# Patient Record
Sex: MALE | Race: WHITE | Employment: OTHER | URBAN - METROPOLITAN AREA
[De-identification: names, ages, dates, MRNs, and addresses within clinical notes are randomized per-mention and may not be internally consistent; named-entity substitution may affect disease eponyms.]

---

## 2017-10-31 ENCOUNTER — HOSPITAL ENCOUNTER (INPATIENT)
Age: 62
LOS: 6 days | Discharge: HOME | DRG: 853 | End: 2017-11-07
Attending: EMERGENCY MEDICINE | Admitting: INTERNAL MEDICINE
Payer: MEDICARE

## 2017-10-31 ENCOUNTER — APPOINTMENT (OUTPATIENT)
Dept: CT IMAGING | Age: 62
DRG: 853 | End: 2017-10-31
Payer: MEDICARE

## 2017-10-31 DIAGNOSIS — K52.9 ENTERITIS: ICD-10-CM

## 2017-10-31 DIAGNOSIS — R19.7 VOMITING AND DIARRHEA: Primary | ICD-10-CM

## 2017-10-31 DIAGNOSIS — D72.9 NEUTROPHILIA: ICD-10-CM

## 2017-10-31 DIAGNOSIS — Z94.4 LIVER TRANSPLANT RECIPIENT (HCC): ICD-10-CM

## 2017-10-31 DIAGNOSIS — R11.10 VOMITING AND DIARRHEA: Primary | ICD-10-CM

## 2017-10-31 LAB
ALBUMIN SERPL-MCNC: 4.3 G/DL (ref 3.9–4.9)
ALP BLD-CCNC: 136 U/L (ref 35–104)
ALT SERPL-CCNC: 48 U/L (ref 0–41)
ANION GAP SERPL CALCULATED.3IONS-SCNC: 36 MEQ/L (ref 7–13)
AST SERPL-CCNC: 22 U/L (ref 0–40)
BASOPHILS ABSOLUTE: 0 K/UL (ref 0–0.2)
BASOPHILS RELATIVE PERCENT: 0.2 %
BILIRUB SERPL-MCNC: 0.4 MG/DL (ref 0–1.2)
BUN BLDV-MCNC: 105 MG/DL (ref 8–23)
CALCIUM SERPL-MCNC: 7.6 MG/DL (ref 8.6–10.2)
CHLORIDE BLD-SCNC: 90 MEQ/L (ref 98–107)
CO2: 12 MEQ/L (ref 22–29)
CREAT SERPL-MCNC: 13.52 MG/DL (ref 0.7–1.2)
EOSINOPHILS ABSOLUTE: 0 K/UL (ref 0–0.7)
EOSINOPHILS RELATIVE PERCENT: 0.3 %
GFR AFRICAN AMERICAN: 4.5
GFR NON-AFRICAN AMERICAN: 3.7
GLOBULIN: 3.3 G/DL (ref 2.3–3.5)
GLUCOSE BLD-MCNC: 79 MG/DL (ref 74–109)
HCT VFR BLD CALC: 49.4 % (ref 42–52)
HEMOGLOBIN: 15.5 G/DL (ref 14–18)
LYMPHOCYTES ABSOLUTE: 0.8 K/UL (ref 1–4.8)
LYMPHOCYTES RELATIVE PERCENT: 5 %
MCH RBC QN AUTO: 29.4 PG (ref 27–31.3)
MCHC RBC AUTO-ENTMCNC: 31.4 % (ref 33–37)
MCV RBC AUTO: 93.7 FL (ref 80–100)
MONOCYTES ABSOLUTE: 1.2 K/UL (ref 0.2–0.8)
MONOCYTES RELATIVE PERCENT: 7.5 %
NEUTROPHILS ABSOLUTE: 14.2 K/UL (ref 1.4–6.5)
NEUTROPHILS RELATIVE PERCENT: 87 %
PDW BLD-RTO: 16.3 % (ref 11.5–14.5)
PLATELET # BLD: 274 K/UL (ref 130–400)
POTASSIUM SERPL-SCNC: 4.8 MEQ/L (ref 3.5–5.1)
RBC # BLD: 5.27 M/UL (ref 4.7–6.1)
SODIUM BLD-SCNC: 138 MEQ/L (ref 132–144)
TOTAL PROTEIN: 7.6 G/DL (ref 6.4–8.1)
WBC # BLD: 16.4 K/UL (ref 4.8–10.8)

## 2017-10-31 PROCEDURE — 80053 COMPREHEN METABOLIC PANEL: CPT

## 2017-10-31 PROCEDURE — 96372 THER/PROPH/DIAG INJ SC/IM: CPT

## 2017-10-31 PROCEDURE — 6360000002 HC RX W HCPCS: Performed by: NURSE PRACTITIONER

## 2017-10-31 PROCEDURE — 6370000000 HC RX 637 (ALT 250 FOR IP): Performed by: EMERGENCY MEDICINE

## 2017-10-31 PROCEDURE — 96361 HYDRATE IV INFUSION ADD-ON: CPT

## 2017-10-31 PROCEDURE — 6360000002 HC RX W HCPCS: Performed by: EMERGENCY MEDICINE

## 2017-10-31 PROCEDURE — 99285 EMERGENCY DEPT VISIT HI MDM: CPT

## 2017-10-31 PROCEDURE — G0378 HOSPITAL OBSERVATION PER HR: HCPCS

## 2017-10-31 PROCEDURE — 96375 TX/PRO/DX INJ NEW DRUG ADDON: CPT

## 2017-10-31 PROCEDURE — 96374 THER/PROPH/DIAG INJ IV PUSH: CPT

## 2017-10-31 PROCEDURE — 36415 COLL VENOUS BLD VENIPUNCTURE: CPT

## 2017-10-31 PROCEDURE — 6370000000 HC RX 637 (ALT 250 FOR IP): Performed by: NURSE PRACTITIONER

## 2017-10-31 PROCEDURE — 2580000003 HC RX 258: Performed by: EMERGENCY MEDICINE

## 2017-10-31 PROCEDURE — 2580000003 HC RX 258: Performed by: NURSE PRACTITIONER

## 2017-10-31 PROCEDURE — 85025 COMPLETE CBC W/AUTO DIFF WBC: CPT

## 2017-10-31 PROCEDURE — 87040 BLOOD CULTURE FOR BACTERIA: CPT

## 2017-10-31 PROCEDURE — 74176 CT ABD & PELVIS W/O CONTRAST: CPT

## 2017-10-31 RX ORDER — DICYCLOMINE HYDROCHLORIDE 10 MG/ML
20 INJECTION INTRAMUSCULAR ONCE
Status: COMPLETED | OUTPATIENT
Start: 2017-10-31 | End: 2017-10-31

## 2017-10-31 RX ORDER — SODIUM CHLORIDE 0.9 % (FLUSH) 0.9 %
10 SYRINGE (ML) INJECTION EVERY 12 HOURS SCHEDULED
Status: DISCONTINUED | OUTPATIENT
Start: 2017-10-31 | End: 2017-11-07 | Stop reason: HOSPADM

## 2017-10-31 RX ORDER — 0.9 % SODIUM CHLORIDE 0.9 %
500 INTRAVENOUS SOLUTION INTRAVENOUS ONCE
Status: COMPLETED | OUTPATIENT
Start: 2017-10-31 | End: 2017-10-31

## 2017-10-31 RX ORDER — IBUPROFEN 400 MG/1
400 TABLET ORAL EVERY 6 HOURS PRN
Status: DISCONTINUED | OUTPATIENT
Start: 2017-10-31 | End: 2017-11-04

## 2017-10-31 RX ORDER — ONDANSETRON 2 MG/ML
4 INJECTION INTRAMUSCULAR; INTRAVENOUS EVERY 6 HOURS PRN
Status: DISCONTINUED | OUTPATIENT
Start: 2017-10-31 | End: 2017-11-07 | Stop reason: HOSPADM

## 2017-10-31 RX ORDER — OXYCODONE HYDROCHLORIDE 5 MG/1
5 TABLET ORAL EVERY 4 HOURS PRN
Status: DISCONTINUED | OUTPATIENT
Start: 2017-10-31 | End: 2017-11-07 | Stop reason: HOSPADM

## 2017-10-31 RX ORDER — SODIUM CHLORIDE 9 MG/ML
INJECTION, SOLUTION INTRAVENOUS CONTINUOUS
Status: DISCONTINUED | OUTPATIENT
Start: 2017-10-31 | End: 2017-11-01

## 2017-10-31 RX ORDER — DIPHENOXYLATE HYDROCHLORIDE AND ATROPINE SULFATE 2.5; .025 MG/1; MG/1
2 TABLET ORAL ONCE
Status: COMPLETED | OUTPATIENT
Start: 2017-10-31 | End: 2017-10-31

## 2017-10-31 RX ORDER — ONDANSETRON 4 MG/1
4 TABLET, ORALLY DISINTEGRATING ORAL ONCE
Status: COMPLETED | OUTPATIENT
Start: 2017-10-31 | End: 2017-10-31

## 2017-10-31 RX ORDER — MORPHINE SULFATE 2 MG/ML
2 INJECTION, SOLUTION INTRAMUSCULAR; INTRAVENOUS EVERY 4 HOURS PRN
Status: DISCONTINUED | OUTPATIENT
Start: 2017-10-31 | End: 2017-11-07 | Stop reason: HOSPADM

## 2017-10-31 RX ORDER — ONDANSETRON 2 MG/ML
4 INJECTION INTRAMUSCULAR; INTRAVENOUS ONCE
Status: COMPLETED | OUTPATIENT
Start: 2017-10-31 | End: 2017-10-31

## 2017-10-31 RX ORDER — MORPHINE SULFATE 2 MG/ML
1 INJECTION, SOLUTION INTRAMUSCULAR; INTRAVENOUS
Status: DISCONTINUED | OUTPATIENT
Start: 2017-10-31 | End: 2017-11-07 | Stop reason: HOSPADM

## 2017-10-31 RX ORDER — 0.9 % SODIUM CHLORIDE 0.9 %
500 INTRAVENOUS SOLUTION INTRAVENOUS ONCE
Status: DISCONTINUED | OUTPATIENT
Start: 2017-10-31 | End: 2017-10-31

## 2017-10-31 RX ORDER — ACETAMINOPHEN 325 MG/1
650 TABLET ORAL EVERY 4 HOURS PRN
Status: DISCONTINUED | OUTPATIENT
Start: 2017-10-31 | End: 2017-11-02

## 2017-10-31 RX ORDER — SODIUM CHLORIDE 0.9 % (FLUSH) 0.9 %
10 SYRINGE (ML) INJECTION PRN
Status: DISCONTINUED | OUTPATIENT
Start: 2017-10-31 | End: 2017-11-07 | Stop reason: HOSPADM

## 2017-10-31 RX ORDER — OXYCODONE HYDROCHLORIDE 5 MG/1
2.5 TABLET ORAL EVERY 4 HOURS PRN
Status: DISCONTINUED | OUTPATIENT
Start: 2017-10-31 | End: 2017-11-07 | Stop reason: HOSPADM

## 2017-10-31 RX ADMIN — DICYCLOMINE HYDROCHLORIDE 20 MG: 20 INJECTION, SOLUTION INTRAMUSCULAR at 16:07

## 2017-10-31 RX ADMIN — ONDANSETRON 4 MG: 4 TABLET, ORALLY DISINTEGRATING ORAL at 12:50

## 2017-10-31 RX ADMIN — SODIUM CHLORIDE 500 ML: 9 INJECTION, SOLUTION INTRAVENOUS at 16:04

## 2017-10-31 RX ADMIN — OXYCODONE HYDROCHLORIDE 5 MG: 5 TABLET ORAL at 20:48

## 2017-10-31 RX ADMIN — SODIUM CHLORIDE: 9 INJECTION, SOLUTION INTRAVENOUS at 20:48

## 2017-10-31 RX ADMIN — ONDANSETRON 4 MG: 2 INJECTION INTRAMUSCULAR; INTRAVENOUS at 16:04

## 2017-10-31 RX ADMIN — MORPHINE SULFATE 2 MG: 2 INJECTION, SOLUTION INTRAMUSCULAR; INTRAVENOUS at 22:06

## 2017-10-31 RX ADMIN — SODIUM CHLORIDE, PRESERVATIVE FREE 10 ML: 5 INJECTION INTRAVENOUS at 20:48

## 2017-10-31 RX ADMIN — DIPHENOXYLATE HYDROCHLORIDE AND ATROPINE SULFATE 2 TABLET: 2.5; .025 TABLET ORAL at 16:04

## 2017-10-31 ASSESSMENT — ENCOUNTER SYMPTOMS
BACK PAIN: 0
COLOR CHANGE: 0
COUGH: 0
TROUBLE SWALLOWING: 0
EYE PAIN: 0
ANAL BLEEDING: 0
EYE DISCHARGE: 0
BLOOD IN STOOL: 0
VOICE CHANGE: 0
ABDOMINAL DISTENTION: 0
RHINORRHEA: 0
CHEST TIGHTNESS: 0
CHOKING: 0
WHEEZING: 0
FACIAL SWELLING: 0
SORE THROAT: 0
DIARRHEA: 1
CONSTIPATION: 0
ABDOMINAL PAIN: 0
PHOTOPHOBIA: 0
NAUSEA: 1
SINUS PRESSURE: 0
EYE REDNESS: 0
STRIDOR: 0
SHORTNESS OF BREATH: 0
VOMITING: 0
EYE ITCHING: 0

## 2017-10-31 ASSESSMENT — PAIN DESCRIPTION - DESCRIPTORS
DESCRIPTORS: DULL
DESCRIPTORS: SHARP

## 2017-10-31 ASSESSMENT — PAIN DESCRIPTION - PAIN TYPE
TYPE: ACUTE PAIN
TYPE: ACUTE PAIN

## 2017-10-31 ASSESSMENT — PAIN DESCRIPTION - LOCATION
LOCATION: ABDOMEN
LOCATION: GROIN;PELVIS

## 2017-10-31 ASSESSMENT — PAIN DESCRIPTION - ORIENTATION
ORIENTATION: MID
ORIENTATION: RIGHT

## 2017-10-31 ASSESSMENT — PAIN SCALES - GENERAL
PAINLEVEL_OUTOF10: 10
PAINLEVEL_OUTOF10: 10
PAINLEVEL_OUTOF10: 1

## 2017-10-31 ASSESSMENT — PAIN DESCRIPTION - FREQUENCY
FREQUENCY: INTERMITTENT
FREQUENCY: INTERMITTENT

## 2017-10-31 ASSESSMENT — PAIN DESCRIPTION - ONSET: ONSET: GRADUAL

## 2017-10-31 NOTE — H&P
Resp 16   Ht 5' 6\" (1.676 m)   Wt 127 lb (57.6 kg)   SpO2 98%   BMI 20.50 kg/m²     General appearance:  No apparent distress, appears stated age and cooperative. HEENT:  Normal cephalic, atraumatic without obvious deformity. Pupils equal, round, and reactive to light. Extra ocular muscles intact. Conjunctivae/corneas clear. Neck: Supple, with full range of motion. No jugular venous distention. Trachea midline. Respiratory:  Normal respiratory effort. Clear to auscultation, bilaterally without Rales/Wheezes/Rhonchi. Cardiovascular:  Regular rate and rhythm with normal S1/S2 without murmurs, rubs or gallops. Abdomen: Soft, non-tender, non-distended with normal bowel sounds. Musculoskeletal:  No clubbing, cyanosis or edema bilaterally. Full range of motion without deformity. Skin: Skin color, texture, turgor normal.  No rashes or lesions. Neurologic:  Neurovascularly intact without any focal sensory/motor deficits. Psychiatric:  Alert and oriented, thought content appropriate, normal insight  Capillary Refill: Brisk,< 3 seconds   Peripheral Pulses: +2 palpable, equal bilaterally   Right arm fistula bruit and thrill  Right anterior chest dialysis catheter    Labs:     Recent Labs      10/31/17   1303   WBC  16.4*   HGB  15.5   HCT  49.4   PLT  274     Recent Labs      10/31/17   1303   NA  138   K  4.8   CL  90*   CO2  12*   BUN  105*   CREATININE  13.52*   CALCIUM  7.6*     Recent Labs      10/31/17   1303   AST  22   ALT  48*   BILITOT  0.4   ALKPHOS  136*     No results for input(s): INR in the last 72 hours. No results for input(s): Sumi Dyerrid in the last 72 hours. Radiology:       CT ABDOMEN PELVIS WO IV CONTRAST Additional Contrast? None   Final Result   STATUS POST LIVER TRANSPLANT. SMALL, FAT-CONTAINING UMBILICAL HERNIA. DIARRHEA. NO ACUTE PROCESS.          All CT scans at this facility use dose modulation, iterative reconstruction, and/or weight based dosing when appropriate to reduce radiation dose to as low as reasonably achievable. ASSESSMENT:PLAN:    1. Diarrhea: culture stool  2. Intractable N/V: IV zofran prn  3. ESRD: nephrology consult, MWF dialysis  4. Liver transplant: continue prograf            DVT Prophylaxis: lovenox  Diet:    Code Status: No Order      Dispo - observation       Momo Camarena CNP    Thank you No primary care provider on file. for the opportunity to be involved in this patient's care. If you have any questions or concerns please feel free to contact me.

## 2017-10-31 NOTE — ED NOTES
Attempted to call report to 5w  No answer for over 10 min  Nursing supervisor notified     Garrett Riley [facility]  10/31/17 3251

## 2017-10-31 NOTE — ED PROVIDER NOTES
and vomiting. Patient complains of sharp pain in the right inguinal area. Endocrine: Negative for cold intolerance, heat intolerance, polydipsia and polyphagia. Genitourinary: Negative for decreased urine volume, difficulty urinating, dysuria, enuresis, flank pain, frequency, genital sores, hematuria and urgency. Musculoskeletal: Negative for arthralgias, back pain, gait problem, joint swelling, myalgias, neck pain and neck stiffness. Skin: Negative for color change, pallor, rash and wound. Allergic/Immunologic: Negative for environmental allergies and food allergies. Neurological: Negative for dizziness, tremors, seizures, syncope, facial asymmetry, speech difficulty, weakness, light-headedness, numbness and headaches. Hematological: Negative for adenopathy. Does not bruise/bleed easily. Psychiatric/Behavioral: Negative for agitation, behavioral problems, confusion, decreased concentration, dysphoric mood, hallucinations, self-injury, sleep disturbance and suicidal ideas. The patient is not nervous/anxious and is not hyperactive. Except as noted above the remainder of the review of systems was reviewed and negative. PAST MEDICAL HISTORY     Past Medical History:   Diagnosis Date    ESRD (end stage renal disease) ([facility] [address]     Hepatitis     Hypertension          SURGICAL HISTORY       Past Surgical History:   Procedure Laterality Date    DIALYSIS FISTULA CREATION Right     LIVER TRANSPLANT  03/08/2017    TUNNELED VENOUS PORT PLACEMENT           CURRENT MEDICATIONS       Previous Medications    TACROLIMUS (PROGRAF PO)    Take 1 tablet by mouth 2 times daily       ALLERGIES     Review of patient's allergies indicates no known allergies. FAMILY HISTORY     History reviewed. No pertinent family history.        SOCIAL HISTORY       Social History     Social History    Marital status: Single     Spouse name: N/A    Number of children: N/A    Years of education: N/A     Social JUSAU   CBC WITH AUTO DIFFERENTIAL - Abnormal; Notable for the following:     WBC 16.4 (*)     MCHC 31.4 (*)     RDW 16.3 (*)     Neutrophils # 14.2 (*)     Lymphocytes # 0.8 (*)     Monocytes # 1.2 (*)     All other components within normal limits   CULTURE BLOOD #1   CULTURE BLOOD #2       All other labs were within normal range or not returned as of this dictation. EMERGENCY DEPARTMENT COURSE and DIFFERENTIAL DIAGNOSIS/MDM:   Vitals:    Vitals:    10/31/17 1219 10/31/17 1458 10/31/17 1736   BP: 106/75 115/77 119/78   Pulse: 96 89 82   Resp: 18 16 16   Temp: 97.6 °F (36.4 °C)     TempSrc: Oral     SpO2: 98% 99% 98%   Weight: 127 lb (57.6 kg)     Height: 5' 6\" (1.676 m)         The patient was observed for approximately 5 hours in the emergency Department doesn't feel much better. He was gently hydrated given anti-medic can antidiarrheal and still doesn't feel really all that well is still complaining of the right lower inguinal pain which I told him this most likely a muscle strain. The patient will be admitted to the service of the hospitalist because his white count is 16,400 with a left shift he is on Prograf and has had a liver transplant in the past..    MDM      CRITICAL CARE TIME     CONSULTS:  IP CONSULT TO HOSPITALIST    PROCEDURES:  Unless otherwise noted below, none     Procedures    FINAL IMPRESSION      1. Vomiting and diarrhea    2. Neutrophilia    3. Enteritis    4. Liver transplant recipient [facility])          DISPOSITION/PLAN   DISPOSITION Decision to Admit    PATIENT REFERRED TO:  No follow-up provider specified.     DISCHARGE MEDICATIONS:  New Prescriptions    No medications on file          (Please note that portions of this note were completed with a voice recognition program.  Efforts were made to edit the dictations but occasionally words are mis-transcribed.)    Blanche Mei MD (electronically signed)  Attending Emergency Physician            Blanche Mei MD  10/31/17 7546

## 2017-11-01 LAB
ANION GAP SERPL CALCULATED.3IONS-SCNC: 38 MEQ/L (ref 7–13)
ANISOCYTOSIS: ABNORMAL
BASOPHILS ABSOLUTE: 0.2 K/UL (ref 0–0.2)
BASOPHILS RELATIVE PERCENT: 1 %
BUN BLDV-MCNC: 115 MG/DL (ref 8–23)
CALCIUM SERPL-MCNC: 6.8 MG/DL (ref 8.6–10.2)
CHLORIDE BLD-SCNC: 91 MEQ/L (ref 98–107)
CO2: 7 MEQ/L (ref 22–29)
CREAT SERPL-MCNC: 13.34 MG/DL (ref 0.7–1.2)
EOSINOPHILS ABSOLUTE: 0 K/UL (ref 0–0.7)
EOSINOPHILS RELATIVE PERCENT: 0.1 %
GFR AFRICAN AMERICAN: 4.6
GFR NON-AFRICAN AMERICAN: 3.8
GLUCOSE BLD-MCNC: 87 MG/DL (ref 74–109)
HCT VFR BLD CALC: 47.3 % (ref 42–52)
HEMOGLOBIN: 14.8 G/DL (ref 14–18)
HYPOCHROMIA: 0
LYMPHOCYTES ABSOLUTE: 1.3 K/UL (ref 1–4.8)
LYMPHOCYTES RELATIVE PERCENT: 7 %
MACROCYTES: 0
MCH RBC QN AUTO: 29.3 PG (ref 27–31.3)
MCHC RBC AUTO-ENTMCNC: 31.3 % (ref 33–37)
MCV RBC AUTO: 93.5 FL (ref 80–100)
MICROCYTES: 0
MONOCYTES ABSOLUTE: 1.7 K/UL (ref 0.2–0.8)
MONOCYTES RELATIVE PERCENT: 9.4 %
NEUTROPHILS ABSOLUTE: 15.4 K/UL (ref 1.4–6.5)
NEUTROPHILS RELATIVE PERCENT: 83 %
PDW BLD-RTO: 15.7 % (ref 11.5–14.5)
PLATELET # BLD: 240 K/UL (ref 130–400)
PLATELET SLIDE REVIEW: ADEQUATE
POIKILOCYTES: ABNORMAL
POLYCHROMASIA: 0
POTASSIUM SERPL-SCNC: 4.5 MEQ/L (ref 3.5–5.1)
RBC # BLD: 5.06 M/UL (ref 4.7–6.1)
SODIUM BLD-SCNC: 136 MEQ/L (ref 132–144)
WBC # BLD: 18.5 K/UL (ref 4.8–10.8)

## 2017-11-01 PROCEDURE — 6360000002 HC RX W HCPCS: Performed by: INTERNAL MEDICINE

## 2017-11-01 PROCEDURE — 1210000000 HC MED SURG R&B

## 2017-11-01 PROCEDURE — 80048 BASIC METABOLIC PNL TOTAL CA: CPT

## 2017-11-01 PROCEDURE — 36415 COLL VENOUS BLD VENIPUNCTURE: CPT

## 2017-11-01 PROCEDURE — 96361 HYDRATE IV INFUSION ADD-ON: CPT

## 2017-11-01 PROCEDURE — 5A1D70Z PERFORMANCE OF URINARY FILTRATION, INTERMITTENT, LESS THAN 6 HOURS PER DAY: ICD-10-PCS | Performed by: INTERNAL MEDICINE

## 2017-11-01 PROCEDURE — 6360000002 HC RX W HCPCS: Performed by: NURSE PRACTITIONER

## 2017-11-01 PROCEDURE — 6370000000 HC RX 637 (ALT 250 FOR IP): Performed by: NURSE PRACTITIONER

## 2017-11-01 PROCEDURE — 85025 COMPLETE CBC W/AUTO DIFF WBC: CPT

## 2017-11-01 PROCEDURE — 2580000003 HC RX 258: Performed by: NURSE PRACTITIONER

## 2017-11-01 PROCEDURE — 96376 TX/PRO/DX INJ SAME DRUG ADON: CPT

## 2017-11-01 RX ORDER — HEPARIN SODIUM 1000 [USP'U]/ML
1000 INJECTION, SOLUTION INTRAVENOUS; SUBCUTANEOUS PRN
Status: DISCONTINUED | OUTPATIENT
Start: 2017-11-01 | End: 2017-11-07 | Stop reason: HOSPADM

## 2017-11-01 RX ORDER — HEPARIN SODIUM 1000 [USP'U]/ML
2000 INJECTION, SOLUTION INTRAVENOUS; SUBCUTANEOUS PRN
Status: DISCONTINUED | OUTPATIENT
Start: 2017-11-01 | End: 2017-11-07 | Stop reason: HOSPADM

## 2017-11-01 RX ORDER — SODIUM CHLORIDE 9 MG/ML
INJECTION, SOLUTION INTRAVENOUS
Status: DISPENSED
Start: 2017-11-01 | End: 2017-11-02

## 2017-11-01 RX ORDER — HEPARIN SODIUM 5000 [USP'U]/ML
5000 INJECTION, SOLUTION INTRAVENOUS; SUBCUTANEOUS EVERY 8 HOURS SCHEDULED
Status: DISCONTINUED | OUTPATIENT
Start: 2017-11-01 | End: 2017-11-03

## 2017-11-01 RX ORDER — ALBUMIN (HUMAN) 12.5 G/50ML
25 SOLUTION INTRAVENOUS DAILY PRN
Status: DISCONTINUED | OUTPATIENT
Start: 2017-11-01 | End: 2017-11-07 | Stop reason: HOSPADM

## 2017-11-01 RX ORDER — HEPARIN SODIUM 1000 [USP'U]/ML
4000 INJECTION, SOLUTION INTRAVENOUS; SUBCUTANEOUS PRN
Status: DISCONTINUED | OUTPATIENT
Start: 2017-11-01 | End: 2017-11-07 | Stop reason: HOSPADM

## 2017-11-01 RX ORDER — TACROLIMUS 1 MG/1
1 CAPSULE ORAL 2 TIMES DAILY
Status: DISCONTINUED | OUTPATIENT
Start: 2017-11-01 | End: 2017-11-02 | Stop reason: SDUPTHER

## 2017-11-01 RX ADMIN — MORPHINE SULFATE 2 MG: 2 INJECTION, SOLUTION INTRAMUSCULAR; INTRAVENOUS at 04:02

## 2017-11-01 RX ADMIN — HEPARIN SODIUM 4000 UNITS: 1000 INJECTION, SOLUTION INTRAVENOUS; SUBCUTANEOUS at 20:01

## 2017-11-01 RX ADMIN — ONDANSETRON 4 MG: 2 INJECTION INTRAMUSCULAR; INTRAVENOUS at 04:02

## 2017-11-01 RX ADMIN — OXYCODONE HYDROCHLORIDE 5 MG: 5 TABLET ORAL at 11:34

## 2017-11-01 RX ADMIN — HEPARIN SODIUM 2000 UNITS: 1000 INJECTION, SOLUTION INTRAVENOUS; SUBCUTANEOUS at 20:00

## 2017-11-01 RX ADMIN — HEPARIN SODIUM 1000 UNITS: 1000 INJECTION, SOLUTION INTRAVENOUS; SUBCUTANEOUS at 20:00

## 2017-11-01 RX ADMIN — HEPARIN SODIUM 5000 UNITS: 5000 INJECTION, SOLUTION INTRAVENOUS; SUBCUTANEOUS at 21:05

## 2017-11-01 RX ADMIN — ONDANSETRON 4 MG: 2 INJECTION INTRAMUSCULAR; INTRAVENOUS at 11:33

## 2017-11-01 RX ADMIN — SODIUM CHLORIDE: 9 INJECTION, SOLUTION INTRAVENOUS at 12:22

## 2017-11-01 RX ADMIN — SODIUM CHLORIDE, PRESERVATIVE FREE 10 ML: 5 INJECTION INTRAVENOUS at 21:06

## 2017-11-01 RX ADMIN — TACROLIMUS 1 MG: 1 CAPSULE ORAL at 21:05

## 2017-11-01 ASSESSMENT — PAIN SCALES - GENERAL
PAINLEVEL_OUTOF10: 0
PAINLEVEL_OUTOF10: 8
PAINLEVEL_OUTOF10: 10

## 2017-11-01 NOTE — CARE COORDINATION
LSW spoke to Pt, he said he is still sick. He said he is from home alone and has dtr and sister support. Pt stated he drives to and from dialysis. .Electronically signed by AL Antonio on 11/1/2017 at 2:44 PM

## 2017-11-01 NOTE — PROGRESS NOTES
Pt c/o nausea and l/s pain. Will medicate pt per MAR.  Electronically signed by Severo Martell RN on 11/1/2017 at 12:36 PM

## 2017-11-01 NOTE — CONSULTS
meds  3- f/u Gi work up  4- hd today      Thank you for the consultation. Please do not hesitate to call with questions.     Vale Luis

## 2017-11-01 NOTE — PROGRESS NOTES
Hospitalist Progress Note      PCP: No primary care provider on file. Date of Admission: 10/31/2017      Subjective:     Medications:  Reviewed    Infusion Medications    sodium chloride 75 mL/hr at 17 1222     Scheduled Medications    heparin (porcine)  5,000 Units Subcutaneous 3 times per day    sodium chloride flush  10 mL Intravenous 2 times per day    influenza virus vaccine  0.5 mL Intramuscular Once     PRN Meds: albumin human, heparin (porcine), heparin (porcine), heparin (porcine), sodium chloride flush, acetaminophen, ibuprofen, oxyCODONE **OR** oxyCODONE, morphine **OR** morphine, magnesium hydroxide, ondansetron      Intake/Output Summary (Last 24 hours) at 17 1227  Last data filed at 17 0649   Gross per 24 hour   Intake              755 ml   Output                0 ml   Net              755 ml       Exam:  BP (!) 179/86   Pulse 97   Temp 97 °F (36.1 °C) (Oral)   Resp 16   Ht 5' 6\" (1.676 m)   Wt 127 lb (57.6 kg)   SpO2 100%   BMI 20.50 kg/m²     Average, Min, and Max for last 24 hours Vitals:  TEMPERATURE:  Temp  Av.3 °F (36.3 °C)  Min: 97 °F (36.1 °C)  Max: 97.5 °F (36.4 °C)    RESPIRATIONS RANGE: Resp  Av  Min: 16  Max: 16    PULSE RANGE: Pulse  Av.5  Min: 82  Max: 97    BLOOD PRESSURE RANGE:  Systolic (42CJV), NXT:849 , Min:115 , XZR:276   ; Diastolic (98EJL), RWZ:37, Min:77, Max:86      PULSE OXIMETRY RANGE: SpO2  Av %  Min: 98 %  Max: 100 %    I/O last 3 completed shifts: In: 859 [P.O.:120; I.V.:635]  Out: -     General appearance: No apparent distress, appears stated age and cooperative. HEENT: Pupils equal, round, and reactive to light. Conjunctivae/corneas clear. Neck: Supple, with full range of motion. No jugular venous distention. Trachea midline. Respiratory:  Normal respiratory effort. Clear to auscultation, bilaterally without Rales/Wheezes/Rhonchi.   Cardiovascular: Regular rate and rhythm with normal S1/S2 without murmurs, rubs or

## 2017-11-01 NOTE — PROGRESS NOTES
Pt c/o pain in the right inguinal area as well as nausea. pt has not vomited or had bm today. Will medicate pt per MAR. Pt denies any other needs. Will continue to monitor.  Electronically signed by Yenny Anderson RN on 11/1/2017 at 3:40 PM

## 2017-11-01 NOTE — PROGRESS NOTES
Pharmacy Dose Adjustment Per Protocol    Cynthia Chacko is a 60 y.o. male. Recent Labs      10/31/17   1303   BUN  105*       Recent Labs      10/31/17   1303   CREATININE  13.52*       Estimated Creatinine Clearance: 5 mL/min (based on SCr of 13.52 mg/dL). Height:   Ht Readings from Last 1 Encounters:   10/31/17 5' 6\" (1.676 m)     Weight:  Wt Readings from Last 1 Encounters:   10/31/17 127 lb (57.6 kg)         Drug Ordered Therapeutic Interchange (CrCL ?  30 mL/min)    Enoxaparin 40 mg subq daily Enoxaparin 30 mg subq daily    Enoxaparin 1 mg/kg subq BID Enoxaparin ________ (1 mg/kg) subq daily     Enoxaparin 30 subq BID Enoxaparin 30 mg subq daily

## 2017-11-01 NOTE — PROGRESS NOTES
Called [facility] to get a list of pt meds. Pt states that he does not know all of his medications and that he does not know anyone to get his medications.  Electronically signed by Yenny Anderson RN on 11/1/2017 at 11:05 AM

## 2017-11-02 PROCEDURE — 87506 IADNA-DNA/RNA PROBE TQ 6-11: CPT

## 2017-11-02 PROCEDURE — 2580000003 HC RX 258: Performed by: NURSE PRACTITIONER

## 2017-11-02 PROCEDURE — 6360000002 HC RX W HCPCS: Performed by: INTERNAL MEDICINE

## 2017-11-02 PROCEDURE — 2580000003 HC RX 258: Performed by: INTERNAL MEDICINE

## 2017-11-02 PROCEDURE — 2000000000 HC ICU R&B

## 2017-11-02 PROCEDURE — 6370000000 HC RX 637 (ALT 250 FOR IP): Performed by: INTERNAL MEDICINE

## 2017-11-02 PROCEDURE — 6360000002 HC RX W HCPCS: Performed by: NURSE PRACTITIONER

## 2017-11-02 PROCEDURE — 99221 1ST HOSP IP/OBS SF/LOW 40: CPT | Performed by: SURGERY

## 2017-11-02 RX ORDER — TACROLIMUS 1 MG/1
1 CAPSULE ORAL 2 TIMES DAILY
Status: DISCONTINUED | OUTPATIENT
Start: 2017-11-02 | End: 2017-11-07 | Stop reason: HOSPADM

## 2017-11-02 RX ORDER — LISINOPRIL 20 MG/1
40 TABLET ORAL DAILY
Status: DISCONTINUED | OUTPATIENT
Start: 2017-11-02 | End: 2017-11-02

## 2017-11-02 RX ORDER — SULFAMETHOXAZOLE AND TRIMETHOPRIM 800; 160 MG/1; MG/1
1 TABLET ORAL DAILY
COMMUNITY
End: 2018-12-20

## 2017-11-02 RX ORDER — LISINOPRIL 40 MG/1
40 TABLET ORAL DAILY
Status: ON HOLD | COMMUNITY
End: 2017-11-07 | Stop reason: HOSPADM

## 2017-11-02 RX ORDER — PREDNISONE 1 MG/1
10 TABLET ORAL DAILY
Status: DISCONTINUED | OUTPATIENT
Start: 2017-11-02 | End: 2017-11-03

## 2017-11-02 RX ORDER — SULFAMETHOXAZOLE AND TRIMETHOPRIM 800; 160 MG/1; MG/1
1 TABLET ORAL DAILY
Status: DISCONTINUED | OUTPATIENT
Start: 2017-11-02 | End: 2017-11-04

## 2017-11-02 RX ORDER — 0.9 % SODIUM CHLORIDE 0.9 %
500 INTRAVENOUS SOLUTION INTRAVENOUS ONCE
Status: DISCONTINUED | OUTPATIENT
Start: 2017-11-03 | End: 2017-11-02

## 2017-11-02 RX ORDER — CARVEDILOL 12.5 MG/1
12.5 TABLET ORAL 2 TIMES DAILY
Status: ON HOLD | COMMUNITY
End: 2017-11-07 | Stop reason: HOSPADM

## 2017-11-02 RX ORDER — PREDNISONE 10 MG/1
10 TABLET ORAL DAILY
Status: ON HOLD | COMMUNITY
End: 2017-11-07 | Stop reason: HOSPADM

## 2017-11-02 RX ORDER — SODIUM CHLORIDE 9 MG/ML
INJECTION, SOLUTION INTRAVENOUS CONTINUOUS
Status: DISCONTINUED | OUTPATIENT
Start: 2017-11-03 | End: 2017-11-07 | Stop reason: HOSPADM

## 2017-11-02 RX ORDER — MYCOPHENOLATE MOFETIL 500 MG/1
500 TABLET ORAL 2 TIMES DAILY
COMMUNITY
End: 2018-12-20

## 2017-11-02 RX ORDER — ACETAMINOPHEN 325 MG/1
650 TABLET ORAL EVERY 8 HOURS PRN
Status: DISCONTINUED | OUTPATIENT
Start: 2017-11-02 | End: 2017-11-07 | Stop reason: HOSPADM

## 2017-11-02 RX ORDER — CARVEDILOL 12.5 MG/1
12.5 TABLET ORAL 2 TIMES DAILY
Status: DISCONTINUED | OUTPATIENT
Start: 2017-11-02 | End: 2017-11-03

## 2017-11-02 RX ORDER — MYCOPHENOLATE MOFETIL 250 MG/1
500 CAPSULE ORAL 2 TIMES DAILY
Status: DISCONTINUED | OUTPATIENT
Start: 2017-11-02 | End: 2017-11-07 | Stop reason: HOSPADM

## 2017-11-02 RX ORDER — SODIUM CHLORIDE 9 MG/ML
INJECTION, SOLUTION INTRAVENOUS CONTINUOUS
Status: DISCONTINUED | OUTPATIENT
Start: 2017-11-02 | End: 2017-11-03

## 2017-11-02 RX ORDER — 0.9 % SODIUM CHLORIDE 0.9 %
500 INTRAVENOUS SOLUTION INTRAVENOUS ONCE
Status: COMPLETED | OUTPATIENT
Start: 2017-11-02 | End: 2017-11-02

## 2017-11-02 RX ORDER — 0.9 % SODIUM CHLORIDE 0.9 %
500 INTRAVENOUS SOLUTION INTRAVENOUS ONCE
Status: COMPLETED | OUTPATIENT
Start: 2017-11-03 | End: 2017-11-03

## 2017-11-02 RX ADMIN — SODIUM CHLORIDE, PRESERVATIVE FREE 10 ML: 5 INJECTION INTRAVENOUS at 08:18

## 2017-11-02 RX ADMIN — CALCIUM ACETATE 667 MG: 667 CAPSULE ORAL at 08:16

## 2017-11-02 RX ADMIN — PREDNISONE 10 MG: 5 TABLET ORAL at 08:17

## 2017-11-02 RX ADMIN — TACROLIMUS 1 MG: 1 CAPSULE ORAL at 21:25

## 2017-11-02 RX ADMIN — HEPARIN SODIUM 5000 UNITS: 5000 INJECTION, SOLUTION INTRAVENOUS; SUBCUTANEOUS at 13:20

## 2017-11-02 RX ADMIN — MYCOPHENOLATE MOFETIL 500 MG: 250 CAPSULE ORAL at 21:25

## 2017-11-02 RX ADMIN — TACROLIMUS 1 MG: 1 CAPSULE ORAL at 08:16

## 2017-11-02 RX ADMIN — ONDANSETRON 4 MG: 2 INJECTION INTRAMUSCULAR; INTRAVENOUS at 09:45

## 2017-11-02 RX ADMIN — SODIUM CHLORIDE, PRESERVATIVE FREE 10 ML: 5 INJECTION INTRAVENOUS at 21:25

## 2017-11-02 RX ADMIN — HEPARIN SODIUM 5000 UNITS: 5000 INJECTION, SOLUTION INTRAVENOUS; SUBCUTANEOUS at 06:51

## 2017-11-02 RX ADMIN — SODIUM CHLORIDE 500 ML: 9 INJECTION, SOLUTION INTRAVENOUS at 09:45

## 2017-11-02 RX ADMIN — MYCOPHENOLATE MOFETIL 500 MG: 250 CAPSULE ORAL at 08:16

## 2017-11-02 RX ADMIN — SODIUM CHLORIDE: 9 INJECTION, SOLUTION INTRAVENOUS at 13:20

## 2017-11-02 RX ADMIN — SULFAMETHOXAZOLE AND TRIMETHOPRIM 1 TABLET: 800; 160 TABLET ORAL at 08:16

## 2017-11-02 RX ADMIN — HEPARIN SODIUM 5000 UNITS: 5000 INJECTION, SOLUTION INTRAVENOUS; SUBCUTANEOUS at 21:24

## 2017-11-02 RX ADMIN — CALCIUM ACETATE 667 MG: 667 CAPSULE ORAL at 18:16

## 2017-11-02 ASSESSMENT — PAIN SCALES - GENERAL
PAINLEVEL_OUTOF10: 0
PAINLEVEL_OUTOF10: 0

## 2017-11-02 NOTE — PROGRESS NOTES
Renal Progress Note    Assessment and Plan:    59-year-old man with end-stage renal disease due to complications from liver transplantation with the diarrhea. He has not had dialysis about 5 days. Had hd yesterday. Still c/o some diarrhea. Has leukocytosis .     Plan/  1- held ivf yesterday  2- HD yesterday and HD tomorrow  3- on prograf, cellcept prednisone from liver transplant standpoint  4- stop lisinopril as bp running low         There is no problem list on file for this patient. Subjective:   Admit Date: 10/31/2017    Interval History: still c/o diarrhea. No cp. No sob. No fevers      Medications:   Scheduled Meds:   calcium acetate  667 mg Oral TID WC    carvedilol  12.5 mg Oral BID    lisinopril  40 mg Oral Daily    mycophenolate  500 mg Oral BID    predniSONE  10 mg Oral Daily    sulfamethoxazole-trimethoprim  1 tablet Oral Daily    tacrolimus  1 mg Oral BID    heparin (porcine)  5,000 Units Subcutaneous 3 times per day    sodium chloride flush  10 mL Intravenous 2 times per day    influenza virus vaccine  0.5 mL Intramuscular Once     Continuous Infusions:   sodium chloride 100 mL/hr at 11/02/17 1320       CBC:   Recent Labs      10/31/17   1303  11/01/17   0545   WBC  16.4*  18.5*   HGB  15.5  14.8   PLT  274  240     CMP:  Recent Labs      10/31/17   1303  11/01/17   0545   NA  138  136   K  4.8  4.5   CL  90*  91*   CO2  12*  7*   BUN  105*  115*   CREATININE  13.52*  13.34*   GLUCOSE  79  87   CALCIUM  7.6*  6.8*   LABGLOM  3.7*  3.8*     Troponin: No results for input(s): TROPONINI in the last 72 hours. BNP: No results for input(s): BNP in the last 72 hours. INR: No results for input(s): INR in the last 72 hours. Lipids: No results for input(s): CHOL, LDLDIRECT, TRIG, HDL, AMYLASE, LIPASE in the last 72 hours.   Liver: Recent Labs      10/31/17   1303   AST  22   ALT  48*   ALKPHOS  136*   PROT  7.6   LABALBU  4.3   BILITOT  0.4     Iron:  No results for input(s): IRONS,

## 2017-11-02 NOTE — PLAN OF CARE
Problem: Infection:  Goal: Will remain free from infection  Will remain free from infection   Outcome: Ongoing      Problem: Safety:  Goal: Free from accidental physical injury  Free from accidental physical injury   Outcome: Ongoing    Goal: Free from intentional harm  Free from intentional harm   Outcome: Ongoing      Problem: Daily Care:  Goal: Daily care needs are met  Daily care needs are met   Outcome: Ongoing      Problem: Pain:  Goal: Patient's pain/discomfort is manageable  Patient's pain/discomfort is manageable   Outcome: Ongoing    Goal: Pain level will decrease  Pain level will decrease   Outcome: Ongoing    Goal: Control of acute pain  Control of acute pain   Outcome: Ongoing    Goal: Control of chronic pain  Control of chronic pain   Outcome: Ongoing      Problem: Discharge Planning:  Goal: Patients continuum of care needs are met  Patients continuum of care needs are met   Outcome: Ongoing

## 2017-11-02 NOTE — CONSULTS
Ashley Chatterjee [address]                     [address], [address] [facility]                                 CONSULTATION    PATIENT NAME: Zarina Myers                   :             1955  MED REC NO:   [id number]                           ROOM:           [id number]  ACCOUNT NO:   [de-identified]                          ADMISSION DATE:  10/31/2017  PROVIDER:     Geovanna Hayden MD    CONSULT DATE:  2017    REASON FOR CONSULTATION:  Abdominal pain and diarrhea. HISTORY OF PRESENT ILLNESS:  The patient is a 60-year-old male who was  admitted because of pain in the right lower abdomen and also had  diarrhea. Symptoms started about a week ago. He also had some nausea  earlier and 2 days ago, he had an episode of vomiting. The patient  reports vomiting brownish fluid. He had some watery diarrhea and the  last few days he has not had any bowel movement. No history of any  recent travel. No history of any antibiotics use. No prior history  of any chronic diarrhea. PAST MEDICAL HISTORY:  Includes history of liver transplantation  sometime in 2016 for cirrhosis secondary to chronic hepatitis C  and also history of chronic renal failure, on hemodialysis. He also  has history of hypertension. PAST SURGICAL HISTORY:  Includes history of liver transplantation. HOME MEDICATIONS:  The patient is on two anti-rejection medications. He only remembers one, Prograf, other medication name he does not  recall. He reports no history of any rejections. He has not had any  complication since transplantation done in March. SOCIAL HISTORY:  He has history of smoking, does not drink any  alcohol. REVIEW OF SYSTEM:  Nothing. No history of any fever or chills. No  history of any significant weight loss. No urinary symptoms. PHYSICAL EXAMINATION:  GENERAL:  A 60-year-old male who is in mild discomfort because of  pain. VITAL SIGNS:  He is afebrile.   Vital signs were stable. HEENT:  Oropharynx is slightly dry. HEART:  S1, S2, regular. ABDOMEN:  Nondistended. Surgical scar is seen in the abdomen, also  has small reducible umbilical hernia noted. The patient has  tenderness in the right inguinal area. No definite tenderness ______  seen in the abdomen. I was not able to see any visible hernia. GENITOURINARY:  Scrotal exam was grossly normal.  No tenderness or any  palpable mass seen. The patient had a CT of the abdomen and pelvis done that showed status  post liver transplantation and a small umbilical hernia, otherwise is  unremarkable. No bowel wall thickening was noted. His lab shows a white count of 18.5, hemoglobin and hematocrit are  14.8 and 47.3, platelet count is 638,148. Albumin is 4.3, globulin is  3.3, alk phos is 136, AST 22, ALT 48, bilirubin is 0.4. Sodium 136,  potassium 4.5, chloride is 91, CO2 of 7, BUN is 115, creatinine is  13.34. The patient has not had any bowel movement, so we were not able to do  any stool studies. IMPRESSION:  A 69-year-old male with history of pain in the right  inguinal area and had transient diarrhea, has also had some nausea and  vomiting. CT scan shows no signs of any bowel obstruction or colitis. One has to wonder whether the patient may have any inguinal or femoral  hernia or any issue with inguinal or femoral hernia. No signs of any  colitis so far. My suggestion would be to get a surgical evaluation  and stool studies will be done when the patient has a bowel movement.         Anu Farley MD    D: 11/01/2017 14:14:47       T: 11/01/2017 14:31:45     [email or web address]  Job#: [id number]     Doc#: [id number]    CC:  MD Gerard Gu MD

## 2017-11-02 NOTE — CONSULTS
A 3 cm umbilical hernia is noted. No mention of inguinal hernias is noted and I do not appreciate a right inguinal hernia either on my review of the films. There are pins present in the right femur. IMP: right groin pain of unclear etiology. There is no hernia present in the right groin. There is a chronically incarcerated [facility] noted. This is not tender    PLAN: No general surgical issues. He has hardware in the right hip and significant arthritis involving the lumbar spine on the CT so the pain may be musculoskeletal in nature.

## 2017-11-02 NOTE — CARE COORDINATION
MEDICARE IMM REVIEWED WITH PATIENT AND SIGNATURE OBTAINED. PLAN REMAINS HOME AT DISCHARGE. HAVING ISSUES WITH HYPOTENSION TODAY. WILL FOLLOW.   Electronically signed by Caleb Borjas RN on 11/2/17 at 4:35 PM

## 2017-11-02 NOTE — PROGRESS NOTES
Vitals and assessment completed. Patient is alert and oriented. Stating he is feeling much better since he went down for dialysis. Denies any SOB or pain at this time. Denies feeling nauseous. Will continue to monitor. Call light is within reach.   Electronically signed by Marge Finch RN on 11/1/2017 at 9:00 PM

## 2017-11-03 PROBLEM — I95.9 HYPOTENSION: Status: ACTIVE | Noted: 2017-11-03

## 2017-11-03 PROBLEM — Z94.4 LIVER TRANSPLANTED (HCC): Status: ACTIVE | Noted: 2017-11-03

## 2017-11-03 PROBLEM — N18.6 ESRD (END STAGE RENAL DISEASE) (HCC): Status: ACTIVE | Noted: 2017-11-03

## 2017-11-03 LAB
ANION GAP SERPL CALCULATED.3IONS-SCNC: 30 MEQ/L (ref 7–13)
BASOPHILS ABSOLUTE: 0 K/UL (ref 0–0.2)
BASOPHILS RELATIVE PERCENT: 0.1 %
BUN BLDV-MCNC: 65 MG/DL (ref 8–23)
CALCIUM SERPL-MCNC: 7 MG/DL (ref 8.6–10.2)
CHLORIDE BLD-SCNC: 96 MEQ/L (ref 98–107)
CO2: 13 MEQ/L (ref 22–29)
CREAT SERPL-MCNC: 10 MG/DL (ref 0.7–1.2)
EOSINOPHILS ABSOLUTE: 0.1 K/UL (ref 0–0.7)
EOSINOPHILS RELATIVE PERCENT: 0.4 %
GFR AFRICAN AMERICAN: 6.4
GFR NON-AFRICAN AMERICAN: 5.3
GI BACTERIAL PATHOGENS BY PCR: NORMAL
GLUCOSE BLD-MCNC: 85 MG/DL (ref 74–109)
HCT VFR BLD CALC: 40.9 % (ref 42–52)
HCT VFR BLD CALC: 41.8 % (ref 42–52)
HEMOGLOBIN: 12.9 G/DL (ref 14–18)
HEMOGLOBIN: 13 G/DL (ref 14–18)
LYMPHOCYTES ABSOLUTE: 0.9 K/UL (ref 1–4.8)
LYMPHOCYTES RELATIVE PERCENT: 3.8 %
MCH RBC QN AUTO: 28.8 PG (ref 27–31.3)
MCH RBC QN AUTO: 29.2 PG (ref 27–31.3)
MCHC RBC AUTO-ENTMCNC: 31.1 % (ref 33–37)
MCHC RBC AUTO-ENTMCNC: 31.5 % (ref 33–37)
MCV RBC AUTO: 92.6 FL (ref 80–100)
MCV RBC AUTO: 92.8 FL (ref 80–100)
MONOCYTES ABSOLUTE: 2.1 K/UL (ref 0.2–0.8)
MONOCYTES RELATIVE PERCENT: 8.8 %
NEUTROPHILS ABSOLUTE: 20.5 K/UL (ref 1.4–6.5)
NEUTROPHILS RELATIVE PERCENT: 86.9 %
PDW BLD-RTO: 15.8 % (ref 11.5–14.5)
PDW BLD-RTO: 16 % (ref 11.5–14.5)
PHOSPHORUS: 7.8 MG/DL (ref 2.5–4.5)
PLATELET # BLD: 164 K/UL (ref 130–400)
PLATELET # BLD: 213 K/UL (ref 130–400)
POTASSIUM SERPL-SCNC: 3.4 MEQ/L (ref 3.5–5.1)
RBC # BLD: 4.41 M/UL (ref 4.7–6.1)
RBC # BLD: 4.52 M/UL (ref 4.7–6.1)
SODIUM BLD-SCNC: 139 MEQ/L (ref 132–144)
WBC # BLD: 19.8 K/UL (ref 4.8–10.8)
WBC # BLD: 23.7 K/UL (ref 4.8–10.8)

## 2017-11-03 PROCEDURE — 6370000000 HC RX 637 (ALT 250 FOR IP): Performed by: INTERNAL MEDICINE

## 2017-11-03 PROCEDURE — 2580000003 HC RX 258: Performed by: INTERNAL MEDICINE

## 2017-11-03 PROCEDURE — 6360000002 HC RX W HCPCS: Performed by: ANESTHESIOLOGY

## 2017-11-03 PROCEDURE — 6360000002 HC RX W HCPCS: Performed by: INTERNAL MEDICINE

## 2017-11-03 PROCEDURE — 80048 BASIC METABOLIC PNL TOTAL CA: CPT

## 2017-11-03 PROCEDURE — 2000000000 HC ICU R&B

## 2017-11-03 PROCEDURE — 99233 SBSQ HOSP IP/OBS HIGH 50: CPT | Performed by: ANESTHESIOLOGY

## 2017-11-03 PROCEDURE — 85025 COMPLETE CBC W/AUTO DIFF WBC: CPT

## 2017-11-03 PROCEDURE — 2500000003 HC RX 250 WO HCPCS: Performed by: INTERNAL MEDICINE

## 2017-11-03 PROCEDURE — 36415 COLL VENOUS BLD VENIPUNCTURE: CPT

## 2017-11-03 PROCEDURE — 6370000000 HC RX 637 (ALT 250 FOR IP): Performed by: ANESTHESIOLOGY

## 2017-11-03 PROCEDURE — 2580000003 HC RX 258: Performed by: NURSE PRACTITIONER

## 2017-11-03 PROCEDURE — 85027 COMPLETE CBC AUTOMATED: CPT

## 2017-11-03 PROCEDURE — 99222 1ST HOSP IP/OBS MODERATE 55: CPT | Performed by: INTERNAL MEDICINE

## 2017-11-03 PROCEDURE — 84100 ASSAY OF PHOSPHORUS: CPT

## 2017-11-03 PROCEDURE — 87493 C DIFF AMPLIFIED PROBE: CPT

## 2017-11-03 RX ORDER — VANCOMYCIN HYDROCHLORIDE 1 G/200ML
1000 INJECTION, SOLUTION INTRAVENOUS ONCE
Status: COMPLETED | OUTPATIENT
Start: 2017-11-03 | End: 2017-11-03

## 2017-11-03 RX ORDER — ACETAMINOPHEN 80 MG
TABLET,CHEWABLE ORAL ONCE
Status: COMPLETED | OUTPATIENT
Start: 2017-11-03 | End: 2017-11-03

## 2017-11-03 RX ADMIN — CARVEDILOL 12.5 MG: 12.5 TABLET, FILM COATED ORAL at 08:32

## 2017-11-03 RX ADMIN — CALCIUM ACETATE 667 MG: 667 CAPSULE ORAL at 14:53

## 2017-11-03 RX ADMIN — HYDROCORTISONE SODIUM SUCCINATE 50 MG: 100 INJECTION, POWDER, FOR SOLUTION INTRAMUSCULAR; INTRAVENOUS at 22:31

## 2017-11-03 RX ADMIN — CALCIUM ACETATE 667 MG: 667 CAPSULE ORAL at 08:32

## 2017-11-03 RX ADMIN — SODIUM CHLORIDE, PRESERVATIVE FREE 10 ML: 5 INJECTION INTRAVENOUS at 08:32

## 2017-11-03 RX ADMIN — Medication: at 14:56

## 2017-11-03 RX ADMIN — SODIUM CHLORIDE 500 ML: 9 INJECTION, SOLUTION INTRAVENOUS at 00:02

## 2017-11-03 RX ADMIN — TAZOBACTAM SODIUM AND PIPERACILLIN SODIUM 3.38 G: 375; 3 INJECTION, SOLUTION INTRAVENOUS at 05:09

## 2017-11-03 RX ADMIN — CALCIUM ACETATE 667 MG: 667 CAPSULE ORAL at 16:36

## 2017-11-03 RX ADMIN — PREDNISONE 10 MG: 5 TABLET ORAL at 08:37

## 2017-11-03 RX ADMIN — MYCOPHENOLATE MOFETIL 500 MG: 250 CAPSULE ORAL at 08:32

## 2017-11-03 RX ADMIN — TACROLIMUS 1 MG: 1 CAPSULE ORAL at 22:31

## 2017-11-03 RX ADMIN — TAZOBACTAM SODIUM AND PIPERACILLIN SODIUM 3.38 G: 375; 3 INJECTION, SOLUTION INTRAVENOUS at 16:36

## 2017-11-03 RX ADMIN — Medication 125 MG: at 18:53

## 2017-11-03 RX ADMIN — Medication 125 MG: at 23:55

## 2017-11-03 RX ADMIN — SODIUM CHLORIDE, PRESERVATIVE FREE 10 ML: 5 INJECTION INTRAVENOUS at 22:32

## 2017-11-03 RX ADMIN — SULFAMETHOXAZOLE AND TRIMETHOPRIM 1 TABLET: 800; 160 TABLET ORAL at 08:32

## 2017-11-03 RX ADMIN — TACROLIMUS 1 MG: 1 CAPSULE ORAL at 08:32

## 2017-11-03 RX ADMIN — Medication 5 MCG/MIN: at 01:16

## 2017-11-03 RX ADMIN — VANCOMYCIN HYDROCHLORIDE 1000 MG: 1 INJECTION, SOLUTION INTRAVENOUS at 08:32

## 2017-11-03 RX ADMIN — MYCOPHENOLATE MOFETIL 500 MG: 250 CAPSULE ORAL at 22:31

## 2017-11-03 RX ADMIN — HYDROCORTISONE SODIUM SUCCINATE 50 MG: 100 INJECTION, POWDER, FOR SOLUTION INTRAMUSCULAR; INTRAVENOUS at 14:59

## 2017-11-03 ASSESSMENT — ENCOUNTER SYMPTOMS
GASTROINTESTINAL NEGATIVE: 1
RESPIRATORY NEGATIVE: 1

## 2017-11-03 ASSESSMENT — PAIN SCALES - GENERAL
PAINLEVEL_OUTOF10: 0

## 2017-11-03 NOTE — PROGRESS NOTES
Patient resting comfortably in bed. Patient repositioned himself. No skin breakdown noted. Hospital provided Tablet at bedside.

## 2017-11-03 NOTE — CONSULTS
Consults   Infectious Disease     Patient Name: Cynthia Chacko  Date: 11/3/2017  YOB: 1955  Medical Record Number: [id number]      Sepsis. History of Present Illness:    10/31 right groin pain and abdominal pain x 7 days pain is sharp persistent  Nausea vomiting diarrhea  On hemodialysis    Liver transplant on Prograf and CellCept and prednisone    11/3/17 Pain has continued since admission persistent leukocytosis developing hypotension requiring transfer to the intensive care unit placed on Levaquin    Currently on vancomycin IV  and vancomycin by mouth along with Zosyn      Stool negative for DNA for enteric pathogens  Stool for C. diff pending    Review of Systems   Constitutional: Positive for malaise/fatigue. Negative for chills and fever. HENT: Negative. Respiratory: Negative. Cardiovascular: Negative. Gastrointestinal: Negative. Genitourinary: Negative. Musculoskeletal: Negative. Skin: Negative. Neurological: Negative. Review of Systems: All 14 review of systems negative other than as stated above    Social History   Substance Use Topics    Smoking status: Current Every Day Smoker     Types: Cigarettes    Smokeless tobacco: Not on file    Alcohol use No         Past Medical History:   Diagnosis Date    ESRD (end stage renal disease) ([facility] [address]     Hepatitis     Hypertension            Past Surgical History:   Procedure Laterality Date    DIALYSIS FISTULA CREATION Right     LIVER TRANSPLANT  03/08/2017    TUNNELED VENOUS PORT PLACEMENT           No current facility-administered medications on file prior to encounter. No current outpatient prescriptions on file prior to encounter. No Known Allergies      fmh dm htn      Physical Exam:     Blood pressure (!) 87/53, pulse 93, temperature 98.2 °F (36.8 °C), temperature source Temporal, resp. rate 18, height 5' 6\" (1.676 m), weight 143 lb (64.9 kg), SpO2 97 %.     Physical Exam   Constitutional: He

## 2017-11-03 NOTE — PROGRESS NOTES
Renal Progress Note    Assessment and Plan:    56-year-old man with end-stage renal disease due to complications from liver transplantation with the diarrhea.  He has not had dialysis about 5 days. Had hd yesterday. Still c/o some diarrhea. Has leukocytosis .     Plan/  1- held ivf yesterday  2-follow  HD   3- on prograf, cellcept prednisone from liver transplant standpoint  4- stop lisinopril as bp running low  Patient Active Problem List:     Hypotension     ESRD (end stage renal disease) ([facility] [address]     Liver transplanted ([facility] [address]      Subjective:   Admit Date: 10/31/2017    Interval History: expressed feeling better denies any current CO with no organ system related symptoms      Medications:   Scheduled Meds:   piperacillin-tazobactam  3.375 g Intravenous Q12H    vancomycin (VANCOCIN) intermittent dosing (placeholder)   Other RX Placeholder    vancomycin  750 mg Intravenous Q MWF    metoprolol tartrate  12.5 mg Oral BID    vancomycin  125 mg Oral 4 times per day    hydrocortisone sodium succinate PF  50 mg Intravenous Q6H    calcium acetate  667 mg Oral TID WC    mycophenolate  500 mg Oral BID    sulfamethoxazole-trimethoprim  1 tablet Oral Daily    tacrolimus  1 mg Oral BID    sodium chloride flush  10 mL Intravenous 2 times per day    influenza virus vaccine  0.5 mL Intramuscular Once     Continuous Infusions:   norepinephrine 2.987 mcg/min (11/03/17 1640)    sodium chloride         CBC:   Recent Labs      11/03/17   0123  11/03/17   0550   WBC  19.8*  23.7*   HGB  12.9*  13.0*   PLT  164  213     CMP:  Recent Labs      11/01/17   0545  11/03/17   0550   NA  136  139   K  4.5  3.4*   CL  91*  96*   CO2  7*  13*   BUN  115*  65*   CREATININE  13.34*  10.00*   GLUCOSE  87  85   CALCIUM  6.8*  7.0*   LABGLOM  3.8*  5.3*     Troponin: No results for input(s): TROPONINI in the last 72 hours. BNP: No results for input(s): BNP in the last 72 hours.   INR: No results for input(s): INR in the last 72

## 2017-11-03 NOTE — PROGRESS NOTES
Hospitalist Progress Note      PCP: No primary care provider on file. Date of Admission: 10/31/2017      Subjective: The patient continues to experience abdominal pain although it has improved    Medications:  Reviewed    Infusion Medications    sodium chloride 100 mL/hr at 17 1320    norepinephrine 3 mcg/min (17 0148)    sodium chloride       Scheduled Medications    calcium acetate  667 mg Oral TID WC    carvedilol  12.5 mg Oral BID    mycophenolate  500 mg Oral BID    predniSONE  10 mg Oral Daily    sulfamethoxazole-trimethoprim  1 tablet Oral Daily    tacrolimus  1 mg Oral BID    heparin (porcine)  5,000 Units Subcutaneous 3 times per day    sodium chloride flush  10 mL Intravenous 2 times per day    influenza virus vaccine  0.5 mL Intramuscular Once     PRN Meds: acetaminophen, albumin human, heparin (porcine), heparin (porcine), heparin (porcine), sodium chloride flush, ibuprofen, oxyCODONE **OR** oxyCODONE, morphine **OR** morphine, magnesium hydroxide, ondansetron      Intake/Output Summary (Last 24 hours) at 17 0218  Last data filed at 17 0553   Gross per 24 hour   Intake              120 ml   Output                0 ml   Net              120 ml       Exam:  BP (!) 131/57   Pulse 90   Temp 97.3 °F (36.3 °C) (Oral)   Resp 17   Ht 5' 6\" (1.676 m)   Wt 139 lb 15.9 oz (63.5 kg)   SpO2 97%   BMI 22.60 kg/m²     Average, Min, and Max for last 24 hours Vitals:  TEMPERATURE:  Temp  Av °F (36.7 °C)  Min: 97.3 °F (36.3 °C)  Max: 98.4 °F (36.9 °C)    RESPIRATIONS RANGE: Resp  Av.7  Min: 10  Max: 20    PULSE RANGE: Pulse  Av.4  Min: 86  Max: 112    BLOOD PRESSURE RANGE:  Systolic (51HXC), JUZ:43 , Min:71 , LVR:881   ; Diastolic (36AQN), PCI:49, Min:48, Max:59      PULSE OXIMETRY RANGE: SpO2  Av.2 %  Min: 96 %  Max: 100 %    I/O last 3 completed shifts:   In: 120 [P.O.:120]  Out: -     General appearance: No apparent distress, appears stated age and

## 2017-11-03 NOTE — PROGRESS NOTES
Pharmacy Note  Vancomycin Consult    Ximena Maldonado is a 60 y.o. male started on Vancomycin for possible Sepsis; consult received from Dr. Andriy Kerr to manage therapy. Also receiving the following antibiotics: zosyn, bactrim. There is no problem list on file for this patient. Allergies:  Review of patient's allergies indicates no known allergies. Temp max: 98.3F    Recent Labs      11/01/17   0545  11/03/17   0550   BUN  115*  65*       Recent Labs      11/01/17   0545  11/03/17   0550   CREATININE  13.34*  10.00*       Recent Labs      11/03/17   0123  11/03/17   0550   WBC  19.8*  23.7*         Intake/Output Summary (Last 24 hours) at 11/03/17 0943  Last data filed at 11/03/17 0600   Gross per 24 hour   Intake 783 ml   Output 0 ml   Net 783 ml       Culture Date      Source                       Results  Recent Labs      10/31/17   1608   BC  No Growth to date. Any change in status will be called. BLOODCULT2  No Growth to date. Any change in status will be called. No results for input(s): CXSURG in the last 72 hours. Ht Readings from Last 1 Encounters:   10/31/17 5' 6\" (1.676 m)        Wt Readings from Last 1 Encounters:   11/03/17 143 lb (64.9 kg)         Body mass index is 23.08 kg/m². Estimated Creatinine Clearance: 7 mL/min (based on SCr of 10 mg/dL). Assessment/Plan:  Vancomycin 1000mg x 1 given as initial dose. Patient is scheduled for dialysis today 11/3/17. Will initiate vancomycin 750 mg IV after dialysis. Timing of random level will be on Monday morning prior to dialysis    Thank you for the consult. Will continue to follow.

## 2017-11-03 NOTE — PROGRESS NOTES
Pharmacy Dose Adjustment Per Protocol    Chely Nicole is a 60 y.o. male. Medication Ordered: Zosyn 3.375 gm IV every 8 hours, extended infusion    Recent Labs      10/31/17   1303  11/01/17   0545   BUN  105*  115*       Recent Labs      10/31/17   1303  11/01/17   0545   CREATININE  13.52*  13.34*       Estimated Creatinine Clearance: 5 mL/min (based on SCr of 13.34 mg/dL).     Height:   Ht Readings from Last 1 Encounters:   10/31/17 5' 6\" (1.676 m)     Weight:  Wt Readings from Last 1 Encounters:   11/01/17 139 lb 15.9 oz (63.5 kg)         Piperacillin/Tazobactam [Zosyn]      Traditional Dosing Change to Extended Infusion:   CrCl ?20 ml/min  Zosyn 2.25 gm q6-8 hr Zosyn 3.375 gm IV q8h, infuse over 4 hr     Zosyn 3.375 gm q6-8 hr      Zosyn 4.5 gm q6-8 hr    CrCl <20 ml/min or ESRD  Zosyn 2.25 gm q6-8 hr Zosyn 3.375 gm IV q12h, infuse over 4 hr     Zosyn 3.375 gm q6-8 hr      Zosyn 4.5 gm q6-8 hr

## 2017-11-03 NOTE — PROGRESS NOTES
Nutrition Assessment    Type and Reason for Visit: Initial, Positive Nutrition Screen    Nutrition Recommendations:     START RENAL ONS- BID. Malnutrition Assessment:  · Malnutrition Status: Meets the criteria for moderate malnutrition  · Context: Chronic illness  · Findings of the 6 clinical characteristics of malnutrition (Minimum of 2 out of 6 clinical characteristics is required to make the diagnosis of moderate or severe Protein Calorie Malnutrition based on AND/ASPEN Guidelines):  1. Energy Intake-Less than or equal to 50%, greater than 7 days    2. Weight Loss-10% loss or greater,  (~ 9 Months)  3. Fat Loss-Mild subcutaneous fat loss, Orbital  4. Muscle Loss-Moderate muscle mass loss, Clavicles (pectoralis and deltoids)  5. Fluid Accumulation-No significant fluid accumulation,    6.  Strength-Not measured    Nutrition Diagnosis:   · Problem: Inadequate oral intake, in context of chronic illness, Moderate malnutrition  · Etiology: related to Insufficient energy/nutrient consumption, Alteration in GI function     Signs and symptoms:  as evidenced by Weight loss greater than or equal to 10% in 6 months, Intake 25-50%, Diarrhea    Nutrition Assessment:  · Subjective Assessment: Patient states diarrhea is slowing down. He states h/o diarrhea ~ 2 weeks pta. Transferred to icu 11/2 due to hypotension.   · Nutrition-Focused Physical Findings:    · Wound Type: None  · Current Nutrition Therapies:  · Oral Diet Orders: Renal   · Oral Diet intake: 26-50%  · Oral Nutrition Supplement (ONS) Orders: None  · Anthropometric Measures:  · Ht: 5' 6\" (167.6 cm)   · Admission Body Wt: 127 lb (57.6 kg)  · Usual Body Wt: 145 lb (65.8 kg) (Jan. 2017- Per Patient)  · % Weight Change: 12%,  9 Months  · Ideal Body Wt: 142 lb (64.4 kg), % Ideal Body 89%  · BMI Classification: BMI 18.5 - 24.9 Normal Weight (20.6)  · Comparative Standards (Estimated Nutrition Needs):  · Estimated Daily Total Kcal: 1740 to 1915  · Estimated Daily Protein (g): 70 to 81    Estimated Intake vs Estimated Needs: Intake Less Than Needs    Nutrition Risk Level: High    Nutrition Interventions:   Start ONS  Continued Inpatient Monitoring    Nutrition Evaluation:   · Evaluation: Goals set   · Goals: Intake To Improve To > 75% With Meals/ONS'S. Decreased stooling. · Monitoring: Meal Intake, Supplement Intake, Weight, Diarrhea, Pertinent Labs    See Adult Nutrition Doc Flowsheet for more detail.      Electronically signed by Oliver Harris RD, SUSHANT on 11/3/17 at 3:51 PM    Contact Number: [phone]

## 2017-11-03 NOTE — PROGRESS NOTES
no change in the patients current condition patients vitals are stable at this time. Patient reports no pain or distress.

## 2017-11-04 ENCOUNTER — APPOINTMENT (OUTPATIENT)
Dept: GENERAL RADIOLOGY | Age: 62
DRG: 853 | End: 2017-11-04
Payer: MEDICARE

## 2017-11-04 LAB
ANION GAP SERPL CALCULATED.3IONS-SCNC: 20 MEQ/L (ref 7–13)
BUN BLDV-MCNC: 24 MG/DL (ref 8–23)
CALCIUM SERPL-MCNC: 7.6 MG/DL (ref 8.6–10.2)
CHLORIDE BLD-SCNC: 95 MEQ/L (ref 98–107)
CLOSTRIDIUM DIFFICILE DNA AMPLIFICATION: NORMAL
CO2: 22 MEQ/L (ref 22–29)
CREAT SERPL-MCNC: 5.29 MG/DL (ref 0.7–1.2)
GFR AFRICAN AMERICAN: 13.3
GFR NON-AFRICAN AMERICAN: 11
GLUCOSE BLD-MCNC: 120 MG/DL (ref 74–109)
HCT VFR BLD CALC: 42.1 % (ref 42–52)
HEMOGLOBIN: 13.6 G/DL (ref 14–18)
MAGNESIUM: 2 MG/DL (ref 1.7–2.3)
MCH RBC QN AUTO: 29.1 PG (ref 27–31.3)
MCHC RBC AUTO-ENTMCNC: 32.3 % (ref 33–37)
MCV RBC AUTO: 90.2 FL (ref 80–100)
PDW BLD-RTO: 15.7 % (ref 11.5–14.5)
PHOSPHORUS: 4.3 MG/DL (ref 2.5–4.5)
PLATELET # BLD: 182 K/UL (ref 130–400)
POTASSIUM SERPL-SCNC: 3.9 MEQ/L (ref 3.5–5.1)
RBC # BLD: 4.66 M/UL (ref 4.7–6.1)
SODIUM BLD-SCNC: 137 MEQ/L (ref 132–144)
WBC # BLD: 19.1 K/UL (ref 4.8–10.8)

## 2017-11-04 PROCEDURE — 2580000003 HC RX 258: Performed by: NURSE PRACTITIONER

## 2017-11-04 PROCEDURE — 6360000002 HC RX W HCPCS: Performed by: ANESTHESIOLOGY

## 2017-11-04 PROCEDURE — 87040 BLOOD CULTURE FOR BACTERIA: CPT

## 2017-11-04 PROCEDURE — 85027 COMPLETE CBC AUTOMATED: CPT

## 2017-11-04 PROCEDURE — 6360000002 HC RX W HCPCS: Performed by: INTERNAL MEDICINE

## 2017-11-04 PROCEDURE — 6370000000 HC RX 637 (ALT 250 FOR IP): Performed by: INTERNAL MEDICINE

## 2017-11-04 PROCEDURE — 1210000000 HC MED SURG R&B

## 2017-11-04 PROCEDURE — 2500000003 HC RX 250 WO HCPCS

## 2017-11-04 PROCEDURE — 99232 SBSQ HOSP IP/OBS MODERATE 35: CPT | Performed by: ANESTHESIOLOGY

## 2017-11-04 PROCEDURE — 36415 COLL VENOUS BLD VENIPUNCTURE: CPT

## 2017-11-04 PROCEDURE — 6370000000 HC RX 637 (ALT 250 FOR IP): Performed by: ANESTHESIOLOGY

## 2017-11-04 PROCEDURE — 2580000003 HC RX 258: Performed by: INTERNAL MEDICINE

## 2017-11-04 PROCEDURE — 71020 XR CHEST STANDARD TWO VW: CPT

## 2017-11-04 PROCEDURE — 83735 ASSAY OF MAGNESIUM: CPT

## 2017-11-04 PROCEDURE — 80048 BASIC METABOLIC PNL TOTAL CA: CPT

## 2017-11-04 PROCEDURE — 84100 ASSAY OF PHOSPHORUS: CPT

## 2017-11-04 PROCEDURE — 99232 SBSQ HOSP IP/OBS MODERATE 35: CPT | Performed by: INTERNAL MEDICINE

## 2017-11-04 RX ORDER — PANTOPRAZOLE SODIUM 40 MG/1
40 TABLET, DELAYED RELEASE ORAL
Status: DISCONTINUED | OUTPATIENT
Start: 2017-11-05 | End: 2017-11-07 | Stop reason: HOSPADM

## 2017-11-04 RX ORDER — PANTOPRAZOLE SODIUM 40 MG/1
40 GRANULE, DELAYED RELEASE ORAL
Status: DISCONTINUED | OUTPATIENT
Start: 2017-11-04 | End: 2017-11-04

## 2017-11-04 RX ORDER — WATER FOR INJ.,BACTERIOSTATIC
VIAL (ML) INJECTION
Status: COMPLETED
Start: 2017-11-04 | End: 2017-11-04

## 2017-11-04 RX ORDER — HEPARIN SODIUM 5000 [USP'U]/ML
5000 INJECTION, SOLUTION INTRAVENOUS; SUBCUTANEOUS EVERY 8 HOURS SCHEDULED
Status: DISCONTINUED | OUTPATIENT
Start: 2017-11-04 | End: 2017-11-07 | Stop reason: HOSPADM

## 2017-11-04 RX ADMIN — VANCOMYCIN HYDROCHLORIDE 750 MG: 750 INJECTION, POWDER, LYOPHILIZED, FOR SOLUTION INTRAVENOUS at 00:59

## 2017-11-04 RX ADMIN — TAZOBACTAM SODIUM AND PIPERACILLIN SODIUM 3.38 G: 375; 3 INJECTION, SOLUTION INTRAVENOUS at 05:55

## 2017-11-04 RX ADMIN — CALCIUM ACETATE 667 MG: 667 CAPSULE ORAL at 17:00

## 2017-11-04 RX ADMIN — TAZOBACTAM SODIUM AND PIPERACILLIN SODIUM 3.38 G: 375; 3 INJECTION, SOLUTION INTRAVENOUS at 17:00

## 2017-11-04 RX ADMIN — HEPARIN SODIUM 5000 UNITS: 5000 INJECTION, SOLUTION INTRAVENOUS; SUBCUTANEOUS at 21:28

## 2017-11-04 RX ADMIN — SULFAMETHOXAZOLE AND TRIMETHOPRIM 1 TABLET: 800; 160 TABLET ORAL at 08:40

## 2017-11-04 RX ADMIN — HYDROCORTISONE SODIUM SUCCINATE 50 MG: 100 INJECTION, POWDER, FOR SOLUTION INTRAMUSCULAR; INTRAVENOUS at 21:28

## 2017-11-04 RX ADMIN — HYDROCORTISONE SODIUM SUCCINATE 50 MG: 100 INJECTION, POWDER, FOR SOLUTION INTRAMUSCULAR; INTRAVENOUS at 15:27

## 2017-11-04 RX ADMIN — TACROLIMUS 1 MG: 1 CAPSULE ORAL at 08:40

## 2017-11-04 RX ADMIN — MYCOPHENOLATE MOFETIL 500 MG: 250 CAPSULE ORAL at 08:40

## 2017-11-04 RX ADMIN — SODIUM CHLORIDE, PRESERVATIVE FREE 10 ML: 5 INJECTION INTRAVENOUS at 21:30

## 2017-11-04 RX ADMIN — TACROLIMUS 1 MG: 1 CAPSULE ORAL at 21:26

## 2017-11-04 RX ADMIN — HYDROCORTISONE SODIUM SUCCINATE 50 MG: 100 INJECTION, POWDER, FOR SOLUTION INTRAMUSCULAR; INTRAVENOUS at 02:29

## 2017-11-04 RX ADMIN — CALCIUM ACETATE 667 MG: 667 CAPSULE ORAL at 11:59

## 2017-11-04 RX ADMIN — METOPROLOL TARTRATE 12.5 MG: 25 TABLET ORAL at 21:27

## 2017-11-04 RX ADMIN — HEPARIN SODIUM 4000 UNITS: 1000 INJECTION, SOLUTION INTRAVENOUS; SUBCUTANEOUS at 02:05

## 2017-11-04 RX ADMIN — HYDROCORTISONE SODIUM SUCCINATE 50 MG: 100 INJECTION, POWDER, FOR SOLUTION INTRAMUSCULAR; INTRAVENOUS at 08:40

## 2017-11-04 RX ADMIN — CALCIUM ACETATE 667 MG: 667 CAPSULE ORAL at 08:40

## 2017-11-04 RX ADMIN — BACTERIOSTATIC WATER 30 ML: 1 INJECTION, SOLUTION INTRAMUSCULAR; INTRAVENOUS; SUBCUTANEOUS at 21:30

## 2017-11-04 RX ADMIN — MYCOPHENOLATE MOFETIL 500 MG: 250 CAPSULE ORAL at 21:26

## 2017-11-04 RX ADMIN — Medication 125 MG: at 11:59

## 2017-11-04 RX ADMIN — Medication 125 MG: at 05:55

## 2017-11-04 RX ADMIN — SODIUM CHLORIDE, PRESERVATIVE FREE 10 ML: 5 INJECTION INTRAVENOUS at 09:17

## 2017-11-04 NOTE — PROGRESS NOTES
BILITOT in the last 72 hours. Invalid input(s): BILDIR  Iron:  No results for input(s): IRONS, FERRITIN in the last 72 hours. Invalid input(s): LABIRONS  Urinalysis: No results for input(s): UA in the last 72 hours. Objective:   Vitals: /64   Pulse 104   Temp 98.3 °F (36.8 °C) (Oral)   Resp 22   Ht 5' 6\" (1.676 m)   Wt 138 lb 14.2 oz (63 kg)   SpO2 97%   BMI 22.42 kg/m²    Wt Readings from Last 3 Encounters:   11/04/17 138 lb 14.2 oz (63 kg)      24HR INTAKE/OUTPUT:    Intake/Output Summary (Last 24 hours) at 11/04/17 1238  Last data filed at 11/04/17 0556   Gross per 24 hour   Intake             1990 ml   Output             3025 ml   Net            -1035 ml       Constitutional:  Alert, awake, no apparent distress   Skin:normal, no rash  HEENT:sclera anicteric.   Head atraumatic normocephalic  Neck:supple with no thyromegally  Cardiovascular:  S1, S2 without m/r/g   Respiratory:  CTA B without w/r/r   Abdomen: +bs, soft, nt  Ext: no LE edema  Musculoskeletal:Intact  Neuro:Alert and oriented with no deficit      Electronically signed by Morena Pressley MD on 11/4/2017 at 12:38 PM

## 2017-11-04 NOTE — PROGRESS NOTES
2100 Report from [facility] set-up & tested @ patient bedside   Modified orders for 4K bath received from Dr. Golden . 9697 Machine passed all tests. 4 hour Hemodialysis treatment started. Patient educated on treatment plan. UF goal of 1.5 to 2L   Catheter used, no thrill felt on R arm graft. No bruit could be auscultated. 0150 Hemodialysis treatment complete with net UF of 1.8L. Report given to Nurse Tc Moscoso.

## 2017-11-04 NOTE — PROGRESS NOTES
Critical Care Staff-ARIEL  IC08/[id number]  Christine Chris  11/4/2017    ASSESSMENT    Possible sepsis with gastroenteritis but making progress    PLAN    Fluids  Abx and steroids as is  R/O C diff    OK for transfer out of ICU    REASON FOR ICU ADMISSION    62 s/p OLT with ESRD, presents with c/o of groin pain, found to have gastroenteritis with a leukocytosis and hypotension requiring transfer to the ICU for further management. TODAY'S EVENTS    He continues to make good progress and is now off levophed as he tolerated dialysis yesterday with removal of 3 litres. He remains empirically on po and IV vanco and zosyn as his leukocytosis has improved with his WBC down to 19 (C diff sample still pending). He is chroncially on steroids so I still have him on hydrocortisone 50 q6. Exam:    A&Ox3  Blood pressure 105/64, pulse 104, temperature 98.3 °F (36.8 °C), temperature source Oral, resp. rate 22, height 5' 6\" (1.676 m), weight 138 lb 14.2 oz (63 kg), SpO2 97 %. RRR  Adequate air-exchange  Abd soft, NT  Extremities warm        LEVEL II: 1. Gastroenteritis 2. Severe sepsis 3. R/o C diff colitis    Patient seen and examined with the ICU team and RN. I personally participated in the key components.   I have discussed the case and management of the patient's care with the RN and other physicians involved with the care of the patient    Restraints n/a  Sedation interruption n/a  Urinary catheter n/a  Central access n/a    Please see chart for further labs, medications and/or reports    Electronically signed by Sergey Kim MD on 11/4/2017 at 12:02 PM

## 2017-11-04 NOTE — PROGRESS NOTES
Up to bsc, continent stool still loose. Up to sink for AM care, shaved. Austin very winded and c/o chest discomfort. Assisted back to bed and felt much better immediately. No ECG changes noted B/P stable sats 100% on room air.

## 2017-11-04 NOTE — PROGRESS NOTES
bilaterally without Rales/Wheezes/Rhonchi. Cardiovascular: Regular rate and rhythm with normal S1/S2 without murmurs, rubs or gallops. Abdomen: Distended, some mild diffuse tenderness; resonant to percussion throughout with normal bowel sounds. Musculoskeletal: No clubbing, cyanosis or edema bilaterally. Full range of motion without deformity. Skin: Skin color, texture, turgor normal.  No rashes or lesions. Neuro: Non Focal. Intact and symmetric  Psychiatric: Alert and oriented, thought content appropriate, normal insight  Capillary Refill: Brisk,< 3 seconds   Peripheral Pulses: +2 palpable, equal bilaterally     Labs:   Recent Labs      11/03/17   0123  11/03/17   0550  11/04/17   0630   WBC  19.8*  23.7*  19.1*   HGB  12.9*  13.0*  13.6*   HCT  40.9*  41.8*  42.1   PLT  164  213  182     Recent Labs      11/03/17   0550  11/04/17   0630   NA  139  137   K  3.4*  3.9   CL  96*  95*   CO2  13*  22   BUN  65*  24*   CREATININE  10.00*  5.29*   CALCIUM  7.0*  7.6*   PHOS  7.8*  4.3     No results for input(s): AST, ALT, BILIDIR, BILITOT, ALKPHOS in the last 72 hours. No results for input(s): INR in the last 72 hours. No results for input(s): Deboraha Burrell in the last 72 hours. Urinalysis:      Lab Results   Component Value Date    NITRU Negative 05/21/2015    BLOODU Negative 05/21/2015    SPECGRAV 1.018 05/21/2015    GLUCOSEU Negative 05/21/2015    GLUCOSEU NEG 02/22/2012       Radiology:  XR CHEST STANDARD (2 VW)   Final Result      NO EVIDENCE OF ACTIVE CARDIOPULMONARY DISEASE. CT ABDOMEN PELVIS WO IV CONTRAST Additional Contrast? None   Final Result   STATUS POST LIVER TRANSPLANT. SMALL, FAT-CONTAINING UMBILICAL HERNIA. DIARRHEA. NO ACUTE PROCESS. All CT scans at this facility use dose modulation, iterative reconstruction, and/or weight based dosing when appropriate to reduce radiation dose to as low as reasonably achievable.         Assessment/Plan:  61 y/o M with a PMHx of ESRD, HTN, cirrhosis 2/2 Hep C s/p liver transplant who presented with abdominal pain and found to be hypotensive. The patient is on levophed with fluctuating blood pressures and broad spectrum Abx with a worsening white count. #?Sepsis     - Pt with hypotension and rising white count; on broad spectrum Abx but the rising white count might be due to prednisone or inflammation rather than infection but need to err on the side of caution given he's immunosuppressed     - No clear source of infection at this time; no cough, blood cultures negative, no symptoms of a UTI; blood cultures will be repeated     - C Diff negative     - Continue vanc/zosyn per ID     - Will try to wean levophed today       #HTN     - Lisinopril was discontinued as pt is hypotensive     - Pt denies any cardiac history; will change his coreg to lopressor to reduce blood pressure effect while prevent beta blocker withdrawal    #ESRD      - Renal is on board for dialysis    #Cirrhosis s/p Liver Transplant       - Continue prograf, Cellcept and prednisone    Active Hospital Problems    Diagnosis Date Noted    Gastroenteritis [K52.9]     C. difficile colitis [A04.72]     Severe sepsis ([facility] [address] [A41.9, R65.20]     Hypotension [I95.9] 11/03/2017    ESRD (end stage renal disease) ([facility] [address] [N18.6] 11/03/2017    Liver transplanted ([facility] [address] [Z94.4] 11/03/2017    Liver transplant recipient [facility]) [Z94.4]     Sepsis ([facility] [address] [A41.9]        Additional work up or/and treatment plan may be added today or then after based on clinical progression. I am managing a portion of pt care. Some medical issues are handled by other specialists. Additional work up and treatment should be done in out pt setting by pt PCP and other out pt providers. In addition to examining and evaluating pt, I spent additional time explaining care, normal and abnormal findings, and treatment plan. All of pt questions were answered. Counseling, diet and education were  provided.  Case

## 2017-11-05 LAB
ANION GAP SERPL CALCULATED.3IONS-SCNC: 26 MEQ/L (ref 7–13)
BLOOD CULTURE, ROUTINE: NORMAL
BUN BLDV-MCNC: 49 MG/DL (ref 8–23)
CALCIUM SERPL-MCNC: 7.6 MG/DL (ref 8.6–10.2)
CHLORIDE BLD-SCNC: 94 MEQ/L (ref 98–107)
CO2: 19 MEQ/L (ref 22–29)
CREAT SERPL-MCNC: 7.64 MG/DL (ref 0.7–1.2)
CULTURE, BLOOD 2: NORMAL
GFR AFRICAN AMERICAN: 8.7
GFR NON-AFRICAN AMERICAN: 7.2
GLUCOSE BLD-MCNC: 102 MG/DL (ref 74–109)
POTASSIUM SERPL-SCNC: 3.2 MEQ/L (ref 3.5–5.1)
SODIUM BLD-SCNC: 139 MEQ/L (ref 132–144)

## 2017-11-05 PROCEDURE — 6360000002 HC RX W HCPCS: Performed by: INTERNAL MEDICINE

## 2017-11-05 PROCEDURE — 97161 PT EVAL LOW COMPLEX 20 MIN: CPT

## 2017-11-05 PROCEDURE — G8979 MOBILITY GOAL STATUS: HCPCS

## 2017-11-05 PROCEDURE — 97165 OT EVAL LOW COMPLEX 30 MIN: CPT

## 2017-11-05 PROCEDURE — G8989 SELF CARE D/C STATUS: HCPCS

## 2017-11-05 PROCEDURE — 6370000000 HC RX 637 (ALT 250 FOR IP): Performed by: INTERNAL MEDICINE

## 2017-11-05 PROCEDURE — G8988 SELF CARE GOAL STATUS: HCPCS

## 2017-11-05 PROCEDURE — 1210000000 HC MED SURG R&B

## 2017-11-05 PROCEDURE — 6370000000 HC RX 637 (ALT 250 FOR IP): Performed by: ANESTHESIOLOGY

## 2017-11-05 PROCEDURE — 2580000003 HC RX 258: Performed by: NURSE PRACTITIONER

## 2017-11-05 PROCEDURE — 36415 COLL VENOUS BLD VENIPUNCTURE: CPT

## 2017-11-05 PROCEDURE — G8987 SELF CARE CURRENT STATUS: HCPCS

## 2017-11-05 PROCEDURE — 6360000002 HC RX W HCPCS: Performed by: ANESTHESIOLOGY

## 2017-11-05 PROCEDURE — G8980 MOBILITY D/C STATUS: HCPCS

## 2017-11-05 PROCEDURE — 90937 HEMODIALYSIS REPEATED EVAL: CPT

## 2017-11-05 PROCEDURE — 80048 BASIC METABOLIC PNL TOTAL CA: CPT

## 2017-11-05 PROCEDURE — G8978 MOBILITY CURRENT STATUS: HCPCS

## 2017-11-05 RX ADMIN — HEPARIN SODIUM 5000 UNITS: 5000 INJECTION, SOLUTION INTRAVENOUS; SUBCUTANEOUS at 21:02

## 2017-11-05 RX ADMIN — SODIUM CHLORIDE, PRESERVATIVE FREE 10 ML: 5 INJECTION INTRAVENOUS at 10:08

## 2017-11-05 RX ADMIN — SODIUM CHLORIDE, PRESERVATIVE FREE 10 ML: 5 INJECTION INTRAVENOUS at 21:07

## 2017-11-05 RX ADMIN — CALCIUM ACETATE 667 MG: 667 CAPSULE ORAL at 11:46

## 2017-11-05 RX ADMIN — TACROLIMUS 1 MG: 1 CAPSULE ORAL at 10:08

## 2017-11-05 RX ADMIN — TAZOBACTAM SODIUM AND PIPERACILLIN SODIUM 3.38 G: 375; 3 INJECTION, SOLUTION INTRAVENOUS at 16:42

## 2017-11-05 RX ADMIN — HYDROCORTISONE SODIUM SUCCINATE 50 MG: 100 INJECTION, POWDER, FOR SOLUTION INTRAMUSCULAR; INTRAVENOUS at 17:03

## 2017-11-05 RX ADMIN — HYDROCORTISONE SODIUM SUCCINATE 50 MG: 100 INJECTION, POWDER, FOR SOLUTION INTRAMUSCULAR; INTRAVENOUS at 06:30

## 2017-11-05 RX ADMIN — METOPROLOL TARTRATE 12.5 MG: 25 TABLET ORAL at 21:01

## 2017-11-05 RX ADMIN — TACROLIMUS 1 MG: 1 CAPSULE ORAL at 21:02

## 2017-11-05 RX ADMIN — MYCOPHENOLATE MOFETIL 500 MG: 250 CAPSULE ORAL at 21:01

## 2017-11-05 RX ADMIN — CALCIUM ACETATE 667 MG: 667 CAPSULE ORAL at 10:09

## 2017-11-05 RX ADMIN — MYCOPHENOLATE MOFETIL 500 MG: 250 CAPSULE ORAL at 10:08

## 2017-11-05 RX ADMIN — PANTOPRAZOLE SODIUM 40 MG: 40 TABLET, DELAYED RELEASE ORAL at 06:41

## 2017-11-05 RX ADMIN — METOPROLOL TARTRATE 12.5 MG: 25 TABLET ORAL at 10:10

## 2017-11-05 RX ADMIN — HEPARIN SODIUM 5000 UNITS: 5000 INJECTION, SOLUTION INTRAVENOUS; SUBCUTANEOUS at 14:07

## 2017-11-05 RX ADMIN — HEPARIN SODIUM 5000 UNITS: 5000 INJECTION, SOLUTION INTRAVENOUS; SUBCUTANEOUS at 06:30

## 2017-11-05 RX ADMIN — TAZOBACTAM SODIUM AND PIPERACILLIN SODIUM 3.38 G: 375; 3 INJECTION, SOLUTION INTRAVENOUS at 06:31

## 2017-11-05 RX ADMIN — CALCIUM ACETATE 667 MG: 667 CAPSULE ORAL at 16:42

## 2017-11-05 RX ADMIN — HYDROCORTISONE SODIUM SUCCINATE 50 MG: 100 INJECTION, POWDER, FOR SOLUTION INTRAMUSCULAR; INTRAVENOUS at 11:46

## 2017-11-05 ASSESSMENT — PAIN SCALES - GENERAL: PAINLEVEL_OUTOF10: 0

## 2017-11-05 NOTE — PROGRESS NOTES
CARE:  Plan  Plan Comment: No skilled PT needs identified at this time. Safety Devices  Type of devices: Left in bed, Call light within reach    G-Code:  PT G-Codes  Functional Assessment Tool Used: functional mobility and clinical judgment   Score: independent/ supervision   Functional Limitation: Mobility: Walking and moving around  Mobility: Walking and Moving Around Current Status (): At least 1 percent but less than 20 percent impaired, limited or restricted  Mobility: Walking and Moving Around Goal Status (): At least 1 percent but less than 20 percent impaired, limited or restricted  Mobility: Walking and Moving Around Discharge Status ():  At least 1 percent but less than 20 percent impaired, limited or restricted    Goals:  Patient goals : return home     Therapy Time:   Individual   Time In 1320   Time Out 1340   Minutes 20   Timed Code Treatment Minutes: Best Quiles PT, 11/05/17 at 1:45 PM

## 2017-11-05 NOTE — PROGRESS NOTES
Hospitalist Progress Note      PCP: No primary care provider on file. Date of Admission: 10/31/2017    Chief Complaint:  Abdominal pain    Subjective:  Pt states he feels better. 10 point ROS negative except as mentioned. Medications:  Reviewed    Infusion Medications    sodium chloride       Scheduled Medications    heparin (porcine)  5,000 Units Subcutaneous 3 times per day    pantoprazole  40 mg Oral QAM AC    piperacillin-tazobactam  3.375 g Intravenous Q12H    vancomycin (VANCOCIN) intermittent dosing (placeholder)   Other RX Placeholder    vancomycin  750 mg Intravenous Q MWF    metoprolol tartrate  12.5 mg Oral BID    hydrocortisone sodium succinate PF  50 mg Intravenous Q6H    calcium acetate  667 mg Oral TID WC    mycophenolate  500 mg Oral BID    tacrolimus  1 mg Oral BID    sodium chloride flush  10 mL Intravenous 2 times per day    influenza virus vaccine  0.5 mL Intramuscular Once     PRN Meds: acetaminophen, albumin human, heparin (porcine), heparin (porcine), heparin (porcine), sodium chloride flush, oxyCODONE **OR** oxyCODONE, morphine **OR** morphine, ondansetron    No intake or output data in the 24 hours ending 11/05/17 1153  Exam:    /67   Pulse 71   Temp 97.9 °F (36.6 °C) (Oral)   Resp 20   Ht 5' 6\" (1.676 m)   Wt 138 lb 14.2 oz (63 kg)   SpO2 100%   BMI 22.42 kg/m²     General appearance: No apparent distress, appears stated age and cooperative. HEENT: Pupils equal, round, and reactive to light. Conjunctivae/corneas clear. Neck: Supple, with full range of motion. No jugular venous distention. Trachea midline. Respiratory:  Normal respiratory effort. Clear to auscultation, bilaterally without Rales/Wheezes/Rhonchi. Cardiovascular: Regular rate and rhythm with normal S1/S2 without murmurs, rubs or gallops. Abdomen: Distended, non tender today; resonant to percussion throughout with normal bowel sounds.   Musculoskeletal: No clubbing, cyanosis or edema count. #?Sepsis     - Pt with hypotension and leucocytosis; on broad spectrum Abx      - No clear source of infection at this time; no cough, blood cultures negative, no symptoms of a UTI but patients BP has improved with broad spectrum anbitiotics     - Continue vanc/zosyn per ID          #HTN     - Lisinopril was discontinued as pt was hypotensive     - Pt denies any cardiac history; changed his coreg to lopressor to reduce blood pressure effect while prevent beta blocker withdrawal     - As pts BP improves he may need to go back on his home antihypertensives    #ESRD      - Renal is on board for dialysis    #Cirrhosis s/p Liver Transplant       - Continue prograf, Cellcept and prednisone    Active Hospital Problems    Diagnosis Date Noted    Vomiting and diarrhea [R11.10, R19.7]     Generalized abdominal pain [R10.84]     Acute renal failure (HCC) [N17.9]     Gastroenteritis [K52.9]     C. difficile colitis [A04.72]     Severe sepsis ([facility] [address] [A41.9, R65.20]     Hypotension [I95.9] 11/03/2017    ESRD (end stage renal disease) ([facility] [address] [N18.6] 11/03/2017    Liver transplanted ([facility] [address] [Z94.4] 11/03/2017    Liver transplant recipient [facility]) [Z94.4]     Sepsis ([facility] [address] [A41.9]        Additional work up or/and treatment plan may be added today or then after based on clinical progression. I am managing a portion of pt care. Some medical issues are handled by other specialists. Additional work up and treatment should be done in out pt setting by pt PCP and other out pt providers. In addition to examining and evaluating pt, I spent additional time explaining care, normal and abnormal findings, and treatment plan. All of pt questions were answered. Counseling, diet and education were  provided. Case will be discussed with nursing staff when appropriate. Family will be updated if and when appropriate.       Diet: DIET RENAL;  Dietary Nutrition Supplements: Renal Oral Supplement    Code Status: Full Code    PT/OT Eval

## 2017-11-05 NOTE — PROGRESS NOTES
[facility] OCCUPATIONAL THERAPY EVALUATION - ACUTE     Date: 2017  Patient Name: Hector Mcneal        MRN: [id number]  Account: [de-identified]   : 1955  (60 y.o.)  Room: [place] [address]    Chart Review:  Diagnosis:  The primary encounter diagnosis was Vomiting and diarrhea. Diagnoses of Neutrophilia, Enteritis, and Liver transplant recipient [facility]) were also pertinent to this visit. Past Medical History:   Diagnosis Date    ESRD (end stage renal disease) ([facility] [address]     Hepatitis     Hypertension      Past Surgical History:   Procedure Laterality Date    DIALYSIS FISTULA CREATION Right     LIVER TRANSPLANT  2017    TUNNELED VENOUS PORT PLACEMENT       Precautions:  N/A       Evaluation and Pt. rights have been reviewed: [x]Yes   [] No   If no why not:   Falls safety interventions in place  []Yes   [x] No    Comments:     Subjective: Pt seen alone    Prior living arrangement:      Support contact: Self    Pt lives: [x] Alone   [] With spouse   [] Other   Comment:    Home: [x] Single level   []  Two level   []  Split level     [x]  Apartment:     Entrance:  Stairs: 0 Hand rails  ,    Inside: Stairs:   Hand rails:    Bathroom: [] Bath tub   [x] Tub/Shower combo   []  Shower stall    Location:      DME: [] W/W   [] Melody Amend   [] Rollator   []  W/C   [x] Delwin Holiday   [] Shower Chair   [] [facility]  [] Dressing  AE  [] Other:      Previous Functional Status:  Pt reports that he was independent with adl and homemaking tasks. Pt was also driving.     Pain:   Start of session: 0/10  Description:    Location:    End of session: 0/10  Action: [x] No Action Necessary    [] Patient reports pain at acceptable level for treatment  [] Nursing notified    [] Other      Objective:  Observation:  Pt supine in bed    Orientation: Oriented to  [x] Person   [x] Place  [x]Time    Vision:   [x]  [facility]   [] Impaired  Comments:  Reading glasses only    Hearing:  [x] WFL   [] Impaired  Comments:    Sensation:   [x] [facility]   []  Impaired Comments:      Cognition:   [] WFL   [x] Impaired  Comments:  Decreased problem solving  Communication:   [x] WFL   [] Impaired  Comments:    Range of Motion:  R UE AROM/PROM: [x]  WFL [] Impaired  Comments:   L UE AROM/PROM:  [x]  WFL [] Impaired  Comments:     Strength:   R UE Strength: []1    [] 2   [] 2+   [] 3   [] 3+   [x] 4   [] 4+  [] 5  Comments:   L UE Strength:  []1    [] 2   [] 2+   [] 3   [] 3+  [x] 4   [] 4+   [] 5  Comments:     Quality of Movement:  [x] Good   [] Fair   [] Poor     Coordination:  Gross motor: [x] WFL   [] Impaired   Fine motor: [x] WFL   [] Impaired     Functional Mobility:  Toilet Transfers:  N/T  Bed Transfer:  independent  Sit to stand: independent  Bed to Chair:  independent    Seated Balance:      Static: [x] Good  [] Fair   [] Poor   Dynamic: [x]  Good  [] Fair   [] Poor     Standing Balance:     Static: [x] Good   [] Fair  [] Poor   Dynamic: [x] Good   [] Fair   [] Poor     Functional Endurance: [] Good  [x] Fair  [] Poor     ADLs  Feeding:   Positive hand to mouth  UE Dressing:  independent  LB Dressing:  independent  Bathing:  N/T  Toileting: N/T  Grooming: Independent      Patient Goal: To return to home  Discussed and agreed upon: [x] Yes   [] No         Comments:     Assessment/Discharge Disposition:     Prognosis: Good  Discharge Recommendations: Continue to assess pending progress  No Skilled OT: At baseline function  History: 2 complexities  Exam: no deficits  Assistance / Modification: No Assist    Prognosis:  [x] Good   []Fair   [] Poor     Barriers to Improvement:  N/A    Recommended DME:  [] W/W   [] Li Mckeon   [] Rollator   [] W/C   [] Aleisha Peterson  [] Shower Chair   []Dressing AD []  [facility]  [] Other:    Plan: ,  N/A    G-Codes:  OT G-codes  Functional Limitation: Self care  Self Care Current Status (): 0 percent impaired, limited or restricted  Self Care Goal Status (): 0 percent impaired, limited or restricted  Self Care Discharge Status (): 0 percent impaired, limited or restricted    Time in:  1320  Time out:  1345  Timed treatment minutes:  25  Total treatment time/minutes:  25    Electronically signed by:    Aldo Álvarez OT  12/3/4618, 1:41 PM Electronically signed by NAV Hamilton on 03/6/58 at 1:48 PM

## 2017-11-06 LAB
ANION GAP SERPL CALCULATED.3IONS-SCNC: 27 MEQ/L (ref 7–13)
BASOPHILS ABSOLUTE: 0 K/UL (ref 0–0.2)
BASOPHILS RELATIVE PERCENT: 0.1 %
BUN BLDV-MCNC: 71 MG/DL (ref 8–23)
CALCIUM SERPL-MCNC: 7.1 MG/DL (ref 8.6–10.2)
CHLORIDE BLD-SCNC: 95 MEQ/L (ref 98–107)
CO2: 18 MEQ/L (ref 22–29)
CREAT SERPL-MCNC: 9.07 MG/DL (ref 0.7–1.2)
EOSINOPHILS ABSOLUTE: 0 K/UL (ref 0–0.7)
EOSINOPHILS RELATIVE PERCENT: 0.1 %
GFR AFRICAN AMERICAN: 7.2
GFR NON-AFRICAN AMERICAN: 5.9
GLUCOSE BLD-MCNC: 110 MG/DL (ref 74–109)
HCT VFR BLD CALC: 41.6 % (ref 42–52)
HEMOGLOBIN: 13.4 G/DL (ref 14–18)
LYMPHOCYTES ABSOLUTE: 0.6 K/UL (ref 1–4.8)
LYMPHOCYTES RELATIVE PERCENT: 3 %
MCH RBC QN AUTO: 29.4 PG (ref 27–31.3)
MCHC RBC AUTO-ENTMCNC: 32.1 % (ref 33–37)
MCV RBC AUTO: 91.7 FL (ref 80–100)
MONOCYTES ABSOLUTE: 1.3 K/UL (ref 0.2–0.8)
MONOCYTES RELATIVE PERCENT: 6.8 %
NEUTROPHILS ABSOLUTE: 16.6 K/UL (ref 1.4–6.5)
NEUTROPHILS RELATIVE PERCENT: 90 %
PDW BLD-RTO: 15.8 % (ref 11.5–14.5)
PLATELET # BLD: 192 K/UL (ref 130–400)
POTASSIUM SERPL-SCNC: 3.6 MEQ/L (ref 3.5–5.1)
RBC # BLD: 4.54 M/UL (ref 4.7–6.1)
SODIUM BLD-SCNC: 140 MEQ/L (ref 132–144)
VANCOMYCIN RANDOM: 16.2 UG/ML (ref 5–40)
WBC # BLD: 18.5 K/UL (ref 4.8–10.8)

## 2017-11-06 PROCEDURE — 6370000000 HC RX 637 (ALT 250 FOR IP): Performed by: ANESTHESIOLOGY

## 2017-11-06 PROCEDURE — 99232 SBSQ HOSP IP/OBS MODERATE 35: CPT | Performed by: INTERNAL MEDICINE

## 2017-11-06 PROCEDURE — 1210000000 HC MED SURG R&B

## 2017-11-06 PROCEDURE — 80048 BASIC METABOLIC PNL TOTAL CA: CPT

## 2017-11-06 PROCEDURE — 2580000003 HC RX 258

## 2017-11-06 PROCEDURE — 6360000002 HC RX W HCPCS: Performed by: INTERNAL MEDICINE

## 2017-11-06 PROCEDURE — 90937 HEMODIALYSIS REPEATED EVAL: CPT

## 2017-11-06 PROCEDURE — 6370000000 HC RX 637 (ALT 250 FOR IP): Performed by: INTERNAL MEDICINE

## 2017-11-06 PROCEDURE — 36415 COLL VENOUS BLD VENIPUNCTURE: CPT

## 2017-11-06 PROCEDURE — 80202 ASSAY OF VANCOMYCIN: CPT

## 2017-11-06 PROCEDURE — 6360000002 HC RX W HCPCS: Performed by: ANESTHESIOLOGY

## 2017-11-06 PROCEDURE — 85025 COMPLETE CBC W/AUTO DIFF WBC: CPT

## 2017-11-06 PROCEDURE — 6360000002 HC RX W HCPCS: Performed by: NURSE PRACTITIONER

## 2017-11-06 RX ORDER — SODIUM CHLORIDE 9 MG/ML
INJECTION, SOLUTION INTRAVENOUS
Status: COMPLETED
Start: 2017-11-06 | End: 2017-11-06

## 2017-11-06 RX ORDER — SULFAMETHOXAZOLE AND TRIMETHOPRIM 800; 160 MG/1; MG/1
1 TABLET ORAL DAILY
Status: DISCONTINUED | OUTPATIENT
Start: 2017-11-06 | End: 2017-11-07 | Stop reason: HOSPADM

## 2017-11-06 RX ORDER — WATER FOR INJ.,BACTERIOSTATIC
VIAL (ML) INJECTION
Status: DISPENSED
Start: 2017-11-06 | End: 2017-11-07

## 2017-11-06 RX ADMIN — ONDANSETRON 4 MG: 2 INJECTION INTRAMUSCULAR; INTRAVENOUS at 21:58

## 2017-11-06 RX ADMIN — TACROLIMUS 1 MG: 1 CAPSULE ORAL at 22:20

## 2017-11-06 RX ADMIN — HEPARIN SODIUM 4000 UNITS: 1000 INJECTION, SOLUTION INTRAVENOUS; SUBCUTANEOUS at 10:21

## 2017-11-06 RX ADMIN — SODIUM CHLORIDE 2000 ML: 9 INJECTION, SOLUTION INTRAVENOUS at 10:23

## 2017-11-06 RX ADMIN — HYDROCORTISONE SODIUM SUCCINATE 50 MG: 100 INJECTION, POWDER, FOR SOLUTION INTRAMUSCULAR; INTRAVENOUS at 05:16

## 2017-11-06 RX ADMIN — HEPARIN SODIUM 5000 UNITS: 5000 INJECTION, SOLUTION INTRAVENOUS; SUBCUTANEOUS at 22:20

## 2017-11-06 RX ADMIN — ONDANSETRON 4 MG: 2 INJECTION INTRAMUSCULAR; INTRAVENOUS at 13:22

## 2017-11-06 RX ADMIN — TAZOBACTAM SODIUM AND PIPERACILLIN SODIUM 3.38 G: 375; 3 INJECTION, SOLUTION INTRAVENOUS at 05:15

## 2017-11-06 RX ADMIN — SULFAMETHOXAZOLE AND TRIMETHOPRIM 1 TABLET: 800; 160 TABLET ORAL at 22:20

## 2017-11-06 RX ADMIN — HEPARIN SODIUM 2000 UNITS: 1000 INJECTION, SOLUTION INTRAVENOUS; SUBCUTANEOUS at 10:20

## 2017-11-06 RX ADMIN — HEPARIN SODIUM 5000 UNITS: 5000 INJECTION, SOLUTION INTRAVENOUS; SUBCUTANEOUS at 05:15

## 2017-11-06 RX ADMIN — HYDROCORTISONE SODIUM SUCCINATE 50 MG: 100 INJECTION, POWDER, FOR SOLUTION INTRAMUSCULAR; INTRAVENOUS at 18:20

## 2017-11-06 RX ADMIN — HEPARIN SODIUM 5000 UNITS: 5000 INJECTION, SOLUTION INTRAVENOUS; SUBCUTANEOUS at 13:26

## 2017-11-06 RX ADMIN — HEPARIN SODIUM 1000 UNITS: 1000 INJECTION, SOLUTION INTRAVENOUS; SUBCUTANEOUS at 10:21

## 2017-11-06 RX ADMIN — HYDROCORTISONE SODIUM SUCCINATE 50 MG: 100 INJECTION, POWDER, FOR SOLUTION INTRAMUSCULAR; INTRAVENOUS at 00:54

## 2017-11-06 RX ADMIN — METOPROLOL TARTRATE 12.5 MG: 25 TABLET ORAL at 22:21

## 2017-11-06 RX ADMIN — PANTOPRAZOLE SODIUM 40 MG: 40 TABLET, DELAYED RELEASE ORAL at 05:15

## 2017-11-06 RX ADMIN — MYCOPHENOLATE MOFETIL 500 MG: 250 CAPSULE ORAL at 22:19

## 2017-11-06 ASSESSMENT — PAIN SCALES - GENERAL: PAINLEVEL_OUTOF10: 0

## 2017-11-06 NOTE — PROGRESS NOTES
Patient states that diarrhea has slowed down, denies abdominal pain at this time. Patient denies needs at this time. Call light in reach.

## 2017-11-06 NOTE — PROGRESS NOTES
Hospitalist Progress Note      PCP: No primary care provider on file. Date of Admission: 10/31/2017    Interval H/P:     Pt admitted with abdominal pain and hypotension , also with hx of cirrhosis and liver transplant. He is tolerating oral diet  denies any  abdominal pain, SOB, CP, N/V, fever, chills. Pt however with non functional RAVF with no bruit /thrill, nephrologist aware with consult in place to Dr Mayra Buitrago  for declotting access. Medications:  Reviewed    Infusion Medications    sodium chloride 2,000 mL (11/06/17 1023)     Scheduled Medications    sulfamethoxazole-trimethoprim  1 tablet Oral Daily    heparin (porcine)  5,000 Units Subcutaneous 3 times per day    pantoprazole  40 mg Oral QAM AC    piperacillin-tazobactam  3.375 g Intravenous Q12H    vancomycin (VANCOCIN) intermittent dosing (placeholder)   Other RX Placeholder    vancomycin  750 mg Intravenous Q MWF    metoprolol tartrate  12.5 mg Oral BID    hydrocortisone sodium succinate PF  50 mg Intravenous Q6H    calcium acetate  667 mg Oral TID WC    mycophenolate  500 mg Oral BID    tacrolimus  1 mg Oral BID    sodium chloride flush  10 mL Intravenous 2 times per day    influenza virus vaccine  0.5 mL Intramuscular Once     PRN Meds: acetaminophen, albumin human, heparin (porcine), heparin (porcine), heparin (porcine), sodium chloride flush, oxyCODONE **OR** oxyCODONE, morphine **OR** morphine, ondansetron      Intake/Output Summary (Last 24 hours) at 11/06/17 1423  Last data filed at 11/06/17 1230   Gross per 24 hour   Intake             1180 ml   Output             3850 ml   Net            -2670 ml       Exam:    BP (!) 137/93   Pulse 86   Temp 97.5 °F (36.4 °C)   Resp 18   Ht 5' 6\" (1.676 m)   Wt 137 lb 9.1 oz (62.4 kg)   SpO2 100%   BMI 22.20 kg/m²     General appearance: No apparent distress, appears stated age and cooperative. HEENT: Pupils equal, round, and reactive to light. Conjunctivae/corneas clear.   Neck: Supple, with full range of motion. No jugular venous distention. Trachea midline. Respiratory:  Normal respiratory effort. Clear to auscultation, bilaterally without Rales/Wheezes/Rhonchi. Cardiovascular: Regular rate and rhythm with normal S1/S2 without murmurs, rubs or gallops. Abdomen: Soft, non-tender, non-distended with normal bowel sounds. Musculoskeletal: No clubbing, cyanosis or edema bilaterally. Full range of motion without deformity. Skin: Skin color, texture, turgor normal.  No rashes or lesions. Neuro: Non Focal. Symetrical motor and tone. Nl Comprehension, Alert,awake and oriented. NL CN. Symetrical tone and reflexes. Psychiatric: Alert and oriented, thought content appropriate, normal insight  Capillary Refill: Brisk,< 3 seconds   Peripheral Pulses: +2 palpable, equal bilaterally       Labs:   Recent Labs      11/04/17   0630  11/06/17   0508   WBC  19.1*  18.5*   HGB  13.6*  13.4*   HCT  42.1  41.6*   PLT  182  192     Recent Labs      11/04/17   0630  11/05/17   0655  11/06/17   0508   NA  137  139  140   K  3.9  3.2*  3.6   CL  95*  94*  95*   CO2  22  19*  18*   BUN  24*  49*  71*   CREATININE  5.29*  7.64*  9.07*   CALCIUM  7.6*  7.6*  7.1*   PHOS  4.3   --    --      No results for input(s): AST, ALT, BILIDIR, BILITOT, ALKPHOS in the last 72 hours. No results for input(s): INR in the last 72 hours. No results for input(s): [place] in the last 72 hours. Urinalysis:    Lab Results   Component Value Date    NITRU Negative 05/21/2015    BLOODU Negative 05/21/2015    SPECGRAV 1.018 05/21/2015    GLUCOSEU Negative 05/21/2015    GLUCOSEU NEG 02/22/2012       Radiology:  XR CHEST STANDARD (2 VW)   Final Result      NO EVIDENCE OF ACTIVE CARDIOPULMONARY DISEASE. CT ABDOMEN PELVIS WO IV CONTRAST Additional Contrast? None   Final Result   STATUS POST LIVER TRANSPLANT. SMALL, FAT-CONTAINING UMBILICAL HERNIA. DIARRHEA. NO ACUTE PROCESS.          All CT scans at this

## 2017-11-06 NOTE — PROGRESS NOTES
[facility]  SOURCE: Blood                              COLLECTED:  10/31/17 16:08  ANTIBIOTICS AT [facility]LEXI. :                      RECEIVED :  10/31/17 16:08   Culture Stool [[id number]] Collected: 10/31/17          ASSESSMENT:PLAN:     Sepsis. ..  Leukocytosis hypertension  Blood cultures are negative  Discontinue IV antibiotics

## 2017-11-06 NOTE — PROGRESS NOTES
Pt seen on hd.  2k bath. No hd related complications. Using hd catheter. Fistula which was able to be used last week. However, it doesn't have thrill or bruit starting over weekend. Diarrhea better    - hd mwf  - consult Dr. Anton Hamman for eval for declotting access  - will get op report from Dr. Lisbet Palomares who placed fistula.

## 2017-11-06 NOTE — PLAN OF CARE
Problem: Infection:  Goal: Will remain free from infection  Will remain free from infection   Outcome: Ongoing      Problem: Safety:  Goal: Free from accidental physical injury  Free from accidental physical injury   Outcome: Ongoing    Goal: Free from intentional harm  Free from intentional harm   Outcome: Ongoing      Problem: Daily Care:  Goal: Daily care needs are met  Daily care needs are met   Outcome: Ongoing      Problem: Pain:  Goal: Patient's pain/discomfort is manageable  Patient's pain/discomfort is manageable   Outcome: Ongoing    Goal: Pain level will decrease  Pain level will decrease   Outcome: Ongoing    Goal: Control of acute pain  Control of acute pain   Outcome: Ongoing    Goal: Control of chronic pain  Control of chronic pain   Outcome: Ongoing      Problem: Discharge Planning:  Goal: Patients continuum of care needs are met  Patients continuum of care needs are met   Outcome: Ongoing      Problem: Nutrition  Goal: Optimal nutrition therapy  Nutrition Problem: Inadequate oral intake, in context of chronic illness, Moderate malnutrition  Intervention: Food and/or Nutrient Delivery: Start ONS  Nutritional Goals: Intake To Improve To > 75% With Meals/ONS'S. Decreased stooling.    Outcome: Ongoing      Problem: Mobility - Impaired:  Goal: Mobility will improve  Mobility will improve   Outcome: Ongoing

## 2017-11-07 ENCOUNTER — APPOINTMENT (OUTPATIENT)
Dept: INTERVENTIONAL RADIOLOGY/VASCULAR | Age: 62
DRG: 853 | End: 2017-11-07
Payer: MEDICARE

## 2017-11-07 VITALS
OXYGEN SATURATION: 98 % | TEMPERATURE: 97.3 F | RESPIRATION RATE: 18 BRPM | HEART RATE: 86 BPM | WEIGHT: 140 LBS | BODY MASS INDEX: 22.5 KG/M2 | HEIGHT: 66 IN | DIASTOLIC BLOOD PRESSURE: 82 MMHG | SYSTOLIC BLOOD PRESSURE: 118 MMHG

## 2017-11-07 LAB
ABO/RH: NORMAL
ANION GAP SERPL CALCULATED.3IONS-SCNC: 27 MEQ/L (ref 7–13)
ANISOCYTOSIS: ABNORMAL
ANTIBODY SCREEN: NORMAL
BANDED NEUTROPHILS RELATIVE PERCENT: 2 %
BASOPHILS ABSOLUTE: 0 K/UL (ref 0–0.2)
BASOPHILS RELATIVE PERCENT: 0 %
BUN BLDV-MCNC: 43 MG/DL (ref 8–23)
CALCIUM SERPL-MCNC: 8 MG/DL (ref 8.6–10.2)
CHLORIDE BLD-SCNC: 93 MEQ/L (ref 98–107)
CO2: 16 MEQ/L (ref 22–29)
CREAT SERPL-MCNC: 6.29 MG/DL (ref 0.7–1.2)
EOSINOPHILS ABSOLUTE: 0 K/UL (ref 0–0.7)
EOSINOPHILS RELATIVE PERCENT: 0 %
GFR AFRICAN AMERICAN: 10.9
GFR NON-AFRICAN AMERICAN: 9
GLUCOSE BLD-MCNC: 106 MG/DL (ref 74–109)
HCT VFR BLD CALC: 48.8 % (ref 42–52)
HEMOGLOBIN: 15.4 G/DL (ref 14–18)
HYPOCHROMIA: 0
LYMPHOCYTES ABSOLUTE: 0.2 K/UL (ref 1–4.8)
LYMPHOCYTES RELATIVE PERCENT: 1 %
MACROCYTES: 0
MCH RBC QN AUTO: 29.3 PG (ref 27–31.3)
MCHC RBC AUTO-ENTMCNC: 31.6 % (ref 33–37)
MCV RBC AUTO: 92.7 FL (ref 80–100)
MICROCYTES: 0
MONOCYTES ABSOLUTE: 0.9 K/UL (ref 0.2–0.8)
MONOCYTES RELATIVE PERCENT: 3.6 %
NEUTROPHILS ABSOLUTE: 20.8 K/UL (ref 1.4–6.5)
NEUTROPHILS RELATIVE PERCENT: 93 %
PDW BLD-RTO: 15.9 % (ref 11.5–14.5)
PLATELET # BLD: 250 K/UL (ref 130–400)
PLATELET SLIDE REVIEW: NORMAL
POIKILOCYTES: ABNORMAL
POLYCHROMASIA: 0
POTASSIUM SERPL-SCNC: 4.1 MEQ/L (ref 3.5–5.1)
PROMYELOCYTES PERCENT: 1 %
RBC # BLD: 5.26 M/UL (ref 4.7–6.1)
SMUDGE CELLS: 4.5
SODIUM BLD-SCNC: 136 MEQ/L (ref 132–144)
WBC # BLD: 21.7 K/UL (ref 4.8–10.8)

## 2017-11-07 PROCEDURE — C1725 CATH, TRANSLUMIN NON-LASER: HCPCS

## 2017-11-07 PROCEDURE — 6360000002 HC RX W HCPCS: Performed by: INTERNAL MEDICINE

## 2017-11-07 PROCEDURE — 037Y3ZZ DILATION OF UPPER ARTERY, PERCUTANEOUS APPROACH: ICD-10-PCS | Performed by: SURGERY

## 2017-11-07 PROCEDURE — 6370000000 HC RX 637 (ALT 250 FOR IP): Performed by: ANESTHESIOLOGY

## 2017-11-07 PROCEDURE — 027V3ZZ DILATION OF SUPERIOR VENA CAVA, PERCUTANEOUS APPROACH: ICD-10-PCS | Performed by: SURGERY

## 2017-11-07 PROCEDURE — 36907 BALO ANGIOP CTR DIALYSIS SEG: CPT | Performed by: SURGERY

## 2017-11-07 PROCEDURE — 6360000002 HC RX W HCPCS: Performed by: SURGERY

## 2017-11-07 PROCEDURE — 86900 BLOOD TYPING SEROLOGIC ABO: CPT

## 2017-11-07 PROCEDURE — 05773ZZ DILATION OF RIGHT AXILLARY VEIN, PERCUTANEOUS APPROACH: ICD-10-PCS | Performed by: SURGERY

## 2017-11-07 PROCEDURE — 6360000002 HC RX W HCPCS: Performed by: ANESTHESIOLOGY

## 2017-11-07 PROCEDURE — 75710 ARTERY X-RAYS ARM/LEG: CPT | Performed by: SURGERY

## 2017-11-07 PROCEDURE — 80048 BASIC METABOLIC PNL TOTAL CA: CPT

## 2017-11-07 PROCEDURE — B51WYZZ FLUOROSCOPY OF DIALYSIS SHUNT/FISTULA USING OTHER CONTRAST: ICD-10-PCS | Performed by: SURGERY

## 2017-11-07 PROCEDURE — 86901 BLOOD TYPING SEROLOGIC RH(D): CPT

## 2017-11-07 PROCEDURE — 2500000003 HC RX 250 WO HCPCS: Performed by: SURGERY

## 2017-11-07 PROCEDURE — 86850 RBC ANTIBODY SCREEN: CPT

## 2017-11-07 PROCEDURE — 6370000000 HC RX 637 (ALT 250 FOR IP): Performed by: INTERNAL MEDICINE

## 2017-11-07 PROCEDURE — 36905 THRMBC/NFS DIALYSIS CIRCUIT: CPT | Performed by: SURGERY

## 2017-11-07 PROCEDURE — 85025 COMPLETE CBC W/AUTO DIFF WBC: CPT

## 2017-11-07 PROCEDURE — 2580000003 HC RX 258: Performed by: SURGERY

## 2017-11-07 PROCEDURE — 36415 COLL VENOUS BLD VENIPUNCTURE: CPT

## 2017-11-07 PROCEDURE — 36215 PLACE CATHETER IN ARTERY: CPT | Performed by: SURGERY

## 2017-11-07 PROCEDURE — 99232 SBSQ HOSP IP/OBS MODERATE 35: CPT | Performed by: INTERNAL MEDICINE

## 2017-11-07 PROCEDURE — 2580000003 HC RX 258: Performed by: NURSE PRACTITIONER

## 2017-11-07 RX ORDER — PANTOPRAZOLE SODIUM 40 MG/1
40 TABLET, DELAYED RELEASE ORAL
Qty: 30 TABLET | Refills: 3 | Status: SHIPPED | OUTPATIENT
Start: 2017-11-08 | End: 2018-12-20

## 2017-11-07 RX ORDER — METHYLPREDNISOLONE 4 MG/1
TABLET ORAL
Qty: 1 KIT | Refills: 0 | Status: SHIPPED | OUTPATIENT
Start: 2017-11-07 | End: 2017-11-13

## 2017-11-07 RX ORDER — FENTANYL CITRATE 50 UG/ML
100 INJECTION, SOLUTION INTRAMUSCULAR; INTRAVENOUS
Status: DISCONTINUED | OUTPATIENT
Start: 2017-11-07 | End: 2017-11-07

## 2017-11-07 RX ORDER — 0.9 % SODIUM CHLORIDE 0.9 %
1000 INTRAVENOUS SOLUTION INTRAVENOUS ONCE
Status: COMPLETED | OUTPATIENT
Start: 2017-11-07 | End: 2017-11-07

## 2017-11-07 RX ORDER — HEPARIN SODIUM 1000 [USP'U]/ML
6000 INJECTION, SOLUTION INTRAVENOUS; SUBCUTANEOUS ONCE
Status: DISCONTINUED | OUTPATIENT
Start: 2017-11-07 | End: 2017-11-07 | Stop reason: HOSPADM

## 2017-11-07 RX ORDER — HEPARIN SODIUM 1000 [USP'U]/ML
10000 INJECTION, SOLUTION INTRAVENOUS; SUBCUTANEOUS ONCE
Status: COMPLETED | OUTPATIENT
Start: 2017-11-07 | End: 2017-11-07

## 2017-11-07 RX ORDER — 0.9 % SODIUM CHLORIDE 0.9 %
250 INTRAVENOUS SOLUTION INTRAVENOUS ONCE
Status: DISCONTINUED | OUTPATIENT
Start: 2017-11-07 | End: 2017-11-07 | Stop reason: HOSPADM

## 2017-11-07 RX ORDER — SODIUM CHLORIDE 9 MG/ML
INJECTION, SOLUTION INTRAVENOUS CONTINUOUS
Status: DISCONTINUED | OUTPATIENT
Start: 2017-11-07 | End: 2017-11-07 | Stop reason: HOSPADM

## 2017-11-07 RX ORDER — MIDAZOLAM HYDROCHLORIDE 1 MG/ML
2 INJECTION INTRAMUSCULAR; INTRAVENOUS ONCE
Status: DISCONTINUED | OUTPATIENT
Start: 2017-11-07 | End: 2017-11-07 | Stop reason: HOSPADM

## 2017-11-07 RX ORDER — LIDOCAINE HYDROCHLORIDE 20 MG/ML
10 INJECTION, SOLUTION INFILTRATION; PERINEURAL ONCE
Status: COMPLETED | OUTPATIENT
Start: 2017-11-07 | End: 2017-11-07

## 2017-11-07 RX ADMIN — HYDROCORTISONE SODIUM SUCCINATE 50 MG: 100 INJECTION, POWDER, FOR SOLUTION INTRAMUSCULAR; INTRAVENOUS at 00:39

## 2017-11-07 RX ADMIN — SODIUM CHLORIDE, PRESERVATIVE FREE 10 ML: 5 INJECTION INTRAVENOUS at 00:39

## 2017-11-07 RX ADMIN — HEPARIN SODIUM 10000 UNITS: 1000 INJECTION, SOLUTION INTRAVENOUS; SUBCUTANEOUS at 14:05

## 2017-11-07 RX ADMIN — CALCIUM ACETATE 667 MG: 667 CAPSULE ORAL at 09:06

## 2017-11-07 RX ADMIN — HYDROCORTISONE SODIUM SUCCINATE 50 MG: 100 INJECTION, POWDER, FOR SOLUTION INTRAMUSCULAR; INTRAVENOUS at 06:13

## 2017-11-07 RX ADMIN — TACROLIMUS 1 MG: 1 CAPSULE ORAL at 09:06

## 2017-11-07 RX ADMIN — SODIUM CHLORIDE, PRESERVATIVE FREE 10 ML: 5 INJECTION INTRAVENOUS at 09:12

## 2017-11-07 RX ADMIN — METOPROLOL TARTRATE 12.5 MG: 25 TABLET ORAL at 09:07

## 2017-11-07 RX ADMIN — HEPARIN SODIUM 5000 UNITS: 5000 INJECTION, SOLUTION INTRAVENOUS; SUBCUTANEOUS at 06:16

## 2017-11-07 RX ADMIN — LIDOCAINE HYDROCHLORIDE: 20 INJECTION, SOLUTION INFILTRATION; PERINEURAL at 12:42

## 2017-11-07 RX ADMIN — PANTOPRAZOLE SODIUM 40 MG: 40 TABLET, DELAYED RELEASE ORAL at 06:16

## 2017-11-07 RX ADMIN — SULFAMETHOXAZOLE AND TRIMETHOPRIM 1 TABLET: 800; 160 TABLET ORAL at 09:07

## 2017-11-07 RX ADMIN — CALCIUM ACETATE 667 MG: 667 CAPSULE ORAL at 16:33

## 2017-11-07 RX ADMIN — SODIUM CHLORIDE 1000 ML: 9 INJECTION, SOLUTION INTRAVENOUS at 14:04

## 2017-11-07 RX ADMIN — HEPARIN SODIUM 5000 UNITS: 1000 INJECTION, SOLUTION INTRAVENOUS; SUBCUTANEOUS at 12:50

## 2017-11-07 RX ADMIN — MYCOPHENOLATE MOFETIL 500 MG: 250 CAPSULE ORAL at 09:06

## 2017-11-07 ASSESSMENT — PAIN - FUNCTIONAL ASSESSMENT: PAIN_FUNCTIONAL_ASSESSMENT: 0-10

## 2017-11-07 ASSESSMENT — PAIN SCALES - GENERAL: PAINLEVEL_OUTOF10: 0

## 2017-11-07 NOTE — SEDATION DOCUMENTATION
7 Fr x 5.5 cm brite tip introducer sheath inserted per Dr Evelia Espitia 5 Fr percutaneous thrombolytic device used and  Injecting 1/2 and 1/2 contrast through the Terry. Clots being removed at this time by Dr Christy Horton.    Pt tolerating very well, VSS

## 2017-11-07 NOTE — SEDATION DOCUMENTATION
Conquest 6 mm balloon removed and Conquest 40 ( 8 mm by 4 cm by 50 cm )  Balloon inserted per Dr Adelina Shin. Continued support given to pt throughout procedure.

## 2017-11-07 NOTE — SEDATION DOCUMENTATION
Conquest 40 (6 mm by 40 mm by 50 cm) PTA dilation balloon inserted per Dr Marixa Garrett. Inflator device used for inflation/deflation cycles. Support continued to pt, tolerating very well.

## 2017-11-07 NOTE — SEDATION DOCUMENTATION
Pt to specials, assisted off cart walked into room and assisted onto table. VSS, support given, procedure explained.

## 2017-11-07 NOTE — PROGRESS NOTES
Renal Progress Note    Assessment and Plan:    1. ESRD with clotted access. Placed by Dr. Dennie Roman. Last used Wednesday   2. Liver transplant   3. Extreme noncompliance issues at times (stopped all liver transplant meds for a while)   4- diarrhea    D/w Dr. Carlos Eduardo Hwang yesterday and placed consult for declotting  I contracted his dialysis unit at [phone] and asked them to fax Dr. Dennie Roman op report  [facility] for d/c from renal standpoint after eval by Dr. Carlos Eduardo Hwang  HD tomorrow if still here  Has leukoyctosis unexplained. Blood cx negative. ID following    Patient Active Problem List:     Hypotension     ESRD (end stage renal disease) ([facility] [address]     Liver transplanted [facility])     Liver transplant recipient ([facility] [address]     Sepsis ([facility] [address]     Gastroenteritis     C. difficile colitis     Severe sepsis ([facility] [address]     Vomiting and diarrhea     Generalized abdominal pain     Acute renal failure ([facility])      Subjective:   Admit Date: 10/31/2017    Interval History: pt feels ok. Had hd yesterday. No cp. No sob.        Medications:   Scheduled Meds:   sulfamethoxazole-trimethoprim  1 tablet Oral Daily    heparin (porcine)  5,000 Units Subcutaneous 3 times per day    pantoprazole  40 mg Oral QAM AC    metoprolol tartrate  12.5 mg Oral BID    hydrocortisone sodium succinate PF  50 mg Intravenous Q6H    calcium acetate  667 mg Oral TID WC    mycophenolate  500 mg Oral BID    tacrolimus  1 mg Oral BID    sodium chloride flush  10 mL Intravenous 2 times per day    influenza virus vaccine  0.5 mL Intramuscular Once     Continuous Infusions:   sodium chloride 2,000 mL (11/06/17 1023)       CBC:   Recent Labs      11/06/17   0508  11/07/17   0514   WBC  18.5*  21.7*   HGB  13.4*  15.4   PLT  192  250     CMP:  Recent Labs      11/05/17   0655  11/06/17   0508  11/07/17   0514   NA  139  140  136   K  3.2*  3.6  4.1   CL  94*  95*  93*   CO2  19*  18*  16*   BUN  49*  71*  43*   CREATININE  7.64*  9.07*  6.29*   GLUCOSE  102  110*  106

## 2017-11-07 NOTE — SEDATION DOCUMENTATION
Balloon dilation catheter (XXL Vascular)12 mm x 4 cm by 75 cm inserted by Dr Mayra Buitrago, no ectopy noted on monitor. 1/2 contrast and 1/2 saline used to inflate balloon.

## 2017-11-07 NOTE — SEDATION DOCUMENTATION
thin wall needle used to gain access 18 g by 7 cm  glidewire stiff angled shaft 0.035 by 180 cm inserted per Dr Harjinder Tijerina.

## 2017-11-07 NOTE — DISCHARGE INSTR - DIET

## 2017-11-07 NOTE — SEDATION DOCUMENTATION
6 F brite tip sheath introducer placed then Soft-Vu Berenstein 5 Fr by 65 cm catheter placed into sheath per Dr Adelina Shin, pt mallory very well, VSS

## 2017-11-07 NOTE — DISCHARGE SUMMARY
EVIDENCE OF ACTIVE CARDIOPULMONARY DISEASE. CT ABDOMEN PELVIS WO IV CONTRAST Additional Contrast? None   Final Result   STATUS POST LIVER TRANSPLANT. SMALL, FAT-CONTAINING UMBILICAL HERNIA. DIARRHEA. NO ACUTE PROCESS. All CT scans at this facility use dose modulation, iterative reconstruction, and/or weight based dosing when appropriate to reduce radiation dose to as low as reasonably achievable. Discharge Medications:   Current Discharge Medication List      START taking these medications    Details   metoprolol tartrate (LOPRESSOR) 25 MG tablet Take 0.5 tablets by mouth 2 times daily  Qty: 60 tablet, Refills: 3      pantoprazole (PROTONIX) 40 MG tablet Take 1 tablet by mouth every morning (before breakfast)  Qty: 30 tablet, Refills: 3      methylPREDNISolone (MEDROL DOSEPACK) 4 MG tablet Take by mouth. Qty: 1 kit, Refills: 0           Current Discharge Medication List        Current Discharge Medication List      CONTINUE these medications which have NOT CHANGED    Details   calcium acetate (PHOSLO) 667 MG capsule Take 667 mg by mouth 3 times daily (with meals)      mycophenolate (CELLCEPT) 500 MG tablet Take 500 mg by mouth 2 times daily      sulfamethoxazole-trimethoprim (BACTRIM DS;SEPTRA DS) 800-160 MG per tablet Take 1 tablet by mouth daily      Tacrolimus (PROGRAF PO) Take 1 tablet by mouth 2 times daily           Current Discharge Medication List      STOP taking these medications       carvedilol (COREG) 12.5 MG tablet Comments:   Reason for Stopping:         lisinopril (PRINIVIL;ZESTRIL) 40 MG tablet Comments:   Reason for Stopping:         predniSONE (DELTASONE) 10 MG tablet Comments:   Reason for Stopping: Follow-up appointments:  one week    Provider Follow-up:    Schedule appointment with internal medicine doctor - 1 week   F/u Liver transplant  @ [facility]- 1 week   Nephrologist - for dialysis tx.      Condition at Discharge:  Stable    The patient was seen

## 2017-11-07 NOTE — SEDATION DOCUMENTATION
Balloon and wire removed per Dr Christy Horton. Total of 5 inflation/deflation cycles performed per Dr Christy Horton. Final contrast/saline used to visualize vessels.

## 2017-11-08 NOTE — OP NOTE
[address]                     [address], [address] [facility]                               OPERATIVE REPORT    PATIENT NAME: Cem Rogers                    :         1955  MED REC NO:   [id number]                            ROOM:       W265  ACCOUNT NO:   [de-identified]                           ADMIT DATE:  10/31/2017  PROVIDER:     Hayde Roper MD    DATE OF PROCEDURE:  2017    PREOPERATIVE DIAGNOSIS:  Clotted right arm AV graft. POSTOPERATIVE DIAGNOSIS:  Clotted right arm AV graft. OPERATION PERFORMED:  Thrombectomy utilizing a Trerotola device,  balloon angioplasty of the peripheral portion of the graft as well as  add-on central angioplasty of the superior vena cava with a 12 x 4  balloon, balloon angioplasty of the arterial inflow utilizing a 6 x 4  balloon, balloon angioplasty of the axillary vein utilizing an 8 x 4  balloon, and arteriogram of right arm. SURGEON:  Hayde Roper MD    ANESTHESIA:  Local only. DETAILS OF PROCEDURE:  This is a male patient. He is 62. He has a  newly placed right arm AV graft. He experienced thrombosis recently. It was then recommended that he undergo a thrombectomy and a  fistulogram with intention to treat. He elected to proceed. He was  taken to the special procedure room. He was placed supine on the  table. His right arm was prepped and draped in sterile fashion. Lidocaine was then used at the skin level. Next, graft was then  accessed with a needle and a wire was placed. There was difficulty  passing it into the outflow into the superior vena cava. At this  time, a sheath was then placed. Next, a Trerotola device was taken up  through the venous outflow and a thrombectomy was performed. This  reestablished some venous flow. Fistulogram was performed. This  identified stenosis, occlusion of the superior vena cava at the  entrance site of the PermCath.   At this time, a wire and catheter were  used to traverse the lesion. Next, a 12 x 4 balloon was then used to  angioplasty this area. This reestablished flow. At this time, then  the graft was then accessed in the arterial direction and a sheath was  then placed. A wire was placed. The Trerotola was then used to pass  through arterial anastomosis. There was very much difficulty and the  Trerotola did not open upon passing. At this time, a wire and a  catheter were taken and an angiogram was performed. This identified  brachial artery was patent. Radial and ulnar artery were patent. The  arterial anastomosis had very severe high-grade inflow stenosis. At  this time, a 6 x 4 balloon was taken to this area and balloon  angioplasty was carried out. This did reestablish arterial flow. At  this time, there was flow through the fistula. The sheaths were then  removed utilizing suture. Pressure was held. He tolerated the  procedure well, stable to recovery.       Derrick Shelley MD    D: 11/07/2017 14:16:37       T: 11/07/2017 23:46:30     JUAN [email or web address]  Job#: [id number]     Doc#: [id number]

## 2017-11-09 LAB
BLOOD CULTURE, ROUTINE: NORMAL
CULTURE, BLOOD 2: NORMAL

## 2017-11-13 ENCOUNTER — HOSPITAL ENCOUNTER (EMERGENCY)
Age: 62
Discharge: HOME | End: 2017-11-13
Attending: EMERGENCY MEDICINE
Payer: MEDICARE

## 2017-11-13 VITALS
OXYGEN SATURATION: 100 % | TEMPERATURE: 96.5 F | HEART RATE: 104 BPM | SYSTOLIC BLOOD PRESSURE: 103 MMHG | RESPIRATION RATE: 18 BRPM | DIASTOLIC BLOOD PRESSURE: 63 MMHG | HEIGHT: 66 IN

## 2017-11-13 DIAGNOSIS — E86.0 DEHYDRATION: ICD-10-CM

## 2017-11-13 DIAGNOSIS — R19.7 DIARRHEA, UNSPECIFIED TYPE: Primary | ICD-10-CM

## 2017-11-13 DIAGNOSIS — I95.1 ORTHOSTATIC HYPOTENSION: ICD-10-CM

## 2017-11-13 DIAGNOSIS — R42 DIZZINESS: ICD-10-CM

## 2017-11-13 PROCEDURE — 96361 HYDRATE IV INFUSION ADD-ON: CPT

## 2017-11-13 PROCEDURE — 96360 HYDRATION IV INFUSION INIT: CPT

## 2017-11-13 PROCEDURE — 99284 EMERGENCY DEPT VISIT MOD MDM: CPT

## 2017-11-13 PROCEDURE — 2580000003 HC RX 258: Performed by: EMERGENCY MEDICINE

## 2017-11-13 RX ORDER — 0.9 % SODIUM CHLORIDE 0.9 %
2000 INTRAVENOUS SOLUTION INTRAVENOUS ONCE
Status: COMPLETED | OUTPATIENT
Start: 2017-11-13 | End: 2017-11-13

## 2017-11-13 RX ADMIN — SODIUM CHLORIDE 2000 ML: 9 INJECTION, SOLUTION INTRAVENOUS at 17:50

## 2017-11-13 NOTE — ED NOTES
Bed: 26  Expected date: 11/13/17  Expected time: 5:10 PM  Means of arrival:   Comments:  62M, coming from dialysis w/dizziness, 86/57, BS-77     Fatoumata Bustillos RN  11/13/17 2740

## 2017-11-13 NOTE — ED PROVIDER NOTES
below, if available at the time of this note:    No orders to display         ED BEDSIDE ULTRASOUND:   Performed by ED Physician - none    LABS:  Labs Reviewed - No data to display    All other labs were within normal range or not returned as of this dictation. EMERGENCY DEPARTMENT COURSE and DIFFERENTIAL DIAGNOSIS/MDM:   Vitals:    Vitals:    11/13/17 1829 11/13/17 1830 11/13/17 1831 11/13/17 1915   BP:   (!) 90/59 103/63   Pulse: 104 102 100 104   Resp: 18 17 17 18   Temp:       TempSrc:       SpO2:    100%   Height:           Patient is initially hypotensive and orthostatic positive. He is given 2 L normal saline and his blood pressure comes up to 531 to systolic and he is feeling significantly better on reevaluation. We have a discussion regarding his ongoing diarrhea and that he needs to increase his fluid intake while this is happening. He is encouraged to follow-up with [facility] and dentistry should his diarrhea continue. Signs and symptoms which should prompt his urgent return to the emergency department are reviewed and patient is discharged in stable condition and feeling better. MDM    CRITICAL CARE TIME   Total Critical Care time was 35 minutes, excluding separately reportable procedures. There was a high probability of clinically significant/life threatening deterioration in the patient's condition which required my urgent intervention. CONSULTS:  None    PROCEDURES:  Unless otherwise noted below, none     Procedures    FINAL IMPRESSION      1. Diarrhea, unspecified type    2. Dehydration    3. Orthostatic hypotension    4. Dizziness          DISPOSITION/PLAN   DISPOSITION Discharge - Pending Orders Complete    PATIENT REFERRED TO:  [address] [place] [address].   [address] [place] [address]  [phone]      As needed if diarrhea continues      DISCHARGE MEDICATIONS:  New Prescriptions    No medications on file          (Please note that portions of this note were completed with a voice recognition program.  Efforts were made to edit the dictations but occasionally words are mis-transcribed.)    Richa Rosales DO (electronically signed)  Attending Emergency Physician          Richa Rosales DO  11/13/17 1940

## 2017-11-13 NOTE — ED TRIAGE NOTES
Pt c/o dizziness while at dialysis today, sent for low BP 86/57, OT 77.  Pt recent d/c from ICU about 1 week ago, also weight loss of 16 pounds over the weekend per Pt.

## 2017-11-14 NOTE — ED NOTES
Pt states he feels better. Pt ambulatory in room with steady gait. No distress noted. Pt called for ride home.      Milan Hernandez RN  [phone]

## 2018-12-20 ENCOUNTER — TELEPHONE (OUTPATIENT)
Dept: INTERVENTIONAL RADIOLOGY/VASCULAR | Age: 63
End: 2018-12-20

## 2018-12-20 ENCOUNTER — INITIAL CONSULT (OUTPATIENT)
Dept: INTERVENTIONAL RADIOLOGY/VASCULAR | Age: 63
End: 2018-12-20
Payer: MEDICARE

## 2018-12-20 ENCOUNTER — HOSPITAL ENCOUNTER (EMERGENCY)
Age: 63
Discharge: HOME | End: 2018-12-20
Attending: EMERGENCY MEDICINE
Payer: MEDICARE

## 2018-12-20 VITALS
DIASTOLIC BLOOD PRESSURE: 76 MMHG | HEIGHT: 66 IN | TEMPERATURE: 98 F | OXYGEN SATURATION: 98 % | RESPIRATION RATE: 16 BRPM | SYSTOLIC BLOOD PRESSURE: 152 MMHG | BODY MASS INDEX: 23.3 KG/M2 | WEIGHT: 145 LBS | HEART RATE: 68 BPM

## 2018-12-20 VITALS
OXYGEN SATURATION: 99 % | DIASTOLIC BLOOD PRESSURE: 80 MMHG | RESPIRATION RATE: 14 BRPM | HEART RATE: 74 BPM | SYSTOLIC BLOOD PRESSURE: 160 MMHG | WEIGHT: 146.6 LBS | BODY MASS INDEX: 23.66 KG/M2

## 2018-12-20 DIAGNOSIS — T82.9XXA COMPLICATIONS DUE TO RENAL DIALYSIS DEVICE, IMPLANT, AND GRAFT, INITIAL ENCOUNTER: ICD-10-CM

## 2018-12-20 DIAGNOSIS — M79.89 PAIN AND SWELLING OF RIGHT UPPER EXTREMITY: ICD-10-CM

## 2018-12-20 DIAGNOSIS — M79.601 PAIN AND SWELLING OF RIGHT UPPER EXTREMITY: ICD-10-CM

## 2018-12-20 DIAGNOSIS — L03.113 CELLULITIS OF RIGHT UPPER EXTREMITY: Primary | ICD-10-CM

## 2018-12-20 DIAGNOSIS — M79.89 ARM SWELLING: Primary | ICD-10-CM

## 2018-12-20 LAB
ALBUMIN SERPL-MCNC: 4 G/DL (ref 3.9–4.9)
ALP BLD-CCNC: 76 U/L (ref 35–104)
ALT SERPL-CCNC: 53 U/L (ref 0–41)
ANION GAP SERPL CALCULATED.3IONS-SCNC: 14 MEQ/L (ref 7–13)
AST SERPL-CCNC: 24 U/L (ref 0–40)
BASOPHILS ABSOLUTE: 0 K/UL (ref 0–0.2)
BASOPHILS RELATIVE PERCENT: 0.8 %
BILIRUB SERPL-MCNC: 0.4 MG/DL (ref 0–1.2)
BUN BLDV-MCNC: 31 MG/DL (ref 8–23)
C-REACTIVE PROTEIN: 3.4 MG/L (ref 0–5)
CALCIUM SERPL-MCNC: 8.3 MG/DL (ref 8.6–10.2)
CHLORIDE BLD-SCNC: 94 MEQ/L (ref 98–107)
CO2: 32 MEQ/L (ref 22–29)
CREAT SERPL-MCNC: 8.19 MG/DL (ref 0.7–1.2)
EOSINOPHILS ABSOLUTE: 0.1 K/UL (ref 0–0.7)
EOSINOPHILS RELATIVE PERCENT: 2.8 %
GFR AFRICAN AMERICAN: 8
GFR NON-AFRICAN AMERICAN: 6.6
GLOBULIN: 3.4 G/DL (ref 2.3–3.5)
GLUCOSE BLD-MCNC: 85 MG/DL (ref 74–109)
HCT VFR BLD CALC: 36.6 % (ref 42–52)
HEMOGLOBIN: 12.2 G/DL (ref 14–18)
LYMPHOCYTES ABSOLUTE: 0.8 K/UL (ref 1–4.8)
LYMPHOCYTES RELATIVE PERCENT: 15.3 %
MCH RBC QN AUTO: 34.4 PG (ref 27–31.3)
MCHC RBC AUTO-ENTMCNC: 33.2 % (ref 33–37)
MCV RBC AUTO: 103.5 FL (ref 80–100)
MONOCYTES ABSOLUTE: 0.6 K/UL (ref 0.2–0.8)
MONOCYTES RELATIVE PERCENT: 12.5 %
NEUTROPHILS ABSOLUTE: 3.4 K/UL (ref 1.4–6.5)
NEUTROPHILS RELATIVE PERCENT: 68.6 %
PDW BLD-RTO: 15.2 % (ref 11.5–14.5)
PLATELET # BLD: 170 K/UL (ref 130–400)
POTASSIUM SERPL-SCNC: 4 MEQ/L (ref 3.5–5.1)
RBC # BLD: 3.54 M/UL (ref 4.7–6.1)
SEDIMENTATION RATE, ERYTHROCYTE: 33 MM (ref 0–20)
SODIUM BLD-SCNC: 140 MEQ/L (ref 132–144)
TOTAL PROTEIN: 7.4 G/DL (ref 6.4–8.1)
WBC # BLD: 5 K/UL (ref 4.8–10.8)

## 2018-12-20 PROCEDURE — G8427 DOCREV CUR MEDS BY ELIG CLIN: HCPCS | Performed by: NURSE PRACTITIONER

## 2018-12-20 PROCEDURE — 36415 COLL VENOUS BLD VENIPUNCTURE: CPT

## 2018-12-20 PROCEDURE — 86140 C-REACTIVE PROTEIN: CPT

## 2018-12-20 PROCEDURE — 85025 COMPLETE CBC W/AUTO DIFF WBC: CPT

## 2018-12-20 PROCEDURE — 99204 OFFICE O/P NEW MOD 45 MIN: CPT | Performed by: NURSE PRACTITIONER

## 2018-12-20 PROCEDURE — 80053 COMPREHEN METABOLIC PANEL: CPT

## 2018-12-20 PROCEDURE — G8484 FLU IMMUNIZE NO ADMIN: HCPCS | Performed by: NURSE PRACTITIONER

## 2018-12-20 PROCEDURE — 85652 RBC SED RATE AUTOMATED: CPT

## 2018-12-20 PROCEDURE — 99283 EMERGENCY DEPT VISIT LOW MDM: CPT

## 2018-12-20 PROCEDURE — 3017F COLORECTAL CA SCREEN DOC REV: CPT | Performed by: NURSE PRACTITIONER

## 2018-12-20 PROCEDURE — 87040 BLOOD CULTURE FOR BACTERIA: CPT

## 2018-12-20 PROCEDURE — G8420 CALC BMI NORM PARAMETERS: HCPCS | Performed by: NURSE PRACTITIONER

## 2018-12-20 RX ORDER — CARVEDILOL 12.5 MG/1
12.5 TABLET ORAL 2 TIMES DAILY
Status: ON HOLD | COMMUNITY
End: 2022-08-18 | Stop reason: HOSPADM

## 2018-12-20 ASSESSMENT — ENCOUNTER SYMPTOMS
BACK PAIN: 1
VOMITING: 0
DIARRHEA: 0
EYE PAIN: 0
SHORTNESS OF BREATH: 0
CHEST TIGHTNESS: 0
NAUSEA: 0
ABDOMINAL PAIN: 0
ABDOMINAL PAIN: 0
VOMITING: 0
COUGH: 0
SORE THROAT: 0
EYE PAIN: 0
NAUSEA: 0
SORE THROAT: 0

## 2018-12-20 NOTE — ED TRIAGE NOTES
Pt arrived to ER from Dr. Crain Hair office with right arm swelling and tender. Pt has a fistula in his right arm. Dr wants pt checked for infection. Pt A&Ox4, skin p/w/d. resp even and unlabored.

## 2018-12-20 NOTE — PROGRESS NOTES
Ariane Batista, a male of 58 y.o. came to the office 12/20/2018. Chief Complaint   Patient presents with    Arm Pain     Pt c/o Rt arm pain and swelling due to problems with fistula. HPI: Patient is pleasant 58year old referred by [facility] for consultation for Right arm swelling and erythema. He has a right brachioaxillary graft per chart review from [facility]. Patient states graft placed by Dr. Boby Brown. States symptoms began last Friday the 14th after dialysis treatment and continued to get worse. He went to dialysis Monday, 17th and symptoms were much worse and that's when patient was referred to our office for evaluation of graft. He states symptoms are now getting better however exam shows entire right UE is erythematous, swollen and tender in areas. Extremity and fingers warm to touch. No cyanosis. He states no difficulty with dialysis treatments. No family history on file.     Past Surgical History:   Procedure Laterality Date    DIALYSIS FISTULA CREATION Right     LIVER TRANSPLANT  03/08/2017    TUNNELED VENOUS PORT PLACEMENT          Past Medical History:   Diagnosis Date    [facility] stage [facility]) [address] [address]     Hepatitis     Hypertension     Liver transplanted ([facility] [address] 03/2016       Social History     Social History    Marital status: Single     Spouse name: N/A    Number of children: N/A    Years of education: N/A     Social History Main Topics    Smoking status: Former Smoker     Packs/day: 0.50     Years: 15.00     Types: Cigarettes     Quit date: 11/1/2017    Smokeless tobacco: Never Used    Alcohol use No    Drug use: No    Sexual activity: Not Asked     Other Topics Concern    None     Social History Narrative    None       No Known Allergies    Current Outpatient Prescriptions on File Prior to Visit   Medication Sig Dispense Refill    calcium acetate (PHOSLO) 667 MG capsule Take 667 mg by mouth 3 times daily (with meals)      Tacrolimus (PROGRAF PO) Take 1 breath sounds normal. No stridor. No respiratory distress. He has no wheezes. He has no rales. He exhibits no tenderness. Abdominal: Soft. Bowel sounds are normal. He exhibits no distension and no mass. There is no tenderness. There is no rebound and no guarding. Musculoskeletal: Normal range of motion. He exhibits edema (RUE) and tenderness (RUE). He exhibits no deformity. Neurological: He is alert and oriented to person, place, and time. No cranial nerve deficit. Coordination normal.   Skin: Skin is warm and dry. No rash noted. He is not diaphoretic. There is erythema (RUE). No pallor. Psychiatric: He has a normal mood and affect. His behavior is normal. Judgment and thought content normal.       ASSESSMENT AND PLAN:    Assessment:   1. Possible Cellulitis to RUE causing erythema, tenderness, and swelling onset 11/14/2018 following dialysis tx. He has Right brachioaxillary graft. US done in office shows graft is patent without any thrombus. US negative for any DVT or superficial thrombus from neck to wrist. There is no abnormal reversal of arterial flow to suggest Arterial Steal syndrome. Plan:   1. Attempted to call and have patient been seen by ID today but unable and advised by Dr. Yang Bunch to have patient go to ED to be evaluated. Patient was advised of this and to report to ED today. He verbalized understanding. Report given to ED Charge Nurse, Deanne Mcdaniels. Thank You [facility] for referral of your patient to our practice for care.      Blu Mejia, APRN - CNP

## 2018-12-25 LAB
BLOOD CULTURE, ROUTINE: NORMAL
CULTURE, BLOOD 2: NORMAL

## 2020-03-11 ENCOUNTER — TELEPHONE (OUTPATIENT)
Dept: INTERVENTIONAL RADIOLOGY/VASCULAR | Age: 65
End: 2020-03-11

## 2020-03-13 NOTE — TELEPHONE ENCOUNTER
Spoke with pt regarding upcoming Fistulagram on 3/18/2020. Told to arrive at 12 noon for 1pm procedure. NPO after MN,  may take meds with sips of water the morning of. Bringing a . Pt had no further questions.

## 2020-03-18 ENCOUNTER — HOSPITAL ENCOUNTER (OUTPATIENT)
Dept: INTERVENTIONAL RADIOLOGY/VASCULAR | Age: 65
Discharge: HOME | End: 2020-03-20
Payer: COMMERCIAL

## 2020-03-18 VITALS
WEIGHT: 145 LBS | HEIGHT: 66 IN | DIASTOLIC BLOOD PRESSURE: 78 MMHG | OXYGEN SATURATION: 94 % | SYSTOLIC BLOOD PRESSURE: 145 MMHG | BODY MASS INDEX: 23.3 KG/M2 | HEART RATE: 71 BPM | TEMPERATURE: 97.6 F | RESPIRATION RATE: 19 BRPM

## 2020-03-18 PROCEDURE — 2709999900 IR US HEMODIALYSIS ACCESS EVAL

## 2020-03-18 PROCEDURE — 36902 INTRO CATH DIALYSIS CIRCUIT: CPT | Performed by: RADIOLOGY

## 2020-03-18 PROCEDURE — 99214 OFFICE O/P EST MOD 30 MIN: CPT | Performed by: RADIOLOGY

## 2020-03-18 PROCEDURE — 93990 DOPPLER FLOW TESTING: CPT | Performed by: RADIOLOGY

## 2020-03-18 PROCEDURE — 6360000004 HC RX CONTRAST MEDICATION: Performed by: NURSE PRACTITIONER

## 2020-03-18 PROCEDURE — 2500000003 HC RX 250 WO HCPCS: Performed by: NURSE PRACTITIONER

## 2020-03-18 PROCEDURE — 2580000003 HC RX 258: Performed by: NURSE PRACTITIONER

## 2020-03-18 PROCEDURE — 2709999900 IR DIALYSIS FISTULAGRAM EVAL

## 2020-03-18 PROCEDURE — 6360000002 HC RX W HCPCS: Performed by: NURSE PRACTITIONER

## 2020-03-18 RX ORDER — IODIXANOL 320 MG/ML
100 INJECTION, SOLUTION INTRAVASCULAR
Status: COMPLETED | OUTPATIENT
Start: 2020-03-18 | End: 2020-03-18

## 2020-03-18 RX ORDER — ONDANSETRON 2 MG/ML
4 INJECTION INTRAMUSCULAR; INTRAVENOUS
Status: ACTIVE | OUTPATIENT
Start: 2020-03-18 | End: 2020-03-18

## 2020-03-18 RX ORDER — FENTANYL CITRATE 50 UG/ML
50 INJECTION, SOLUTION INTRAMUSCULAR; INTRAVENOUS
Status: DISCONTINUED | OUTPATIENT
Start: 2020-03-18 | End: 2020-03-21 | Stop reason: HOSPADM

## 2020-03-18 RX ORDER — HEPARIN SODIUM 1000 [USP'U]/ML
6000 INJECTION, SOLUTION INTRAVENOUS; SUBCUTANEOUS ONCE
Status: COMPLETED | OUTPATIENT
Start: 2020-03-18 | End: 2020-03-18

## 2020-03-18 RX ORDER — HEPARIN SODIUM 1000 [USP'U]/ML
1000 INJECTION, SOLUTION INTRAVENOUS; SUBCUTANEOUS ONCE
Status: DISCONTINUED | OUTPATIENT
Start: 2020-03-18 | End: 2020-03-18 | Stop reason: CLARIF

## 2020-03-18 RX ORDER — 0.9 % SODIUM CHLORIDE 0.9 %
250 INTRAVENOUS SOLUTION INTRAVENOUS
Status: DISCONTINUED | OUTPATIENT
Start: 2020-03-18 | End: 2020-03-21 | Stop reason: HOSPADM

## 2020-03-18 RX ORDER — 0.9 % SODIUM CHLORIDE 0.9 %
1000 INTRAVENOUS SOLUTION INTRAVENOUS
Status: DISCONTINUED | OUTPATIENT
Start: 2020-03-18 | End: 2020-03-21 | Stop reason: HOSPADM

## 2020-03-18 RX ORDER — MIDAZOLAM HYDROCHLORIDE 1 MG/ML
0.5 INJECTION INTRAMUSCULAR; INTRAVENOUS
Status: DISCONTINUED | OUTPATIENT
Start: 2020-03-18 | End: 2020-03-21 | Stop reason: HOSPADM

## 2020-03-18 RX ORDER — BACITRACIN, NEOMYCIN, POLYMYXIN B 400; 3.5; 5 [USP'U]/G; MG/G; [USP'U]/G
OINTMENT TOPICAL ONCE
Status: DISCONTINUED | OUTPATIENT
Start: 2020-03-18 | End: 2020-03-21 | Stop reason: HOSPADM

## 2020-03-18 RX ORDER — LIDOCAINE HYDROCHLORIDE 20 MG/ML
10 INJECTION, SOLUTION INFILTRATION; PERINEURAL
Status: DISCONTINUED | OUTPATIENT
Start: 2020-03-18 | End: 2020-03-21 | Stop reason: HOSPADM

## 2020-03-18 RX ADMIN — FENTANYL CITRATE 50 MCG: 50 INJECTION, SOLUTION INTRAMUSCULAR; INTRAVENOUS at 13:49

## 2020-03-18 RX ADMIN — LIDOCAINE HYDROCHLORIDE 2 ML: 20 INJECTION, SOLUTION INFILTRATION; PERINEURAL at 13:28

## 2020-03-18 RX ADMIN — HEPARIN SODIUM 3000 UNITS: 1000 INJECTION INTRAVENOUS; SUBCUTANEOUS at 13:48

## 2020-03-18 RX ADMIN — IODIXANOL 90 ML: 320 INJECTION, SOLUTION INTRAVASCULAR at 13:32

## 2020-03-18 RX ADMIN — SODIUM CHLORIDE 250 ML: 9 INJECTION, SOLUTION INTRAVENOUS at 13:08

## 2020-03-18 RX ADMIN — SODIUM CHLORIDE 1000 ML: 9 INJECTION, SOLUTION INTRAVENOUS at 13:08

## 2020-03-18 RX ADMIN — HEPARIN SODIUM: 1000 INJECTION, SOLUTION INTRAVENOUS; SUBCUTANEOUS at 13:08

## 2020-03-18 RX ADMIN — MIDAZOLAM HYDROCHLORIDE 0.5 MG: 1 INJECTION, SOLUTION INTRAMUSCULAR; INTRAVENOUS at 13:49

## 2020-03-18 ASSESSMENT — ENCOUNTER SYMPTOMS
GASTROINTESTINAL NEGATIVE: 1
WHEEZING: 0
EYES NEGATIVE: 1
ABDOMINAL PAIN: 0
CONSTIPATION: 0
PHOTOPHOBIA: 0
DIARRHEA: 0
BACK PAIN: 0
COUGH: 0
VOMITING: 0
RESPIRATORY NEGATIVE: 1
SHORTNESS OF BREATH: 0
NAUSEA: 0

## 2020-03-18 ASSESSMENT — PAIN SCALES - GENERAL: PAINLEVEL_OUTOF10: 0

## 2020-03-18 ASSESSMENT — PAIN - FUNCTIONAL ASSESSMENT: PAIN_FUNCTIONAL_ASSESSMENT: 0-10

## 2020-03-18 NOTE — H&P
Social History Narrative    None       No Known Allergies    Current Outpatient Medications on File Prior to Encounter   Medication Sig Dispense Refill    carvedilol (COREG) 12.5 MG tablet Take 12.5 mg by mouth 2 times daily      calcium acetate (PHOSLO) 667 MG capsule Take 667 mg by mouth 3 times daily (with meals)      Tacrolimus (PROGRAF PO) Take 1 tablet by mouth 2 times daily       No current facility-administered medications on file prior to encounter. Review of Systems   Constitutional: Negative. Negative for chills, diaphoresis and fever. HENT: Negative. Negative for congestion, ear pain, hearing loss and tinnitus. Eyes: Negative. Negative for photophobia. Respiratory: Negative. Negative for cough, shortness of breath and wheezing. Cardiovascular: Negative. Negative for chest pain, palpitations and leg swelling. Gastrointestinal: Negative. Negative for abdominal pain, constipation, diarrhea, nausea and vomiting. Genitourinary: Negative. Negative for dysuria, flank pain, frequency, hematuria and urgency. Musculoskeletal: Negative. Negative for back pain and neck pain. Skin: Negative. Negative for rash. Allergic/Immunologic: Negative for environmental allergies. Neurological: Negative. Negative for dizziness, tremors, weakness and headaches. Hematological: Does not bruise/bleed easily. Psychiatric/Behavioral: Negative. Negative for hallucinations and suicidal ideas. The patient is not nervous/anxious. OBJECTIVE:  BP (!) 168/82   Pulse 68   Temp 97.6 °F (36.4 °C) (Oral)   Resp 15   Ht 5' 6\" (1.676 m)   Wt 145 lb (65.8 kg)   SpO2 100%   BMI 23.40 kg/m²     Physical Exam  Constitutional:       General: He is not in acute distress. Appearance: He is well-developed. He is not diaphoretic. HENT:      Head: Normocephalic and atraumatic. Nose: Nose normal.      Mouth/Throat:      Pharynx: No oropharyngeal exudate.    Eyes:      General: No scleral icterus. Right eye: No discharge. Left eye: No discharge. Conjunctiva/sclera: Conjunctivae normal.   Neck:      Musculoskeletal: Neck supple. Thyroid: No thyromegaly. Vascular: No JVD. Trachea: No tracheal deviation. Cardiovascular:      Rate and Rhythm: Normal rate and regular rhythm. Heart sounds: Normal heart sounds. No murmur. No friction rub. No gallop. Pulmonary:      Effort: No respiratory distress. Breath sounds: No stridor. No wheezing or rales. Chest:      Chest wall: No tenderness. Abdominal:      General: Bowel sounds are normal. There is no distension. Palpations: Abdomen is soft. There is no mass. Tenderness: There is no abdominal tenderness. There is no guarding or rebound. Hernia: No hernia is present. Musculoskeletal:         General: No tenderness or deformity. Arms:       Comments: Right arm radial basilic fistula demonstrates good pulse and thrill   Skin:     General: Skin is dry. Coloration: Skin is not pale. Findings: No erythema or rash. Neurological:      Mental Status: He is alert and oriented to person, place, and time. Cranial Nerves: No cranial nerve deficit. Psychiatric:         Behavior: Behavior normal.         Thought Content: Thought content normal.         Judgment: Judgment normal.         ASSESSMENT ANDPLAN:    Assessment: Decreased clearance during dialysis    Plan: Fistulogram with possible angioplasty.      Ashley Hinton MD

## 2020-03-18 NOTE — FLOWSHEET NOTE
Patient now back in the cath lab holding room for recovery. Denies pain in his right arm. Right hand is slightly cooler than the left radial pulse easily palpable. Dressing CD&I at the fistula site. VSS. Patient's daughter Terence Vann updated on the plan of care. 1430  Dr Suze Banegas would like patient to recover for 2 hours ans he plans to contact Dr Jocelyne Gupta with the results of the procedure today. 1600 Patient resting quietly with eyes closed. VSS. Dressing to right ar fistula site CD&I. Patient stated,\"I had hemodialysis early this morning and I plan to go again in Friday morning. Right radial pulse easiliy palpable. Patient denies discomfort. 1620 discharge instructions reviewed in detail and patient encouraged to contact Dr Suze Banegas if he has any concerns after dialysis on Friday. Suture removal kit and order for OK to use the right arm fistula given to the patient to take to his HD treatment scheduled for Friday morning. Patient voiced good understanding of the plan of care. Assisted to get dressed. Report given and order faxed to HD center for OK to use the right arm fistula after removal of purse string sutures. 1623 Patient taken to the main lobby via wheelchair to the care of his daughter Terence Vann. Able to ambulate a short distance in steady gait. Denies pain, or dizziness.

## 2020-03-18 NOTE — SEDATION DOCUMENTATION
administered. 1350 Conquest PTA dilatation catheter 7mm x 40mm x 50 cm used with indeflator for one minute intervals for a total of two times. 1352 Pt oxygen 90% on room air, O2 applied 2L NC.    1356 Dr Wilber Castellon inserting Glidewire through sheath to evaluate right axilla. 1400 Dr Wilber Castellon inserted Glidewire catheter over Glidewire. 1403 Dr. Wilber Castellon again hand injected a 1:1 mixture of heparinized saline and contrast and fluoro images taken to visualized distal SVC. Final images obtained. 1406 Sheath removed after purse string sutures placed and minimal manual pressure applied briefly at the site. 1410 Gauze and tegaderm dressing applied. No bleeding noted. Procedure complete. Patient tolerated well. VSS. Patient denies complaints at this time. 1421 Pt transferred from IR table to the cart with minimal assistance and taken to cath lab holding for recovery.      Total Sedation Given:  Versed:     0.5 mg       Fentanyl:  50 mcg      Total Contrast used: 90 ml

## 2020-04-02 ENCOUNTER — PREP FOR PROCEDURE (OUTPATIENT)
Dept: INTERVENTIONAL RADIOLOGY/VASCULAR | Age: 65
End: 2020-04-02

## 2020-04-02 ENCOUNTER — TELEPHONE (OUTPATIENT)
Dept: INTERVENTIONAL RADIOLOGY/VASCULAR | Age: 65
End: 2020-04-02

## 2020-04-02 RX ORDER — LIDOCAINE HYDROCHLORIDE 20 MG/ML
10 INJECTION, SOLUTION INFILTRATION; PERINEURAL
Status: CANCELLED | OUTPATIENT
Start: 2020-04-02

## 2020-04-02 RX ORDER — HEPARIN SODIUM 1000 [USP'U]/ML
1000 INJECTION, SOLUTION INTRAVENOUS; SUBCUTANEOUS ONCE
Status: CANCELLED | OUTPATIENT
Start: 2020-04-02 | End: 2020-04-02

## 2020-04-02 RX ORDER — HEPARIN SODIUM 1000 [USP'U]/ML
6000 INJECTION, SOLUTION INTRAVENOUS; SUBCUTANEOUS ONCE
Status: CANCELLED | OUTPATIENT
Start: 2020-04-02 | End: 2020-04-02

## 2020-04-02 RX ORDER — 0.9 % SODIUM CHLORIDE 0.9 %
500 INTRAVENOUS SOLUTION INTRAVENOUS
Status: CANCELLED | OUTPATIENT
Start: 2020-04-02

## 2020-04-02 RX ORDER — 0.9 % SODIUM CHLORIDE 0.9 %
250 INTRAVENOUS SOLUTION INTRAVENOUS
Status: CANCELLED | OUTPATIENT
Start: 2020-04-02

## 2020-04-15 ENCOUNTER — TELEPHONE (OUTPATIENT)
Dept: INTERVENTIONAL RADIOLOGY/VASCULAR | Age: 65
End: 2020-04-15

## 2020-04-15 NOTE — TELEPHONE ENCOUNTER
I spoke with Joshua Hickman earlier this morning and she was to fax referral with order and current labs and medications. They have since been reviewed. Please schedule. Thanks.

## 2020-04-16 ENCOUNTER — TELEPHONE (OUTPATIENT)
Dept: INTERVENTIONAL RADIOLOGY/VASCULAR | Age: 65
End: 2020-04-16

## 2020-04-20 ENCOUNTER — HOSPITAL ENCOUNTER (OUTPATIENT)
Dept: INTERVENTIONAL RADIOLOGY/VASCULAR | Age: 65
Discharge: HOME | End: 2020-04-22
Payer: COMMERCIAL

## 2020-04-20 VITALS
HEART RATE: 75 BPM | SYSTOLIC BLOOD PRESSURE: 166 MMHG | RESPIRATION RATE: 17 BRPM | DIASTOLIC BLOOD PRESSURE: 91 MMHG | OXYGEN SATURATION: 96 %

## 2020-04-20 PROCEDURE — 99214 OFFICE O/P EST MOD 30 MIN: CPT | Performed by: RADIOLOGY

## 2020-04-20 PROCEDURE — 2580000003 HC RX 258: Performed by: NURSE PRACTITIONER

## 2020-04-20 PROCEDURE — 76937 US GUIDE VASCULAR ACCESS: CPT | Performed by: RADIOLOGY

## 2020-04-20 PROCEDURE — 2500000003 HC RX 250 WO HCPCS: Performed by: NURSE PRACTITIONER

## 2020-04-20 PROCEDURE — 6370000000 HC RX 637 (ALT 250 FOR IP): Performed by: NURSE PRACTITIONER

## 2020-04-20 PROCEDURE — 6360000002 HC RX W HCPCS: Performed by: RADIOLOGY

## 2020-04-20 PROCEDURE — 36558 INSERT TUNNELED CV CATH: CPT | Performed by: RADIOLOGY

## 2020-04-20 PROCEDURE — 77001 FLUOROGUIDE FOR VEIN DEVICE: CPT | Performed by: RADIOLOGY

## 2020-04-20 PROCEDURE — 2709999900 IR TUNNELED CVC PLACE WO SQ PORT/PUMP > 5 YEARS

## 2020-04-20 RX ORDER — 0.9 % SODIUM CHLORIDE 0.9 %
500 INTRAVENOUS SOLUTION INTRAVENOUS
Status: DISCONTINUED | OUTPATIENT
Start: 2020-04-20 | End: 2020-04-23 | Stop reason: HOSPADM

## 2020-04-20 RX ORDER — HEPARIN SODIUM 1000 [USP'U]/ML
1000 INJECTION, SOLUTION INTRAVENOUS; SUBCUTANEOUS ONCE
Status: DISCONTINUED | OUTPATIENT
Start: 2020-04-20 | End: 2020-04-23 | Stop reason: HOSPADM

## 2020-04-20 RX ORDER — HEPARIN SODIUM 1000 [USP'U]/ML
6000 INJECTION, SOLUTION INTRAVENOUS; SUBCUTANEOUS ONCE
Status: DISCONTINUED | OUTPATIENT
Start: 2020-04-20 | End: 2020-04-23 | Stop reason: HOSPADM

## 2020-04-20 RX ORDER — HEPARIN SODIUM 5000 [USP'U]/ML
INJECTION, SOLUTION INTRAVENOUS; SUBCUTANEOUS
Status: COMPLETED | OUTPATIENT
Start: 2020-04-20 | End: 2020-04-20

## 2020-04-20 RX ORDER — 0.9 % SODIUM CHLORIDE 0.9 %
250 INTRAVENOUS SOLUTION INTRAVENOUS
Status: DISCONTINUED | OUTPATIENT
Start: 2020-04-20 | End: 2020-04-23 | Stop reason: HOSPADM

## 2020-04-20 RX ORDER — LIDOCAINE HYDROCHLORIDE 20 MG/ML
10 INJECTION, SOLUTION INFILTRATION; PERINEURAL
Status: DISCONTINUED | OUTPATIENT
Start: 2020-04-20 | End: 2020-04-23 | Stop reason: HOSPADM

## 2020-04-20 RX ADMIN — SODIUM CHLORIDE 500 ML: 9 INJECTION, SOLUTION INTRAVENOUS at 11:20

## 2020-04-20 RX ADMIN — LIDOCAINE HYDROCHLORIDE 16 ML: 20 INJECTION, SOLUTION INFILTRATION; PERINEURAL at 11:40

## 2020-04-20 RX ADMIN — Medication 1 EACH: at 12:00

## 2020-04-20 RX ADMIN — HEPARIN SODIUM 4600 UNITS: 5000 INJECTION INTRAVENOUS; SUBCUTANEOUS at 11:54

## 2020-04-20 NOTE — SEDATION DOCUMENTATION
sites without bleeding at this time. Pt tolerated procedure well. No ectopy noted on monitor. Pt. was given support and reassurance throughout procedure. 1212 Pt transferred from IR table to the cart with 3 person assistance and taken to cath lab holding for observation and discharge. 1215 /71, HR 72, 95% on room air, RR 16. Pt states he feels \"prety good\". Pt denies any pain, no dizziness or nausea. Pt in semi - fowlers position. Pt sipping on ginger ale. Site soft, clean, dry, intact, no bleeding noted. 1225 VSS, no distress noted. Pt states he feels good. Site c/d/i. Will monitor. 1238 VSS. Pt drinking ginger ale. Denies any pain, states he feels good. 1250 Pt dangled on the cart. Pt states he feels good. 1300 D/C instructions given to pt. Pt verbalized understanding of given info. [phone] Pt changed into street clothes. Pt denies any dizziness. [phone] Pt d/c home with daughter. Pt wheeled to the entrance. Pt walked short distance with steady gait. Pt denied nay dizziness or lightlessness.

## 2020-04-21 ASSESSMENT — ENCOUNTER SYMPTOMS
BACK PAIN: 0
SHORTNESS OF BREATH: 0
VOMITING: 0
NAUSEA: 0
WHEEZING: 0
GASTROINTESTINAL NEGATIVE: 1
RESPIRATORY NEGATIVE: 1
CONSTIPATION: 0
DIARRHEA: 0
COUGH: 0
EYES NEGATIVE: 1
PHOTOPHOBIA: 0
ABDOMINAL PAIN: 0

## 2020-04-21 NOTE — H&P
HPI:  Patient is a pleasant 26-year-old gentleman with chronic kidney disease on dialysis. Patient has a right arm AV fistula which has had decreased in clearance recently. A fistulogram was performed recently which demonstrated complete occlusion of the right subclavian and possibly the innominate veins. Attempt at recanalizing those veins was unsuccessful. There was still flow into the chest from collateral veins, therefore attempt at using the fistula was tried, though clearance was too low. Patient presents today by referral for placement of the left internal jugular vein tunneled dialysis catheter. Patient understands the procedure, including the risks, benefits, and alternatives, and wishes to proceed. History reviewed. No pertinent family history.     Past Surgical History:   Procedure Laterality Date    DIALYSIS FISTULA CREATION Right     LIVER TRANSPLANT  2017    TUNNELED VENOUS PORT PLACEMENT          Past Medical History:   Diagnosis Date    [facility]end stage [facility]) ([facility] [address]     Hepatitis     Hypertension     Liver transplanted ([facility] 2016       Social History     Socioeconomic History    Marital status: Single     Spouse name: None    Number of children: None    Years of education: None    Highest education level: None   Occupational History    None   Social Needs    Financial resource strain: None    Food insecurity     Worry: None     Inability: None    Transportation needs     Medical: None     Non-medical: None   Tobacco Use    Smoking status: Former Smoker     Packs/day: 0.50     Years: 15.00     Pack years: 7.50     Types: Cigarettes     Last attempt to quit: 2017     Years since quittin.4    Smokeless tobacco: Never Used   Substance and Sexual Activity    Alcohol use: No    Drug use: No    Sexual activity: None   Lifestyle    Physical activity     Days per week: None     Minutes per session: None    Stress: None   Relationships    Social connections     Talks on phone: None     Gets together: None     Attends Latter day service: None     Active member of club or organization: None     Attends meetings of clubs or organizations: None     Relationship status: None    Intimate partner violence     Fear of current or ex partner: None     Emotionally abused: None     Physically abused: None     Forced sexual activity: None   Other Topics Concern    None   Social History Narrative    None       No Known Allergies    Current Outpatient Medications on File Prior to Encounter   Medication Sig Dispense Refill    carvedilol (COREG) 12.5 MG tablet Take 12.5 mg by mouth 2 times daily      calcium acetate (PHOSLO) 667 MG capsule Take 667 mg by mouth 3 times daily (with meals)      Tacrolimus (PROGRAF PO) Take 1 tablet by mouth 2 times daily       No current facility-administered medications on file prior to encounter. Review of Systems   Constitutional: Negative. Negative for chills, diaphoresis and fever. HENT: Negative. Negative for congestion, ear pain, hearing loss and tinnitus. Eyes: Negative. Negative for photophobia. Respiratory: Negative. Negative for cough, shortness of breath and wheezing. Cardiovascular: Negative. Negative for chest pain, palpitations and leg swelling. Gastrointestinal: Negative. Negative for abdominal pain, constipation, diarrhea, nausea and vomiting. Genitourinary: Negative. Negative for dysuria, flank pain, frequency, hematuria and urgency. Musculoskeletal: Negative. Negative for back pain and neck pain. Skin: Negative. Negative for rash. Allergic/Immunologic: Negative for environmental allergies. Neurological: Negative. Negative for dizziness, tremors, weakness and headaches. Hematological: Does not bruise/bleed easily. Psychiatric/Behavioral: Negative. Negative for hallucinations and suicidal ideas. The patient is not nervous/anxious.         OBJECTIVE:  BP (!) 166/91   Pulse 75

## 2021-11-07 NOTE — FLOWSHEET NOTE
Hep lock d/c'ed, pt given discharge paperwork, pt verbalizes understanding, pt getting dressed and awaiting ride, will call for transport when ready.   Electronically signed by Ankit Santos RN on 11/7/2017 at 4:27 PM
Pt complaining of dry heaving which then caused his stomach to hurt. Given zofran for his nausea. Will continue to monitor.
Pt's BP was 75/51. Pt asymptomatic. Dr. Jennifer Luke called, he stated to transfer the pt to ICU. Pt made aware, questions answered. Handoff of care given to ICU RN. Transport called to assist with transfer to ICU bed #8.
Rec'd in ICU from [address]
N/A

## 2022-06-28 ENCOUNTER — HOSPITAL ENCOUNTER (EMERGENCY)
Age: 67
Discharge: HOME | End: 2022-06-28
Attending: STUDENT IN AN ORGANIZED HEALTH CARE EDUCATION/TRAINING PROGRAM
Payer: MEDICARE

## 2022-06-28 ENCOUNTER — APPOINTMENT (OUTPATIENT)
Dept: GENERAL RADIOLOGY | Age: 67
End: 2022-06-28
Payer: MEDICARE

## 2022-06-28 VITALS
HEIGHT: 66 IN | HEART RATE: 88 BPM | TEMPERATURE: 98 F | DIASTOLIC BLOOD PRESSURE: 52 MMHG | RESPIRATION RATE: 21 BRPM | WEIGHT: 145 LBS | BODY MASS INDEX: 23.3 KG/M2 | SYSTOLIC BLOOD PRESSURE: 108 MMHG | OXYGEN SATURATION: 99 %

## 2022-06-28 DIAGNOSIS — N18.6 ESRD ON HEMODIALYSIS (HCC): ICD-10-CM

## 2022-06-28 DIAGNOSIS — Z94.4 HISTORY OF LIVER TRANSPLANT (HCC): ICD-10-CM

## 2022-06-28 DIAGNOSIS — Z99.2 ESRD ON HEMODIALYSIS (HCC): ICD-10-CM

## 2022-06-28 DIAGNOSIS — J98.01 BRONCHOSPASM: ICD-10-CM

## 2022-06-28 DIAGNOSIS — J18.9 PNEUMONIA OF RIGHT LOWER LOBE DUE TO INFECTIOUS ORGANISM: Primary | ICD-10-CM

## 2022-06-28 LAB
ALBUMIN SERPL-MCNC: 4.2 G/DL (ref 3.5–4.6)
ALP BLD-CCNC: 98 U/L (ref 35–104)
ALT SERPL-CCNC: 22 U/L (ref 0–41)
ANION GAP SERPL CALCULATED.3IONS-SCNC: 14 MEQ/L (ref 9–15)
AST SERPL-CCNC: 16 U/L (ref 0–40)
BASE EXCESS VENOUS: 3 (ref -3–3)
BASOPHILS ABSOLUTE: 0 K/UL (ref 0–0.2)
BASOPHILS RELATIVE PERCENT: 0.5 %
BILIRUB SERPL-MCNC: 0.5 MG/DL (ref 0.2–0.7)
BUN BLDV-MCNC: 13 MG/DL (ref 8–23)
CALCIUM IONIZED: 0.96 MMOL/L (ref 1.12–1.32)
CALCIUM SERPL-MCNC: 9.6 MG/DL (ref 8.5–9.9)
CHLORIDE BLD-SCNC: 88 MEQ/L (ref 95–107)
CO2: 32 MEQ/L (ref 20–31)
CREAT SERPL-MCNC: 5.5 MG/DL (ref 0.7–1.2)
EOSINOPHILS ABSOLUTE: 0.1 K/UL (ref 0–0.7)
EOSINOPHILS RELATIVE PERCENT: 2 %
GFR AFRICAN AMERICAN: 12
GFR AFRICAN AMERICAN: 12.6
GFR NON-AFRICAN AMERICAN: 10
GFR NON-AFRICAN AMERICAN: 10.4
GLOBULIN: 3.4 G/DL (ref 2.3–3.5)
GLUCOSE BLD-MCNC: 81 MG/DL (ref 70–99)
GLUCOSE BLD-MCNC: 81 MG/DL (ref 70–99)
HCO3 VENOUS: 27.5 MMOL/L (ref 23–29)
HCT VFR BLD CALC: 36.5 % (ref 42–52)
HEMOGLOBIN: 12.1 G/DL (ref 14–18)
HEMOGLOBIN: 14.5 GM/DL (ref 13.5–17.5)
LACTATE: 2.46 MMOL/L (ref 0.4–2)
LYMPHOCYTES ABSOLUTE: 0.5 K/UL (ref 1–4.8)
LYMPHOCYTES RELATIVE PERCENT: 7.5 %
MAGNESIUM: 2.6 MG/DL (ref 1.7–2.4)
MCH RBC QN AUTO: 34.5 PG (ref 27–31.3)
MCHC RBC AUTO-ENTMCNC: 33 % (ref 33–37)
MCV RBC AUTO: 104.4 FL (ref 80–100)
MONOCYTES ABSOLUTE: 0.8 K/UL (ref 0.2–0.8)
MONOCYTES RELATIVE PERCENT: 12.1 %
NEUTROPHILS ABSOLUTE: 4.9 K/UL (ref 1.4–6.5)
NEUTROPHILS RELATIVE PERCENT: 77.9 %
O2 SAT, VEN: 81 %
PCO2, VEN: 42.4 MM HG (ref 40–50)
PDW BLD-RTO: 14.1 % (ref 11.5–14.5)
PERFORMED ON: ABNORMAL
PH VENOUS: 7.42 (ref 7.32–7.42)
PLATELET # BLD: 217 K/UL (ref 130–400)
PO2, VEN: 44 MM HG
POC CHLORIDE: 94 MEQ/L (ref 99–110)
POC CREATININE: 5.9 MG/DL (ref 0.8–1.3)
POC HEMATOCRIT: 43 % (ref 41–53)
POC POTASSIUM: 3.1 MEQ/L (ref 3.5–5.1)
POC SAMPLE TYPE: ABNORMAL
POC SODIUM: 133 MEQ/L (ref 136–145)
POTASSIUM SERPL-SCNC: 3.4 MEQ/L (ref 3.4–4.9)
RBC # BLD: 3.5 M/UL (ref 4.7–6.1)
REASON FOR REJECTION: NORMAL
REJECTED TEST: NORMAL
SODIUM BLD-SCNC: 134 MEQ/L (ref 135–144)
TCO2 CALC VENOUS: 29 MMOL/L
TOTAL PROTEIN: 7.6 G/DL (ref 6.3–8)
WBC # BLD: 6.3 K/UL (ref 4.8–10.8)

## 2022-06-28 PROCEDURE — 94761 N-INVAS EAR/PLS OXIMETRY MLT: CPT

## 2022-06-28 PROCEDURE — 71046 X-RAY EXAM CHEST 2 VIEWS: CPT

## 2022-06-28 PROCEDURE — 84295 ASSAY OF SERUM SODIUM: CPT

## 2022-06-28 PROCEDURE — 80053 COMPREHEN METABOLIC PANEL: CPT

## 2022-06-28 PROCEDURE — 83735 ASSAY OF MAGNESIUM: CPT

## 2022-06-28 PROCEDURE — 94640 AIRWAY INHALATION TREATMENT: CPT

## 2022-06-28 PROCEDURE — 82803 BLOOD GASES ANY COMBINATION: CPT

## 2022-06-28 PROCEDURE — 93005 ELECTROCARDIOGRAM TRACING: CPT | Performed by: STUDENT IN AN ORGANIZED HEALTH CARE EDUCATION/TRAINING PROGRAM

## 2022-06-28 PROCEDURE — 96365 THER/PROPH/DIAG IV INF INIT: CPT

## 2022-06-28 PROCEDURE — 6370000000 HC RX 637 (ALT 250 FOR IP): Performed by: STUDENT IN AN ORGANIZED HEALTH CARE EDUCATION/TRAINING PROGRAM

## 2022-06-28 PROCEDURE — 84132 ASSAY OF SERUM POTASSIUM: CPT

## 2022-06-28 PROCEDURE — 85025 COMPLETE CBC W/AUTO DIFF WBC: CPT

## 2022-06-28 PROCEDURE — 36415 COLL VENOUS BLD VENIPUNCTURE: CPT

## 2022-06-28 PROCEDURE — 83605 ASSAY OF LACTIC ACID: CPT

## 2022-06-28 PROCEDURE — 87040 BLOOD CULTURE FOR BACTERIA: CPT

## 2022-06-28 PROCEDURE — 85014 HEMATOCRIT: CPT

## 2022-06-28 PROCEDURE — 82435 ASSAY OF BLOOD CHLORIDE: CPT

## 2022-06-28 PROCEDURE — 82330 ASSAY OF CALCIUM: CPT

## 2022-06-28 PROCEDURE — 82565 ASSAY OF CREATININE: CPT

## 2022-06-28 PROCEDURE — 99285 EMERGENCY DEPT VISIT HI MDM: CPT

## 2022-06-28 PROCEDURE — 36600 WITHDRAWAL OF ARTERIAL BLOOD: CPT

## 2022-06-28 PROCEDURE — 6360000002 HC RX W HCPCS: Performed by: STUDENT IN AN ORGANIZED HEALTH CARE EDUCATION/TRAINING PROGRAM

## 2022-06-28 RX ORDER — ALBUTEROL SULFATE 90 UG/1
AEROSOL, METERED RESPIRATORY (INHALATION)
Qty: 18 G | Refills: 0 | Status: SHIPPED | OUTPATIENT
Start: 2022-06-28

## 2022-06-28 RX ORDER — MAGNESIUM SULFATE IN WATER 40 MG/ML
2000 INJECTION, SOLUTION INTRAVENOUS ONCE
Status: COMPLETED | OUTPATIENT
Start: 2022-06-28 | End: 2022-06-28

## 2022-06-28 RX ORDER — CEFUROXIME AXETIL 250 MG/1
250 TABLET ORAL DAILY
Qty: 10 TABLET | Refills: 0 | Status: SHIPPED | OUTPATIENT
Start: 2022-06-28 | End: 2022-07-08

## 2022-06-28 RX ORDER — BENZONATATE 100 MG/1
100 CAPSULE ORAL 2 TIMES DAILY PRN
Qty: 14 CAPSULE | Refills: 0 | Status: SHIPPED | OUTPATIENT
Start: 2022-06-28 | End: 2022-07-05

## 2022-06-28 RX ORDER — CEFUROXIME AXETIL 500 MG/1
250 TABLET ORAL EVERY 12 HOURS SCHEDULED
Status: DISCONTINUED | OUTPATIENT
Start: 2022-06-28 | End: 2022-06-28 | Stop reason: HOSPADM

## 2022-06-28 RX ORDER — IPRATROPIUM BROMIDE AND ALBUTEROL SULFATE 2.5; .5 MG/3ML; MG/3ML
1 SOLUTION RESPIRATORY (INHALATION) ONCE
Status: COMPLETED | OUTPATIENT
Start: 2022-06-28 | End: 2022-06-28

## 2022-06-28 RX ADMIN — MAGNESIUM SULFATE HEPTAHYDRATE 2000 MG: 40 INJECTION, SOLUTION INTRAVENOUS at 11:18

## 2022-06-28 RX ADMIN — IPRATROPIUM BROMIDE AND ALBUTEROL SULFATE 1 AMPULE: .5; 2.5 SOLUTION RESPIRATORY (INHALATION) at 10:37

## 2022-06-28 RX ADMIN — CEFUROXIME AXETIL 250 MG: 500 TABLET ORAL at 14:14

## 2022-06-28 ASSESSMENT — ENCOUNTER SYMPTOMS
SHORTNESS OF BREATH: 1
COUGH: 1
DIARRHEA: 0
CHEST TIGHTNESS: 0
VOMITING: 0
BACK PAIN: 0
TROUBLE SWALLOWING: 0
WHEEZING: 1
SINUS PRESSURE: 0
ABDOMINAL PAIN: 0

## 2022-06-28 ASSESSMENT — PAIN - FUNCTIONAL ASSESSMENT: PAIN_FUNCTIONAL_ASSESSMENT: NONE - DENIES PAIN

## 2022-06-28 NOTE — ED PROVIDER NOTES
[address] [facility] ED  eMERGENCY dEPARTMENT eNCOUnter      Pt Name: Humaira Grant  MRN: [id number]  [place] 1955  Date of evaluation: 6/28/2022  Provider: Shima Zarate, [facility]       Chief Complaint   Patient presents with    Shortness of Breath         HISTORY OF PRESENT ILLNESS   (Location/Symptom, Timing/Onset,Context/Setting, Quality, Duration, Modifying Factors, Severity)  Note limiting factors. Humaira Grant is a 75 y.o. male who presents to the emergency department complaint of 3 weeks of productive cough and shortness of breath. Patient states if he exerts himself like walks 10 or 25 feet he starts coughing which makes him feel short of breath. On exam the patient is wheezing in all lung fields. Patient admits that he has previously had similar problems treated with an inhaler but he had ran out of that a long time ago. Patient does not have an inhaler. The patient denies any fever or chills. Patient denies nausea, vomiting or diarrhea. Last dialysis was yesterday. The history is provided by the patient. NursingNotes were reviewed. REVIEW OF SYSTEMS    (2-9 systems for level 4, 10 or more for level 5)     Review of Systems   Constitutional: Negative for activity change, appetite change, chills, fever and unexpected weight change. HENT: Negative for drooling, ear pain, nosebleeds, sinus pressure and trouble swallowing. Respiratory: Positive for cough, shortness of breath and wheezing. Negative for chest tightness. Cardiovascular: Negative for chest pain and leg swelling. Gastrointestinal: Negative for abdominal pain, diarrhea and vomiting. Endocrine: Negative for polydipsia and polyphagia. Genitourinary: Negative for dysuria, flank pain and frequency. Musculoskeletal: Negative for back pain and myalgias. Skin: Negative for pallor and rash. Neurological: Negative for syncope, weakness and headaches.    Hematological: Does not bruise/bleed easily. All other systems reviewed and are negative. Except as noted above the remainder of the review of systems was reviewed and negative. PAST MEDICAL HISTORY     Past Medical History:   Diagnosis Date    [facility]D [facility] stage [facility]) ([facility] [address]     Hepatitis     Hypertension     Liver transplanted ([facility] [address] 2016         SURGICALHISTORY       Past Surgical History:   Procedure Laterality Date    DIALYSIS FISTULA CREATION Right     LIVER TRANSPLANT  2017    TUNNELED VENOUS PORT PLACEMENT           CURRENT MEDICATIONS       Previous Medications    CALCIUM ACETATE (PHOSLO) 667 MG CAPSULE    Take 667 mg by mouth 3 times daily (with meals)    CARVEDILOL (COREG) 12.5 MG TABLET    Take 12.5 mg by mouth 2 times daily    TACROLIMUS (PROGRAF PO)    Take 1 tablet by mouth 2 times daily       ALLERGIES     Patient has no known allergies. FAMILY HISTORY     No family history on file. SOCIAL HISTORY       Social History     Socioeconomic History    Marital status: Single     Spouse name: Not on file    Number of children: Not on file    Years of education: Not on file    Highest education level: Not on file   Occupational History    Not on file   Tobacco Use    Smoking status: Former Smoker     Packs/day: 0.50     Years: 15.00     Pack years: 7.50     Types: Cigarettes     Quit date: 2017     Years since quittin.6    Smokeless tobacco: Never Used   Substance and Sexual Activity    Alcohol use: No    Drug use: No    Sexual activity: Not on file   Other Topics Concern    Not on file   Social History Narrative    Not on file     Social Determinants of Health     Financial Resource Strain:     Difficulty of Paying Living Expenses: Not on file   Food Insecurity:     Worried About Running Out of Food in the Last Year: Not on file    Neeta of Food in the Last Year: Not on file   Transportation Needs:     Lack of Transportation (Medical):  Not on file    Lack of Transportation (Non-Medical): Not on file   Physical Activity:     Days of Exercise per Week: Not on file    Minutes of Exercise per Session: Not on file   Stress:     Feeling of Stress : Not on file   Social Connections:     Frequency of Communication with Friends and Family: Not on file    Frequency of Social Gatherings with Friends and Family: Not on file    Attends Confucianist Services: Not on file    Active Member of [address] [place] [address] or Organizations: Not on file    Attends Club or Organization Meetings: Not on file    Marital Status: Not on file   Intimate Partner Violence:     Fear of Current or Ex-Partner: Not on file    Emotionally Abused: Not on file    Physically Abused: Not on file    Sexually Abused: Not on file   Housing Stability:     Unable to Pay for Housing in the Last Year: Not on file    Number of [place] in the Last Year: Not on file    Unstable Housing in the Last Year: Not on file       SCREENINGS    Wanda Coma Scale  Eye Opening: Spontaneous  Best Verbal Response: Oriented  Best Motor Response: Obeys commands  Wanda Coma Scale Score: 15 @FLOW([id number])@      PHYSICAL EXAM    (up to 7 for level 4, 8 or more for level 5)     ED Triage Vitals [06/28/22 1010]   BP Temp Temp Source Heart Rate Resp SpO2 Height Weight   105/61 98 °F (36.7 °C) Oral 91 22 96 % 5' 6\" (1.676 m) 145 lb (65.8 kg)       Physical Exam  Vitals and nursing note reviewed. Constitutional:       General: He is awake. He is in acute distress. Appearance: Normal appearance. He is well-developed and normal weight. He is not ill-appearing, toxic-appearing or diaphoretic. Comments: Frequent cough. HENT:      Head: Normocephalic and atraumatic. No Swann's sign. Right Ear: External ear normal.      Left Ear: External ear normal.      Nose: Nose normal. No congestion or rhinorrhea. Mouth/Throat:      Mouth: Mucous membranes are moist.      Pharynx: Oropharynx is clear.  No oropharyngeal exudate or bruising, erythema, lesion or rash. Neurological:      General: No focal deficit present. Mental Status: He is alert and oriented to person, place, and time. Mental status is at baseline. Cranial Nerves: No cranial nerve deficit. Sensory: No sensory deficit. Motor: No weakness. Coordination: Coordination normal.      Deep Tendon Reflexes: Reflexes are normal and symmetric. Psychiatric:         Mood and Affect: Mood normal.         Behavior: Behavior normal. Behavior is cooperative. Thought Content: Thought content normal.         Judgment: Judgment normal.         DIAGNOSTIC RESULTS     EKG: All EKG's are interpreted by the Emergency Department Physician who either signs or Co-signsthis chart in the absence of a cardiologist.    EKG: Accelerated junctional rhythm. Heart rate 96 bpm.  Normal axis. Diffuse low voltage. QT interval of 378 ms. There is wavering of the baseline. There are no PVCs. RADIOLOGY:   Non-plain filmimages such as CT, Ultrasound and MRI are read by the radiologist. Plain radiographic images are visualized and preliminarily interpreted by the emergency physician with the below findings:        Interpretation per the Radiologist below, if available at the time ofthis note:    XR CHEST (2 VW)   Final Result   POSSIBLE SLIGHT INTERSTITIAL PNEUMONIA BOTH LOWER LUNGS. SMALL PLEURAL EFFUSIONS BILATERALLY. CXR: Infiltrate right lower lobe. No pneumothorax.       ED BEDSIDE ULTRASOUND:   Performed by ED Physician - none    LABS:  Labs Reviewed   CBC WITH AUTO DIFFERENTIAL - Abnormal; Notable for the following components:       Result Value    RBC 3.50 (*)     Hemoglobin 12.1 (*)     Hematocrit 36.5 (*)     .4 (*)     MCH 34.5 (*)     Lymphocytes Absolute 0.5 (*)     All other components within normal limits   COMPREHENSIVE METABOLIC PANEL - Abnormal; Notable for the following components:    Sodium 134 (*)     Chloride 88 (*)     CO2 32 (*) CREATININE 5.50 (*)     GFR Non- 10.4 (*)     GFR  12.6 (*)     All other components within normal limits    Narrative:     redraw   MAGNESIUM - Abnormal; Notable for the following components:    Magnesium 2.6 (*)     All other components within normal limits    Narrative:     redraw   POCT VENOUS - Abnormal; Notable for the following components:    POC Sodium 133 (*)     POC Potassium 3.1 (*)     POC Chloride 94 (*)     POC Creatinine 5.9 (*)     GFR Non- 10 (*)     GFR  12 (*)     Calcium, Ion 0.96 (*)     Lactate 2.46 (*)     All other components within normal limits   CULTURE, BLOOD 2   CULTURE, BLOOD 1   SPECIMEN REJECTION   POCT EPOC BLOOD GAS, LACTIC ACID, ICA       All other labs were within normal range or not returned as of this dictation. EMERGENCY DEPARTMENT COURSE and DIFFERENTIAL DIAGNOSIS/MDM:   Vitals:    Vitals:    06/28/22 1151 06/28/22 1216 06/28/22 1220 06/28/22 1332   BP: 93/62 (!) 115/59  (!) 108/52   Pulse: 90 90  88   Resp: 21 21 19 21   Temp:       TempSrc:       SpO2: 96% 99%  99%   Weight:       Height:               MDM  Patient has a area of pneumonia. Its a small area in the right lung base. Clinical pharmacy consult was obtained being that the patient takes Prograf the ER physician did not want a use of macrolide or other antibiotics that may interfere with the levels or cause elevated levels to be very high or low. Jean Marie Carroll the clinical pharmacist recommends Ceftin 250 daily for 5 days. Patient is a dialysis patient. PORT score is (III). Signs are stable. Patient be discharged to home. Follow-up with primary care tomorrow. CONSULTS:  IP CONSULT TO PHARMACY    PROCEDURES:  Unless otherwise noted below, none     Procedures    FINAL IMPRESSION      1. Pneumonia of right lower lobe due to infectious organism    2. History of liver transplant ([facility] [address]    3.  ESRD on hemodialysis [facility])          DISPOSITION/PLAN DISPOSITION Discharge - Pending Orders Complete 06/28/2022 01:20:53 PM      PATIENT REFERRED TO:  No follow-up provider specified.     DISCHARGE MEDICATIONS:  New Prescriptions    No medications on file          (Please note that portions of this note were completed with a voice recognition program.  Efforts were made to edit the dictations but occasionally words are mis-transcribed.)    Miller Tapia DO (electronically signed)  Attending Emergency Physician          Miller Tapia DO  06/28/22 [id number]

## 2022-06-28 NOTE — ED TRIAGE NOTES
Pt was heading into an appt at [facility] and appt was cancelled and pt was returning to car using a cane  Pt became SOB when returning to car  Pt states had dry heaves  Pt is on dialysis and last done on 6/27/2022  Pt has port on left arm  Pt denies any chest pain  Pt states subsided by the time squad arrived  110/70 80 HR NSR on monitor  Pt states at this time is at baseline and no issues

## 2022-06-29 LAB
EKG ATRIAL RATE: 96 BPM
EKG Q-T INTERVAL: 378 MS
EKG QRS DURATION: 82 MS
EKG QTC CALCULATION (BAZETT): 477 MS
EKG R AXIS: 48 DEGREES
EKG T AXIS: 80 DEGREES
EKG VENTRICULAR RATE: 96 BPM

## 2022-06-29 PROCEDURE — 93010 ELECTROCARDIOGRAM REPORT: CPT | Performed by: INTERNAL MEDICINE

## 2022-07-03 LAB — CULTURE, BLOOD 2: NORMAL

## 2022-07-25 ENCOUNTER — HOSPITAL ENCOUNTER (INPATIENT)
Age: 67
LOS: 24 days | Discharge: NURSING, REHAB OR LONG-TERM CARE | DRG: 871 | End: 2022-08-18
Attending: STUDENT IN AN ORGANIZED HEALTH CARE EDUCATION/TRAINING PROGRAM | Admitting: FAMILY MEDICINE
Payer: MEDICARE

## 2022-07-25 ENCOUNTER — APPOINTMENT (OUTPATIENT)
Dept: GENERAL RADIOLOGY | Age: 67
DRG: 871 | End: 2022-07-25
Payer: MEDICARE

## 2022-07-25 DIAGNOSIS — J96.01 ACUTE RESPIRATORY FAILURE WITH HYPOXIA (HCC): Primary | ICD-10-CM

## 2022-07-25 DIAGNOSIS — Z99.2 ESRD (END STAGE RENAL DISEASE) ON DIALYSIS (HCC): ICD-10-CM

## 2022-07-25 DIAGNOSIS — N18.6 ESRD (END STAGE RENAL DISEASE) ON DIALYSIS (HCC): ICD-10-CM

## 2022-07-25 DIAGNOSIS — E11.65 TYPE 2 DIABETES MELLITUS WITH HYPERGLYCEMIA, UNSPECIFIED WHETHER LONG TERM INSULIN USE (HCC): ICD-10-CM

## 2022-07-25 DIAGNOSIS — D53.9 MACROCYTIC ANEMIA: ICD-10-CM

## 2022-07-25 LAB
ALBUMIN SERPL-MCNC: 3.9 G/DL (ref 3.5–4.6)
ALP BLD-CCNC: 87 U/L (ref 35–104)
ALT SERPL-CCNC: 79 U/L (ref 0–41)
ANION GAP SERPL CALCULATED.3IONS-SCNC: 12 MEQ/L (ref 9–15)
ANISOCYTOSIS: ABNORMAL
APTT: 26 SEC (ref 24.4–36.8)
AST SERPL-CCNC: 77 U/L (ref 0–40)
BANDED NEUTROPHILS RELATIVE PERCENT: 1 %
BASE EXCESS ARTERIAL: 4 (ref -3–3)
BASE EXCESS ARTERIAL: 6 (ref -3–3)
BASOPHILS ABSOLUTE: 0 K/UL (ref 0–0.2)
BASOPHILS RELATIVE PERCENT: 0.4 %
BILIRUB SERPL-MCNC: 0.7 MG/DL (ref 0.2–0.7)
BUN BLDV-MCNC: 10 MG/DL (ref 8–23)
CALCIUM IONIZED: 1.07 MMOL/L (ref 1.12–1.32)
CALCIUM IONIZED: 1.1 MMOL/L (ref 1.12–1.32)
CALCIUM SERPL-MCNC: 9.6 MG/DL (ref 8.5–9.9)
CHLORIDE BLD-SCNC: 94 MEQ/L (ref 95–107)
CO2: 32 MEQ/L (ref 20–31)
CREAT SERPL-MCNC: 3.9 MG/DL (ref 0.7–1.2)
EKG ATRIAL RATE: 109 BPM
EKG P AXIS: 36 DEGREES
EKG P-R INTERVAL: 146 MS
EKG Q-T INTERVAL: 378 MS
EKG QRS DURATION: 72 MS
EKG QTC CALCULATION (BAZETT): 509 MS
EKG R AXIS: 64 DEGREES
EKG T AXIS: 79 DEGREES
EKG VENTRICULAR RATE: 109 BPM
EOSINOPHILS ABSOLUTE: 0 K/UL (ref 0–0.7)
EOSINOPHILS RELATIVE PERCENT: 0.5 %
GFR AFRICAN AMERICAN: 18
GFR AFRICAN AMERICAN: 18
GFR AFRICAN AMERICAN: 18.7
GFR NON-AFRICAN AMERICAN: 15
GFR NON-AFRICAN AMERICAN: 15
GFR NON-AFRICAN AMERICAN: 15.4
GLOBULIN: 3.6 G/DL (ref 2.3–3.5)
GLUCOSE BLD-MCNC: 109 MG/DL (ref 70–99)
GLUCOSE BLD-MCNC: 116 MG/DL (ref 70–99)
GLUCOSE BLD-MCNC: 124 MG/DL (ref 70–99)
GLUCOSE BLD-MCNC: 129 MG/DL (ref 70–99)
GLUCOSE BLD-MCNC: 159 MG/DL (ref 70–99)
GLUCOSE BLD-MCNC: 160 MG/DL (ref 70–99)
HCO3 ARTERIAL: 27.1 MMOL/L (ref 21–29)
HCO3 ARTERIAL: 29.3 MMOL/L (ref 21–29)
HCT VFR BLD CALC: 38.8 % (ref 42–52)
HEMOGLOBIN: 12.5 G/DL (ref 14–18)
HEMOGLOBIN: 13.3 GM/DL (ref 13.5–17.5)
HEMOGLOBIN: 14.4 GM/DL (ref 13.5–17.5)
INR BLD: 1
LACTATE: 1.15 MMOL/L (ref 0.4–2)
LACTATE: 2.08 MMOL/L (ref 0.4–2)
LACTIC ACID, SEPSIS: 3.8 MMOL/L (ref 0.5–1.9)
LACTIC ACID, SEPSIS: 4.6 MMOL/L (ref 0.5–1.9)
LACTIC ACID: 3.2 MMOL/L (ref 0.5–2.2)
LYMPHOCYTES ABSOLUTE: 0.3 K/UL (ref 1–4.8)
LYMPHOCYTES RELATIVE PERCENT: 3 %
MACROCYTES: ABNORMAL
MAGNESIUM: 2 MG/DL (ref 1.7–2.4)
MCH RBC QN AUTO: 34.2 PG (ref 27–31.3)
MCHC RBC AUTO-ENTMCNC: 32.1 % (ref 33–37)
MCV RBC AUTO: 106.4 FL (ref 80–100)
MONOCYTES ABSOLUTE: 0.5 K/UL (ref 0.2–0.8)
MONOCYTES RELATIVE PERCENT: 4.7 %
NEUTROPHILS ABSOLUTE: 8.9 K/UL (ref 1.4–6.5)
NEUTROPHILS RELATIVE PERCENT: 92 %
O2 SAT, ARTERIAL: 100 % (ref 93–100)
O2 SAT, ARTERIAL: 91 % (ref 93–100)
PCO2 ARTERIAL: 35 MM HG (ref 35–45)
PCO2 ARTERIAL: 41 MM HG (ref 35–45)
PDW BLD-RTO: 15.1 % (ref 11.5–14.5)
PERFORMED ON: ABNORMAL
PH ARTERIAL: 7.47 (ref 7.35–7.45)
PH ARTERIAL: 7.5 (ref 7.35–7.45)
PLATELET # BLD: 161 K/UL (ref 130–400)
PLATELET SLIDE REVIEW: NORMAL
PO2 ARTERIAL: 242 MM HG (ref 75–108)
PO2 ARTERIAL: 56 MM HG (ref 75–108)
POC CHLORIDE: 97 MEQ/L (ref 99–110)
POC CHLORIDE: 98 MEQ/L (ref 99–110)
POC CREATININE: 4 MG/DL (ref 0.8–1.3)
POC CREATININE: 4 MG/DL (ref 0.8–1.3)
POC FIO2: 100
POC FIO2: 4
POC HEMATOCRIT: 39 % (ref 41–53)
POC HEMATOCRIT: 42 % (ref 41–53)
POC POTASSIUM: 3.2 MEQ/L (ref 3.5–5.1)
POC POTASSIUM: 3.5 MEQ/L (ref 3.5–5.1)
POC SAMPLE TYPE: ABNORMAL
POC SAMPLE TYPE: ABNORMAL
POC SODIUM: 135 MEQ/L (ref 136–145)
POC SODIUM: 137 MEQ/L (ref 136–145)
POTASSIUM SERPL-SCNC: 3.7 MEQ/L (ref 3.4–4.9)
PRO-BNP: NORMAL PG/ML
PROCALCITONIN: 15.57 NG/ML (ref 0–0.15)
PROTHROMBIN TIME: 13.1 SEC (ref 12.3–14.9)
RBC # BLD: 3.64 M/UL (ref 4.7–6.1)
SARS-COV-2, NAAT: NOT DETECTED
SODIUM BLD-SCNC: 138 MEQ/L (ref 135–144)
STOMATOCYTES: ABNORMAL
TCO2 ARTERIAL: 28 MMOL/L (ref 21–32)
TCO2 ARTERIAL: 31 MMOL/L (ref 21–32)
TOTAL CK: 67 U/L (ref 0–190)
TOTAL PROTEIN: 7.5 G/DL (ref 6.3–8)
TROPONIN: 0.1 NG/ML (ref 0–0.01)
TROPONIN: 0.1 NG/ML (ref 0–0.01)
TROPONIN: 0.11 NG/ML (ref 0–0.01)
TROPONIN: 0.12 NG/ML (ref 0–0.01)
TSH SERPL DL<=0.05 MIU/L-ACNC: 25.11 UIU/ML (ref 0.44–3.86)
WBC # BLD: 9.6 K/UL (ref 4.8–10.8)

## 2022-07-25 PROCEDURE — 2580000003 HC RX 258: Performed by: FAMILY MEDICINE

## 2022-07-25 PROCEDURE — 83880 ASSAY OF NATRIURETIC PEPTIDE: CPT

## 2022-07-25 PROCEDURE — 93005 ELECTROCARDIOGRAM TRACING: CPT | Performed by: STUDENT IN AN ORGANIZED HEALTH CARE EDUCATION/TRAINING PROGRAM

## 2022-07-25 PROCEDURE — 83605 ASSAY OF LACTIC ACID: CPT

## 2022-07-25 PROCEDURE — 82435 ASSAY OF BLOOD CHLORIDE: CPT

## 2022-07-25 PROCEDURE — 2500000003 HC RX 250 WO HCPCS

## 2022-07-25 PROCEDURE — 31500 INSERT EMERGENCY AIRWAY: CPT

## 2022-07-25 PROCEDURE — 85025 COMPLETE CBC W/AUTO DIFF WBC: CPT

## 2022-07-25 PROCEDURE — 84484 ASSAY OF TROPONIN QUANT: CPT

## 2022-07-25 PROCEDURE — 6360000002 HC RX W HCPCS: Performed by: NURSE PRACTITIONER

## 2022-07-25 PROCEDURE — 84145 PROCALCITONIN (PCT): CPT

## 2022-07-25 PROCEDURE — 5A1945Z RESPIRATORY VENTILATION, 24-96 CONSECUTIVE HOURS: ICD-10-PCS | Performed by: STUDENT IN AN ORGANIZED HEALTH CARE EDUCATION/TRAINING PROGRAM

## 2022-07-25 PROCEDURE — 85610 PROTHROMBIN TIME: CPT

## 2022-07-25 PROCEDURE — 6360000002 HC RX W HCPCS: Performed by: STUDENT IN AN ORGANIZED HEALTH CARE EDUCATION/TRAINING PROGRAM

## 2022-07-25 PROCEDURE — 80053 COMPREHEN METABOLIC PANEL: CPT

## 2022-07-25 PROCEDURE — 84295 ASSAY OF SERUM SODIUM: CPT

## 2022-07-25 PROCEDURE — 84443 ASSAY THYROID STIM HORMONE: CPT

## 2022-07-25 PROCEDURE — 2000000000 HC ICU R&B

## 2022-07-25 PROCEDURE — 6360000002 HC RX W HCPCS: Performed by: FAMILY MEDICINE

## 2022-07-25 PROCEDURE — 36600 WITHDRAWAL OF ARTERIAL BLOOD: CPT

## 2022-07-25 PROCEDURE — 6370000000 HC RX 637 (ALT 250 FOR IP): Performed by: NURSE PRACTITIONER

## 2022-07-25 PROCEDURE — A4216 STERILE WATER/SALINE, 10 ML: HCPCS | Performed by: NURSE PRACTITIONER

## 2022-07-25 PROCEDURE — 2500000003 HC RX 250 WO HCPCS: Performed by: NURSE PRACTITIONER

## 2022-07-25 PROCEDURE — 83735 ASSAY OF MAGNESIUM: CPT

## 2022-07-25 PROCEDURE — 36415 COLL VENOUS BLD VENIPUNCTURE: CPT

## 2022-07-25 PROCEDURE — 71045 X-RAY EXAM CHEST 1 VIEW: CPT

## 2022-07-25 PROCEDURE — 82550 ASSAY OF CK (CPK): CPT

## 2022-07-25 PROCEDURE — 94002 VENT MGMT INPAT INIT DAY: CPT

## 2022-07-25 PROCEDURE — 82330 ASSAY OF CALCIUM: CPT

## 2022-07-25 PROCEDURE — 99291 CRITICAL CARE FIRST HOUR: CPT | Performed by: INTERNAL MEDICINE

## 2022-07-25 PROCEDURE — 2580000003 HC RX 258: Performed by: NURSE PRACTITIONER

## 2022-07-25 PROCEDURE — 85014 HEMATOCRIT: CPT

## 2022-07-25 PROCEDURE — 2500000003 HC RX 250 WO HCPCS: Performed by: FAMILY MEDICINE

## 2022-07-25 PROCEDURE — 82803 BLOOD GASES ANY COMBINATION: CPT

## 2022-07-25 PROCEDURE — 87635 SARS-COV-2 COVID-19 AMP PRB: CPT

## 2022-07-25 PROCEDURE — 96374 THER/PROPH/DIAG INJ IV PUSH: CPT

## 2022-07-25 PROCEDURE — 85730 THROMBOPLASTIN TIME PARTIAL: CPT

## 2022-07-25 PROCEDURE — 84132 ASSAY OF SERUM POTASSIUM: CPT

## 2022-07-25 PROCEDURE — 0BH18EZ INSERTION OF ENDOTRACHEAL AIRWAY INTO TRACHEA, VIA NATURAL OR ARTIFICIAL OPENING ENDOSCOPIC: ICD-10-PCS | Performed by: STUDENT IN AN ORGANIZED HEALTH CARE EDUCATION/TRAINING PROGRAM

## 2022-07-25 PROCEDURE — 82565 ASSAY OF CREATININE: CPT

## 2022-07-25 PROCEDURE — 99285 EMERGENCY DEPT VISIT HI MDM: CPT

## 2022-07-25 PROCEDURE — 87040 BLOOD CULTURE FOR BACTERIA: CPT

## 2022-07-25 RX ORDER — SODIUM CHLORIDE 0.9 % (FLUSH) 0.9 %
5-40 SYRINGE (ML) INJECTION PRN
Status: DISCONTINUED | OUTPATIENT
Start: 2022-07-25 | End: 2022-07-27 | Stop reason: SDUPTHER

## 2022-07-25 RX ORDER — ETOMIDATE 2 MG/ML
INJECTION INTRAVENOUS
Status: COMPLETED
Start: 2022-07-25 | End: 2022-07-25

## 2022-07-25 RX ORDER — SODIUM CHLORIDE 9 MG/ML
250 INJECTION, SOLUTION INTRAVENOUS ONCE
Status: DISCONTINUED | OUTPATIENT
Start: 2022-07-25 | End: 2022-08-02 | Stop reason: SDUPTHER

## 2022-07-25 RX ORDER — HEPARIN SODIUM 5000 [USP'U]/ML
5000 INJECTION, SOLUTION INTRAVENOUS; SUBCUTANEOUS 2 TIMES DAILY
Status: DISCONTINUED | OUTPATIENT
Start: 2022-07-25 | End: 2022-07-26

## 2022-07-25 RX ORDER — ACETAMINOPHEN 650 MG/1
650 SUPPOSITORY RECTAL EVERY 6 HOURS PRN
Status: DISCONTINUED | OUTPATIENT
Start: 2022-07-25 | End: 2022-08-19 | Stop reason: HOSPADM

## 2022-07-25 RX ORDER — ALBUMIN (HUMAN) 12.5 G/50ML
25 SOLUTION INTRAVENOUS
Status: ACTIVE | OUTPATIENT
Start: 2022-07-26 | End: 2022-07-26

## 2022-07-25 RX ORDER — PROPOFOL 10 MG/ML
5-50 INJECTION, EMULSION INTRAVENOUS CONTINUOUS
Status: DISCONTINUED | OUTPATIENT
Start: 2022-07-25 | End: 2022-07-29

## 2022-07-25 RX ORDER — MAGNESIUM HYDROXIDE 1200 MG/15ML
LIQUID ORAL
Status: DISPENSED
Start: 2022-07-25 | End: 2022-07-26

## 2022-07-25 RX ORDER — SODIUM CHLORIDE 9 MG/ML
250 INJECTION, SOLUTION INTRAVENOUS PRN
Status: ACTIVE | OUTPATIENT
Start: 2022-07-26 | End: 2022-07-26

## 2022-07-25 RX ORDER — INSULIN LISPRO 100 [IU]/ML
0-4 INJECTION, SOLUTION INTRAVENOUS; SUBCUTANEOUS EVERY 4 HOURS
Status: DISCONTINUED | OUTPATIENT
Start: 2022-07-25 | End: 2022-07-29

## 2022-07-25 RX ORDER — ROCURONIUM BROMIDE 10 MG/ML
INJECTION, SOLUTION INTRAVENOUS
Status: COMPLETED
Start: 2022-07-25 | End: 2022-07-25

## 2022-07-25 RX ORDER — ROCURONIUM BROMIDE 10 MG/ML
50 INJECTION, SOLUTION INTRAVENOUS ONCE
Status: COMPLETED | OUTPATIENT
Start: 2022-07-25 | End: 2022-07-25

## 2022-07-25 RX ORDER — PROPOFOL 10 MG/ML
5-50 INJECTION, EMULSION INTRAVENOUS ONCE
Status: COMPLETED | OUTPATIENT
Start: 2022-07-25 | End: 2022-07-25

## 2022-07-25 RX ORDER — SODIUM CHLORIDE 0.9 % (FLUSH) 0.9 %
5-40 SYRINGE (ML) INJECTION EVERY 12 HOURS SCHEDULED
Status: DISCONTINUED | OUTPATIENT
Start: 2022-07-25 | End: 2022-07-27 | Stop reason: SDUPTHER

## 2022-07-25 RX ORDER — DEXTROSE MONOHYDRATE 100 MG/ML
INJECTION, SOLUTION INTRAVENOUS CONTINUOUS PRN
Status: DISCONTINUED | OUTPATIENT
Start: 2022-07-25 | End: 2022-08-19 | Stop reason: HOSPADM

## 2022-07-25 RX ORDER — HEPARIN SODIUM 1000 [USP'U]/ML
1000 INJECTION, SOLUTION INTRAVENOUS; SUBCUTANEOUS PRN
Status: DISCONTINUED | OUTPATIENT
Start: 2022-07-25 | End: 2022-08-19 | Stop reason: HOSPADM

## 2022-07-25 RX ORDER — ENOXAPARIN SODIUM 100 MG/ML
30 INJECTION SUBCUTANEOUS DAILY
Status: DISCONTINUED | OUTPATIENT
Start: 2022-07-25 | End: 2022-07-25 | Stop reason: ALTCHOICE

## 2022-07-25 RX ORDER — CHLORHEXIDINE GLUCONATE 0.12 MG/ML
15 RINSE ORAL 2 TIMES DAILY
Status: DISCONTINUED | OUTPATIENT
Start: 2022-07-25 | End: 2022-07-29

## 2022-07-25 RX ORDER — LIDOCAINE HYDROCHLORIDE 20 MG/ML
5 INJECTION, SOLUTION INFILTRATION; PERINEURAL ONCE
Status: DISCONTINUED | OUTPATIENT
Start: 2022-07-25 | End: 2022-08-02 | Stop reason: SDUPTHER

## 2022-07-25 RX ORDER — FENTANYL CITRATE-0.9 % NACL/PF 20 MCG/2ML
50 SYRINGE (ML) INTRAVENOUS EVERY 30 MIN PRN
Status: DISCONTINUED | OUTPATIENT
Start: 2022-07-25 | End: 2022-08-12

## 2022-07-25 RX ORDER — SODIUM CHLORIDE 9 MG/ML
INJECTION, SOLUTION INTRAVENOUS PRN
Status: DISCONTINUED | OUTPATIENT
Start: 2022-07-25 | End: 2022-07-27 | Stop reason: SDUPTHER

## 2022-07-25 RX ORDER — ETOMIDATE 2 MG/ML
20 INJECTION INTRAVENOUS ONCE
Status: COMPLETED | OUTPATIENT
Start: 2022-07-25 | End: 2022-07-25

## 2022-07-25 RX ORDER — ACETAMINOPHEN 325 MG/1
650 TABLET ORAL EVERY 6 HOURS PRN
Status: DISCONTINUED | OUTPATIENT
Start: 2022-07-25 | End: 2022-08-19 | Stop reason: HOSPADM

## 2022-07-25 RX ADMIN — ETOMIDATE 40 MG: 2 INJECTION, SOLUTION INTRAVENOUS at 12:03

## 2022-07-25 RX ADMIN — Medication 10 ML: at 21:15

## 2022-07-25 RX ADMIN — ETOMIDATE 40 MG: 2 INJECTION INTRAVENOUS at 12:03

## 2022-07-25 RX ADMIN — FENTANYL CITRATE 25 MCG/HR: 0.05 INJECTION, SOLUTION INTRAMUSCULAR; INTRAVENOUS at 14:41

## 2022-07-25 RX ADMIN — HEPARIN SODIUM 5000 UNITS: 5000 INJECTION INTRAVENOUS; SUBCUTANEOUS at 13:04

## 2022-07-25 RX ADMIN — PIPERACILLIN SODIUM AND TAZOBACTAM SODIUM 4500 MG: 4; .5 INJECTION, POWDER, LYOPHILIZED, FOR SOLUTION INTRAVENOUS at 13:12

## 2022-07-25 RX ADMIN — ROCURONIUM BROMIDE 50 MG: 10 INJECTION, SOLUTION INTRAVENOUS at 12:04

## 2022-07-25 RX ADMIN — VANCOMYCIN HYDROCHLORIDE 1000 MG: 1 INJECTION, POWDER, LYOPHILIZED, FOR SOLUTION INTRAVENOUS at 18:08

## 2022-07-25 RX ADMIN — Medication 5 MCG/MIN: at 17:57

## 2022-07-25 RX ADMIN — HEPARIN SODIUM 5000 UNITS: 5000 INJECTION INTRAVENOUS; SUBCUTANEOUS at 23:34

## 2022-07-25 RX ADMIN — PROPOFOL 5 MCG/KG/MIN: 10 INJECTION, EMULSION INTRAVENOUS at 12:20

## 2022-07-25 RX ADMIN — PIPERACILLIN AND TAZOBACTAM 3375 MG: 3; .375 INJECTION, POWDER, LYOPHILIZED, FOR SOLUTION INTRAVENOUS at 20:34

## 2022-07-25 RX ADMIN — FAMOTIDINE 20 MG: 10 INJECTION, SOLUTION INTRAVENOUS at 18:03

## 2022-07-25 RX ADMIN — CHLORHEXIDINE GLUCONATE 15 ML: 1.2 RINSE ORAL at 21:15

## 2022-07-25 ASSESSMENT — PULMONARY FUNCTION TESTS
PIF_VALUE: 23
PIF_VALUE: 22
PIF_VALUE: 27
PIF_VALUE: 25
PIF_VALUE: 23
PIF_VALUE: 23
PIF_VALUE: 22

## 2022-07-25 ASSESSMENT — ENCOUNTER SYMPTOMS
ABDOMINAL PAIN: 0
PHOTOPHOBIA: 0
CONSTIPATION: 0
WHEEZING: 0
VOMITING: 0
SHORTNESS OF BREATH: 1
COUGH: 1
NAUSEA: 0
DIARRHEA: 0

## 2022-07-25 ASSESSMENT — PAIN - FUNCTIONAL ASSESSMENT: PAIN_FUNCTIONAL_ASSESSMENT: NONE - DENIES PAIN

## 2022-07-25 NOTE — ED TRIAGE NOTES
Pt reports to the ED via EMS with co sob after receiving dialysis. Pt received 125 mg solumedrol and 2 denebs PTA.    Upon assessment pt is a/ox4 resp even and labored, pt is orthopneic, and having sob at rest. EMS reports pt was 88% on room air for upon their arrival.

## 2022-07-25 NOTE — PROGRESS NOTES
[address] [facility] Pharmacokinetic Monitoring Service - Vancomycin    Duane Chacon is a 75 y.o. male starting on vancomycin therapy for sepsis. Pharmacy consulted by Dr Josiah Nicole for monitoring and adjustment. Target Concentration: Pre-Dialysis Concentration 21-24 mg/L  Additional Antimicrobials: Zosyn    Pertinent Laboratory Values: Wt Readings from Last 1 Encounters:   07/25/22 145 lb (65.8 kg)     Temp Readings from Last 1 Encounters:   07/25/22 98.2 °F (36.8 °C)     Recent Labs     07/25/22  1127 07/25/22  1128 07/25/22  1228   CREATININE 3.90* 4.0* 4.0*   WBC 9.6  --   --      Procalcitonin: 15.57      MRSA Nasal Swab: N/A. Non-respiratory infection. Plan:  Concentration-guided dosing due to renal impairment and intermittent hemodialysis   Start vancomycin 1000mg x1. Patient  to ER today after outpatient HD session. Vancomycin concentration to be ordered based on HD schedule (unknown at this time).    Pharmacy will continue to monitor patient and adjust therapy as indicated    Thank you for the consult,  Daniel Mensah, [facility]  7/25/2022 1:32 PM

## 2022-07-25 NOTE — CONSULTS
Marital status: Single     Spouse name: Not on file    Number of children: Not on file    Years of education: Not on file    Highest education level: Not on file   Occupational History    Not on file   Tobacco Use    Smoking status: Former     Packs/day: 0.50     Years: 15.00     Pack years: 7.50     Types: Cigarettes     Quit date: 2017     Years since quittin.7    Smokeless tobacco: Never   Substance and Sexual Activity    Alcohol use: No    Drug use: No    Sexual activity: Not on file   Other Topics Concern    Not on file   Social History Narrative    Not on file     Social Determinants of Health     Financial Resource Strain: Not on file   Food Insecurity: Not on file   Transportation Needs: Not on file   Physical Activity: Not on file   Stress: Not on file   Social Connections: Not on file   Intimate Partner Violence: Not on file   Housing Stability: Not on file       Family History:   No family history on file. Review of Systems:   Review of Systems   Unable to perform ROS: Intubated       Physical exam:   Constitutional:  VITALS:  BP (!) 119/40   Pulse 94   Temp 98.2 °F (36.8 °C)   Resp 21   Ht 5' 6\" (1.676 m)   Wt 145 lb (65.8 kg)   SpO2 94%   BMI 23.40 kg/m²   Gen: alert, awake, nad  Skin: no rash, turgor wnl  Heent:  eomi, mmm  Neck: no bruits or jvd noted, thyroid normal  Lungs:  coarse, on vent  Heart:  Heart sounds are normal.  Regular rate and rhythm without murmur, gallop or rub.   Abdomen:  +bs, soft, nt, nd, no hepatosplenomegaly   Extremities: 1+ edema  Psychiatric: mood and affect appropriate; judgement and insight intact  Musculoskeletal:  Rom, muscular strength intact; digits, nails normal    Data/  CBC:   Lab Results   Component Value Date/Time    WBC 9.6 2022 11:27 AM    RBC 3.64 2022 11:27 AM    RBC 3.34 2012 05:15 AM    HGB 13.3 2022 12:28 PM    HCT 38.8 2022 11:27 AM    .4 2022 11:27 AM    MCH 34.2 2022 11:27 AM    MCHC 32.1 07/25/2022 11:27 AM    RDW 15.1 07/25/2022 11:27 AM     07/25/2022 11:27 AM    MPV 9.5 10/09/2015 02:00 PM     BMP:    Lab Results   Component Value Date/Time     07/25/2022 11:27 AM    K 3.7 07/25/2022 11:27 AM    CL 94 07/25/2022 11:27 AM    CO2 32 07/25/2022 11:27 AM    BUN 10 07/25/2022 11:27 AM    LABALBU 3.9 07/25/2022 11:27 AM    LABALBU 2.8 02/23/2012 06:40 AM    CREATININE 4.0 07/25/2022 12:28 PM    CREATININE 3.90 07/25/2022 11:27 AM    CALCIUM 9.6 07/25/2022 11:27 AM    GFRAA 18 07/25/2022 12:28 PM    LABGLOM 15 07/25/2022 12:28 PM    GLUCOSE 124 07/25/2022 11:27 AM    GLUCOSE 136 02/25/2012 05:10 PM     XR CHEST (2 VW)    Result Date: 6/28/2022  EXAMINATION:  CHEST. CLINICAL HISTORY:  SHORTNESS OF BREATH. COMPARISONS:  11/4/2017. TECHNIQUE:  PA and lateral views. FINDINGS:  Heart size and contour are within normal limits. Pulmonary vascularity appears normal. There are small effusions bilaterally. There is a suggestion of small interstitial infiltrates. No definite evidence of a mass or adenopathy. No significant  bony abnormality. POSSIBLE SLIGHT INTERSTITIAL PNEUMONIA BOTH LOWER LUNGS. SMALL PLEURAL EFFUSIONS BILATERALLY. XR CHEST PORTABLE    Result Date: 7/25/2022  XR CHEST PORTABLE : 7/25/2022 CLINICAL HISTORY:  post intubation . COMPARISON: Earlier 7/25/2022 TECHNIQUE: A portable upright AP radiograph of the chest was obtained at approximately 12:46 PM. FINDINGS: Both lung bases have been excluded from the radiograph. An endotracheal tube is present, with its tip approximately 4 to 5 cm above the ulcita. An orogastric tube probably extends below the diaphragm out of field of view radiograph. Moderate pleural effusions and mild to moderate probable scarring and/or atelectasis of the mid to lower lung fields has not significantly changed. There is no cardiomegaly, pneumothorax, displaced fractures, or other significant changes identified.      ENDOTRACHEAL AND OROGASTRIC TUBES IN EXPECTED POSITIONS. OTHERWISE, STABLE CHEST FROM EARLIER 7/25/2022. XR CHEST PORTABLE    Result Date: 7/25/2022  TECHNIQUE: Single portable view of the chest. CLINICAL INDICATION: Chest pain. COMPARISON: Chest x-ray obtained on June 28, 2022. PROCEDURE AND FINDINGS: Poor inspiratory effort is seen. The cardiomediastinal silhouette is slightly prominent in size. Mild prominence of the bronchovascular and interstitial lung markings is visualized bilaterally, linear streaky opacities visualized in bilateral lung fields, subsegmental atelectatic streaks, fluid visualized in the right minor fissure. Airspace opacification visualized in the lower lung fields bilaterally with blunting of the costophrenic angles and obliteration of the hemidiaphragms consistent with bilateral pleural effusions. . No evidence of pneumothorax or parenchymal lung mass. Degenerative bone changes. Congestive heart failure versus pneumonia and parapneumonic effusions would recommend clinical correlation. Increased opacification seen in comparison to the prior study. Assessment/  -74 yo man with ESRD with liver transplant. Access is av fistula. Admitted with sob/ resp failure that may be PNA but looks like more fluid overload to me. Was dialyzed today. On vent. Some low bp earlier. Plan/  1- UF tomorrow  2- worried about what his cardiac status is. Will get echo to start and likely cardio eval  3- HD MWF    Thank you for the consultation. Please do not hesitate to call with questions.     Muriel Melchor MD

## 2022-07-25 NOTE — CONSULTS
Pulmonary and Critical Care Medicine  Consult Note  Encounter Date: 2022 1:52 PM    Mr. Kennedy Rhoades is a 75 y.o. male  : 1955  Requesting Provider: Andriy Parsons MD   Reason for request: Acute hypoxic respiratory failure            HISTORY OF PRESENT ILLNESS:    Kennedy Rhoades is a 75 y.o., male who has a past medical history of  has a past medical history of ESRD (end stage renal disease) ([facility] [address] Hepatitis, Hypertension, and Liver transplanted ([facility] [address] that is hospitalized for acute respiratory failure    HPI   Patient presented to the ER with increased shortness of breath and 88% on room air. Patient states in the ER he has been short of breath over the past several weeks becoming worse today. Patient received dialysis according to report 2 L were removed. And once arriving home he was more short of breath so squad called. Per EMS he was 88% on room air and cyanotic. Per him EMS patient was hypotensive 70/40, no pressors were started. Patient reported a productive cough with white sputum. Patient was just in the ER on 80 and diagnosed with pneumonia sent home on Ceftin. Patient denied fever nausea or vomiting or headache in the ER. While in the ER he became more labored with breathing and ABGs were obtained, decision to intubate in the ER. Patient has a history of liver transplant, he is a smoker, in the ER he denied any diagnosis of COPD or CHF. Past Medical History:        Diagnosis Date    ESRD (end stage renal disease) ([facility] [address]     Hepatitis     Hypertension     Liver transplanted ([facility] [address] 2016       Past Surgical History:        Procedure Laterality Date    DIALYSIS FISTULA CREATION Right     LIVER TRANSPLANT  2017    TUNNELED VENOUS PORT PLACEMENT         Social History:     reports that he quit smoking about 4 years ago. His smoking use included cigarettes. He has a 7.50 pack-year smoking history.  He has never used smokeless tobacco. He reports that he does not drink alcohol and does not use drugs. Family History:   No family history on file. Allergies:  Patient has no known allergies. MEDICATIONS during current hospitalization:    Continuous Infusions:   sodium chloride      propofol      fentaNYL         Scheduled Meds:   sodium chloride flush  5-40 mL IntraVENous 2 times per day    piperacillin-tazobactam  3,375 mg IntraVENous Q12H    vancomycin  15 mg/kg IntraVENous Once    heparin (porcine)  5,000 Units SubCUTAneous BID    vancomycin (VANCOCIN) intermittent dosing (placeholder)   Other RX Placeholder    chlorhexidine  15 mL Mouth/Throat BID    famotidine (PEPCID) injection  20 mg IntraVENous Daily       PRN Meds:sodium chloride flush, sodium chloride, acetaminophen **OR** acetaminophen, fentaNYL **AND** fentaNYL    REVIEW OF SYSTEMS:      Review of Systems    PHYSICAL EXAM:    Vitals:  BP (!) 119/40   Pulse 94   Temp 98.2 °F (36.8 °C)   Resp 21   Ht 5' 6\" (1.676 m)   Wt 145 lb (65.8 kg)   SpO2 94%   BMI 23.40 kg/m²     General: Alert and oriented, given paralytic and sedation in ER  Head: normocephalic, atraumatic  Eyes:No gross abnormalities. ENT:  MMM no lesions  Neck:  supple and no masses  Chest : Fair air movement, bibasilar rales, occasional wheeze nontender tympanic  Heart[de-identified] Heart sounds are normal.  Regular rate and rhythm without murmur, gallop or rub. ABD:  bowel sounds normal, soft, non-tender  Musculoskeletal : no cyanosis, no clubbing, and upper exts edema  Neuro:  Sedation   Skin: No rashes or nodules noted.   Lymph node:  no cervical nodes  Urology: No [place]   Psychiatric: unresponsive    Data Review  Recent Labs     07/25/22  1127 07/25/22  1128 07/25/22  1228   WBC 9.6  --   --    HGB 12.5* 14.4 13.3*   HCT 38.8*  --   --      --   --       Recent Labs     07/25/22  1127 07/25/22  1128 07/25/22  1228     --   --    K 3.7  --   --    CL 94*  --   --    CO2 32*  --   --    BUN 10  --   --    CREATININE 3.90* 4.0* 4.0*   GLUCOSE 124*  --   --        MV Settings: ABGs:   Recent Labs     07/25/22  1128 07/25/22  1228   PHART 7.466* 7.500*   TFW7SPJ 41 35   PO2ART 56* 242*   UUK7NNS 29.3* 27.1   BEART 6* 4*   V6NPIPNW 91* 100*   JQB8ARD 31 28     O2 Device: Ventilator  O2 Flow Rate (L/min): 15 L/min  Lab Results   Component Value Date/Time    LACTA 3.2 07/25/2022 11:27 AM    LACTA 6.9 05/20/2015 05:45 PM       Radiology     Most recent    Chest CT      WITH CONTRAST:No results found for this or any previous visit. WITHOUT CONTRAST: No results found for this or any previous visit. CXR      2-view: Results for orders placed during the hospital encounter of 06/28/22    XR CHEST (2 VW)    Narrative  EXAMINATION:  CHEST. CLINICAL HISTORY:  SHORTNESS OF BREATH.    COMPARISONS:  11/4/2017. TECHNIQUE:  PA and lateral views. FINDINGS:  Heart size and contour are within normal limits. Pulmonary vascularity appears normal. There are small effusions bilaterally. There is a suggestion of small interstitial infiltrates. No definite evidence of a mass or adenopathy. No significant  bony abnormality. Impression  POSSIBLE SLIGHT INTERSTITIAL PNEUMONIA BOTH LOWER LUNGS. SMALL PLEURAL EFFUSIONS BILATERALLY. Results for orders placed during the hospital encounter of 10/31/17    XR CHEST STANDARD (2 VW)    Narrative  XR CHEST STANDARD TWO VW: 11/4/2017    CLINICAL HISTORY:  Pneumonia . COMPARISON: Portable chest 5/20/2015 and 2 view chest 12/23/2013. A portable upright AP radiograph of the chest was obtained. FINDINGS:    A right internal jugular approach hemodialysis catheter has been placed since the prior study with its tip within the right atrium. There is no developing pulmonary infiltrate, cardiomegaly, pleural effusion, significant vascular congestion, pneumothorax, or acute fractures identified. A moderate compression fracture T9 appears old.     The cardiac and mediastinal silhouettes appear within normal limits for technique. Impression  NO EVIDENCE OF ACTIVE CARDIOPULMONARY DISEASE. Portable: Results for orders placed during the hospital encounter of 07/25/22    XR CHEST PORTABLE    Narrative  TECHNIQUE:  Single portable view of the chest.    CLINICAL INDICATION:  Chest pain. COMPARISON:  Chest x-ray obtained on June 28, 2022. PROCEDURE AND FINDINGS:  Poor inspiratory effort is seen. The cardiomediastinal silhouette is slightly prominent in size. Mild prominence of the bronchovascular and interstitial lung markings is visualized bilaterally, linear streaky opacities visualized in bilateral lung fields, subsegmental atelectatic streaks, fluid visualized in the right minor fissure. Airspace  opacification visualized in the lower lung fields bilaterally with blunting of the costophrenic angles and obliteration of the hemidiaphragms consistent with bilateral pleural effusions. .  No evidence of pneumothorax or parenchymal lung mass. Degenerative bone changes. Impression  Congestive heart failure versus pneumonia and parapneumonic effusions would recommend clinical correlation. Increased opacification seen in comparison to the prior study. Echo No results found for this or any previous visit. Assessment, plan:    This is a critically ill patient at risk of deterioration / death , needing close ICU monitoring and intervention due to below noted problems   Acute hypoxic respiratory failure, requiring intubation  Right pleural effusion, possible pneumonia   Chronic kidney failure dialysis Monday Wednesday Friday  Lactic acidosis  History of liver transplant  Smoker    Recommendation  Vent support lung protective strategy  head of the bed 30°  Daily sedation holidays and breathing trials  Sedation with combination propofol and fentanyl target R ASS of 0 to -1  Watch for ICU delirium: TV on, natural light, avoid benzos, pain control, early mobility, and family engagement  PUD prophylaxis  DVT prophylaxis   Watch urine output and volume status  Monitor electrolyte, fluid management   Monitor blood sugar target 140-180  Continue Zosyn  Received dialysis today 2 L removed  Consult nephrology  Panculture     Thank you for consultation    Electronically signed by ISI Arreguin CNP on 7/25/2022 at 1:52 PM

## 2022-07-25 NOTE — ED PROVIDER NOTES
[facility]      Pt Name: Lucrecia Olson  MRN: [id number]  [place] 1955  Date of evaluation: 7/25/2022  Provider: Aby Selby DO    CHIEF COMPLAINT       Chief Complaint   Patient presents with    Shortness of Breath     C/O SOB  AFTER DIALYSIS  88 % ON RA  GIVEN 2 DUONEBS  SOLU MEDROL          HISTORY OF PRESENT ILLNESS   (Location/Symptom, Timing/Onset, Context/Setting, Quality, Duration, Modifying Factors, Severity)  Note limiting factors. Lucrecia Olson is a 75 y.o. male who per chart review has a past medical history of end-stage renal disease on dialysis Monday Wednesday Friday (compliant) liver transplant status post cirrhosis and hypertension presents to the emergency department for evaluation of shortness of breath. Patient states he has been short of breath constantly for several weeks however worse today after arriving home from dialysis. Per EMS patient 88% on room air and cyanotic. He does not wear oxygen at home. He reports associated productive cough with white sputum. He was given 2 duo nebs and Solu-Medrol in route without much improvement. He was placed on 4 L nasal cannula. EMS states patient hypotensive to 70/40. Patient denies fever chills chest pain abd pain back pain headache dizziness nausea vomiting. He states that when he wakes up in the morning he notices that his neck is swollen. He denies history of CHF. Was seen in the ED 6/28, dx with pneumonia and prescribed Ceftin. He is a long time smoker but denies COPD hx.      HPI    Nursing Notes were reviewed. REVIEW OF SYSTEMS    (2-9 systems for level 4, 10 or more for level 5)     Review of Systems   Constitutional:  Negative for chills, fatigue and fever. HENT:  Negative for congestion. Eyes:  Negative for photophobia. Respiratory:  Positive for cough and shortness of breath. Negative for wheezing. Cardiovascular:  Negative for chest pain, palpitations and leg swelling. Gastrointestinal:  Negative for abdominal pain, constipation, diarrhea, nausea and vomiting. Genitourinary:  Negative for dysuria, frequency and hematuria. Musculoskeletal:  Negative for myalgias. Allergic/Immunologic: Negative for immunocompromised state. Neurological:  Negative for dizziness, weakness and headaches. All other systems reviewed and are negative. Except as noted above the remainder of the review of systems was reviewed and negative. PAST MEDICAL HISTORY     Past Medical History:   Diagnosis Date    ESRD (end stage renal disease) ([facility] [address]     Hepatitis     Hypertension     Liver transplanted ([facility] [address] 03/2016         SURGICAL HISTORY       Past Surgical History:   Procedure Laterality Date    DIALYSIS FISTULA CREATION Right     LIVER TRANSPLANT  03/08/2017    TUNNELED VENOUS PORT PLACEMENT           CURRENT MEDICATIONS       Current Discharge Medication List        CONTINUE these medications which have NOT CHANGED    Details   albuterol sulfate HFA (PROAIR HFA) 108 (90 Base) MCG/ACT inhaler 2 puffs; Use every 4 hours while awake for 7-10 days then PRN wheezing  Dispense with SPACER and Instruct on use. May sub Ventolin or Proventil as needed per [employer]. Qty: 18 g, Refills: 0      carvedilol (COREG) 12.5 MG tablet Take 12.5 mg by mouth 2 times daily    Associated Diagnoses: Pain and swelling of right upper extremity; Cellulitis of right upper extremity; Complications due to renal dialysis device, implant, and graft, initial encounter      calcium acetate (PHOSLO) 667 MG capsule Take 667 mg by mouth 3 times daily (with meals)      Tacrolimus (PROGRAF PO) Take 1 tablet by mouth 2 times daily             ALLERGIES     Patient has no known allergies. FAMILY HISTORY     No family history on file.        SOCIAL HISTORY       Social History     Socioeconomic History    Marital status: Single   Tobacco Use    Smoking status: Former     Packs/day: 0.50     Years: 15.00     Pack years: 7.50 Types: Cigarettes     Quit date: 2017     Years since quittin.7    Smokeless tobacco: Never   Substance and Sexual Activity    Alcohol use: No    Drug use: No       SCREENINGS         [place] Coma Scale  Eye Opening: None  Best Verbal Response: None  Best Motor Response: None  Wanda Coma Scale Score: 3                     CIWA Assessment  BP: (!) 119/40  Heart Rate: 94                 PHYSICAL EXAM    (up to 7 for level 4, 8 or more for level 5)     ED Triage Vitals [22 1120]   BP Temp Temp src Heart Rate Resp SpO2 Height Weight   (!) 94/45 98.2 °F (36.8 °C) -- (!) 110 22 (!) 88 % 5' 6\" (1.676 m) 145 lb (65.8 kg)       Physical Exam  Constitutional:       General: He is in acute distress. Appearance: He is well-developed. He is ill-appearing. He is not toxic-appearing or diaphoretic. HENT:      Head: Normocephalic and atraumatic. Nose: Nose normal.      Mouth/Throat:      Mouth: Mucous membranes are moist.   Eyes:      Pupils: Pupils are equal, round, and reactive to light. Cardiovascular:      Rate and Rhythm: Normal rate and regular rhythm. Heart sounds: No murmur heard. No friction rub. No gallop. Pulmonary:      Effort: Tachypnea, accessory muscle usage and respiratory distress present. Breath sounds: Decreased air movement present. Decreased breath sounds and wheezing present. No rhonchi or rales. Abdominal:      General: Bowel sounds are normal. There is no distension. Palpations: Abdomen is soft. Tenderness: There is no abdominal tenderness. There is no guarding or rebound. Musculoskeletal:         General: No swelling. Cervical back: Normal range of motion. Skin:     General: Skin is warm and dry. Capillary Refill: Capillary refill takes 2 to 3 seconds. Coloration: Skin is ashen and cyanotic.       Comments: Anasarca 1-2+ pitting  + significant JVD bilaterally worse on the R    Neurological:      General: No focal deficit present. Mental Status: He is alert and oriented to person, place, and time. Cranial Nerves: No cranial nerve deficit. Sensory: No sensory deficit. Motor: No weakness. Coordination: Coordination normal.      Gait: Gait normal.       DIAGNOSTIC RESULTS     EKG: All EKG's are interpreted by the Emergency Department Physician who either signs or Co-signs this chart in the absence of a cardiologist.    EKG shows Sinus tach with , normal axis, normal intervals, no ST changes. RADIOLOGY:   Non-plain film images such as CT, Ultrasound and MRI are read by the radiologist. Plain radiographic images are visualized and preliminarily interpreted by the emergency physician with the below findings:        Interpretation per the Radiologist below, if available at the time of this note:    XR CHEST PORTABLE   Final Result      ENDOTRACHEAL AND OROGASTRIC TUBES IN EXPECTED POSITIONS. OTHERWISE, STABLE CHEST FROM EARLIER 7/25/2022. XR CHEST PORTABLE   Final Result   Congestive heart failure versus pneumonia and parapneumonic effusions would recommend clinical correlation. Increased opacification seen in comparison to the prior study.       IR PICC WO SQ PORT/PUMP > 5 YEARS    (Results Pending)         ED BEDSIDE ULTRASOUND:   Performed by ED Physician - none    LABS:  Labs Reviewed   CBC WITH AUTO DIFFERENTIAL - Abnormal; Notable for the following components:       Result Value    RBC 3.64 (*)     Hemoglobin 12.5 (*)     Hematocrit 38.8 (*)     .4 (*)     MCH 34.2 (*)     MCHC 32.1 (*)     RDW 15.1 (*)     Neutrophils Absolute 8.9 (*)     Lymphocytes Absolute 0.3 (*)     All other components within normal limits   COMPREHENSIVE METABOLIC PANEL - Abnormal; Notable for the following components:    Chloride 94 (*)     CO2 32 (*)     Glucose 124 (*)     Creatinine 3.90 (*)     GFR Non- 15.4 (*)     GFR  18.7 (*)     ALT 79 (*)     AST 77 (*) Globulin 3.6 (*)     All other components within normal limits   TROPONIN - Abnormal; Notable for the following components:    Troponin 0.117 (*)     All other components within normal limits    Narrative:     CALL  [facility] tel. [phone],  Troponin results called to and read back by Justin Rosenbergsuzi 1159, 07/25/2022  12:09, by Reji Singh   PROCALCITONIN - Abnormal; Notable for the following components:    Procalcitonin 15.57 (*)     All other components within normal limits    Narrative:     CALL  [facility] tel. [phone],  Troponin results called to and read back by Justin Adams [facility], 07/25/2022  12:09, by Reji Singh   LACTIC ACID - Abnormal; Notable for the following components:    Lactic Acid 3.2 (*)     All other components within normal limits    Narrative:     CALL  [facility] tel. [phone],  Lactic acid results called to and read back by Justinsuzi Adams [facility], 07/25/2022  12:10, by FELIPE   TSH - Abnormal; Notable for the following components:    TSH 25.110 (*)     All other components within normal limits   POCT ARTERIAL - Abnormal; Notable for the following components:    POC Sodium 135 (*)     POC Chloride 97 (*)     POC Glucose 129 (*)     POC Creatinine 4.0 (*)     GFR Non- 15 (*)     GFR  18 (*)     Calcium, Ionized 1.10 (*)     pH, Arterial 7.466 (*)     pO2, Arterial 56 (*)     HCO3, Arterial 29.3 (*)     Base Excess, Arterial 6 (*)     O2 Sat, Arterial 91 (*)     Lactate 2.08 (*)     All other components within normal limits   POCT ARTERIAL - Abnormal; Notable for the following components:    POC Potassium 3.2 (*)     POC Chloride 98 (*)     POC Glucose 116 (*)     POC Creatinine 4.0 (*)     GFR Non- 15 (*)     GFR  18 (*)     Calcium, Ionized 1.07 (*)     pH, Arterial 7.500 (*)     pO2, Arterial 242 (*)     Base Excess, Arterial 4 (*)     O2 Sat, Arterial 100 (*)     POC Hematocrit 39 (*)     Hemoglobin 13.3 (*)     All other components within normal limits   COVID-19, RAPID   CULTURE, BLOOD 1   CULTURE, BLOOD 2   CULTURE, RESPIRATORY   APTT   CK    Narrative:     CALL  [facility] tel. [phone],  Troponin results called to and read back by Eric, 07/25/2022  12:09, by Leah Ron   MAGNESIUM    Narrative:     Jae Rogel  [facility] tel. [phone],  Troponin results called to and read back by Eric, 07/25/2022  12:09, by [address] [place] [address], SEPSIS   LACTATE, SEPSIS   TROPONIN   TROPONIN   TROPONIN   TROPONIN   POCT EPOC BLOOD GAS, LACTIC ACID, ICA   POCT EPOC BLOOD GAS, LACTIC ACID, ICA   POCT GLUCOSE   POCT GLUCOSE       All other labs were within normal range or not returned as of this dictation. EMERGENCY DEPARTMENT COURSE and DIFFERENTIAL DIAGNOSIS/MDM:   Vitals:    Vitals:    07/25/22 1210 07/25/22 1211 07/25/22 1246 07/25/22 1311   BP:   (!) 107/54 (!) 119/40   Pulse: (!) 103 (!) 113 99 94   Resp: 24 27 24 21   Temp:       SpO2: 99% 99% 90% 94%   Weight:       Height:               MDM    Patient is a 69-year-old male presents ED for evaluation of shortness of breath and respiratory distress. He is afebrile hypoxic to 88% on 4 L and tachycardic to 113 on arrival.  He was given duo nebs and Solu-Medrol in route to the ED.  500 cc IV normal saline hanging on arrival to the ED. Pt is cyanotic, 1-2+ pitting edema diffusely and significant JVD. Dr. Suzy De Leon ED Attending at bedside. Patient desaturating as low as 56% on even NRB 15 L therefore decision was made to emergently intubate the patient. Pt verbally agreed to intubation. Patient was intubated by ED attending Dr. Suzy De Leon. CBC is remarkable for stable anemia hemoglobin of 12.5. Baseline kidney function. Troponin 0.117. ProCalcitonin 15.57. Lactic acid 3.2. Chest x-ray shows congestive heart failure versus pneumonia and parapneumonic effusions, increased opacification in comparison to prior study on 6/28.   Will Start patient on Vanco and Zosyn. Due to acute respiratory failure likely secondary to acute congestive heart failure versus pneumonia, patient to be admitted to the ICU in critical condition. Dr. Rupert Guerrero accepts to the floor. Dr. Theresa Carmona came down saw and evaluated the patient in the emergency room. The ED attending gave him an update. REASSESSMENT          CRITICAL CARE TIME   Total Critical Care time was 67 minutes, excluding separately reportable procedures. There was a high probability of clinically significant/life threatening deterioration in the patient's condition which required my urgent intervention.       CONSULTS:  IP CONSULT TO NEPHROLOGY  IP CONSULT TO CRITICAL CARE  PHARMACY TO DOSE VANCOMYCIN  IP CONSULT TO DIETITIAN    PROCEDURES:  Unless otherwise noted below, none     Intubation    Date/Time: 7/25/2022 12:00 PM  Performed by: Jose M Verdugo DO  Authorized by: Krystyna Dickinson MD     Consent:     Consent obtained:  Verbal    Consent given by:  Patient    Risks, benefits, and alternatives were discussed: yes      Risks discussed:  Hypoxia and aspiration    Alternatives discussed:  No treatment  Universal protocol:     Procedure explained and questions answered to patient or proxy's satisfaction: yes      Relevant documents present and verified: yes      Test results available: yes      Imaging studies available: yes      Required blood products, implants, devices, and special equipment available: yes      Site/side marked: yes      Immediately prior to procedure, a time out was called: yes      Patient identity confirmed:  Arm band  Pre-procedure details:     Indication: failure to oxygenate      Patient status:  Awake    Look externally: no concerns      Mouth opening - incisor distance:  2 finger widths    Hyoid-mental distance: 2 finger widths      Hyoid-thyroid distance: 2 or more finger widths      Mallampati score:  III    Obstruction: none      Neck mobility: normal      Pharmacologic strategy: RSI Induction agents:  Etomidate    Paralytics:  Rocuronium  Procedure details:     Preoxygenation:  Bag valve mask    CPR in progress: no      Intubation method:  Oral    Intubation technique: video assisted      Laryngoscope blade: Mac 4    Bougie used: no      Grade view: II      Tube size (mm):  7.5    Tube type:  Cuffed    Number of attempts:  1    Ventilation between attempts: yes with mask      Tube visualized through cords: yes    Placement assessment:     ETT at teeth/gumline (cm):  20    Tube secured with: Adhesive tape    Breath sounds:  Equal    Placement verification: chest rise, colorimetric ETCO2, CXR verification and tube exhalation      CXR findings:  Appropriate position  Post-procedure details:     Procedure completion:  Tolerated well, no immediate complications        FINAL IMPRESSION      1. Acute respiratory failure with hypoxia ([facility] [address]    2. ESRD (end stage renal disease) on dialysis ([facility] [address]    3. Macrocytic anemia    4. Type 2 diabetes mellitus with hyperglycemia, unspecified whether long term insulin use ([facility] [address]          DISPOSITION/PLAN   DISPOSITION Admitted 07/25/2022 12:29:20 PM      PATIENT REFERRED TO:  No follow-up provider specified. DISCHARGE MEDICATIONS:  Current Discharge Medication List        Controlled Substances Monitoring:     No flowsheet data found.     (Please note that portions of this note were completed with a voice recognition program.  Efforts were made to edit the dictations but occasionally words are mis-transcribed.)    52 Ashley Morales DO (electronically signed)               52 Ashley Morales DO  07/25/22 1385

## 2022-07-25 NOTE — ED NOTES
Mary Tabares and RRT at [facility] to set up for intubation. Pt gave verbal consent for intubation.        Randall Lund, RN  07/25/22 [address], RN  07/25/22 [id number]

## 2022-07-25 NOTE — ED NOTES
Dr. Ying Maharaj informed of lactic acid 2.08 and trop 0.117  No orders at this time      [facility], RN  07/25/22 4330 Diogo Luo  07/25/22 4330 Diogo Luo  07/25/22 [id number]

## 2022-07-25 NOTE — H&P
while awake for 7-10 days then PRN wheezing  Dispense with SPACER and Instruct on use. May sub Ventolin or Proventil as needed per [employer]. 6/28/22   Tiff Mejia DO   carvedilol (COREG) 12.5 MG tablet Take 12.5 mg by mouth 2 times daily    Historical Provider, MD   calcium acetate (PHOSLO) 667 MG capsule Take 667 mg by mouth 3 times daily (with meals)    Historical Provider, MD   Tacrolimus (PROGRAF PO) Take 1 tablet by mouth 2 times daily    Historical Provider, MD       Allergies:  Patient has no known allergies. Social History:   TOBACCO:   reports that he quit smoking about 4 years ago. His smoking use included cigarettes. He has a 7.50 pack-year smoking history. He has never used smokeless tobacco.  ETOH:   reports no history of alcohol use. Family History:   No family history on file. REVIEW OF SYSTEMS:  Ten systems reviewed and negative except for as above. Physical Exam:    Vitals: /76   Pulse (!) 113   Temp 98.2 °F (36.8 °C)   Resp 27   Ht 5' 6\" (1.676 m)   Wt 145 lb (65.8 kg)   SpO2 99%   BMI 23.40 kg/m²   Constitutional: intubated, sedated. Skin: Skin color, texture, turgor normal. Anterior chest wall and right shoulder chronic changes  Eyes:Eye: Normal external eye, conjunctiva, EULA. ENT: Head: Normocephalic, no lesions, without obvious abnormality. Neck: no adenopathy, no carotid bruit, no JVD, supple, symmetrical, trachea midline and thyroid not enlarged, symmetric, no tenderness/mass/nodules  Respiratory: clear to auscultation bilaterally  Cardiovascular: regular rate and rhythm, S1, S2 normal, no murmur, click, rub or gallop  Gastrointestinal: soft, non-tender; bowel sounds normal; no masses,  no organomegaly  Genitourinary: Deferred  Musculoskeletal:extremities normal, atraumatic, no cyanosis or edema  Neurologic: Intubated, sedated.   No clear focal deficits      Recent Labs     07/25/22  1127 07/25/22  1128   WBC 9.6  --    HGB 12.5* 14.4     -- Recent Labs     07/25/22  1127 07/25/22  1128     --    K 3.7  --    CL 94*  --    CO2 32*  --    BUN 10  --    CREATININE 3.90* 4.0*   GLUCOSE 124*  --    AST 77*  --    ALT 79*  --    BILITOT 0.7  --    ALKPHOS 87  --      Troponin T:   Recent Labs     07/25/22  1127   TROPONINI 0.117*     ABGs:   Lab Results   Component Value Date/Time    PHART 7.466 07/25/2022 11:28 AM    PO2ART 56 07/25/2022 11:28 AM    PMY3UJR 41 07/25/2022 11:28 AM     INR:   Recent Labs     07/25/22  1127   INR 1.0     URINALYSIS:No results for input(s): NITRITE, COLORU, PHUR, LABCAST, WBCUA, RBCUA, MUCUS, TRICHOMONAS, YEAST, BACTERIA, CLARITYU, SPECGRAV, LEUKOCYTESUR, UROBILINOGEN, BILIRUBINUR, BLOODU, GLUCOSEU, AMORPHOUS in the last 72 hours. Invalid input(s): Darin Push  -----------------------------------------------------------------   XR CHEST PORTABLE    Result Date: 7/25/2022  TECHNIQUE: Single portable view of the chest. CLINICAL INDICATION: Chest pain. COMPARISON: Chest x-ray obtained on June 28, 2022. PROCEDURE AND FINDINGS: Poor inspiratory effort is seen. The cardiomediastinal silhouette is slightly prominent in size. Mild prominence of the bronchovascular and interstitial lung markings is visualized bilaterally, linear streaky opacities visualized in bilateral lung fields, subsegmental atelectatic streaks, fluid visualized in the right minor fissure. Airspace opacification visualized in the lower lung fields bilaterally with blunting of the costophrenic angles and obliteration of the hemidiaphragms consistent with bilateral pleural effusions. . No evidence of pneumothorax or parenchymal lung mass. Degenerative bone changes. Congestive heart failure versus pneumonia and parapneumonic effusions would recommend clinical correlation. Increased opacification seen in comparison to the prior study.           Assessment and Plan   Acute hypoxic respiratory failure  Ventilated, intensivist consult, follow abg, ICU admission  Sepsis 2/2 PNA  Questionable on imaging, labs likely elevated 2/2 ESRD. However will cover with broad spectrum abx, obtain blood cultures, follow  Pulmonary edema  Patient to likely need some additional fluid removal through HD, d/w Dr. Neel Mota, to evaluate.   ESRD on HD  Consult nephrology  Elevated troponin  Cycle trops x 2  DVT proph    CCT 30 minutes      Patient Active Problem List   Diagnosis Code    Hypotension I95.9    ESRD (end stage renal disease) ([facility] N18.6    Liver transplanted ([facility] Z94.4    Liver transplant recipient ([address] Z94.4    Sepsis ([address] A41.9    Gastroenteritis K52.9    C. difficile colitis A04.72    Severe sepsis (HCC) A41.9, R65.20    Vomiting and diarrhea R11.10, R19.7    Generalized abdominal pain R10.84    Acute renal failure ([address] N17.9    Acute respiratory failure with hypoxia ([address] J96.01       Jacki Burger MD, MD  Admitting Hospitalist

## 2022-07-25 NOTE — PROGRESS NOTES
DVT / VTE PROPHYLAXIS EVALUATION    Estimated Creatinine Clearance: 16 mL/min (A) (based on SCr of 4 mg/dL (H)). Recent Labs     07/25/22  1127 07/25/22  1128   BUN 10  --    CREATININE 3.90* 4.0*     --    HGB 12.5* 14.4   HCT 38.8*  --    INR 1.0  --      ADMITTING DX OR CHIEF COMPLAINT?  Resp failure  WARFARIN? DOAC'S? no  ANY APPARENT BLEEDING? no  SCHEDULED SURGERY? no     Current order:  Enoxaparin 30 mg SUBQ once daily      Plan:  Pharmacologic VTE prophylaxis modified based on patient renal function per [facility] approved protocol     Patient Weight (kg)      50.9 and below .9 101-150.9 151-174.9 175 or greater   Estimated   CrCl  (ml/min) 30 or greater []   30 mg   SUBQ daily   []   40 mg   SUBQ daily []  30 mg SUBQ   BID  []  40 mg   SUBQ   BID []  60mg SUBQ BID    15-29.9 []  UFH 5000   units SUBQ BID []  30 mg   SUBQ daily [] 30 mg SUBQ   daily []  40 mg SUBQ   daily [] 60 mg SUBQ   daily    Less than 15 or dialysis []  UFH 5000   units SUBQ BID [x] UFH 5000 units SUBQ TID []  UFH 7500   units   SUBQ TID

## 2022-07-25 NOTE — ED NOTES
Lab called to collect 2 sets of blood cultures, lab states they will send someone.       Estefania Dennis RN  07/25/22 9838

## 2022-07-25 NOTE — PROGRESS NOTES
[address] [facility] Dose Adjustment Per Protocol:  Zosyn Extended Interval Interchange      Garrett Bhandari is a 75 y.o. male. The following ordered dose of Zosyn has been changed to optimize its pharmacodynamic profile per [facility] pharmacy policy approved by [facility]. Recent Labs     07/25/22  1127 07/25/22  1128   CREATININE 3.90* 4.0*   WBC 9.6  --      . Height: 5' 6\" (167.6 cm), Weight: 145 lb (65.8 kg), Body mass index is 23.4 kg/m². Estimated Creatinine Clearance: 16 mL/min (A) (based on SCr of 4 mg/dL (H)). Medication Ordered:   Traditional Dosing   [] Zosyn 2.25 gm q6-8 hr   [] Zosyn 3.375 gm q6-8 hr   [] Zosyn 4.5 gm q6-8 hr   [] Zosyn 2.25 gm q6-8 hr   [] Zosyn 3.375 gm q6-8 hr   [x] Zosyn 4.5 gm q6-8 hr     New Dose      Piperacillin/Tazobactam - Extended Infusion Dosing (4-hour infusion) - Preferred Dosing Strategy       Renal Function (CrCl mL/min)  ? 20  < 20, HD, PD  CRRT    All Indications - Loading dose of 4500 milligrams x 1 over 30 minutes or via IV push. Maintenance dose  should begin 6 hours after load for CrCl > 20 and 8 hours after load for CrCl < 20. Maintenance dosing for all indications except as outlined below  [] 3375mg q8h  [x] 3375mg q12h  [] 3375mg q8h    Febrile neutropenia, Cystic fibrosis, BMI > 40*, local Pseudomonas susceptibility < 80% (refer to page 3 for indications)     [] 4500mg q8h  [] 4500 q12h  [] 4500mg q8h    *Consider 3375mg q8h for indication of Urinary tract infections in BMI > 40. Adjust for decreased renal function.            Thank GENI Turk RODRÍGUEZ [facility]  7/25/2022 12:40 PM

## 2022-07-26 ENCOUNTER — HOSPITAL ENCOUNTER (INPATIENT)
Dept: INTERVENTIONAL RADIOLOGY/VASCULAR | Age: 67
Discharge: HOME | DRG: 871 | End: 2022-07-28
Payer: MEDICARE

## 2022-07-26 ENCOUNTER — APPOINTMENT (OUTPATIENT)
Dept: ULTRASOUND IMAGING | Age: 67
DRG: 871 | End: 2022-07-26
Payer: MEDICARE

## 2022-07-26 VITALS
SYSTOLIC BLOOD PRESSURE: 166 MMHG | RESPIRATION RATE: 19 BRPM | DIASTOLIC BLOOD PRESSURE: 76 MMHG | HEART RATE: 60 BPM | OXYGEN SATURATION: 100 %

## 2022-07-26 LAB
ALBUMIN SERPL-MCNC: 3.7 G/DL (ref 3.5–4.6)
ANION GAP SERPL CALCULATED.3IONS-SCNC: 17 MEQ/L (ref 9–15)
ANTI-XA UNFRAC HEPARIN: 1.14 IU/ML
APPEARANCE FLUID: NORMAL
APTT: >150 SEC (ref 24.4–36.8)
BASE EXCESS ARTERIAL: 6 (ref -3–3)
BASOPHILS ABSOLUTE: 0 K/UL (ref 0–0.2)
BASOPHILS RELATIVE PERCENT: 0.3 %
BUN BLDV-MCNC: 22 MG/DL (ref 8–23)
CALCIUM IONIZED: 1.08 MMOL/L (ref 1.12–1.32)
CALCIUM SERPL-MCNC: 9.3 MG/DL (ref 8.5–9.9)
CELL COUNT FLUID TYPE: NORMAL
CHLORIDE BLD-SCNC: 93 MEQ/L (ref 95–107)
CLOT EVALUATION: NORMAL
CO2: 26 MEQ/L (ref 20–31)
COLOR FLUID: YELLOW
CREAT SERPL-MCNC: 5.05 MG/DL (ref 0.7–1.2)
EOSINOPHILS ABSOLUTE: 0 K/UL (ref 0–0.7)
EOSINOPHILS RELATIVE PERCENT: 0.1 %
FLUID TYPE: NORMAL
FLUID TYPE: NORMAL
GFR AFRICAN AMERICAN: 13.9
GFR AFRICAN AMERICAN: 14
GFR NON-AFRICAN AMERICAN: 11
GFR NON-AFRICAN AMERICAN: 11.5
GLUCOSE BLD-MCNC: 129 MG/DL (ref 70–99)
GLUCOSE BLD-MCNC: 154 MG/DL (ref 70–99)
GLUCOSE BLD-MCNC: 155 MG/DL (ref 70–99)
GLUCOSE BLD-MCNC: 164 MG/DL (ref 70–99)
GLUCOSE BLD-MCNC: 167 MG/DL (ref 70–99)
GLUCOSE BLD-MCNC: 188 MG/DL (ref 70–99)
GLUCOSE, FLUID: 151 MG/DL
HCO3 ARTERIAL: 28.9 MMOL/L (ref 21–29)
HCT VFR BLD CALC: 38.9 % (ref 42–52)
HEMOGLOBIN: 12.4 G/DL (ref 14–18)
HEMOGLOBIN: 13.9 GM/DL (ref 13.5–17.5)
INR BLD: 1.1
LACTATE: 1.48 MMOL/L (ref 0.4–2)
LYMPHOCYTES ABSOLUTE: 0.5 K/UL (ref 1–4.8)
LYMPHOCYTES RELATIVE PERCENT: 3.9 %
LYMPHOCYTES, BODY FLUID: 70 %
MACROPHAGE FLUID: 23 %
MCH RBC QN AUTO: 33.8 PG (ref 27–31.3)
MCHC RBC AUTO-ENTMCNC: 32 % (ref 33–37)
MCV RBC AUTO: 105.7 FL (ref 80–100)
MONOCYTES ABSOLUTE: 1.4 K/UL (ref 0.2–0.8)
MONOCYTES RELATIVE PERCENT: 10.1 %
NEUTROPHIL, FLUID: 7 %
NEUTROPHILS ABSOLUTE: 11.9 K/UL (ref 1.4–6.5)
NEUTROPHILS RELATIVE PERCENT: 85.6 %
NUCLEATED CELLS FLUID: 274 /CUMM
NUMBER OF CELLS COUNTED FLUID: 100
O2 SAT, ARTERIAL: 98 % (ref 93–100)
PCO2 ARTERIAL: 36 MM HG (ref 35–45)
PDW BLD-RTO: 15.1 % (ref 11.5–14.5)
PERFORMED ON: ABNORMAL
PH ARTERIAL: 7.51 (ref 7.35–7.45)
PH, BODY FLUID: 7.5
PHOSPHORUS: 4 MG/DL (ref 2.3–4.8)
PLATELET # BLD: 180 K/UL (ref 130–400)
PO2 ARTERIAL: 87 MM HG (ref 75–108)
POC CHLORIDE: 96 MEQ/L (ref 99–110)
POC CREATININE: 5.1 MG/DL (ref 0.8–1.3)
POC FIO2: 60
POC HEMATOCRIT: 41 % (ref 41–53)
POC POTASSIUM: 3.7 MEQ/L (ref 3.5–5.1)
POC SAMPLE TYPE: ABNORMAL
POC SODIUM: 134 MEQ/L (ref 136–145)
POTASSIUM SERPL-SCNC: 3.8 MEQ/L (ref 3.4–4.9)
PROTEIN FLUID: 4 G/DL
PROTHROMBIN TIME: 14.5 SEC (ref 12.3–14.9)
RBC # BLD: 3.68 M/UL (ref 4.7–6.1)
RBC FLUID: 4834 /CUMM
SODIUM BLD-SCNC: 136 MEQ/L (ref 135–144)
TCO2 ARTERIAL: 30 MMOL/L (ref 21–32)
TROPONIN: 0.11 NG/ML (ref 0–0.01)
TROPONIN: 0.12 NG/ML (ref 0–0.01)
TROPONIN: 0.15 NG/ML (ref 0–0.01)
TROPONIN: 0.15 NG/ML (ref 0–0.01)
WBC # BLD: 13.9 K/UL (ref 4.8–10.8)

## 2022-07-26 PROCEDURE — 84295 ASSAY OF SERUM SODIUM: CPT

## 2022-07-26 PROCEDURE — 77001 FLUOROGUIDE FOR VEIN DEVICE: CPT | Performed by: RADIOLOGY

## 2022-07-26 PROCEDURE — 2500000003 HC RX 250 WO HCPCS: Performed by: FAMILY MEDICINE

## 2022-07-26 PROCEDURE — 2580000003 HC RX 258: Performed by: NURSE PRACTITIONER

## 2022-07-26 PROCEDURE — 36620 INSERTION CATHETER ARTERY: CPT | Performed by: INTERNAL MEDICINE

## 2022-07-26 PROCEDURE — 99291 CRITICAL CARE FIRST HOUR: CPT | Performed by: INTERNAL MEDICINE

## 2022-07-26 PROCEDURE — 36600 WITHDRAWAL OF ARTERIAL BLOOD: CPT

## 2022-07-26 PROCEDURE — 6360000002 HC RX W HCPCS: Performed by: FAMILY MEDICINE

## 2022-07-26 PROCEDURE — 82330 ASSAY OF CALCIUM: CPT

## 2022-07-26 PROCEDURE — 2700000000 HC OXYGEN THERAPY PER DAY

## 2022-07-26 PROCEDURE — 85520 HEPARIN ASSAY: CPT

## 2022-07-26 PROCEDURE — 85610 PROTHROMBIN TIME: CPT

## 2022-07-26 PROCEDURE — 02HV33Z INSERTION OF INFUSION DEVICE INTO SUPERIOR VENA CAVA, PERCUTANEOUS APPROACH: ICD-10-PCS | Performed by: RADIOLOGY

## 2022-07-26 PROCEDURE — 85025 COMPLETE CBC W/AUTO DIFF WBC: CPT

## 2022-07-26 PROCEDURE — 75860 VEIN X-RAY NECK: CPT

## 2022-07-26 PROCEDURE — 36415 COLL VENOUS BLD VENIPUNCTURE: CPT

## 2022-07-26 PROCEDURE — 88342 IMHCHEM/IMCYTCHM 1ST ANTB: CPT

## 2022-07-26 PROCEDURE — 37799 UNLISTED PX VASCULAR SURGERY: CPT

## 2022-07-26 PROCEDURE — 83986 ASSAY PH BODY FLUID NOS: CPT

## 2022-07-26 PROCEDURE — 89051 BODY FLUID CELL COUNT: CPT

## 2022-07-26 PROCEDURE — 88305 TISSUE EXAM BY PATHOLOGIST: CPT

## 2022-07-26 PROCEDURE — 85014 HEMATOCRIT: CPT

## 2022-07-26 PROCEDURE — 87102 FUNGUS ISOLATION CULTURE: CPT

## 2022-07-26 PROCEDURE — 87205 SMEAR GRAM STAIN: CPT

## 2022-07-26 PROCEDURE — 32555 ASPIRATE PLEURA W/ IMAGING: CPT | Performed by: INTERNAL MEDICINE

## 2022-07-26 PROCEDURE — 83615 LACTATE (LD) (LDH) ENZYME: CPT

## 2022-07-26 PROCEDURE — 93971 EXTREMITY STUDY: CPT

## 2022-07-26 PROCEDURE — 6360000002 HC RX W HCPCS: Performed by: NURSE PRACTITIONER

## 2022-07-26 PROCEDURE — 82945 GLUCOSE OTHER FLUID: CPT

## 2022-07-26 PROCEDURE — 84484 ASSAY OF TROPONIN QUANT: CPT

## 2022-07-26 PROCEDURE — 82565 ASSAY OF CREATININE: CPT

## 2022-07-26 PROCEDURE — 2500000003 HC RX 250 WO HCPCS: Performed by: INTERNAL MEDICINE

## 2022-07-26 PROCEDURE — 36620 INSERTION CATHETER ARTERY: CPT

## 2022-07-26 PROCEDURE — A4217 STERILE WATER/SALINE, 500 ML: HCPCS | Performed by: RADIOLOGY

## 2022-07-26 PROCEDURE — 0W993ZZ DRAINAGE OF RIGHT PLEURAL CAVITY, PERCUTANEOUS APPROACH: ICD-10-PCS | Performed by: INTERNAL MEDICINE

## 2022-07-26 PROCEDURE — 6370000000 HC RX 637 (ALT 250 FOR IP): Performed by: NURSE PRACTITIONER

## 2022-07-26 PROCEDURE — 36556 INSERT NON-TUNNEL CV CATH: CPT | Performed by: INTERNAL MEDICINE

## 2022-07-26 PROCEDURE — 94003 VENT MGMT INPAT SUBQ DAY: CPT

## 2022-07-26 PROCEDURE — 2580000003 HC RX 258: Performed by: FAMILY MEDICINE

## 2022-07-26 PROCEDURE — 76937 US GUIDE VASCULAR ACCESS: CPT | Performed by: RADIOLOGY

## 2022-07-26 PROCEDURE — 6360000004 HC RX CONTRAST MEDICATION: Performed by: RADIOLOGY

## 2022-07-26 PROCEDURE — 88112 CYTOPATH CELL ENHANCE TECH: CPT

## 2022-07-26 PROCEDURE — 2580000003 HC RX 258: Performed by: INTERNAL MEDICINE

## 2022-07-26 PROCEDURE — 82803 BLOOD GASES ANY COMBINATION: CPT

## 2022-07-26 PROCEDURE — 06HY33Z INSERTION OF INFUSION DEVICE INTO LOWER VEIN, PERCUTANEOUS APPROACH: ICD-10-PCS | Performed by: INTERNAL MEDICINE

## 2022-07-26 PROCEDURE — 99223 1ST HOSP IP/OBS HIGH 75: CPT | Performed by: RADIOLOGY

## 2022-07-26 PROCEDURE — 2500000003 HC RX 250 WO HCPCS: Performed by: RADIOLOGY

## 2022-07-26 PROCEDURE — 85730 THROMBOPLASTIN TIME PARTIAL: CPT

## 2022-07-26 PROCEDURE — 36299 UNLISTED PX VASCULAR NJX: CPT

## 2022-07-26 PROCEDURE — 80069 RENAL FUNCTION PANEL: CPT

## 2022-07-26 PROCEDURE — 88341 IMHCHEM/IMCYTCHM EA ADD ANTB: CPT

## 2022-07-26 PROCEDURE — 76937 US GUIDE VASCULAR ACCESS: CPT | Performed by: INTERNAL MEDICINE

## 2022-07-26 PROCEDURE — 87070 CULTURE OTHR SPECIMN AEROBIC: CPT

## 2022-07-26 PROCEDURE — 82435 ASSAY OF BLOOD CHLORIDE: CPT

## 2022-07-26 PROCEDURE — 87116 MYCOBACTERIA CULTURE: CPT

## 2022-07-26 PROCEDURE — 82948 REAGENT STRIP/BLOOD GLUCOSE: CPT

## 2022-07-26 PROCEDURE — 2000000000 HC ICU R&B

## 2022-07-26 PROCEDURE — 84132 ASSAY OF SERUM POTASSIUM: CPT

## 2022-07-26 PROCEDURE — 2580000003 HC RX 258: Performed by: RADIOLOGY

## 2022-07-26 PROCEDURE — 83605 ASSAY OF LACTIC ACID: CPT

## 2022-07-26 PROCEDURE — 84157 ASSAY OF PROTEIN OTHER: CPT

## 2022-07-26 PROCEDURE — 75860 VEIN X-RAY NECK: CPT | Performed by: RADIOLOGY

## 2022-07-26 PROCEDURE — 2709999900 IR FLUORO GUIDED CVA DEVICE PLMT/REPLACE/REMOVAL

## 2022-07-26 PROCEDURE — 36556 INSERT NON-TUNNEL CV CATH: CPT

## 2022-07-26 RX ORDER — SODIUM CHLORIDE 0.9 % (FLUSH) 0.9 %
5-40 SYRINGE (ML) INJECTION EVERY 12 HOURS SCHEDULED
Status: DISCONTINUED | OUTPATIENT
Start: 2022-07-26 | End: 2022-08-02 | Stop reason: SDUPTHER

## 2022-07-26 RX ORDER — HEPARIN SODIUM 10000 [USP'U]/100ML
5-30 INJECTION, SOLUTION INTRAVENOUS CONTINUOUS
Status: DISCONTINUED | OUTPATIENT
Start: 2022-07-26 | End: 2022-07-30 | Stop reason: ALTCHOICE

## 2022-07-26 RX ORDER — TACROLIMUS 1 MG/1
1 CAPSULE ORAL 2 TIMES DAILY
Status: DISCONTINUED | OUTPATIENT
Start: 2022-07-26 | End: 2022-08-19 | Stop reason: HOSPADM

## 2022-07-26 RX ORDER — HEPARIN SODIUM 1000 [USP'U]/ML
80 INJECTION, SOLUTION INTRAVENOUS; SUBCUTANEOUS PRN
Status: DISCONTINUED | OUTPATIENT
Start: 2022-07-26 | End: 2022-07-30 | Stop reason: ALTCHOICE

## 2022-07-26 RX ORDER — LIDOCAINE HYDROCHLORIDE 20 MG/ML
INJECTION, SOLUTION INFILTRATION; PERINEURAL
Status: COMPLETED | OUTPATIENT
Start: 2022-07-26 | End: 2022-07-26

## 2022-07-26 RX ORDER — SODIUM CHLORIDE 9 MG/ML
INJECTION, SOLUTION INTRAVENOUS PRN
Status: DISCONTINUED | OUTPATIENT
Start: 2022-07-26 | End: 2022-08-02 | Stop reason: SDUPTHER

## 2022-07-26 RX ORDER — MAGNESIUM HYDROXIDE 1200 MG/15ML
LIQUID ORAL CONTINUOUS PRN
Status: COMPLETED | OUTPATIENT
Start: 2022-07-26 | End: 2022-07-26

## 2022-07-26 RX ORDER — SODIUM CHLORIDE 0.9 % (FLUSH) 0.9 %
5-40 SYRINGE (ML) INJECTION PRN
Status: DISCONTINUED | OUTPATIENT
Start: 2022-07-26 | End: 2022-08-02 | Stop reason: SDUPTHER

## 2022-07-26 RX ORDER — HEPARIN SODIUM 1000 [USP'U]/ML
40 INJECTION, SOLUTION INTRAVENOUS; SUBCUTANEOUS PRN
Status: DISCONTINUED | OUTPATIENT
Start: 2022-07-26 | End: 2022-07-30 | Stop reason: ALTCHOICE

## 2022-07-26 RX ADMIN — IOPAMIDOL 15 ML: 612 INJECTION, SOLUTION INTRAVENOUS at 13:57

## 2022-07-26 RX ADMIN — PIPERACILLIN AND TAZOBACTAM 3375 MG: 3; .375 INJECTION, POWDER, LYOPHILIZED, FOR SOLUTION INTRAVENOUS at 20:20

## 2022-07-26 RX ADMIN — Medication 10 ML: at 21:00

## 2022-07-26 RX ADMIN — PIPERACILLIN AND TAZOBACTAM 3375 MG: 3; .375 INJECTION, POWDER, LYOPHILIZED, FOR SOLUTION INTRAVENOUS at 11:02

## 2022-07-26 RX ADMIN — VASOPRESSIN 0.01 UNITS/MIN: 20 INJECTION PARENTERAL at 16:06

## 2022-07-26 RX ADMIN — TACROLIMUS 1 MG: 1 CAPSULE ORAL at 20:31

## 2022-07-26 RX ADMIN — PROPOFOL 50 MCG/KG/MIN: 10 INJECTION, EMULSION INTRAVENOUS at 17:45

## 2022-07-26 RX ADMIN — HEPARIN SODIUM AND DEXTROSE 18 UNITS/KG/HR: 10000; 5 INJECTION INTRAVENOUS at 15:46

## 2022-07-26 RX ADMIN — PROPOFOL 15 MCG/KG/MIN: 10 INJECTION, EMULSION INTRAVENOUS at 00:27

## 2022-07-26 RX ADMIN — SODIUM CHLORIDE 500 ML: 900 IRRIGANT IRRIGATION at 13:38

## 2022-07-26 RX ADMIN — FENTANYL CITRATE 75 MCG/HR: 0.05 INJECTION, SOLUTION INTRAMUSCULAR; INTRAVENOUS at 08:37

## 2022-07-26 RX ADMIN — Medication 15 MCG/MIN: at 15:54

## 2022-07-26 RX ADMIN — PROPOFOL 35 MCG/KG/MIN: 10 INJECTION, EMULSION INTRAVENOUS at 23:47

## 2022-07-26 RX ADMIN — CHLORHEXIDINE GLUCONATE 15 ML: 1.2 RINSE ORAL at 20:31

## 2022-07-26 RX ADMIN — LIDOCAINE HYDROCHLORIDE 5 ML: 20 INJECTION, SOLUTION INFILTRATION; PERINEURAL at 14:24

## 2022-07-26 RX ADMIN — FENTANYL CITRATE 125 MCG/HR: 0.05 INJECTION, SOLUTION INTRAMUSCULAR; INTRAVENOUS at 15:48

## 2022-07-26 RX ADMIN — PROPOFOL 50 MCG/KG/MIN: 10 INJECTION, EMULSION INTRAVENOUS at 10:00

## 2022-07-26 RX ADMIN — HEPARIN SODIUM 5390 UNITS: 1000 INJECTION INTRAVENOUS; SUBCUTANEOUS at 15:34

## 2022-07-26 RX ADMIN — SODIUM BICARBONATE 50 MEQ: 84 INJECTION, SOLUTION INTRAVENOUS at 15:19

## 2022-07-26 RX ADMIN — PROPOFOL 50 MCG/KG/MIN: 10 INJECTION, EMULSION INTRAVENOUS at 13:04

## 2022-07-26 RX ADMIN — LIDOCAINE HYDROCHLORIDE 7 ML: 20 INJECTION, SOLUTION INFILTRATION; PERINEURAL at 13:52

## 2022-07-26 ASSESSMENT — PULMONARY FUNCTION TESTS
PIF_VALUE: 23
PIF_VALUE: 21
PIF_VALUE: 22
PIF_VALUE: 23
PIF_VALUE: 27
PIF_VALUE: 21
PIF_VALUE: 23
PIF_VALUE: 22
PIF_VALUE: 22
PIF_VALUE: 23
PIF_VALUE: 23
PIF_VALUE: 24
PIF_VALUE: 22
PIF_VALUE: 22
PIF_VALUE: 23
PIF_VALUE: 0
PIF_VALUE: 22
PIF_VALUE: 22
PIF_VALUE: 21
PIF_VALUE: 21
PIF_VALUE: 22
PIF_VALUE: 0
PIF_VALUE: 22
PIF_VALUE: 21
PIF_VALUE: 23
PIF_VALUE: 21
PIF_VALUE: 24
PIF_VALUE: 24
PIF_VALUE: 23
PIF_VALUE: 24
PIF_VALUE: 22
PIF_VALUE: 24
PIF_VALUE: 24
PIF_VALUE: 0
PIF_VALUE: 25
PIF_VALUE: 24
PIF_VALUE: 23
PIF_VALUE: 22
PIF_VALUE: 22
PIF_VALUE: 0
PIF_VALUE: 22
PIF_VALUE: 23
PIF_VALUE: 21
PIF_VALUE: 24
PIF_VALUE: 21
PIF_VALUE: 23
PIF_VALUE: 22
PIF_VALUE: 23
PIF_VALUE: 21
PIF_VALUE: 21
PIF_VALUE: 22
PIF_VALUE: 24
PIF_VALUE: 26
PIF_VALUE: 21

## 2022-07-26 NOTE — OR NURSING
NO SEDATION    1336- Pt assisted to IR table in supine position. Consent verified for tunneled Mclean catheter. Patient maintained on ICU monitor. VSS. Right IJ neck vessels assessed for patency using US guidance. Chest hair clipped using electric razor. Right  IJ area cleansed generously with chloro prep and full body drape applied. 1350- Time out performed for Tunneled Mclean Catheter. 56- Dr Cedric Lopez administered 2% lidocaine to right IJ site with US guidance. 1354- 5 Fr MP set used to access IJ site with US guidance. 1357- Unable to place wire. Dr. Cedric Lopez hand injecting contrast via transition sheath under fluoroscopy guidance. 1358- 5Fr MP set used again to gain access to right IJ with US guidance. 1400- Dr. Cedric Lopez hand injecting contrast via transition sheath under fluoroscopy guidance. 1401- Unable to place Mclean on right side due to thrombus in right IJ. Switching to left side. 1412- Patient's vessels assessed on left side using US by radiology tech. 1414- Left IJ area cleansed generously with tinted chloroprep and full body drape applied. 1- Dr Cedric Lopez administered 2% lidocaine to right IJ site with US guidance. 1425- 5 Fr MP set used to access IJ site with US guidance. Guidewire inserted through introducer with fluoroscopy guidance. 1426- Resistance being met when trying to place guidewire. Hand contrast being injected under fluoroscopy guidance. Manual pressure released to right IJ site, no evidence of bleeding, hematoma noted. 26- Dr. Cedric Lopez calling to notify Dr. Allie Peterson that he is unable to place Mclean catheter. Manual pressure being held to left IJ site. Dr. Cedric Lopez notified Elmer Juarez RN that Ceci Dominique will place a non tunneled central line in patient's groin in ICU.     1436- Manual pressure released to left IJ site. No bleeding, oozing or hematoma noted.      Contrast total: 15ml

## 2022-07-26 NOTE — DIALYSIS
Medications used to manage unstable BP with little success. call placed to dr Freddie Sky who gave order to stop tx. pt completed 2 of 3hr UF and removed 1905ml. Report given to Alice Montalvo RN.

## 2022-07-26 NOTE — PROGRESS NOTES
[address] [facility] Pharmacokinetic Monitoring Service - Vancomycin    Consulting Provider: Franco Campbell   Indication: sepsis  Target Concentration: Pre-Dialysis Concentration 21-24 mg/L  Day of Therapy: 2  Additional Antimicrobials: Zosyn    Pertinent Laboratory Values: Wt Readings from Last 1 Encounters:   07/25/22 148 lb 9.4 oz (67.4 kg)     Temp Readings from Last 1 Encounters:   07/26/22 97.2 °F (36.2 °C) (Rectal)     Recent Labs     07/25/22  1127 07/25/22  1128 07/25/22  1228 07/26/22  0443   CREATININE 3.90*   < > 4.0* 5.1*   WBC 9.6  --   --   --     < > = values in this interval not displayed.        Recent vancomycin administrations                     vancomycin 1000 mg IVPB in 250 mL D5W addavial (mg) 1,000 mg New Bag 07/25/22 1808                    Assessment:  Date/Time Current Dose  Dialysis Session   7-26-22 1101 1000 mg x1  HD MW , last 7/25 outpatient     Plan:  Concentration-guided dosing due to renal impairment and intermittent hemodialysis   Current dosing 1000mg x1   If HD completed 7/27/22, patient will need vanco dose post session  Vancomycin concentration likely to be ordered prior to HD session on 7/29/22  Pharmacy will continue to monitor patient and adjust therapy as indicated    Thank you for the [address] [place] [address]  7/26/2022 10:58 AM

## 2022-07-26 NOTE — PROCEDURES
PROCEDURE:   Radial artery line placement. (A-line)  [id number]    INDICATION: Monitor blood pressure       CONSENT: The family members were counseled regarding the procedure, it's indications, risks, potential complications and alternatives and any questions were answered. Consent was obtained. nt. PROCEDURE SUMMARY:   Radial arteries were assessed by palpation and ultrasound localization. Barby Pernell test was done to asses collateral circulation. The patient was prepped and draped in the usual sterile manner using chlorhexidine scrub. 1% lidocaine was used to numb the region. The right  radial artery was palpated and successfully cannulated on the first pass. Pulsatile, arterial blood was visualized and the artery was then threaded using the Seldinger technique and a catheter was then sutured into place. Good wave-form was obtained. The patient tolerated the procedure well without any immediate complications. The area was cleaned and Tegaderm was applied. ESTIMATED BLOOD LOSS:   Minimal    COMPLICATIONS:  No immediate complication     ISI Acevedo CNP was present during the whole procedure time and participated in key aspects of the procedure.     Electronically signed by Ann-Marie Ramsay MD on 7/26/2022 at 12:26 PM

## 2022-07-26 NOTE — CARE COORDINATION
[facility] Case Management Initial Discharge Assessment    Met with ZAHRAA to discuss discharge plan. PCP: Benedict Pettit MD                                Date of Last Visit: UNSURE    [facility] Patient: No        [facility] Notified: no    If no PCP, list provided? N/A    Discharge Planning    Living Arrangements: independently at home    Who do you live with? ALONE    Who helps you with your care:  self    If lives at home:     Do you have any barriers navigating in your home? no    Patient can perform ADL? Yes    Current Services (outpatient and in home) :  None    Dialysis: Yes, Location [facility], Chair Time AM BUT UNSURE OF TIME    Is transportation available to get to your appointments? Yes    DME Equipment:  yes - [facility]    Respiratory equipment: None    Respiratory provider:  no     Pharmacy:  yes - [address] [place] [address] with Medication Assistance Program?  No      [facility] SHE IS UNSURE OF HIS NEEDS, ,MAY NEED HHC AND PRIVATE HIRE LIST    Patient agreeable to SNF/Rehab? N/A    Other discharge needs identified? N/A    Initial Discharge Plan? (Note: please see concurrent daily documentation for any updates after initial note). DTR STATES SHE LIVES IN [place] AND HIS SISTER IS THE ONLY LOCAL  FAMILY BUT NOT RELIABLE TO ASSIST. SHE STATES HE COULD USE SOME HELP WITH CLEANING AND COOKING.  WILL LEAVE NOTE FOR PT/OT EVALS ONCE EXTUBATED AND MAY NEED PRIVATE HIRE LIST    Readmission Risk              Risk of Unplanned Readmission:  [id number]         Electronically signed by Yoselyn Aiken RN on 7/26/2022 at 9:48 AM

## 2022-07-26 NOTE — PROGRESS NOTES
0700am Report from [facility]. Vented and sedated. Able to follow commands and nods head appropriatley to questions asked. Heart rate in 40's with pvc. BP wnl levo lowered to 30meqs. Propofol Fentanyl infusing without issues. Anuric. AV fistula R upper arm + thrill bruit.   09:20am Critical care rounds with Dr Marcy Santiago and team  1000am Alissa Feng and Dr Luis Alfredo Mari in for CVC Line. Uinable to obtain at this time. Art Line placed in R radial per Alissa Feng NP. Site without redness swelling. Dressing dry andintact  11:15am Thoracentesis with Dr Marcy Santiago and Debera Members NP.900cc [place] with sediment fluid out. Fluid sent for analysis. 13:35pm To [facility] via bed with respiratory and transport. Sister called and updated on status   14;45pm Returned specials. Dr Katerina Ward not able to insert Mclean Catheter due to poor access. Dr Marcy Santiago and Alissa Feng NP Aware. 15:00pm Alissa Feng NP inserted R Groin CVC Line. Site without redness swelling. Dressing dry intact. BP in the 50's 2 amps of Bicarb given. Levophed increased per Alissa Feng NP. Dialysis here for ultrafiltration  15:34pm Heparin gtt started per order. Dilaysis in progress  1605pm Vasopressin up at 0.01units/min due to Levo increased to 20meqs  16:50pm Vasopressin increased to 0.03units/min. Levo to 25meqs  1800pm Dialysis ended. 2L off. Levo and vaso off at this time. Color gray. Doppled pulses     18;15pm Levo restarted due low BP.    1900PM Report with Charlene Juan RN at bedside.  Sister at bedside and updated on status

## 2022-07-26 NOTE — PROCEDURES
THORACENTESIS--35146   PROCEDURE:   1. right sided diagnostic and therapeutic thoracentesis with catheter insertion. 2. Ultrasound guidance yes -     CONSENT:   The risks and benefits of this procedure were explained to the daughter. All questions were answered and alternative options explained. The patient has been assessed and he/she is stable to undergo the procedure itself. MEDICATIONS USED:   Lidocaine 1% without epinephrine, total quantity 5 mL. PREOPERATIVE DIAGNOSIS   1. Pleural effusion     POSTPROCEDURE DIAGNOSIS:   1. Same       DESCRIPTION OF PROCEDURE:   Consent was verified as signed and placed upon the chart. Patient was positively identified and procedure site was marked. Time-out was performed by operating team and patient. Vital sign monitoring was accomplished by noninvasive hemodynamic monitoring, pulse oximetry, and telemetry. In the seated position, the right posterior hemithorax was examined using ultrasound probe and the diaphragm and pleural fluid was easily located. A picture was taken for documentation and placed in the chart. The skin was then prepped and draped in usual sterile fashion and skin anesthetized with 1% lidocaine without epinephrine. A finder needle was inserted in the pleural space with return of serosanguinous pleural fluid. A thoracentesis catheter was then inserted in the same location and a total of 900 mL of serosanguinous pleural fluid was withdrawn. The catheter was withdrawn on exhalation and a sterile dressing was applied. The patient had stable vital signs throughout the entire procedure.      ESTIMATED BLOOD LOSS:   less than 5 cc    COMPLICATIONS:  None      Electronically signed by Fransisca Wagner MD on 7/26/2022 at 12:27 PM

## 2022-07-26 NOTE — PROGRESS NOTES
Comprehensive Nutrition Assessment    Type and Reason for Visit:  Initial, Consult (TF order and manage)    Nutrition Recommendations/Plan:   Continue Standard with fiber TF (Vital HP)  Increase goal rate to 35 ml/hr x 24 hrs    50 ml water flush every 4 hrs    Add one protein modular (proteinex) once daily     Malnutrition Assessment:  Malnutrition Status:  No malnutrition (07/26/22 1456)    Context:  Acute Illness     Findings of the 6 clinical characteristics of malnutrition:  Energy Intake:  Unable to assess  Weight Loss:  No significant weight loss     Body Fat Loss:  No significant body fat loss     Muscle Mass Loss:  No significant muscle mass loss    Fluid Accumulation:  Unable to assess     Strength:  Not Performed    Nutrition Assessment:    Pt appears to be adequately nourished on admission, currently at risk for malnutrition due inutabtion and unable to take po. Trophic tube feedings started yesterday and tolerated well, will modify TF goal rate to better meet estimated nutrition needs    Nutrition Related Findings:    PMH: hypertension, hepatitis-s/p liver transplant, ESRD on hemodialysis M,W,F. Went toHD today, after driving home became SOB and came to the ER for further evaluation, intubated 7/25.   S/p thoracentesis (7/26), labs (7/26): BUN/CR: 22/5.05, glucose 167, meds reviewed, propofol @ 19.7 (520 kcal), levo @ 14, OGT in place, anuric Wound Type: None       Current Nutrition Intake & Therapies:    Average Meal Intake: NPO  Average Supplements Intake: NPO  Diet NPO  ADULT TUBE FEEDING; Orogastric; Peptide Based High Protein; Continuous; 20; No; 50; Q 4 hours  Current Tube Feeding (TF) Orders:  Feeding Route: Orogastric  Formula: Peptide Based High Protein (Vital HP)  Schedule: Continuous  Feeding Regimen: 20 ml/hr x 24 hrs  Water Flushes: 50 ml q 4 hrs  Current TF & Flush Orders Provides: 480 kcal (+propofol), 42 gm protein ~700 ml free water  Goal TF & Flush Orders Provides: @ 35 ml/hr + 1 PX = 944 kcal (+propofol), 99.5 gm protein ~ 1000 ml free water    Anthropometric Measures:  Height: 5' 6\" (167.6 cm)  Ideal Body Weight (IBW): 142 lbs (65 kg)    Admission Body Weight: 148 lb (67.1 kg)  Current Body Weight: 148 lb (67.1 kg) (adm wt),   IBW. Weight Source: Bed Scale  Current BMI (kg/m2): 23.9  Usual Body Weight: 145 lb (65.8 kg) (stated 6/28/22, 2020)  % Weight Change (Calculated): 2.1  Weight Adjustment For: No Adjustment                 BMI Categories: Normal Weight (BMI 22.0 to 24.9) age over 72    Estimated Daily Nutrient Needs:  Energy Requirements Based On: Kcal/kg  Weight Used for Energy Requirements: Admission (67.4 kg)  Energy (kcal/day): 6846-4878 (kg x 20-22)  Weight Used for Protein Requirements: Admission (67.4 kg)  Protein (g/day): 101 gm (kg x 1.5)  Method Used for Fluid Requirements: Standard Renal  Fluid (ml/day): 500-800 + UOP (or as per MD)    Nutrition Diagnosis:   Inadequate oral intake related to impaired respiratory function as evidenced by intubation    Nutrition Interventions:   Food and/or Nutrient Delivery: Modify Tube Feeding (Continue Standard with fiber TF (Vital HP), increase to a goal rate of 35 ml/hr x 24 hrs  50 ml water flush every 4 hrs  Add one protein modular (proteinex) once daily)  Nutrition Education/Counseling: No recommendation at this time  Coordination of Nutrition Care: Continue to monitor while inpatient       Goals:     Goals: Tolerate nutrition support at goal rate       Nutrition Monitoring and Evaluation:      Food/Nutrient Intake Outcomes: Enteral Nutrition Intake/Tolerance  Physical Signs/Symptoms Outcomes: Biochemical Data, Fluid Status or Edema, Weight, Hemodynamic Status    Discharge Planning:     Too soon to determine     Abhishek Villeda RD, LD

## 2022-07-26 NOTE — PROCEDURES
Femoral central venous catheter    INDICATION:   poor peripheral access    CONSENT:   The family members were counseled regarding the procedure, it's indications, risks, potential complications and alternatives and any questions were answered. Consent was obtained. PROCEDURE SUMMARY:   The patient was prepped and draped in the usual sterile manner using chlorhexidine scrub. 1% lidocaine was used to numb the region. The finder needle was used to locate the right femoral veinc medial to palpated artery under ultrasound guidance  . A central venous catheter  was inserted using the Seldinger technique. Line ends in the Iliac/femoral vein. All ports aspirate and flushed without difficulty. The patient tolerated the procedure well without any immediate complications. The line was sutured into place and the area was cleaned and Tegaderm applied. ESTIMATED BLOOD LOSS:   less than 5 cc    Complications   None        ISI Gonzales CNP, [facility]  7/26/2022 3:20 PM        I was present during the whole procedure time and participated in key aspects of the procedure.       Electronically signed by Noemi Young MD on 7/26/2022 at 3:28 PM

## 2022-07-26 NOTE — PROGRESS NOTES
Report received from mauricio Rodrigues. Patient has copious thin white secretions from ETT. Left side fistula positive bruit and thrill. Patient bladder scanned, 9 ml's on scanner  Messaged Nephrology regarding anuric status, and order for dailey, nephrology stated no need for dailey at this time. Tube feeds started overnight, patient had minimal residual, max 10 ml for residual.  Patient had uneventful night, linens changed, gown changed and electrodes changed. 6312: Patient became hypotensive (see flowsheets) Titrated levophed to support patient. Patient blood pressures responded. 5436:  Bedside handoff report given to Lilia Jenkins.

## 2022-07-26 NOTE — PROGRESS NOTES
values in this interval not displayed. CMP:    Recent Labs     07/25/22  1127 07/25/22  1128 07/25/22  1228 07/26/22  0443     --   --   --    K 3.7  --   --   --    CL 94*  --   --   --    CO2 32*  --   --   --    BUN 10  --   --   --    CREATININE 3.90* 4.0* 4.0* 5.1*   GLUCOSE 124*  --   --   --    CALCIUM 9.6  --   --   --    LABGLOM 15.4* 15* 15* 11*     Troponin:   Recent Labs     07/25/22  2230   TROPONINI 0.102*     BNP: No results for input(s): BNP in the last 72 hours. INR:   Recent Labs     07/25/22 1127   INR 1.0     Lipids: No results for input(s): CHOL, LDLDIRECT, TRIG, HDL, AMYLASE, LIPASE in the last 72 hours. Liver:   Recent Labs     07/25/22 1127   AST 77*   ALT 79*   ALKPHOS 87   PROT 7.5   LABALBU 3.9   BILITOT 0.7     Iron:  No results for input(s): IRONS, FERRITIN in the last 72 hours. Invalid input(s): LABIRONS  Urinalysis: No results for input(s): UA in the last 72 hours.     Objective:  Vitals: BP (!) 124/50   Pulse 55   Temp 97.2 °F (36.2 °C) (Rectal)   Resp 19   Ht 5' 6\" (1.676 m)   Wt 148 lb 9.4 oz (67.4 kg)   SpO2 94%   BMI 23.98 kg/m²    Wt Readings from Last 3 Encounters:   07/25/22 148 lb 9.4 oz (67.4 kg)   06/28/22 145 lb (65.8 kg)   03/18/20 145 lb (65.8 kg)      24HR INTAKE/OUTPUT:    Intake/Output Summary (Last 24 hours) at 7/26/2022 1140  Last data filed at 7/26/2022 0800  Gross per 24 hour   Intake 261 ml   Output --   Net 261 ml       General: intubated, sedated  HEENT: normocephalic, atraumatic, ETT in place  Lungs: intubated, on vent, RT sided decreased sounds, coarse breath sounds  Heart: regular rate and rhythm, no murmurs or rubs  Abdomen: soft, non-tender, non-distended  MSK: no joint swelling or tenderness  Ext: no cyanosis, no peripheral edema  Neuro: unable to assess, sedated    Electronically signed by Olga Diaz MD, MD

## 2022-07-26 NOTE — PROGRESS NOTES
Critical Care Progress Note    2022 10:36 AM    Subjective:   Admit Date: 2022  PCP: Alexander Omalley MD    Chief Complaint   Patient presents with    Shortness of Breath     C/O SOB  AFTER DIALYSIS  88 % ON RA  GIVEN 2 DUONEBS  SOLU MEDROL      Interval History: Continues to be on mechanical ventilation. Currently on 60% FiO2 and 8 of PEEP. He is currently on 15 MCG of norepinephrine. He is sedated with fentanyl and propofol.       Medications:   Scheduled Meds:   tacrolimus  1 mg Oral BID    sodium chloride flush  5-40 mL IntraVENous 2 times per day    piperacillin-tazobactam  3,375 mg IntraVENous Q12H    heparin (porcine)  5,000 Units SubCUTAneous BID    vancomycin (VANCOCIN) intermittent dosing (placeholder)   Other RX Placeholder    chlorhexidine  15 mL Mouth/Throat BID    famotidine (PEPCID) injection  20 mg IntraVENous Daily    insulin lispro  0-4 Units SubCUTAneous Q4H    lidocaine  5 mL IntraDERmal Once    sodium chloride flush  5-40 mL IntraVENous 2 times per day     Continuous Infusions:   sodium chloride      propofol 50 mcg/kg/min (22)    fentaNYL 125 mcg/hr (22)    dextrose      sodium chloride      sodium chloride      sodium chloride      norepinephrine 30 mcg/min (2242)         Objective:   Vitals:   Temp (24hrs), Av.3 °F (36.3 °C), Min:96.6 °F (35.9 °C), Max:98.2 °F (36.8 °C)    BP (!) 124/50   Pulse 58   Temp 97.2 °F (36.2 °C) (Rectal)   Resp 18   Ht 5' 6\" (1.676 m)   Wt 148 lb 9.4 oz (67.4 kg)   SpO2 95%   BMI 23.98 kg/m²   I/O:24HR INTAKE/OUTPUT:    Intake/Output Summary (Last 24 hours) at 2022 1036  Last data filed at 2022 0800  Gross per 24 hour   Intake 261 ml   Output --   Net 261 ml      07 -  0700  In: 211   Out: -   CVP:        Physical Exam:  General appearance -ill appearing  Mental status -intubated and sedated  Eyes - pupils equal and reactive  Nose - normal and patent  Mouth-ET tube  Neck - supple, no significant adenopathy  Chest -diminished bilaterally to auscultation but more on the right side, no wheezes, rales or rhonchi, symmetric air entry  Heart - normal rate, regular rhythm, normal S1, S2, no murmurs, rubs, clicks or gallops  Abdomen - soft, nontender, nondistended, no masses or organomegaly  Rectal - deferred, not clinically indicated  Neurological -intubated and sedated but nonfocal exam.  Musculoskeletal - no joint tenderness, deformity or swelling  Extremities - peripheral pulses normal, no pedal edema, no clubbing or cyanosis  Skin -chronic skin changes. BMP:    Recent Labs     07/25/22 1127 07/25/22 1128 07/25/22 1228 07/26/22  0443     --   --   --    K 3.7  --   --   --    CL 94*  --   --   --    CO2 32*  --   --   --    BUN 10  --   --   --    CREATININE 3.90*   < > 4.0* 5.1*   GLUCOSE 124*  --   --   --     < > = values in this interval not displayed. .  MG:3,PHOS:3)@  Ionized Calcium: No results found for: IONCA  CBC:   Recent Labs     07/25/22 1127 07/25/22 1128 07/25/22 1228 07/26/22  0443   WBC 9.6  --   --   --    HGB 12.5*   < > 13.3* 13.9     --   --   --     < > = values in this interval not displayed. ABG: No results for input(s): PH, PCO2, PO2 in the last 72 hours. Assessment and Plan:               Impression:     -  Acute hypoxic respiratory failure. Suspect due to volume overload, right-sided pleural effusion and likely pneumonia. - Large right-sided pleural effusion.  - Pneumonia  - Hypertension, requiring low-dose of vasopressors. - End-stage renal disease on hemodialysis. - Lactic acidosis likely due to hypoxia.  - Status post liver transplant. Recommendations:     - Continue care in the ICU for hemodynamic and airway monitoring.  - Ventilator bundle. Increase PEEP. Gentle sedation. Head of bed 30 degrees. - Wean off FiO2. Check serial ABGs. - Broad-spectrum antibiotic and check sputum culture.   - Continue vasopressors.  -Consider bronchoscopy as well.  -Plan for thoracentesis today.  -Check blood culture. -Tight Glucose control.  -Strict intake and output. -DVT and GI prophylaxis         Prophylaxis:  Stress ulcer: [] PPI Agent [] H2RA [] Sucralfate [] Other:   VTE: [] Enoxaparin  [] SC Heparin  [] SCD      Full Code      Excluding procedures, the total critical care time caring for this patient with life threatening, unstable organ failure, including direct patient contact, review of medical record, management of life support systems, review of data including imaging and labs, discussions with other team members, patient's family and physicians at least 32 minutes so far today.         Electronically signed by Lorrie Matt MD on 7/26/2022 at 10:36 AM

## 2022-07-26 NOTE — PROGRESS NOTES
Hospitalist Daily Progress Note  Name: Duane Chacon  Age: 75 y.o. Gender: male  CodeStatus: Full Code  Allergies: No Known Allergies    Chief Complaint:Shortness of Breath (C/O SOB  AFTER DIALYSIS  88 % ON RA  GIVEN 2 DUONEBS  SOLU MEDROL )      Primary Care Provider: Kevin Thayer MD    InpatientTreatment Team: Treatment Team: Attending Provider: Damari Aguilar MD; Consulting Physician: Deonna Mcgarry MD; Consulting Physician: Fransisca Wagner MD; Consulting Physician: Ian Ray MD; Registered Nurse: Rehan Nava, RN; Registered Nurse: Jeremi Mota RN    Admission Date: 7/25/2022      Subjective: Intubated, sedated    Physical Exam  Vitals and nursing note reviewed. Constitutional:       Comments: Intubated, sedated   Cardiovascular:      Rate and Rhythm: Normal rate and regular rhythm. Pulmonary:      Effort: Pulmonary effort is normal.      Breath sounds: Normal breath sounds. Abdominal:      General: Bowel sounds are normal.      Palpations: Abdomen is soft. Skin:     General: Skin is warm and dry. Neurological:      Comments: Intubated, sedated.        Medications:  Reviewed    Infusion Medications:    sodium chloride      propofol 15 mcg/kg/min (07/26/22 0027)    fentaNYL 50 mcg/hr (07/25/22 1550)    dextrose      sodium chloride      sodium chloride      sodium chloride      norepinephrine 30 mcg/min (07/26/22 0700)     Scheduled Medications:    sodium chloride flush  5-40 mL IntraVENous 2 times per day    piperacillin-tazobactam  3,375 mg IntraVENous Q12H    heparin (porcine)  5,000 Units SubCUTAneous BID    vancomycin (VANCOCIN) intermittent dosing (placeholder)   Other RX Placeholder    chlorhexidine  15 mL Mouth/Throat BID    famotidine (PEPCID) injection  20 mg IntraVENous Daily    insulin lispro  0-4 Units SubCUTAneous Q4H    lidocaine  5 mL IntraDERmal Once    sodium chloride flush  5-40 mL IntraVENous 2 times per day     PRN Meds: sodium chloride flush, sodium chloride, acetaminophen **OR** acetaminophen, fentaNYL **AND** fentaNYL, glucose, dextrose bolus **OR** dextrose bolus, glucagon (rDNA), dextrose, heparin (porcine), sodium chloride, albumin human, sodium chloride flush, sodium chloride    Labs:   Recent Labs     07/25/22  1127 07/25/22  1128 07/25/22  1228 07/26/22  0443   WBC 9.6  --   --   --    HGB 12.5* 14.4 13.3* 13.9   HCT 38.8*  --   --   --      --   --   --      Recent Labs     07/25/22  1127 07/25/22  1128 07/25/22  1228 07/26/22  0443     --   --   --    K 3.7  --   --   --    CL 94*  --   --   --    CO2 32*  --   --   --    BUN 10  --   --   --    CREATININE 3.90* 4.0* 4.0* 5.1*   CALCIUM 9.6  --   --   --      Recent Labs     07/25/22  1127   AST 77*   ALT 79*   BILITOT 0.7   ALKPHOS 87     Recent Labs     07/25/22  1127   INR 1.0     Recent Labs     07/25/22  1127 07/25/22  1428 07/25/22  1837 07/25/22  2230   CKTOTAL 67  --   --   --    TROPONINI 0.117* 0.107* 0.099* 0.102*       Urinalysis:   Lab Results   Component Value Date/Time    NITRU Negative 05/21/2015 10:22 AM    BLOODU Negative 05/21/2015 10:22 AM    SPECGRAV 1.018 05/21/2015 10:22 AM    GLUCOSEU Negative 05/21/2015 10:22 AM    GLUCOSEU NEG 02/22/2012 11:16 AM       Radiology:   Most recent    Chest CT      WITH CONTRAST:No results found for this or any previous visit. WITHOUT CONTRAST: No results found for this or any previous visit. CXR      2-view: Results for orders placed during the hospital encounter of 06/28/22    XR CHEST (2 VW)    Narrative  EXAMINATION:  CHEST. CLINICAL HISTORY:  SHORTNESS OF BREATH.    COMPARISONS:  11/4/2017. TECHNIQUE:  PA and lateral views. FINDINGS:  Heart size and contour are within normal limits. Pulmonary vascularity appears normal. There are small effusions bilaterally. There is a suggestion of small interstitial infiltrates. No definite evidence of a mass or adenopathy.  No significant  bony abnormality. Impression  POSSIBLE SLIGHT INTERSTITIAL PNEUMONIA BOTH LOWER LUNGS. SMALL PLEURAL EFFUSIONS BILATERALLY. Results for orders placed during the hospital encounter of 10/31/17    XR CHEST STANDARD (2 VW)    Narrative  XR CHEST STANDARD TWO VW: 11/4/2017    CLINICAL HISTORY:  Pneumonia . COMPARISON: Portable chest 5/20/2015 and 2 view chest 12/23/2013. A portable upright AP radiograph of the chest was obtained. FINDINGS:    A right internal jugular approach hemodialysis catheter has been placed since the prior study with its tip within the right atrium. There is no developing pulmonary infiltrate, cardiomegaly, pleural effusion, significant vascular congestion, pneumothorax, or acute fractures identified. A moderate compression fracture T9 appears old. The cardiac and mediastinal silhouettes appear within normal limits for technique. Impression  NO EVIDENCE OF ACTIVE CARDIOPULMONARY DISEASE. Portable: Results for orders placed during the hospital encounter of 07/25/22    XR CHEST PORTABLE    Narrative  XR CHEST PORTABLE : 7/25/2022    CLINICAL HISTORY:  post intubation . COMPARISON: Earlier 7/25/2022    TECHNIQUE: A portable upright AP radiograph of the chest was obtained at approximately 12:46 PM.      FINDINGS:    Both lung bases have been excluded from the radiograph. An endotracheal tube is present, with its tip approximately 4 to 5 cm above the lucita. An orogastric tube probably extends below the diaphragm out of field of view radiograph. Moderate pleural effusions and mild to moderate probable scarring and/or atelectasis of the mid to lower lung fields has not significantly changed. There is no cardiomegaly, pneumothorax, displaced fractures, or other significant changes identified. Impression  ENDOTRACHEAL AND OROGASTRIC TUBES IN EXPECTED POSITIONS. OTHERWISE, STABLE CHEST FROM EARLIER 7/25/2022.       Echo No results found for this or any previous visit. Assessment/Plan:    Active Hospital Problems    Diagnosis Date Noted    Acute respiratory failure with hypoxia ([facility] [address] [J96.01] 07/25/2022     Priority: Medium     Acute hypoxic respiratory failure  Ventilated, intensivist consult, follow abg, ICU admission  7/26 - weaning as able, follow abg  Septic shock 2/2 PNA  Questionable on imaging, labs likely elevated 2/2 ESRD. However will cover with broad spectrum abx, obtain blood cultures, follow  7/26 - sputum culture  Pulmonary edema  Patient to likely need some additional fluid removal through HD, d/w Dr. Uziel Cook, to evaluate.   7/26 - HD per nephrology  ESRD on HD  Consult nephrology  Elevated troponin  Cycle trops x 2  DVT proph    Electronically signed by Brittaney Mann MD on 7/26/2022 at 7:21 AM

## 2022-07-26 NOTE — CONSULTS
CC:   Chief Complaint   Patient presents with    Shortness of Breath     C/O SOB  AFTER DIALYSIS  88 % ON RA  GIVEN 2 DUONEBS  SOLU MEDROL         HPI:  Patient is a 40-year-old gentleman with past medical history of hypertension, liver transplant, end-stage kidney disease on hemodialysis on , , and . Patient had presented to hemodialysis yesterday and had a normal session, though became very short of breath during the end of his dialysis session and presented to the emergency room. He was given IV Solu-Medrol at hemodialysis. Upon arrival to the emergency room he had low oxygen saturation, even with a nonrebreather. Patient was sedated and admitted to the intensive care unit. Patient requiring IV access, though due to dialysis, a PICC line cannot be used. Additionally attempt at bilateral internal jugular vein central lines were unsuccessful, likely secondary to chronic stenosis from previous dialysis catheters. Interventional radiology was consulted for long-term IV access. No family history on file. Past Surgical History:   Procedure Laterality Date    DIALYSIS FISTULA CREATION Right     LIVER TRANSPLANT  2017    TUNNELED VENOUS CATHETER PLACEMENT Left 2022    Right IJ Mclean Catheter Placed by Dr. Rony Salazar    TUNNELED VENOUS PORT PLACEMENT          Past Medical History:   Diagnosis Date    ESRD (end stage renal disease) ([facility] [address]     Hepatitis     Hypertension     Liver transplanted ([facility] [address] 2016       Social History     Socioeconomic History    Marital status: Single   Tobacco Use    Smoking status: Former     Packs/day: 0.50     Years: 15.00     Pack years: 7.50     Types: Cigarettes     Quit date: 2017     Years since quittin.7    Smokeless tobacco: Never   Substance and Sexual Activity    Alcohol use: No    Drug use: No       No Known Allergies    No current facility-administered medications on file prior to encounter.      Current Outpatient Medications on File Prior to Encounter   Medication Sig Dispense Refill    albuterol sulfate HFA (PROAIR HFA) 108 (90 Base) MCG/ACT inhaler 2 puffs; Use every 4 hours while awake for 7-10 days then PRN wheezing  Dispense with SPACER and Instruct on use. May sub Ventolin or Proventil as needed per [employer]. 18 g 0    carvedilol (COREG) 12.5 MG tablet Take 12.5 mg by mouth 2 times daily      calcium acetate (PHOSLO) 667 MG capsule Take 667 mg by mouth 3 times daily (with meals)      Tacrolimus (PROGRAF PO) Take 1 tablet by mouth 2 times daily         Review of Systems   Unable to perform ROS: Intubated     OBJECTIVE:  BP (!) 149/33   Pulse 66   Temp 98.8 °F (37.1 °C) (Oral)   Resp 18   Ht 5' 6\" (1.676 m)   Wt 148 lb 9.4 oz (67.4 kg)   SpO2 100%   BMI 23.98 kg/m²     Physical Exam  Constitutional:       General: He is sleeping. Interventions: He is sedated and intubated. HENT:      Head: Normocephalic and atraumatic. Nose: Nose normal.      Mouth/Throat:      Pharynx: No oropharyngeal exudate. Eyes:      General: No scleral icterus. Right eye: No discharge. Left eye: No discharge. Conjunctiva/sclera: Conjunctivae normal.   Neck:      Thyroid: No thyromegaly. Vascular: No JVD. Trachea: No tracheal deviation. Cardiovascular:      Rate and Rhythm: Normal rate and regular rhythm. Heart sounds: Normal heart sounds. No murmur heard. No friction rub. No gallop. Pulmonary:      Effort: No respiratory distress. He is intubated. Breath sounds: No stridor. No wheezing or rales. Chest:      Chest wall: No tenderness. Abdominal:      General: Bowel sounds are normal. There is no distension. Palpations: Abdomen is soft. There is no mass. Tenderness: There is no abdominal tenderness. There is no guarding or rebound. Hernia: No hernia is present. Musculoskeletal:         General: No tenderness or deformity. Cervical back: Neck supple.    Skin:     General: Skin is dry. Coloration: Skin is not pale. Findings: No erythema or rash. Neurological:      Mental Status: He is unresponsive. Lab Results   Component Value Date/Time    WBC 13.9 07/26/2022 10:39 AM    HGB 12.4 07/26/2022 10:39 AM    HCT 38.9 07/26/2022 10:39 AM     07/26/2022 10:39 AM    .7 07/26/2022 10:39 AM       XR CHEST PORTABLE    Result Date: 7/25/2022  XR CHEST PORTABLE : 7/25/2022 CLINICAL HISTORY:  post intubation . COMPARISON: Earlier 7/25/2022 TECHNIQUE: A portable upright AP radiograph of the chest was obtained at approximately 12:46 PM. FINDINGS: Both lung bases have been excluded from the radiograph. An endotracheal tube is present, with its tip approximately 4 to 5 cm above the lucita. An orogastric tube probably extends below the diaphragm out of field of view radiograph. Moderate pleural effusions and mild to moderate probable scarring and/or atelectasis of the mid to lower lung fields has not significantly changed. There is no cardiomegaly, pneumothorax, displaced fractures, or other significant changes identified. ENDOTRACHEAL AND OROGASTRIC TUBES IN EXPECTED POSITIONS. OTHERWISE, STABLE CHEST FROM EARLIER 7/25/2022. XR CHEST PORTABLE    Result Date: 7/25/2022  TECHNIQUE: Single portable view of the chest. CLINICAL INDICATION: Chest pain. COMPARISON: Chest x-ray obtained on June 28, 2022. PROCEDURE AND FINDINGS: Poor inspiratory effort is seen. The cardiomediastinal silhouette is slightly prominent in size. Mild prominence of the bronchovascular and interstitial lung markings is visualized bilaterally, linear streaky opacities visualized in bilateral lung fields, subsegmental atelectatic streaks, fluid visualized in the right minor fissure. Airspace opacification visualized in the lower lung fields bilaterally with blunting of the costophrenic angles and obliteration of the hemidiaphragms consistent with bilateral pleural effusions. . No evidence of pneumothorax or parenchymal lung mass. Degenerative bone changes. Congestive heart failure versus pneumonia and parapneumonic effusions would recommend clinical correlation. Increased opacification seen in comparison to the prior study. ASSESSMENT ANDPLAN:    ASSESSMENT: Patient admitted to ICU with respiratory failure after dialysis, requiring IV access. Bilateral internal jugular vein central line attempt was unsuccessful, likely due to old dialysis catheters. PLAN: Attempt at fluoroscopy and ultrasound-guided tunneled Mclean placement. If unsuccessful, the groin access may be necessary in the interim.     Abran Velazco MD, Christopher Alvarenga

## 2022-07-27 ENCOUNTER — APPOINTMENT (OUTPATIENT)
Dept: ULTRASOUND IMAGING | Age: 67
DRG: 871 | End: 2022-07-27
Payer: MEDICARE

## 2022-07-27 ENCOUNTER — APPOINTMENT (OUTPATIENT)
Dept: GENERAL RADIOLOGY | Age: 67
DRG: 871 | End: 2022-07-27
Payer: MEDICARE

## 2022-07-27 LAB
ALBUMIN SERPL-MCNC: 3.2 G/DL (ref 3.5–4.6)
ANION GAP SERPL CALCULATED.3IONS-SCNC: 20 MEQ/L (ref 9–15)
ANTI-XA UNFRAC HEPARIN: 0.38 IU/ML
ANTI-XA UNFRAC HEPARIN: 0.5 IU/ML
BASE EXCESS ARTERIAL: 5 (ref -3–3)
BASOPHILS ABSOLUTE: 0 K/UL (ref 0–0.2)
BASOPHILS RELATIVE PERCENT: 0.5 %
BUN BLDV-MCNC: 28 MG/DL (ref 8–23)
CALCIUM SERPL-MCNC: 8.9 MG/DL (ref 8.5–9.9)
CHLORIDE BLD-SCNC: 92 MEQ/L (ref 95–107)
CO2: 26 MEQ/L (ref 20–31)
CREAT SERPL-MCNC: 5.33 MG/DL (ref 0.7–1.2)
EOSINOPHILS ABSOLUTE: 0.1 K/UL (ref 0–0.7)
EOSINOPHILS RELATIVE PERCENT: 1 %
GFR AFRICAN AMERICAN: 13
GFR NON-AFRICAN AMERICAN: 10.8
GLUCOSE BLD-MCNC: 106 MG/DL (ref 70–99)
GLUCOSE BLD-MCNC: 106 MG/DL (ref 70–99)
GLUCOSE BLD-MCNC: 134 MG/DL (ref 70–99)
GLUCOSE BLD-MCNC: 145 MG/DL (ref 70–99)
GLUCOSE BLD-MCNC: 83 MG/DL (ref 70–99)
GLUCOSE BLD-MCNC: 84 MG/DL (ref 70–99)
GLUCOSE BLD-MCNC: 87 MG/DL (ref 70–99)
HCO3 ARTERIAL: 27.3 MMOL/L (ref 21–29)
HCT VFR BLD CALC: 36.1 % (ref 42–52)
HEMOGLOBIN: 12.5 G/DL (ref 14–18)
LACTATE DEHYDROGENASE, FLUID: 145 U/L
LV EF: 45 %
LVEF MODALITY: NORMAL
LYMPHOCYTES ABSOLUTE: 0.5 K/UL (ref 1–4.8)
LYMPHOCYTES RELATIVE PERCENT: 4.4 %
MCH RBC QN AUTO: 35.7 PG (ref 27–31.3)
MCHC RBC AUTO-ENTMCNC: 34.5 % (ref 33–37)
MCV RBC AUTO: 103.5 FL (ref 80–100)
MONOCYTES ABSOLUTE: 0.8 K/UL (ref 0.2–0.8)
MONOCYTES RELATIVE PERCENT: 7.7 %
NEUTROPHILS ABSOLUTE: 9 K/UL (ref 1.4–6.5)
NEUTROPHILS RELATIVE PERCENT: 86.4 %
O2 SAT, ARTERIAL: 100 % (ref 93–100)
PCO2 ARTERIAL: 32 MM HG (ref 35–45)
PDW BLD-RTO: 15.1 % (ref 11.5–14.5)
PERFORMED ON: ABNORMAL
PERFORMED ON: NORMAL
PH ARTERIAL: 7.54 (ref 7.35–7.45)
PHOSPHORUS: 4.4 MG/DL (ref 2.3–4.8)
PLATELET # BLD: 284 K/UL (ref 130–400)
PO2 ARTERIAL: 144 MM HG (ref 75–108)
POC FIO2: 50
POC SAMPLE TYPE: ABNORMAL
POTASSIUM SERPL-SCNC: 5.1 MEQ/L (ref 3.4–4.9)
RBC # BLD: 3.49 M/UL (ref 4.7–6.1)
REASON FOR REJECTION: NORMAL
REJECTED TEST: NORMAL
SODIUM BLD-SCNC: 138 MEQ/L (ref 135–144)
SPECIMEN TYPE: NORMAL
TCO2 ARTERIAL: 28 MMOL/L (ref 21–32)
TROPONIN: 0.12 NG/ML (ref 0–0.01)
TROPONIN: 0.13 NG/ML (ref 0–0.01)
VANCOMYCIN RANDOM: 11.9 UG/ML (ref 10–40)
WBC # BLD: 10.4 K/UL (ref 4.8–10.8)

## 2022-07-27 PROCEDURE — 82948 REAGENT STRIP/BLOOD GLUCOSE: CPT

## 2022-07-27 PROCEDURE — 71045 X-RAY EXAM CHEST 1 VIEW: CPT

## 2022-07-27 PROCEDURE — 37799 UNLISTED PX VASCULAR SURGERY: CPT

## 2022-07-27 PROCEDURE — 99291 CRITICAL CARE FIRST HOUR: CPT | Performed by: INTERNAL MEDICINE

## 2022-07-27 PROCEDURE — 93306 TTE W/DOPPLER COMPLETE: CPT

## 2022-07-27 PROCEDURE — 82803 BLOOD GASES ANY COMBINATION: CPT

## 2022-07-27 PROCEDURE — 94003 VENT MGMT INPAT SUBQ DAY: CPT

## 2022-07-27 PROCEDURE — 6360000002 HC RX W HCPCS: Performed by: NURSE PRACTITIONER

## 2022-07-27 PROCEDURE — 80069 RENAL FUNCTION PANEL: CPT

## 2022-07-27 PROCEDURE — 2580000003 HC RX 258: Performed by: NURSE PRACTITIONER

## 2022-07-27 PROCEDURE — 2500000003 HC RX 250 WO HCPCS: Performed by: FAMILY MEDICINE

## 2022-07-27 PROCEDURE — 2700000000 HC OXYGEN THERAPY PER DAY

## 2022-07-27 PROCEDURE — 99213 OFFICE O/P EST LOW 20 MIN: CPT

## 2022-07-27 PROCEDURE — 6370000000 HC RX 637 (ALT 250 FOR IP): Performed by: NURSE PRACTITIONER

## 2022-07-27 PROCEDURE — 2500000003 HC RX 250 WO HCPCS: Performed by: NURSE PRACTITIONER

## 2022-07-27 PROCEDURE — 31624 DX BRONCHOSCOPE/LAVAGE: CPT

## 2022-07-27 PROCEDURE — A4216 STERILE WATER/SALINE, 10 ML: HCPCS | Performed by: NURSE PRACTITIONER

## 2022-07-27 PROCEDURE — 6360000002 HC RX W HCPCS: Performed by: FAMILY MEDICINE

## 2022-07-27 PROCEDURE — 85520 HEPARIN ASSAY: CPT

## 2022-07-27 PROCEDURE — 84484 ASSAY OF TROPONIN QUANT: CPT

## 2022-07-27 PROCEDURE — 93970 EXTREMITY STUDY: CPT

## 2022-07-27 PROCEDURE — 2580000003 HC RX 258: Performed by: FAMILY MEDICINE

## 2022-07-27 PROCEDURE — 94761 N-INVAS EAR/PLS OXIMETRY MLT: CPT

## 2022-07-27 PROCEDURE — 80202 ASSAY OF VANCOMYCIN: CPT

## 2022-07-27 PROCEDURE — 2000000000 HC ICU R&B

## 2022-07-27 PROCEDURE — 99221 1ST HOSP IP/OBS SF/LOW 40: CPT | Performed by: INTERNAL MEDICINE

## 2022-07-27 PROCEDURE — 87077 CULTURE AEROBIC IDENTIFY: CPT

## 2022-07-27 PROCEDURE — 87186 SC STD MICRODIL/AGAR DIL: CPT

## 2022-07-27 PROCEDURE — 85025 COMPLETE CBC W/AUTO DIFF WBC: CPT

## 2022-07-27 PROCEDURE — 87070 CULTURE OTHR SPECIMN AEROBIC: CPT

## 2022-07-27 RX ADMIN — HEPARIN SODIUM AND DEXTROSE 15 UNITS/KG/HR: 10000; 5 INJECTION INTRAVENOUS at 18:12

## 2022-07-27 RX ADMIN — TACROLIMUS 1 MG: 1 CAPSULE ORAL at 07:54

## 2022-07-27 RX ADMIN — PIPERACILLIN AND TAZOBACTAM 3375 MG: 3; .375 INJECTION, POWDER, LYOPHILIZED, FOR SOLUTION INTRAVENOUS at 08:09

## 2022-07-27 RX ADMIN — FAMOTIDINE 20 MG: 10 INJECTION, SOLUTION INTRAVENOUS at 07:53

## 2022-07-27 RX ADMIN — Medication 10 ML: at 07:55

## 2022-07-27 RX ADMIN — PIPERACILLIN AND TAZOBACTAM 3375 MG: 3; .375 INJECTION, POWDER, LYOPHILIZED, FOR SOLUTION INTRAVENOUS at 20:04

## 2022-07-27 RX ADMIN — PROPOFOL 25 MCG/KG/MIN: 10 INJECTION, EMULSION INTRAVENOUS at 23:15

## 2022-07-27 RX ADMIN — PROPOFOL 25 MCG/KG/MIN: 10 INJECTION, EMULSION INTRAVENOUS at 16:30

## 2022-07-27 RX ADMIN — CHLORHEXIDINE GLUCONATE 15 ML: 1.2 RINSE ORAL at 20:29

## 2022-07-27 RX ADMIN — Medication 17 MCG/MIN: at 12:54

## 2022-07-27 RX ADMIN — PROPOFOL 25 MCG/KG/MIN: 10 INJECTION, EMULSION INTRAVENOUS at 08:02

## 2022-07-27 RX ADMIN — FENTANYL CITRATE 175 MCG/HR: 0.05 INJECTION, SOLUTION INTRAMUSCULAR; INTRAVENOUS at 12:56

## 2022-07-27 RX ADMIN — FENTANYL CITRATE 100 MCG/HR: 0.05 INJECTION, SOLUTION INTRAMUSCULAR; INTRAVENOUS at 02:20

## 2022-07-27 RX ADMIN — VANCOMYCIN HYDROCHLORIDE 1000 MG: 1 INJECTION, POWDER, LYOPHILIZED, FOR SOLUTION INTRAVENOUS at 14:32

## 2022-07-27 RX ADMIN — TACROLIMUS 1 MG: 1 CAPSULE ORAL at 20:29

## 2022-07-27 RX ADMIN — CHLORHEXIDINE GLUCONATE 15 ML: 1.2 RINSE ORAL at 07:54

## 2022-07-27 RX ADMIN — Medication 10 ML: at 21:46

## 2022-07-27 ASSESSMENT — PULMONARY FUNCTION TESTS
PIF_VALUE: 22
PIF_VALUE: 16

## 2022-07-27 NOTE — PROGRESS NOTES
Critical Care Progress Note    2022 9:46 AM    Subjective:   Admit Date: 2022  PCP: Elizabeth Henderson MD    Chief Complaint   Patient presents with    Shortness of Breath     C/O SOB  AFTER DIALYSIS  88 % ON RA  GIVEN 2 DUONEBS  SOLU MEDROL      Interval History: Had 1000 cc thoracentesis from right side yesterday. Fluid appears to be exudative. Is currently on low ventilator settings. Has been on Levophed and vasopressin had to be added. Ultrasound of upper extremities showed extensive bilateral DVTs and multiple veins. Had dialysis yesterday and only 2 L were removed due to hypotension.          Medications:   Scheduled Meds:   tacrolimus  1 mg Oral BID    sodium chloride flush  5-40 mL IntraVENous 2 times per day    sodium chloride flush  5-40 mL IntraVENous 2 times per day    piperacillin-tazobactam  3,375 mg IntraVENous Q12H    vancomycin (VANCOCIN) intermittent dosing (placeholder)   Other RX Placeholder    chlorhexidine  15 mL Mouth/Throat BID    famotidine (PEPCID) injection  20 mg IntraVENous Daily    insulin lispro  0-4 Units SubCUTAneous Q4H    lidocaine  5 mL IntraDERmal Once    sodium chloride flush  5-40 mL IntraVENous 2 times per day     Continuous Infusions:   sodium chloride      heparin (PORCINE) Infusion 15 Units/kg/hr (22)    vasopressin (Septic Shock) infusion Stopped (22)    sodium chloride      propofol 25 mcg/kg/min (22 0802)    fentaNYL 100 mcg/hr (22)    dextrose      sodium chloride      sodium chloride      norepinephrine 17 mcg/min (22)         Objective:   Vitals:   Temp (24hrs), Av °F (36.1 °C), Min:93.2 °F (34 °C), Max:98.8 °F (37.1 °C)    BP (!) 149/33   Pulse 68   Temp 97.9 °F (36.6 °C) (Rectal)   Resp (!) 0   Ht 5' 6\" (1.676 m)   Wt 148 lb 9.4 oz (67.4 kg)   SpO2 100%   BMI 23.98 kg/m²   I/O:24HR INTAKE/OUTPUT:    Intake/Output Summary (Last 24 hours) at 2022 3675  Last data filed at 7/27/2022 0730  Gross per 24 hour   Intake 2363.48 ml   Output 2305 ml   Net 58.48 ml       07/26 0701 - 07/27 0700  In: 1992.4 [I.V.:1147.7]  Out: 2305   CVP:        Physical Exam:  General appearance -ill appearing  Mental status -intubated and sedated  Eyes - pupils equal and reactive  Nose - normal and patent  Mouth-ET tube  Neck - supple, no significant adenopathy  Chest -diminished bilaterally to auscultation but more on the right side, no wheezes, rales or rhonchi, symmetric air entry  Heart - normal rate, regular rhythm, normal S1, S2, no murmurs, rubs, clicks or gallops  Abdomen - soft, nontender, nondistended, no masses or organomegaly  Rectal - deferred, not clinically indicated  Neurological -intubated and sedated but nonfocal exam.  Musculoskeletal - no joint tenderness, deformity or swelling  Extremities - peripheral pulses normal, no pedal edema, no clubbing or cyanosis  Skin -chronic skin changes. BMP:    Recent Labs     07/25/22  1127 07/25/22  1128 07/26/22  1039 07/27/22  0554     --  136 138   K 3.7  --  3.8 5.1*   CL 94*  --  93* 92*   CO2 32*  --  26 26   BUN 10  --  22 28*   CREATININE 3.90*   < > 5.05* 5.33*   GLUCOSE 124*  --  167* 134*    < > = values in this interval not displayed. .  MG:3,PHOS:3)@  Ionized Calcium: No results found for: IONCA  CBC:   Recent Labs     07/26/22  1039 07/27/22  0440   WBC 13.9* 10.4   HGB 12.4* 12.5*    284        ABG: No results for input(s): PH, PCO2, PO2 in the last 72 hours. Assessment and Plan:               Impression:     -  Acute hypoxic respiratory failure. Suspect due to volume overload, right-sided pleural effusion and likely pneumonia. Continues to be on mechanical ventilation. Currently on low ventilator settings. - Large right-sided pleural effusion.   Status postthoracentesis  -Right-sided pneumonia  -Shock, requiring vasopressors.  -Extensive upper extremity DVT and cannot rule out PE.  - End-stage renal disease on hemodialysis. - Lactic acidosis likely due to hypoxia.  - Status post liver transplant. Recommendations:     - Continue care in the ICU for hemodynamic and airway monitoring.  - Ventilator bundle. Increase PEEP. Gentle sedation. Head of bed 30 degrees. - Wean off FiO2. Check serial ABGs. -Continue vasopressors. Goal map of 65.  - Broad-spectrum antibiotic and check sputum culture.  -Plan for bronchoscopy today.  -Status postthoracentesis. Fluid is exudative. Await culture results. - tight Glucose control.  -Strict intake and output. -DVT and GI prophylaxis         Prophylaxis:  Stress ulcer: [] PPI Agent [] H2RA [] Sucralfate [] Other:   VTE: [] Enoxaparin  [] SC Heparin  [] SCD      Full Code      Excluding procedures, the total critical care time caring for this patient with life threatening, unstable organ failure, including direct patient contact, review of medical record, management of life support systems, review of data including imaging and labs, discussions with other team members, patient's family and physicians at least 31 minutes so far today.         Electronically signed by Randall Fortune MD on 7/27/2022 at 9:46 AM

## 2022-07-27 NOTE — PROGRESS NOTES
Nephrology Progress Note    Assessment:  Pt is a 76 y/o male w/ history s/f HTN, ESRD on IHD, liver transplant who presented for SOB    ESRD on IHD MWF  Acute hypoxic respiratory failure: in setting of fluid overload, RT sided thoracentesis and PNA, intubated, now s/p RT sided thoracentesis   Septic shock: on pressors  Lactic acidosis  Metabolic/respiratory alkalosis  6.  Bilateral IJ thrombi    Plan:  - usually MWF however will hold off HD today and reassess tomorrow  - if continues to require high pressor requirements may need to transition to CRRT via femoral line vs.      Patient Active Problem List:     Hypotension     ESRD (end stage renal disease) ([facility] [address]     Liver transplanted ([facility] [address]     Liver transplant recipient ([facility] [address]     Sepsis ([facility] [address]     Gastroenteritis     C. difficile colitis     Severe sepsis ([facility] [address]     Vomiting and diarrhea     Generalized abdominal pain     Acute renal failure ([address]     Acute respiratory failure with hypoxia ([facility] [address]      Subjective:  Admit Date: 7/25/2022    Interval History: remains on levo and vaso, intubated, dopplers showed bilateral IJ thrombi, had issues w/ UF yesterday, did get 2L off however had to stop rx early due to worsening pressor requirements     Medications:  Scheduled Meds:   tacrolimus  1 mg Oral BID    sodium chloride flush  5-40 mL IntraVENous 2 times per day    sodium chloride flush  5-40 mL IntraVENous 2 times per day    piperacillin-tazobactam  3,375 mg IntraVENous Q12H    vancomycin (VANCOCIN) intermittent dosing (placeholder)   Other RX Placeholder    chlorhexidine  15 mL Mouth/Throat BID    famotidine (PEPCID) injection  20 mg IntraVENous Daily    insulin lispro  0-4 Units SubCUTAneous Q4H    lidocaine  5 mL IntraDERmal Once    sodium chloride flush  5-40 mL IntraVENous 2 times per day     Continuous Infusions:   sodium chloride      heparin (PORCINE) Infusion 15 Units/kg/hr (07/27/22 0730)    vasopressin (Septic Shock) infusion Stopped (07/26/22 1248)    sodium chloride      propofol 25 mcg/kg/min (07/27/22 0802)    fentaNYL 100 mcg/hr (07/27/22 0730)    dextrose      sodium chloride      sodium chloride      norepinephrine 17 mcg/min (07/27/22 0730)       CBC:   Recent Labs     07/26/22  1039 07/27/22  0440   WBC 13.9* 10.4   HGB 12.4* 12.5*    284       CMP:    Recent Labs     07/25/22  1127 07/25/22  1128 07/26/22  0443 07/26/22  1039 07/27/22  0554     --   --  136 138   K 3.7  --   --  3.8 5.1*   CL 94*  --   --  93* 92*   CO2 32*  --   --  26 26   BUN 10  --   --  22 28*   CREATININE 3.90*   < > 5.1* 5.05* 5.33*   GLUCOSE 124*  --   --  167* 134*   CALCIUM 9.6  --   --  9.3 8.9   LABGLOM 15.4*   < > 11* 11.5* 10.8*    < > = values in this interval not displayed. Troponin:   Recent Labs     07/27/22  0439   TROPONINI 0.126*       BNP: No results for input(s): BNP in the last 72 hours. INR:   Recent Labs     07/26/22  1558   INR 1.1       Lipids: No results for input(s): CHOL, LDLDIRECT, TRIG, HDL, AMYLASE, LIPASE in the last 72 hours. Liver:   Recent Labs     07/25/22  1127 07/26/22 1039 07/27/22  0554   AST 77*  --   --    ALT 79*  --   --    ALKPHOS 87  --   --    PROT 7.5  --   --    LABALBU 3.9   < > 3.2*   BILITOT 0.7  --   --     < > = values in this interval not displayed. Iron:  No results for input(s): IRONS, FERRITIN in the last 72 hours. Invalid input(s): LABIRONS  Urinalysis: No results for input(s): UA in the last 72 hours.     Objective:  Vitals: BP (!) 149/33   Pulse 68   Temp 97.9 °F (36.6 °C) (Rectal)   Resp (!) 0   Ht 5' 6\" (1.676 m)   Wt 148 lb 9.4 oz (67.4 kg)   SpO2 100%   BMI 23.98 kg/m²    Wt Readings from Last 3 Encounters:   07/25/22 148 lb 9.4 oz (67.4 kg)   06/28/22 145 lb (65.8 kg)   03/18/20 145 lb (65.8 kg)      24HR INTAKE/OUTPUT:    Intake/Output Summary (Last 24 hours) at 7/27/2022 1006  Last data filed at 7/27/2022 0730  Gross per 24 hour   Intake 2363.48 ml   Output 2305 ml   Net 58.48 ml General: intubated, off sedation   HEENT: normocephalic, atraumatic, ETT in place  Lungs: intubated, on vent,coarse breath sounds  Heart: regular rate and rhythm, no murmurs or rubs  Abdomen: soft, non-tender, non-distended  Ext: no cyanosis, no peripheral edema  Neuro: unable to assess, sedated    Electronically signed by Katlin Gilbert MD, MD

## 2022-07-27 NOTE — PROGRESS NOTES
Wound Ostomy Continence Nurse  Consult Note       NAME:  Pelon Kee  MEDICAL RECORD NUMBER:  [id number]  AGE: 75 y.o. GENDER: male  : 1955  TODAY'S DATE:  2022    Subjective   Reason for [id number] Nurse Evaluation and Assessment: Sacral pressure injury & and Incontinence assocaited dermatitis (IAD)      Pelon Kee is a 75 y.o. male referred by:   [] Physician  [x] Nursing  [] Other:     Wound Identification:  Wound Type: pressure and IAD  Contributing Factors: chronic pressure, decreased mobility, shear force, malnutrition, and incontinence of stool    Wound History: Patient admitted to [facility] ICU on . Patient found to have developed a (Deep Tissue Injury) DTI to the sacrum and IAD to bilateral buttock and coccyx areas. Current Wound Care Treatment: Recommending 1) continue pressure injury prevention interventions 2) protective barrier cream with zinc for IAD and split in gluteal cleft 3) Border foam dressing for sacral DTI, change dressing q 3 days and as needed. Patient Goal of Care:  [x] Wound Healing  [] Odor Control  [] Palliative Care  [] Pain Control   [] Other:         PAST MEDICAL HISTORY        Diagnosis Date    ESRD (end stage renal disease) ([facility] [address]     Hepatitis     Hypertension     Liver transplanted ([facility] [address] 2016       PAST SURGICAL HISTORY    Past Surgical History:   Procedure Laterality Date    DIALYSIS FISTULA CREATION Right     LIVER TRANSPLANT  2017    TUNNELED VENOUS PORT PLACEMENT         FAMILY HISTORY    No family history on file. SOCIAL HISTORY    Social History     Tobacco Use    Smoking status: Former     Packs/day: 0.50     Years: 15.00     Pack years: 7.50     Types: Cigarettes     Quit date: 2017     Years since quittin.7    Smokeless tobacco: Never   Substance Use Topics    Alcohol use: No    Drug use: No       ALLERGIES    No Known Allergies    MEDICATIONS    No current facility-administered medications on file prior to encounter. Wound Assessment Other (Comment) 07/27/22 0800   Number of days: 0       Wound 07/27/22 Buttocks Left; Lower Incontinence Associated Dermatitis (Active)   Wound Cleansed Soap and water 07/27/22 0619   Dressing/Treatment Foam 07/27/22 0800   Wound Assessment Purple/maroon 07/27/22 0800   Drainage Amount None 07/27/22 0800   Jemima-wound Assessment Blanchable erythema 07/27/22 0800   Margins Defined edges 07/27/22 0800   Number of days: 0       Wound 07/27/22 Sacrum Medial (Active)   Number of days: 0     Assessment:    Patient in ICU, currently on vent. Assisted to turn to the right side. Patient found to have a DTI to the sacrum - wound measures 2x1 cm, light purple/maroon, intact, non-blanchable tissue. Bilateral buttocks have IAD with denudation of skin and small split in the gluteal cleft r/t moisture. Plan   Plan of Care: Wound 07/27/22 Perineum Incontinence Associated Dermatitis-Dressing/Treatment: Zinc paste  Wound 07/27/22 Buttocks Left; Lower Incontinence Associated Dermatitis-Dressing/Treatment: Foam    Specialty Bed Required : Yes   [x] Low Air Loss   [] Pressure Redistribution  [] Fluid Immersion  [] Bariatric  [] Other:     Current Diet: Diet NPO  ADULT TUBE FEEDING; Orogastric; Peptide Based High Protein; Continuous; 35; No; 50; Q 4 hours; Protein;  Add 1 bottle of proteinex via OGT once daily  Dietician consult:  Yes    Discharge Plan:  Placement for patient upon discharge: skilled nursing    Patient appropriate for Outpatient [address]: N/A    Referrals:  []   [] [address]  [] Supplies  [] Other    Patient/Caregiver Teaching:  Level of patient/caregiver understanding able to:   [] Indicates understanding       [] Needs reinforcement  [] Unsuccessful      [] Verbal Understanding  [] Demonstrated understanding       [] No evidence of learning  [] Refused teaching         [x] N/A       Electronically signed by HANG Jimenez, RN, CWOCN on 7/27/2022 at 10:59 AM

## 2022-07-27 NOTE — PROGRESS NOTES
PHARMACY NOTE:   Interdisciplinary Rounds Completed     ICU Day #2     Changes made today by Pharmacy:   No changes per pharmacy today      Additional information:  No HD today per nephrology. Usually MWF schedule. Will adjust vancomycin therapy accordingly   Insulin coverage:   SSI: Low Dose  Humalo units required in the past 24 hours  Pressors: Levophed, vasopressin  Sedation: Fentanyl, propofol  Antimicrobial therapy, day #3: Zosyn 3375 mg every 12 hours, vancomycin post-HD dosing (last dose 1000 mg x 1 dose on ). Renally adjusted by pharmacy for a patient on HD. ID not on consult.  Blood & body fluid cultures with no growth to date    Core measures assessed/met    Dede Mehta, PharmRODRÍGUEZ  2022 11:33 AM

## 2022-07-27 NOTE — CONSULTS
Cardiology Consult Note  Patient: Praful Alvarado  Unit/Bed: IC12/IC12-01  YOB: 1955  MRN: [id number]  Acct: [de-identified]   Admitting Diagnosis: Acute respiratory failure with hypoxia [facility]) [J96.01]  Date:  7/25/2022  Hospital Day: 2      Chief Complaint:  Shortness of breath    Reason of this consult  Bradycardia    History of Present Illness:  68-year-old male without prior cardiac history but significant for end-stage renal disease on hemodialysis, hepatitis status post liver transplant, hypertension who was admitted to the hospital on 7/25/2022 for shortness of breath.     Patient was transferred to the ICU given hypoxic respiratory failure  Today patient in the ICU  Currently on Levophed and vasopressin  Currently sedated on ventilator    No Known Allergies    Current Facility-Administered Medications   Medication Dose Route Frequency Provider Last Rate Last Admin    vancomycin 1000 mg IVPB in 250 mL D5W addavial  1,000 mg IntraVENous Once Alma Chung  mL/hr at 07/27/22 1438 Rate Verify at 07/27/22 1438    tacrolimus (PROGRAF) capsule 1 mg  1 mg Oral BID Braden Burows, APRN - CNP   1 mg at 07/27/22 0754    sodium chloride flush 0.9 % injection 5-40 mL  5-40 mL IntraVENous 2 times per day Braden Burows, APRN - CNP   10 mL at 07/27/22 0755    sodium chloride flush 0.9 % injection 5-40 mL  5-40 mL IntraVENous PRN Braden Burows, APRN - CNP        0.9 % sodium chloride infusion   IntraVENous PRN Braden Burows, APRN - CNP        heparin (porcine) injection 5,390 Units  80 Units/kg IntraVENous PRN Braden Burows, APRN - CNP   5,390 Units at 07/26/22 1534    heparin (porcine) injection 2,700 Units  40 Units/kg IntraVENous PRN Braden Burows, APRN - CNP        heparin 25,000 units in dextrose 5% 250 mL (premix) infusion  5-30 Units/kg/hr IntraVENous Continuous Braden Burows, APRN - CNP 10.1 mL/hr at 07/27/22 1438 15 Units/kg/hr at 07/27/22 1438    vasopressin 20 Units in dextrose 5 % 100 mL infusion  0.01-0.03 Units/min IntraVENous Continuous Lissy Vogel MD   Stopped at 07/26/22 1738    acetaminophen (TYLENOL) tablet 650 mg  650 mg Oral Q6H PRN Hilda Mabry MD        Or    acetaminophen (TYLENOL) suppository 650 mg  650 mg Rectal Q6H PRN Hilda Mabry MD        piperacillin-tazobactam (ZOSYN) 3,375 mg in dextrose 5 % 50 mL IVPB (mini-bag)  3,375 mg IntraVENous Q12H Hilda Mabry MD   Stopped at 07/27/22 1101    vancomycin (VANCOCIN) intermittent dosing (placeholder)   Other RX Placeholder Hilda Mabry MD        chlorhexidine (PERIDEX) 0.12 % solution 15 mL  15 mL Mouth/Throat BID Daun Hurst, APRN - CNP   15 mL at 07/27/22 0754    famotidine (PEPCID) 20 mg in sodium chloride (PF) 10 mL injection  20 mg IntraVENous Daily Daun Hurst, APRN - CNP   20 mg at 07/27/22 0753    propofol injection  5-50 mcg/kg/min IntraVENous Continuous Daun Hurst, APRN - CNP 12.1 mL/hr at 07/27/22 1438 30 mcg/kg/min at 07/27/22 1438    fentaNYL (SUBLIMAZE) 1,000 mcg in sodium chloride 0.9 % 100 mL infusion   mcg/hr IntraVENous Continuous Daun Hurst, APRN - CNP 10 mL/hr at 07/27/22 1438 100 mcg/hr at 07/27/22 1438    And    fentaNYL bolus from bag  50 mcg IntraVENous Q30 Min PRN Daun Hurst, APRN - CNP        glucose chewable tablet 16 g  4 tablet Oral PRN Daun Hurst, APRN - CNP        dextrose bolus 10% 125 mL  125 mL IntraVENous PRN Daun Hurst, APRN - CNP        Or    dextrose bolus 10% 250 mL  250 mL IntraVENous PRN Daun Hurst, APRN - CNP        glucagon (rDNA) injection 1 mg  1 mg SubCUTAneous PRN Daun Hurst, APRN - CNP        dextrose 10 % infusion   IntraVENous Continuous PRN Daun Hurst, APRN - CNP        insulin lispro (HUMALOG) injection vial 0-4 Units  0-4 Units SubCUTAneous Q4H Daun Hurst, APRN - CNP        heparin (porcine) injection 1,000 Units  1,000 Units IntraVENous PRN Cory Smith MD        lidocaine 2 % injection 5 mL  5 mL IntraDERmal Once Km ISI Huertas - CNP        0.9 % sodium chloride infusion  250 mL IntraVENous Once Dennys GAVIN RosaN - CNP        norepinephrine (LEVOPHED) 16 mg in dextrose 5% 250 mL infusion  1-100 mcg/min IntraVENous Continuous Carolynn Najera MD 13.1 mL/hr at 22 1438 14 mcg/min at 22 1438       PMHx:  Past Medical History:   Diagnosis Date    ESRD (end stage renal disease) ([facility] [address]     Hepatitis     Hypertension     [facility]       PSHx:  Past Surgical History:   Procedure Laterality Date    DIALYSIS FISTULA CREATION Right     LIVER TRANSPLANT  2017    TUNNELED VENOUS PORT PLACEMENT         Social Hx:  Social History     Socioeconomic History    Marital status: Single   Tobacco Use    Smoking status: Former     Packs/day: 0.50     Years: 15.00     Pack years: 7.50     Types: Cigarettes     Quit date: 2017     Years since quittin.7    Smokeless tobacco: Never   Substance and Sexual Activity    Alcohol use: No    Drug use: No       Family Hx:  No family history on file. Review of Systems:   Review of Systems   Unable to perform ROS: Intubated       Physical Examination:    BP (!) 149/33   Pulse 56   Temp 97.2 °F (36.2 °C) (Rectal)   Resp 18   Ht 5' 6\" (1.676 m)   Wt 148 lb 9.4 oz (67.4 kg)   SpO2 100%   BMI 23.98 kg/m²    Physical Exam  Vitals and nursing note reviewed. HENT:      Head: Normocephalic and atraumatic. Mouth/Throat:      Mouth: Mucous membranes are moist.      Pharynx: Oropharynx is clear. Eyes:      Extraocular Movements: Extraocular movements intact. Conjunctiva/sclera: Conjunctivae normal.      Pupils: Pupils are equal, round, and reactive to light. Cardiovascular:      Rate and Rhythm: Normal rate and regular rhythm. Pulses: Normal pulses. Heart sounds: Normal heart sounds. Pulmonary:      Effort: Pulmonary effort is normal.      Breath sounds: Normal breath sounds. No rales. Abdominal:      General: Abdomen is flat.  Bowel sounds are normal.      Palpations: Abdomen is soft. Musculoskeletal:         General: Normal range of motion. Cervical back: Normal range of motion and neck supple. Skin:     General: Skin is warm.        LABS:  CBC:  Lab Results   Component Value Date/Time    WBC 10.4 07/27/2022 04:40 AM    RBC 3.49 07/27/2022 04:40 AM    RBC 3.34 02/20/2012 05:15 AM    HGB 12.5 07/27/2022 04:40 AM    HCT 36.1 07/27/2022 04:40 AM    .5 07/27/2022 04:40 AM    MCH 35.7 07/27/2022 04:40 AM    MCHC 34.5 07/27/2022 04:40 AM    RDW 15.1 07/27/2022 04:40 AM     07/27/2022 04:40 AM    MPV 9.5 10/09/2015 02:00 PM     CBC with Differential:   Lab Results   Component Value Date/Time    WBC 10.4 07/27/2022 04:40 AM    RBC 3.49 07/27/2022 04:40 AM    RBC 3.34 02/20/2012 05:15 AM    HGB 12.5 07/27/2022 04:40 AM    HCT 36.1 07/27/2022 04:40 AM     07/27/2022 04:40 AM    .5 07/27/2022 04:40 AM    MCH 35.7 07/27/2022 04:40 AM    MCHC 34.5 07/27/2022 04:40 AM    RDW 15.1 07/27/2022 04:40 AM    BANDSPCT 1 07/25/2022 11:27 AM    METASPCT 1 05/28/2015 05:46 AM    LYMPHOPCT 4.4 07/27/2022 04:40 AM    PROMYELOPCT 1 11/07/2017 05:14 AM    MONOPCT 7.7 07/27/2022 04:40 AM    EOSPCT 3.1 02/20/2012 05:15 AM    BASOPCT 0.5 07/27/2022 04:40 AM    MONOSABS 0.8 07/27/2022 04:40 AM    LYMPHSABS 0.5 07/27/2022 04:40 AM    EOSABS 0.1 07/27/2022 04:40 AM    BASOSABS 0.0 07/27/2022 04:40 AM     CMP:    Lab Results   Component Value Date/Time     07/27/2022 05:54 AM    K 5.1 07/27/2022 05:54 AM    CL 92 07/27/2022 05:54 AM    CO2 26 07/27/2022 05:54 AM    BUN 28 07/27/2022 05:54 AM    CREATININE 5.33 07/27/2022 05:54 AM    GFRAA 13.0 07/27/2022 05:54 AM    LABGLOM 10.8 07/27/2022 05:54 AM    GLUCOSE 134 07/27/2022 05:54 AM    GLUCOSE 136 02/25/2012 05:10 PM    PROT 7.5 07/25/2022 11:27 AM    LABALBU 3.2 07/27/2022 05:54 AM    LABALBU 2.8 02/23/2012 06:40 AM    CALCIUM 8.9 07/27/2022 05:54 AM    BILITOT 0.7 07/25/2022 11:27 AM ALKPHOS 87 07/25/2022 11:27 AM    AST 77 07/25/2022 11:27 AM    ALT 79 07/25/2022 11:27 AM     BMP:    Lab Results   Component Value Date/Time     07/27/2022 05:54 AM    K 5.1 07/27/2022 05:54 AM    CL 92 07/27/2022 05:54 AM    CO2 26 07/27/2022 05:54 AM    BUN 28 07/27/2022 05:54 AM    LABALBU 3.2 07/27/2022 05:54 AM    LABALBU 2.8 02/23/2012 06:40 AM    CREATININE 5.33 07/27/2022 05:54 AM    CALCIUM 8.9 07/27/2022 05:54 AM    GFRAA 13.0 07/27/2022 05:54 AM    LABGLOM 10.8 07/27/2022 05:54 AM    GLUCOSE 134 07/27/2022 05:54 AM    GLUCOSE 136 02/25/2012 05:10 PM     Magnesium:    Lab Results   Component Value Date/Time    MG 2.0 07/25/2022 11:27 AM    MG 2.0 02/19/2012 12:42 PM     Troponin:    Lab Results   Component Value Date/Time    TROPONINI 0.126 07/27/2022 04:39 AM          EKG: sinus tachycardia      Assessment:    Active Hospital Problems    Diagnosis Date Noted    Acute respiratory failure with hypoxia ([facility] [address] [J96.01] 07/25/2022     Priority: Medium     Bradycardia.   Currently sinus tachycardia  Acute hypoxic respiratory failure currently ventilator  End-stage renal disease on hemodialysis  Extensive upper extremity DVTs  Pleural effusion status postthoracentesis  History of liver transplant  History of hypertension  Septic shock  Hyperkalemia  Positive troponins 0.126 in the setting of end-stage renal disease EKG without active signs of ischemia       Plan:  Continue ICU  Wean off pressors as tolerated  Wean off vent as tolerated  Obtain an echocardiogram  Please keep potassium between 4 and 5 magnesium above 2  Please keep hemoglobin above 8  Once patient is more hemodynamically stable we can restart cardioprotective medications including beta-blocker and possibly ACE inhibitor  Most likely patient will need an ischemic evaluation as an outpatient once acute conditions resolved  Continue management of rest of comorbidities as per primary team  Consider anticoagulation for extensive DVT  Further

## 2022-07-27 NOTE — PROGRESS NOTES
Hospitalist Daily Progress Note  Name: Rolanda Diggs  Age: 75 y.o. Gender: male  CodeStatus: Full Code  Allergies: No Known Allergies    Chief Complaint:Shortness of Breath (C/O SOB  AFTER DIALYSIS  88 % ON RA  GIVEN 2 DUONEBS  SOLU MEDROL )      Primary Care Provider: Geovanny Garcia MD    InpatientTreatment Team: Treatment Team: Attending Provider: North Mace MD; Consulting Physician: Robert Andrews MD; Consulting Physician: Ratna Ortiz MD; Consulting Physician: Daljit Gandara MD; : Bob Lancaster, BAILEY; Consulting Physician: Sohrty Zapata DPM; Registered Nurse: Osito Dewey, RN; Utilization Reviewer: Bala Guerra, RN; Russell Aleman Nurse: Clementina Alas RN; Consulting Physician: Jhonny Simons MD    Admission Date: 7/25/2022      Subjective: Intubated, sedated    Physical Exam  Vitals and nursing note reviewed. Constitutional:       Comments: Intubated, sedated   Cardiovascular:      Rate and Rhythm: Normal rate and regular rhythm. Pulmonary:      Effort: Pulmonary effort is normal.      Breath sounds: Normal breath sounds. Abdominal:      General: Bowel sounds are normal.      Palpations: Abdomen is soft. Skin:     General: Skin is warm and dry. Neurological:      Comments: Intubated, sedated.        Medications:  Reviewed    Infusion Medications:    sodium chloride      heparin (PORCINE) Infusion 15 Units/kg/hr (07/27/22 1438)    vasopressin (Septic Shock) infusion Stopped (07/26/22 1738)    propofol 30 mcg/kg/min (07/27/22 1438)    fentaNYL 100 mcg/hr (07/27/22 1438)    dextrose      sodium chloride      norepinephrine 14 mcg/min (07/27/22 1438)     Scheduled Medications:    tacrolimus  1 mg Oral BID    sodium chloride flush  5-40 mL IntraVENous 2 times per day    piperacillin-tazobactam  3,375 mg IntraVENous Q12H    vancomycin (VANCOCIN) intermittent dosing (placeholder)   Other RX Placeholder    chlorhexidine  15 mL Mouth/Throat BID    famotidine (PEPCID) VW)    Narrative  EXAMINATION:  CHEST. CLINICAL HISTORY:  SHORTNESS OF BREATH.    COMPARISONS:  11/4/2017. TECHNIQUE:  PA and lateral views. FINDINGS:  Heart size and contour are within normal limits. Pulmonary vascularity appears normal. There are small effusions bilaterally. There is a suggestion of small interstitial infiltrates. No definite evidence of a mass or adenopathy. No significant  bony abnormality. Impression  POSSIBLE SLIGHT INTERSTITIAL PNEUMONIA BOTH LOWER LUNGS. SMALL PLEURAL EFFUSIONS BILATERALLY. Results for orders placed during the hospital encounter of 10/31/17    XR CHEST STANDARD (2 VW)    Narrative  XR CHEST STANDARD TWO VW: 11/4/2017    CLINICAL HISTORY:  Pneumonia . COMPARISON: Portable chest 5/20/2015 and 2 view chest 12/23/2013. A portable upright AP radiograph of the chest was obtained. FINDINGS:    A right internal jugular approach hemodialysis catheter has been placed since the prior study with its tip within the right atrium. There is no developing pulmonary infiltrate, cardiomegaly, pleural effusion, significant vascular congestion, pneumothorax, or acute fractures identified. A moderate compression fracture T9 appears old. The cardiac and mediastinal silhouettes appear within normal limits for technique. Impression  NO EVIDENCE OF ACTIVE CARDIOPULMONARY DISEASE. Portable: Results for orders placed during the hospital encounter of 07/25/22    XR CHEST PORTABLE    Narrative  XR CHEST PORTABLE : 7/25/2022    CLINICAL HISTORY:  post intubation . COMPARISON: Earlier 7/25/2022    TECHNIQUE: A portable upright AP radiograph of the chest was obtained at approximately 12:46 PM.      FINDINGS:    Both lung bases have been excluded from the radiograph. An endotracheal tube is present, with its tip approximately 4 to 5 cm above the lucita. An orogastric tube probably extends below the diaphragm out of field of view radiograph.     Moderate pleural effusions and mild to moderate probable scarring and/or atelectasis of the mid to lower lung fields has not significantly changed. There is no cardiomegaly, pneumothorax, displaced fractures, or other significant changes identified. Impression  ENDOTRACHEAL AND OROGASTRIC TUBES IN EXPECTED POSITIONS. OTHERWISE, STABLE CHEST FROM EARLIER 7/25/2022. Echo No results found for this or any previous visit. Assessment/Plan:    Active Hospital Problems    Diagnosis Date Noted    Acute respiratory failure with hypoxia ([facility] [address] [J96.01] 07/25/2022     Priority: Medium     Acute hypoxic respiratory failure  Ventilated, intensivist consult, follow abg, ICU admission  7/26 - weaning as able, follow abg  7/27 - cont supportive care  Septic shock 2/2 PNA  Questionable on imaging, labs likely elevated 2/2 ESRD. However will cover with broad spectrum abx, obtain blood cultures, follow  7/26 - sputum culture  7/27 - cont broad abx  Extensive bue DVT with central involvement  7/27 - heparin gtt  Pulmonary edema  Patient to likely need some additional fluid removal through HD, d/w Dr. Ashlyn Malone, to evaluate.   7/26 - HD per nephrology  ESRD on HD  Consult nephrology  Elevated troponin  Cycle trops x 2  DVT proph    Electronically signed by Elias Joseph MD on 7/27/2022 at 3:43 PM

## 2022-07-27 NOTE — PROGRESS NOTES
Received bedside report from [facility]. Patient continues on sedation and Norepinephrine for blood pressure support. Norepinephrine at 15mcg, Vassopressin started for blood pressure support (See Emar). Both titrated throughout night. Patient continues on Heparin gtt, hepain drip titrated per protocol. Patient received bath, linen change. Two wounds noted during bath on buttocks and coccyx, Wound team consulted. Patient temperature unobtainable at start of shift, rectal probe placed, temperature probe reading around 94 degrees F, bear hugger started, patient temperature back to 97 at 4 am.  Xa result this am therapeutic, no titration to heparin gtt.

## 2022-07-27 NOTE — PROGRESS NOTES
[facility] Pharmacokinetic Monitoring Service - Vancomycin    Consulting Provider: Reyna Fung   Indication: sepsis  Target Concentration: Pre-Dialysis Concentration 21-24 mg/L  Day of Therapy: 3  Additional Antimicrobials: Zosyn    Pertinent Laboratory Values: Wt Readings from Last 1 Encounters:   07/25/22 148 lb 9.4 oz (67.4 kg)     Temp Readings from Last 1 Encounters:   07/27/22 97.9 °F (36.6 °C) (Rectal)     Recent Labs     07/26/22  1039 07/27/22  0440 07/27/22  0554   CREATININE 5.05*  --  5.33*   WBC 13.9* 10.4  --      Recent vancomycin administrations                     vancomycin 1000 mg IVPB in 250 mL D5W addavial (mg) 1,000 mg New Bag 07/25/22 1808                  Assessment:  Date/Time Current Dose Dialysis Session   7-25-22 1000 mg x1 HD MWF , last 7/25 outpatient   7-27-22  1000 mg x1 HD Tues 7/26 (usually MWF)     Vancomycin Rm   Date Value Ref Range Status   07/27/2022 11.9 10.0 - 40.0 ug/mL Final     Plan:  Concentration-guided dosing due to renal impairment and intermittent hemodialysis   Current dosing 1000mg x1 dose on 7/25  Will order vancomycin 1000 mg x1 dose for today   Patient is normally MWF HD, however no dialysis today per nephrology. Plan is to reevaluate need for dialysis tomorrow .  Last HD session 7/26  Vancomycin concentration ordered for tomorrow morning in anticipation of potential dialysis   Pharmacy will continue to monitor patient and adjust therapy as indicated    Thank you for the consult,    Timothy Graff, PharmD  7/27/2022 1:56 PM

## 2022-07-27 NOTE — PLAN OF CARE
psychosocial status, comfort, nutrition and hydration     Problem: Pain  Goal: Verbalizes/displays adequate comfort level or baseline comfort level  Outcome: Progressing     Problem: Skin/Tissue Integrity  Goal: Absence of new skin breakdown  Description: 1. Monitor for areas of redness and/or skin breakdown  2. Assess vascular access sites hourly  3. Every 4-6 hours minimum:  Change oxygen saturation probe site  4. Every 4-6 hours:  If on nasal continuous positive airway pressure, respiratory therapy assess nares and determine need for appliance change or resting period.   Outcome: Progressing     Problem: Safety - Adult  Goal: Free from fall injury  Outcome: Progressing     Problem: ABCDS Injury Assessment  Goal: Absence of physical injury  Outcome: Progressing     Problem: Nutrition Deficit:  Goal: Optimize nutritional status  Outcome: Progressing

## 2022-07-28 LAB
ALBUMIN SERPL-MCNC: 3 G/DL (ref 3.5–4.6)
ANION GAP SERPL CALCULATED.3IONS-SCNC: 16 MEQ/L (ref 9–15)
ANTI-XA UNFRAC HEPARIN: 0.33 IU/ML
BASE EXCESS ARTERIAL: 3 (ref -3–3)
BASE EXCESS ARTERIAL: 4 (ref -3–3)
BASOPHILS ABSOLUTE: 0 K/UL (ref 0–0.2)
BASOPHILS RELATIVE PERCENT: 0.3 %
BUN BLDV-MCNC: 40 MG/DL (ref 8–23)
CALCIUM IONIZED: 1.03 MMOL/L (ref 1.12–1.32)
CALCIUM IONIZED: 1.08 MMOL/L (ref 1.12–1.32)
CALCIUM SERPL-MCNC: 8.6 MG/DL (ref 8.5–9.9)
CHLORIDE BLD-SCNC: 88 MEQ/L (ref 95–107)
CO2: 26 MEQ/L (ref 20–31)
CREAT SERPL-MCNC: 6.33 MG/DL (ref 0.7–1.2)
EOSINOPHILS ABSOLUTE: 0.2 K/UL (ref 0–0.7)
EOSINOPHILS RELATIVE PERCENT: 1.9 %
GFR AFRICAN AMERICAN: 10
GFR AFRICAN AMERICAN: 10
GFR AFRICAN AMERICAN: 10.7
GFR NON-AFRICAN AMERICAN: 8
GFR NON-AFRICAN AMERICAN: 8
GFR NON-AFRICAN AMERICAN: 8.8
GLUCOSE BLD-MCNC: 100 MG/DL (ref 70–99)
GLUCOSE BLD-MCNC: 105 MG/DL (ref 70–99)
GLUCOSE BLD-MCNC: 107 MG/DL (ref 70–99)
GLUCOSE BLD-MCNC: 80 MG/DL (ref 70–99)
GLUCOSE BLD-MCNC: 89 MG/DL (ref 70–99)
GLUCOSE BLD-MCNC: 91 MG/DL (ref 70–99)
GLUCOSE BLD-MCNC: 92 MG/DL (ref 70–99)
HCO3 ARTERIAL: 27.9 MMOL/L (ref 21–29)
HCO3 ARTERIAL: 28 MMOL/L (ref 21–29)
HCT VFR BLD CALC: 34.4 % (ref 42–52)
HEMOGLOBIN: 11.4 G/DL (ref 14–18)
HEMOGLOBIN: 13.2 GM/DL (ref 13.5–17.5)
HEMOGLOBIN: 13.2 GM/DL (ref 13.5–17.5)
LACTATE: 0.75 MMOL/L (ref 0.4–2)
LACTATE: 1.18 MMOL/L (ref 0.4–2)
LYMPHOCYTES ABSOLUTE: 0.3 K/UL (ref 1–4.8)
LYMPHOCYTES RELATIVE PERCENT: 3.4 %
MCH RBC QN AUTO: 34.5 PG (ref 27–31.3)
MCHC RBC AUTO-ENTMCNC: 33 % (ref 33–37)
MCV RBC AUTO: 104.5 FL (ref 80–100)
MONOCYTES ABSOLUTE: 0.7 K/UL (ref 0.2–0.8)
MONOCYTES RELATIVE PERCENT: 8.5 %
NEUTROPHILS ABSOLUTE: 7.5 K/UL (ref 1.4–6.5)
NEUTROPHILS RELATIVE PERCENT: 85.9 %
O2 SAT, ARTERIAL: 97 % (ref 93–100)
O2 SAT, ARTERIAL: 98 % (ref 93–100)
PCO2 ARTERIAL: 42 MM HG (ref 35–45)
PCO2 ARTERIAL: 47 MM HG (ref 35–45)
PDW BLD-RTO: 14.6 % (ref 11.5–14.5)
PERFORMED ON: ABNORMAL
PERFORMED ON: NORMAL
PH ARTERIAL: 7.38 (ref 7.35–7.45)
PH ARTERIAL: 7.43 (ref 7.35–7.45)
PHOSPHORUS: 4.4 MG/DL (ref 2.3–4.8)
PLATELET # BLD: 133 K/UL (ref 130–400)
PO2 ARTERIAL: 109 MM HG (ref 75–108)
PO2 ARTERIAL: 98 MM HG (ref 75–108)
POC CHLORIDE: 94 MEQ/L (ref 99–110)
POC CHLORIDE: 95 MEQ/L (ref 99–110)
POC CREATININE: 6.6 MG/DL (ref 0.8–1.3)
POC CREATININE: 6.8 MG/DL (ref 0.8–1.3)
POC FIO2: 35
POC FIO2: 40
POC HEMATOCRIT: 39 % (ref 41–53)
POC HEMATOCRIT: 39 % (ref 41–53)
POC POTASSIUM: 3 MEQ/L (ref 3.5–5.1)
POC POTASSIUM: 3.4 MEQ/L (ref 3.5–5.1)
POC SAMPLE TYPE: ABNORMAL
POC SAMPLE TYPE: ABNORMAL
POC SODIUM: 131 MEQ/L (ref 136–145)
POC SODIUM: 133 MEQ/L (ref 136–145)
POTASSIUM SERPL-SCNC: 3.3 MEQ/L (ref 3.4–4.9)
RBC # BLD: 3.29 M/UL (ref 4.7–6.1)
SODIUM BLD-SCNC: 130 MEQ/L (ref 135–144)
TCO2 ARTERIAL: 29 MMOL/L (ref 21–32)
TCO2 ARTERIAL: 30 MMOL/L (ref 21–32)
VANCOMYCIN RANDOM: 24.2 UG/ML (ref 10–40)
WBC # BLD: 8.8 K/UL (ref 4.8–10.8)

## 2022-07-28 PROCEDURE — 2500000003 HC RX 250 WO HCPCS: Performed by: NURSE PRACTITIONER

## 2022-07-28 PROCEDURE — 82435 ASSAY OF BLOOD CHLORIDE: CPT

## 2022-07-28 PROCEDURE — 37799 UNLISTED PX VASCULAR SURGERY: CPT

## 2022-07-28 PROCEDURE — 2700000000 HC OXYGEN THERAPY PER DAY

## 2022-07-28 PROCEDURE — 2580000003 HC RX 258: Performed by: FAMILY MEDICINE

## 2022-07-28 PROCEDURE — 80069 RENAL FUNCTION PANEL: CPT

## 2022-07-28 PROCEDURE — 85014 HEMATOCRIT: CPT

## 2022-07-28 PROCEDURE — A4216 STERILE WATER/SALINE, 10 ML: HCPCS | Performed by: NURSE PRACTITIONER

## 2022-07-28 PROCEDURE — 2580000003 HC RX 258: Performed by: INTERNAL MEDICINE

## 2022-07-28 PROCEDURE — 83605 ASSAY OF LACTIC ACID: CPT

## 2022-07-28 PROCEDURE — 99233 SBSQ HOSP IP/OBS HIGH 50: CPT | Performed by: INTERNAL MEDICINE

## 2022-07-28 PROCEDURE — 6360000002 HC RX W HCPCS: Performed by: FAMILY MEDICINE

## 2022-07-28 PROCEDURE — 85025 COMPLETE CBC W/AUTO DIFF WBC: CPT

## 2022-07-28 PROCEDURE — 82330 ASSAY OF CALCIUM: CPT

## 2022-07-28 PROCEDURE — 5A1D70Z PERFORMANCE OF URINARY FILTRATION, INTERMITTENT, LESS THAN 6 HOURS PER DAY: ICD-10-PCS | Performed by: INTERNAL MEDICINE

## 2022-07-28 PROCEDURE — 90935 HEMODIALYSIS ONE EVALUATION: CPT

## 2022-07-28 PROCEDURE — 82948 REAGENT STRIP/BLOOD GLUCOSE: CPT

## 2022-07-28 PROCEDURE — 2580000003 HC RX 258: Performed by: NURSE PRACTITIONER

## 2022-07-28 PROCEDURE — C8923 2D TTE W OR W/O FOL W/CON,CO: HCPCS

## 2022-07-28 PROCEDURE — 6360000002 HC RX W HCPCS: Performed by: INTERNAL MEDICINE

## 2022-07-28 PROCEDURE — 6370000000 HC RX 637 (ALT 250 FOR IP): Performed by: NURSE PRACTITIONER

## 2022-07-28 PROCEDURE — 82565 ASSAY OF CREATININE: CPT

## 2022-07-28 PROCEDURE — 85520 HEPARIN ASSAY: CPT

## 2022-07-28 PROCEDURE — 2000000000 HC ICU R&B

## 2022-07-28 PROCEDURE — 84132 ASSAY OF SERUM POTASSIUM: CPT

## 2022-07-28 PROCEDURE — 0HBRXZZ EXCISION OF TOE NAIL, EXTERNAL APPROACH: ICD-10-PCS | Performed by: PODIATRIST

## 2022-07-28 PROCEDURE — 84295 ASSAY OF SERUM SODIUM: CPT

## 2022-07-28 PROCEDURE — 80202 ASSAY OF VANCOMYCIN: CPT

## 2022-07-28 PROCEDURE — 82803 BLOOD GASES ANY COMBINATION: CPT

## 2022-07-28 PROCEDURE — 6360000004 HC RX CONTRAST MEDICATION: Performed by: INTERNAL MEDICINE

## 2022-07-28 PROCEDURE — 99221 1ST HOSP IP/OBS SF/LOW 40: CPT | Performed by: NURSE PRACTITIONER

## 2022-07-28 PROCEDURE — 2500000003 HC RX 250 WO HCPCS: Performed by: FAMILY MEDICINE

## 2022-07-28 PROCEDURE — 94003 VENT MGMT INPAT SUBQ DAY: CPT

## 2022-07-28 PROCEDURE — 6360000002 HC RX W HCPCS: Performed by: NURSE PRACTITIONER

## 2022-07-28 PROCEDURE — 99291 CRITICAL CARE FIRST HOUR: CPT | Performed by: INTERNAL MEDICINE

## 2022-07-28 RX ORDER — POLYETHYLENE GLYCOL 3350 17 G/17G
17 POWDER, FOR SOLUTION ORAL DAILY
Status: DISCONTINUED | OUTPATIENT
Start: 2022-07-28 | End: 2022-08-19 | Stop reason: HOSPADM

## 2022-07-28 RX ORDER — POTASSIUM CHLORIDE 29.8 MG/ML
20 INJECTION INTRAVENOUS ONCE
Status: COMPLETED | OUTPATIENT
Start: 2022-07-28 | End: 2022-07-28

## 2022-07-28 RX ADMIN — TACROLIMUS 1 MG: 1 CAPSULE ORAL at 20:36

## 2022-07-28 RX ADMIN — DOCUSATE SODIUM 100 MG: 50 LIQUID ORAL at 20:36

## 2022-07-28 RX ADMIN — Medication 10 ML: at 07:27

## 2022-07-28 RX ADMIN — POTASSIUM CHLORIDE 20 MEQ: 29.8 INJECTION, SOLUTION INTRAVENOUS at 08:40

## 2022-07-28 RX ADMIN — HEPARIN SODIUM AND DEXTROSE 15 UNITS/KG/HR: 10000; 5 INJECTION INTRAVENOUS at 22:14

## 2022-07-28 RX ADMIN — POLYETHYLENE GLYCOL 3350 17 G: 17 POWDER, FOR SOLUTION ORAL at 10:18

## 2022-07-28 RX ADMIN — TACROLIMUS 1 MG: 1 CAPSULE ORAL at 07:25

## 2022-07-28 RX ADMIN — Medication 10 ML: at 20:36

## 2022-07-28 RX ADMIN — DOCUSATE SODIUM 100 MG: 50 LIQUID ORAL at 10:18

## 2022-07-28 RX ADMIN — Medication 5 MCG/MIN: at 14:54

## 2022-07-28 RX ADMIN — PIPERACILLIN AND TAZOBACTAM 3375 MG: 3; .375 INJECTION, POWDER, LYOPHILIZED, FOR SOLUTION INTRAVENOUS at 07:38

## 2022-07-28 RX ADMIN — FAMOTIDINE 20 MG: 10 INJECTION, SOLUTION INTRAVENOUS at 07:25

## 2022-07-28 RX ADMIN — PIPERACILLIN AND TAZOBACTAM 3375 MG: 3; .375 INJECTION, POWDER, LYOPHILIZED, FOR SOLUTION INTRAVENOUS at 20:54

## 2022-07-28 RX ADMIN — CHLORHEXIDINE GLUCONATE 15 ML: 1.2 RINSE ORAL at 07:25

## 2022-07-28 RX ADMIN — PERFLUTREN 1.65 MG: 6.52 INJECTION, SUSPENSION INTRAVENOUS at 08:15

## 2022-07-28 RX ADMIN — CHLORHEXIDINE GLUCONATE 15 ML: 1.2 RINSE ORAL at 20:43

## 2022-07-28 RX ADMIN — VANCOMYCIN HYDROCHLORIDE 500 MG: 500 INJECTION, POWDER, LYOPHILIZED, FOR SOLUTION INTRAVENOUS at 20:33

## 2022-07-28 RX ADMIN — FENTANYL CITRATE 100 MCG/HR: 0.05 INJECTION, SOLUTION INTRAMUSCULAR; INTRAVENOUS at 04:14

## 2022-07-28 RX ADMIN — PROPOFOL 20 MCG/KG/MIN: 10 INJECTION, EMULSION INTRAVENOUS at 07:37

## 2022-07-28 ASSESSMENT — PULMONARY FUNCTION TESTS
PIF_VALUE: 23
PIF_VALUE: 17
PIF_VALUE: 9.3
PIF_VALUE: 20

## 2022-07-28 ASSESSMENT — PAIN SCALES - GENERAL
PAINLEVEL_OUTOF10: 0
PAINLEVEL_OUTOF10: 0

## 2022-07-28 NOTE — PROGRESS NOTES
Pulmonary & Critical Care Medicine ICU Progress Note  Chief complaint : Shortness of breath     Subjunctive/24 hour events :   Patient seen and examined during multidisciplinary rounds with RN, charge nurse, RT, pharmacy, dietitian, and social service. Patient remains on the vent FiO2 35% and peep 5, currently tolerating SBT, O2 sat 100%. Sedation is currently off. Remains on levophed at 5 mcg/min and heparin drip. No fever overnight and he is anuric. Last BM none noted. Social History     Tobacco Use    Smoking status: Former     Packs/day: 0.50     Years: 15.00     Pack years: 7.50     Types: Cigarettes     Quit date: 2017     Years since quittin.7    Smokeless tobacco: Never   Substance Use Topics    Alcohol use: No     No family history on file. Recent Labs     22  0340 22  0437   PHART 7.544* 7.435   [id number] 32* 42   PO2ART 144* 109*       MV Settings:  Vent Mode: CPAP Resp Rate (Set): 18 bmp/Vt (Set, mL): 390 mL/ /FiO2 : 35 %           IV:   sodium chloride      heparin (PORCINE) Infusion 15 Units/kg/hr (22 1012)    vasopressin (Septic Shock) infusion Stopped (22 1738)    propofol Stopped (22 0843)    fentaNYL Stopped (22 0847)    dextrose      sodium chloride      norepinephrine 6 mcg/min (22 1012)       Vitals:  BP (!) 102/52   Pulse 82   Temp 97.2 °F (36.2 °C) (Rectal)   Resp 13   Ht 5' 6\" (1.676 m)   Wt 148 lb 9.4 oz (67.4 kg)   SpO2 100%   BMI 23.98 kg/m²    Tmax:       Intake/Output Summary (Last 24 hours) at 2022 1041  Last data filed at 2022 1012  Gross per 24 hour   Intake 2474.3 ml   Output --   Net 2474.3 ml       EXAM:    General: alert, fatigued, cooperative  Head: normocephalic, atraumatic  Eyes:No gross abnormalities. ENT:  MMM no lesions  Neck:  supple and no masses  Chest : Fair air movement minimal rales no wheezes   Heart[de-identified] Heart sounds are normal.  Regular rate and rhythm without murmur, gallop or rub.   ABD: BLOODU, GLUCOSEU, AMORPHOUS in the last 72 hours. Invalid input(s): KETONESU    Cultures:    Echocardiogram complete 2D with doppler with color    Result Date: 7/27/2022  Transthoracic Echocardiography Report (TTE)  Demographics   Patient Name    [facility]       Gender               Male                  Christina Delgado   Patient Number  [id number]       Race                                                   Ethnicity   Visit Number    [id number]      Room Number          IC12   Corporate ID                   Date of Study        07/27/2022   Accession       [id number]     Referring Physician  Number   Date of Birth   1955     Sonographer          Roge Francois   Age             75 year(s)     Interpreting         [facility])                                 Physician            Cardiology                                                      [facility]  Procedure Type of Study   TTE procedure:ECHO COMPLETE 2D W/DOP W/COLOR. Procedure Date Date: 07/27/2022 Start: 10:27 AM Study Location: Portable Technical Quality: Limited visualization due to lung interface. Indications:LVF. Patient Status: Routine Height: 66 inches Weight: 148 pounds BSA: 1.76 m^2 BMI: 23.89 kg/m^2 BP: 96/54 mmHg  Conclusions   Summary  Left ventricular ejection fraction is estimated at 45%. Diastolic Dysfunction is noted by mitral flow. Normal right ventricle systolic pressure. RVSP 28mmHg  Echogenic mass in the apex concerning for thrombus recommend limited echo  with definity  No hemodynamic evidence of significant valve disease   Signature   ----------------------------------------------------------------  Electronically signed by Gordo Ricks(Interpreting physician)  on 07/27/2022 03:48 PM  ----------------------------------------------------------------   Findings  Left Ventricle Left ventricular ejection fraction is estimated at 45%. Left ventricular size is normal . Normal left ventricular wall thickness.  Diastolic Dysfunction is noted by mitral flow. Echogenic mass in the apex possible thrombus Right Ventricle Normal right ventricle structure and function. Normal right ventricle systolic pressure. RVSP 28mmHg Left Atrium Normal left atrium. Right Atrium Normal right atrium. Mitral Valve Diffusely thickened and pliable mitral valve leaflets with normal excursion. Structurally normal mitral valve. No evidence of mitral valve stenosis. No evidence of mitral regurgitaton. Tricuspid Valve Tricuspid valve is structurally normal. No evidence of tricuspid stenosis. No evidence of tricuspid regurgitation. Aortic Valve Mildly thickened trileaflet aortic valve with normal excursion. Structurally normal aortic valve. No evidence of aortic valve stenosis . No evidence of aortic valve regurgitation . Pulmonic Valve The pulmonic valve was not well visualized . Pericardial Effusion No evidence of pericardial effusion. Aorta \ Miscellaneous The aorta is within normal limits. M-Mode Measurements (cm)   LVIDd: 2.97 cm                         LVIDs: 2.34 cm  IVSd: 0.73 cm                          IVSs: 1.01 cm  LVPWd: 0.9 cm                          LVPWs: 1.01 cm  Rt. Vent.  Dimension: 3.12 cm           AO Root Dimension: 2.1 cm                                         ACS: 1.37 cm                                         LA: 1.82 cm                                         LVOT: 2.03 cm  Doppler Measurements:   AV Velocity:0.02 m/s                    MV Peak E-Wave: 0.5 m/s  AV Peak Gradient: 5.77 mmHg             MV Peak A-Wave: 0.53 m/s  AV Mean Gradient: 3.05 mmHg  AV Area (Continuity):1.97 cm^2  TR Velocity:2.52 m/s                    Estimated RAP:3 mmHg  TR Gradient:25.31 mmHg                  RVSP:28.31 mmHg  Valves  Mitral Valve   Peak E-Wave: 0.5 m/s                  Peak A-Wave: 0.53 m/s  Mean Velocity: 0.87 m/s               E/A Ratio: 0.94  Mean Gradient: 4.66 mmHg              Peak Gradient: 0.99 mmHg Deceleration Time: 353.3 msec                                        Area (continuity): 1.4 cm^2   Tissue Doppler   E' Septal Velocity: 0.07 m/s  E' Lateral Velocity: 0.07 m/s   Aortic Valve   Peak Velocity: 1.2 m/s                 Mean Velocity: 0.82 m/s  Peak Gradient: 5.77 mmHg               Mean Gradient: 3.05 mmHg  Area (continuity): 1.97 cm^2  AV VTI: 29.33 cm   Cusp Separation: 1.37 cm   Tricuspid Valve   Estimated RVSP: 28.31 mmHg              Estimated RAP: 3 mmHg  TR Velocity: 2.52 m/s                   TR Gradient: 25.31 mmHg   Pulmonic Valve   Peak Velocity: 0.99 m/s           Peak Gradient: 3.91 mmHg                                    Estimated PASP: 28.31 mmHg   LVOT   Peak Velocity: 0.97 m/s              Mean Velocity: 0.61 m/s  Peak Gradient: 3.77 mmHg             Mean Gradient: 1.79 mmHg  LVOT Diameter: 2.03 cm               LVOT VTI: 17.9 cm  Structures  Left Atrium   LA Dimension: 1.82 cm                        LA Area: 12.58 cm^2  LA/Aorta: 0.87  LA Volume/Index: 44.74 ml /25 m^2   Left Ventricle   Diastolic Dimension: 6.28 cm         Systolic Dimension: 8.07 cm  Septum Diastolic: 8.76 cm            Septum Systolic: 8.33 cm  PW Diastolic: 0.9 cm                 PW Systolic: 7.81 cm                                       FS: 21.2 %  LV EDV/LV EDV Index: 34.12 ml/19 m^2 LV ESV/LV ESV Index: 19.02 ml/11 m^2  EF Calculated: 44.3 %   LVOT Diameter: 2.03 cm   Right Ventricle   Diastolic Dimension: 7.36 cm                                    RV Systolic Pressure: 92.72 mmHg  Aorta/ Miscellaneous Aorta   Aortic Root: 2.1 cm  LVOT Diameter: 2.03 cm      ECHO Limited    Result Date: 7/28/2022  Transthoracic Echocardiography Report (TTE)  Demographics   Patient Name    [facility]       Gender               Male                  [facility]   Patient Number  [id number]       Race                                                   Ethnicity   Visit Number    [id number]      [facility] Number          [address] [place] [address] small collateral veins. 2. 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 Passage of contrast into the chest is through a small collateral veins. Radiation dose to the patient was: 24.21 mGy Additional clinical data: Long-term IV access Procedure: 1. Ultrasound guidance for vascular access into the right internal jugular vein. The ultrasound image of the blood vessel was saved to PACS. 2. Venogram via contrast injection of the right internal jugular vein. 3.   Ultrasound guidance for vascular access into the left internal jugular vein. The ultrasound image of the blood vessel was saved to PACS 4. Venogram via contrast injection of the left internal jugular vein. Body of Report: Informed and written consent was obtained from the patient following discussion of risks, benefits and alternatives to this procedure. The was patient placed supine on the angiographic table. The patient's neck and chest were then prepped and draped in  normal sterile fashion. A small amount of local lidocaine anesthesia was injected subcutaneously. Ultrasound was used to study the jugular vein we intended to use prior to accessing it. The vein appeared patent. The ultrasound image of the blood vessel was saved to PACS. Using ultrasound access, puncture was made of the right internal jugular vein using a 21 GA needle. A wire was advanced into the right internal jugular vein, though would not pass into the superior vena cava. A 4 Citizen of [place] short thin sheath was placed, through the sheath digital subtraction venography was performed. Venography demonstrated a thrombus in the right internal jugular vein and complete occlusion of the vein central to the thrombus. This access was aborted. Ultrasound was used to study the left jugular vein we intended to use prior to accessing it. The vein appeared patent.   The ultrasound image of the blood vessel was saved to PACS. Using ultrasound access, puncture was made of the left internal jugular vein using a 21 GA needle. A wire was attempted to be passed, though would not pass to the superior vena  cava. A 4 Telugu thin sheath was placed and digital subtraction venography was performed. 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 The procedure was aborted. Findings discussed with ICU team who will place a temporary central line in the groin. XR CHEST PORTABLE    Result Date: 7/27/2022  XR CHEST PORTABLE COMPARISON: July 25, 2022. HISTORY: resp failure TECHNIQUE: AP view FINDINGS: Endotracheal tube again seen in place. . There is also an enteric tube seen in place and the tip is below the diaphragm. They appear unchanged in position. A small pleural effusion seen on the right. The left costophrenic angle is not well seen. The lungs are hyperinflated and there is coarsening of the interstitium. Atelectasis and/or scarring is again seen bilaterally in the lower lung fields. The cardiac silhouette appears mildly enlarged but may be accentuated by the portable technique. Degenerative changes are seen in the visualized portion of the right shoulder. The airspace disease has diminished when compared to previous study. . A small pleural effusion is seen on the right and scarring or atelectasis is again seen bilaterally in the bases. US DUP UPPER EXTREMITY RIGHT VENOUS COMPARISON: HISTORY:  resp failure TECHNIQUE: , US DUP UPPER EXTREMITY RIGHT VENOUS AND LEFT VENOUS FINDINGS: Thrombus is seen in the right internal jugular and proximal subclavian, veins it is also seen in the cephalic vein. The right ulnar and basilic veins are not visualized. A right AV fistula seen. Thrombus is seen in the left internal jugular vein. The left basilic and axillary veins are not visualized. IMPRESSION: Deep venous thrombosis seen in the right and left upper extremities.  .    XR CHEST PORTABLE    Result Date: 7/25/2022  XR CHEST PORTABLE : 7/25/2022 CLINICAL HISTORY:  post intubation . COMPARISON: Earlier 7/25/2022 TECHNIQUE: A portable upright AP radiograph of the chest was obtained at approximately 12:46 PM. FINDINGS: Both lung bases have been excluded from the radiograph. An endotracheal tube is present, with its tip approximately 4 to 5 cm above the lucita. An orogastric tube probably extends below the diaphragm out of field of view radiograph. Moderate pleural effusions and mild to moderate probable scarring and/or atelectasis of the mid to lower lung fields has not significantly changed. There is no cardiomegaly, pneumothorax, displaced fractures, or other significant changes identified. ENDOTRACHEAL AND OROGASTRIC TUBES IN EXPECTED POSITIONS. OTHERWISE, STABLE CHEST FROM EARLIER 7/25/2022. XR CHEST PORTABLE    Result Date: 7/25/2022  TECHNIQUE: Single portable view of the chest. CLINICAL INDICATION: Chest pain. COMPARISON: Chest x-ray obtained on June 28, 2022. PROCEDURE AND FINDINGS: Poor inspiratory effort is seen. The cardiomediastinal silhouette is slightly prominent in size. Mild prominence of the bronchovascular and interstitial lung markings is visualized bilaterally, linear streaky opacities visualized in bilateral lung fields, subsegmental atelectatic streaks, fluid visualized in the right minor fissure. Airspace opacification visualized in the lower lung fields bilaterally with blunting of the costophrenic angles and obliteration of the hemidiaphragms consistent with bilateral pleural effusions. . No evidence of pneumothorax or parenchymal lung mass. Degenerative bone changes. Congestive heart failure versus pneumonia and parapneumonic effusions would recommend clinical correlation. Increased opacification seen in comparison to the prior study. US DUP UPPER EXTREMITY RIGHT VENOUS    Result Date: 7/27/2022  XR CHEST PORTABLE COMPARISON: July 25, 2022.  HISTORY: resp failure TECHNIQUE: AP view FINDINGS: Endotracheal tube again seen in place. . There is also an enteric tube seen in place and the tip is below the diaphragm. They appear unchanged in position. A small pleural effusion seen on the right. The left costophrenic angle is not well seen. The lungs are hyperinflated and there is coarsening of the interstitium. Atelectasis and/or scarring is again seen bilaterally in the lower lung fields. The cardiac silhouette appears mildly enlarged but may be accentuated by the portable technique. Degenerative changes are seen in the visualized portion of the right shoulder. The airspace disease has diminished when compared to previous study. . A small pleural effusion is seen on the right and scarring or atelectasis is again seen bilaterally in the bases. US DUP UPPER EXTREMITY RIGHT VENOUS COMPARISON: HISTORY:  resp failure TECHNIQUE: , US DUP UPPER EXTREMITY RIGHT VENOUS AND LEFT VENOUS FINDINGS: Thrombus is seen in the right internal jugular and proximal subclavian, veins it is also seen in the cephalic vein. The right ulnar and basilic veins are not visualized. A right AV fistula seen. Thrombus is seen in the left internal jugular vein. The left basilic and axillary veins are not visualized. IMPRESSION: Deep venous thrombosis seen in the right and left upper extremities. .    US DUP UPPER EXTREMITY LEFT VENOUS    Result Date: 7/27/2022  XR CHEST PORTABLE COMPARISON: July 25, 2022. HISTORY: resp failure TECHNIQUE: AP view FINDINGS: Endotracheal tube again seen in place. . There is also an enteric tube seen in place and the tip is below the diaphragm. They appear unchanged in position. A small pleural effusion seen on the right. The left costophrenic angle is not well seen. The lungs are hyperinflated and there is coarsening of the interstitium. Atelectasis and/or scarring is again seen bilaterally in the lower lung fields.  The cardiac silhouette appears mildly enlarged but may be accentuated by the portable technique. Degenerative changes are seen in the visualized portion of the right shoulder. The airspace disease has diminished when compared to previous study. . A small pleural effusion is seen on the right and scarring or atelectasis is again seen bilaterally in the bases. US DUP UPPER EXTREMITY RIGHT VENOUS COMPARISON: HISTORY:  resp failure TECHNIQUE: , US DUP UPPER EXTREMITY RIGHT VENOUS AND LEFT VENOUS FINDINGS: Thrombus is seen in the right internal jugular and proximal subclavian, veins it is also seen in the cephalic vein. The right ulnar and basilic veins are not visualized. A right AV fistula seen. Thrombus is seen in the left internal jugular vein. The left basilic and axillary veins are not visualized. IMPRESSION: Deep venous thrombosis seen in the right and left upper extremities. .    IR VENOGRAM VENOUS SINUS/JUGULAR    1. Venogram of the right internal jugular vein demonstrates a thrombus in the right internal jugular vein which completely occludes the vein. Passage of contrast into the chest is through small collateral veins. 2. 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 Passage of contrast into the chest is through a small collateral veins. Radiation dose to the patient was: 24.21 mGy Additional clinical data: Long-term IV access Procedure: 1. Ultrasound guidance for vascular access into the right internal jugular vein. The ultrasound image of the blood vessel was saved to PACS. 2. Venogram via contrast injection of the right internal jugular vein. 3.   Ultrasound guidance for vascular access into the left internal jugular vein. The ultrasound image of the blood vessel was saved to PACS 4. Venogram via contrast injection of the left internal jugular vein. Body of Report:  Informed and written consent was obtained from the patient following discussion of risks, benefits and alternatives to this procedure. The was patient placed supine on the angiographic table. The patient's neck and chest were then prepped and draped in  normal sterile fashion. A small amount of local lidocaine anesthesia was injected subcutaneously. Ultrasound was used to study the jugular vein we intended to use prior to accessing it. The vein appeared patent. The ultrasound image of the blood vessel was saved to PACS. Using ultrasound access, puncture was made of the right internal jugular vein using a 21 GA needle. A wire was advanced into the right internal jugular vein, though would not pass into the superior vena cava. A 4 [place] short thin sheath was placed, through the sheath digital subtraction venography was performed. Venography demonstrated a thrombus in the right internal jugular vein and complete occlusion of the vein central to the thrombus. This access was aborted. Ultrasound was used to study the left jugular vein we intended to use prior to accessing it. The vein appeared patent. The ultrasound image of the blood vessel was saved to PACS. Using ultrasound access, puncture was made of the left internal jugular vein using a 21 GA needle. A wire was attempted to be passed, though would not pass to the superior vena  cava. A 4 [place] thin sheath was placed and digital subtraction venography was performed. 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 The procedure was aborted. Findings discussed with ICU team who will place a temporary central line in the groin. IR ULTRASOUND GUIDANCE VASCULAR ACCESS    1. Venogram of the right internal jugular vein demonstrates a thrombus in the right internal jugular vein which completely occludes the vein. Passage of contrast into the chest is through small collateral veins.  2. 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 Passage of contrast into the chest is through a small collateral veins. Radiation dose to the patient was: 24.21 mGy Additional clinical data: Long-term IV access Procedure: 1. Ultrasound guidance for vascular access into the right internal jugular vein. The ultrasound image of the blood vessel was saved to PACS. 2. Venogram via contrast injection of the right internal jugular vein. 3.   Ultrasound guidance for vascular access into the left internal jugular vein. The ultrasound image of the blood vessel was saved to PACS 4. Venogram via contrast injection of the left internal jugular vein. Body of Report: Informed and written consent was obtained from the patient following discussion of risks, benefits and alternatives to this procedure. The was patient placed supine on the angiographic table. The patient's neck and chest were then prepped and draped in  normal sterile fashion. A small amount of local lidocaine anesthesia was injected subcutaneously. Ultrasound was used to study the jugular vein we intended to use prior to accessing it. The vein appeared patent. The ultrasound image of the blood vessel was saved to PACS. Using ultrasound access, puncture was made of the right internal jugular vein using a 21 GA needle. A wire was advanced into the right internal jugular vein, though would not pass into the superior vena cava. A 4 [place] short thin sheath was placed, through the sheath digital subtraction venography was performed. Venography demonstrated a thrombus in the right internal jugular vein and complete occlusion of the vein central to the thrombus. This access was aborted. Ultrasound was used to study the left jugular vein we intended to use prior to accessing it. The vein appeared patent. The ultrasound image of the blood vessel was saved to PACS. Using ultrasound access, puncture was made of the left internal jugular vein using a 21 GA needle.  A wire was attempted to be passed, though would not pass to the superior vena  cava. A 4 [place] thin sheath was placed and digital subtraction venography was performed. 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 The procedure was aborted. Findings discussed with ICU team who will place a temporary central line in the groin. US DUP LOWER EXTREMITIES BILATERAL VENOUS    Result Date: 7/27/2022  EXAMINATION: US DUP LOWER EXTREMITIES BILATERAL VENOUS CLINICAL HISTORY: 79-year-old with lower extremity swelling COMPARISONS: None available. FINDINGS: Duplex and color Doppler ultrasounds were performed of the bilateral lower extremity deep venous systems. Examination was performed portably and limited due to patient condition and access to the lower extremities. Right leg: Visualized portions of the right common femoral vein, femoral vein, popliteal vein demonstrate satisfactory compression, color flow, and augmentation. Deep calf veins are not evaluated. Left leg: The left common femoral vein is enlarged filled with intraluminal echoes with absent color flow and poor compression consistent with occluding thrombus. Occluding Thrombus extends into the left proximal femoral vein. Mid to distal femoral vein and popliteal vein demonstrate satisfactory compression and color flow. Deep calf veins are not assessed on this portable study     ULTRASOUND FINDINGS ARE POSITIVE FOR OCCLUDING THROMBUS IN THE LEFT COMMON FEMORAL VEIN EXTENDING INTO THE PROXIMAL FEMORAL VEIN. NO ULTRASOUND SIGNS OF DVT IN THE RIGHT LEG FROM THE GROIN TO THE POPLITEAL FOSSA    Most recent    Chest CT      WITH CONTRAST:No results found for this or any previous visit. WITHOUT CONTRAST: No results found for this or any previous visit. CXR      2-view: Results for orders placed during the hospital encounter of 06/28/22    XR CHEST (2 VW)    Narrative  EXAMINATION:  CHEST.     CLINICAL HISTORY:  SHORTNESS OF BREATH.    COMPARISONS: 11/4/2017. TECHNIQUE:  PA and lateral views. FINDINGS:  Heart size and contour are within normal limits. Pulmonary vascularity appears normal. There are small effusions bilaterally. There is a suggestion of small interstitial infiltrates. No definite evidence of a mass or adenopathy. No significant  bony abnormality. Impression  POSSIBLE SLIGHT INTERSTITIAL PNEUMONIA BOTH LOWER LUNGS. SMALL PLEURAL EFFUSIONS BILATERALLY. Results for orders placed during the hospital encounter of 10/31/17    XR CHEST STANDARD (2 VW)    Narrative  XR CHEST STANDARD TWO VW: 11/4/2017    CLINICAL HISTORY:  Pneumonia . COMPARISON: Portable chest 5/20/2015 and 2 view chest 12/23/2013. A portable upright AP radiograph of the chest was obtained. FINDINGS:    A right internal jugular approach hemodialysis catheter has been placed since the prior study with its tip within the right atrium. There is no developing pulmonary infiltrate, cardiomegaly, pleural effusion, significant vascular congestion, pneumothorax, or acute fractures identified. A moderate compression fracture T9 appears old. The cardiac and mediastinal silhouettes appear within normal limits for technique. Impression  NO EVIDENCE OF ACTIVE CARDIOPULMONARY DISEASE. Portable: Results for orders placed during the hospital encounter of 07/25/22    XR CHEST PORTABLE    Narrative  XR CHEST PORTABLE    COMPARISON: July 25, 2022. HISTORY: resp failure    TECHNIQUE: AP view      FINDINGS:  Endotracheal tube again seen in place. . There is also an enteric tube seen in place and the tip is below the diaphragm. They appear unchanged in position. A small pleural effusion seen on the right. The left costophrenic angle is not well seen. The lungs are hyperinflated and there is coarsening of the interstitium. Atelectasis and/or scarring is again seen bilaterally in the lower lung fields.  The  cardiac silhouette appears mildly enlarged but may be accentuated by the portable technique. Degenerative changes are seen in the visualized portion of the right shoulder. Impression  The airspace disease has diminished when compared to previous study. . A small pleural effusion is seen on the right and scarring or atelectasis is again seen bilaterally in the bases. US DUP UPPER EXTREMITY RIGHT VENOUS    COMPARISON:    HISTORY:  resp failure    TECHNIQUE: , US DUP UPPER EXTREMITY RIGHT VENOUS AND LEFT VENOUS    FINDINGS: Thrombus is seen in the right internal jugular and proximal subclavian, veins it is also seen in the cephalic vein. The right ulnar and basilic veins are not visualized. A right AV fistula seen. Thrombus is seen in the left internal jugular vein. The left basilic and axillary veins are not visualized. IMPRESSION: Deep venous thrombosis seen in the right and left upper extremities. .      Echo No results found for this or any previous visit. Assessment:   This is a critically ill patient at risk of deterioration / death , needing close ICU monitoring and intervention due to below noted problems   Acute hypoxic respiratory failure, requiring intubation   Large Right sided pleural effusion, thoracentesis   Right sided pneumonia   Shock, requiring vasopressors   Multiple DVT's, thrombus left common femoral vein, thrombus right internal jugular and proximal subclavian and thrombus in the left internal jugular   Hyponatremia   Hypokalemia   Chronic kidney disease on dialysis   Liver transplant   Lactic acidosis, improved     Recommendations   Vent support lung protective strategy  head of the bed 30°  Daily sedation holidays and breathing trials  Sedation with combination propofol and fentanyl target R ASS of 0 to -1  Watch for ICU delirium: TV on, natural light, avoid benzos, pain control, early mobility, and family engagement  PUD prophylaxis  DVT prophylaxis   Continue heparin drip   Continue levophed to maintain map>65  Laxatives Maintain blood sugar 140-180  Continue antibiotics   Dialysis today   Potassium replacement   EF 45%  Continue tube feeding   BAL negative   Thoracentesis fluid exudative, fluid still in process   SBT today                 Electronically signed by ISI Berg - CNP,  FCCP ,on 7/28/2022 at 10:42 AM

## 2022-07-28 NOTE — PROGRESS NOTES
Pt. Sedated on vent. Weaning down sedation, also weaning down levo. Vaso is off. Heparin drip remains running. Tolerating tube feed at 20cc/hour with residual of 40cc. Protein shot and water flush given per order. Tech in for echo with definity. IV potassium replacement ordered. Sedation off, pt. Opening eyes to voice, nods head, follows commands in all extremities, able to lift head off of bed. Called RT to SBT pt. Continuing to wean down levo. Pt.'s sister, Yong Collins, in room. Updated by RN and Dr. Dorian Coyne. SBT passed. ABG done. Pt. Extubed 2810 to 2L NC. During extubation, pt. Had 1x episode of emesis, tube feed appearance. Pt. Suctioned. Pt. Did not want oral care done. 1200- pt. Suddenly desatting to 86%, encouraged pt. To take deep breaths through nose, increased O2 to 8L, pt. Still satting 86-88%. RT called and pt. Placed on bipap. Pt. Up to high 90s pulse ox. Pulse ox also positioned on ear due to cool fingers. Pt. Denies pain, denies nausea, denies feeling any difficulty to breathe. 1800- unable to turn/reposition pt. At this time. Pt. Receiving hemodialysis treatment. Dialysis RN states pt. Cannot be turned due to access/needle position. During dialysis BP dropping and levo requirement increased. See MAR/flowsheets. Per dialysis RN, 1 Liter of fluid removed from pt.    1:1 patient sitter remains in place for pt. Safety due to pt. Being on bipap.

## 2022-07-28 NOTE — PROGRESS NOTES
Cardiology Consult Note  Patient: Suzi Lowe  Unit/Bed: IC12/IC12-01  YOB: 1955  MRN: [id number]  Acct: [de-identified]   Admitting Diagnosis: Acute respiratory failure with hypoxia [facility]) [J96.01]  Date:  7/25/2022  Hospital Day: 3      Chief Complaint:  Shortness of breath    Reason of this consult  Bradycardia    History of Present Illness:  27-year-old male without prior cardiac history but significant for end-stage renal disease on hemodialysis, hepatitis status post liver transplant, hypertension who was admitted to the hospital on 7/25/2022 for shortness of breath.     Patient was transferred to the ICU given hypoxic respiratory failure  Today patient in the ICU  Currently on Levophed and vasopressin  Currently sedated on ventilator  ====================  Hospital course  7/28/2022  Patient ventilated, opens his eyes  Still on Levophed  Still on heparin drip    No Known Allergies    Current Facility-Administered Medications   Medication Dose Route Frequency Provider Last Rate Last Admin    polyethylene glycol (GLYCOLAX) packet 17 g  17 g Oral Daily ISI Mcknight CNP        docusate (COLACE) 50 MG/5ML liquid 100 mg  100 mg Oral BID ISI Mcknight CNP        tacrolimus (PROGRAF) capsule 1 mg  1 mg Oral BID ISI Mcknight - CNP   1 mg at 07/28/22 0725    sodium chloride flush 0.9 % injection 5-40 mL  5-40 mL IntraVENous 2 times per day ISI Mcknight CNP   10 mL at 07/28/22 0727    sodium chloride flush 0.9 % injection 5-40 mL  5-40 mL IntraVENous PRN ISI Mcknight CNP        0.9 % sodium chloride infusion   IntraVENous PRN ISI Mcknight CNP        heparin (porcine) injection 5,390 Units  80 Units/kg IntraVENous PRN ISI Mcknight CNP   5,390 Units at 07/26/22 1534    heparin (porcine) injection 2,700 Units  40 Units/kg IntraVENous PRN ISI Mcknight CNP        heparin 25,000 units in dextrose 5% 250 mL (premix) infusion  5-30 Units/kg/hr IntraVENous Continuous Joseph Malcris, APRN - CNP 10.1 mL/hr at 07/28/22 0845 15 Units/kg/hr at 07/28/22 0845    vasopressin 20 Units in dextrose 5 % 100 mL infusion  0.01-0.03 Units/min IntraVENous Continuous Fransisca Wagner MD   Stopped at 07/26/22 1738    acetaminophen (TYLENOL) tablet 650 mg  650 mg Oral Q6H PRN Damari Aguilar MD        Or    acetaminophen (TYLENOL) suppository 650 mg  650 mg Rectal Q6H PRN Damari Aguilar MD        piperacillin-tazobactam (ZOSYN) 3,375 mg in dextrose 5 % 50 mL IVPB (mini-bag)  3,375 mg IntraVENous Q12H Damari Aguilar MD 12.5 mL/hr at 07/28/22 0845 Rate Verify at 07/28/22 0845    vancomycin (VANCOCIN) intermittent dosing (placeholder)   Other RX Placeholder Damari Aguilar MD        chlorhexidine (PERIDEX) 0.12 % solution 15 mL  15 mL Mouth/Throat BID Ojseph Malcris, APRN - CNP   15 mL at 07/28/22 0725    famotidine (PEPCID) 20 mg in sodium chloride (PF) 10 mL injection  20 mg IntraVENous Daily Joseph Malcris, APRN - CNP   20 mg at 07/28/22 0725    propofol injection  5-50 mcg/kg/min IntraVENous Continuous Joseph Malady, APRN - CNP   Stopped at 07/28/22 0843    fentaNYL (SUBLIMAZE) 1,000 mcg in sodium chloride 0.9 % 100 mL infusion   mcg/hr IntraVENous Continuous Joseph Malady, APRN - CNP 2.5 mL/hr at 07/28/22 0845 25 mcg/hr at 07/28/22 0845    And    fentaNYL bolus from bag  50 mcg IntraVENous Q30 Min PRN Joseph Jamacia, APRN - CNP        glucose chewable tablet 16 g  4 tablet Oral PRN Joseph Malady, APRN - CNP        dextrose bolus 10% 125 mL  125 mL IntraVENous PRN Joseph Malady, APRN - CNP        Or    dextrose bolus 10% 250 mL  250 mL IntraVENous PRN Joseph Malcris, APRN - CNP        glucagon (rDNA) injection 1 mg  1 mg SubCUTAneous PRN Joseph Malady, APRN - CNP        dextrose 10 % infusion   IntraVENous Continuous PRN Josephjoyce Berumen, APRN - CNP        insulin lispro (HUMALOG) injection vial 0-4 Units  0-4 Units SubCUTAneous Q4H ISI Fuentes CNP heparin (porcine) injection 1,000 Units  1,000 Units IntraVENous PRN Roderick Diez MD        lidocaine 2 % injection 5 mL  5 mL IntraDERmal Once Adam Kilts, APRN - CNP        0.9 % sodium chloride infusion  250 mL IntraVENous Once Adam Kilts, APRN - CNP        norepinephrine (LEVOPHED) 16 mg in dextrose 5% 250 mL infusion  1-100 mcg/min IntraVENous Continuous aGil Santoyo MD 8.4 mL/hr at 22 0845 9 mcg/min at 22 0845       PMHx:  Past Medical History:   Diagnosis Date    ESRD (end stage renal disease) ([facility] [address]     Hepatitis     Hypertension     Liver transplanted ([facility] [address] 2016       PSHx:  Past Surgical History:   Procedure Laterality Date    DIALYSIS FISTULA CREATION Right     LIVER TRANSPLANT  2017    TUNNELED VENOUS PORT PLACEMENT         Social Hx:  Social History     Socioeconomic History    Marital status: Single   Tobacco Use    Smoking status: Former     Packs/day: 0.50     Years: 15.00     Pack years: 7.50     Types: Cigarettes     Quit date: 2017     Years since quittin.7    Smokeless tobacco: Never   Substance and Sexual Activity    Alcohol use: No    Drug use: No       Family Hx:  No family history on file. Review of Systems:   Review of Systems   Unable to perform ROS: Intubated       Physical Examination:    BP (!) 102/52   Pulse 96   Temp 97.2 °F (36.2 °C) (Rectal)   Resp 16   Ht 5' 6\" (1.676 m)   Wt 148 lb 9.4 oz (67.4 kg)   SpO2 99%   BMI 23.98 kg/m²    Physical Exam  Vitals and nursing note reviewed. HENT:      Head: Normocephalic and atraumatic. Mouth/Throat:      Mouth: Mucous membranes are moist.      Pharynx: Oropharynx is clear. Eyes:      Extraocular Movements: Extraocular movements intact. Conjunctiva/sclera: Conjunctivae normal.      Pupils: Pupils are equal, round, and reactive to light. Cardiovascular:      Rate and Rhythm: Normal rate and regular rhythm. Pulses: Normal pulses. Heart sounds: Normal heart sounds. Pulmonary:      Effort: Pulmonary effort is normal.      Breath sounds: Normal breath sounds. No rales. Abdominal:      General: Abdomen is flat. Bowel sounds are normal.      Palpations: Abdomen is soft. Musculoskeletal:         General: Normal range of motion. Cervical back: Normal range of motion and neck supple. Skin:     General: Skin is warm.        LABS:  CBC:  Lab Results   Component Value Date/Time    WBC 8.8 07/28/2022 05:38 AM    RBC 3.29 07/28/2022 05:38 AM    RBC 3.34 02/20/2012 05:15 AM    HGB 11.4 07/28/2022 05:38 AM    HCT 34.4 07/28/2022 05:38 AM    .5 07/28/2022 05:38 AM    MCH 34.5 07/28/2022 05:38 AM    MCHC 33.0 07/28/2022 05:38 AM    RDW 14.6 07/28/2022 05:38 AM     07/28/2022 05:38 AM    MPV 9.5 10/09/2015 02:00 PM     CBC with Differential:   Lab Results   Component Value Date/Time    WBC 8.8 07/28/2022 05:38 AM    RBC 3.29 07/28/2022 05:38 AM    RBC 3.34 02/20/2012 05:15 AM    HGB 11.4 07/28/2022 05:38 AM    HCT 34.4 07/28/2022 05:38 AM     07/28/2022 05:38 AM    .5 07/28/2022 05:38 AM    MCH 34.5 07/28/2022 05:38 AM    MCHC 33.0 07/28/2022 05:38 AM    RDW 14.6 07/28/2022 05:38 AM    BANDSPCT 1 07/25/2022 11:27 AM    METASPCT 1 05/28/2015 05:46 AM    LYMPHOPCT 3.4 07/28/2022 05:38 AM    PROMYELOPCT 1 11/07/2017 05:14 AM    MONOPCT 8.5 07/28/2022 05:38 AM    EOSPCT 3.1 02/20/2012 05:15 AM    BASOPCT 0.3 07/28/2022 05:38 AM    MONOSABS 0.7 07/28/2022 05:38 AM    LYMPHSABS 0.3 07/28/2022 05:38 AM    EOSABS 0.2 07/28/2022 05:38 AM    BASOSABS 0.0 07/28/2022 05:38 AM     CMP:    Lab Results   Component Value Date/Time     07/28/2022 05:38 AM    K 3.3 07/28/2022 05:38 AM    CL 88 07/28/2022 05:38 AM    CO2 26 07/28/2022 05:38 AM    BUN 40 07/28/2022 05:38 AM    CREATININE 6.33 07/28/2022 05:38 AM    CREATININE 6.8 07/28/2022 04:37 AM    GFRAA 10.7 07/28/2022 05:38 AM    LABGLOM 8.8 07/28/2022 05:38 AM    GLUCOSE 105 07/28/2022 05:38 AM    GLUCOSE 136 02/25/2012 05:10 PM    PROT 7.5 07/25/2022 11:27 AM    LABALBU 3.0 07/28/2022 05:38 AM    LABALBU 2.8 02/23/2012 06:40 AM    CALCIUM 8.6 07/28/2022 05:38 AM    BILITOT 0.7 07/25/2022 11:27 AM    ALKPHOS 87 07/25/2022 11:27 AM    AST 77 07/25/2022 11:27 AM    ALT 79 07/25/2022 11:27 AM     BMP:    Lab Results   Component Value Date/Time     07/28/2022 05:38 AM    K 3.3 07/28/2022 05:38 AM    CL 88 07/28/2022 05:38 AM    CO2 26 07/28/2022 05:38 AM    BUN 40 07/28/2022 05:38 AM    LABALBU 3.0 07/28/2022 05:38 AM    LABALBU 2.8 02/23/2012 06:40 AM    CREATININE 6.33 07/28/2022 05:38 AM    CREATININE 6.8 07/28/2022 04:37 AM    CALCIUM 8.6 07/28/2022 05:38 AM    GFRAA 10.7 07/28/2022 05:38 AM    LABGLOM 8.8 07/28/2022 05:38 AM    GLUCOSE 105 07/28/2022 05:38 AM    GLUCOSE 136 02/25/2012 05:10 PM     Magnesium:    Lab Results   Component Value Date/Time    MG 2.0 07/25/2022 11:27 AM    MG 2.0 02/19/2012 12:42 PM     Troponin:    Lab Results   Component Value Date/Time    TROPONINI 0.126 07/27/2022 04:39 AM          EKG: sinus tachycardia      Assessment:    Active Hospital Problems    Diagnosis Date Noted    Acute respiratory failure with hypoxia ([address] [J96.01] 07/25/2022     Priority: Medium     Bradycardia.   Currently sinus rhythm  Acute hypoxic respiratory failure currently ventilator  End-stage renal disease on hemodialysis  Extensive upper extremity DVTs  Pleural effusion status postthoracentesis  History of liver transplant  History of hypertension  Septic shock  Hyperkalemia  Positive troponins 0.126 in the setting of end-stage renal disease EKG without active signs of ischemia    TTE 7/27/2022 EF 45%, echogenic mass at the apex  Limited TTE with Definity 7/28/2022 EF 65%, no evidence of apical thrombus    Plan:  Continue ICU  Wean off pressors as tolerated  Wean off vent as tolerated  Please keep potassium between 4 and 5 magnesium above 2  Please keep hemoglobin above 8  Once patient is more hemodynamically stable we can restart cardioprotective medications including beta-blocker and possibly ACE inhibitor  Most likely patient will need an ischemic evaluation as an outpatient once acute conditions resolve  Continue management of rest of comorbidities as per primary team  Cont anticoagulation for extensive DVT  Further recommendations to follow       This report was transcribed using voice recognition software. Every effort was made to ensure accuracy; however, inadvertent computerized transcription errors may be present.     Electronically signed by Vini Arnett MD on 7/28/2022 at 9:46 AM

## 2022-07-28 NOTE — CONSULTS
PODIATRIC MEDICINE AND SURGERY  CONSULT HISTORY AND PHYSICAL    Consulting Service:  Critical care   Requesting Provider: SONIA Bean  Opinion/advice regarding: toenails  Staff Doctor:  Dr. Buckner Markusk:  75 y.o. male with PMH significant for end-stage renal disease on dialysis Monday Wednesday Friday (compliant) liver transplant status post cirrhosis and hypertension for who podiatry was consulted for toenail debridement. Upon examination, toenails appeared discolored, thickened, and elongated. Pulses non palpable, but no wounds noted. No other pedal complaints noted with examination. PLAN AND RECOMMENDATIONS[de-identified]  Patient's case discussed with staff, Dr. Jeremi Gilliland,   Debridement of toenails 1-10 without incident  Pain management per primary team   Podiatry recommends no further follow up. Consult Podiatry again if any new pedal issues arise or patient needs nails trimmed again in the future      HPI: This 75y.o. year old male was seen today for toenail debridement. Patient was unable to communicate verbally but was able to shake his head yes, or no. Patient denies any foot pain or toenail pain. Patient denies nausea, vomiting, diarrhea, or fevers. No other pedal complaints. Past Medical History:   Diagnosis Date    ESRD (end stage renal disease) ([facility] [address]     Hepatitis     Hypertension     Liver transplanted ([facility] [address] 03/2016       Past Surgical History:   Procedure Laterality Date    DIALYSIS FISTULA CREATION Right     LIVER TRANSPLANT  03/08/2017    TUNNELED VENOUS PORT PLACEMENT         No current facility-administered medications on file prior to encounter. Current Outpatient Medications on File Prior to Encounter   Medication Sig Dispense Refill    albuterol sulfate HFA (PROAIR HFA) 108 (90 Base) MCG/ACT inhaler 2 puffs; Use every 4 hours while awake for 7-10 days then PRN wheezing  Dispense with SPACER and Instruct on use. May sub Ventolin or Proventil as needed per [employer].  25 to cold  tibial tuberosity to the digits B/L  Hair growth absent to digits  mild edema  + varicosities     Neurological:   Sensation to light touch intact B/L  Protective sensation via monofilament testing impaired B/L  Gross sensation intact to bilateral lower extremities    Musculoskeletal/Orthopaedic:   No structural deformities  Decreased in ankle joint ROM , no crepitus  Muscle strength not tested     Dermatological:   Skin appears xerotic,   no hyperkeratotic lesions noted  Nails 1-5 B/L appear thickened, discolored and elongated  Interspaces 1-4 B/L dry with debris  No open lesions noted B/L      Procedure: Toe Nail Debridement Procedure Note    This 75 y.o. male with PMH of end-stage renal disease on dialysis Monday Wednesday Friday (compliant) liver transplant status post cirrhosis and hypertension presents with elongated and painful nails secondary  pressure. Nails 1 bilateral are thick, elongated, with subungual debris, and painful to palpation of nail folds. Nails 2 bilateral are thick, elongated, with subungual debris, and painful to palpation of nail folds. Nails 3 bilateral are thick, elongated, with subungual debris, and painful to palpation of nail folds. Nails 4 bilateral are thick, elongated, with subungual debris, and painful to palpation of nail folds. Nails 5 bilateral are thick, elongated, with subungual debris, and painful to palpation of nail folds. Procedure Note  Indications:  Based on my examination of this patient's toenails 1-5 bialteral, debridement is deemed medically necessary for the prevention of secondary infection in the presences of systemic disease and or deformity.     Performed by: Leslie Prado DPM    Consent obtained:  Yes    Time out taken:  Yes    Pain Control:   not needed      Debridement:toenail debridement     Using nail nippers toenails were sharply debrided down through and including the removal of thick, yellow, elongated,crumbly, and painful toenails and subungual debris. Bleeding:  None    Hemostasis Achieved:  not needed    Procedural Pain:  2  / 10     Post Procedural Pain:  0 / 10     Response to treatment:  Well tolerated by patient.     LABS:   Lab Results   Component Value Date    WBC 8.8 07/28/2022    HGB 13.2 (L) 07/28/2022    HCT 34.4 (L) 07/28/2022    .5 (H) 07/28/2022     07/28/2022     Lab Results   Component Value Date/Time     07/28/2022 05:38 AM    K 3.3 07/28/2022 05:38 AM    CL 88 07/28/2022 05:38 AM    CO2 26 07/28/2022 05:38 AM    BUN 40 07/28/2022 05:38 AM    CREATININE 6.6 07/28/2022 11:00 AM    CREATININE 6.33 07/28/2022 05:38 AM    GLUCOSE 105 07/28/2022 05:38 AM    GLUCOSE 136 02/25/2012 05:10 PM    CALCIUM 8.6 07/28/2022 05:38 AM      Lab Results   Component Value Date    LABPROT 0.1 05/21/2015    LABALBU 3.0 (L) 07/28/2022     Lab Results   Component Value Date    SEDRATE 33 (H) 12/20/2018     Lab Results   Component Value Date    CRP 3.4 12/20/2018     Lab Results   Component Value Date    LABA1C 3.8 (L) 02/19/2012       MICROBIOLOGY:   N/a    IMAGING:   N/a      Charity Selby DPM PGY-1  Podiatric Surgery Resident  Podiatry On Call Pager: [phone]  July 28, 2022  1:57 PM

## 2022-07-28 NOTE — PROGRESS NOTES
[facility] Pharmacokinetic Monitoring Service - Vancomycin    Consulting Provider: Aries Grigsby   Indication: sepsis  Target Concentration: Pre-Dialysis Concentration 21-24 mg/L  Day of Therapy: 4  Additional Antimicrobials: Zosyn    Pertinent Laboratory Values: Wt Readings from Last 1 Encounters:   07/25/22 148 lb 9.4 oz (67.4 kg)     Temp Readings from Last 1 Encounters:   07/28/22 97.2 °F (36.2 °C) (Rectal)     Recent Labs     07/27/22  0440 07/27/22  0554 07/28/22  0538 07/28/22  1100   CREATININE  --    < > 6.33* 6.6*   WBC 10.4  --  8.8  --     < > = values in this interval not displayed. Recent vancomycin administrations                     vancomycin 1000 mg IVPB in 250 mL D5W addavial (mg) 1,000 mg New Bag 07/25/22 1808                  Assessment:  Date/Time Current Dose Dialysis Session   7-25-22 1000 mg x1 post-HD HD MWF , last 7/25 outpatient   7-27-22  1000 mg x1 post-HD HD Tues 7/26 (usually MWF)   7-28-22 500 mg x1 post-HD HD Thurs 7/28 (usually MWF)     Vancomycin Rm   Date Value Ref Range Status   07/28/2022 24.2 10.0 - 40.0 ug/mL Final     Plan:  Concentration-guided dosing due to renal impairment and intermittent hemodialysis   Most recent dosing 1000mg x1 dose on 7/27  Patient is normally MWF HD, however short dialysis session ordered for today per nephrology. If tolerated, plan is to have full dialysis tomorrow to get back on MWF schedule .  Last HD session 7/26  Will order vancomycin mini dose of 500 mg x1 dose for today given planned short session  Vancomycin concentration ordered for tomorrow morning in anticipation of full dialysis session  Pharmacy will continue to monitor patient and adjust therapy as indicated    Thank you for the consult,    Marie Sexton, PharmD  7/28/2022 1:47 PM

## 2022-07-28 NOTE — PROGRESS NOTES
Nephrology Progress Note    Assessment:  Pt is a 76 y/o male w/ history s/f HTN, ESRD on IHD, liver transplant who presented for SOB    ESRD on IHD MWF  Acute hypoxic respiratory failure: in setting of fluid overload, RT sided thoracentesis and PNA, intubated, now s/p RT sided thoracentesis   Septic shock: on pressors  Lactic acidosis: corrected  Metabolic/respiratory alkalosis  6.  Bilateral IJ thrombi  Hyponatremia  Hypokalemia  Hypoalbuminemia     Plan:  - will plan for short rx today, if able to tolerate will plan for full session tomorrow and continue IHD MWF      Patient Active Problem List:     Hypotension     ESRD (end stage renal disease) ([facility]     Liver transplanted ([facility]     Liver transplant recipient ([facility]     Sepsis ([facility]     Gastroenteritis     C. difficile colitis     Severe sepsis ([facility]     Vomiting and diarrhea     Generalized abdominal pain     Acute renal failure ([facility]     Acute respiratory failure with hypoxia (HCC)      Subjective:  Admit Date: 7/25/2022    Interval History: remains intubated however getting SBT w/ plan to extubate if able, off vaso, remains on levo but has been weaned down significantly     Medications:  Scheduled Meds:   polyethylene glycol  17 g Oral Daily    docusate  100 mg Oral BID    tacrolimus  1 mg Oral BID    sodium chloride flush  5-40 mL IntraVENous 2 times per day    piperacillin-tazobactam  3,375 mg IntraVENous Q12H    vancomycin (VANCOCIN) intermittent dosing (placeholder)   Other RX Placeholder    chlorhexidine  15 mL Mouth/Throat BID    famotidine (PEPCID) injection  20 mg IntraVENous Daily    insulin lispro  0-4 Units SubCUTAneous Q4H    lidocaine  5 mL IntraDERmal Once     Continuous Infusions:   sodium chloride      heparin (PORCINE) Infusion 15 Units/kg/hr (07/28/22 1042)    vasopressin (Septic Shock) infusion Stopped (07/26/22 5428)    propofol Stopped (07/28/22 0843)    fentaNYL Stopped (07/28/22 0847)    dextrose      sodium chloride      norepinephrine 5 mcg/min (07/28/22 1042)       CBC:   Recent Labs     07/27/22  0440 07/28/22  0437 07/28/22  0538 07/28/22  1100   WBC 10.4  --  8.8  --    HGB 12.5*   < > 11.4* 13.2*     --  133  --     < > = values in this interval not displayed. CMP:    Recent Labs     07/26/22  1039 07/27/22  0554 07/28/22  0437 07/28/22  0538 07/28/22  1100    138  --  130*  --    K 3.8 5.1*  --  3.3*  --    CL 93* 92*  --  88*  --    CO2 26 26  --  26  --    BUN 22 28*  --  40*  --    CREATININE 5.05* 5.33* 6.8* 6.33* 6.6*   GLUCOSE 167* 134*  --  105*  --    CALCIUM 9.3 8.9  --  8.6  --    LABGLOM 11.5* 10.8* 8* 8.8* 8*       Troponin:   Recent Labs     07/27/22  0439   TROPONINI 0.126*       BNP: No results for input(s): BNP in the last 72 hours. INR:   Recent Labs     07/26/22  1558   INR 1.1       Lipids: No results for input(s): CHOL, LDLDIRECT, TRIG, HDL, AMYLASE, LIPASE in the last 72 hours. Liver:   Recent Labs     07/28/22  0538   LABALBU 3.0*       Iron:  No results for input(s): IRONS, FERRITIN in the last 72 hours. Invalid input(s): LABIRONS  Urinalysis: No results for input(s): UA in the last 72 hours.     Objective:  Vitals: BP (!) 102/52   Pulse 85   Temp 97.2 °F (36.2 °C) (Rectal)   Resp 22   Ht 5' 6\" (1.676 m)   Wt 148 lb 9.4 oz (67.4 kg)   SpO2 99%   BMI 23.98 kg/m²    Wt Readings from Last 3 Encounters:   07/25/22 148 lb 9.4 oz (67.4 kg)   06/28/22 145 lb (65.8 kg)   03/18/20 145 lb (65.8 kg)      24HR INTAKE/OUTPUT:    Intake/Output Summary (Last 24 hours) at 7/28/2022 1128  Last data filed at 7/28/2022 1042  Gross per 24 hour   Intake 2485.88 ml   Output --   Net 2485.88 ml         General: intubated, off sedation   HEENT: normocephalic, atraumatic, ETT in place  Lungs: intubated, on vent,coarse breath sounds  Heart: regular rate and rhythm, no murmurs or rubs  Abdomen: soft, non-tender, non-distended  Ext: no cyanosis, no peripheral edema  Neuro: unable to assess, sedated    Electronically signed by Paloma Peterson MD, MD

## 2022-07-28 NOTE — PLAN OF CARE
Problem: Discharge Planning  Goal: Discharge to home or other facility with appropriate resources  Outcome: Progressing  Flowsheets (Taken 7/28/2022 6430)  Discharge to home or other facility with appropriate resources: Identify barriers to discharge with patient and caregiver     Problem: Pain  Goal: Verbalizes/displays adequate comfort level or baseline comfort level  Outcome: Progressing     Problem: Skin/Tissue Integrity  Goal: Absence of new skin breakdown  Description: 1. Monitor for areas of redness and/or skin breakdown  2. Assess vascular access sites hourly  3. Every 4-6 hours minimum:  Change oxygen saturation probe site  4. Every 4-6 hours:  If on nasal continuous positive airway pressure, respiratory therapy assess nares and determine need for appliance change or resting period.   Outcome: Progressing     Problem: Safety - Adult  Goal: Free from fall injury  Outcome: Progressing     Problem: ABCDS Injury Assessment  Goal: Absence of physical injury  Outcome: Progressing     Problem: Nutrition Deficit:  Goal: Optimize nutritional status  Outcome: Progressing

## 2022-07-28 NOTE — CARE COORDINATION
Quality rounds completed. Initial DC plan: Home possible HHC. LAURAW will follow.      Electronically signed by JOYCE Escobar on 7/28/2022 at 10:51 AM

## 2022-07-28 NOTE — PROGRESS NOTES
1900:Received report from [facility].  2000: Head to toe assessment complete (see flowsheets). Tube feeding residual 300 ml's, no s/s of intolerance. 2100: Dr. Paras Chaparro notified regarding residual notified this RN to hold tube feed for 2 hours. 2300: Rechecked residual after on hold for two hours, residual was 275 ml's , Messaged Dr. Paras Chaparro, notified RN to hold for two more hours and recheck. 0100: Rechecked residual got 150 ml's back, Dr Nellie Oquendo notified this RN to restart tube feed at 20ml/hr. 0400: Reassessment complete, patient able to follow certain commands, able to squeeze hands and move toes. Patient's abdomen observed to be more rounded, bowel sounds active x4 quadrants. Tube feed Residual checked 80 ml's. 0600: Residual rechecked, less than 20 ml's, patient's abdomen observed to be less rounded. 0700: [facility]. Patient continues on sedation and Pressors (see Emar) for titrations.

## 2022-07-28 NOTE — PROGRESS NOTES
Spiritual Care Services     Summary of Visit:  This  participated in morning interdisciplinary rounds. No family present. Patient remains intubated at this time, however, sedation is coming down. Patient appeared calm. Spiritual Assessment/Intervention/Outcomes:    Encounter Summary  Encounter Overview/Reason : Interdisciplinary rounds  Service Provided For[de-identified] Patient  Referral/Consult From[de-identified] Rounding  Support System: Family members  Last Encounter : 07/27/22  Complexity of Encounter: Low  Begin Time: 0940  End Time : 0945  Total Time Calculated: 5 min  Encounter   Type: Follow up        Rituals, Rites and Sacraments  Type: Anointing                 Values / Beliefs  Do You Have Any Ethnic, Cultural, Sacramental, or Spiritual Islam Needs You Would Like Us To Be Aware of While You Are in the Hospital : No    Care Plan:    Spiritual care to continue to follow. Spiritual Care Services   Electronically signed by Kiran Mathew on 7/28/22 at 10:10 AM EDT     To reach a  for emotional and spiritual support, place an [facility]'S \A Chronology of [place] [facility]\"" consult request.   If a  is needed immediately, dial 0 and ask to page the on-call .

## 2022-07-28 NOTE — DIALYSIS
Tx complete sites held for hemostasis, pt alert, stable. 1000ml removed patient did not tolerate treatment well, had low Bps throughout treatment (see charting/ levo titration).   Primary [facility] notified

## 2022-07-29 LAB
ALBUMIN SERPL-MCNC: 2.7 G/DL (ref 3.5–4.6)
ANION GAP SERPL CALCULATED.3IONS-SCNC: 17 MEQ/L (ref 9–15)
ANTI-XA UNFRAC HEPARIN: 0.29 IU/ML
ANTI-XA UNFRAC HEPARIN: 0.7 IU/ML
BUN BLDV-MCNC: 35 MG/DL (ref 8–23)
CALCIUM SERPL-MCNC: 8.5 MG/DL (ref 8.5–9.9)
CHLORIDE BLD-SCNC: 91 MEQ/L (ref 95–107)
CO2: 27 MEQ/L (ref 20–31)
CREAT SERPL-MCNC: 4.94 MG/DL (ref 0.7–1.2)
GFR AFRICAN AMERICAN: 14.2
GFR NON-AFRICAN AMERICAN: 11.8
GLUCOSE BLD-MCNC: 83 MG/DL (ref 70–99)
GLUCOSE BLD-MCNC: 85 MG/DL (ref 70–99)
GLUCOSE BLD-MCNC: 89 MG/DL (ref 70–99)
GLUCOSE BLD-MCNC: 90 MG/DL (ref 70–99)
GLUCOSE BLD-MCNC: 90 MG/DL (ref 70–99)
GLUCOSE BLD-MCNC: 94 MG/DL (ref 70–99)
HCT VFR BLD CALC: 33.4 % (ref 42–52)
HEMOGLOBIN: 11.1 G/DL (ref 14–18)
MCH RBC QN AUTO: 34.4 PG (ref 27–31.3)
MCHC RBC AUTO-ENTMCNC: 33.1 % (ref 33–37)
MCV RBC AUTO: 103.9 FL (ref 80–100)
PDW BLD-RTO: 14.8 % (ref 11.5–14.5)
PERFORMED ON: NORMAL
PHOSPHORUS: 5 MG/DL (ref 2.3–4.8)
PLATELET # BLD: 143 K/UL (ref 130–400)
POTASSIUM SERPL-SCNC: 4.8 MEQ/L (ref 3.4–4.9)
RBC # BLD: 3.22 M/UL (ref 4.7–6.1)
SODIUM BLD-SCNC: 135 MEQ/L (ref 135–144)
VANCOMYCIN RANDOM: 25 UG/ML (ref 10–40)
WBC # BLD: 11.3 K/UL (ref 4.8–10.8)

## 2022-07-29 PROCEDURE — 6360000002 HC RX W HCPCS: Performed by: NURSE PRACTITIONER

## 2022-07-29 PROCEDURE — 6360000002 HC RX W HCPCS: Performed by: INTERNAL MEDICINE

## 2022-07-29 PROCEDURE — 2000000000 HC ICU R&B

## 2022-07-29 PROCEDURE — 6360000002 HC RX W HCPCS: Performed by: FAMILY MEDICINE

## 2022-07-29 PROCEDURE — 80202 ASSAY OF VANCOMYCIN: CPT

## 2022-07-29 PROCEDURE — 2580000003 HC RX 258: Performed by: FAMILY MEDICINE

## 2022-07-29 PROCEDURE — 2580000003 HC RX 258: Performed by: NURSE PRACTITIONER

## 2022-07-29 PROCEDURE — 99233 SBSQ HOSP IP/OBS HIGH 50: CPT | Performed by: INTERNAL MEDICINE

## 2022-07-29 PROCEDURE — A4216 STERILE WATER/SALINE, 10 ML: HCPCS | Performed by: NURSE PRACTITIONER

## 2022-07-29 PROCEDURE — 99291 CRITICAL CARE FIRST HOUR: CPT | Performed by: INTERNAL MEDICINE

## 2022-07-29 PROCEDURE — 6370000000 HC RX 637 (ALT 250 FOR IP): Performed by: INTERNAL MEDICINE

## 2022-07-29 PROCEDURE — 2700000000 HC OXYGEN THERAPY PER DAY

## 2022-07-29 PROCEDURE — 94761 N-INVAS EAR/PLS OXIMETRY MLT: CPT

## 2022-07-29 PROCEDURE — 37799 UNLISTED PX VASCULAR SURGERY: CPT

## 2022-07-29 PROCEDURE — 94660 CPAP INITIATION&MGMT: CPT

## 2022-07-29 PROCEDURE — 85027 COMPLETE CBC AUTOMATED: CPT

## 2022-07-29 PROCEDURE — 2580000003 HC RX 258: Performed by: INTERNAL MEDICINE

## 2022-07-29 PROCEDURE — 2500000003 HC RX 250 WO HCPCS: Performed by: NURSE PRACTITIONER

## 2022-07-29 PROCEDURE — 90935 HEMODIALYSIS ONE EVALUATION: CPT

## 2022-07-29 PROCEDURE — 85520 HEPARIN ASSAY: CPT

## 2022-07-29 PROCEDURE — 80069 RENAL FUNCTION PANEL: CPT

## 2022-07-29 PROCEDURE — 6370000000 HC RX 637 (ALT 250 FOR IP): Performed by: NURSE PRACTITIONER

## 2022-07-29 PROCEDURE — 92610 EVALUATE SWALLOWING FUNCTION: CPT

## 2022-07-29 RX ORDER — DOCUSATE SODIUM 100 MG/1
100 CAPSULE, LIQUID FILLED ORAL 2 TIMES DAILY
Status: DISCONTINUED | OUTPATIENT
Start: 2022-07-29 | End: 2022-08-19 | Stop reason: HOSPADM

## 2022-07-29 RX ORDER — ASPIRIN 81 MG/1
81 TABLET ORAL DAILY
Status: DISCONTINUED | OUTPATIENT
Start: 2022-07-29 | End: 2022-08-19 | Stop reason: HOSPADM

## 2022-07-29 RX ORDER — INSULIN LISPRO 100 [IU]/ML
0-4 INJECTION, SOLUTION INTRAVENOUS; SUBCUTANEOUS
Status: DISCONTINUED | OUTPATIENT
Start: 2022-07-29 | End: 2022-08-19 | Stop reason: HOSPADM

## 2022-07-29 RX ORDER — MIDODRINE HYDROCHLORIDE 10 MG/1
10 TABLET ORAL
Status: DISCONTINUED | OUTPATIENT
Start: 2022-07-29 | End: 2022-07-31

## 2022-07-29 RX ORDER — ATORVASTATIN CALCIUM 40 MG/1
40 TABLET, FILM COATED ORAL NIGHTLY
Status: DISCONTINUED | OUTPATIENT
Start: 2022-07-29 | End: 2022-07-29

## 2022-07-29 RX ADMIN — HEPARIN SODIUM AND DEXTROSE 17 UNITS/KG/HR: 10000; 5 INJECTION INTRAVENOUS at 10:17

## 2022-07-29 RX ADMIN — PIPERACILLIN AND TAZOBACTAM 3375 MG: 3; .375 INJECTION, POWDER, LYOPHILIZED, FOR SOLUTION INTRAVENOUS at 09:09

## 2022-07-29 RX ADMIN — MIDODRINE HYDROCHLORIDE 10 MG: 10 TABLET ORAL at 12:44

## 2022-07-29 RX ADMIN — PIPERACILLIN AND TAZOBACTAM 3375 MG: 3; .375 INJECTION, POWDER, LYOPHILIZED, FOR SOLUTION INTRAVENOUS at 19:53

## 2022-07-29 RX ADMIN — Medication 10 ML: at 21:36

## 2022-07-29 RX ADMIN — MIDODRINE HYDROCHLORIDE 10 MG: 10 TABLET ORAL at 21:28

## 2022-07-29 RX ADMIN — Medication 10 ML: at 12:45

## 2022-07-29 RX ADMIN — ASPIRIN 81 MG: 81 TABLET, COATED ORAL at 12:44

## 2022-07-29 RX ADMIN — VANCOMYCIN HYDROCHLORIDE 500 MG: 500 INJECTION, POWDER, LYOPHILIZED, FOR SOLUTION INTRAVENOUS at 21:32

## 2022-07-29 RX ADMIN — HEPARIN SODIUM 2700 UNITS: 1000 INJECTION INTRAVENOUS; SUBCUTANEOUS at 10:18

## 2022-07-29 RX ADMIN — POLYETHYLENE GLYCOL 3350 17 G: 17 POWDER, FOR SOLUTION ORAL at 09:07

## 2022-07-29 RX ADMIN — TACROLIMUS 1 MG: 1 CAPSULE ORAL at 09:07

## 2022-07-29 RX ADMIN — TACROLIMUS 1 MG: 1 CAPSULE ORAL at 21:28

## 2022-07-29 RX ADMIN — FAMOTIDINE 20 MG: 10 INJECTION, SOLUTION INTRAVENOUS at 09:07

## 2022-07-29 ASSESSMENT — PAIN SCALES - GENERAL
PAINLEVEL_OUTOF10: 0

## 2022-07-29 NOTE — PROGRESS NOTES
Comprehensive Nutrition Assessment    Type and Reason for Visit:  Reassess    Nutrition Recommendations/Plan:   Advance po diet when medically appropriate  Follow up to assess adequacy of po intake vs need for oral nutrition supplements  Anticipate weight loss if edema resolves     Malnutrition Assessment:  Malnutrition Status: At risk for malnutrition (Comment) (07/29/22 1417)    Context:  Chronic Illness     Findings of the 6 clinical characteristics of malnutrition:  Energy Intake:  Mild decrease in energy intake (Comment)  Weight Loss:  No significant weight loss     Body Fat Loss:  No significant body fat loss     Muscle Mass Loss:  No significant muscle mass loss    Fluid Accumulation:  Mild Generalized   Strength:  Not Performed    Nutrition Assessment:    Nutritional status appears stable at this time, was intubated x 2 days and received trophic TF, awaitigng progression to po diet once evaluated by SLP,    Nutrition Related Findings:    PMH: hypertension, hepatitis-s/p liver transplant, ESRD on hemodialysis M,W,F.  , intubated 7/25-27 with trophic TF.   S/p thoracentesis (7/26), labs noted, meds reviewed, anuric, 2+ generalized edema per nursing, BM .7.29 Wound Type: None       Current Nutrition Intake & Therapies:    Average Meal Intake: NPO  Average Supplements Intake: NPO  Diet NPO    Anthropometric Measures:  Height: 5' 6\" (167.6 cm)  Ideal Body Weight (IBW): 142 lbs (65 kg)    Admission Body Weight: 148 lb (67.1 kg)  Current Body Weight: 152 lb (68.9 kg) (edema present),    Weight Source: Bed Scale  Current BMI (kg/m2): 24.5  Usual Body Weight: 145 lb (65.8 kg) (stated 6/28/22, 2020)  % Weight Change (Calculated): 2.1  Weight Adjustment For: No Adjustment                 BMI Categories: Normal Weight (BMI 22.0 to 24.9) age over 72    Estimated Daily Nutrient Needs:  Energy Requirements Based On: Kcal/kg  Weight Used for Energy Requirements: Admission  Energy (kcal/day): 6949-0412 kcals @ 25-28 kcal/kg  Weight Used for Protein Requirements: Admission (67.4 kg)  Protein (g/day): 101 gm (kg x 1.5)  Method Used for Fluid Requirements: Standard Renal  Fluid (ml/day): 500-800 + UOP (or as per MD)    Nutrition Diagnosis:   Inadequate oral intake related to other (comment) (recent extubation, awaiting BSE) as evidenced by NPO or clear liquid status due to medical condition       Nutrition Interventions:   Food and/or Nutrient Delivery: Continue NPO  Nutrition Education/Counseling: No recommendation at this time  Coordination of Nutrition Care: Continue to monitor while inpatient       Goals:  Previous Goal Met:  (new goals)  Goals: Initiate PO diet, PO intake 75% or greater, by next RD assessment       Nutrition Monitoring and Evaluation:      Food/Nutrient Intake Outcomes: Diet Advancement/Tolerance, Food and Nutrient Intake  Physical Signs/Symptoms Outcomes: Biochemical Data, Weight, Meal Time Behavior    Discharge Planning:     Too soon to determine     Luann James RD, LD

## 2022-07-29 NOTE — PROGRESS NOTES
Cardiology Progress Note  Patient: Lul Reilly  Unit/Bed: IC12/IC12-01  YOB: 1955  MRN: [id number]  Acct: [de-identified]   Admitting Diagnosis: Acute respiratory failure with hypoxia [facility]) [J96.01]  Date:  7/25/2022  Hospital Day: 4      Chief Complaint:  Shortness of breath    Reason of this consult  Bradycardia    History of Present Illness:  68-year-old male without prior cardiac history but significant for end-stage renal disease on hemodialysis, hepatitis status post liver transplant, hypertension who was admitted to the hospital on 7/25/2022 for shortness of breath.     Patient was transferred to the ICU given hypoxic respiratory failure  Today patient in the ICU  Currently on Levophed and vasopressin  Currently sedated on ventilator  ====================  Hospital course  7/29/2022  Patient now extubated on nasal cannula  Denies chest pain   Telemetry sinus rhythm  Still on Levophed    7/28/2022  Patient ventilated, opens his eyes  Still on Levophed  Still on heparin drip    No Known Allergies    Current Facility-Administered Medications   Medication Dose Route Frequency Provider Last Rate Last Admin    midodrine (PROAMATINE) tablet 10 mg  10 mg Oral TID  ISI Awad - SHELDON        polyethylene glycol (GLYCOLAX) packet 17 g  17 g Oral Daily ISI Awad - CNP   17 g at 07/28/22 1018    docusate (COLACE) 50 MG/5ML liquid 100 mg  100 mg Oral BID Andree Galaviz APRN - CNP   100 mg at 07/28/22 2036    tacrolimus (PROGRAF) capsule 1 mg  1 mg Oral BID ISI Awad - CNP   1 mg at 07/28/22 2036    sodium chloride flush 0.9 % injection 5-40 mL  5-40 mL IntraVENous 2 times per day ISI Awad - CNP   10 mL at 07/28/22 2036    sodium chloride flush 0.9 % injection 5-40 mL  5-40 mL IntraVENous PRN ISI Awad - CNP        0.9 % sodium chloride infusion   IntraVENous PRN ISI Awad CNP        heparin (porcine) injection 5,390 Units  80 Units/kg IntraVENous PRN Levonia Rios, APRN - CNP   5,390 Units at 07/26/22 1534    heparin (porcine) injection 2,700 Units  40 Units/kg IntraVENous PRN Levonia Rios, APRN - CNP        heparin 25,000 units in dextrose 5% 250 mL (premix) infusion  5-30 Units/kg/hr IntraVENous Continuous Levonia Rios, APRN - CNP 10.1 mL/hr at 07/29/22 0659 15 Units/kg/hr at 07/29/22 0659    acetaminophen (TYLENOL) tablet 650 mg  650 mg Oral Q6H PRN Aric Liang MD        Or    acetaminophen (TYLENOL) suppository 650 mg  650 mg Rectal Q6H PRN Aric Liang MD        piperacillin-tazobactam (ZOSYN) 3,375 mg in dextrose 5 % 50 mL IVPB (mini-bag)  3,375 mg IntraVENous Q12H Aric Liang MD   Stopped at 07/29/22 0056    vancomycin (VANCOCIN) intermittent dosing (placeholder)   Other RX Placeholder Aric Liang MD        famotidine (PEPCID) 20 mg in sodium chloride (PF) 10 mL injection  20 mg IntraVENous Daily Levonia Rios, APRN - CNP   20 mg at 07/28/22 0725    fentaNYL bolus from bag  50 mcg IntraVENous Q30 Min PRN Levonia Rios, APRN - CNP        glucose chewable tablet 16 g  4 tablet Oral PRN Levonia Rios, APRN - CNP        dextrose bolus 10% 125 mL  125 mL IntraVENous PRN Levonia Rios, APRN - CNP        Or    dextrose bolus 10% 250 mL  250 mL IntraVENous PRN Levonia Rios, APRN - CNP        glucagon (rDNA) injection 1 mg  1 mg SubCUTAneous PRN Levonia Rios, APRN - CNP        dextrose 10 % infusion   IntraVENous Continuous PRN Levonia Rios, APRN - CNP        insulin lispro (HUMALOG) injection vial 0-4 Units  0-4 Units SubCUTAneous Q4H Levonia Rios, APRN - CNP        heparin (porcine) injection 1,000 Units  1,000 Units IntraVENous PRN Renetta Angulo MD        lidocaine 2 % injection 5 mL  5 mL IntraDERmal Once Levonia Rios, APRN - CNP        0.9 % sodium chloride infusion  250 mL IntraVENous Once Levonia Rios, APRN - CNP        norepinephrine (LEVOPHED) 16 mg in dextrose 5% 250 mL infusion  1-100 mcg/min IntraVENous Continuous Luz Love MD 4.7 mL/hr at 22 0847 5 mcg/min at 22 0847       PMHx:  Past Medical History:   Diagnosis Date    ESRD (end stage renal disease) ([facility]     Hepatitis     Hypertension     Liver transplanted ([facility] 2016       PSHx:  Past Surgical History:   Procedure Laterality Date    DIALYSIS FISTULA CREATION Right     LIVER TRANSPLANT  2017    TUNNELED VENOUS PORT PLACEMENT         Social Hx:  Social History     Socioeconomic History    Marital status: Single   Tobacco Use    Smoking status: Former     Packs/day: 0.50     Years: 15.00     Pack years: 7.50     Types: Cigarettes     Quit date: 2017     Years since quittin.7    Smokeless tobacco: Never   Substance and Sexual Activity    Alcohol use: No    Drug use: No       Family Hx:  No family history on file. Review of Systems:   Review of Systems   Unable to perform ROS: Intubated       Physical Examination:    BP (!) 105/52   Pulse 80   Temp 97.7 °F (36.5 °C) (Rectal)   Resp 18   Ht 5' 6\" (1.676 m)   Wt 152 lb 8.9 oz (69.2 kg)   SpO2 97%   BMI 24.62 kg/m²    Physical Exam  Vitals and nursing note reviewed. HENT:      Head: Normocephalic and atraumatic. Mouth/Throat:      Mouth: Mucous membranes are moist.      Pharynx: Oropharynx is clear. Eyes:      Extraocular Movements: Extraocular movements intact. Conjunctiva/sclera: Conjunctivae normal.      Pupils: Pupils are equal, round, and reactive to light. Cardiovascular:      Rate and Rhythm: Normal rate and regular rhythm. Pulses: Normal pulses. Heart sounds: Normal heart sounds. Pulmonary:      Effort: Pulmonary effort is normal.      Breath sounds: Normal breath sounds. No rales. Abdominal:      General: Abdomen is flat. Bowel sounds are normal.      Palpations: Abdomen is soft. Musculoskeletal:         General: Normal range of motion. Cervical back: Normal range of motion and neck supple. Skin:     General: Skin is warm. LABS:  CBC:  Lab Results   Component Value Date/Time    WBC 11.3 07/29/2022 06:00 AM    RBC 3.22 07/29/2022 06:00 AM    RBC 3.34 02/20/2012 05:15 AM    HGB 11.1 07/29/2022 06:00 AM    HCT 33.4 07/29/2022 06:00 AM    .9 07/29/2022 06:00 AM    MCH 34.4 07/29/2022 06:00 AM    MCHC 33.1 07/29/2022 06:00 AM    RDW 14.8 07/29/2022 06:00 AM     07/29/2022 06:00 AM    MPV 9.5 10/09/2015 02:00 PM     CBC with Differential:   Lab Results   Component Value Date/Time    WBC 11.3 07/29/2022 06:00 AM    RBC 3.22 07/29/2022 06:00 AM    RBC 3.34 02/20/2012 05:15 AM    HGB 11.1 07/29/2022 06:00 AM    HCT 33.4 07/29/2022 06:00 AM     07/29/2022 06:00 AM    .9 07/29/2022 06:00 AM    MCH 34.4 07/29/2022 06:00 AM    MCHC 33.1 07/29/2022 06:00 AM    RDW 14.8 07/29/2022 06:00 AM    BANDSPCT 1 07/25/2022 11:27 AM    METASPCT 1 05/28/2015 05:46 AM    LYMPHOPCT 3.4 07/28/2022 05:38 AM    PROMYELOPCT 1 11/07/2017 05:14 AM    MONOPCT 8.5 07/28/2022 05:38 AM    EOSPCT 3.1 02/20/2012 05:15 AM    BASOPCT 0.3 07/28/2022 05:38 AM    MONOSABS 0.7 07/28/2022 05:38 AM    LYMPHSABS 0.3 07/28/2022 05:38 AM    EOSABS 0.2 07/28/2022 05:38 AM    BASOSABS 0.0 07/28/2022 05:38 AM     CMP:    Lab Results   Component Value Date/Time     07/28/2022 05:38 AM    K 3.3 07/28/2022 05:38 AM    CL 88 07/28/2022 05:38 AM    CO2 26 07/28/2022 05:38 AM    BUN 40 07/28/2022 05:38 AM    CREATININE 6.6 07/28/2022 11:00 AM    CREATININE 6.33 07/28/2022 05:38 AM    GFRAA 10 07/28/2022 11:00 AM    LABGLOM 8 07/28/2022 11:00 AM    GLUCOSE 105 07/28/2022 05:38 AM    GLUCOSE 136 02/25/2012 05:10 PM    PROT 7.5 07/25/2022 11:27 AM    LABALBU 3.0 07/28/2022 05:38 AM    LABALBU 2.8 02/23/2012 06:40 AM    CALCIUM 8.6 07/28/2022 05:38 AM    BILITOT 0.7 07/25/2022 11:27 AM    ALKPHOS 87 07/25/2022 11:27 AM    AST 77 07/25/2022 11:27 AM    ALT 79 07/25/2022 11:27 AM     BMP:    Lab Results   Component Value Date/Time     07/28/2022 05:38 AM K 3.3 07/28/2022 05:38 AM    CL 88 07/28/2022 05:38 AM    CO2 26 07/28/2022 05:38 AM    BUN 40 07/28/2022 05:38 AM    LABALBU 3.0 07/28/2022 05:38 AM    LABALBU 2.8 02/23/2012 06:40 AM    CREATININE 6.6 07/28/2022 11:00 AM    CREATININE 6.33 07/28/2022 05:38 AM    CALCIUM 8.6 07/28/2022 05:38 AM    GFRAA 10 07/28/2022 11:00 AM    LABGLOM 8 07/28/2022 11:00 AM    GLUCOSE 105 07/28/2022 05:38 AM    GLUCOSE 136 02/25/2012 05:10 PM     Magnesium:    Lab Results   Component Value Date/Time    MG 2.0 07/25/2022 11:27 AM    MG 2.0 02/19/2012 12:42 PM     Troponin:    Lab Results   Component Value Date/Time    TROPONINI 0.126 07/27/2022 04:39 AM          EKG: sinus tachycardia      Assessment:    Active Hospital Problems    Diagnosis Date Noted    Acute respiratory failure with hypoxia ([facility] [J96.01] 07/25/2022     Priority: Medium     Bradycardia. Currently sinus rhythm, no high degree AV blocks. Bradycardia likely due to sedation side effect versus clinical condition  Acute hypoxic respiratory failure.   Extubated on 7/28/2022  End-stage renal disease on hemodialysis  Extensive upper extremity DVTs  Pleural effusion status postthoracentesis  History of liver transplant  History of hypertension  Septic shock  Hyperkalemia  Positive troponins 0.126 in the setting of end-stage renal disease EKG without active signs of ischemia    TTE 7/27/2022 EF 45%, echogenic mass at the apex  Limited TTE with Definity 7/28/2022 EF 65%, no evidence of apical thrombus    Plan:  Continue ICU  Wean off pressors as tolerated  Continue midodrine  Please keep potassium between 4 and 5 magnesium above 2  Please keep hemoglobin above 8  Once patient is more hemodynamically stable we can restart cardioprotective medications including beta-blocker and possibly ACE inhibitor  Most likely patient will need an ischemic evaluation as an outpatient once acute conditions resolve  Continue management of rest of comorbidities as per primary team  Cont anticoagulation for extensive DVT  Further recommendations to follow       This report was transcribed using voice recognition software. Every effort was made to ensure accuracy; however, inadvertent computerized transcription errors may be present.     Electronically signed by Cheli Wakefield MD on 7/29/2022 at 9:07 AM

## 2022-07-29 NOTE — PROGRESS NOTES
[facility]   Facility/Department: [facility]  Speech Language Pathology  Clinical Bedside Swallow Evaluation    Pascal Kocher  : 1955 (75 y.o.)   [x]   confirmed    MRN: [id number]  ROOM: [place] [address]  ADMISSION DATE: 2022  PATIENT DIAGNOSIS(ES): Acute respiratory failure with hypoxia ([address] [J96.01]  Chief Complaint   Patient presents with    Shortness of Breath     C/O SOB  AFTER DIALYSIS  88 % ON RA  GIVEN 2 DUONEBS  SOLU MEDROL      Patient Active Problem List    Diagnosis Date Noted    Acute respiratory failure with hypoxia ([address] 2022    Vomiting and diarrhea     Generalized abdominal pain     Acute renal failure (HCC)     Gastroenteritis     C. difficile colitis     Severe sepsis ([address]     Hypotension 2017    ESRD (end stage renal disease) ([address] 2017    Liver transplanted ([facility] 2017    Liver transplant recipient [facility])     Sepsis ([address]      Past Medical History:   Diagnosis Date    ESRD (end stage renal disease) ([address]     Hepatitis     Hypertension     Liver transplanted ([address] 2016     Past Surgical History:   Procedure Laterality Date    DIALYSIS FISTULA CREATION Right     LIVER TRANSPLANT  2017    TUNNELED VENOUS PORT PLACEMENT       No Known Allergies    DATE ONSET: 22    Date of Evaluation: 2022   Evaluating Therapist: Karlee Lopez, SLP    Dysphagia Diagnosis  Dysphagia Diagnosis: Mild oral stage dysphagia;Mild pharyngeal stage dysphagia  Dysphagia Impression : Pt p/w mild oropharyngeal dysphagia characterized by lingual pumping, decreased propulsion, delayed initaiton and impaired mastication with soft solids. Pt had multiple swallows with trials of soft solids. Pt had no overt s/s of aspiration with pureed and thin trials. Pt has hoarse vocal quality. Once RN fixed pt's O2 monitor O2 sats were in the 90-94 during trials. Adequate hyolaryngeal elevation noted with puree and thin liquids.     Recommended Diet  Recommendations: suspected    Average Adult Male MPT range: 25-35 seconds  Average Adult Female MPT range: 15-25 seconds    S/Z Ratio   Ratio: 1.16  Ratio indicates: abnormal laryngeal function is not suspected    A ratio of 1.4 or above may indicate a degree of vocal fold dysfunction     Cough Strength:   perceptually strong               Oral/Pharyngeal Phase  Oral Phase - Comment: Mild oral phase dysphagia  Pharyngeal Phase: Mild pharyngeal dysphagia    PO Trials  Neuromuscular Estim Used: No  Assessment Method(s): Observation;Palpation  Vocal Quality: Hoarse  Consistency Presented: Thin  How Presented: Cup/sip  How much presented: 4  Bolus Acceptance: No impairment  Bolus Formation/Control: No impairment  Propulsion: No impairment  Oral Residue: None  Initiation of Swallow: No impairment  Laryngeal Elevation: Functional  Aspiration Signs/Symptoms: None  Pharyngeal Phase Characteristics: No impairment, issues, or problems  Additional PO Trials: 3      PO Trials 2  Consistency Presented:  Ice Chips  How Presented: SLP-fed/Presented;Spoon  How much presented: 2  Bolus Acceptance: No impairment  Bolus Formation/Control: No impairment  Type of Impairment: Delayed  Propulsion: Delayed (# of seconds)  Oral Residue: None  Initiation of Swallow: Delayed (# of seconds) (4-5 seconds)  Aspiration Signs/Symptoms: None  Pharyngeal Phase Characteristics: Multiple swallows  Effective Modifications: None      PO Trials 3  Consistency Presented: Pureed  How Presented: Spoon;SLP-fed/Presented  How much presented: 2  Bolus Acceptance: No impairment  Bolus Formation/Control: No impairment  Propulsion: No impairment  Oral Residue: None  Initiation of Swallow: No impairment  Aspiration Signs/Symptoms: None  Pharyngeal Phase Characteristics: No impairment, issues, or problems      PO Trials 4  Consistency Presented: Soft & Bite Sized  How Presented: SLP-fed/Presented  How much presented: 2  Bolus Acceptance: No impairment  Bolus Formation/Control: No impairment  Type of Impairment: Mastication; Suspected premature spilling; Incomplete (mild decreased laryngeal elevation noted)  Propulsion: Tongue pumping;Delayed (# of seconds) (5-6 seconds)  Oral Residue: None  Initiation of Swallow: Delayed (# of seconds) (5-6 seconds)  Pharyngeal Phase Characteristics: Double swallow             Dysphagia Diagnosis  Dysphagia Diagnosis: Mild oral stage dysphagia;Mild pharyngeal stage dysphagia  Dysphagia Impression : Pt p/w mild oropharyngeal dysphagia characterized by lingual pumping, decreased propulsion, delayed initaiton and impaired mastication with soft solids. Pt had multiple swallows with trials of soft solids. Pt had no overt s/s of aspiration with pureed and thin trials. Pt has hoarse vocal quality. Once RN fixed pt's O2 monitor O2 sats were in the 90-94 during trials. Adequate hyolaryngeal elevation noted with puree and thin liquids. Dysphagia Outcome Severity Scale: Level 5: Mild dysphagia- Distant supervision. May need one diet consistency restricted     Recommendations  Requires SLP Intervention: Yes  Recommendations: Dysphagia treatment  D/C Recommendations: Ongoing speech therapy is recommended during this hospitalization  Diet Solids Recommendation: Pureed  Liquid Consistency Recommendation: Thin  Compensatory Swallowing Strategies : Eat/Feed slowly;Upright as possible for all oral intake;Assist feed;Small bites/sips  Therapeutic Interventions: Laryngeal exercises; Patient/Family education;Diet tolerance monitoring  Duration of Treatment: during LOS or until goals met  Frequency of Treatment: 2-3x/week    Prognosis  Speech Therapy Prognosis  Prognosis: Good  Prognosis Considerations: Previous Level of Function; Motivation    Education  Individuals consulted  Consulted and agree with results and recommendations: Patient;RN;Physician  RN Name: Lv Aponte    Treatment/Goals  Short-term Goals  Timeframe for Short-term Goals: 2 weeks  Goal 1: Pt will tolerate the recommended diet level without clinical s/s of aspiration. Goal 2: Pt will tolerate 5/5 trials of minced and moist consistencies with adequate mastication and oral clearance of bolus in all opportunities. Goal 3: SLP to continue to assess need for MBS study in order to more objectively assess pharyngeal phase of swallow and determine least restrictive diet consistency. Goal 4: Pt will complete pharyngeal strengthening exercises (such as the Lashay, Effortful swallow, etc) with 80% acc in order to strengthen and establish and  more effective swallow. Long-term Goals  Timeframe for Long-term Goals: 1-2 weeks  Goal 1: Pt will tolerate the least restrictive diet level without clinical s/s of aspiration. Dysphagia Goals: The patient will tolerate recommended diet without observed clinical signs of aspiration    Safety Devices  Safety Devices  Safety Devices in place: Yes  Type of devices: Bed alarm in place;Call light within reach  Restraints Initially in Place: No    Pain Assessment  Patient does not c/o pain. Pain Re-assessment  Patient does not c/o pain.       Therapy Time   Time in: 5619  Time out: [address]  Minutes: 22              Signature: Electronically signed by Necia Hodgkin, SLP on 7/29/2022 at 2:44 PM

## 2022-07-29 NOTE — PROGRESS NOTES
Nephrology Progress Note    Assessment:  Pt is a 74 y/o male w/ history s/f HTN, ESRD on IHD, liver transplant who presented for SOB    ESRD on IHD MWF  Acute hypoxic respiratory failure: in setting of fluid overload, RT sided thoracentesis and PNA, now s/p RT sided thoracentesis, extubated 7/28  Septic shock: on pressors  Lactic acidosis: corrected  Metabolic/respiratory alkalosis  6.  Bilateral IJ thrombi  Hyponatremia  Hypokalemia  Hypoalbuminemia     Plan:  - continuing HD MWF       Patient Active Problem List:     Hypotension     ESRD (end stage renal disease) ([facility]     Liver transplanted ([facility] [address]     Liver transplant recipient ([facility] [address]     Sepsis ([facility]     Gastroenteritis     C. difficile colitis     Severe sepsis ([facility]     Vomiting and diarrhea     Generalized abdominal pain     Acute renal failure (HCC)     Acute respiratory failure with hypoxia (HCC)      Subjective:  Admit Date: 7/25/2022    Interval History: extubated, got short rx yesterday, remains on pressors     Medications:  Scheduled Meds:   midodrine  10 mg Oral TID WC    aspirin  81 mg Oral Daily    docusate sodium  100 mg Oral BID    vancomycin  500 mg IntraVENous Once    polyethylene glycol  17 g Oral Daily    tacrolimus  1 mg Oral BID    sodium chloride flush  5-40 mL IntraVENous 2 times per day    piperacillin-tazobactam  3,375 mg IntraVENous Q12H    vancomycin (VANCOCIN) intermittent dosing (placeholder)   Other RX Placeholder    famotidine (PEPCID) injection  20 mg IntraVENous Daily    insulin lispro  0-4 Units SubCUTAneous Q4H    lidocaine  5 mL IntraDERmal Once     Continuous Infusions:   sodium chloride      heparin (PORCINE) Infusion 17 Units/kg/hr (07/29/22 1017)    dextrose      sodium chloride      norepinephrine 2 mcg/min (07/29/22 1042)       CBC:   Recent Labs     07/28/22  0538 07/28/22  1100 07/29/22  0600   WBC 8.8  --  11.3*   HGB 11.4* 13.2* 11.1*     --  143       CMP:    Recent Labs     07/27/22  0554 07/28/22  0437 07/28/22  0538 07/28/22  1100 07/29/22  0927     --  130*  --  135   K 5.1*  --  3.3*  --  4.8   CL 92*  --  88*  --  91*   CO2 26  --  26  --  27   BUN 28*  --  40*  --  35*   CREATININE 5.33*   < > 6.33* 6.6* 4.94*   GLUCOSE 134*  --  105*  --  94   CALCIUM 8.9  --  8.6  --  8.5   LABGLOM 10.8*   < > 8.8* 8* 11.8*    < > = values in this interval not displayed. Troponin:   Recent Labs     07/27/22  0439   TROPONINI 0.126*       BNP: No results for input(s): BNP in the last 72 hours. INR:   Recent Labs     07/26/22  1558   INR 1.1       Lipids: No results for input(s): CHOL, LDLDIRECT, TRIG, HDL, AMYLASE, LIPASE in the last 72 hours. Liver:   Recent Labs     07/29/22 0927   LABALBU 2.7*       Iron:  No results for input(s): IRONS, FERRITIN in the last 72 hours. Invalid input(s): LABIRONS  Urinalysis: No results for input(s): UA in the last 72 hours.     Objective:  Vitals: BP (!) 105/52   Pulse 90   Temp 97.7 °F (36.5 °C) (Rectal)   Resp 22   Ht 5' 6\" (1.676 m)   Wt 152 lb 8.9 oz (69.2 kg)   SpO2 94%   BMI 24.62 kg/m²    Wt Readings from Last 3 Encounters:   07/28/22 152 lb 8.9 oz (69.2 kg)   06/28/22 145 lb (65.8 kg)   03/18/20 145 lb (65.8 kg)      24HR INTAKE/OUTPUT:    Intake/Output Summary (Last 24 hours) at 7/29/2022 1236  Last data filed at 7/29/2022 2464  Gross per 24 hour   Intake 884.04 ml   Output 1400 ml   Net -515.96 ml       General: alert, in no apparent distress  HEENT: normocephalic, atraumatic, anicteric  Lungs: non-labored respirations, clear to auscultation bilaterally  Heart: regular rate and rhythm, no murmurs or rubs  Abdomen: soft, non-tender, non-distended  MSK: no joint swelling or tenderness  Ext: no cyanosis, no peripheral edema  Neuro: alert and oriented, no gross abnormalities    Electronically signed by Lisbet Justin MD, MD

## 2022-07-29 NOTE — PROGRESS NOTES
[address] [facility] Pharmacokinetic Monitoring Service - Vancomycin    Consulting Provider: Nettie Scheuermann   Indication: sepsis  Target Concentration: Pre-Dialysis Concentration 21-24 mg/L  Day of Therapy: 5  Additional Antimicrobials: Zosyn    Pertinent Laboratory Values: Wt Readings from Last 1 Encounters:   07/28/22 152 lb 8.9 oz (69.2 kg)     Temp Readings from Last 1 Encounters:   07/29/22 97.7 °F (36.5 °C) (Rectal)     Recent Labs     07/28/22  0538 07/28/22  1100 07/29/22  0600 07/29/22  0927   CREATININE 6.33* 6.6*  --  4.94*   WBC 8.8  --  11.3*  --      Recent vancomycin administrations                     vancomycin 1000 mg IVPB in 250 mL D5W addavial (mg) 1,000 mg New Bag 07/25/22 1808                  Assessment:  Date/Time Current Dose Dialysis Session   7-25-22 1000 mg x1 post-HD HD MWF , last 7/25 outpatient   7-27-22  1000 mg x1 post-HD HD Tues 7/26 (usually MWF)   7-28-22 500 mg x1 post-HD HD Thurs 7/28 (usually MWF)     Vancomycin Rm   Date Value Ref Range Status   07/29/2022 25.0 10.0 - 40.0 ug/mL Final     Plan:  Concentration-guided dosing due to renal impairment and intermittent hemodialysis   Most recent dosing 500mg x1 dose on 7/28 @2033 after short dialysis session  Pt normally MWF dialysis schedule. Scheduled to have dialysis today.  Last dialysis was short session yesterday 7-28-22  Will give another Vancomycin 500 mg IV after todays dialysis  Vancomycin concentration ordered for Monday morning prior to dialysis session  Pharmacy will continue to monitor patient and adjust therapy as indicated    Thank you for the consult,    Diana Kruger  Staff Pharmacist, [facility]  7/29/2022  10:51 AM

## 2022-07-29 NOTE — CARE COORDINATION
Team ICU rounds done in room and pt continues on Levo and Heparin drips. Pt will need PT/OT and I asked re: this and he is not ready yet. Per report pt has multiple blood clots and possibly get therapy ordered Mon if remains stable.

## 2022-07-29 NOTE — PROGRESS NOTES
Pt awake and alert, flat effect noted. Pt is able to follow simple commands but is very weak and swollen. (Arms)  Pt given a few ice chips then sips of water and tolerated well.

## 2022-07-29 NOTE — PROGRESS NOTES
Hospitalist Daily Progress Note  Name: Rolanda Diggs  Age: 75 y.o. Gender: male  CodeStatus: Full Code  Allergies: No Known Allergies    Chief Complaint:Shortness of Breath (C/O SOB  AFTER DIALYSIS  88 % ON RA  GIVEN 2 DUONEBS  SOLU MEDROL )      Primary Care Provider: Geovanny Garcia MD    InpatientTreatment Team: Treatment Team: Attending Provider: North Mace MD; Consulting Physician: Robert Andrews MD; Consulting Physician: Ratna Ortiz MD; Consulting Physician: Daljit Gandara MD; Consulting Physician: Shorty Zapata DPM; Utilization Reviewer: Bala Guerra, BAILEY; Russell Aleman Nurse: Clementina Alas, BAILEY; Consulting Physician: Jhonny Simons MD; Registered Nurse: Pepper Hamilton RN; Patient Care Tech: Milagros Joel; Patient Care Tech: Ramesh Cartagena    Admission Date: 7/25/2022      Subjective: Intubated, sedated    Physical Exam  Vitals and nursing note reviewed. Constitutional:       Appearance: Normal appearance. He is ill-appearing. Cardiovascular:      Rate and Rhythm: Normal rate and regular rhythm. Pulmonary:      Effort: Pulmonary effort is normal.      Breath sounds: Normal breath sounds. Abdominal:      General: Bowel sounds are normal.      Palpations: Abdomen is soft. Skin:     General: Skin is warm and dry.    Neurological:      Comments: Sleepy, minimally arousable, denies pain       Medications:  Reviewed    Infusion Medications:    sodium chloride      heparin (PORCINE) Infusion 15 Units/kg/hr (07/28/22 2215)    vasopressin (Septic Shock) infusion Stopped (07/26/22 7628)    propofol Stopped (07/28/22 0843)    fentaNYL Stopped (07/28/22 0847)    dextrose      sodium chloride      norepinephrine 12 mcg/min (07/28/22 2215)     Scheduled Medications:    polyethylene glycol  17 g Oral Daily    docusate  100 mg Oral BID    tacrolimus  1 mg Oral BID    sodium chloride flush  5-40 mL IntraVENous 2 times per day    piperacillin-tazobactam  3,375 mg IntraVENous Q12H vancomycin (VANCOCIN) intermittent dosing (placeholder)   Other RX Placeholder    chlorhexidine  15 mL Mouth/Throat BID    famotidine (PEPCID) injection  20 mg IntraVENous Daily    insulin lispro  0-4 Units SubCUTAneous Q4H    lidocaine  5 mL IntraDERmal Once     PRN Meds: sodium chloride flush, sodium chloride, heparin (porcine), heparin (porcine), acetaminophen **OR** acetaminophen, fentaNYL **AND** fentaNYL, glucose, dextrose bolus **OR** dextrose bolus, glucagon (rDNA), dextrose, heparin (porcine)    Labs:   Recent Labs     07/26/22  1039 07/27/22  0440 07/28/22  0437 07/28/22  0538 07/28/22  1100   WBC 13.9* 10.4  --  8.8  --    HGB 12.4* 12.5* 13.2* 11.4* 13.2*   HCT 38.9* 36.1*  --  34.4*  --     284  --  133  --        Recent Labs     07/26/22  1039 07/27/22  0554 07/28/22  0437 07/28/22  0538 07/28/22  1100    138  --  130*  --    K 3.8 5.1*  --  3.3*  --    CL 93* 92*  --  88*  --    CO2 26 26  --  26  --    BUN 22 28*  --  40*  --    CREATININE 5.05* 5.33* 6.8* 6.33* 6.6*   CALCIUM 9.3 8.9  --  8.6  --    PHOS 4.0 4.4  --  4.4  --        No results for input(s): AST, ALT, BILIDIR, BILITOT, ALKPHOS in the last 72 hours. Recent Labs     07/26/22  1558   INR 1.1       Recent Labs     07/26/22  2116 07/27/22  0153 07/27/22  0439   TROPONINI 0.152* 0.117* 0.126*         Urinalysis:   Lab Results   Component Value Date/Time    NITRU Negative 05/21/2015 10:22 AM    BLOODU Negative 05/21/2015 10:22 AM    SPECGRAV 1.018 05/21/2015 10:22 AM    GLUCOSEU Negative 05/21/2015 10:22 AM    GLUCOSEU NEG 02/22/2012 11:16 AM       Radiology:   Most recent    Chest CT      WITH CONTRAST:No results found for this or any previous visit. WITHOUT CONTRAST: No results found for this or any previous visit. CXR      2-view: Results for orders placed during the hospital encounter of 06/28/22    XR CHEST (2 VW)    Narrative  EXAMINATION:  CHEST.     CLINICAL HISTORY:  SHORTNESS OF BREATH.    COMPARISONS: 11/4/2017. TECHNIQUE:  PA and lateral views. FINDINGS:  Heart size and contour are within normal limits. Pulmonary vascularity appears normal. There are small effusions bilaterally. There is a suggestion of small interstitial infiltrates. No definite evidence of a mass or adenopathy. No significant  bony abnormality. Impression  POSSIBLE SLIGHT INTERSTITIAL PNEUMONIA BOTH LOWER LUNGS. SMALL PLEURAL EFFUSIONS BILATERALLY. Results for orders placed during the hospital encounter of 10/31/17    XR CHEST STANDARD (2 VW)    Narrative  XR CHEST STANDARD TWO VW: 11/4/2017    CLINICAL HISTORY:  Pneumonia . COMPARISON: Portable chest 5/20/2015 and 2 view chest 12/23/2013. A portable upright AP radiograph of the chest was obtained. FINDINGS:    A right internal jugular approach hemodialysis catheter has been placed since the prior study with its tip within the right atrium. There is no developing pulmonary infiltrate, cardiomegaly, pleural effusion, significant vascular congestion, pneumothorax, or acute fractures identified. A moderate compression fracture T9 appears old. The cardiac and mediastinal silhouettes appear within normal limits for technique. Impression  NO EVIDENCE OF ACTIVE CARDIOPULMONARY DISEASE. Portable: Results for orders placed during the hospital encounter of 07/25/22    XR CHEST PORTABLE    Narrative  XR CHEST PORTABLE : 7/25/2022    CLINICAL HISTORY:  post intubation . COMPARISON: Earlier 7/25/2022    TECHNIQUE: A portable upright AP radiograph of the chest was obtained at approximately 12:46 PM.      FINDINGS:    Both lung bases have been excluded from the radiograph. An endotracheal tube is present, with its tip approximately 4 to 5 cm above the lucita. An orogastric tube probably extends below the diaphragm out of field of view radiograph.     Moderate pleural effusions and mild to moderate probable scarring and/or atelectasis of the mid to lower lung landis has not significantly changed. There is no cardiomegaly, pneumothorax, displaced fractures, or other significant changes identified. Impression  ENDOTRACHEAL AND OROGASTRIC TUBES IN EXPECTED POSITIONS. OTHERWISE, STABLE CHEST FROM EARLIER 7/25/2022. Echo No results found for this or any previous visit. Assessment/Plan:    Active Hospital Problems    Diagnosis Date Noted    Acute respiratory failure with hypoxia ([facility] [address] [J96.01] 07/25/2022     Priority: Medium     Acute hypoxic respiratory failure  Ventilated, intensivist consult, follow abg, ICU admission  7/26 - weaning as able, follow abg  7/27 - cont supportive care  7/28 - extubate today  Septic shock 2/2 PNA  Questionable on imaging, labs likely elevated 2/2 ESRD. However will cover with broad spectrum abx, obtain blood cultures, follow  7/26 - sputum culture  7/27 - cont broad abx  Extensive bue DVT with central involvement  7/27 - heparin gtt  Pulmonary edema  Patient to likely need some additional fluid removal through HD, d/w Dr. Ladonna Olmos, to evaluate. 7/26 - HD per nephrology  ESRD on HD  Consult nephrology  Elevated troponin  Cycle trops x 2  DVT proph  7/28 - d/w sister yesterday, considering tx to [facility] when improved.     Electronically signed by dAelaida Stokes MD on 7/29/2022 at 12:12 AM

## 2022-07-29 NOTE — PROGRESS NOTES
Pt tolerated dialysis well and titrated levo per blood pressure demands. Pt repositioned and oral care given. Pt ordered dinner but refusing states he is not hungry. Only taking sips of water.

## 2022-07-29 NOTE — PLAN OF CARE
BSE completed  Problem: Nutrition Deficit:  Goal: Optimize nutritional status  7/29/2022 1419 by Aidee Kathleen RD, LD  Outcome: Not Progressing  Flowsheets (Taken 7/29/2022 1412)  Nutrient intake appropriate for improving, restoring, or maintaining nutritional needs:   Assess nutritional status and recommend course of action   Recommend appropriate diets, oral nutritional supplements, and vitamin/mineral supplements   Monitor oral intake, labs, and treatment plans

## 2022-07-29 NOTE — FLOWSHEET NOTE
Slept well during the night Bipap maintained  Heparin gtt.  levophed gtt titrated down to 7 mcg Complete bath and linen change  sitter maintained in room

## 2022-07-29 NOTE — PLAN OF CARE
Problem: Nutrition Deficit:  Goal: Optimize nutritional status  Outcome: Not Progressing  Flowsheets (Taken 7/29/2022 1412)  Nutrient intake appropriate for improving, restoring, or maintaining nutritional needs:   Assess nutritional status and recommend course of action   Recommend appropriate diets, oral nutritional supplements, and vitamin/mineral supplements   Monitor oral intake, labs, and treatment plans

## 2022-07-29 NOTE — PROGRESS NOTES
Pulmonary & Critical Care Medicine ICU Progress Note  Chief complaint : Shortness of breath     Subjunctive/24 hour events :   Patient seen and examined during multidisciplinary rounds with RN, charge nurse, RT, pharmacy, dietitian, and social service. Patient was extubated yesterday. Patient wore BiPAP all night, currently on 2 L nasal cannula O2 sats 92%. Patient continues on Levophed at 5 mcg/min and heparin drip. No fever overnight patient is anuric. Received dialysis yesterday 1.4 L removed. Last bowel movement 2022. Social History     Tobacco Use    Smoking status: Former     Packs/day: 0.50     Years: 15.00     Pack years: 7.50     Types: Cigarettes     Quit date: 2017     Years since quittin.7    Smokeless tobacco: Never   Substance Use Topics    Alcohol use: No     No family history on file. Recent Labs     22  0437 22  1100   PHART 7.435 7.381   [id number] 42 47*   PO2ART 109* 98*         MV Settings:  Vent Mode: CPAP Resp Rate (Set): 18 bmp/Vt (Set, mL): 390 mL/ /FiO2 : 40 %           IV:   sodium chloride      heparin (PORCINE) Infusion 15 Units/kg/hr (22 0659)    vasopressin (Septic Shock) infusion Stopped (22 1738)    propofol Stopped (22 0843)    fentaNYL Stopped (22 0847)    dextrose      sodium chloride      norepinephrine 5 mcg/min (22 0847)       Vitals:  BP (!) 105/52   Pulse 80   Temp 97.7 °F (36.5 °C) (Rectal)   Resp 18   Ht 5' 6\" (1.676 m)   Wt 152 lb 8.9 oz (69.2 kg)   SpO2 97%   BMI 24.62 kg/m²    Tmax:       Intake/Output Summary (Last 24 hours) at 2022 0851  Last data filed at 2022 0659  Gross per 24 hour   Intake 1151.07 ml   Output 1400 ml   Net -248.93 ml         EXAM:    General: alert, fatigued, cooperative  Head: normocephalic, atraumatic  Eyes:No gross abnormalities.   ENT:  MMM no lesions  Neck:  supple and no masses  Chest : Fair air movement minimal rales no wheezes   Heart[de-identified] Heart sounds are 14.5   INR 1.1       APTT:   Recent Labs     07/26/22  1558   APTT >150.0*       UA:No results for input(s): NITRITE, COLORU, PHUR, LABCAST, WBCUA, RBCUA, MUCUS, TRICHOMONAS, YEAST, BACTERIA, CLARITYU, SPECGRAV, LEUKOCYTESUR, UROBILINOGEN, BILIRUBINUR, BLOODU, GLUCOSEU, AMORPHOUS in the last 72 hours. Invalid input(s): KETONESU    Cultures:    Echocardiogram complete 2D with doppler with color    Result Date: 7/27/2022  Transthoracic Echocardiography Report (TTE)  Demographics   Patient Name    VIA [facility]       Gender               Male                  [facility]   Patient Number  [id number]       Race                                                   Ethnicity   Visit Number    [id number]      Room Number          IC12   Corporate ID                   Date of Study        07/27/2022   Accession       [id number]     Referring Physician  Number   Date of Birth   1955     Sonographer          Zohreh Mele   Age             75 year(s)     Interpreting         [facility])                                 Physician            Cardiology                                                      [facility]  Procedure Type of Study   TTE procedure:ECHO COMPLETE 2D W/DOP W/COLOR. Procedure Date Date: 07/27/2022 Start: 10:27 AM Study Location: Portable Technical Quality: Limited visualization due to lung interface. Indications:LVF. Patient Status: Routine Height: 66 inches Weight: 148 pounds BSA: 1.76 m^2 BMI: 23.89 kg/m^2 BP: 96/54 mmHg  Conclusions   Summary  Left ventricular ejection fraction is estimated at 45%. Diastolic Dysfunction is noted by mitral flow. Normal right ventricle systolic pressure.   RVSP 28mmHg  Echogenic mass in the apex concerning for thrombus recommend limited echo  with definity  No hemodynamic evidence of significant valve disease   Signature   ----------------------------------------------------------------  Electronically signed by Rick Ricks(Interpreting physician)  on 07/27/2022 03:48 PM  ----------------------------------------------------------------   Findings  Left Ventricle Left ventricular ejection fraction is estimated at 45%. Left ventricular size is normal . Normal left ventricular wall thickness. Diastolic Dysfunction is noted by mitral flow. Echogenic mass in the apex possible thrombus Right Ventricle Normal right ventricle structure and function. Normal right ventricle systolic pressure. RVSP 28mmHg Left Atrium Normal left atrium. Right Atrium Normal right atrium. Mitral Valve Diffusely thickened and pliable mitral valve leaflets with normal excursion. Structurally normal mitral valve. No evidence of mitral valve stenosis. No evidence of mitral regurgitaton. Tricuspid Valve Tricuspid valve is structurally normal. No evidence of tricuspid stenosis. No evidence of tricuspid regurgitation. Aortic Valve Mildly thickened trileaflet aortic valve with normal excursion. Structurally normal aortic valve. No evidence of aortic valve stenosis . No evidence of aortic valve regurgitation . Pulmonic Valve The pulmonic valve was not well visualized . Pericardial Effusion No evidence of pericardial effusion. Aorta \ Miscellaneous The aorta is within normal limits. M-Mode Measurements (cm)   LVIDd: 2.97 cm                         LVIDs: 2.34 cm  IVSd: 0.73 cm                          IVSs: 1.01 cm  LVPWd: 0.9 cm                          LVPWs: 1.01 cm  Rt. Vent.  Dimension: 3.12 cm           AO Root Dimension: 2.1 cm                                         ACS: 1.37 cm                                         LA: 1.82 cm                                         LVOT: 2.03 cm  Doppler Measurements:   AV Velocity:0.02 m/s                    MV Peak E-Wave: 0.5 m/s  AV Peak Gradient: 5.77 mmHg             MV Peak A-Wave: 0.53 m/s  AV Mean Gradient: 3.05 mmHg  AV Area (Continuity):1.97 cm^2  TR Velocity:2.52 m/s                    Estimated RAP:3 mmHg  TR Gradient:25.31 mmHg                  RVSP:28.31 mmHg  Valves  Mitral Valve   Peak E-Wave: 0.5 m/s                  Peak A-Wave: 0.53 m/s  Mean Velocity: 0.87 m/s               E/A Ratio: 0.94  Mean Gradient: 4.66 mmHg              Peak Gradient: 0.99 mmHg                                        Deceleration Time: 353.3 msec                                        Area (continuity): 1.4 cm^2   Tissue Doppler   E' Septal Velocity: 0.07 m/s  E' Lateral Velocity: 0.07 m/s   Aortic Valve   Peak Velocity: 1.2 m/s                 Mean Velocity: 0.82 m/s  Peak Gradient: 5.77 mmHg               Mean Gradient: 3.05 mmHg  Area (continuity): 1.97 cm^2  AV VTI: 29.33 cm   Cusp Separation: 1.37 cm   Tricuspid Valve   Estimated RVSP: 28.31 mmHg              Estimated RAP: 3 mmHg  TR Velocity: 2.52 m/s                   TR Gradient: 25.31 mmHg   Pulmonic Valve   Peak Velocity: 0.99 m/s           Peak Gradient: 3.91 mmHg                                    Estimated PASP: 28.31 mmHg   LVOT   Peak Velocity: 0.97 m/s              Mean Velocity: 0.61 m/s  Peak Gradient: 3.77 mmHg             Mean Gradient: 1.79 mmHg  LVOT Diameter: 2.03 cm               LVOT VTI: 17.9 cm  Structures  Left Atrium   LA Dimension: 1.82 cm                        LA Area: 12.58 cm^2  LA/Aorta: 0.87  LA Volume/Index: 44.74 ml /25 m^2   Left Ventricle   Diastolic Dimension: 1.18 cm         Systolic Dimension: 1.00 cm  Septum Diastolic: 0.12 cm            Septum Systolic: 4.26 cm  PW Diastolic: 0.9 cm                 PW Systolic: 8.38 cm                                       FS: 21.2 %  LV EDV/LV EDV Index: 34.12 ml/19 m^2 LV ESV/LV ESV Index: 19.02 ml/11 m^2  EF Calculated: 44.3 %   LVOT Diameter: 2.03 cm   Right Ventricle   Diastolic Dimension: 4.16 cm                                    RV Systolic Pressure: 32.04 mmHg  Aorta/ Miscellaneous Aorta   Aortic Root: 2.1 cm  LVOT Diameter: 2.03 cm      ECHO Limited    Result Date: 7/28/2022  Transthoracic Echocardiography Report (TTE)  Demographics   Patient Name VIA [facility]  BLAINE       Gender               Male                  JON   Patient Number  [id number]       Race                                                   Ethnicity   Visit Number    [id number]      Room Number          IC12   Corporate ID                   Date of Study        07/28/2022   Accession       [id number]     Referring Physician  Number   Date of Birth   1955     Sonographer          Altagracia Sim   Age             75 year(s)     Interpreting         [facility])                                 Physician            Cardiology                                                      [facility]  Procedure Type of Study   TTE procedure:ECHOCARDIOGRAM LIMITED. Procedure Date Date: 07/28/2022 Start: 08:08 AM Study Location: Portable Technical Quality: Limited visualization Indications:LVF. Patient Status: Routine Contrast Medium: Definity. Amount - 2 ml Height: 66 inches Weight: 148 pounds BSA: 1.76 m^2 BMI: 23.89 kg/m^2  Conclusions   Summary  No evidence of thrombus with definity  Left ventricular ejection fraction is visually estimated at 65%. Signature   ----------------------------------------------------------------  Electronically signed by Abdulaziz Ricks(Interpreting physician)  on 07/28/2022 08:42 AM  ----------------------------------------------------------------   Findings  Left Ventricle Left ventricular ejection fraction is visually estimated at 65%. XR CHEST (2 VW)    Result Date: 6/28/2022  EXAMINATION:  CHEST. CLINICAL HISTORY:  SHORTNESS OF BREATH. COMPARISONS:  11/4/2017. TECHNIQUE:  PA and lateral views. FINDINGS:  Heart size and contour are within normal limits. Pulmonary vascularity appears normal. There are small effusions bilaterally. There is a suggestion of small interstitial infiltrates. No definite evidence of a mass or adenopathy. No significant  bony abnormality. POSSIBLE SLIGHT INTERSTITIAL PNEUMONIA BOTH LOWER LUNGS. SMALL PLEURAL EFFUSIONS BILATERALLY. IR FLUORO GUIDED CVA DEVICE PLMT/REPLACE/REMOVAL    1. Venogram of the right internal jugular vein demonstrates a thrombus in the right internal jugular vein which completely occludes the vein. Passage of contrast into the chest is through small collateral veins. 2. 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 Passage of contrast into the chest is through a small collateral veins. Radiation dose to the patient was: 24.21 mGy Additional clinical data: Long-term IV access Procedure: 1. Ultrasound guidance for vascular access into the right internal jugular vein. The ultrasound image of the blood vessel was saved to PACS. 2. Venogram via contrast injection of the right internal jugular vein. 3.   Ultrasound guidance for vascular access into the left internal jugular vein. The ultrasound image of the blood vessel was saved to PACS 4. Venogram via contrast injection of the left internal jugular vein. Body of Report: Informed and written consent was obtained from the patient following discussion of risks, benefits and alternatives to this procedure. The was patient placed supine on the angiographic table. The patient's neck and chest were then prepped and draped in  normal sterile fashion. A small amount of local lidocaine anesthesia was injected subcutaneously. Ultrasound was used to study the jugular vein we intended to use prior to accessing it. The vein appeared patent. The ultrasound image of the blood vessel was saved to PACS. Using ultrasound access, puncture was made of the right internal jugular vein using a 21 GA needle. A wire was advanced into the right internal jugular vein, though would not pass into the superior vena cava. A 4 Turkmen short thin sheath was placed, through the sheath digital subtraction venography was performed.  Venography demonstrated a thrombus in the right internal jugular vein and complete occlusion of the vein central to the thrombus. This access was aborted. Ultrasound was used to study the left jugular vein we intended to use prior to accessing it. The vein appeared patent. The ultrasound image of the blood vessel was saved to PACS. Using ultrasound access, puncture was made of the left internal jugular vein using a 21 GA needle. A wire was attempted to be passed, though would not pass to the superior vena  cava. A 4 Angolan thin sheath was placed and digital subtraction venography was performed. 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 The procedure was aborted. Findings discussed with ICU team who will place a temporary central line in the groin. XR CHEST PORTABLE    Result Date: 7/27/2022  XR CHEST PORTABLE COMPARISON: July 25, 2022. HISTORY: resp failure TECHNIQUE: AP view FINDINGS: Endotracheal tube again seen in place. . There is also an enteric tube seen in place and the tip is below the diaphragm. They appear unchanged in position. A small pleural effusion seen on the right. The left costophrenic angle is not well seen. The lungs are hyperinflated and there is coarsening of the interstitium. Atelectasis and/or scarring is again seen bilaterally in the lower lung fields. The cardiac silhouette appears mildly enlarged but may be accentuated by the portable technique. Degenerative changes are seen in the visualized portion of the right shoulder. The airspace disease has diminished when compared to previous study. . A small pleural effusion is seen on the right and scarring or atelectasis is again seen bilaterally in the bases. US DUP UPPER EXTREMITY RIGHT VENOUS COMPARISON: HISTORY:  resp failure TECHNIQUE: , US DUP UPPER EXTREMITY RIGHT VENOUS AND LEFT VENOUS FINDINGS: Thrombus is seen in the right internal jugular and proximal subclavian, veins it is also seen in the cephalic vein. The right ulnar and basilic veins are not visualized.  A right AV fistula seen. Thrombus is seen in the left internal jugular vein. The left basilic and axillary veins are not visualized. IMPRESSION: Deep venous thrombosis seen in the right and left upper extremities. .    XR CHEST PORTABLE    Result Date: 7/25/2022  XR CHEST PORTABLE : 7/25/2022 CLINICAL HISTORY:  post intubation . COMPARISON: Earlier 7/25/2022 TECHNIQUE: A portable upright AP radiograph of the chest was obtained at approximately 12:46 PM. FINDINGS: Both lung bases have been excluded from the radiograph. An endotracheal tube is present, with its tip approximately 4 to 5 cm above the lucita. An orogastric tube probably extends below the diaphragm out of field of view radiograph. Moderate pleural effusions and mild to moderate probable scarring and/or atelectasis of the mid to lower lung fields has not significantly changed. There is no cardiomegaly, pneumothorax, displaced fractures, or other significant changes identified. ENDOTRACHEAL AND OROGASTRIC TUBES IN EXPECTED POSITIONS. OTHERWISE, STABLE CHEST FROM EARLIER 7/25/2022. XR CHEST PORTABLE    Result Date: 7/25/2022  TECHNIQUE: Single portable view of the chest. CLINICAL INDICATION: Chest pain. COMPARISON: Chest x-ray obtained on June 28, 2022. PROCEDURE AND FINDINGS: Poor inspiratory effort is seen. The cardiomediastinal silhouette is slightly prominent in size. Mild prominence of the bronchovascular and interstitial lung markings is visualized bilaterally, linear streaky opacities visualized in bilateral lung fields, subsegmental atelectatic streaks, fluid visualized in the right minor fissure. Airspace opacification visualized in the lower lung fields bilaterally with blunting of the costophrenic angles and obliteration of the hemidiaphragms consistent with bilateral pleural effusions. . No evidence of pneumothorax or parenchymal lung mass. Degenerative bone changes.      Congestive heart failure versus pneumonia and parapneumonic effusions would recommend clinical correlation. Increased opacification seen in comparison to the prior study. US DUP UPPER EXTREMITY RIGHT VENOUS    Result Date: 7/27/2022  XR CHEST PORTABLE COMPARISON: July 25, 2022. HISTORY: resp failure TECHNIQUE: AP view FINDINGS: Endotracheal tube again seen in place. . There is also an enteric tube seen in place and the tip is below the diaphragm. They appear unchanged in position. A small pleural effusion seen on the right. The left costophrenic angle is not well seen. The lungs are hyperinflated and there is coarsening of the interstitium. Atelectasis and/or scarring is again seen bilaterally in the lower lung fields. The cardiac silhouette appears mildly enlarged but may be accentuated by the portable technique. Degenerative changes are seen in the visualized portion of the right shoulder. The airspace disease has diminished when compared to previous study. . A small pleural effusion is seen on the right and scarring or atelectasis is again seen bilaterally in the bases. US DUP UPPER EXTREMITY RIGHT VENOUS COMPARISON: HISTORY:  resp failure TECHNIQUE: , US DUP UPPER EXTREMITY RIGHT VENOUS AND LEFT VENOUS FINDINGS: Thrombus is seen in the right internal jugular and proximal subclavian, veins it is also seen in the cephalic vein. The right ulnar and basilic veins are not visualized. A right AV fistula seen. Thrombus is seen in the left internal jugular vein. The left basilic and axillary veins are not visualized. IMPRESSION: Deep venous thrombosis seen in the right and left upper extremities. .    [facility] UPPER EXTREMITY LEFT VENOUS    Result Date: 7/27/2022  XR CHEST PORTABLE COMPARISON: July 25, 2022. HISTORY: resp failure TECHNIQUE: AP view FINDINGS: Endotracheal tube again seen in place. . There is also an enteric tube seen in place and the tip is below the diaphragm. They appear unchanged in position. A small pleural effusion seen on the right.  The left costophrenic angle is not well seen. The lungs are hyperinflated and there is coarsening of the interstitium. Atelectasis and/or scarring is again seen bilaterally in the lower lung fields. The cardiac silhouette appears mildly enlarged but may be accentuated by the portable technique. Degenerative changes are seen in the visualized portion of the right shoulder. The airspace disease has diminished when compared to previous study. . A small pleural effusion is seen on the right and scarring or atelectasis is again seen bilaterally in the bases. US DUP UPPER EXTREMITY RIGHT VENOUS COMPARISON: HISTORY:  resp failure TECHNIQUE: , US DUP UPPER EXTREMITY RIGHT VENOUS AND LEFT VENOUS FINDINGS: Thrombus is seen in the right internal jugular and proximal subclavian, veins it is also seen in the cephalic vein. The right ulnar and basilic veins are not visualized. A right AV fistula seen. Thrombus is seen in the left internal jugular vein. The left basilic and axillary veins are not visualized. IMPRESSION: Deep venous thrombosis seen in the right and left upper extremities. .    IR VENOGRAM VENOUS SINUS/JUGULAR    1. Venogram of the right internal jugular vein demonstrates a thrombus in the right internal jugular vein which completely occludes the vein. Passage of contrast into the chest is through small collateral veins. 2. 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 Passage of contrast into the chest is through a small collateral veins. Radiation dose to the patient was: 24.21 mGy Additional clinical data: Long-term IV access Procedure: 1. Ultrasound guidance for vascular access into the right internal jugular vein. The ultrasound image of the blood vessel was saved to PACS. 2. Venogram via contrast injection of the right internal jugular vein. 3.   Ultrasound guidance for vascular access into the left internal jugular vein.  The ultrasound image of the blood vessel was saved to PACS 4. Venogram via contrast injection of the left internal jugular vein. Body of Report: Informed and written consent was obtained from the patient following discussion of risks, benefits and alternatives to this procedure. The was patient placed supine on the angiographic table. The patient's neck and chest were then prepped and draped in  normal sterile fashion. A small amount of local lidocaine anesthesia was injected subcutaneously. Ultrasound was used to study the jugular vein we intended to use prior to accessing it. The vein appeared patent. The ultrasound image of the blood vessel was saved to PACS. Using ultrasound access, puncture was made of the right internal jugular vein using a 21 GA needle. A wire was advanced into the right internal jugular vein, though would not pass into the superior vena cava. A 4 Albanian short thin sheath was placed, through the sheath digital subtraction venography was performed. Venography demonstrated a thrombus in the right internal jugular vein and complete occlusion of the vein central to the thrombus. This access was aborted. Ultrasound was used to study the left jugular vein we intended to use prior to accessing it. The vein appeared patent. The ultrasound image of the blood vessel was saved to PACS. Using ultrasound access, puncture was made of the left internal jugular vein using a 21 GA needle. A wire was attempted to be passed, though would not pass to the superior vena  cava. A 4 Albanian thin sheath was placed and digital subtraction venography was performed. 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 The procedure was aborted. Findings discussed with ICU team who will place a temporary central line in the groin. IR ULTRASOUND GUIDANCE VASCULAR ACCESS    1.  Venogram of the right internal jugular vein demonstrates a thrombus in the right internal jugular vein which completely occludes the vein. Passage of contrast into the chest is through small collateral veins. 2. 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 Passage of contrast into the chest is through a small collateral veins. Radiation dose to the patient was: 24.21 mGy Additional clinical data: Long-term IV access Procedure: 1. Ultrasound guidance for vascular access into the right internal jugular vein. The ultrasound image of the blood vessel was saved to PACS. 2. Venogram via contrast injection of the right internal jugular vein. 3.   Ultrasound guidance for vascular access into the left internal jugular vein. The ultrasound image of the blood vessel was saved to PACS 4. Venogram via contrast injection of the left internal jugular vein. Body of Report: Informed and written consent was obtained from the patient following discussion of risks, benefits and alternatives to this procedure. The was patient placed supine on the angiographic table. The patient's neck and chest were then prepped and draped in  normal sterile fashion. A small amount of local lidocaine anesthesia was injected subcutaneously. Ultrasound was used to study the jugular vein we intended to use prior to accessing it. The vein appeared patent. The ultrasound image of the blood vessel was saved to PACS. Using ultrasound access, puncture was made of the right internal jugular vein using a 21 GA needle. A wire was advanced into the right internal jugular vein, though would not pass into the superior vena cava. A 4 Sri Lankan short thin sheath was placed, through the sheath digital subtraction venography was performed. Venography demonstrated a thrombus in the right internal jugular vein and complete occlusion of the vein central to the thrombus. This access was aborted. Ultrasound was used to study the left jugular vein we intended to use prior to accessing it.   The vein appeared patent. The ultrasound image of the blood vessel was saved to PACS. Using ultrasound access, puncture was made of the left internal jugular vein using a 21 GA needle. A wire was attempted to be passed, though would not pass to the superior vena  cava. A 4 Slovenian thin sheath was placed and digital subtraction venography was performed. 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 The procedure was aborted. Findings discussed with ICU team who will place a temporary central line in the groin. US DUP LOWER EXTREMITIES BILATERAL VENOUS    Result Date: 7/27/2022  EXAMINATION: US DUP LOWER EXTREMITIES BILATERAL VENOUS CLINICAL HISTORY: 69-year-old with lower extremity swelling COMPARISONS: None available. FINDINGS: Duplex and color Doppler ultrasounds were performed of the bilateral lower extremity deep venous systems. Examination was performed portably and limited due to patient condition and access to the lower extremities. Right leg: Visualized portions of the right common femoral vein, femoral vein, popliteal vein demonstrate satisfactory compression, color flow, and augmentation. Deep calf veins are not evaluated. Left leg: The left common femoral vein is enlarged filled with intraluminal echoes with absent color flow and poor compression consistent with occluding thrombus. Occluding Thrombus extends into the left proximal femoral vein. Mid to distal femoral vein and popliteal vein demonstrate satisfactory compression and color flow. Deep calf veins are not assessed on this portable study     ULTRASOUND FINDINGS ARE POSITIVE FOR OCCLUDING THROMBUS IN THE LEFT COMMON FEMORAL VEIN EXTENDING INTO THE PROXIMAL FEMORAL VEIN. NO ULTRASOUND SIGNS OF DVT IN THE RIGHT LEG FROM THE GROIN TO THE POPLITEAL FOSSA    Most recent    Chest CT      WITH CONTRAST:No results found for this or any previous visit. WITHOUT CONTRAST: No results found for this or any previous visit.       CXR 2-view: Results for orders placed during the hospital encounter of 06/28/22    XR CHEST (2 VW)    Narrative  EXAMINATION:  CHEST. CLINICAL HISTORY:  SHORTNESS OF BREATH.    COMPARISONS:  11/4/2017. TECHNIQUE:  PA and lateral views. FINDINGS:  Heart size and contour are within normal limits. Pulmonary vascularity appears normal. There are small effusions bilaterally. There is a suggestion of small interstitial infiltrates. No definite evidence of a mass or adenopathy. No significant  bony abnormality. Impression  POSSIBLE SLIGHT INTERSTITIAL PNEUMONIA BOTH LOWER LUNGS. SMALL PLEURAL EFFUSIONS BILATERALLY. Results for orders placed during the hospital encounter of 10/31/17    XR CHEST STANDARD (2 VW)    Narrative  XR CHEST STANDARD TWO VW: 11/4/2017    CLINICAL HISTORY:  Pneumonia . COMPARISON: Portable chest 5/20/2015 and 2 view chest 12/23/2013. A portable upright AP radiograph of the chest was obtained. FINDINGS:    A right internal jugular approach hemodialysis catheter has been placed since the prior study with its tip within the right atrium. There is no developing pulmonary infiltrate, cardiomegaly, pleural effusion, significant vascular congestion, pneumothorax, or acute fractures identified. A moderate compression fracture T9 appears old. The cardiac and mediastinal silhouettes appear within normal limits for technique. Impression  NO EVIDENCE OF ACTIVE CARDIOPULMONARY DISEASE. Portable: Results for orders placed during the hospital encounter of 07/25/22    XR CHEST PORTABLE    Narrative  XR CHEST PORTABLE    COMPARISON: July 25, 2022. HISTORY: resp failure    TECHNIQUE: AP view      FINDINGS:  Endotracheal tube again seen in place. . There is also an enteric tube seen in place and the tip is below the diaphragm. They appear unchanged in position. A small pleural effusion seen on the right. The left costophrenic angle is not well seen.  The lungs are hyperinflated and there is coarsening of the interstitium. Atelectasis and/or scarring is again seen bilaterally in the lower lung fields. The  cardiac silhouette appears mildly enlarged but may be accentuated by the portable technique. Degenerative changes are seen in the visualized portion of the right shoulder. Impression  The airspace disease has diminished when compared to previous study. . A small pleural effusion is seen on the right and scarring or atelectasis is again seen bilaterally in the bases. US DUP UPPER EXTREMITY RIGHT VENOUS    COMPARISON:    HISTORY:  resp failure    TECHNIQUE: , US DUP UPPER EXTREMITY RIGHT VENOUS AND LEFT VENOUS    FINDINGS: Thrombus is seen in the right internal jugular and proximal subclavian, veins it is also seen in the cephalic vein. The right ulnar and basilic veins are not visualized. A right AV fistula seen. Thrombus is seen in the left internal jugular vein. The left basilic and axillary veins are not visualized. IMPRESSION: Deep venous thrombosis seen in the right and left upper extremities. .      Echo No results found for this or any previous visit. Assessment:   This is a critically ill patient at risk of deterioration / death , needing close ICU monitoring and intervention due to below noted problems   Acute hypoxic respiratory failure, requiring intubation   Large Right sided pleural effusion, thoracentesis   Right sided pneumonia   Shock, requiring vasopressors   Multiple DVT's, thrombus left common femoral vein, thrombus right internal jugular and proximal subclavian and thrombus in the left internal jugular   Hyponatremia   Hypokalemia   Chronic kidney disease on dialysis   Liver transplant   Lactic acidosis, improved     Recommendations   Watch for ICU delirium: TV on, natural light, avoid benzos, pain control, early mobility, and family engagement  PUD prophylaxis  DVT prophylaxis   Continue heparin drip   Continue levophed to maintain map>65  Maintain blood sugar 140-180  Continue antibiotics   Labs pending   EF 45%  BAL negative   Dialysis today  If able to wean off Levophed, remove groin central line  Start midodrine  Thoracentesis fluid exudative  Bedside swallow eval                 Electronically signed by ISI Chase - CNP,  [facility] ,on 7/29/2022 at 8:51 AM

## 2022-07-30 PROBLEM — J18.9 PNEUMONIA DUE TO INFECTIOUS ORGANISM: Status: ACTIVE | Noted: 2022-07-30

## 2022-07-30 PROBLEM — J90 PLEURAL EFFUSION: Status: ACTIVE | Noted: 2022-07-30

## 2022-07-30 PROBLEM — Z99.2 ESRD (END STAGE RENAL DISEASE) ON DIALYSIS (HCC): Status: ACTIVE | Noted: 2017-11-03

## 2022-07-30 LAB
ALBUMIN SERPL-MCNC: 2.9 G/DL (ref 3.5–4.6)
ANION GAP SERPL CALCULATED.3IONS-SCNC: 15 MEQ/L (ref 9–15)
ANTI-XA UNFRAC HEPARIN: 0.41 IU/ML
ANTI-XA UNFRAC HEPARIN: 0.53 IU/ML
BASOPHILS ABSOLUTE: 0 K/UL (ref 0–0.2)
BASOPHILS RELATIVE PERCENT: 0.2 %
BLOOD CULTURE, ROUTINE: NORMAL
BUN BLDV-MCNC: 24 MG/DL (ref 8–23)
CALCIUM SERPL-MCNC: 9 MG/DL (ref 8.5–9.9)
CHLORIDE BLD-SCNC: 92 MEQ/L (ref 95–107)
CO2: 28 MEQ/L (ref 20–31)
CREAT SERPL-MCNC: 3.55 MG/DL (ref 0.7–1.2)
CULTURE, BLOOD 2: NORMAL
CULTURE, RESPIRATORY: ABNORMAL
CULTURE, RESPIRATORY: ABNORMAL
EOSINOPHILS ABSOLUTE: 0.1 K/UL (ref 0–0.7)
EOSINOPHILS RELATIVE PERCENT: 1 %
GFR AFRICAN AMERICAN: 20.8
GFR NON-AFRICAN AMERICAN: 17.2
GLUCOSE BLD-MCNC: 135 MG/DL (ref 70–99)
GLUCOSE BLD-MCNC: 79 MG/DL (ref 70–99)
GLUCOSE BLD-MCNC: 85 MG/DL (ref 70–99)
GLUCOSE BLD-MCNC: 85 MG/DL (ref 70–99)
GLUCOSE BLD-MCNC: 90 MG/DL (ref 70–99)
HBA1C MFR BLD: 4.9 % (ref 4.8–5.9)
HCT VFR BLD CALC: 32.3 % (ref 42–52)
HEMOGLOBIN: 10.8 G/DL (ref 14–18)
LYMPHOCYTES ABSOLUTE: 0.2 K/UL (ref 1–4.8)
LYMPHOCYTES RELATIVE PERCENT: 2.3 %
MCH RBC QN AUTO: 35 PG (ref 27–31.3)
MCHC RBC AUTO-ENTMCNC: 33.5 % (ref 33–37)
MCV RBC AUTO: 104.6 FL (ref 80–100)
MONOCYTES ABSOLUTE: 1.1 K/UL (ref 0.2–0.8)
MONOCYTES RELATIVE PERCENT: 10.4 %
NEUTROPHILS ABSOLUTE: 9.1 K/UL (ref 1.4–6.5)
NEUTROPHILS RELATIVE PERCENT: 86.1 %
ORGANISM: ABNORMAL
PDW BLD-RTO: 14.5 % (ref 11.5–14.5)
PERFORMED ON: ABNORMAL
PERFORMED ON: NORMAL
PHOSPHORUS: 4.4 MG/DL (ref 2.3–4.8)
PLATELET # BLD: 152 K/UL (ref 130–400)
POTASSIUM SERPL-SCNC: 4 MEQ/L (ref 3.4–4.9)
RBC # BLD: 3.09 M/UL (ref 4.7–6.1)
SODIUM BLD-SCNC: 135 MEQ/L (ref 135–144)
WBC # BLD: 10.5 K/UL (ref 4.8–10.8)

## 2022-07-30 PROCEDURE — A4216 STERILE WATER/SALINE, 10 ML: HCPCS | Performed by: NURSE PRACTITIONER

## 2022-07-30 PROCEDURE — 2500000003 HC RX 250 WO HCPCS: Performed by: NURSE PRACTITIONER

## 2022-07-30 PROCEDURE — 83036 HEMOGLOBIN GLYCOSYLATED A1C: CPT

## 2022-07-30 PROCEDURE — 2580000003 HC RX 258: Performed by: NURSE PRACTITIONER

## 2022-07-30 PROCEDURE — 2500000003 HC RX 250 WO HCPCS: Performed by: FAMILY MEDICINE

## 2022-07-30 PROCEDURE — 6370000000 HC RX 637 (ALT 250 FOR IP): Performed by: NURSE PRACTITIONER

## 2022-07-30 PROCEDURE — 6360000002 HC RX W HCPCS: Performed by: FAMILY MEDICINE

## 2022-07-30 PROCEDURE — 85520 HEPARIN ASSAY: CPT

## 2022-07-30 PROCEDURE — 99233 SBSQ HOSP IP/OBS HIGH 50: CPT | Performed by: INTERNAL MEDICINE

## 2022-07-30 PROCEDURE — 2700000000 HC OXYGEN THERAPY PER DAY

## 2022-07-30 PROCEDURE — 2580000003 HC RX 258: Performed by: INTERNAL MEDICINE

## 2022-07-30 PROCEDURE — 6370000000 HC RX 637 (ALT 250 FOR IP): Performed by: INTERNAL MEDICINE

## 2022-07-30 PROCEDURE — 6360000002 HC RX W HCPCS: Performed by: NURSE PRACTITIONER

## 2022-07-30 PROCEDURE — 99222 1ST HOSP IP/OBS MODERATE 55: CPT | Performed by: INTERNAL MEDICINE

## 2022-07-30 PROCEDURE — 2500000003 HC RX 250 WO HCPCS: Performed by: INTERNAL MEDICINE

## 2022-07-30 PROCEDURE — 99291 CRITICAL CARE FIRST HOUR: CPT | Performed by: INTERNAL MEDICINE

## 2022-07-30 PROCEDURE — 80069 RENAL FUNCTION PANEL: CPT

## 2022-07-30 PROCEDURE — 94660 CPAP INITIATION&MGMT: CPT

## 2022-07-30 PROCEDURE — 85025 COMPLETE CBC W/AUTO DIFF WBC: CPT

## 2022-07-30 PROCEDURE — 2580000003 HC RX 258: Performed by: FAMILY MEDICINE

## 2022-07-30 PROCEDURE — 2000000000 HC ICU R&B

## 2022-07-30 RX ADMIN — HEPARIN SODIUM AND DEXTROSE 17 UNITS/KG/HR: 10000; 5 INJECTION INTRAVENOUS at 02:21

## 2022-07-30 RX ADMIN — Medication 2 MCG/MIN: at 02:19

## 2022-07-30 RX ADMIN — MIDODRINE HYDROCHLORIDE 10 MG: 10 TABLET ORAL at 18:16

## 2022-07-30 RX ADMIN — DOCUSATE SODIUM 100 MG: 100 CAPSULE, LIQUID FILLED ORAL at 09:24

## 2022-07-30 RX ADMIN — DOCUSATE SODIUM 100 MG: 100 CAPSULE, LIQUID FILLED ORAL at 20:09

## 2022-07-30 RX ADMIN — FAMOTIDINE 20 MG: 10 INJECTION, SOLUTION INTRAVENOUS at 09:24

## 2022-07-30 RX ADMIN — APIXABAN 10 MG: 5 TABLET, FILM COATED ORAL at 10:09

## 2022-07-30 RX ADMIN — ASPIRIN 81 MG: 81 TABLET, COATED ORAL at 09:24

## 2022-07-30 RX ADMIN — MIDODRINE HYDROCHLORIDE 10 MG: 10 TABLET ORAL at 09:24

## 2022-07-30 RX ADMIN — PIPERACILLIN AND TAZOBACTAM 3375 MG: 3; .375 INJECTION, POWDER, LYOPHILIZED, FOR SOLUTION INTRAVENOUS at 20:04

## 2022-07-30 RX ADMIN — TACROLIMUS 1 MG: 1 CAPSULE ORAL at 09:23

## 2022-07-30 RX ADMIN — Medication 10 ML: at 20:06

## 2022-07-30 RX ADMIN — APIXABAN 10 MG: 5 TABLET, FILM COATED ORAL at 20:09

## 2022-07-30 RX ADMIN — Medication 1.3 MCG/MIN: at 15:06

## 2022-07-30 RX ADMIN — TACROLIMUS 1 MG: 1 CAPSULE ORAL at 20:08

## 2022-07-30 RX ADMIN — MIDODRINE HYDROCHLORIDE 10 MG: 10 TABLET ORAL at 11:58

## 2022-07-30 RX ADMIN — PIPERACILLIN AND TAZOBACTAM 3375 MG: 3; .375 INJECTION, POWDER, LYOPHILIZED, FOR SOLUTION INTRAVENOUS at 09:22

## 2022-07-30 RX ADMIN — Medication 1 MCG/MIN: at 12:02

## 2022-07-30 RX ADMIN — SULFAMETHOXAZOLE AND TRIMETHOPRIM 518.4 MG: 80; 16 INJECTION, SOLUTION, CONCENTRATE INTRAVENOUS at 10:52

## 2022-07-30 ASSESSMENT — PAIN SCALES - GENERAL: PAINLEVEL_OUTOF10: 0

## 2022-07-30 NOTE — PROGRESS NOTES
Hospitalist Daily Progress Note  Name: Shruthi Brower  Age: 75 y.o. Gender: male  CodeStatus: Full Code  Allergies: No Known Allergies    Chief Complaint:Shortness of Breath (C/O SOB  AFTER DIALYSIS  88 % ON RA  GIVEN 2 DUONEBS  SOLU MEDROL )      Primary Care Provider: Azael Castanon MD    InpatientTreatment Team: Treatment Team: Attending Provider: Angela Dave MD; Consulting Physician: Dragan Haddad MD; Consulting Physician: Pepito Fritz MD; Consulting Physician: Jese Oliver MD; Utilization Reviewer: Janine Coburn, BAILEY; Oswald Saldana Nurse: Lucy Emerson, RN; Consulting Physician: Sergey Antonio MD; : Zuri Plunkett. Corry, RN; Registered Nurse: Scott Smith RN; : Bart Howell, RN; Utilization Reviewer: Lorrie Bernard, RN; Consulting Physician: Thi Almeida MD    Admission Date: 7/25/2022      Subjective: Resting in bed, feels improved, no cp, sob improving    Physical Exam  Vitals and nursing note reviewed. Constitutional:       Appearance: Normal appearance. He is ill-appearing. Cardiovascular:      Rate and Rhythm: Normal rate and regular rhythm. Pulmonary:      Effort: Pulmonary effort is normal.      Breath sounds: Normal breath sounds. Abdominal:      General: Bowel sounds are normal.      Palpations: Abdomen is soft. Skin:     General: Skin is warm and dry.    Neurological:      Comments: Sleepy, minimally arousable, denies pain       Medications:  Reviewed    Infusion Medications:    sodium chloride      dextrose      sodium chloride      norepinephrine Stopped (07/30/22 1000)     Scheduled Medications:    apixaban  10 mg Oral BID    Followed by    Maria Fernanda Arciniega ON 8/6/2022] apixaban  5 mg Oral BID    sulfamethoxazole-trimethoprim (BACTRIM) IVPB  7.5 mg/kg IntraVENous Daily    midodrine  10 mg Oral TID WC    aspirin  81 mg Oral Daily    docusate sodium  100 mg Oral BID    insulin lispro  0-4 Units SubCUTAneous 4x Daily AC & HS polyethylene glycol  17 g Oral Daily    tacrolimus  1 mg Oral BID    sodium chloride flush  5-40 mL IntraVENous 2 times per day    piperacillin-tazobactam  3,375 mg IntraVENous Q12H    vancomycin (VANCOCIN) intermittent dosing (placeholder)   Other RX Placeholder    famotidine (PEPCID) injection  20 mg IntraVENous Daily    lidocaine  5 mL IntraDERmal Once     PRN Meds: sodium chloride flush, sodium chloride, acetaminophen **OR** acetaminophen, [DISCONTINUED] fentaNYL **AND** fentaNYL, glucose, dextrose bolus **OR** dextrose bolus, glucagon (rDNA), dextrose, heparin (porcine)    Labs:   Recent Labs     07/28/22  0538 07/28/22  1100 07/29/22  0600 07/30/22  0600   WBC 8.8  --  11.3* 10.5   HGB 11.4* 13.2* 11.1* 10.8*   HCT 34.4*  --  33.4* 32.3*     --  143 152       Recent Labs     07/28/22  0538 07/28/22  1100 07/29/22  0927 07/30/22  0600   *  --  135 135   K 3.3*  --  4.8 4.0   CL 88*  --  91* 92*   CO2 26  --  27 28   BUN 40*  --  35* 24*   CREATININE 6.33* 6.6* 4.94* 3.55*   CALCIUM 8.6  --  8.5 9.0   PHOS 4.4  --  5.0* 4.4       No results for input(s): AST, ALT, BILIDIR, BILITOT, ALKPHOS in the last 72 hours. No results for input(s): INR in the last 72 hours. No results for input(s): Curlie Lat in the last 72 hours. Urinalysis:   Lab Results   Component Value Date/Time    NITRU Negative 05/21/2015 10:22 AM    BLOODU Negative 05/21/2015 10:22 AM    SPECGRAV 1.018 05/21/2015 10:22 AM    GLUCOSEU Negative 05/21/2015 10:22 AM    GLUCOSEU NEG 02/22/2012 11:16 AM       Radiology:   Most recent    Chest CT      WITH CONTRAST:No results found for this or any previous visit. WITHOUT CONTRAST: No results found for this or any previous visit. CXR      2-view: Results for orders placed during the hospital encounter of 06/28/22    XR CHEST (2 VW)    Narrative  EXAMINATION:  CHEST. CLINICAL HISTORY:  SHORTNESS OF BREATH.    COMPARISONS:  11/4/2017.     TECHNIQUE:  PA and lateral views.    FINDINGS:  Heart size and contour are within normal limits. Pulmonary vascularity appears normal. There are small effusions bilaterally. There is a suggestion of small interstitial infiltrates. No definite evidence of a mass or adenopathy. No significant  bony abnormality. Impression  POSSIBLE SLIGHT INTERSTITIAL PNEUMONIA BOTH LOWER LUNGS. SMALL PLEURAL EFFUSIONS BILATERALLY. Results for orders placed during the hospital encounter of 10/31/17    XR CHEST STANDARD (2 VW)    Narrative  XR CHEST STANDARD TWO VW: 11/4/2017    CLINICAL HISTORY:  Pneumonia . COMPARISON: Portable chest 5/20/2015 and 2 view chest 12/23/2013. A portable upright AP radiograph of the chest was obtained. FINDINGS:    A right internal jugular approach hemodialysis catheter has been placed since the prior study with its tip within the right atrium. There is no developing pulmonary infiltrate, cardiomegaly, pleural effusion, significant vascular congestion, pneumothorax, or acute fractures identified. A moderate compression fracture T9 appears old. The cardiac and mediastinal silhouettes appear within normal limits for technique. Impression  NO EVIDENCE OF ACTIVE CARDIOPULMONARY DISEASE. Portable: Results for orders placed during the hospital encounter of 07/25/22    XR CHEST PORTABLE    Narrative  XR CHEST PORTABLE : 7/25/2022    CLINICAL HISTORY:  post intubation . COMPARISON: Earlier 7/25/2022    TECHNIQUE: A portable upright AP radiograph of the chest was obtained at approximately 12:46 PM.      FINDINGS:    Both lung bases have been excluded from the radiograph. An endotracheal tube is present, with its tip approximately 4 to 5 cm above the lucita. An orogastric tube probably extends below the diaphragm out of field of view radiograph.     Moderate pleural effusions and mild to moderate probable scarring and/or atelectasis of the mid to lower lung fields has not significantly changed. There is no cardiomegaly, pneumothorax, displaced fractures, or other significant changes identified. Impression  ENDOTRACHEAL AND OROGASTRIC TUBES IN EXPECTED POSITIONS. OTHERWISE, STABLE CHEST FROM EARLIER 7/25/2022. Echo No results found for this or any previous visit. Assessment/Plan:    Active Hospital Problems    Diagnosis Date Noted    Acute respiratory failure with hypoxia ([facility] [address] [J96.01] 07/25/2022     Priority: Medium     Acute hypoxic respiratory failure  Ventilated, intensivist consult, follow abg, ICU admission  7/26 - weaning as able, follow abg  7/27 - cont supportive care  7/28 - extubate today  7/29 - significantly improved, weaning 2  7/30 - continues to improve  Septic shock 2/2 PNA  Questionable on imaging, labs likely elevated 2/2 ESRD. However will cover with broad spectrum abx, obtain blood cultures, follow  7/26 - sputum culture  7/27 - cont broad abx  Extensive bue DVT with central involvement  7/27 - heparin gtt  Pulmonary edema  Patient to likely need some additional fluid removal through HD, d/w Dr. Ashlyn Malone, to evaluate. 7/26 - HD per nephrology  ESRD on HD  Consult nephrology  Elevated troponin  Cycle trops x 2  DVT proph  7/28 - d/w sister yesterday, considering tx to [facility] when improved.   7/30 - hold plans to transfer, pt/ot when able    Electronically signed by Elias Joseph MD on 7/30/2022 at 11:40 AM

## 2022-07-30 NOTE — CARE COORDINATION
PT STARTED ON ELIQUIS, COUPON PLACED ON BLUE CHART. CURRENTLY ON 3LNC. NOTE LEFT REQUESTING PT/OT WHEN ABLE.

## 2022-07-30 NOTE — PROGRESS NOTES
Nephrology Progress Note    Assessment:  Pt is a 76 y/o male w/ history s/f HTN, ESRD on IHD, liver transplant who presented for SOB    ESRD on IHD MWF  Acute hypoxic respiratory failure: in setting of fluid overload, RT sided thoracentesis and PNA, now s/p RT sided thoracentesis, extubated 7/28  Septic shock: on pressors  Lactic acidosis: corrected  Metabolic/respiratory alkalosis  6.  Bilateral IJ thrombi  Hyponatremia  Hypokalemia: corrected   Hypoalbuminemia     Plan:  - continuing HD MWF       Patient Active Problem List:     Hypotension     ESRD (end stage renal disease) ([facility] [address]     Liver transplanted ([facility] [address]     Liver transplant recipient ([facility] [address]     Sepsis ([facility] [address]     Gastroenteritis     C. difficile colitis     Severe sepsis ([facility] [address]     Vomiting and diarrhea     Generalized abdominal pain     Acute renal failure ([facility] [address]     Acute respiratory failure with hypoxia ([facility] [address]      Subjective:  Admit Date: 7/25/2022    Interval History: remains in ICU, levo stopped earlier today    Medications:  Scheduled Meds:   apixaban  10 mg Oral BID    Followed by    Jt Hammond ON 8/6/2022] apixaban  5 mg Oral BID    sulfamethoxazole-trimethoprim (BACTRIM) IVPB  7.5 mg/kg IntraVENous Daily    midodrine  10 mg Oral TID WC    aspirin  81 mg Oral Daily    docusate sodium  100 mg Oral BID    insulin lispro  0-4 Units SubCUTAneous 4x Daily AC & HS    polyethylene glycol  17 g Oral Daily    tacrolimus  1 mg Oral BID    sodium chloride flush  5-40 mL IntraVENous 2 times per day    piperacillin-tazobactam  3,375 mg IntraVENous Q12H    vancomycin (VANCOCIN) intermittent dosing (placeholder)   Other RX Placeholder    famotidine (PEPCID) injection  20 mg IntraVENous Daily    lidocaine  5 mL IntraDERmal Once     Continuous Infusions:   norepinephrine 1.3 mcg/min (07/30/22 1506)    sodium chloride      dextrose      sodium chloride         CBC:   Recent Labs     07/29/22  0600 07/30/22  0600   WBC 11.3* 10.5   HGB 11.1* 10.8*    152       CMP:

## 2022-07-30 NOTE — PROGRESS NOTES
Cardiology Progress Note  Patient: Kennedy Rhoades  Unit/Bed: IC12/IC12-01  YOB: 1955  MRN: [id number]  Acct: [de-identified]   Admitting Diagnosis: Acute respiratory failure with hypoxia [facility]) [J96.01]  Date:  7/25/2022  Hospital Day: 5      Chief Complaint:  Shortness of breath    Reason of this consult  Bradycardia    History of Present Illness:  65-year-old male without prior cardiac history but significant for end-stage renal disease on hemodialysis, hepatitis status post liver transplant, hypertension who was admitted to the hospital on 7/25/2022 for shortness of breath.     Patient was transferred to the ICU given hypoxic respiratory failure  Today patient in the ICU  Currently on Levophed and vasopressin  Currently sedated on ventilator  ====================  Hospital course  7/30/2022  Patient laying in bed looks comfortable  Denies chest pain  Still on Levophed  Telemetry sinus rhythm    7/29/2022  Patient now extubated on nasal cannula  Denies chest pain   Telemetry sinus rhythm  Still on Levophed    7/28/2022  Patient ventilated, opens his eyes  Still on Levophed  Still on heparin drip    No Known Allergies    Current Facility-Administered Medications   Medication Dose Route Frequency Provider Last Rate Last Admin    apixaban (ELIQUIS) tablet 10 mg  10 mg Oral BID Yolanda Morocho MD   10 mg at 07/30/22 1009    Followed by    Yoel Asher ON 8/6/2022] apixaban (ELIQUIS) tablet 5 mg  5 mg Oral BID Yolanda Morocho MD        sulfamethoxazole-trimethoprim (BACTRIM) 518.4 mg in dextrose 5 % 500 mL IVPB  7.5 mg/kg IntraVENous Daily Cora Leon .3 mL/hr at 07/30/22 1052 518.4 mg at 07/30/22 1052    midodrine (PROAMATINE) tablet 10 mg  10 mg Oral TID  ISI Pearce - CNP   10 mg at 07/30/22 8386    aspirin EC tablet 81 mg  81 mg Oral Daily Devin Shin MD   81 mg at 07/30/22 2083    docusate sodium (COLACE) capsule 100 mg  100 mg Oral BID ISI Pearce - CNP   100 mg at 07/30/22 1188 insulin lispro (HUMALOG) injection vial 0-4 Units  0-4 Units SubCUTAneous 4x Daily AC & HS Nadege Jimenez MD        polyethylene glycol [facility]) packet 17 g  17 g Oral Daily Cherylle Abu, APRN - CNP   17 g at 07/29/22 0907    tacrolimus (PROGRAF) capsule 1 mg  1 mg Oral BID Cherylle Abu, APRN - CNP   1 mg at 07/30/22 5320    sodium chloride flush 0.9 % injection 5-40 mL  5-40 mL IntraVENous 2 times per day Cherylle Abu, APRN - CNP   10 mL at 07/29/22 2136    sodium chloride flush 0.9 % injection 5-40 mL  5-40 mL IntraVENous PRN Cherylle Abu, APRN - CNP        0.9 % sodium chloride infusion   IntraVENous PRN Cherylle Abu, APRN - CNP        acetaminophen (TYLENOL) tablet 650 mg  650 mg Oral Q6H PRN Nadege Jimenez MD        Or    acetaminophen (TYLENOL) suppository 650 mg  650 mg Rectal Q6H PRN Nadege Jimenez MD        piperacillin-tazobactam (ZOSYN) 3,375 mg in dextrose 5 % 50 mL IVPB (mini-bag)  3,375 mg IntraVENous Q12H Nadege Jimenez MD 12.5 mL/hr at 07/30/22 0922 3,375 mg at 07/30/22 6616    vancomycin (VANCOCIN) intermittent dosing (placeholder)   Other RX Placeholder Nadege Jimenez MD        famotidine (PEPCID) 20 mg in sodium chloride (PF) 10 mL injection  20 mg IntraVENous Daily Cherylle Abu, APRN - CNP   20 mg at 07/30/22 0924    fentaNYL bolus from bag  50 mcg IntraVENous Q30 Min PRN Cherylle Abu, APRN - CNP        glucose chewable tablet 16 g  4 tablet Oral PRN Cherylle Abu, APRN - CNP        dextrose bolus 10% 125 mL  125 mL IntraVENous PRN Cherylle Abu, APRN - CNP        Or    dextrose bolus 10% 250 mL  250 mL IntraVENous PRN Cherylle Abu, APRN - CNP        glucagon (rDNA) injection 1 mg  1 mg SubCUTAneous PRN Cherylle Abu, APRN - CNP        dextrose 10 % infusion   IntraVENous Continuous PRN Cherylle Abu, APRN - CNP        heparin (porcine) injection 1,000 Units  1,000 Units IntraVENous PRN Ever Cr MD        lidocaine 2 % injection 5 mL  5 mL IntraDERmal Once Km ISI Betancourt - CNP        0.9 % sodium chloride infusion  250 mL IntraVENous Once Kari BeckerISI - CNP        norepinephrine (LEVOPHED) 16 mg in dextrose 5% 250 mL infusion  1-100 mcg/min IntraVENous Continuous Hilda Mabry MD   Stopped at 22 1000       PMHx:  Past Medical History:   Diagnosis Date    ESRD (end stage renal disease) ([facility] [address]     Hepatitis     Hypertension     Liver transplanted ([facility] [address] 2016       PSHx:  Past Surgical History:   Procedure Laterality Date    DIALYSIS FISTULA CREATION Right     LIVER TRANSPLANT  2017    TUNNELED VENOUS PORT PLACEMENT         Social Hx:  Social History     Socioeconomic History    Marital status: Single     Spouse name: None    Number of children: None    Years of education: None    Highest education level: None   Tobacco Use    Smoking status: Former     Packs/day: 0.50     Years: 15.00     Pack years: 7.50     Types: Cigarettes     Quit date: 2017     Years since quittin.7    Smokeless tobacco: Never   Substance and Sexual Activity    Alcohol use: No    Drug use: No       Family Hx:  History reviewed. No pertinent family history. Review of Systems:   Review of Systems   Unable to perform ROS: Intubated       Physical Examination:    /60   Pulse 83   Temp 98.9 °F (37.2 °C) (Oral)   Resp 18   Ht 5' 6\" (1.676 m)   Wt 152 lb 8.9 oz (69.2 kg)   SpO2 100%   BMI 24.62 kg/m²    Physical Exam  Vitals and nursing note reviewed. HENT:      Head: Normocephalic and atraumatic. Mouth/Throat:      Mouth: Mucous membranes are moist.      Pharynx: Oropharynx is clear. Eyes:      Extraocular Movements: Extraocular movements intact. Conjunctiva/sclera: Conjunctivae normal.      Pupils: Pupils are equal, round, and reactive to light. Cardiovascular:      Rate and Rhythm: Normal rate and regular rhythm. Pulses: Normal pulses. Heart sounds: Normal heart sounds.    Pulmonary:      Effort: Pulmonary effort is normal. Breath sounds: Normal breath sounds. No rales. Abdominal:      General: Abdomen is flat. Bowel sounds are normal.      Palpations: Abdomen is soft. Musculoskeletal:         General: Normal range of motion. Cervical back: Normal range of motion and neck supple. Skin:     General: Skin is warm.        LABS:  CBC:  Lab Results   Component Value Date/Time    WBC 10.5 07/30/2022 06:00 AM    RBC 3.09 07/30/2022 06:00 AM    RBC 3.34 02/20/2012 05:15 AM    HGB 10.8 07/30/2022 06:00 AM    HCT 32.3 07/30/2022 06:00 AM    .6 07/30/2022 06:00 AM    MCH 35.0 07/30/2022 06:00 AM    MCHC 33.5 07/30/2022 06:00 AM    RDW 14.5 07/30/2022 06:00 AM     07/30/2022 06:00 AM    MPV 9.5 10/09/2015 02:00 PM     CBC with Differential:   Lab Results   Component Value Date/Time    WBC 10.5 07/30/2022 06:00 AM    RBC 3.09 07/30/2022 06:00 AM    RBC 3.34 02/20/2012 05:15 AM    HGB 10.8 07/30/2022 06:00 AM    HCT 32.3 07/30/2022 06:00 AM     07/30/2022 06:00 AM    .6 07/30/2022 06:00 AM    MCH 35.0 07/30/2022 06:00 AM    MCHC 33.5 07/30/2022 06:00 AM    RDW 14.5 07/30/2022 06:00 AM    BANDSPCT 1 07/25/2022 11:27 AM    METASPCT 1 05/28/2015 05:46 AM    LYMPHOPCT 2.3 07/30/2022 06:00 AM    PROMYELOPCT 1 11/07/2017 05:14 AM    MONOPCT 10.4 07/30/2022 06:00 AM    EOSPCT 3.1 02/20/2012 05:15 AM    BASOPCT 0.2 07/30/2022 06:00 AM    MONOSABS 1.1 07/30/2022 06:00 AM    LYMPHSABS 0.2 07/30/2022 06:00 AM    EOSABS 0.1 07/30/2022 06:00 AM    BASOSABS 0.0 07/30/2022 06:00 AM     CMP:    Lab Results   Component Value Date/Time     07/30/2022 06:00 AM    K 4.0 07/30/2022 06:00 AM    CL 92 07/30/2022 06:00 AM    CO2 28 07/30/2022 06:00 AM    BUN 24 07/30/2022 06:00 AM    CREATININE 3.55 07/30/2022 06:00 AM    GFRAA 20.8 07/30/2022 06:00 AM    LABGLOM 17.2 07/30/2022 06:00 AM    GLUCOSE 90 07/30/2022 06:00 AM    GLUCOSE 136 02/25/2012 05:10 PM    PROT 7.5 07/25/2022 11:27 AM    LABALBU 2.9 07/30/2022 06:00 AM LABALBU 2.8 02/23/2012 06:40 AM    CALCIUM 9.0 07/30/2022 06:00 AM    BILITOT 0.7 07/25/2022 11:27 AM    ALKPHOS 87 07/25/2022 11:27 AM    AST 77 07/25/2022 11:27 AM    ALT 79 07/25/2022 11:27 AM     BMP:    Lab Results   Component Value Date/Time     07/30/2022 06:00 AM    K 4.0 07/30/2022 06:00 AM    CL 92 07/30/2022 06:00 AM    CO2 28 07/30/2022 06:00 AM    BUN 24 07/30/2022 06:00 AM    LABALBU 2.9 07/30/2022 06:00 AM    LABALBU 2.8 02/23/2012 06:40 AM    CREATININE 3.55 07/30/2022 06:00 AM    CALCIUM 9.0 07/30/2022 06:00 AM    GFRAA 20.8 07/30/2022 06:00 AM    LABGLOM 17.2 07/30/2022 06:00 AM    GLUCOSE 90 07/30/2022 06:00 AM    GLUCOSE 136 02/25/2012 05:10 PM     Magnesium:    Lab Results   Component Value Date/Time    MG 2.0 07/25/2022 11:27 AM    MG 2.0 02/19/2012 12:42 PM     Troponin:    Lab Results   Component Value Date/Time    TROPONINI 0.126 07/27/2022 04:39 AM          EKG: sinus tachycardia      Assessment:    Active Hospital Problems    Diagnosis Date Noted    Acute respiratory failure with hypoxia ([facility] [address] [J96.01] 07/25/2022     Priority: Medium     Bradycardia. Currently sinus rhythm, no high degree AV blocks. Bradycardia likely due to sedation side effect versus clinical condition  Acute hypoxic respiratory failure.   Extubated on 7/28/2022  End-stage renal disease on hemodialysis  Extensive upper extremity DVTs  Pleural effusion status postthoracentesis  History of liver transplant  History of hypertension  Septic shock  Hyperkalemia  Positive troponins 0.126 in the setting of end-stage renal disease EKG without active signs of ischemia    TTE 7/27/2022 EF 45%, echogenic mass at the apex  Limited TTE with Definity 7/28/2022 EF 65%, no evidence of apical thrombus    Plan:  Continue ICU  Wean off pressors as tolerated  Continue midodrine  Please keep potassium between 4 and 5 magnesium above 2  Please keep hemoglobin above 8  Once patient is more hemodynamically stable we can restart cardioprotective medications including beta-blocker and possibly ACE inhibitor  Most likely patient will need an ischemic evaluation as an outpatient once acute conditions resolve  Continue management of rest of comorbidities as per primary team  Cont anticoagulation for extensive DVT  Further recommendations to follow       This report was transcribed using voice recognition software. Every effort was made to ensure accuracy; however, inadvertent computerized transcription errors may be present.     Electronically signed by Daniel Baeza MD on 7/30/2022 at 11:42 AM

## 2022-07-30 NOTE — PROGRESS NOTES
Pulmonary & Critical Care Medicine ICU Progress Note  Chief complaint : Shortness of breath     Subjunctive/24 hour events :   Patient seen and examined during multidisciplinary rounds with RN, charge nurse, RT, pharmacy, dietitian, and social service. Patient is doing well. Wore the bipap for couple of hours last night and currently is on 3 Liters nasal cannula. Continues on 1 mcg/min of levophed and heparin drip. No fever overnight and he is anuric, received dialysis yesterday and removed 1.4 liters. Last BM 2022. Tolerating po diet. Social History     Tobacco Use    Smoking status: Former     Packs/day: 0.50     Years: 15.00     Pack years: 7.50     Types: Cigarettes     Quit date: 2017     Years since quittin.7    Smokeless tobacco: Never   Substance Use Topics    Alcohol use: No     History reviewed. No pertinent family history. Recent Labs     22  0437 22  1100   PHART 7.435 7.381   RNO4WUH 42 47*   PO2ART 109* 98*         MV Settings:  Vent Mode: CPAP Resp Rate (Set): 18 bmp/Vt (Set, mL): 390 mL/ /FiO2 : 40 %           IV:   sodium chloride      heparin (PORCINE) Infusion 17 Units/kg/hr (22 0650)    dextrose      sodium chloride      norepinephrine 1 mcg/min (22 0650)       Vitals:  BP (!) 105/52   Pulse 86   Temp 98.3 °F (36.8 °C) (Oral)   Resp 16   Ht 5' 6\" (1.676 m)   Wt 152 lb 8.9 oz (69.2 kg)   SpO2 98%   BMI 24.62 kg/m²    Tmax:       Intake/Output Summary (Last 24 hours) at 2022 0910  Last data filed at 2022 0650  Gross per 24 hour   Intake 1074.79 ml   Output 1400 ml   Net -325.21 ml         EXAM:    General: alert, fatigued, cooperative  Head: normocephalic, atraumatic  Eyes:No gross abnormalities. ENT:  MMM no lesions  Neck:  supple and no masses  Chest : Fair air movement minimal rales no wheezes   Heart[de-identified] Heart sounds are normal.  Regular rate and rhythm without murmur, gallop or rub.   ABD:  bowel sounds normal, soft, non-tender  Musculoskeletal : no cyanosis, no clubbing, and no edema  Neuro:  Grossly normal  Skin: No rashes or nodules noted. Lymph node:  no cervical nodes  Urology: No [place]   Psychiatric: appropriate    Medications:  Scheduled Meds:   midodrine  10 mg Oral TID WC    aspirin  81 mg Oral Daily    docusate sodium  100 mg Oral BID    insulin lispro  0-4 Units SubCUTAneous 4x Daily AC & HS    polyethylene glycol  17 g Oral Daily    tacrolimus  1 mg Oral BID    sodium chloride flush  5-40 mL IntraVENous 2 times per day    piperacillin-tazobactam  3,375 mg IntraVENous Q12H    vancomycin (VANCOCIN) intermittent dosing (placeholder)   Other RX Placeholder    famotidine (PEPCID) injection  20 mg IntraVENous Daily    lidocaine  5 mL IntraDERmal Once       PRN Meds:  sodium chloride flush, sodium chloride, heparin (porcine), heparin (porcine), acetaminophen **OR** acetaminophen, [DISCONTINUED] fentaNYL **AND** fentaNYL, glucose, dextrose bolus **OR** dextrose bolus, glucagon (rDNA), dextrose, heparin (porcine)    Results: reviewed by me   CBC:   Recent Labs     07/28/22  0538 07/28/22  1100 07/29/22  0600 07/30/22  0600   WBC 8.8  --  11.3* 10.5   HGB 11.4* 13.2* 11.1* 10.8*   HCT 34.4*  --  33.4* 32.3*   .5*  --  103.9* 104.6*     --  143 152       BMP:   Recent Labs     07/28/22  0538 07/28/22  1100 07/29/22  0927 07/30/22  0600   *  --  135 135   K 3.3*  --  4.8 4.0   CL 88*  --  91* 92*   CO2 26  --  27 28   PHOS 4.4  --  5.0* 4.4   BUN 40*  --  35* 24*   CREATININE 6.33* 6.6* 4.94* 3.55*       LIVER PROFILE:   No results for input(s): AST, ALT, LIPASE, BILIDIR, BILITOT, ALKPHOS in the last 72 hours. Invalid input(s): AMYLASE,  ALB    PT/INR:   No results for input(s): PROTIME, INR in the last 72 hours. APTT:   No results for input(s): APTT in the last 72 hours.     UA:No results for input(s): NITRITE, COLORU, PHUR, LABCAST, WBCUA, RBCUA, MUCUS, TRICHOMONAS, YEAST, BACTERIA, CLARITYU, Gloria Mikes, UROBILINOGEN, BILIRUBINUR, BLOODU, GLUCOSEU, AMORPHOUS in the last 72 hours. Invalid input(s): KETONESU    Cultures:    Echocardiogram complete 2D with doppler with color    Result Date: 7/27/2022  Transthoracic Echocardiography Report (TTE)  Demographics   Patient Name    VIA [facility]       Gender               Male                  [facility]   Patient Number  [id number]       Race                                                   Ethnicity   Visit Number    [id number]      Room Number          IC12   Corporate ID                   Date of Study        07/27/2022   Accession       [id number]     Referring Physician  Number   Date of Birth   1955     Sonographer          Antonietta Major   Age             75 year(s)     Interpreting         [facility])                                 Physician            Cardiology                                                      [facility]  Procedure Type of Study   TTE procedure:ECHO COMPLETE 2D W/DOP W/COLOR. Procedure Date Date: 07/27/2022 Start: 10:27 AM Study Location: Portable Technical Quality: Limited visualization due to lung interface. Indications:LVF. Patient Status: Routine Height: 66 inches Weight: 148 pounds BSA: 1.76 m^2 BMI: 23.89 kg/m^2 BP: 96/54 mmHg  Conclusions   Summary  Left ventricular ejection fraction is estimated at 45%. Diastolic Dysfunction is noted by mitral flow. Normal right ventricle systolic pressure. RVSP 28mmHg  Echogenic mass in the apex concerning for thrombus recommend limited echo  with definity  No hemodynamic evidence of significant valve disease   Signature   ----------------------------------------------------------------  Electronically signed by Warner Ricks(Interpreting physician)  on 07/27/2022 03:48 PM  ----------------------------------------------------------------   Findings  Left Ventricle Left ventricular ejection fraction is estimated at 45%.  Left ventricular size is normal . Normal left ventricular wall thickness. Diastolic Dysfunction is noted by mitral flow. Echogenic mass in the apex possible thrombus Right Ventricle Normal right ventricle structure and function. Normal right ventricle systolic pressure. RVSP 28mmHg Left Atrium Normal left atrium. Right Atrium Normal right atrium. Mitral Valve Diffusely thickened and pliable mitral valve leaflets with normal excursion. Structurally normal mitral valve. No evidence of mitral valve stenosis. No evidence of mitral regurgitaton. Tricuspid Valve Tricuspid valve is structurally normal. No evidence of tricuspid stenosis. No evidence of tricuspid regurgitation. Aortic Valve Mildly thickened trileaflet aortic valve with normal excursion. Structurally normal aortic valve. No evidence of aortic valve stenosis . No evidence of aortic valve regurgitation . Pulmonic Valve The pulmonic valve was not well visualized . Pericardial Effusion No evidence of pericardial effusion. Aorta \ Miscellaneous The aorta is within normal limits. M-Mode Measurements (cm)   LVIDd: 2.97 cm                         LVIDs: 2.34 cm  IVSd: 0.73 cm                          IVSs: 1.01 cm  LVPWd: 0.9 cm                          LVPWs: 1.01 cm  Rt. Vent.  Dimension: 3.12 cm           AO Root Dimension: 2.1 cm                                         ACS: 1.37 cm                                         LA: 1.82 cm                                         LVOT: 2.03 cm  Doppler Measurements:   AV Velocity:0.02 m/s                    MV Peak E-Wave: 0.5 m/s  AV Peak Gradient: 5.77 mmHg             MV Peak A-Wave: 0.53 m/s  AV Mean Gradient: 3.05 mmHg  AV Area (Continuity):1.97 cm^2  TR Velocity:2.52 m/s                    Estimated RAP:3 mmHg  TR Gradient:25.31 mmHg                  RVSP:28.31 mmHg  Valves  Mitral Valve   Peak E-Wave: 0.5 m/s                  Peak A-Wave: 0.53 m/s  Mean Velocity: 0.87 m/s               E/A Ratio: 0.94  Mean Gradient: 4.66 mmHg              Peak Gradient: 0.99 mmHg                                        Deceleration Time: 353.3 msec                                        Area (continuity): 1.4 cm^2   Tissue Doppler   E' Septal Velocity: 0.07 m/s  E' Lateral Velocity: 0.07 m/s   Aortic Valve   Peak Velocity: 1.2 m/s                 Mean Velocity: 0.82 m/s  Peak Gradient: 5.77 mmHg               Mean Gradient: 3.05 mmHg  Area (continuity): 1.97 cm^2  AV VTI: 29.33 cm   Cusp Separation: 1.37 cm   Tricuspid Valve   Estimated RVSP: 28.31 mmHg              Estimated RAP: 3 mmHg  TR Velocity: 2.52 m/s                   TR Gradient: 25.31 mmHg   Pulmonic Valve   Peak Velocity: 0.99 m/s           Peak Gradient: 3.91 mmHg                                    Estimated PASP: 28.31 mmHg   LVOT   Peak Velocity: 0.97 m/s              Mean Velocity: 0.61 m/s  Peak Gradient: 3.77 mmHg             Mean Gradient: 1.79 mmHg  LVOT Diameter: 2.03 cm               LVOT VTI: 17.9 cm  Structures  Left Atrium   LA Dimension: 1.82 cm                        LA Area: 12.58 cm^2  LA/Aorta: 0.87  LA Volume/Index: 44.74 ml /25 m^2   Left Ventricle   Diastolic Dimension: 2.40 cm         Systolic Dimension: 0.22 cm  Septum Diastolic: 1.63 cm            Septum Systolic: 4.44 cm  PW Diastolic: 0.9 cm                 PW Systolic: 0.78 cm                                       FS: 21.2 %  LV EDV/LV EDV Index: 34.12 ml/19 m^2 LV ESV/LV ESV Index: 19.02 ml/11 m^2  EF Calculated: 44.3 %   LVOT Diameter: 2.03 cm   Right Ventricle   Diastolic Dimension: 5.09 cm                                    RV Systolic Pressure: 48.86 mmHg  Aorta/ Miscellaneous Aorta   Aortic Root: 2.1 cm  LVOT Diameter: 2.03 cm      ECHO Limited    Result Date: 7/28/2022  Transthoracic Echocardiography Report (TTE)  Demographics   Patient Name    VIA [facility]       Gender               Male                  [facility]   Patient Number  [id number]       Race                                                   Ethnicity   Visit Number    [id number] Room Number          IC12   Corporate ID                   Date of Study        07/28/2022   Accession       [id number]     Referring Physician  Number   Date of Birth   1955     Sonographer          Dennis Grande   Age             75 year(s)     Interpreting         [address] [place] [address]            Cardiology                                                      [facility]  Procedure Type of Study   TTE procedure:ECHOCARDIOGRAM LIMITED. Procedure Date Date: 07/28/2022 Start: 08:08 AM Study Location: Portable Technical Quality: Limited visualization Indications:LVF. Patient Status: Routine Contrast Medium: Definity. Amount - 2 ml Height: 66 inches Weight: 148 pounds BSA: 1.76 m^2 BMI: 23.89 kg/m^2  Conclusions   Summary  No evidence of thrombus with definity  Left ventricular ejection fraction is visually estimated at 65%. Signature   ----------------------------------------------------------------  Electronically signed by Bernie Ricks(Interpreting physician)  on 07/28/2022 08:42 AM  ----------------------------------------------------------------   Findings  Left Ventricle Left ventricular ejection fraction is visually estimated at 65%. XR CHEST (2 VW)    Result Date: 6/28/2022  EXAMINATION:  CHEST. CLINICAL HISTORY:  SHORTNESS OF BREATH. COMPARISONS:  11/4/2017. TECHNIQUE:  PA and lateral views. FINDINGS:  Heart size and contour are within normal limits. Pulmonary vascularity appears normal. There are small effusions bilaterally. There is a suggestion of small interstitial infiltrates. No definite evidence of a mass or adenopathy. No significant  bony abnormality. POSSIBLE SLIGHT INTERSTITIAL PNEUMONIA BOTH LOWER LUNGS. SMALL PLEURAL EFFUSIONS BILATERALLY. IR FLUORO GUIDED CVA DEVICE PLMT/REPLACE/REMOVAL    1. Venogram of the right internal jugular vein demonstrates a thrombus in the right internal jugular vein which completely occludes the vein.  Passage of contrast into the chest is through small collateral veins. 2. 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 Passage of contrast into the chest is through a small collateral veins. Radiation dose to the patient was: 24.21 mGy Additional clinical data: Long-term IV access Procedure: 1. Ultrasound guidance for vascular access into the right internal jugular vein. The ultrasound image of the blood vessel was saved to PACS. 2. Venogram via contrast injection of the right internal jugular vein. 3.   Ultrasound guidance for vascular access into the left internal jugular vein. The ultrasound image of the blood vessel was saved to PACS 4. Venogram via contrast injection of the left internal jugular vein. Body of Report: Informed and written consent was obtained from the patient following discussion of risks, benefits and alternatives to this procedure. The was patient placed supine on the angiographic table. The patient's neck and chest were then prepped and draped in  normal sterile fashion. A small amount of local lidocaine anesthesia was injected subcutaneously. Ultrasound was used to study the jugular vein we intended to use prior to accessing it. The vein appeared patent. The ultrasound image of the blood vessel was saved to PACS. Using ultrasound access, puncture was made of the right internal jugular vein using a 21 GA needle. A wire was advanced into the right internal jugular vein, though would not pass into the superior vena cava. A 4 Surinamese short thin sheath was placed, through the sheath digital subtraction venography was performed. Venography demonstrated a thrombus in the right internal jugular vein and complete occlusion of the vein central to the thrombus. This access was aborted. Ultrasound was used to study the left jugular vein we intended to use prior to accessing it. The vein appeared patent. The ultrasound image of the blood vessel was saved to PACS. Using ultrasound access, puncture was made of the left internal jugular vein using a 21 GA needle. A wire was attempted to be passed, though would not pass to the superior vena  cava. A 4 Yoruba thin sheath was placed and digital subtraction venography was performed. 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 The procedure was aborted. Findings discussed with ICU team who will place a temporary central line in the groin. XR CHEST PORTABLE    Result Date: 7/27/2022  XR CHEST PORTABLE COMPARISON: July 25, 2022. HISTORY: resp failure TECHNIQUE: AP view FINDINGS: Endotracheal tube again seen in place. . There is also an enteric tube seen in place and the tip is below the diaphragm. They appear unchanged in position. A small pleural effusion seen on the right. The left costophrenic angle is not well seen. The lungs are hyperinflated and there is coarsening of the interstitium. Atelectasis and/or scarring is again seen bilaterally in the lower lung fields. The cardiac silhouette appears mildly enlarged but may be accentuated by the portable technique. Degenerative changes are seen in the visualized portion of the right shoulder. The airspace disease has diminished when compared to previous study. . A small pleural effusion is seen on the right and scarring or atelectasis is again seen bilaterally in the bases. US DUP UPPER EXTREMITY RIGHT VENOUS COMPARISON: HISTORY:  resp failure TECHNIQUE: , US DUP UPPER EXTREMITY RIGHT VENOUS AND LEFT VENOUS FINDINGS: Thrombus is seen in the right internal jugular and proximal subclavian, veins it is also seen in the cephalic vein. The right ulnar and basilic veins are not visualized. A right AV fistula seen. Thrombus is seen in the left internal jugular vein. The left basilic and axillary veins are not visualized.  IMPRESSION: Deep venous thrombosis seen in the right and left upper extremities. .    XR CHEST PORTABLE    Result Date: 7/25/2022  XR CHEST PORTABLE : 7/25/2022 CLINICAL HISTORY:  post intubation . COMPARISON: Earlier 7/25/2022 TECHNIQUE: A portable upright AP radiograph of the chest was obtained at approximately 12:46 PM. FINDINGS: Both lung bases have been excluded from the radiograph. An endotracheal tube is present, with its tip approximately 4 to 5 cm above the lucita. An orogastric tube probably extends below the diaphragm out of field of view radiograph. Moderate pleural effusions and mild to moderate probable scarring and/or atelectasis of the mid to lower lung fields has not significantly changed. There is no cardiomegaly, pneumothorax, displaced fractures, or other significant changes identified. ENDOTRACHEAL AND OROGASTRIC TUBES IN EXPECTED POSITIONS. OTHERWISE, STABLE CHEST FROM EARLIER 7/25/2022. XR CHEST PORTABLE    Result Date: 7/25/2022  TECHNIQUE: Single portable view of the chest. CLINICAL INDICATION: Chest pain. COMPARISON: Chest x-ray obtained on June 28, 2022. PROCEDURE AND FINDINGS: Poor inspiratory effort is seen. The cardiomediastinal silhouette is slightly prominent in size. Mild prominence of the bronchovascular and interstitial lung markings is visualized bilaterally, linear streaky opacities visualized in bilateral lung fields, subsegmental atelectatic streaks, fluid visualized in the right minor fissure. Airspace opacification visualized in the lower lung fields bilaterally with blunting of the costophrenic angles and obliteration of the hemidiaphragms consistent with bilateral pleural effusions. . No evidence of pneumothorax or parenchymal lung mass. Degenerative bone changes. Congestive heart failure versus pneumonia and parapneumonic effusions would recommend clinical correlation. Increased opacification seen in comparison to the prior study.     US DUP UPPER EXTREMITY RIGHT VENOUS    Result Date: 7/27/2022  XR CHEST PORTABLE COMPARISON: July 25, 2022. HISTORY: resp failure TECHNIQUE: AP view FINDINGS: Endotracheal tube again seen in place. . There is also an enteric tube seen in place and the tip is below the diaphragm. They appear unchanged in position. A small pleural effusion seen on the right. The left costophrenic angle is not well seen. The lungs are hyperinflated and there is coarsening of the interstitium. Atelectasis and/or scarring is again seen bilaterally in the lower lung fields. The cardiac silhouette appears mildly enlarged but may be accentuated by the portable technique. Degenerative changes are seen in the visualized portion of the right shoulder. The airspace disease has diminished when compared to previous study. . A small pleural effusion is seen on the right and scarring or atelectasis is again seen bilaterally in the bases. US DUP UPPER EXTREMITY RIGHT VENOUS COMPARISON: HISTORY:  resp failure TECHNIQUE: , US DUP UPPER EXTREMITY RIGHT VENOUS AND LEFT VENOUS FINDINGS: Thrombus is seen in the right internal jugular and proximal subclavian, veins it is also seen in the cephalic vein. The right ulnar and basilic veins are not visualized. A right AV fistula seen. Thrombus is seen in the left internal jugular vein. The left basilic and axillary veins are not visualized. IMPRESSION: Deep venous thrombosis seen in the right and left upper extremities. .    US DUP UPPER EXTREMITY LEFT VENOUS    Result Date: 7/27/2022  XR CHEST PORTABLE COMPARISON: July 25, 2022. HISTORY: resp failure TECHNIQUE: AP view FINDINGS: Endotracheal tube again seen in place. . There is also an enteric tube seen in place and the tip is below the diaphragm. They appear unchanged in position. A small pleural effusion seen on the right. The left costophrenic angle is not well seen. The lungs are hyperinflated and there is coarsening of the interstitium. Atelectasis and/or scarring is again seen bilaterally in the lower lung fields.  The cardiac silhouette appears mildly enlarged but may be accentuated by the portable technique. Degenerative changes are seen in the visualized portion of the right shoulder. The airspace disease has diminished when compared to previous study. . A small pleural effusion is seen on the right and scarring or atelectasis is again seen bilaterally in the bases. US DUP UPPER EXTREMITY RIGHT VENOUS COMPARISON: HISTORY:  resp failure TECHNIQUE: , US DUP UPPER EXTREMITY RIGHT VENOUS AND LEFT VENOUS FINDINGS: Thrombus is seen in the right internal jugular and proximal subclavian, veins it is also seen in the cephalic vein. The right ulnar and basilic veins are not visualized. A right AV fistula seen. Thrombus is seen in the left internal jugular vein. The left basilic and axillary veins are not visualized. IMPRESSION: Deep venous thrombosis seen in the right and left upper extremities. .    IR VENOGRAM VENOUS SINUS/JUGULAR    1. Venogram of the right internal jugular vein demonstrates a thrombus in the right internal jugular vein which completely occludes the vein. Passage of contrast into the chest is through small collateral veins. 2. 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 Passage of contrast into the chest is through a small collateral veins. Radiation dose to the patient was: 24.21 mGy Additional clinical data: Long-term IV access Procedure: 1. Ultrasound guidance for vascular access into the right internal jugular vein. The ultrasound image of the blood vessel was saved to PACS. 2. Venogram via contrast injection of the right internal jugular vein. 3.   Ultrasound guidance for vascular access into the left internal jugular vein. The ultrasound image of the blood vessel was saved to PACS 4. Venogram via contrast injection of the left internal jugular vein. Body of Report:  Informed and written consent was obtained from the patient following discussion of risks, benefits and alternatives to this procedure. The was patient placed supine on the angiographic table. The patient's neck and chest were then prepped and draped in  normal sterile fashion. A small amount of local lidocaine anesthesia was injected subcutaneously. Ultrasound was used to study the jugular vein we intended to use prior to accessing it. The vein appeared patent. The ultrasound image of the blood vessel was saved to PACS. Using ultrasound access, puncture was made of the right internal jugular vein using a 21 GA needle. A wire was advanced into the right internal jugular vein, though would not pass into the superior vena cava. A 4 Kittitian short thin sheath was placed, through the sheath digital subtraction venography was performed. Venography demonstrated a thrombus in the right internal jugular vein and complete occlusion of the vein central to the thrombus. This access was aborted. Ultrasound was used to study the left jugular vein we intended to use prior to accessing it. The vein appeared patent. The ultrasound image of the blood vessel was saved to PACS. Using ultrasound access, puncture was made of the left internal jugular vein using a 21 GA needle. A wire was attempted to be passed, though would not pass to the superior vena  cava. A 4 Kittitian thin sheath was placed and digital subtraction venography was performed. 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 The procedure was aborted. Findings discussed with ICU team who will place a temporary central line in the groin. IR ULTRASOUND GUIDANCE VASCULAR ACCESS    1. Venogram of the right internal jugular vein demonstrates a thrombus in the right internal jugular vein which completely occludes the vein. Passage of contrast into the chest is through small collateral veins.  2. 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 Passage of contrast into the chest is through a small collateral veins. Radiation dose to the patient was: 24.21 mGy Additional clinical data: Long-term IV access Procedure: 1. Ultrasound guidance for vascular access into the right internal jugular vein. The ultrasound image of the blood vessel was saved to PACS. 2. Venogram via contrast injection of the right internal jugular vein. 3.   Ultrasound guidance for vascular access into the left internal jugular vein. The ultrasound image of the blood vessel was saved to PACS 4. Venogram via contrast injection of the left internal jugular vein. Body of Report: Informed and written consent was obtained from the patient following discussion of risks, benefits and alternatives to this procedure. The was patient placed supine on the angiographic table. The patient's neck and chest were then prepped and draped in  normal sterile fashion. A small amount of local lidocaine anesthesia was injected subcutaneously. Ultrasound was used to study the jugular vein we intended to use prior to accessing it. The vein appeared patent. The ultrasound image of the blood vessel was saved to PACS. Using ultrasound access, puncture was made of the right internal jugular vein using a 21 GA needle. A wire was advanced into the right internal jugular vein, though would not pass into the superior vena cava. A 4 Chinese short thin sheath was placed, through the sheath digital subtraction venography was performed. Venography demonstrated a thrombus in the right internal jugular vein and complete occlusion of the vein central to the thrombus. This access was aborted. Ultrasound was used to study the left jugular vein we intended to use prior to accessing it. The vein appeared patent. The ultrasound image of the blood vessel was saved to PACS. Using ultrasound access, puncture was made of the left internal jugular vein using a 21 GA needle.  A wire was attempted to be passed, though would not pass to the superior vena  cava. A 4 American thin sheath was placed and digital subtraction venography was performed. 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 The procedure was aborted. Findings discussed with ICU team who will place a temporary central line in the groin. US DUP LOWER EXTREMITIES BILATERAL VENOUS    Result Date: 7/27/2022  EXAMINATION: US DUP LOWER EXTREMITIES BILATERAL VENOUS CLINICAL HISTORY: 79-year-old with lower extremity swelling COMPARISONS: None available. FINDINGS: Duplex and color Doppler ultrasounds were performed of the bilateral lower extremity deep venous systems. Examination was performed portably and limited due to patient condition and access to the lower extremities. Right leg: Visualized portions of the right common femoral vein, femoral vein, popliteal vein demonstrate satisfactory compression, color flow, and augmentation. Deep calf veins are not evaluated. Left leg: The left common femoral vein is enlarged filled with intraluminal echoes with absent color flow and poor compression consistent with occluding thrombus. Occluding Thrombus extends into the left proximal femoral vein. Mid to distal femoral vein and popliteal vein demonstrate satisfactory compression and color flow. Deep calf veins are not assessed on this portable study     ULTRASOUND FINDINGS ARE POSITIVE FOR OCCLUDING THROMBUS IN THE LEFT COMMON FEMORAL VEIN EXTENDING INTO THE PROXIMAL FEMORAL VEIN. NO ULTRASOUND SIGNS OF DVT IN THE RIGHT LEG FROM THE GROIN TO THE POPLITEAL FOSSA    Most recent    Chest CT      WITH CONTRAST:No results found for this or any previous visit. WITHOUT CONTRAST: No results found for this or any previous visit. CXR      2-view: Results for orders placed during the hospital encounter of 06/28/22    XR CHEST (2 VW)    Narrative  EXAMINATION:  CHEST.     CLINICAL HISTORY:  SHORTNESS OF BREATH.    COMPARISONS:  11/4/2017. TECHNIQUE:  PA and lateral views. FINDINGS:  Heart size and contour are within normal limits. Pulmonary vascularity appears normal. There are small effusions bilaterally. There is a suggestion of small interstitial infiltrates. No definite evidence of a mass or adenopathy. No significant  bony abnormality. Impression  POSSIBLE SLIGHT INTERSTITIAL PNEUMONIA BOTH LOWER LUNGS. SMALL PLEURAL EFFUSIONS BILATERALLY. Results for orders placed during the hospital encounter of 10/31/17    XR CHEST STANDARD (2 VW)    Narrative  XR CHEST STANDARD TWO VW: 11/4/2017    CLINICAL HISTORY:  Pneumonia . COMPARISON: Portable chest 5/20/2015 and 2 view chest 12/23/2013. A portable upright AP radiograph of the chest was obtained. FINDINGS:    A right internal jugular approach hemodialysis catheter has been placed since the prior study with its tip within the right atrium. There is no developing pulmonary infiltrate, cardiomegaly, pleural effusion, significant vascular congestion, pneumothorax, or acute fractures identified. A moderate compression fracture T9 appears old. The cardiac and mediastinal silhouettes appear within normal limits for technique. Impression  NO EVIDENCE OF ACTIVE CARDIOPULMONARY DISEASE. Portable: Results for orders placed during the hospital encounter of 07/25/22    XR CHEST PORTABLE    Narrative  XR CHEST PORTABLE    COMPARISON: July 25, 2022. HISTORY: resp failure    TECHNIQUE: AP view      FINDINGS:  Endotracheal tube again seen in place. . There is also an enteric tube seen in place and the tip is below the diaphragm. They appear unchanged in position. A small pleural effusion seen on the right. The left costophrenic angle is not well seen. The lungs are hyperinflated and there is coarsening of the interstitium. Atelectasis and/or scarring is again seen bilaterally in the lower lung fields.  The  cardiac silhouette appears mildly enlarged but may be accentuated by the portable technique. Degenerative changes are seen in the visualized portion of the right shoulder. Impression  The airspace disease has diminished when compared to previous study. . A small pleural effusion is seen on the right and scarring or atelectasis is again seen bilaterally in the bases. US DUP UPPER EXTREMITY RIGHT VENOUS    COMPARISON:    HISTORY:  resp failure    TECHNIQUE: , US DUP UPPER EXTREMITY RIGHT VENOUS AND LEFT VENOUS    FINDINGS: Thrombus is seen in the right internal jugular and proximal subclavian, veins it is also seen in the cephalic vein. The right ulnar and basilic veins are not visualized. A right AV fistula seen. Thrombus is seen in the left internal jugular vein. The left basilic and axillary veins are not visualized. IMPRESSION: Deep venous thrombosis seen in the right and left upper extremities. .      Echo No results found for this or any previous visit. Assessment:   This is a critically ill patient at risk of deterioration / death , needing close ICU monitoring and intervention due to below noted problems   Acute hypoxic respiratory failure, requiring intubation   Large Right sided pleural effusion, thoracentesis   Right sided pneumonia   Shock, requiring vasopressors   Multiple DVT's, thrombus left common femoral vein, thrombus right internal jugular and proximal subclavian and thrombus in the left internal jugular   Hyponatremia, improved   Hypokalemia, improved   Chronic kidney disease on dialysis   Liver transplant   Lactic acidosis, improved     Recommendations   Watch for ICU delirium: TV on, natural light, avoid benzos, pain control, early mobility, and family engagement  PUD prophylaxis  DVT prophylaxis   Continue heparin drip   Continue levophed to maintain map>65  Maintain blood sugar 140-180  Continue antibiotics   EF 45%  If able to wean off Levophed, remove groin central line  Continue midodrine  Thoracentesis fluid exudative  Consult ID for positive stenotrophomonas   Start Bactrim pharmacy to dose                 Electronically signed by ISI Phan - CNP,  F[facility] ,on 7/30/2022 at 9:10 AM

## 2022-07-30 NOTE — FLOWSHEET NOTE
Patient resting in bed with no distress noted, he is alert and answers questions appropriately. Appetite is poor, he does like ice water. Levo has been stopped since 10am and remains with a MAP (62). He is on Proamatine 10 TID. When he falls asleep (MAP is between 57-61). He states that he feels much better today.

## 2022-07-30 NOTE — PROGRESS NOTES
Hospitalist Daily Progress Note  Name: Althea Cornejo  Age: 75 y.o. Gender: male  CodeStatus: Full Code  Allergies: No Known Allergies    Chief Complaint:Shortness of Breath (C/O SOB  AFTER DIALYSIS  88 % ON RA  GIVEN 2 DUONEBS  SOLU MEDROL )      Primary Care Provider: Mando Romero MD    InpatientTreatment Team: Treatment Team: Attending Provider: Edneilson Mccarthy MD; Consulting Physician: Brenda Leon MD; Consulting Physician: Kulwant Tapia MD; Consulting Physician: Star Alfredo MD; Utilization Reviewer: Kiki Oppenheim, RN; Jonatan Garcia Nurse: Neto Pulido RN; Consulting Physician: Melinda Grossman MD; Registered Nurse: Brian Porter RN    Admission Date: 7/25/2022      Subjective: Extubated, feels improved, no cp, sob improving    Physical Exam  Vitals and nursing note reviewed. Constitutional:       Appearance: Normal appearance. He is ill-appearing. Cardiovascular:      Rate and Rhythm: Normal rate and regular rhythm. Pulmonary:      Effort: Pulmonary effort is normal.      Breath sounds: Normal breath sounds. Abdominal:      General: Bowel sounds are normal.      Palpations: Abdomen is soft. Skin:     General: Skin is warm and dry.    Neurological:      Comments: Sleepy, minimally arousable, denies pain       Medications:  Reviewed    Infusion Medications:    sodium chloride      heparin (PORCINE) Infusion 17 Units/kg/hr (07/29/22 2334)    dextrose      sodium chloride      norepinephrine 1 mcg/min (07/29/22 2334)     Scheduled Medications:    midodrine  10 mg Oral TID WC    aspirin  81 mg Oral Daily    docusate sodium  100 mg Oral BID    insulin lispro  0-4 Units SubCUTAneous 4x Daily AC & HS    polyethylene glycol  17 g Oral Daily    tacrolimus  1 mg Oral BID    sodium chloride flush  5-40 mL IntraVENous 2 times per day    piperacillin-tazobactam  3,375 mg IntraVENous Q12H    vancomycin (VANCOCIN) intermittent dosing (placeholder)   Other RX Placeholder    famotidine (PEPCID) injection  20 mg IntraVENous Daily    lidocaine  5 mL IntraDERmal Once     PRN Meds: sodium chloride flush, sodium chloride, heparin (porcine), heparin (porcine), acetaminophen **OR** acetaminophen, [DISCONTINUED] fentaNYL **AND** fentaNYL, glucose, dextrose bolus **OR** dextrose bolus, glucagon (rDNA), dextrose, heparin (porcine)    Labs:   Recent Labs     07/27/22  0440 07/28/22  0437 07/28/22  0538 07/28/22  1100 07/29/22  0600   WBC 10.4  --  8.8  --  11.3*   HGB 12.5*   < > 11.4* 13.2* 11.1*   HCT 36.1*  --  34.4*  --  33.4*     --  133  --  143    < > = values in this interval not displayed. Recent Labs     07/27/22  0554 07/28/22  0437 07/28/22  0538 07/28/22  1100 07/29/22  0927     --  130*  --  135   K 5.1*  --  3.3*  --  4.8   CL 92*  --  88*  --  91*   CO2 26  --  26  --  27   BUN 28*  --  40*  --  35*   CREATININE 5.33*   < > 6.33* 6.6* 4.94*   CALCIUM 8.9  --  8.6  --  8.5   PHOS 4.4  --  4.4  --  5.0*    < > = values in this interval not displayed. No results for input(s): AST, ALT, BILIDIR, BILITOT, ALKPHOS in the last 72 hours. No results for input(s): INR in the last 72 hours. Recent Labs     07/27/22  0153 07/27/22  0439   TROPONINI 0.117* 0.126*         Urinalysis:   Lab Results   Component Value Date/Time    NITRU Negative 05/21/2015 10:22 AM    BLOODU Negative 05/21/2015 10:22 AM    SPECGRAV 1.018 05/21/2015 10:22 AM    GLUCOSEU Negative 05/21/2015 10:22 AM    GLUCOSEU NEG 02/22/2012 11:16 AM       Radiology:   Most recent    Chest CT      WITH CONTRAST:No results found for this or any previous visit. WITHOUT CONTRAST: No results found for this or any previous visit. CXR      2-view: Results for orders placed during the hospital encounter of 06/28/22    XR CHEST (2 VW)    Narrative  EXAMINATION:  CHEST. CLINICAL HISTORY:  SHORTNESS OF BREATH.    COMPARISONS:  11/4/2017. TECHNIQUE:  PA and lateral views.     FINDINGS:  Heart size and contour are within normal limits. Pulmonary vascularity appears normal. There are small effusions bilaterally. There is a suggestion of small interstitial infiltrates. No definite evidence of a mass or adenopathy. No significant  bony abnormality. Impression  POSSIBLE SLIGHT INTERSTITIAL PNEUMONIA BOTH LOWER LUNGS. SMALL PLEURAL EFFUSIONS BILATERALLY. Results for orders placed during the hospital encounter of 10/31/17    XR CHEST STANDARD (2 VW)    Narrative  XR CHEST STANDARD TWO VW: 11/4/2017    CLINICAL HISTORY:  Pneumonia . COMPARISON: Portable chest 5/20/2015 and 2 view chest 12/23/2013. A portable upright AP radiograph of the chest was obtained. FINDINGS:    A right internal jugular approach hemodialysis catheter has been placed since the prior study with its tip within the right atrium. There is no developing pulmonary infiltrate, cardiomegaly, pleural effusion, significant vascular congestion, pneumothorax, or acute fractures identified. A moderate compression fracture T9 appears old. The cardiac and mediastinal silhouettes appear within normal limits for technique. Impression  NO EVIDENCE OF ACTIVE CARDIOPULMONARY DISEASE. Portable: Results for orders placed during the hospital encounter of 07/25/22    XR CHEST PORTABLE    Narrative  XR CHEST PORTABLE : 7/25/2022    CLINICAL HISTORY:  post intubation . COMPARISON: Earlier 7/25/2022    TECHNIQUE: A portable upright AP radiograph of the chest was obtained at approximately 12:46 PM.      FINDINGS:    Both lung bases have been excluded from the radiograph. An endotracheal tube is present, with its tip approximately 4 to 5 cm above the lucita. An orogastric tube probably extends below the diaphragm out of field of view radiograph. Moderate pleural effusions and mild to moderate probable scarring and/or atelectasis of the mid to lower lung fields has not significantly changed.     There is no cardiomegaly, pneumothorax, displaced fractures, or other significant changes identified. Impression  ENDOTRACHEAL AND OROGASTRIC TUBES IN EXPECTED POSITIONS. OTHERWISE, STABLE CHEST FROM EARLIER 7/25/2022. Echo No results found for this or any previous visit. Assessment/Plan:    Active Hospital Problems    Diagnosis Date Noted    Acute respiratory failure with hypoxia ([facility] [address] [J96.01] 07/25/2022     Priority: Medium     Acute hypoxic respiratory failure  Ventilated, intensivist consult, follow abg, ICU admission  7/26 - weaning as able, follow abg  7/27 - cont supportive care  7/28 - extubate today  7/29 - significantly improved, weaning 2  Septic shock 2/2 PNA  Questionable on imaging, labs likely elevated 2/2 ESRD. However will cover with broad spectrum abx, obtain blood cultures, follow  7/26 - sputum culture  7/27 - cont broad abx  Extensive bue DVT with central involvement  7/27 - heparin gtt  Pulmonary edema  Patient to likely need some additional fluid removal through HD, d/w Dr. Kalee Pryor, to evaluate. 7/26 - HD per nephrology  ESRD on HD  Consult nephrology  Elevated troponin  Cycle trops x 2  DVT proph  7/28 - d/w sister yesterday, considering tx to [facility] when improved.     Electronically signed by Karishma Alvarado MD on 7/29/2022 at 11:49 PM

## 2022-07-31 LAB
ALBUMIN SERPL-MCNC: 2.9 G/DL (ref 3.5–4.6)
ANION GAP SERPL CALCULATED.3IONS-SCNC: 17 MEQ/L (ref 9–15)
ANISOCYTOSIS: ABNORMAL
ATYPICAL LYMPHOCYTE RELATIVE PERCENT: 1 %
BANDED NEUTROPHILS RELATIVE PERCENT: 2 %
BASOPHILS ABSOLUTE: 0 K/UL (ref 0–0.2)
BASOPHILS RELATIVE PERCENT: 0.3 %
BUN BLDV-MCNC: 33 MG/DL (ref 8–23)
CALCIUM SERPL-MCNC: 8.9 MG/DL (ref 8.5–9.9)
CHLORIDE BLD-SCNC: 89 MEQ/L (ref 95–107)
CO2: 28 MEQ/L (ref 20–31)
CREAT SERPL-MCNC: 4.89 MG/DL (ref 0.7–1.2)
EOSINOPHILS ABSOLUTE: 0 K/UL (ref 0–0.7)
EOSINOPHILS RELATIVE PERCENT: 1.1 %
GFR AFRICAN AMERICAN: 14.4
GFR NON-AFRICAN AMERICAN: 11.9
GLUCOSE BLD-MCNC: 77 MG/DL (ref 70–99)
GLUCOSE BLD-MCNC: 79 MG/DL (ref 70–99)
GLUCOSE BLD-MCNC: 99 MG/DL (ref 70–99)
HCT VFR BLD CALC: 31.9 % (ref 42–52)
HEMOGLOBIN: 10.5 G/DL (ref 14–18)
HYPOCHROMIA: ABNORMAL
LYMPHOCYTES ABSOLUTE: 0.4 K/UL (ref 1–4.8)
LYMPHOCYTES RELATIVE PERCENT: 3 %
MACROCYTES: ABNORMAL
MCH RBC QN AUTO: 34.6 PG (ref 27–31.3)
MCHC RBC AUTO-ENTMCNC: 33 % (ref 33–37)
MCV RBC AUTO: 105.1 FL (ref 80–100)
MONOCYTES ABSOLUTE: 1 K/UL (ref 0.2–0.8)
MONOCYTES RELATIVE PERCENT: 9.9 %
NEUTROPHILS ABSOLUTE: 8.7 K/UL (ref 1.4–6.5)
NEUTROPHILS RELATIVE PERCENT: 85 %
PDW BLD-RTO: 14.2 % (ref 11.5–14.5)
PERFORMED ON: NORMAL
PERFORMED ON: NORMAL
PHOSPHORUS: 5.3 MG/DL (ref 2.3–4.8)
PLATELET # BLD: 180 K/UL (ref 130–400)
PLATELET SLIDE REVIEW: NORMAL
POTASSIUM SERPL-SCNC: 4.1 MEQ/L (ref 3.4–4.9)
RBC # BLD: 3.03 M/UL (ref 4.7–6.1)
SODIUM BLD-SCNC: 134 MEQ/L (ref 135–144)
WBC # BLD: 10 K/UL (ref 4.8–10.8)

## 2022-07-31 PROCEDURE — 94660 CPAP INITIATION&MGMT: CPT

## 2022-07-31 PROCEDURE — 2700000000 HC OXYGEN THERAPY PER DAY

## 2022-07-31 PROCEDURE — 99291 CRITICAL CARE FIRST HOUR: CPT | Performed by: INTERNAL MEDICINE

## 2022-07-31 PROCEDURE — 80069 RENAL FUNCTION PANEL: CPT

## 2022-07-31 PROCEDURE — 2580000003 HC RX 258: Performed by: NURSE PRACTITIONER

## 2022-07-31 PROCEDURE — 6360000002 HC RX W HCPCS: Performed by: FAMILY MEDICINE

## 2022-07-31 PROCEDURE — 2500000003 HC RX 250 WO HCPCS: Performed by: NURSE PRACTITIONER

## 2022-07-31 PROCEDURE — 6370000000 HC RX 637 (ALT 250 FOR IP): Performed by: NURSE PRACTITIONER

## 2022-07-31 PROCEDURE — 1210000000 HC MED SURG R&B

## 2022-07-31 PROCEDURE — 99233 SBSQ HOSP IP/OBS HIGH 50: CPT | Performed by: INTERNAL MEDICINE

## 2022-07-31 PROCEDURE — 85025 COMPLETE CBC W/AUTO DIFF WBC: CPT

## 2022-07-31 PROCEDURE — 2580000003 HC RX 258: Performed by: FAMILY MEDICINE

## 2022-07-31 PROCEDURE — 6370000000 HC RX 637 (ALT 250 FOR IP): Performed by: INTERNAL MEDICINE

## 2022-07-31 PROCEDURE — 6370000000 HC RX 637 (ALT 250 FOR IP): Performed by: FAMILY MEDICINE

## 2022-07-31 PROCEDURE — 6360000002 HC RX W HCPCS: Performed by: NURSE PRACTITIONER

## 2022-07-31 RX ORDER — PANTOPRAZOLE SODIUM 40 MG/1
40 TABLET, DELAYED RELEASE ORAL
Status: DISCONTINUED | OUTPATIENT
Start: 2022-07-31 | End: 2022-08-04

## 2022-07-31 RX ADMIN — ACETAMINOPHEN 650 MG: 325 TABLET, FILM COATED ORAL at 21:13

## 2022-07-31 RX ADMIN — PANTOPRAZOLE SODIUM 40 MG: 40 TABLET, DELAYED RELEASE ORAL at 09:29

## 2022-07-31 RX ADMIN — SULFAMETHOXAZOLE AND TRIMETHOPRIM 518.4 MG: 80; 16 INJECTION, SOLUTION, CONCENTRATE INTRAVENOUS at 10:22

## 2022-07-31 RX ADMIN — APIXABAN 10 MG: 5 TABLET, FILM COATED ORAL at 09:29

## 2022-07-31 RX ADMIN — Medication 10 ML: at 21:13

## 2022-07-31 RX ADMIN — TACROLIMUS 1 MG: 1 CAPSULE ORAL at 21:13

## 2022-07-31 RX ADMIN — MIDODRINE HYDROCHLORIDE 15 MG: 5 TABLET ORAL at 14:12

## 2022-07-31 RX ADMIN — Medication 10 ML: at 09:30

## 2022-07-31 RX ADMIN — APIXABAN 10 MG: 5 TABLET, FILM COATED ORAL at 21:12

## 2022-07-31 RX ADMIN — PIPERACILLIN AND TAZOBACTAM 3375 MG: 3; .375 INJECTION, POWDER, LYOPHILIZED, FOR SOLUTION INTRAVENOUS at 09:33

## 2022-07-31 RX ADMIN — TACROLIMUS 1 MG: 1 CAPSULE ORAL at 09:29

## 2022-07-31 RX ADMIN — PIPERACILLIN AND TAZOBACTAM 3375 MG: 3; .375 INJECTION, POWDER, LYOPHILIZED, FOR SOLUTION INTRAVENOUS at 21:16

## 2022-07-31 RX ADMIN — ASPIRIN 81 MG: 81 TABLET, COATED ORAL at 09:29

## 2022-07-31 NOTE — PROGRESS NOTES
Cardiology Progress Note  Patient: Hamlet Soni  Unit/Bed: IC12/IC12-01  YOB: 1955  MRN: [id number]  Acct: [de-identified]   Admitting Diagnosis: Acute respiratory failure with hypoxia [facility]) [J96.01]  Date:  7/25/2022  Hospital Day: 6      Chief Complaint:  Shortness of breath    Reason of this consult  Bradycardia    History of Present Illness:  68-year-old male without prior cardiac history but significant for end-stage renal disease on hemodialysis, hepatitis status post liver transplant, hypertension who was admitted to the hospital on 7/25/2022 for shortness of breath.     Patient was transferred to the ICU given hypoxic respiratory failure  Today patient in the ICU  Currently on Levophed and vasopressin  Currently sedated on ventilator  ====================  Hospital course  7/31/2022  Patient laying in bed looks comfortable  Currently on nasal cannula  He is off Levophed  Telemetry sinus rhythm      7/30/2022  Patient laying in bed looks comfortable  Denies chest pain  Still on Levophed  Telemetry sinus rhythm    7/29/2022  Patient now extubated on nasal cannula  Denies chest pain   Telemetry sinus rhythm  Still on Levophed    7/28/2022  Patient ventilated, opens his eyes  Still on Levophed  Still on heparin drip    No Known Allergies    Current Facility-Administered Medications   Medication Dose Route Frequency Provider Last Rate Last Admin    pantoprazole (PROTONIX) tablet 40 mg  40 mg Oral QAM AC ISI Dumont CNP   40 mg at 07/31/22 0929    midodrine (PROAMATINE) tablet 15 mg  15 mg Oral TID WC ISI Dumont CNP        apixaban (ELIQUIS) tablet 10 mg  10 mg Oral BID Randall Fortune MD   10 mg at 07/31/22 7348    Followed by    Prudencio Rodriguez ON 8/6/2022] apixaban (ELIQUIS) tablet 5 mg  5 mg Oral BID Randall Fortune MD        sulfamethoxazole-trimethoprim (BACTRIM) 518.4 mg in dextrose 5 % 500 mL IVPB  7.5 mg/kg IntraVENous Daily ISI Dumont .3 mL/hr at 07/31/22 1022 518.4 mg at 07/31/22 1022    aspirin EC tablet 81 mg  81 mg Oral Daily Effie Mcgovern MD   81 mg at 07/31/22 6361    docusate sodium (COLACE) capsule 100 mg  100 mg Oral BID ISI Julio CNP   100 mg at 07/30/22 2009    insulin lispro (HUMALOG) injection vial 0-4 Units  0-4 Units SubCUTAneous 4x Daily AC & HS Shanthi Larsen MD        polyethylene glycol [facility]) packet 17 g  17 g Oral Daily ISI Julio - CNP   17 g at 07/29/22 0907    tacrolimus (PROGRAF) capsule 1 mg  1 mg Oral BID ISI Julio - CNP   1 mg at 07/31/22 0176    sodium chloride flush 0.9 % injection 5-40 mL  5-40 mL IntraVENous 2 times per day ISI Julio CNP   10 mL at 07/31/22 0930    sodium chloride flush 0.9 % injection 5-40 mL  5-40 mL IntraVENous PRN ISI Julio CNP        0.9 % sodium chloride infusion   IntraVENous PRN ISI Julio CNP        acetaminophen (TYLENOL) tablet 650 mg  650 mg Oral Q6H PRN Shanthi Larsen MD        Or    acetaminophen (TYLENOL) suppository 650 mg  650 mg Rectal Q6H PRN Shanthi Larsen MD        piperacillin-tazobactam (ZOSYN) 3,375 mg in dextrose 5 % 50 mL IVPB (mini-bag)  3,375 mg IntraVENous Q12H Shanthi Larsen MD 12.5 mL/hr at 07/31/22 0933 3,375 mg at 07/31/22 0933    fentaNYL bolus from bag  50 mcg IntraVENous Q30 Min PRN ISI Julio CNP        glucose chewable tablet 16 g  4 tablet Oral PRN ISI Julio CNP        dextrose bolus 10% 125 mL  125 mL IntraVENous PRN ISI Julio CNP        Or    dextrose bolus 10% 250 mL  250 mL IntraVENous PRN ISI Julio CNP        glucagon (rDNA) injection 1 mg  1 mg SubCUTAneous PRN ISI Julio CNP        dextrose 10 % infusion   IntraVENous Continuous PRN ISI Julio CNP        heparin (porcine) injection 1,000 Units  1,000 Units IntraVENous PRN John Hoffmann MD        lidocaine 2 % injection 5 mL  5 mL IntraDERmal Once [place] Stapler, APRN - CNP        0.9 % sodium chloride infusion  250 mL IntraVENous Once Shailesh Almeida, APRN - CNP           PMHx:  Past Medical History:   Diagnosis Date    ESRD (end stage renal disease) ([facility] [address]     Hepatitis     Hypertension     Liver transplanted ([facility] [address] 2016       PSHx:  Past Surgical History:   Procedure Laterality Date    DIALYSIS FISTULA CREATION Right     LIVER TRANSPLANT  2017    TUNNELED VENOUS PORT PLACEMENT         Social Hx:  Social History     Socioeconomic History    Marital status: Single     Spouse name: None    Number of children: None    Years of education: None    Highest education level: None   Tobacco Use    Smoking status: Former     Packs/day: 0.50     Years: 15.00     Pack years: 7.50     Types: Cigarettes     Quit date: 2017     Years since quittin.7    Smokeless tobacco: Never   Substance and Sexual Activity    Alcohol use: No    Drug use: No       Family Hx:  History reviewed. No pertinent family history. Review of Systems:   Review of Systems   Unable to perform ROS: Intubated       Physical Examination:    /60   Pulse 77   Temp 98.9 °F (37.2 °C) (Oral)   Resp 20   Ht 5' 6\" (1.676 m)   Wt 144 lb 6.4 oz (65.5 kg)   SpO2 96%   BMI 23.31 kg/m²    Physical Exam  Vitals and nursing note reviewed. HENT:      Head: Normocephalic and atraumatic. Mouth/Throat:      Mouth: Mucous membranes are moist.      Pharynx: Oropharynx is clear. Eyes:      Extraocular Movements: Extraocular movements intact. Conjunctiva/sclera: Conjunctivae normal.      Pupils: Pupils are equal, round, and reactive to light. Cardiovascular:      Rate and Rhythm: Normal rate and regular rhythm. Pulses: Normal pulses. Heart sounds: Normal heart sounds. Pulmonary:      Effort: Pulmonary effort is normal.      Breath sounds: Normal breath sounds. No rales. Abdominal:      General: Abdomen is flat. Bowel sounds are normal.      Palpations: Abdomen is soft.    Musculoskeletal:         General: Normal range of motion. Cervical back: Normal range of motion and neck supple. Skin:     General: Skin is warm.        LABS:  CBC:  Lab Results   Component Value Date/Time    WBC 10.0 07/31/2022 06:00 AM    RBC 3.03 07/31/2022 06:00 AM    RBC 3.34 02/20/2012 05:15 AM    HGB 10.5 07/31/2022 06:00 AM    HCT 31.9 07/31/2022 06:00 AM    .1 07/31/2022 06:00 AM    MCH 34.6 07/31/2022 06:00 AM    MCHC 33.0 07/31/2022 06:00 AM    RDW 14.2 07/31/2022 06:00 AM     07/31/2022 06:00 AM    MPV 9.5 10/09/2015 02:00 PM     CBC with Differential:   Lab Results   Component Value Date/Time    WBC 10.0 07/31/2022 06:00 AM    RBC 3.03 07/31/2022 06:00 AM    RBC 3.34 02/20/2012 05:15 AM    HGB 10.5 07/31/2022 06:00 AM    HCT 31.9 07/31/2022 06:00 AM     07/31/2022 06:00 AM    .1 07/31/2022 06:00 AM    MCH 34.6 07/31/2022 06:00 AM    MCHC 33.0 07/31/2022 06:00 AM    RDW 14.2 07/31/2022 06:00 AM    BANDSPCT 1 07/25/2022 11:27 AM    METASPCT 1 05/28/2015 05:46 AM    LYMPHOPCT 2.3 07/30/2022 06:00 AM    PROMYELOPCT 1 11/07/2017 05:14 AM    MONOPCT 10.4 07/30/2022 06:00 AM    EOSPCT 3.1 02/20/2012 05:15 AM    BASOPCT 0.2 07/30/2022 06:00 AM    MONOSABS 1.1 07/30/2022 06:00 AM    LYMPHSABS 0.2 07/30/2022 06:00 AM    EOSABS 0.1 07/30/2022 06:00 AM    BASOSABS 0.0 07/30/2022 06:00 AM     CMP:    Lab Results   Component Value Date/Time     07/31/2022 06:00 AM    K 4.1 07/31/2022 06:00 AM    CL 89 07/31/2022 06:00 AM    CO2 28 07/31/2022 06:00 AM    BUN 33 07/31/2022 06:00 AM    CREATININE 4.89 07/31/2022 06:00 AM    GFRAA 14.4 07/31/2022 06:00 AM    LABGLOM 11.9 07/31/2022 06:00 AM    GLUCOSE 77 07/31/2022 06:00 AM    GLUCOSE 136 02/25/2012 05:10 PM    PROT 7.5 07/25/2022 11:27 AM    LABALBU 2.9 07/31/2022 06:00 AM    LABALBU 2.8 02/23/2012 06:40 AM    CALCIUM 8.9 07/31/2022 06:00 AM    BILITOT 0.7 07/25/2022 11:27 AM    ALKPHOS 87 07/25/2022 11:27 AM    AST 77 07/25/2022 11:27 AM    ALT 79 07/25/2022 11:27 AM     BMP:    Lab Results   Component Value Date/Time     07/31/2022 06:00 AM    K 4.1 07/31/2022 06:00 AM    CL 89 07/31/2022 06:00 AM    CO2 28 07/31/2022 06:00 AM    BUN 33 07/31/2022 06:00 AM    LABALBU 2.9 07/31/2022 06:00 AM    LABALBU 2.8 02/23/2012 06:40 AM    CREATININE 4.89 07/31/2022 06:00 AM    CALCIUM 8.9 07/31/2022 06:00 AM    GFRAA 14.4 07/31/2022 06:00 AM    LABGLOM 11.9 07/31/2022 06:00 AM    GLUCOSE 77 07/31/2022 06:00 AM    GLUCOSE 136 02/25/2012 05:10 PM     Magnesium:    Lab Results   Component Value Date/Time    MG 2.0 07/25/2022 11:27 AM    MG 2.0 02/19/2012 12:42 PM     Troponin:    Lab Results   Component Value Date/Time    TROPONINI 0.126 07/27/2022 04:39 AM          EKG: sinus tachycardia      Assessment:    Active Hospital Problems    Diagnosis Date Noted    Pneumonia due to infectious organism [J18.9] 07/30/2022     Priority: Medium    Pleural effusion [J90] 07/30/2022     Priority: Medium    Acute respiratory failure with hypoxia ([facility] [address] [J96.01] 07/25/2022     Priority: Medium    ESRD (end stage renal disease) on dialysis ([facility] [address] [N18.6, Z99.2] 11/03/2017     Priority: Low     Bradycardia. Currently sinus rhythm, no high degree AV blocks. Bradycardia likely due to sedation side effect versus clinical condition  Acute hypoxic respiratory failure.   Extubated on 7/28/2022  End-stage renal disease on hemodialysis  Extensive upper extremity DVTs  Pleural effusion status postthoracentesis  History of liver transplant  History of hypertension  Septic shock  Hyperkalemia  Positive troponins 0.126 in the setting of end-stage renal disease EKG without active signs of ischemia  CKD    TTE 7/27/2022 EF 45%, echogenic mass at the apex  Limited TTE with Definity 7/28/2022 EF 65%, no evidence of apical thrombus    Plan:  Continue ICU  Continue midodrine  Please keep potassium between 4 and 5 magnesium above 2  Please keep hemoglobin above 8  Once patient is more hemodynamically stable we can restart cardioprotective medications including beta-blocker and possibly ACE inhibitor   Most likely patient will need an ischemic evaluation as an outpatient once acute conditions resolve  Continue management of rest of comorbidities as per primary team  Cont anticoagulation for extensive DVT  Further recommendations to follow       This report was transcribed using voice recognition software. Every effort was made to ensure accuracy; however, inadvertent computerized transcription errors may be present.     Electronically signed by Amarjit Cantor MD on 7/31/2022 at 10:54 AM

## 2022-07-31 NOTE — CARE COORDINATION
LSW meet with pt at bedside. Initial DC plan Home with HHC. Will need PT and OT recommendation. Pt is open to Rehab. LSW will follow. DC plan: Home with [facility] vs. Rehab; pending PT and OT.      Electronically signed by JOYCE Obrien on 7/31/2022 at 8:32 AM

## 2022-07-31 NOTE — PROGRESS NOTES
Hospitalist Daily Progress Note  Name: Benja Jimenez  Age: 75 y.o. Gender: male  CodeStatus: Full Code  Allergies: No Known Allergies    Chief Complaint:Shortness of Breath (C/O SOB  AFTER DIALYSIS  88 % ON RA  GIVEN 2 DUONEBS  SOLU MEDROL )      Primary Care Provider: Astrid Randall MD    InpatientTreatment Team: Treatment Team: Attending Provider: Kartik Schmidt MD; Consulting Physician: Vamsi Hart MD; Consulting Physician: Marco Rm MD; Consulting Physician: Dakota Cedeno MD; Utilization Reviewer: Olinda Carney, RN; Ranulfo Pena Nurse: Darrel Mello, RN; Consulting Physician: Kiara Ruff MD; Utilization Reviewer: Cara Schwartz RN; Consulting Physician: Basia Thurston MD; Registered Nurse: Harley Lacey RN    Admission Date: 7/25/2022      Subjective: Resting in bed, feels improved, no cp, sob improving    Physical Exam  Vitals and nursing note reviewed. Constitutional:       Appearance: Normal appearance. He is ill-appearing. Cardiovascular:      Rate and Rhythm: Normal rate and regular rhythm. Pulmonary:      Effort: Pulmonary effort is normal.      Breath sounds: Normal breath sounds. Abdominal:      General: Bowel sounds are normal.      Palpations: Abdomen is soft. Skin:     General: Skin is warm and dry.    Neurological:      Comments: Sleepy, minimally arousable, denies pain       Medications:  Reviewed    Infusion Medications:    sodium chloride      dextrose      sodium chloride       Scheduled Medications:    pantoprazole  40 mg Oral QAM AC    midodrine  15 mg Oral TID WC    apixaban  10 mg Oral BID    Followed by    Lizeth Bang ON 8/6/2022] apixaban  5 mg Oral BID    sulfamethoxazole-trimethoprim (BACTRIM) IVPB  7.5 mg/kg IntraVENous Daily    aspirin  81 mg Oral Daily    docusate sodium  100 mg Oral BID    insulin lispro  0-4 Units SubCUTAneous 4x Daily AC & HS    polyethylene glycol  17 g Oral Daily    tacrolimus  1 mg Oral BID    sodium chloride flush  5-40 mL IntraVENous 2 times per day    piperacillin-tazobactam  3,375 mg IntraVENous Q12H    lidocaine  5 mL IntraDERmal Once     PRN Meds: sodium chloride flush, sodium chloride, acetaminophen **OR** acetaminophen, [DISCONTINUED] fentaNYL **AND** fentaNYL, glucose, dextrose bolus **OR** dextrose bolus, glucagon (rDNA), dextrose, heparin (porcine)    Labs:   Recent Labs     07/29/22  0600 07/30/22  0600 07/31/22  0600   WBC 11.3* 10.5 10.0   HGB 11.1* 10.8* 10.5*   HCT 33.4* 32.3* 31.9*    152 180       Recent Labs     07/29/22  0927 07/30/22  0600 07/31/22  0600    135 134*   K 4.8 4.0 4.1   CL 91* 92* 89*   CO2 27 28 28   BUN 35* 24* 33*   CREATININE 4.94* 3.55* 4.89*   CALCIUM 8.5 9.0 8.9   PHOS 5.0* 4.4 5.3*       No results for input(s): AST, ALT, BILIDIR, BILITOT, ALKPHOS in the last 72 hours. No results for input(s): INR in the last 72 hours. No results for input(s): Curlie Lat in the last 72 hours. Urinalysis:   Lab Results   Component Value Date/Time    NITRU Negative 05/21/2015 10:22 AM    BLOODU Negative 05/21/2015 10:22 AM    SPECGRAV 1.018 05/21/2015 10:22 AM    GLUCOSEU Negative 05/21/2015 10:22 AM    GLUCOSEU NEG 02/22/2012 11:16 AM       Radiology:   Most recent    Chest CT      WITH CONTRAST:No results found for this or any previous visit. WITHOUT CONTRAST: No results found for this or any previous visit. CXR      2-view: Results for orders placed during the hospital encounter of 06/28/22    XR CHEST (2 VW)    Narrative  EXAMINATION:  CHEST. CLINICAL HISTORY:  SHORTNESS OF BREATH.    COMPARISONS:  11/4/2017. TECHNIQUE:  PA and lateral views. FINDINGS:  Heart size and contour are within normal limits. Pulmonary vascularity appears normal. There are small effusions bilaterally. There is a suggestion of small interstitial infiltrates. No definite evidence of a mass or adenopathy. No significant  bony abnormality.     Impression  POSSIBLE SLIGHT INTERSTITIAL PNEUMONIA BOTH LOWER LUNGS. SMALL PLEURAL EFFUSIONS BILATERALLY. Results for orders placed during the hospital encounter of 10/31/17    XR CHEST STANDARD (2 VW)    Narrative  XR CHEST STANDARD TWO VW: 11/4/2017    CLINICAL HISTORY:  Pneumonia . COMPARISON: Portable chest 5/20/2015 and 2 view chest 12/23/2013. A portable upright AP radiograph of the chest was obtained. FINDINGS:    A right internal jugular approach hemodialysis catheter has been placed since the prior study with its tip within the right atrium. There is no developing pulmonary infiltrate, cardiomegaly, pleural effusion, significant vascular congestion, pneumothorax, or acute fractures identified. A moderate compression fracture T9 appears old. The cardiac and mediastinal silhouettes appear within normal limits for technique. Impression  NO EVIDENCE OF ACTIVE CARDIOPULMONARY DISEASE. Portable: Results for orders placed during the hospital encounter of 07/25/22    XR CHEST PORTABLE    Narrative  XR CHEST PORTABLE : 7/25/2022    CLINICAL HISTORY:  post intubation . COMPARISON: Earlier 7/25/2022    TECHNIQUE: A portable upright AP radiograph of the chest was obtained at approximately 12:46 PM.      FINDINGS:    Both lung bases have been excluded from the radiograph. An endotracheal tube is present, with its tip approximately 4 to 5 cm above the lucita. An orogastric tube probably extends below the diaphragm out of field of view radiograph. Moderate pleural effusions and mild to moderate probable scarring and/or atelectasis of the mid to lower lung fields has not significantly changed. There is no cardiomegaly, pneumothorax, displaced fractures, or other significant changes identified. Impression  ENDOTRACHEAL AND OROGASTRIC TUBES IN EXPECTED POSITIONS. OTHERWISE, STABLE CHEST FROM EARLIER 7/25/2022. Echo No results found for this or any previous visit.             Assessment/Plan:    FISH/Anoop Alexandra 0687 Problems    Diagnosis Date Noted    Pneumonia due to infectious organism [J18.9] 07/30/2022     Priority: Medium    Pleural effusion [J90] 07/30/2022     Priority: Medium    Acute respiratory failure with hypoxia ([facility] [address] [J96.01] 07/25/2022     Priority: Medium    ESRD (end stage renal disease) on dialysis ([facility] [address] [N18.6, Z99.2] 11/03/2017     Acute hypoxic respiratory failure  Ventilated, intensivist consult, follow abg, ICU admission  7/26 - weaning as able, follow abg  7/27 - cont supportive care  7/28 - extubate today  7/29 - significantly improved, weaning 2  7/30 - continues to improve  Septic shock 2/2 PNA  Questionable on imaging, labs likely elevated 2/2 ESRD. However will cover with broad spectrum abx, obtain blood cultures, follow  7/26 - sputum culture  7/27 - cont broad abx  Extensive bue DVT with central involvement  7/27 - heparin gtt  8/1 - rue mildly cool, difficult to assess pulses, will order arterial ultrasound, does not appear acute loss of blood supply  Pulmonary edema  Patient to likely need some additional fluid removal through HD, d/w Dr. Rl Gerard, to evaluate. 7/26 - HD per nephrology  ESRD on HD  Consult nephrology  Elevated troponin  Cycle trops x 2  DVT proph  7/28 - d/w sister yesterday, considering tx to [facility] when improved.   7/30 - hold plans to transfer, pt/ot when able    Electronically signed by Sean Song MD on 7/31/2022 at 10:36 AM

## 2022-07-31 NOTE — PROGRESS NOTES
Pulmonary & Critical Care Medicine ICU Progress Note  Chief complaint : Shortness of breath     Subjunctive/24 hour events :   Patient seen and examined during multidisciplinary rounds with RN, charge nurse, RT, pharmacy, dietitian, and social service. Patient is doing well. Wore the bipap for couple of hours last night and currently is on 3 Liters nasal cannula, O2 sats are 98%. Levophed has been off since yesterday evening. No fever overnight and he is anuric, last BM 2022. Tolerating po diet. Social History     Tobacco Use    Smoking status: Former     Packs/day: 0.50     Years: 15.00     Pack years: 7.50     Types: Cigarettes     Quit date: 2017     Years since quittin.7    Smokeless tobacco: Never   Substance Use Topics    Alcohol use: No     History reviewed. No pertinent family history. Recent Labs     22  1100   PHART 7.381   [id number] 47*   PO2ART 98*         MV Settings:  Vent Mode: CPAP Resp Rate (Set): 18 bmp/Vt (Set, mL): 390 mL/ /FiO2 : 40 %           IV:   sodium chloride      dextrose      sodium chloride         Vitals:  /60   Pulse 77   Temp 98.9 °F (37.2 °C) (Oral)   Resp 20   Ht 5' 6\" (1.676 m)   Wt 144 lb 6.4 oz (65.5 kg)   SpO2 96%   BMI 23.31 kg/m²    Tmax:       Intake/Output Summary (Last 24 hours) at 2022 0825  Last data filed at 2022 6753  Gross per 24 hour   Intake 80 ml   Output --   Net 80 ml         EXAM:    General: alert, fatigued, cooperative  Head: normocephalic, atraumatic  Eyes:No gross abnormalities. ENT:  MMM no lesions  Neck:  supple and no masses  Chest : Fair air movement minimal rales no wheezes   Heart[de-identified] Heart sounds are normal.  Regular rate and rhythm without murmur, gallop or rub. ABD:  bowel sounds normal, soft, non-tender  Musculoskeletal : no cyanosis, no clubbing, and no edema  Neuro:  Grossly normal  Skin: No rashes or nodules noted.   Lymph node:  no cervical nodes  Urology: No [place]   Psychiatric: appropriate    Medications:  Scheduled Meds:   apixaban  10 mg Oral BID    Followed by    Rancho Saenz ON 8/6/2022] apixaban  5 mg Oral BID    sulfamethoxazole-trimethoprim (BACTRIM) IVPB  7.5 mg/kg IntraVENous Daily    midodrine  10 mg Oral TID WC    aspirin  81 mg Oral Daily    docusate sodium  100 mg Oral BID    insulin lispro  0-4 Units SubCUTAneous 4x Daily AC & HS    polyethylene glycol  17 g Oral Daily    tacrolimus  1 mg Oral BID    sodium chloride flush  5-40 mL IntraVENous 2 times per day    piperacillin-tazobactam  3,375 mg IntraVENous Q12H    famotidine (PEPCID) injection  20 mg IntraVENous Daily    lidocaine  5 mL IntraDERmal Once       PRN Meds:  sodium chloride flush, sodium chloride, acetaminophen **OR** acetaminophen, [DISCONTINUED] fentaNYL **AND** fentaNYL, glucose, dextrose bolus **OR** dextrose bolus, glucagon (rDNA), dextrose, heparin (porcine)    Results: reviewed by me   CBC:   Recent Labs     07/29/22  0600 07/30/22  0600 07/31/22  0600   WBC 11.3* 10.5 10.0   HGB 11.1* 10.8* 10.5*   HCT 33.4* 32.3* 31.9*   .9* 104.6* 105.1*    152 180       BMP:   Recent Labs     07/28/22  1100 07/29/22  0927 07/30/22  0600   NA  --  135 135   K  --  4.8 4.0   CL  --  91* 92*   CO2  --  27 28   PHOS  --  5.0* 4.4   BUN  --  35* 24*   CREATININE 6.6* 4.94* 3.55*       LIVER PROFILE:   No results for input(s): AST, ALT, LIPASE, BILIDIR, BILITOT, ALKPHOS in the last 72 hours. Invalid input(s): AMYLASE,  ALB    PT/INR:   No results for input(s): PROTIME, INR in the last 72 hours. APTT:   No results for input(s): APTT in the last 72 hours. UA:No results for input(s): NITRITE, COLORU, PHUR, LABCAST, WBCUA, RBCUA, MUCUS, TRICHOMONAS, YEAST, BACTERIA, CLARITYU, SPECGRAV, LEUKOCYTESUR, UROBILINOGEN, BILIRUBINUR, BLOODU, GLUCOSEU, AMORPHOUS in the last 72 hours.     Invalid input(s): Lindsay Dirk    Cultures:    Echocardiogram complete 2D with doppler with color    Result Date: 7/27/2022  Transthoracic Left Atrium Normal left atrium. Right Atrium Normal right atrium. Mitral Valve Diffusely thickened and pliable mitral valve leaflets with normal excursion. Structurally normal mitral valve. No evidence of mitral valve stenosis. No evidence of mitral regurgitaton. Tricuspid Valve Tricuspid valve is structurally normal. No evidence of tricuspid stenosis. No evidence of tricuspid regurgitation. Aortic Valve Mildly thickened trileaflet aortic valve with normal excursion. Structurally normal aortic valve. No evidence of aortic valve stenosis . No evidence of aortic valve regurgitation . Pulmonic Valve The pulmonic valve was not well visualized . Pericardial Effusion No evidence of pericardial effusion. Aorta \ Miscellaneous The aorta is within normal limits. M-Mode Measurements (cm)   LVIDd: 2.97 cm                         LVIDs: 2.34 cm  IVSd: 0.73 cm                          IVSs: 1.01 cm  LVPWd: 0.9 cm                          LVPWs: 1.01 cm  Rt. Vent.  Dimension: 3.12 cm           AO Root Dimension: 2.1 cm                                         ACS: 1.37 cm                                         LA: 1.82 cm                                         LVOT: 2.03 cm  Doppler Measurements:   AV Velocity:0.02 m/s                    MV Peak E-Wave: 0.5 m/s  AV Peak Gradient: 5.77 mmHg             MV Peak A-Wave: 0.53 m/s  AV Mean Gradient: 3.05 mmHg  AV Area (Continuity):1.97 cm^2  TR Velocity:2.52 m/s                    Estimated RAP:3 mmHg  TR Gradient:25.31 mmHg                  RVSP:28.31 mmHg  Valves  Mitral Valve   Peak E-Wave: 0.5 m/s                  Peak A-Wave: 0.53 m/s  Mean Velocity: 0.87 m/s               E/A Ratio: 0.94  Mean Gradient: 4.66 mmHg              Peak Gradient: 0.99 mmHg                                        Deceleration Time: 353.3 msec                                        Area (continuity): 1.4 cm^2   Tissue Doppler   E' Septal Velocity: 0.07 m/s  E' Lateral Velocity: 0.07 m/s   Aortic Valve Peak Velocity: 1.2 m/s                 Mean Velocity: 0.82 m/s  Peak Gradient: 5.77 mmHg               Mean Gradient: 3.05 mmHg  Area (continuity): 1.97 cm^2  AV VTI: 29.33 cm   Cusp Separation: 1.37 cm   Tricuspid Valve   Estimated RVSP: 28.31 mmHg              Estimated RAP: 3 mmHg  TR Velocity: 2.52 m/s                   TR Gradient: 25.31 mmHg   Pulmonic Valve   Peak Velocity: 0.99 m/s           Peak Gradient: 3.91 mmHg                                    Estimated PASP: 28.31 mmHg   LVOT   Peak Velocity: 0.97 m/s              Mean Velocity: 0.61 m/s  Peak Gradient: 3.77 mmHg             Mean Gradient: 1.79 mmHg  LVOT Diameter: 2.03 cm               LVOT VTI: 17.9 cm  Structures  Left Atrium   LA Dimension: 1.82 cm                        LA Area: 12.58 cm^2  LA/Aorta: 0.87  LA Volume/Index: 44.74 ml /25 m^2   Left Ventricle   Diastolic Dimension: 8.21 cm         Systolic Dimension: 2.61 cm  Septum Diastolic: 5.59 cm            Septum Systolic: 4.44 cm  PW Diastolic: 0.9 cm                 PW Systolic: 1.55 cm                                       FS: 21.2 %  LV EDV/LV EDV Index: 34.12 ml/19 m^2 LV ESV/LV ESV Index: 19.02 ml/11 m^2  EF Calculated: 44.3 %   LVOT Diameter: 2.03 cm   Right Ventricle   Diastolic Dimension: 1.36 cm                                    RV Systolic Pressure: 56.70 mmHg  Aorta/ Miscellaneous Aorta   Aortic Root: 2.1 cm  LVOT Diameter: 2.03 cm      ECHO Limited    Result Date: 7/28/2022  Transthoracic Echocardiography Report (TTE)  Demographics   Patient Name    [facility]       Gender               Male                  [facility]   Patient Number  [id number]       Race                                                   Ethnicity   Visit Number    [id number]      Room Number          IC12   Corporate ID                   Date of Study        07/28/2022   Accession       [id number]     Referring Physician  Number   Date of Birth   1955     Sonographer          Fifi Ortega 75 year(s)     Interpreting         [facility])                                 Physician            Cardiology                                                      [facility]  Procedure Type of Study   TTE procedure:ECHOCARDIOGRAM LIMITED. Procedure Date Date: 07/28/2022 Start: 08:08 AM Study Location: Portable Technical Quality: Limited visualization Indications:LVF. Patient Status: Routine Contrast Medium: Definity. Amount - 2 ml Height: 66 inches Weight: 148 pounds BSA: 1.76 m^2 BMI: 23.89 kg/m^2  Conclusions   Summary  No evidence of thrombus with definity  Left ventricular ejection fraction is visually estimated at 65%. Signature   ----------------------------------------------------------------  Electronically signed by Juan Carlos Ricks(Interpreting physician)  on 07/28/2022 08:42 AM  ----------------------------------------------------------------   Findings  Left Ventricle Left ventricular ejection fraction is visually estimated at 65%. XR CHEST (2 VW)    Result Date: 6/28/2022  EXAMINATION:  CHEST. CLINICAL HISTORY:  SHORTNESS OF BREATH. COMPARISONS:  11/4/2017. TECHNIQUE:  PA and lateral views. FINDINGS:  Heart size and contour are within normal limits. Pulmonary vascularity appears normal. There are small effusions bilaterally. There is a suggestion of small interstitial infiltrates. No definite evidence of a mass or adenopathy. No significant  bony abnormality. POSSIBLE SLIGHT INTERSTITIAL PNEUMONIA BOTH LOWER LUNGS. SMALL PLEURAL EFFUSIONS BILATERALLY. IR FLUORO GUIDED CVA DEVICE PLMT/REPLACE/REMOVAL    1. Venogram of the right internal jugular vein demonstrates a thrombus in the right internal jugular vein which completely occludes the vein. Passage of contrast into the chest is through small collateral veins. 2. 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  Passage of contrast into the chest is through a small collateral veins. Radiation dose to the patient was: 24.21 mGy Additional clinical data: Long-term IV access Procedure: 1. Ultrasound guidance for vascular access into the right internal jugular vein. The ultrasound image of the blood vessel was saved to PACS. 2. Venogram via contrast injection of the right internal jugular vein. 3.   Ultrasound guidance for vascular access into the left internal jugular vein. The ultrasound image of the blood vessel was saved to PACS 4. Venogram via contrast injection of the left internal jugular vein. Body of Report: Informed and written consent was obtained from the patient following discussion of risks, benefits and alternatives to this procedure. The was patient placed supine on the angiographic table. The patient's neck and chest were then prepped and draped in  normal sterile fashion. A small amount of local lidocaine anesthesia was injected subcutaneously. Ultrasound was used to study the jugular vein we intended to use prior to accessing it. The vein appeared patent. The ultrasound image of the blood vessel was saved to PACS. Using ultrasound access, puncture was made of the right internal jugular vein using a 21 GA needle. A wire was advanced into the right internal jugular vein, though would not pass into the superior vena cava. A 4 [place] short thin sheath was placed, through the sheath digital subtraction venography was performed. Venography demonstrated a thrombus in the right internal jugular vein and complete occlusion of the vein central to the thrombus. This access was aborted. Ultrasound was used to study the left jugular vein we intended to use prior to accessing it. The vein appeared patent. The ultrasound image of the blood vessel was saved to PACS. Using ultrasound access, puncture was made of the left internal jugular vein using a 21 GA needle.  A wire was attempted to be passed, though would not pass to the superior vena cava. A 4 Indonesian thin sheath was placed and digital subtraction venography was performed. 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 The procedure was aborted. Findings discussed with ICU team who will place a temporary central line in the groin. XR CHEST PORTABLE    Result Date: 7/27/2022  XR CHEST PORTABLE COMPARISON: July 25, 2022. HISTORY: resp failure TECHNIQUE: AP view FINDINGS: Endotracheal tube again seen in place. . There is also an enteric tube seen in place and the tip is below the diaphragm. They appear unchanged in position. A small pleural effusion seen on the right. The left costophrenic angle is not well seen. The lungs are hyperinflated and there is coarsening of the interstitium. Atelectasis and/or scarring is again seen bilaterally in the lower lung fields. The cardiac silhouette appears mildly enlarged but may be accentuated by the portable technique. Degenerative changes are seen in the visualized portion of the right shoulder. The airspace disease has diminished when compared to previous study. . A small pleural effusion is seen on the right and scarring or atelectasis is again seen bilaterally in the bases. US DUP UPPER EXTREMITY RIGHT VENOUS COMPARISON: HISTORY:  resp failure TECHNIQUE: , US DUP UPPER EXTREMITY RIGHT VENOUS AND LEFT VENOUS FINDINGS: Thrombus is seen in the right internal jugular and proximal subclavian, veins it is also seen in the cephalic vein. The right ulnar and basilic veins are not visualized. A right AV fistula seen. Thrombus is seen in the left internal jugular vein. The left basilic and axillary veins are not visualized. IMPRESSION: Deep venous thrombosis seen in the right and left upper extremities. .    XR CHEST PORTABLE    Result Date: 7/25/2022  XR CHEST PORTABLE : 7/25/2022 CLINICAL HISTORY:  post intubation .  COMPARISON: Earlier 7/25/2022 TECHNIQUE: A portable upright AP radiograph of the chest was obtained at approximately 12:46 PM. FINDINGS: Both lung bases have been excluded from the radiograph. An endotracheal tube is present, with its tip approximately 4 to 5 cm above the lucita. An orogastric tube probably extends below the diaphragm out of field of view radiograph. Moderate pleural effusions and mild to moderate probable scarring and/or atelectasis of the mid to lower lung fields has not significantly changed. There is no cardiomegaly, pneumothorax, displaced fractures, or other significant changes identified. ENDOTRACHEAL AND OROGASTRIC TUBES IN EXPECTED POSITIONS. OTHERWISE, STABLE CHEST FROM EARLIER 7/25/2022. XR CHEST PORTABLE    Result Date: 7/25/2022  TECHNIQUE: Single portable view of the chest. CLINICAL INDICATION: Chest pain. COMPARISON: Chest x-ray obtained on June 28, 2022. PROCEDURE AND FINDINGS: Poor inspiratory effort is seen. The cardiomediastinal silhouette is slightly prominent in size. Mild prominence of the bronchovascular and interstitial lung markings is visualized bilaterally, linear streaky opacities visualized in bilateral lung fields, subsegmental atelectatic streaks, fluid visualized in the right minor fissure. Airspace opacification visualized in the lower lung fields bilaterally with blunting of the costophrenic angles and obliteration of the hemidiaphragms consistent with bilateral pleural effusions. . No evidence of pneumothorax or parenchymal lung mass. Degenerative bone changes. Congestive heart failure versus pneumonia and parapneumonic effusions would recommend clinical correlation. Increased opacification seen in comparison to the prior study. US DUP UPPER EXTREMITY RIGHT VENOUS    Result Date: 7/27/2022  XR CHEST PORTABLE COMPARISON: July 25, 2022. HISTORY: resp failure TECHNIQUE: AP view FINDINGS: Endotracheal tube again seen in place. . There is also an enteric tube seen in place and the tip is below the diaphragm. They appear unchanged in position.   A small pleural effusion seen on the right. The left costophrenic angle is not well seen. The lungs are hyperinflated and there is coarsening of the interstitium. Atelectasis and/or scarring is again seen bilaterally in the lower lung fields. The cardiac silhouette appears mildly enlarged but may be accentuated by the portable technique. Degenerative changes are seen in the visualized portion of the right shoulder. The airspace disease has diminished when compared to previous study. . A small pleural effusion is seen on the right and scarring or atelectasis is again seen bilaterally in the bases. US DUP UPPER EXTREMITY RIGHT VENOUS COMPARISON: HISTORY:  resp failure TECHNIQUE: , US DUP UPPER EXTREMITY RIGHT VENOUS AND LEFT VENOUS FINDINGS: Thrombus is seen in the right internal jugular and proximal subclavian, veins it is also seen in the cephalic vein. The right ulnar and basilic veins are not visualized. A right AV fistula seen. Thrombus is seen in the left internal jugular vein. The left basilic and axillary veins are not visualized. IMPRESSION: Deep venous thrombosis seen in the right and left upper extremities. .    US DUP UPPER EXTREMITY LEFT VENOUS    Result Date: 7/27/2022  XR CHEST PORTABLE COMPARISON: July 25, 2022. HISTORY: resp failure TECHNIQUE: AP view FINDINGS: Endotracheal tube again seen in place. . There is also an enteric tube seen in place and the tip is below the diaphragm. They appear unchanged in position. A small pleural effusion seen on the right. The left costophrenic angle is not well seen. The lungs are hyperinflated and there is coarsening of the interstitium. Atelectasis and/or scarring is again seen bilaterally in the lower lung fields. The cardiac silhouette appears mildly enlarged but may be accentuated by the portable technique. Degenerative changes are seen in the visualized portion of the right shoulder. The airspace disease has diminished when compared to previous study. . A small of local lidocaine anesthesia was injected subcutaneously. Ultrasound was used to study the jugular vein we intended to use prior to accessing it. The vein appeared patent. The ultrasound image of the blood vessel was saved to PACS. Using ultrasound access, puncture was made of the right internal jugular vein using a 21 GA needle. A wire was advanced into the right internal jugular vein, though would not pass into the superior vena cava. A 4 [place] short thin sheath was placed, through the sheath digital subtraction venography was performed. Venography demonstrated a thrombus in the right internal jugular vein and complete occlusion of the vein central to the thrombus. This access was aborted. Ultrasound was used to study the left jugular vein we intended to use prior to accessing it. The vein appeared patent. The ultrasound image of the blood vessel was saved to PACS. Using ultrasound access, puncture was made of the left internal jugular vein using a 21 GA needle. A wire was attempted to be passed, though would not pass to the superior vena  cava. A 4 [place] thin sheath was placed and digital subtraction venography was performed. 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 The procedure was aborted. Findings discussed with ICU team who will place a temporary central line in the groin. IR ULTRASOUND GUIDANCE VASCULAR ACCESS    1. Venogram of the right internal jugular vein demonstrates a thrombus in the right internal jugular vein which completely occludes the vein. Passage of contrast into the chest is through small collateral veins. 2. 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 Passage of contrast into the chest is through a small collateral veins. Radiation dose to the patient was: 24.21 mGy Additional clinical data: Long-term IV access Procedure: 1.    Ultrasound guidance for vascular access into the right internal jugular vein. The ultrasound image of the blood vessel was saved to PACS. 2. Venogram via contrast injection of the right internal jugular vein. 3.   Ultrasound guidance for vascular access into the left internal jugular vein. The ultrasound image of the blood vessel was saved to PACS 4. Venogram via contrast injection of the left internal jugular vein. Body of Report: Informed and written consent was obtained from the patient following discussion of risks, benefits and alternatives to this procedure. The was patient placed supine on the angiographic table. The patient's neck and chest were then prepped and draped in  normal sterile fashion. A small amount of local lidocaine anesthesia was injected subcutaneously. Ultrasound was used to study the jugular vein we intended to use prior to accessing it. The vein appeared patent. The ultrasound image of the blood vessel was saved to PACS. Using ultrasound access, puncture was made of the right internal jugular vein using a 21 GA needle. A wire was advanced into the right internal jugular vein, though would not pass into the superior vena cava. A 4 Spanish short thin sheath was placed, through the sheath digital subtraction venography was performed. Venography demonstrated a thrombus in the right internal jugular vein and complete occlusion of the vein central to the thrombus. This access was aborted. Ultrasound was used to study the left jugular vein we intended to use prior to accessing it. The vein appeared patent. The ultrasound image of the blood vessel was saved to PACS. Using ultrasound access, puncture was made of the left internal jugular vein using a 21 GA needle. A wire was attempted to be passed, though would not pass to the superior vena  cava. A 4 Spanish thin sheath was placed and digital subtraction venography was performed.  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 The procedure was aborted. Findings discussed with ICU team who will place a temporary central line in the groin. US DUP LOWER EXTREMITIES BILATERAL VENOUS    Result Date: 7/27/2022  EXAMINATION: US DUP LOWER EXTREMITIES BILATERAL VENOUS CLINICAL HISTORY: 68-year-old with lower extremity swelling COMPARISONS: None available. FINDINGS: Duplex and color Doppler ultrasounds were performed of the bilateral lower extremity deep venous systems. Examination was performed portably and limited due to patient condition and access to the lower extremities. Right leg: Visualized portions of the right common femoral vein, femoral vein, popliteal vein demonstrate satisfactory compression, color flow, and augmentation. Deep calf veins are not evaluated. Left leg: The left common femoral vein is enlarged filled with intraluminal echoes with absent color flow and poor compression consistent with occluding thrombus. Occluding Thrombus extends into the left proximal femoral vein. Mid to distal femoral vein and popliteal vein demonstrate satisfactory compression and color flow. Deep calf veins are not assessed on this portable study     ULTRASOUND FINDINGS ARE POSITIVE FOR OCCLUDING THROMBUS IN THE LEFT COMMON FEMORAL VEIN EXTENDING INTO THE PROXIMAL FEMORAL VEIN. NO ULTRASOUND SIGNS OF DVT IN THE RIGHT LEG FROM THE GROIN TO THE POPLITEAL FOSSA    Most recent    Chest CT      WITH CONTRAST:No results found for this or any previous visit. WITHOUT CONTRAST: No results found for this or any previous visit. CXR      2-view: Results for orders placed during the hospital encounter of 06/28/22    XR CHEST (2 VW)    Narrative  EXAMINATION:  CHEST. CLINICAL HISTORY:  SHORTNESS OF BREATH.    COMPARISONS:  11/4/2017. TECHNIQUE:  PA and lateral views. FINDINGS:  Heart size and contour are within normal limits. Pulmonary vascularity appears normal. There are small effusions bilaterally.  There is a suggestion of small interstitial infiltrates. No definite evidence of a mass or adenopathy. No significant  bony abnormality. Impression  POSSIBLE SLIGHT INTERSTITIAL PNEUMONIA BOTH LOWER LUNGS. SMALL PLEURAL EFFUSIONS BILATERALLY. Results for orders placed during the hospital encounter of 10/31/17    XR CHEST STANDARD (2 VW)    Narrative  XR CHEST STANDARD TWO VW: 11/4/2017    CLINICAL HISTORY:  Pneumonia . COMPARISON: Portable chest 5/20/2015 and 2 view chest 12/23/2013. A portable upright AP radiograph of the chest was obtained. FINDINGS:    A right internal jugular approach hemodialysis catheter has been placed since the prior study with its tip within the right atrium. There is no developing pulmonary infiltrate, cardiomegaly, pleural effusion, significant vascular congestion, pneumothorax, or acute fractures identified. A moderate compression fracture T9 appears old. The cardiac and mediastinal silhouettes appear within normal limits for technique. Impression  NO EVIDENCE OF ACTIVE CARDIOPULMONARY DISEASE. Portable: Results for orders placed during the hospital encounter of 07/25/22    XR CHEST PORTABLE    Narrative  XR CHEST PORTABLE    COMPARISON: July 25, 2022. HISTORY: resp failure    TECHNIQUE: AP view      FINDINGS:  Endotracheal tube again seen in place. . There is also an enteric tube seen in place and the tip is below the diaphragm. They appear unchanged in position. A small pleural effusion seen on the right. The left costophrenic angle is not well seen. The lungs are hyperinflated and there is coarsening of the interstitium. Atelectasis and/or scarring is again seen bilaterally in the lower lung fields. The  cardiac silhouette appears mildly enlarged but may be accentuated by the portable technique. Degenerative changes are seen in the visualized portion of the right shoulder. Impression  The airspace disease has diminished when compared to previous study. . A small pleural effusion is seen on the right and scarring or atelectasis is again seen bilaterally in the bases. US DUP UPPER EXTREMITY RIGHT VENOUS    COMPARISON:    HISTORY:  resp failure    TECHNIQUE: , US DUP UPPER EXTREMITY RIGHT VENOUS AND LEFT VENOUS    FINDINGS: Thrombus is seen in the right internal jugular and proximal subclavian, veins it is also seen in the cephalic vein. The right ulnar and basilic veins are not visualized. A right AV fistula seen. Thrombus is seen in the left internal jugular vein. The left basilic and axillary veins are not visualized. IMPRESSION: Deep venous thrombosis seen in the right and left upper extremities. .      Echo No results found for this or any previous visit. Assessment:   This is a critically ill patient at risk of deterioration / death , needing close ICU monitoring and intervention due to below noted problems   Acute hypoxic respiratory failure, requiring intubation   Large Right sided pleural effusion, thoracentesis   Right sided pneumonia   Shock, requiring vasopressors   Multiple DVT's, thrombus left common femoral vein, thrombus right internal jugular and proximal subclavian and thrombus in the left internal jugular   Hyponatremia, improved   Hypokalemia, improved   Chronic kidney disease on dialysis   Liver transplant   Lactic acidosis, improved     Recommendations   PUD prophylaxis  DVT prophylaxis   Continue Eliquis   Maintain blood sugar 140-180  Continue antibiotics, per ID   EF 45%  Remove groin central line  Continue midodrine  Thoracentesis fluid exudative  Continue Bactrim for Stenotrophomonas   Transfer to floor today                 Electronically signed by ISI Long - CNP,  [facility] ,on 7/31/2022 at 8:25 AM

## 2022-07-31 NOTE — PROGRESS NOTES
RN asked MD to come to the bedside to assess pt, upon RN assessment, RUE very cold, very edematous, MD agreed and will order further testing. Pt has large hematoma in the right neck from previous IJ attempt. Pt has blood clots in both UE and LLE. BP is being taken in the right upper leg. Pt dyspneic at rest, poor cap refill in the right fingers. RN notified MD. RN will continue to monitor.  Noni Samson RN    Electronically signed by Noni Samson RN on 7/31/2022 at 3:28 PM

## 2022-07-31 NOTE — CONSULTS
Infectious Disease     Patient Name: Garrett Bhandari  Date: 2022  YOB: 1955  Medical Record Number: [id number]        Chief Complaint   Patient presents with    Shortness of Breath     C/O SOB  AFTER DIALYSIS  88 % ON RA  GIVEN 2 DUONEBS  SOLU MEDROL           History of Present Illness: Patient 70-year-old presented with shortness of breath. Was placed on nonrebreather. he had some progressive shortness of breath with more activity in the last month apparently. Admitted about 5 days ago approximately. Patient known history of end-stage renal disease on hemodialysis also history of liver transplant. Patient currently poor historian and is alert and oriented x2 he denies pain at this time. He was intubated during this hospitalization extubated after couple days. .          Review of Systems: All other ROS reviewed and are negative other than as stated in HPI            Social History     Tobacco Use    Smoking status: Former     Packs/day: 0.50     Years: 15.00     Pack years: 7.50     Types: Cigarettes     Quit date: 2017     Years since quittin.7    Smokeless tobacco: Never   Substance Use Topics    Alcohol use: No    Drug use: No         Past Medical History:   Diagnosis Date    ESRD (end stage renal disease) ([facility] [address]     Hepatitis     Hypertension     Liver transplanted ([facility] [address] 2016           Past Surgical History:   Procedure Laterality Date    DIALYSIS FISTULA CREATION Right     LIVER TRANSPLANT  2017    TUNNELED VENOUS PORT PLACEMENT           No current facility-administered medications on file prior to encounter. Current Outpatient Medications on File Prior to Encounter   Medication Sig Dispense Refill    albuterol sulfate HFA (PROAIR HFA) 108 (90 Base) MCG/ACT inhaler 2 puffs; Use every 4 hours while awake for 7-10 days then PRN wheezing  Dispense with SPACER and Instruct on use. May sub Ventolin or Proventil as needed per [employer].  18 g 0    carvedilol (COREG) 12.5 MG tablet Take 12.5 mg by mouth 2 times daily      calcium acetate (PHOSLO) 667 MG capsule Take 667 mg by mouth 3 times daily (with meals)      Tacrolimus (PROGRAF PO) Take 1 tablet by mouth 2 times daily         No Known Allergies      History reviewed. No pertinent family history. Physical Exam:     Blood pressure 101/60, pulse 75, temperature 98.9 °F (37.2 °C), temperature source Oral, resp. rate 28, height 5' 6\" (1.676 m), weight 152 lb 8.9 oz (69.2 kg), SpO2 95 %. General: Patient appears ok at the present time. NAD  Skin: no new rashes  HEENT:  Neck is supple, No subconjunctival hemorrhages, no oral exudates  Heart: S1 S2  Lungs: Diminished  Abdomen: soft, ND, NTTP,   Back :no CVA tenderness  Extrem: No edema, non tender  Neuro exam: CN II-XII intact  Psych: cooperative    Labs: I have reviewed all lab results by electronic record, including most recent CBC, metabolic panel, and pertinent abnormalities were addressed from an infectious disease perspective. Trends are being monitored over time. Lab Results   Component Value Date    WBC 10.5 07/30/2022    HGB 10.8 (L) 07/30/2022    HCT 32.3 (L) 07/30/2022    .6 (H) 07/30/2022     07/30/2022     Lab Results   Component Value Date/Time     07/30/2022 06:00 AM    K 4.0 07/30/2022 06:00 AM    CL 92 07/30/2022 06:00 AM    CO2 28 07/30/2022 06:00 AM    BUN 24 07/30/2022 06:00 AM    CREATININE 3.55 07/30/2022 06:00 AM    GLUCOSE 90 07/30/2022 06:00 AM    GLUCOSE 136 02/25/2012 05:10 PM    CALCIUM 9.0 07/30/2022 06:00 AM        Radiology:  I have reviewed imaging results per electronic record and most pertinent abnormalities are being addressed from an infectious disease standpoint. ASSESSMENT:  Pneumonia  Pleural effusion  Stage renal disease  Liver transplant      Stenotrophomonas recovered from bronchoscopy. This organism chronic colonizer, difficult to tell true pathogen from his history.   Appears he may have been improving prior to initiation of antimicrobial therapy for this organism, unclear. He is certainly immunocompromised. Had been on vancomycin and Zosyn when clinical improvement occurred otherwise. No other clear source is identified or cultures. Pleural fluid based on protein looks exudative. Does not appear to be a complicated effusion from available studies but will defer to pulmonary judgment    Unfortunately stenotrophomonas as limited effective antibiotics and in this case patient has end-stage renal disease which complicates treatment with t Bactrim. There are less well-defined dosing strategies seems to be improving we will continue for now but would likely only treat for 7 days unless pulmonary feels this effusion is complicated. Continue other antibiotics for now, but would DC vancomycin in the next day if no MRSA recovered. Doc Palma

## 2022-08-01 ENCOUNTER — APPOINTMENT (OUTPATIENT)
Dept: ULTRASOUND IMAGING | Age: 67
DRG: 871 | End: 2022-08-01
Payer: MEDICARE

## 2022-08-01 LAB
GLUCOSE BLD-MCNC: 115 MG/DL (ref 70–99)
GLUCOSE BLD-MCNC: 176 MG/DL (ref 70–99)
PERFORMED ON: ABNORMAL
PERFORMED ON: ABNORMAL

## 2022-08-01 PROCEDURE — 99233 SBSQ HOSP IP/OBS HIGH 50: CPT | Performed by: INTERNAL MEDICINE

## 2022-08-01 PROCEDURE — 2580000003 HC RX 258: Performed by: NURSE PRACTITIONER

## 2022-08-01 PROCEDURE — 6370000000 HC RX 637 (ALT 250 FOR IP): Performed by: NURSE PRACTITIONER

## 2022-08-01 PROCEDURE — 6370000000 HC RX 637 (ALT 250 FOR IP): Performed by: FAMILY MEDICINE

## 2022-08-01 PROCEDURE — 94660 CPAP INITIATION&MGMT: CPT

## 2022-08-01 PROCEDURE — 6370000000 HC RX 637 (ALT 250 FOR IP): Performed by: INTERNAL MEDICINE

## 2022-08-01 PROCEDURE — 93931 UPPER EXTREMITY STUDY: CPT

## 2022-08-01 PROCEDURE — 99232 SBSQ HOSP IP/OBS MODERATE 35: CPT | Performed by: INTERNAL MEDICINE

## 2022-08-01 PROCEDURE — 1210000000 HC MED SURG R&B

## 2022-08-01 PROCEDURE — 2700000000 HC OXYGEN THERAPY PER DAY

## 2022-08-01 PROCEDURE — 6360000002 HC RX W HCPCS: Performed by: NURSE PRACTITIONER

## 2022-08-01 PROCEDURE — 6360000002 HC RX W HCPCS: Performed by: FAMILY MEDICINE

## 2022-08-01 PROCEDURE — 85025 COMPLETE CBC W/AUTO DIFF WBC: CPT

## 2022-08-01 PROCEDURE — 80069 RENAL FUNCTION PANEL: CPT

## 2022-08-01 PROCEDURE — 2500000003 HC RX 250 WO HCPCS: Performed by: NURSE PRACTITIONER

## 2022-08-01 PROCEDURE — 2580000003 HC RX 258: Performed by: FAMILY MEDICINE

## 2022-08-01 PROCEDURE — APPSS30 APP SPLIT SHARED TIME 16-30 MINUTES: Performed by: PHYSICIAN ASSISTANT

## 2022-08-01 RX ORDER — MIDODRINE HYDROCHLORIDE 5 MG/1
2.5 TABLET ORAL
Status: DISCONTINUED | OUTPATIENT
Start: 2022-08-02 | End: 2022-08-12

## 2022-08-01 RX ORDER — SODIUM CHLORIDE 9 MG/ML
INJECTION, SOLUTION INTRAVENOUS
Status: DISPENSED
Start: 2022-08-01 | End: 2022-08-01

## 2022-08-01 RX ORDER — SULFAMETHOXAZOLE AND TRIMETHOPRIM 200; 40 MG/5ML; MG/5ML
160 SUSPENSION ORAL ONCE
Status: COMPLETED | OUTPATIENT
Start: 2022-08-01 | End: 2022-08-01

## 2022-08-01 RX ADMIN — APIXABAN 10 MG: 5 TABLET, FILM COATED ORAL at 21:33

## 2022-08-01 RX ADMIN — SULFAMETHOXAZOLE AND TRIMETHOPRIM 20 ML: 200; 40 SUSPENSION ORAL at 21:32

## 2022-08-01 RX ADMIN — Medication 10 ML: at 21:45

## 2022-08-01 RX ADMIN — MIDODRINE HYDROCHLORIDE 15 MG: 5 TABLET ORAL at 13:38

## 2022-08-01 RX ADMIN — PANTOPRAZOLE SODIUM 40 MG: 40 TABLET, DELAYED RELEASE ORAL at 05:40

## 2022-08-01 RX ADMIN — PIPERACILLIN AND TAZOBACTAM 3375 MG: 3; .375 INJECTION, POWDER, LYOPHILIZED, FOR SOLUTION INTRAVENOUS at 13:54

## 2022-08-01 RX ADMIN — Medication 10 ML: at 17:24

## 2022-08-01 RX ADMIN — TACROLIMUS 1 MG: 1 CAPSULE ORAL at 13:38

## 2022-08-01 RX ADMIN — ASPIRIN 81 MG: 81 TABLET, COATED ORAL at 13:45

## 2022-08-01 RX ADMIN — ACETAMINOPHEN 650 MG: 325 TABLET, FILM COATED ORAL at 21:32

## 2022-08-01 RX ADMIN — MIDODRINE HYDROCHLORIDE 15 MG: 5 TABLET ORAL at 17:23

## 2022-08-01 RX ADMIN — SULFAMETHOXAZOLE AND TRIMETHOPRIM 518.4 MG: 80; 16 INJECTION, SOLUTION, CONCENTRATE INTRAVENOUS at 21:28

## 2022-08-01 RX ADMIN — TACROLIMUS 1 MG: 1 CAPSULE ORAL at 21:33

## 2022-08-01 RX ADMIN — APIXABAN 10 MG: 5 TABLET, FILM COATED ORAL at 13:44

## 2022-08-01 RX ADMIN — POLYETHYLENE GLYCOL 3350 17 G: 17 POWDER, FOR SOLUTION ORAL at 13:37

## 2022-08-01 ASSESSMENT — ENCOUNTER SYMPTOMS
CHEST TIGHTNESS: 0
VOMITING: 0
COLOR CHANGE: 0
NAUSEA: 0
SHORTNESS OF BREATH: 0
COUGH: 0
SHORTNESS OF BREATH: 1
GASTROINTESTINAL NEGATIVE: 1

## 2022-08-01 ASSESSMENT — PAIN SCALES - GENERAL: PAINLEVEL_OUTOF10: 3

## 2022-08-01 ASSESSMENT — PAIN DESCRIPTION - DESCRIPTORS: DESCRIPTORS: ACHING

## 2022-08-01 NOTE — PROGRESS NOTES
INPATIENT PROGRESS NOTES    PATIENT NAME: Deysi Vasquez  MRN: [id number]  SERVICE DATE:  August 1, 2022   SERVICE TIME:  6:01 PM      PRIMARY SERVICE: Pulmonary Disease    CHIEF COMPLAIN: Acute respiratory failure      INTERVAL HPI: Patient seen and examined at bedside, Interval Notes, orders reviewed. Nursing notes noted  Patient is transfer out of ICU. He is on 3 L O2 via nasal cannula O2 saturation 95%. He is on BiPAP at night. He denies having shortness of breath at rest.  No chest pain. No cough or sputum production. No fever or chills. No nausea vomiting or diarrhea. OBJECTIVE    Body mass index is 22.99 kg/m². PHYSICAL EXAM:  Vitals:  /72   Pulse (!) 110   Temp 97.8 °F (36.6 °C)   Resp 18   Ht 5' 6\" (1.676 m)   Wt 142 lb 6.7 oz (64.6 kg)   SpO2 95%   BMI 22.99 kg/m²   General: Alert, awake . comfortable in bed, No distress. Head: Atraumatic , Normocephalic   Eyes: PERRL. No sclera icterus. No conjunctival injection. No discharge   ENT: No nasal  discharge. Pharynx clear. Neck:  Trachea midline. No thyromegaly, no JVD, No cervical adenopathy. Chest : Bilaterally symmetrical ,Normal effort,  No accessory muscle use  Lung : . Fair BS bilateral, decreased BS at bases. Bibasilar Rales. No wheezing. No rhonchi. Heart[de-identified] Normal  rate. Regular rhythm. No mumur ,  Rub or gallop  ABD: Non-tender. Non-distended. No masses. No organmegaly. Normal bowel sounds. No hernia.   Ext : No Pitting both leg , No Cyanosis No clubbing  Neuro: no focal weakness          DATA:   Recent Labs     07/30/22  0600 07/31/22  0600   WBC 10.5 10.0   HGB 10.8* 10.5*   HCT 32.3* 31.9*   .6* 105.1*    180     Recent Labs     07/30/22  0600 07/31/22  0600    134*   K 4.0 4.1   CL 92* 89*   CO2 28 28   BUN 24* 33*   CREATININE 3.55* 4.89*   GLUCOSE 90 77   CALCIUM 9.0 8.9   LABALBU 2.9* 2.9*   LABGLOM 17.2* 11.9*   GFRAA 20.8* 14.4*       MV Settings:     Vent Mode: CPAP  Vt (Set, mL): 390 mL  Resp Rate (Set): 18 bmp  FiO2 : 40 %  PEEP/CPAP (cmH2O): 5  Pressure Support: 5 cmH20  Peak Inspiratory Pressure: 9.3 cmH2O  Mean Airway Pressure: 6 cmH20  I:E Ratio: 1:3    No results for input(s): PHART, AXD1PRF, PO2ART, FEG6OLW, BEART, S3NMAWAI in the last 72 hours. O2 Device: Nasal cannula  O2 Flow Rate (L/min): 3 L/min    ADULT DIET;  Dysphagia - Pureed     MEDICATIONS during current hospitalization:    Continuous Infusions:   sodium chloride      sodium chloride      dextrose      sodium chloride         Scheduled Meds:   [START ON 8/2/2022] midodrine  2.5 mg Oral TID WC    metoprolol tartrate  12.5 mg Oral BID    pantoprazole  40 mg Oral QAM AC    apixaban  10 mg Oral BID    Followed by    Floy Sherry ON 8/6/2022] apixaban  5 mg Oral BID    sulfamethoxazole-trimethoprim (BACTRIM) IVPB  7.5 mg/kg IntraVENous Daily    aspirin  81 mg Oral Daily    docusate sodium  100 mg Oral BID    insulin lispro  0-4 Units SubCUTAneous 4x Daily AC & HS    polyethylene glycol  17 g Oral Daily    tacrolimus  1 mg Oral BID    sodium chloride flush  5-40 mL IntraVENous 2 times per day    lidocaine  5 mL IntraDERmal Once       PRN Meds:sodium chloride flush, sodium chloride, acetaminophen **OR** acetaminophen, [DISCONTINUED] fentaNYL **AND** fentaNYL, glucose, dextrose bolus **OR** dextrose bolus, glucagon (rDNA), dextrose, heparin (porcine)    Radiology  Echocardiogram complete 2D with doppler with color    Result Date: 7/27/2022  Transthoracic Echocardiography Report (TTE)  Demographics   Patient Name    VIA [facility]       Gender               Male                  [facility]   Patient Number  [id number]       Race                                                   Ethnicity   Visit Number    [id number]      Room Number          IC12   Corporate ID                   Date of Study        07/27/2022   Accession       [id number]     Referring Physician  Number   Date of Birth   1955     Sonographer          Manohar Ortega No evidence of aortic valve stenosis . No evidence of aortic valve regurgitation . Pulmonic Valve The pulmonic valve was not well visualized . Pericardial Effusion No evidence of pericardial effusion. Aorta \ Miscellaneous The aorta is within normal limits. M-Mode Measurements (cm)   LVIDd: 2.97 cm                         LVIDs: 2.34 cm  IVSd: 0.73 cm                          IVSs: 1.01 cm  LVPWd: 0.9 cm                          LVPWs: 1.01 cm  Rt. Vent.  Dimension: 3.12 cm           AO Root Dimension: 2.1 cm                                         ACS: 1.37 cm                                         LA: 1.82 cm                                         LVOT: 2.03 cm  Doppler Measurements:   AV Velocity:0.02 m/s                    MV Peak E-Wave: 0.5 m/s  AV Peak Gradient: 5.77 mmHg             MV Peak A-Wave: 0.53 m/s  AV Mean Gradient: 3.05 mmHg  AV Area (Continuity):1.97 cm^2  TR Velocity:2.52 m/s                    Estimated RAP:3 mmHg  TR Gradient:25.31 mmHg                  RVSP:28.31 mmHg  Valves  Mitral Valve   Peak E-Wave: 0.5 m/s                  Peak A-Wave: 0.53 m/s  Mean Velocity: 0.87 m/s               E/A Ratio: 0.94  Mean Gradient: 4.66 mmHg              Peak Gradient: 0.99 mmHg                                        Deceleration Time: 353.3 msec                                        Area (continuity): 1.4 cm^2   Tissue Doppler   E' Septal Velocity: 0.07 m/s  E' Lateral Velocity: 0.07 m/s   Aortic Valve   Peak Velocity: 1.2 m/s                 Mean Velocity: 0.82 m/s  Peak Gradient: 5.77 mmHg               Mean Gradient: 3.05 mmHg  Area (continuity): 1.97 cm^2  AV VTI: 29.33 cm   Cusp Separation: 1.37 cm   Tricuspid Valve   Estimated RVSP: 28.31 mmHg              Estimated RAP: 3 mmHg  TR Velocity: 2.52 m/s                   TR Gradient: 25.31 mmHg   Pulmonic Valve   Peak Velocity: 0.99 m/s           Peak Gradient: 3.91 mmHg                                    Estimated PASP: 28.31 mmHg   LVOT   Peak Velocity: 0.97 m/s              Mean Velocity: 0.61 m/s  Peak Gradient: 3.77 mmHg             Mean Gradient: 1.79 mmHg  LVOT Diameter: 2.03 cm               LVOT VTI: 17.9 cm  Structures  Left Atrium   LA Dimension: 1.82 cm                        LA Area: 12.58 cm^2  LA/Aorta: 0.87  LA Volume/Index: 44.74 ml /25 m^2   Left Ventricle   Diastolic Dimension: 5.25 cm         Systolic Dimension: 6.55 cm  Septum Diastolic: 8.44 cm            Septum Systolic: 5.98 cm  PW Diastolic: 0.9 cm                 PW Systolic: 1.99 cm                                       FS: 21.2 %  LV EDV/LV EDV Index: 34.12 ml/19 m^2 LV ESV/LV ESV Index: 19.02 ml/11 m^2  EF Calculated: 44.3 %   LVOT Diameter: 2.03 cm   Right Ventricle   Diastolic Dimension: 6.00 cm                                    RV Systolic Pressure: 67.78 mmHg  Aorta/ Miscellaneous Aorta   Aortic Root: 2.1 cm  LVOT Diameter: 2.03 cm      ECHO Limited    Result Date: 7/28/2022  Transthoracic Echocardiography Report (TTE)  Demographics   Patient Name    [facility]IA [facility]       Gender               Male                  [facility]   Patient Number  [id number]       Race                                                   Ethnicity   Visit Number    [id number]      Room Number          IC12   Corporate ID                   Date of Study        07/28/2022   Accession       [id number]     Referring Physician  Number   Date of Birth   1955     Sonographer          Rosalba Velasco   Age             75 year(s)     Interpreting         [facility])                                 Physician            Cardiology                                                      [facility]  Procedure Type of Study   TTE procedure:ECHOCARDIOGRAM LIMITED. Procedure Date Date: 07/28/2022 Start: 08:08 AM Study Location: Portable Technical Quality: Limited visualization Indications:LVF. Patient Status: Routine Contrast Medium: Definity.  Amount - 2 ml Height: 66 inches Weight: 148 pounds BSA: 1.76 m^2 BMI: 23.89 kg/m^2  Conclusions   Summary  No evidence of thrombus with definity  Left ventricular ejection fraction is visually estimated at 65%. Signature   ----------------------------------------------------------------  Electronically signed by Carlton Ricks(Interpreting physician)  on 07/28/2022 08:42 AM  ----------------------------------------------------------------   Findings  Left Ventricle Left ventricular ejection fraction is visually estimated at 65%. IR FLUORO GUIDED CVA DEVICE PLMT/REPLACE/REMOVAL    1. Venogram of the right internal jugular vein demonstrates a thrombus in the right internal jugular vein which completely occludes the vein. Passage of contrast into the chest is through small collateral veins. 2. 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 Passage of contrast into the chest is through a small collateral veins. Radiation dose to the patient was: 24.21 mGy Additional clinical data: Long-term IV access Procedure: 1. Ultrasound guidance for vascular access into the right internal jugular vein. The ultrasound image of the blood vessel was saved to PACS. 2. Venogram via contrast injection of the right internal jugular vein. 3.   Ultrasound guidance for vascular access into the left internal jugular vein. The ultrasound image of the blood vessel was saved to PACS 4. Venogram via contrast injection of the left internal jugular vein. Body of Report: Informed and written consent was obtained from the patient following discussion of risks, benefits and alternatives to this procedure. The was patient placed supine on the angiographic table. The patient's neck and chest were then prepped and draped in  normal sterile fashion. A small amount of local lidocaine anesthesia was injected subcutaneously. Ultrasound was used to study the jugular vein we intended to use prior to accessing it.   The vein appeared patent. The ultrasound image of the blood vessel was saved to PACS. Using ultrasound access, puncture was made of the right internal jugular vein using a 21 GA needle. A wire was advanced into the right internal jugular vein, though would not pass into the superior vena cava. A 4 [place]onian short thin sheath was placed, through the sheath digital subtraction venography was performed. Venography demonstrated a thrombus in the right internal jugular vein and complete occlusion of the vein central to the thrombus. This access was aborted. Ultrasound was used to study the left jugular vein we intended to use prior to accessing it. The vein appeared patent. The ultrasound image of the blood vessel was saved to PACS. Using ultrasound access, puncture was made of the left internal jugular vein using a 21 GA needle. A wire was attempted to be passed, though would not pass to the superior vena  cava. A 4 [place]onian thin sheath was placed and digital subtraction venography was performed. 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 The procedure was aborted. Findings discussed with ICU team who will place a temporary central line in the groin. XR CHEST PORTABLE    Result Date: 7/27/2022  XR CHEST PORTABLE COMPARISON: July 25, 2022. HISTORY: resp failure TECHNIQUE: AP view FINDINGS: Endotracheal tube again seen in place. . There is also an enteric tube seen in place and the tip is below the diaphragm. They appear unchanged in position. A small pleural effusion seen on the right. The left costophrenic angle is not well seen. The lungs are hyperinflated and there is coarsening of the interstitium. Atelectasis and/or scarring is again seen bilaterally in the lower lung fields. The cardiac silhouette appears mildly enlarged but may be accentuated by the portable technique. Degenerative changes are seen in the visualized portion of the right shoulder.      The airspace disease has diminished when compared to previous study. . A small pleural effusion is seen on the right and scarring or atelectasis is again seen bilaterally in the bases. US DUP UPPER EXTREMITY RIGHT VENOUS COMPARISON: HISTORY:  resp failure TECHNIQUE: , US DUP UPPER EXTREMITY RIGHT VENOUS AND LEFT VENOUS FINDINGS: Thrombus is seen in the right internal jugular and proximal subclavian, veins it is also seen in the cephalic vein. The right ulnar and basilic veins are not visualized. A right AV fistula seen. Thrombus is seen in the left internal jugular vein. The left basilic and axillary veins are not visualized. IMPRESSION: Deep venous thrombosis seen in the right and left upper extremities. .    XR CHEST PORTABLE    Result Date: 7/25/2022  XR CHEST PORTABLE : 7/25/2022 CLINICAL HISTORY:  post intubation . COMPARISON: Earlier 7/25/2022 TECHNIQUE: A portable upright AP radiograph of the chest was obtained at approximately 12:46 PM. FINDINGS: Both lung bases have been excluded from the radiograph. An endotracheal tube is present, with its tip approximately 4 to 5 cm above the lucita. An orogastric tube probably extends below the diaphragm out of field of view radiograph. Moderate pleural effusions and mild to moderate probable scarring and/or atelectasis of the mid to lower lung fields has not significantly changed. There is no cardiomegaly, pneumothorax, displaced fractures, or other significant changes identified. ENDOTRACHEAL AND OROGASTRIC TUBES IN EXPECTED POSITIONS. OTHERWISE, STABLE CHEST FROM EARLIER 7/25/2022. XR CHEST PORTABLE    Result Date: 7/25/2022  TECHNIQUE: Single portable view of the chest. CLINICAL INDICATION: Chest pain. COMPARISON: Chest x-ray obtained on June 28, 2022. PROCEDURE AND FINDINGS: Poor inspiratory effort is seen. The cardiomediastinal silhouette is slightly prominent in size.  Mild prominence of the bronchovascular and interstitial lung markings is visualized bilaterally, linear streaky opacities visualized in bilateral lung fields, subsegmental atelectatic streaks, fluid visualized in the right minor fissure. Airspace opacification visualized in the lower lung fields bilaterally with blunting of the costophrenic angles and obliteration of the hemidiaphragms consistent with bilateral pleural effusions. . No evidence of pneumothorax or parenchymal lung mass. Degenerative bone changes. Congestive heart failure versus pneumonia and parapneumonic effusions would recommend clinical correlation. Increased opacification seen in comparison to the prior study. [facility] UPPER EXTREMITY RIGHT VENOUS    Result Date: 7/27/2022  XR CHEST PORTABLE COMPARISON: July 25, 2022. HISTORY: resp failure TECHNIQUE: AP view FINDINGS: Endotracheal tube again seen in place. . There is also an enteric tube seen in place and the tip is below the diaphragm. They appear unchanged in position. A small pleural effusion seen on the right. The left costophrenic angle is not well seen. The lungs are hyperinflated and there is coarsening of the interstitium. Atelectasis and/or scarring is again seen bilaterally in the lower lung fields. The cardiac silhouette appears mildly enlarged but may be accentuated by the portable technique. Degenerative changes are seen in the visualized portion of the right shoulder. The airspace disease has diminished when compared to previous study. . A small pleural effusion is seen on the right and scarring or atelectasis is again seen bilaterally in the bases. US [facility] UPPER EXTREMITY RIGHT VENOUS COMPARISON: HISTORY:  resp failure TECHNIQUE: , US DUP UPPER EXTREMITY RIGHT VENOUS AND LEFT VENOUS FINDINGS: Thrombus is seen in the right internal jugular and proximal subclavian, veins it is also seen in the cephalic vein. The right ulnar and basilic veins are not visualized. A right AV fistula seen. Thrombus is seen in the left internal jugular vein. The left basilic and axillary veins are not visualized. IMPRESSION: Deep venous thrombosis seen in the right and left upper extremities. .    US DUP UPPER EXTREMITY LEFT VENOUS    Result Date: 7/27/2022  XR CHEST PORTABLE COMPARISON: July 25, 2022. HISTORY: resp failure TECHNIQUE: AP view FINDINGS: Endotracheal tube again seen in place. . There is also an enteric tube seen in place and the tip is below the diaphragm. They appear unchanged in position. A small pleural effusion seen on the right. The left costophrenic angle is not well seen. The lungs are hyperinflated and there is coarsening of the interstitium. Atelectasis and/or scarring is again seen bilaterally in the lower lung fields. The cardiac silhouette appears mildly enlarged but may be accentuated by the portable technique. Degenerative changes are seen in the visualized portion of the right shoulder. The airspace disease has diminished when compared to previous study. . A small pleural effusion is seen on the right and scarring or atelectasis is again seen bilaterally in the bases. US DUP UPPER EXTREMITY RIGHT VENOUS COMPARISON: HISTORY:  resp failure TECHNIQUE: , US DUP UPPER EXTREMITY RIGHT VENOUS AND LEFT VENOUS FINDINGS: Thrombus is seen in the right internal jugular and proximal subclavian, veins it is also seen in the cephalic vein. The right ulnar and basilic veins are not visualized. A right AV fistula seen. Thrombus is seen in the left internal jugular vein. The left basilic and axillary veins are not visualized. IMPRESSION: Deep venous thrombosis seen in the right and left upper extremities. .    IR VENOGRAM VENOUS SINUS/JUGULAR    1. Venogram of the right internal jugular vein demonstrates a thrombus in the right internal jugular vein which completely occludes the vein. Passage of contrast into the chest is through small collateral veins. 2. 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  Passage of contrast into the chest is through a small collateral veins. Radiation dose to the patient was: 24.21 mGy Additional clinical data: Long-term IV access Procedure: 1. Ultrasound guidance for vascular access into the right internal jugular vein. The ultrasound image of the blood vessel was saved to PACS. 2. Venogram via contrast injection of the right internal jugular vein. 3.   Ultrasound guidance for vascular access into the left internal jugular vein. The ultrasound image of the blood vessel was saved to PACS 4. Venogram via contrast injection of the left internal jugular vein. Body of Report: Informed and written consent was obtained from the patient following discussion of risks, benefits and alternatives to this procedure. The was patient placed supine on the angiographic table. The patient's neck and chest were then prepped and draped in  normal sterile fashion. A small amount of local lidocaine anesthesia was injected subcutaneously. Ultrasound was used to study the jugular vein we intended to use prior to accessing it. The vein appeared patent. The ultrasound image of the blood vessel was saved to PACS. Using ultrasound access, puncture was made of the right internal jugular vein using a 21 GA needle. A wire was advanced into the right internal jugular vein, though would not pass into the superior vena cava. A 4 [place]ammar[place] short thin sheath was placed, through the sheath digital subtraction venography was performed. Venography demonstrated a thrombus in the right internal jugular vein and complete occlusion of the vein central to the thrombus. This access was aborted. Ultrasound was used to study the left jugular vein we intended to use prior to accessing it. The vein appeared patent. The ultrasound image of the blood vessel was saved to PACS. Using ultrasound access, puncture was made of the left internal jugular vein using a 21 GA needle.  A wire was attempted to be passed, though would not pass to the superior vena  cava. A 4 Thai thin sheath was placed and digital subtraction venography was performed. 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 The procedure was aborted. Findings discussed with ICU team who will place a temporary central line in the groin. IR ULTRASOUND GUIDANCE VASCULAR ACCESS    1. Venogram of the right internal jugular vein demonstrates a thrombus in the right internal jugular vein which completely occludes the vein. Passage of contrast into the chest is through small collateral veins. 2. 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 Passage of contrast into the chest is through a small collateral veins. Radiation dose to the patient was: 24.21 mGy Additional clinical data: Long-term IV access Procedure: 1. Ultrasound guidance for vascular access into the right internal jugular vein. The ultrasound image of the blood vessel was saved to PACS. 2. Venogram via contrast injection of the right internal jugular vein. 3.   Ultrasound guidance for vascular access into the left internal jugular vein. The ultrasound image of the blood vessel was saved to PACS 4. Venogram via contrast injection of the left internal jugular vein. Body of Report: Informed and written consent was obtained from the patient following discussion of risks, benefits and alternatives to this procedure. The was patient placed supine on the angiographic table. The patient's neck and chest were then prepped and draped in  normal sterile fashion. A small amount of local lidocaine anesthesia was injected subcutaneously. Ultrasound was used to study the jugular vein we intended to use prior to accessing it. The vein appeared patent. The ultrasound image of the blood vessel was saved to PACS. Using ultrasound access, puncture was made of the right internal jugular vein using a 21 GA needle. A wire was advanced into the right internal jugular vein, though would not pass into the superior vena cava. A 4 [place] short thin sheath was placed, through the sheath digital subtraction venography was performed. Venography demonstrated a thrombus in the right internal jugular vein and complete occlusion of the vein central to the thrombus. This access was aborted. Ultrasound was used to study the left jugular vein we intended to use prior to accessing it. The vein appeared patent. The ultrasound image of the blood vessel was saved to PACS. Using ultrasound access, puncture was made of the left internal jugular vein using a 21 GA needle. A wire was attempted to be passed, though would not pass to the superior vena  cava. A 4 [place] thin sheath was placed and digital subtraction venography was performed. 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 The procedure was aborted. Findings discussed with ICU team who will place a temporary central line in the groin. US DUP LOWER EXTREMITIES BILATERAL VENOUS    Result Date: 7/27/2022  EXAMINATION: US DUP LOWER EXTREMITIES BILATERAL VENOUS CLINICAL HISTORY: 50-year-old with lower extremity swelling COMPARISONS: None available. FINDINGS: Duplex and color Doppler ultrasounds were performed of the bilateral lower extremity deep venous systems. Examination was performed portably and limited due to patient condition and access to the lower extremities. Right leg: Visualized portions of the right common femoral vein, femoral vein, popliteal vein demonstrate satisfactory compression, color flow, and augmentation. Deep calf veins are not evaluated. Left leg: The left common femoral vein is enlarged filled with intraluminal echoes with absent color flow and poor compression consistent with occluding thrombus. Occluding Thrombus extends into the left proximal femoral vein.  Mid to distal femoral vein and popliteal vein demonstrate satisfactory compression and color flow. Deep calf veins are not assessed on this portable study     ULTRASOUND FINDINGS ARE POSITIVE FOR OCCLUDING THROMBUS IN THE LEFT COMMON FEMORAL VEIN EXTENDING INTO THE PROXIMAL FEMORAL VEIN. NO ULTRASOUND SIGNS OF DVT IN THE RIGHT LEG FROM THE GROIN TO THE POPLITEAL FOSSA        IMPRESSION AND SUGGESTION:  Acute hypoxic respiratory failure s/p extubation  Right-sided pleural effusion status for thoracentesis  Right-sided pneumonia status post bronchoscopy  DVT left lower extremity  Extensive upper extremity DVT cannot rule out PE  S/p liver transplant  End-stage renal disease on hemodialysis    Patient had exudative pleural effusion no growth. Antibiotic as per ID. He is off vasopressor. Titrate off FiO2. BiPAP during sleep. On anticoagulation therapy for DVT, left lower extremity and extensive upper extremity DVT. Continue present treatment plan      NOTE: This report was transcribed using voice recognition software. Every effort was made to ensure accuracy; however, inadvertent computerized transcription errors may be present.       Electronically signed by Elsa Parr MD, FCCP on 8/1/2022 at 6:01 PM

## 2022-08-01 NOTE — PROGRESS NOTES
[facility] Occupational Therapy      Date: 2022  Patient Name: Leticia Carlin        MRN: [id number]  Account: [de-identified]   : 1955  (75 y.o.)  Room: [place] [address]    Chart reviewed, attempted OT at 1330 for OT eval. Patient not seen 2° to:    Hold per nursing request due to: pending US results    Spoke to RN Luz Marina Haji RN aware. Will attempt again when able.     Electronically signed by Cheryl Gaitan OT on 2022 at 1:33 PM

## 2022-08-01 NOTE — PROGRESS NOTES
Infectious Disease     Patient Name: Deysi Vasquez  Date: 8/1/2022  YOB: 1955  Medical Record Number: [id number]                  Pneumonia    Stenotrophomonas recovered from bronchoscopy  No significant number of neutrophils were seen however     7/27/22 1240    CULTURE, RESPIRATORY  Abnormal   Direct Exam:  FEW NEUTROPHILS   Direct Exam:  NO BACTERIA SEEN   Cult,Respiratory:  NO NORMAL CHLOE   Performed at [address] [place] [address] Drive [address] [facility], [address] [place] [address]   [phone]     Organism Stenotrophomonas maltophilia Abnormal     CULTURE, RESPIRATORY 10,000 CFU/ML    Resulting Agency [address] [facility]          Susceptibility      Stenotrophomonas maltophilia (1)    Antibiotic Interpretation Microscan  Method Status    trimethoprim-sulfamethoxazole Sensitive <=20 mcg/mL BACTERIAL SUSCEPTIBILITY PANEL BY GIANNI                    Pleural fluid is exudative but culture negative          Culture, Body Fluid  Order: [id number]  Status: Preliminary result    Visible to patient: No (not released)    Next appt: None    Specimen Information: Fluid        0 Result Notes    Component 7/26/22 1212    Body Fluid Culture, Sterile Direct Exam:  RARE NEUTROPHILS   Direct Exam:  NO BACTERIA SEEN   Direct Exam:  Gram stain made from cytocentrifuged specimen. Organisms   Direct Exam:  and cells will be concentrated. Cult,Fluid:  NO GROWTH 5 DAYS   Performed at [address] [facility]             Narrative  Performed by: [address] [facility]  ORDER#: [id number]                          ORDERED BY: Lashell Banks   SOURCE: Fluid                              COLLECTED:  07/26/22 12:12   ANTIBIOTICS AT ALEXI.:                      RECEIVED :  07/26/22 12:12              Blood cultures are negative at 5 days    Review of Systems   HENT: Negative. Respiratory:  Positive for shortness of breath. Negative for cough. Cardiovascular: Negative. Gastrointestinal: Negative. Review of Systems: All 14 review of systems negative other than as stated above    Social History     Tobacco Use    Smoking status: Former     Packs/day: 0.50     Years: 15.00     Pack years: 7.50     Types: Cigarettes     Quit date: 2017     Years since quittin.7    Smokeless tobacco: Never   Substance Use Topics    Alcohol use: No    Drug use: No         Past Medical History:   Diagnosis Date    ESRD (end stage renal disease) ([facility] [address]     Hepatitis     Hypertension     Liver transplanted ([facility] [address] 2016           Past Surgical History:   Procedure Laterality Date    DIALYSIS FISTULA CREATION Right     LIVER TRANSPLANT  2017    TUNNELED VENOUS PORT PLACEMENT           No current facility-administered medications on file prior to encounter. Current Outpatient Medications on File Prior to Encounter   Medication Sig Dispense Refill    albuterol sulfate HFA (PROAIR HFA) 108 (90 Base) MCG/ACT inhaler 2 puffs; Use every 4 hours while awake for 7-10 days then PRN wheezing  Dispense with SPACER and Instruct on use. May sub Ventolin or Proventil as needed per [employer]. 18 g 0    carvedilol (COREG) 12.5 MG tablet Take 12.5 mg by mouth 2 times daily      calcium acetate (PHOSLO) 667 MG capsule Take 667 mg by mouth 3 times daily (with meals)      Tacrolimus (PROGRAF PO) Take 1 tablet by mouth 2 times daily         No Known Allergies       Physical Exam  Constitutional:       Appearance: He is not ill-appearing. Cardiovascular:      Heart sounds: Normal heart sounds. No murmur heard. Pulmonary:      Effort: Pulmonary effort is normal. No respiratory distress. Breath sounds: No wheezing, rhonchi or rales. Abdominal:      General: Abdomen is flat. Bowel sounds are normal. There is no distension. Palpations: There is no mass. Tenderness: There is no abdominal tenderness. There is no guarding.        Blood pressure 129/72, pulse (!) 110, temperature 97.8 °F (36.6 °C), resp. rate 18, height 5' 6\" (1.676 m), weight 142 lb 6.7 oz (64.6 kg), SpO2 95 %.       .   Lab Results   Component Value Date    WBC 10.0 07/31/2022    HGB 10.5 (L) 07/31/2022    HCT 31.9 (L) 07/31/2022    .1 (H) 07/31/2022     07/31/2022     Lab Results   Component Value Date/Time     07/31/2022 06:00 AM    K 4.1 07/31/2022 06:00 AM    CL 89 07/31/2022 06:00 AM    CO2 28 07/31/2022 06:00 AM    BUN 33 07/31/2022 06:00 AM    CREATININE 4.89 07/31/2022 06:00 AM    GLUCOSE 77 07/31/2022 06:00 AM    GLUCOSE 136 02/25/2012 05:10 PM    CALCIUM 8.9 07/31/2022 06:00 AM          Chest x-ray shows right effusion much improved      ASSESSMENT:  Patient Active Problem List   Diagnosis    Hypotension    ESRD (end stage renal disease) on dialysis (HCC)    Liver transplanted ([facility] [address]    Liver transplant recipient ([facility] [address]    Sepsis ([facility] [address]    Gastroenteritis    C. difficile colitis    Severe sepsis ([facility] [address]    Vomiting and diarrhea    Generalized abdominal pain    Acute renal failure (HCC)    Acute respiratory failure with hypoxia (HCC)    Pneumonia due to infectious organism    Pleural effusion         PLAN:  Pneumonia  Stenotrophomonas from sputum  Continue Bactrim

## 2022-08-01 NOTE — CARE COORDINATION
Team rounds done this am and pt does have order now for PT/OT eval and tx to help determine dc plan. He was at  earlier .

## 2022-08-01 NOTE — PROGRESS NOTES
Internal Medicine   Hospitalist   Progress Note    2022   11:49 AM    Name:  Rachell Alvarado  MRN:    [id number]     IP Day: 7     Admit Date: 2022 11:15 AM  PCP: Deshaun Bullock MD    Code Status:  Full Code    Assessment and Plan: Active Problems/ diagnosis:     Acute hypoxic respiratory failure in the setting of aspiration pneumonia-required intubation, extubated now on nasal cannula  Sepsis present admission  End-stage renal disease  Hypotension in the setting of dialysis-on midodrine  Bilateral upper and lower extremity acute DVT on Eliquis  Anemia  History of liver transplant    Plan  Continue to monitor medical floor  Antibiotic as per infectious disease. Currently on IV Bactrim and IV Zosyn  HD as per nephrology  Resume Eliquis  Monitor H&H  Home medication resumed  Resume immunosuppressants for history of liver transplant, Prograf  Will initiate PT/OT  DVT/PUD PPx     7 pm- 7 am, please contact on call Hospitalist for any needs     Subjective:      no new event. Dialysis today    Physical Examination:      Vitals:  /72   Pulse (!) 110   Temp 97.8 °F (36.6 °C)   Resp 18   Ht 5' 6\" (1.676 m)   Wt 142 lb 6.7 oz (64.6 kg)   SpO2 95%   BMI 22.99 kg/m²   Temp (24hrs), Av.1 °F (36.7 °C), Min:97.7 °F (36.5 °C), Max:98.8 °F (37.1 °C)      General appearance: alert, cooperative and no distress  Mental Status: oriented to person, place and time and normal affect  Lungs: clear to auscultation bilaterally, normal effort  Heart: regular rate and rhythm, no murmur  Abdomen: soft, nontender, nondistended, bowel sounds present, no masses  Extremities: Significant bilateral upper and lower extremity edema, pulses intact, no neck to have upper and lower extremity DVTs  Skin: no gross lesions, rashes.   Has bruising related to IV access    Data:     Labs:  Recent Labs     22  0600 22  0600   WBC 10.5 10.0   HGB 10.8* 10.5*    180     Recent Labs     22  0600 07/31/22  0600    134*   K 4.0 4.1   CL 92* 89*   CO2 28 28   BUN 24* 33*   CREATININE 3.55* 4.89*   GLUCOSE 90 77     No results for input(s): AST, ALT, ALB, BILITOT, ALKPHOS in the last 72 hours.     Current Facility-Administered Medications   Medication Dose Route Frequency Provider Last Rate Last Admin    sodium chloride 0.9 % infusion             pantoprazole (PROTONIX) tablet 40 mg  40 mg Oral QAM AC ISI Nuñez - CNP   40 mg at 08/01/22 0540    midodrine (PROAMATINE) tablet 15 mg  15 mg Oral TID  ISI Nuñez - CNP   15 mg at 07/31/22 1412    apixaban (ELIQUIS) tablet 10 mg  10 mg Oral BID Satish Turk MD   10 mg at 07/31/22 2112    Followed by    Teddy Simeon ON 8/6/2022] apixaban (ELIQUIS) tablet 5 mg  5 mg Oral BID Satish Turk MD        sulfamethoxazole-trimethoprim (BACTRIM) 518.4 mg in dextrose 5 % 500 mL IVPB  7.5 mg/kg IntraVENous Daily ISI Nuñez - CNP   Stopped at 07/31/22 1509    aspirin EC tablet 81 mg  81 mg Oral Daily Alba Rubin MD   81 mg at 07/31/22 6511    docusate sodium (COLACE) capsule 100 mg  100 mg Oral BID ISI Nuñez - CNP   100 mg at 07/30/22 2009    insulin lispro (HUMALOG) injection vial 0-4 Units  0-4 Units SubCUTAneous 4x Daily AC & HS Nicanor Guevara MD        polyethylene glycol (GLYCOLAX) packet 17 g  17 g Oral Daily ISI Nuñez - CNP   17 g at 07/29/22 0907    tacrolimus (PROGRAF) capsule 1 mg  1 mg Oral BID ISI Nuñez - CNP   1 mg at 07/31/22 2113    sodium chloride flush 0.9 % injection 5-40 mL  5-40 mL IntraVENous 2 times per day ISI Nuñez - CNP   10 mL at 07/31/22 2113    sodium chloride flush 0.9 % injection 5-40 mL  5-40 mL IntraVENous PRN Venecia Satnam, APRN - CNP        0.9 % sodium chloride infusion   IntraVENous PRN ISI Nuñez - SHELDON        acetaminophen (TYLENOL) tablet 650 mg  650 mg Oral Q6H PRN Nicanor Guevara MD   650 mg at 07/31/22 2113    Or    acetaminophen (TYLENOL) suppository 650 mg  650 mg Rectal Q6H PRN Azeb Gutiérrez MD        piperacillin-tazobactam (ZOSYN) 3,375 mg in dextrose 5 % 50 mL IVPB (mini-bag)  3,375 mg IntraVENous Q12H Azeb Gutiérrez MD   Stopped at 08/01/22 0111    fentaNYL bolus from bag  50 mcg IntraVENous Q30 Min PRN Al Tucker, APRN - CNP        glucose chewable tablet 16 g  4 tablet Oral PRN Al Tucker, APRN - CNP        dextrose bolus 10% 125 mL  125 mL IntraVENous PRN Al Tucker, APRN - CNP        Or    dextrose bolus 10% 250 mL  250 mL IntraVENous PRN Al Tucker, APRN - CNP        glucagon (rDNA) injection 1 mg  1 mg SubCUTAneous PRN Al Tucker, APRN - CNP        dextrose 10 % infusion   IntraVENous Continuous PRN Al Tucker, APRN - CNP        heparin (porcine) injection 1,000 Units  1,000 Units IntraVENous PRN Nadira Alexis MD        lidocaine 2 % injection 5 mL  5 mL IntraDERmal Once Al Tucker, APRN - CNP        0.9 % sodium chloride infusion  250 mL IntraVENous Once Al Tucker, APRN - CNP           Additional work up or/and treatment plan may be added today or then after based on clinical progression. I am managing a portion of pt care. Some medical issues are handled by other specialists. Additional work up and treatment should be done in out pt setting by pt PCP and other out pt providers. In addition to examining and evaluating pt, I spent additional time explaining care, normaland abnormal findings, and treatment plan. All of pt questions were answered. Counseling, diet and education were provided. Case will be discussed with nursing staff when appropriate. Family will be updated if and when appropriate.        Electronically signed by Gagan Verdin DO on 8/1/2022 at 11:49 AM

## 2022-08-01 NOTE — CARE COORDINATION
This Care Transition Nurse provided Pneumonia booklet and zones sheet and reviewed. Stressed importance of phoning physician promptly for symptoms in the yellow zone and ems for symptoms in the red zone. Discussed causes of pneumonia, symptoms, treatment, tests that may be ordered. Discussed importance of taking antibiotics until gone, drinking plenty of fluids, especially water, coughing and deep breathing. Get adequate rest and eat a well balanced diet. Importance of infection prevention: good handwashing, disposing of used tissues in the proper receptacle, masking. Avoid drinking alcoholic beverages. Discussed importance of vaccines. Stressed importance of smoking cessation and informed of [facility] as a resource. Stressed importance of physician follow up within one week of discharge. Patient voiced understanding. Patient states h does smoke. Encouraged smoking cessation. He has had a decreased appetite. Encouraged small, frequent meals and adding ONS. He states he drinks Boost at home. He drinks plenty of fluids. He does not drink alcohol. He is up to date with influenza vaccine but has never taken the pneumonia vaccine. Encouraged to talk with his PCP about this.

## 2022-08-01 NOTE — PROGRESS NOTES
Progress Note  Patient: Kennedy Rhoades  Unit/Bed: R418/X333-63  YOB: 1955  MRN: [id number]  Acct: [de-identified]   Admitting Diagnosis: Acute respiratory failure with hypoxia [facility]) [J96.01]  Date:  7/25/2022  Hospital Day: 7    Chief Complaint:  Shortness of breath    Subjective      8/1/22: Resting comfortably in bed in no acute distress. Denies chest pain. Maintaining adequate SPO2 on nasal cannula at this time. Denies shortness of breath. On oral anticoagulation with Eliquis per DVT protocol given extensive bilateral upper extremity DVT and left lower extremity DVT. On telemetry, sinus tach with heart rates 110s to 120s. A.m. labs reviewed. Electrolytes stable. Hemoglobin low but stable at 10.5. Underwent hemodialysis earlier today. No new change from cardiac standpoint. 7/31/22: Patient laying in bed looks comfortable  Currently on nasal cannula  He is off Levophed  Telemetry sinus rhythm    7/30/22: Patient laying in bed looks comfortable  Denies chest pain  Still on Levophed  Telemetry sinus rhythm    7/29/22: Patient now extubated on nasal cannula  Denies chest pain  Telemetry sinus rhythm  Still on Levophed    7/28/22: Patient ventilated, opens his eyes  Still on Levophed  Still on heparin drip    927/22: 74-year-old male without prior cardiac history but significant for end-stage renal disease on hemodialysis, hepatitis status post liver transplant, hypertension who was admitted to the hospital on 7/25/2022 for shortness of breath. Patient was transferred to the ICU given hypoxic respiratory failure  Today patient in the ICU  Currently on Levophed and vasopressin  Currently sedated on ventilator    Review of Systems:   Review of Systems   Constitutional:  Negative for activity change and fever. HENT:  Negative for congestion. Respiratory:  Negative for chest tightness and shortness of breath. Cardiovascular:  Negative for chest pain and palpitations.    Gastrointestinal: Negative for nausea and vomiting. Musculoskeletal:  Negative for arthralgias. Skin:  Negative for color change. Neurological:  Negative for syncope. Psychiatric/Behavioral:  Negative for agitation. Physical Examination:    /72   Pulse (!) 110   Temp 97.8 °F (36.6 °C)   Resp 18   Ht 5' 6\" (1.676 m)   Wt 142 lb 6.7 oz (64.6 kg)   SpO2 95%   BMI 22.99 kg/m²    Physical Exam  Constitutional:       General: He is not in acute distress. HENT:      Head: Normocephalic and atraumatic. Cardiovascular:      Rate and Rhythm: Regular rhythm. Tachycardia present. Pulmonary:      Effort: Pulmonary effort is normal. No respiratory distress. Breath sounds: No wheezing, rhonchi or rales. Abdominal:      Palpations: Abdomen is soft. Tenderness: There is no abdominal tenderness. Musculoskeletal:         General: Swelling (bilateral UE) present. Cervical back: Normal range of motion and neck supple. Skin:     General: Skin is warm and dry. Findings: Bruising (bilateral UE) present. Neurological:      General: No focal deficit present. Mental Status: He is alert.    Psychiatric:         Mood and Affect: Mood normal.       LABS:  CBC:   Lab Results   Component Value Date/Time    WBC 10.0 07/31/2022 06:00 AM    RBC 3.03 07/31/2022 06:00 AM    RBC 3.34 02/20/2012 05:15 AM    HGB 10.5 07/31/2022 06:00 AM    HCT 31.9 07/31/2022 06:00 AM    .1 07/31/2022 06:00 AM    MCH 34.6 07/31/2022 06:00 AM    MCHC 33.0 07/31/2022 06:00 AM    RDW 14.2 07/31/2022 06:00 AM     07/31/2022 06:00 AM    MPV 9.5 10/09/2015 02:00 PM     CBC with Differential:   Lab Results   Component Value Date/Time    WBC 10.0 07/31/2022 06:00 AM    RBC 3.03 07/31/2022 06:00 AM    RBC 3.34 02/20/2012 05:15 AM    HGB 10.5 07/31/2022 06:00 AM    HCT 31.9 07/31/2022 06:00 AM     07/31/2022 06:00 AM    .1 07/31/2022 06:00 AM    MCH 34.6 07/31/2022 06:00 AM    MCHC 33.0 07/31/2022 06:00 AM RDW 14.2 07/31/2022 06:00 AM    BANDSPCT 2 07/31/2022 06:00 AM    METASPCT 1 05/28/2015 05:46 AM    LYMPHOPCT 3.0 07/31/2022 06:00 AM    PROMYELOPCT 1 11/07/2017 05:14 AM    MONOPCT 9.9 07/31/2022 06:00 AM    EOSPCT 3.1 02/20/2012 05:15 AM    BASOPCT 0.3 07/31/2022 06:00 AM    MONOSABS 1.0 07/31/2022 06:00 AM    LYMPHSABS 0.4 07/31/2022 06:00 AM    EOSABS 0.0 07/31/2022 06:00 AM    BASOSABS 0.0 07/31/2022 06:00 AM     CMP:    Lab Results   Component Value Date/Time     07/31/2022 06:00 AM    K 4.1 07/31/2022 06:00 AM    CL 89 07/31/2022 06:00 AM    CO2 28 07/31/2022 06:00 AM    BUN 33 07/31/2022 06:00 AM    CREATININE 4.89 07/31/2022 06:00 AM    GFRAA 14.4 07/31/2022 06:00 AM    LABGLOM 11.9 07/31/2022 06:00 AM    GLUCOSE 77 07/31/2022 06:00 AM    GLUCOSE 136 02/25/2012 05:10 PM    PROT 7.5 07/25/2022 11:27 AM    LABALBU 2.9 07/31/2022 06:00 AM    LABALBU 2.8 02/23/2012 06:40 AM    CALCIUM 8.9 07/31/2022 06:00 AM    BILITOT 0.7 07/25/2022 11:27 AM    ALKPHOS 87 07/25/2022 11:27 AM    AST 77 07/25/2022 11:27 AM    ALT 79 07/25/2022 11:27 AM     BMP:    Lab Results   Component Value Date/Time     07/31/2022 06:00 AM    K 4.1 07/31/2022 06:00 AM    CL 89 07/31/2022 06:00 AM    CO2 28 07/31/2022 06:00 AM    BUN 33 07/31/2022 06:00 AM    LABALBU 2.9 07/31/2022 06:00 AM    LABALBU 2.8 02/23/2012 06:40 AM    CREATININE 4.89 07/31/2022 06:00 AM    CALCIUM 8.9 07/31/2022 06:00 AM    GFRAA 14.4 07/31/2022 06:00 AM    LABGLOM 11.9 07/31/2022 06:00 AM    GLUCOSE 77 07/31/2022 06:00 AM    GLUCOSE 136 02/25/2012 05:10 PM     Magnesium:    Lab Results   Component Value Date/Time    MG 2.0 07/25/2022 11:27 AM    MG 2.0 02/19/2012 12:42 PM     Troponin:    Lab Results   Component Value Date/Time    TROPONINI 0.126 07/27/2022 04:39 AM       Radiology:    CXR chest portable 7/27/22:  Impression   The airspace disease has diminished when compared to previous study. . A small pleural effusion is seen on the right and scarring or atelectasis is again seen bilaterally in the bases. Bilateral LE venous duplex US 7/27/22:  Impression   ULTRASOUND FINDINGS ARE POSITIVE FOR OCCLUDING THROMBUS IN THE LEFT COMMON FEMORAL VEIN EXTENDING INTO THE PROXIMAL FEMORAL VEIN. NO ULTRASOUND SIGNS OF DVT IN THE RIGHT LEG FROM THE GROIN TO THE POPLITEAL FOSSA       Bilateral UE venous duplex US 7/26/22:  Impression   The airspace disease has diminished when compared to previous study. . A small pleural effusion is seen on the right and scarring or atelectasis is again seen bilaterally in the bases. US DUP UPPER EXTREMITY RIGHT VENOUS       COMPARISON:       HISTORY:  resp failure        TECHNIQUE: , US DUP UPPER EXTREMITY RIGHT VENOUS AND LEFT VENOUS       FINDINGS: Thrombus is seen in the right internal jugular and proximal subclavian, veins it is also seen in the cephalic vein. The right ulnar and basilic veins are not visualized. A right AV fistula seen. Thrombus is seen in the left internal jugular vein. The left basilic and axillary veins are not visualized. IMPRESSION: Deep venous thrombosis seen in the right and left upper extremities. .       Echocardiogram 7/27/22:  Conclusions   Summary   Left ventricular ejection fraction is estimated at 45%. Diastolic Dysfunction is noted by mitral flow. Normal right ventricle systolic pressure. RVSP 28mmHg   Echogenic mass in the apex concerning for thrombus recommend limited echo   with definity   No hemodynamic evidence of significant valve disease      Signature   ----------------------------------------------------------------   Electronically signed by Carlton Ricks(Interpreting physician)   on 07/27/2022 03:48 PM   ----------------------------------------------------------------    Limited echo 7/28/22:  Conclusions   Summary   No evidence of thrombus with definity   Left ventricular ejection fraction is visually estimated at 65%.       Signature ----------------------------------------------------------------   Electronically signed by Anya Ricks(Interpreting physician)   on 07/28/2022 08:42 AM   ----------------------------------------------------------------    EKG 7/25/22: , nonspecific ST changes, low voltage QRS, QTc 509ms    Telemetry 8/1/22: ST 110s-120s      Assessment:    Active Hospital Problems    Diagnosis Date Noted    Pneumonia due to infectious organism [J18.9] 07/30/2022     Priority: Medium    Pleural effusion [J90] 07/30/2022     Priority: Medium    Acute respiratory failure with hypoxia [facility]) [J96.01] 07/25/2022     Priority: Medium    ESRD (end stage renal disease) on dialysis ([facility] [address] [N18.6, Z99.2] 11/03/2017     Priority: Low     Bradycardia--currently Sinus tach  Acute hypoxic resp failure s/p extubation on 7/28/22  Extensive bilateral UE DVT per US 7/26/22  Occluding left LE DVT (from left CFV extending into prox femoral vein) per US 7/27/22  Large right pleural effusion s/p thoracentesis  Right sided pneumonia s/p bronch  ESRD-HDD  Hx liver transplant  Elevated troponin  Anemia--stable  Normal LVF EF 65% per echo 7/28/22     Plan:  Continue current medications-aspirin 81 mg p.o. daily, oral anticoagulation with Eliquis per DVT protocol, midodrine 15 mg p.o. 3 times daily, insulin as directed, tacrolimus 1 mg p.o. twice daily, Bactrim IV as directed  Further optimize cardiac medications in future when feasible/BP tolerates  Cardiac diet recommended  Monitor on telemetry for any tachycardia or bradycardia arrhythmias  Maintain potassium greater than 4, magnesium greater than 2  GI/DVT prophylaxis  Pulmonology recommendations  Nephrology recommendations  Infectious disease recommendations  Conservative medical therapy from a cardiac standpoint at this time secondary to multiple comorbidities.   Consider coronary/ischemic evaluation in future as outpatient when feasible  Will follow        Electronically signed by Jose Santiago Noreen Bolden on 8/1/2022 at 1:57 PM    Attending Supervising Physician's BRENDA Lopez 106  I performed a history and physical examination on the patient and discussed the management with the resident physician. I reviewed and agree with the findings and plan as documented in her note . ID  18-year-old male without prior cardiac history but significant for end-stage renal disease on hemodialysis, hepatitis status post liver transplant, hypertension who was admitted to the hospital on 7/25/2022 for shortness of breath. General: Alert oriented x4 no acute distress  Lungs: Clear to auscultation bilaterally  CV: Regular rate and rhythm no murmurs  Extremities: Trace pitting edema      Assessment and plan  Badycardia. Currently sinus rhythm, no high degree AV blocks. Bradycardia likely due to sedation side effect versus clinical condition  Acute hypoxic respiratory failure. Extubated on 7/28/2022  End-stage renal disease on hemodialysis  Extensive upper extremity DVTs  Pleural effusion status postthoracentesis  History of liver transplant  History of hypertension  Septic shock  Hyperkalemia  Positive troponins 0.126 in the setting of end-stage renal disease EKG without active signs of ischemia  CKD     TTE 7/27/2022 EF 45%, echogenic mass at the apex  Limited TTE with Definity 7/28/2022 EF 65%, no evidence of apical thrombus     Plan:  Continue telemetry  Wean off midodrine as tolerated  Please keep potassium between 4 and 5 magnesium above 2  Please keep hemoglobin above 8  Start metoprolol  Ischemic evaluation as an outpatient  Continue management of rest of comorbidities as per primary team  Cont anticoagulation for extensive DVT  From cardiology standpoint patient is stable to be discharged.   Cardiology will sign off  I will follow patient up in clinic in 2 weeks      Electronically signed by Kalyan Billingsley MD on 8/1/22 at 5:54 PM EDT

## 2022-08-01 NOTE — PROGRESS NOTES
Nephrology Progress Note    Assessment:  Pt is a 74 y/o male w/ history s/f HTN, ESRD on IHD, liver transplant who presented for SOB    ESRD on IHD MWF  Acute hypoxic respiratory failure: in setting of fluid overload, RT sided thoracentesis and PNA, now s/p RT sided thoracentesis, extubated 7/28  Septic shock: on pressors  Lactic acidosis: corrected  Metabolic/respiratory alkalosis  6.  Bilateral IJ thrombi  Hyponatremia  Hypokalemia: corrected   Hypoalbuminemia     Plan:  - continuing HD MWF       Patient Active Problem List:     Hypotension     ESRD (end stage renal disease) ([facility] [address]     Liver transplanted ([facility] [address]     Liver transplant recipient ([facility] [address]     Sepsis ([facility] [address]     Gastroenteritis     C. difficile colitis     Severe sepsis ([facility] [address]     Vomiting and diarrhea     Generalized abdominal pain     Acute renal failure ([facility] [address]     Acute respiratory failure with hypoxia ([facility] [address]      Subjective:  Admit Date: 7/25/2022    Interval History: no acute overnight events, plan to transfer out of ICU    Medications:  Scheduled Meds:   pantoprazole  40 mg Oral QAM AC    midodrine  15 mg Oral TID WC    apixaban  10 mg Oral BID    Followed by    Yoel Asher ON 8/6/2022] apixaban  5 mg Oral BID    sulfamethoxazole-trimethoprim (BACTRIM) IVPB  7.5 mg/kg IntraVENous Daily    aspirin  81 mg Oral Daily    docusate sodium  100 mg Oral BID    insulin lispro  0-4 Units SubCUTAneous 4x Daily AC & HS    polyethylene glycol  17 g Oral Daily    tacrolimus  1 mg Oral BID    sodium chloride flush  5-40 mL IntraVENous 2 times per day    piperacillin-tazobactam  3,375 mg IntraVENous Q12H    lidocaine  5 mL IntraDERmal Once     Continuous Infusions:   sodium chloride      dextrose      sodium chloride         CBC:   Recent Labs     07/30/22  0600 07/31/22  0600   WBC 10.5 10.0   HGB 10.8* 10.5*    180       CMP:    Recent Labs     07/29/22  0927 07/30/22  0600 07/31/22  0600    135 134*   K 4.8 4.0 4.1   CL 91* 92* 89*   CO2 27 28 28   BUN 35* 24* 33*   CREATININE 4.94* 3.55* 4.89*   GLUCOSE 94 90 77   CALCIUM 8.5 9.0 8.9   LABGLOM 11.8* 17.2* 11.9*       Troponin:   No results for input(s): TROPONINI in the last 72 hours. BNP: No results for input(s): BNP in the last 72 hours. INR:   No results for input(s): INR in the last 72 hours. Lipids: No results for input(s): CHOL, LDLDIRECT, TRIG, HDL, AMYLASE, LIPASE in the last 72 hours. Liver:   Recent Labs     07/31/22  0600   LABALBU 2.9*       Iron:  No results for input(s): IRONS, FERRITIN in the last 72 hours. Invalid input(s): LABIRONS  Urinalysis: No results for input(s): UA in the last 72 hours.     Objective:  Vitals: BP (!) 111/59   Pulse 93   Temp 97.7 °F (36.5 °C) (Oral)   Resp 20   Ht 5' 6\" (1.676 m)   Wt 144 lb 6.4 oz (65.5 kg)   SpO2 97%   BMI 23.31 kg/m²    Wt Readings from Last 3 Encounters:   07/31/22 144 lb 6.4 oz (65.5 kg)   06/28/22 145 lb (65.8 kg)   03/18/20 145 lb (65.8 kg)      24HR INTAKE/OUTPUT:    Intake/Output Summary (Last 24 hours) at 7/31/2022 2258  Last data filed at 7/31/2022 7260  Gross per 24 hour   Intake 80 ml   Output --   Net 80 ml       General: alert, in no apparent distress  HEENT: normocephalic, atraumatic, anicteric  Lungs: non-labored respirations, clear to auscultation bilaterally  Heart: regular rate and rhythm, no murmurs or rubs  Abdomen: soft, non-tender, non-distended  MSK: no joint swelling or tenderness  Ext: no cyanosis, no peripheral edema  Neuro: alert and oriented, no gross abnormalities    Electronically signed by Ysabel Michaels MD, MD

## 2022-08-01 NOTE — PROGRESS NOTES
Nephrology Progress Note    Assessment:  ESRDX m-w-f  Anemia  Hx Liver transplant  Bilat UPJ thrombosis  Admission sepsis         Plan:dialysis today    Patient Active Problem List:     Hypotension     ESRD (end stage renal disease) on dialysis [facility])     Liver transplanted [facility])     Liver transplant recipient ([facility] [address]     Sepsis ([facility] [address]     Gastroenteritis     C. difficile colitis     Severe sepsis ([facility] [address]     Vomiting and diarrhea     Generalized abdominal pain     Acute renal failure ([facility])     Acute respiratory failure with hypoxia ([facility])     Pneumonia due to infectious organism     Pleural effusion      Subjective:  Admit Date: 7/25/2022    Interval History: no issues  weak     Medications:  Scheduled Meds:   pantoprazole  40 mg Oral QAM AC    midodrine  15 mg Oral TID WC    apixaban  10 mg Oral BID    Followed by    Yefri Frausto ON 8/6/2022] apixaban  5 mg Oral BID    sulfamethoxazole-trimethoprim (BACTRIM) IVPB  7.5 mg/kg IntraVENous Daily    aspirin  81 mg Oral Daily    docusate sodium  100 mg Oral BID    insulin lispro  0-4 Units SubCUTAneous 4x Daily AC & HS    polyethylene glycol  17 g Oral Daily    tacrolimus  1 mg Oral BID    sodium chloride flush  5-40 mL IntraVENous 2 times per day    piperacillin-tazobactam  3,375 mg IntraVENous Q12H    lidocaine  5 mL IntraDERmal Once     Continuous Infusions:   sodium chloride      sodium chloride      dextrose      sodium chloride         CBC:   Recent Labs     07/30/22  0600 07/31/22  0600   WBC 10.5 10.0   HGB 10.8* 10.5*    180     CMP:    Recent Labs     07/29/22  0927 07/30/22  0600 07/31/22  0600    135 134*   K 4.8 4.0 4.1   CL 91* 92* 89*   CO2 27 28 28   BUN 35* 24* 33*   CREATININE 4.94* 3.55* 4.89*   GLUCOSE 94 90 77   CALCIUM 8.5 9.0 8.9   LABGLOM 11.8* 17.2* 11.9*     Troponin: No results for input(s): TROPONINI in the last 72 hours. BNP: No results for input(s): BNP in the last 72 hours. INR: No results for input(s): INR in the last 72 hours.   Lipids: No results for input(s): CHOL, LDLDIRECT, TRIG, HDL, AMYLASE, LIPASE in the last 72 hours. Liver:   Recent Labs     07/31/22  0600   LABALBU 2.9*     Iron:  No results for input(s): IRONS, FERRITIN in the last 72 hours. Invalid input(s): LABIRONS  Urinalysis: No results for input(s): UA in the last 72 hours.     Objective:  Vitals: /66   Pulse (!) 101   Temp 97.8 °F (36.6 °C)   Resp 18   Ht 5' 6\" (1.676 m)   Wt 144 lb 10 oz (65.6 kg)   SpO2 95%   BMI 23.34 kg/m²    Wt Readings from Last 3 Encounters:   08/01/22 144 lb 10 oz (65.6 kg)   06/28/22 145 lb (65.8 kg)   03/18/20 145 lb (65.8 kg)      24HR INTAKE/OUTPUT:  No intake or output data in the 24 hours ending 08/01/22 0834    General: alert, in no apparent distress  HEENT: normocephalic, atraumatic, anicteric  Neck: supple, no mass  Lungs: non-labored respirations, clear to auscultation bilaterally  Heart: regular rate and rhythm, no murmurs or rubs  Abdomen: soft, non-tender, non-distended  Ext: no cyanosis, no peripheral edema  Neuro: alert and oriented, no gross abnormalities  Psych: normal mood and affect  Skin: no rash      Electronically signed by Nomi Perry DO, MD

## 2022-08-01 NOTE — FLOWSHEET NOTE
Tx complete 1000 ml uf removed tolerated well       08/01/22 1100   Vital Signs   /72   Heart Rate (!) 110   Resp 18   Weight 142 lb 6.7 oz (64.6 kg)   Percent Weight Change 0   Post-Hemodialysis Assessment   Post-Treatment Procedures Blood returned; Access bleeding time > 10 minutes   Machine Disinfection Process Acid/Vinegar Clean;Heat Disinfect; Exterior Machine Disinfection   Rinseback Volume (ml) 200 ml   Blood Volume Processed (Liters) 66.9 l/min   Dialyzer Clearance Lightly streaked   Duration of Treatment (minutes) 210 minutes   Heparin Amount Administered During Treatment (mL) 0 units   Hemodialysis Intake (ml) 600 ml   Hemodialysis Output (ml) 1600 ml   NET Removed (ml) 1000   Tolerated Treatment Good   Bilateral Breath Sounds Diminished   Edema Generalized   Edema Generalized +1   RUE Edema +2   LUE Edema +2

## 2022-08-02 ENCOUNTER — APPOINTMENT (OUTPATIENT)
Dept: INTERVENTIONAL RADIOLOGY/VASCULAR | Age: 67
DRG: 871 | End: 2022-08-02
Payer: MEDICARE

## 2022-08-02 LAB
BODY FLUID CULTURE, STERILE: NORMAL
GLUCOSE BLD-MCNC: 228 MG/DL (ref 70–99)
GLUCOSE BLD-MCNC: 78 MG/DL (ref 70–99)
GLUCOSE BLD-MCNC: 82 MG/DL (ref 70–99)
GLUCOSE BLD-MCNC: 98 MG/DL (ref 70–99)
PERFORMED ON: ABNORMAL
PERFORMED ON: NORMAL
REASON FOR REJECTION: NORMAL
REJECTED TEST: NORMAL

## 2022-08-02 PROCEDURE — 6370000000 HC RX 637 (ALT 250 FOR IP): Performed by: INTERNAL MEDICINE

## 2022-08-02 PROCEDURE — 6370000000 HC RX 637 (ALT 250 FOR IP): Performed by: NURSE PRACTITIONER

## 2022-08-02 PROCEDURE — 6360000002 HC RX W HCPCS: Performed by: NURSE PRACTITIONER

## 2022-08-02 PROCEDURE — 76937 US GUIDE VASCULAR ACCESS: CPT

## 2022-08-02 PROCEDURE — 92526 ORAL FUNCTION THERAPY: CPT

## 2022-08-02 PROCEDURE — 1210000000 HC MED SURG R&B

## 2022-08-02 PROCEDURE — 2709999900 IR MIDLINE CATH

## 2022-08-02 PROCEDURE — 2500000003 HC RX 250 WO HCPCS: Performed by: NURSE PRACTITIONER

## 2022-08-02 PROCEDURE — 99232 SBSQ HOSP IP/OBS MODERATE 35: CPT | Performed by: INTERNAL MEDICINE

## 2022-08-02 PROCEDURE — 2500000003 HC RX 250 WO HCPCS: Performed by: INTERNAL MEDICINE

## 2022-08-02 PROCEDURE — 99233 SBSQ HOSP IP/OBS HIGH 50: CPT | Performed by: INTERNAL MEDICINE

## 2022-08-02 PROCEDURE — 2700000000 HC OXYGEN THERAPY PER DAY

## 2022-08-02 PROCEDURE — 6370000000 HC RX 637 (ALT 250 FOR IP): Performed by: FAMILY MEDICINE

## 2022-08-02 PROCEDURE — 2709999900 IR PICC WO SQ PORT/PUMP > 5 YEARS

## 2022-08-02 PROCEDURE — 2580000003 HC RX 258: Performed by: NURSE PRACTITIONER

## 2022-08-02 PROCEDURE — 2580000003 HC RX 258: Performed by: INTERNAL MEDICINE

## 2022-08-02 PROCEDURE — 36410 VNPNXR 3YR/> PHY/QHP DX/THER: CPT

## 2022-08-02 RX ORDER — LIDOCAINE HYDROCHLORIDE 20 MG/ML
5 INJECTION, SOLUTION INFILTRATION; PERINEURAL ONCE
Status: COMPLETED | OUTPATIENT
Start: 2022-08-02 | End: 2022-08-02

## 2022-08-02 RX ORDER — SODIUM CHLORIDE 9 MG/ML
INJECTION, SOLUTION INTRAVENOUS PRN
Status: DISCONTINUED | OUTPATIENT
Start: 2022-08-02 | End: 2022-08-19 | Stop reason: HOSPADM

## 2022-08-02 RX ORDER — SODIUM CHLORIDE 0.9 % (FLUSH) 0.9 %
5-40 SYRINGE (ML) INJECTION PRN
Status: DISCONTINUED | OUTPATIENT
Start: 2022-08-02 | End: 2022-08-19 | Stop reason: HOSPADM

## 2022-08-02 RX ORDER — SODIUM CHLORIDE 9 MG/ML
250 INJECTION, SOLUTION INTRAVENOUS ONCE
Status: COMPLETED | OUTPATIENT
Start: 2022-08-02 | End: 2022-08-02

## 2022-08-02 RX ORDER — SODIUM CHLORIDE 0.9 % (FLUSH) 0.9 %
5-40 SYRINGE (ML) INJECTION EVERY 12 HOURS SCHEDULED
Status: DISCONTINUED | OUTPATIENT
Start: 2022-08-02 | End: 2022-08-19 | Stop reason: HOSPADM

## 2022-08-02 RX ADMIN — PANTOPRAZOLE SODIUM 40 MG: 40 TABLET, DELAYED RELEASE ORAL at 04:52

## 2022-08-02 RX ADMIN — SODIUM CHLORIDE 250 ML: 9 INJECTION, SOLUTION INTRAVENOUS at 18:07

## 2022-08-02 RX ADMIN — TACROLIMUS 1 MG: 1 CAPSULE ORAL at 09:45

## 2022-08-02 RX ADMIN — APIXABAN 10 MG: 5 TABLET, FILM COATED ORAL at 22:45

## 2022-08-02 RX ADMIN — Medication 10 ML: at 09:42

## 2022-08-02 RX ADMIN — TACROLIMUS 1 MG: 1 CAPSULE ORAL at 22:44

## 2022-08-02 RX ADMIN — METOPROLOL TARTRATE 12.5 MG: 25 TABLET, FILM COATED ORAL at 22:45

## 2022-08-02 RX ADMIN — SULFAMETHOXAZOLE AND TRIMETHOPRIM 518.4 MG: 80; 16 INJECTION, SOLUTION, CONCENTRATE INTRAVENOUS at 09:34

## 2022-08-02 RX ADMIN — MIDODRINE HYDROCHLORIDE 2.5 MG: 5 TABLET ORAL at 09:00

## 2022-08-02 RX ADMIN — METOPROLOL TARTRATE 12.5 MG: 25 TABLET, FILM COATED ORAL at 09:45

## 2022-08-02 RX ADMIN — LIDOCAINE HYDROCHLORIDE 5 ML: 20 INJECTION, SOLUTION INFILTRATION; PERINEURAL at 18:08

## 2022-08-02 RX ADMIN — INSULIN LISPRO 1 UNITS: 100 INJECTION, SOLUTION INTRAVENOUS; SUBCUTANEOUS at 22:48

## 2022-08-02 RX ADMIN — Medication 10 ML: at 22:48

## 2022-08-02 ASSESSMENT — ENCOUNTER SYMPTOMS
COUGH: 0
SHORTNESS OF BREATH: 1
GASTROINTESTINAL NEGATIVE: 1

## 2022-08-02 ASSESSMENT — PAIN SCALES - GENERAL: PAINLEVEL_OUTOF10: 0

## 2022-08-02 NOTE — PROGRESS NOTES
Physical [facility]   [facility] C194/H898-63    NAME: Geraldine Dover    : 1955 (75 y.o.)  MRN: [id number]    Account: [de-identified]  Gender: male    PT evaluation and treatment orders received. Chart reviewed. PT eval attempted. Hold PT eval: per clinical judgement. Pt has + DVT and was previously on eliquis, however this appears to be held today. Per PT protocol/clinical guidelines, pt should be on anticoagulation prior to initiation of PT eval and treat. BAILEY Dale and Jose Matute, [facility] notified and aware.   Will attempt to see 8/3/22    Electronically signed by Alexis Hill PT on 22 at 4:48 PM EDT

## 2022-08-02 NOTE — PROGRESS NOTES
[facility]  Facility/Department: [facility] [place] [address]JUST[place] Bullock  Speech Language Pathology   Treatment Note      Placido Gómez  1955  [id number]  [x]   confirmed      Date: 2022    Acute respiratory failure with hypoxia (HCC) [J96.01]         Weight: 142 lb 6.7 oz (64.6 kg)     ADULT DIET; Dysphagia - Pureed  ADULT ORAL NUTRITION SUPPLEMENT; Breakfast; Standard High Calorie/High Protein Oral Supplement  ADULT ORAL NUTRITION SUPPLEMENT; Lunch; Fortified Pudding Oral Supplement  ADULT ORAL NUTRITION SUPPLEMENT; Dinner; Frozen Oral Supplement    SpO2: 98 % (22 0848)  O2 Flow Rate (L/min): 4 L/min (22)  No active isolations    Speech Dx: Dysphagia    Subjective:  Alert, Cooperative, and Pleasant     Patient reports dislike of pureed diet, poor intake noted per chart review. Interventions used this date:  Dysphagia Treatment      Objective/Assessment:  Patient progressing towards goals:  Short-term Goals  Timeframe for Short-term Goals: 2 weeks  Goal 1: Pt will tolerate the recommended diet level without clinical s/s of aspiration. Patient tolerated three consecutive ounces of thin liquid via cup without overt s/s of aspiration. Goal 2: Pt will tolerate 5/5 trials of minced and moist consistencies with adequate mastication and oral clearance of bolus in all opportunities. Patient trialed soft solids. Patient demonstrated extended mastication time likely due to combination of decreased lingual strength and edentulous state. Despite extended time, patient was able to adequately masticate and clear all soft solids independently. Goal 3: SLP to continue to assess need for MBS study in order to more objectively assess pharyngeal phase of swallow and determine least restrictive diet consistency.   MBS not recommended at this time,   Goal 4: Pt will complete pharyngeal strengthening exercises (such as the Lashay, Effortful swallow, etc) with 80% acc in order to strengthen and establish and  more effective swallow. Pharyngeal exercises not indicated at this time. Recommend diet change to soft & bite sized solids, continue with thin liquids. Notified LPN Suyapa. Updated goals for POC:  Pt will tolerate the recommended diet level without clinical s/s of aspiration. Pt will tolerate 5/5 trials of easy to chew solids with adequate mastication and oral clearance of bolus in all opportunities. Pt will complete lingual exercises that promote anterior to posterior propulsion of bolus and improve tongue base retraction with 80% accuracy in order to strengthen the muscles of the swallow to decrease risk of aspiration and to increase ability to safely handle the least restrictive diet level. Improved laryngeal function exam compared to prior on 7/29/22. LARYNGEAL FUNCTION EXAM    GRBAS Perceptual Voice Scale  Grade: 0--non-hoarse or normal  Rough: 1--slight  Breathy: 0--non-hoarse or normal  Asthenia: 0--non-hoarse or normal  Strained: 0--non-hoarse or normal  Total:   1/15  Score indicates: no vocal impairment    1 normal  2-4 mild  5-6 mild to moderate  7-9 moderate  10-12 moderate-severe  13-14 severe  15 profound    Secretion Management   4--Normal: no excessive secretions; ability to swallow saliva is automatic and normal    Maximum Phonation Time (MPT)  MPT: 18 seconds  MPT indicates: impaired vocal fold closure is suspected    Average Adult Male MPT range: 25-35 seconds  Average Adult Female MPT range: 15-25 seconds    S/Z Ratio   Ratio: 1.05  Ratio indicates: abnormal laryngeal function is not suspected    A ratio of 1.4 or above may indicate a degree of vocal fold dysfunction     Cough Strength:   perceptually strong      Treatment/Activity Tolerance:  Patient tolerated treatment well    Plan: Alter current POC (see above)    Pain Assessment:  Patient does not c/o pain. Pain Re-assessment:  Patient does not c/o pain.     Patient/Caregiver Education:  Patient educated on session and progression towards goals. Education needs reinforcement.     Safety Devices:  Bed alarm in place and Call light within reach      Therapy Time  Time in: 1325  Time out: 1338  Total minutes: 13      Signature: Electronically signed by MARSHALL Calles on 8/2/2022 at 3:17 PM

## 2022-08-02 NOTE — PROGRESS NOTES
INPATIENT PROGRESS NOTES    PATIENT NAME: Hamlet Soni  MRN: [id number]  SERVICE DATE:  August 2, 2022   SERVICE TIME:  6:39 PM      PRIMARY SERVICE: Pulmonary Disease    CHIEF COMPLAIN: Acute respiratory failure      INTERVAL HPI: Patient seen and examined at bedside, Interval Notes, orders reviewed. Nursing notes noted  Patient is feeling much better. He is on 3 L O2 via nasal cannula O2 saturation 98%. He is on BiPAP at night. He said he used few hour last night. He is going for PICC line. He denies having shortness of breath at rest.  No chest pain. No cough or sputum production. No fever or chills. No nausea vomiting or diarrhea. OBJECTIVE    Body mass index is 22.99 kg/m². PHYSICAL EXAM:  Vitals:  BP (!) 185/76   Pulse 88   Temp 97.5 °F (36.4 °C) (Oral)   Resp 18   Ht 5' 6\" (1.676 m)   Wt 142 lb 6.7 oz (64.6 kg)   SpO2 98%   BMI 22.99 kg/m²   General: Alert, awake . comfortable in bed, No distress. Head: Atraumatic , Normocephalic   Eyes: PERRL. No sclera icterus. No conjunctival injection. No discharge   ENT: No nasal  discharge. Pharynx clear. Neck:  Trachea midline. No thyromegaly, no JVD, No cervical adenopathy. Chest : Bilaterally symmetrical ,Normal effort,  No accessory muscle use  Lung : . Fair BS bilateral, decreased BS at bases. Bibasilar Rales. No wheezing. No rhonchi. Heart[de-identified] Normal  rate. Regular rhythm. No mumur ,  Rub or gallop  ABD: Non-tender. Non-distended. No masses. No organmegaly. Normal bowel sounds. No hernia.   Ext : No Pitting both leg , No Cyanosis No clubbing  Neuro: no focal weakness          DATA:   Recent Labs     07/31/22  0600   WBC 10.0   HGB 10.5*   HCT 31.9*   .1*        Recent Labs     07/31/22  0600   *   K 4.1   CL 89*   CO2 28   BUN 33*   CREATININE 4.89*   GLUCOSE 77   CALCIUM 8.9   LABALBU 2.9*   LABGLOM 11.9*   GFRAA 14.4*       MV Settings:     Vent Mode: CPAP  Vt (Set, mL): 390 mL  Resp Rate (Set): 18 bmp  FiO2 : 40 %  PEEP/CPAP (cmH2O): 5  Pressure Support: 5 cmH20  Peak Inspiratory Pressure: 9.3 cmH2O  Mean Airway Pressure: 6 cmH20  I:E Ratio: 1:3    No results for input(s): PHART, LKO9HAS, PO2ART, AYD4BYY, BEART, A1KHGLVP in the last 72 hours. O2 Device: Nasal cannula  O2 Flow Rate (L/min): 4 L/min    ADULT DIET; Dysphagia - Pureed  ADULT ORAL NUTRITION SUPPLEMENT; Breakfast; Standard High Calorie/High Protein Oral Supplement  ADULT ORAL NUTRITION SUPPLEMENT; Lunch;  Fortified Pudding Oral Supplement  ADULT ORAL NUTRITION SUPPLEMENT; Dinner; Frozen Oral Supplement     MEDICATIONS during current hospitalization:    Continuous Infusions:   sodium chloride      dextrose         Scheduled Meds:   sodium chloride flush  5-40 mL IntraVENous 2 times per day    midodrine  2.5 mg Oral TID WC    metoprolol tartrate  12.5 mg Oral BID    pantoprazole  40 mg Oral QAM AC    apixaban  10 mg Oral BID    Followed by    Haysvilledevi Lordl ON 8/6/2022] apixaban  5 mg Oral BID    sulfamethoxazole-trimethoprim (BACTRIM) IVPB  7.5 mg/kg IntraVENous Daily    aspirin  81 mg Oral Daily    docusate sodium  100 mg Oral BID    insulin lispro  0-4 Units SubCUTAneous 4x Daily AC & HS    polyethylene glycol  17 g Oral Daily    tacrolimus  1 mg Oral BID       PRN Meds:sodium chloride flush, sodium chloride, acetaminophen **OR** acetaminophen, [DISCONTINUED] fentaNYL **AND** fentaNYL, glucose, dextrose bolus **OR** dextrose bolus, glucagon (rDNA), dextrose, heparin (porcine)    Radiology  Echocardiogram complete 2D with doppler with color    Result Date: 7/27/2022  Transthoracic Echocardiography Report (TTE)  Demographics   Patient Name    VIA [facility]       Gender               Male                  [facility]   Patient Number  [id number]       Race                                                   Ethnicity   Visit Number    [id number]      Room Number          IC12   Corporate ID                   Date of Study        07/27/2022   Accession       [id number] Referring Physician  Number   Date of Birth   1955     Sonographer          Crystal Ward   Age             75 year(s)     Interpreting         [facility])                                 Physician            Cardiology                                                      [facility]  Procedure Type of Study   TTE procedure:ECHO COMPLETE 2D W/DOP W/COLOR. Procedure Date Date: 07/27/2022 Start: 10:27 AM Study Location: Portable Technical Quality: Limited visualization due to lung interface. Indications:LVF. Patient Status: Routine Height: 66 inches Weight: 148 pounds BSA: 1.76 m^2 BMI: 23.89 kg/m^2 BP: 96/54 mmHg  Conclusions   Summary  Left ventricular ejection fraction is estimated at 45%. Diastolic Dysfunction is noted by mitral flow. Normal right ventricle systolic pressure. RVSP 28mmHg  Echogenic mass in the apex concerning for thrombus recommend limited echo  with definity  No hemodynamic evidence of significant valve disease   Signature   ----------------------------------------------------------------  Electronically signed by Da Ricks(Interpreting physician)  on 07/27/2022 03:48 PM  ----------------------------------------------------------------   Findings  Left Ventricle Left ventricular ejection fraction is estimated at 45%. Left ventricular size is normal . Normal left ventricular wall thickness. Diastolic Dysfunction is noted by mitral flow. Echogenic mass in the apex possible thrombus Right Ventricle Normal right ventricle structure and function. Normal right ventricle systolic pressure. RVSP 28mmHg Left Atrium Normal left atrium. Right Atrium Normal right atrium. Mitral Valve Diffusely thickened and pliable mitral valve leaflets with normal excursion. Structurally normal mitral valve. No evidence of mitral valve stenosis. No evidence of mitral regurgitaton. Tricuspid Valve Tricuspid valve is structurally normal. No evidence of tricuspid stenosis.  No evidence of tricuspid regurgitation. Aortic Valve Mildly thickened trileaflet aortic valve with normal excursion. Structurally normal aortic valve. No evidence of aortic valve stenosis . No evidence of aortic valve regurgitation . Pulmonic Valve The pulmonic valve was not well visualized . Pericardial Effusion No evidence of pericardial effusion. Aorta \ Miscellaneous The aorta is within normal limits. M-Mode Measurements (cm)   LVIDd: 2.97 cm                         LVIDs: 2.34 cm  IVSd: 0.73 cm                          IVSs: 1.01 cm  LVPWd: 0.9 cm                          LVPWs: 1.01 cm  Rt. Vent.  Dimension: 3.12 cm           AO Root Dimension: 2.1 cm                                         ACS: 1.37 cm                                         LA: 1.82 cm                                         LVOT: 2.03 cm  Doppler Measurements:   AV Velocity:0.02 m/s                    MV Peak E-Wave: 0.5 m/s  AV Peak Gradient: 5.77 mmHg             MV Peak A-Wave: 0.53 m/s  AV Mean Gradient: 3.05 mmHg  AV Area (Continuity):1.97 cm^2  TR Velocity:2.52 m/s                    Estimated RAP:3 mmHg  TR Gradient:25.31 mmHg                  RVSP:28.31 mmHg  Valves  Mitral Valve   Peak E-Wave: 0.5 m/s                  Peak A-Wave: 0.53 m/s  Mean Velocity: 0.87 m/s               E/A Ratio: 0.94  Mean Gradient: 4.66 mmHg              Peak Gradient: 0.99 mmHg                                        Deceleration Time: 353.3 msec                                        Area (continuity): 1.4 cm^2   Tissue Doppler   E' Septal Velocity: 0.07 m/s  E' Lateral Velocity: 0.07 m/s   Aortic Valve   Peak Velocity: 1.2 m/s                 Mean Velocity: 0.82 m/s  Peak Gradient: 5.77 mmHg               Mean Gradient: 3.05 mmHg  Area (continuity): 1.97 cm^2  AV VTI: 29.33 cm   Cusp Separation: 1.37 cm   Tricuspid Valve   Estimated RVSP: 28.31 mmHg              Estimated RAP: 3 mmHg  TR Velocity: 2.52 m/s                   TR Gradient: 25.31 mmHg   Pulmonic Valve   Peak Velocity: 0.99 m/s           Peak Gradient: 3.91 mmHg                                    Estimated PASP: 28.31 mmHg   LVOT   Peak Velocity: 0.97 m/s              Mean Velocity: 0.61 m/s  Peak Gradient: 3.77 mmHg             Mean Gradient: 1.79 mmHg  LVOT Diameter: 2.03 cm               LVOT VTI: 17.9 cm  Structures  Left Atrium   LA Dimension: 1.82 cm                        LA Area: 12.58 cm^2  LA/Aorta: 0.87  LA Volume/Index: 44.74 ml /25 m^2   Left Ventricle   Diastolic Dimension: 9.91 cm         Systolic Dimension: 4.06 cm  Septum Diastolic: 3.32 cm            Septum Systolic: 3.39 cm  PW Diastolic: 0.9 cm                 PW Systolic: 9.78 cm                                       FS: 21.2 %  LV EDV/LV EDV Index: 34.12 ml/19 m^2 LV ESV/LV ESV Index: 19.02 ml/11 m^2  EF Calculated: 44.3 %   LVOT Diameter: 2.03 cm   Right Ventricle   Diastolic Dimension: 9.30 cm                                    RV Systolic Pressure: 41.63 mmHg  Aorta/ Miscellaneous Aorta   Aortic Root: 2.1 cm  LVOT Diameter: 2.03 cm      ECHO Limited    Result Date: 7/28/2022  Transthoracic Echocardiography Report (TTE)  Demographics   Patient Name    VIA [facility]       Gender               Male                  [facility]   Patient Number  [id number]       Race                                                   Ethnicity   Visit Number    [id number]      Room Number          IC12   Corporate ID                   Date of Study        07/28/2022   Accession       [id number]     Referring Physician  Number   Date of Birth   1955     Sonographer          Aria Nye   Age             75 year(s)     Interpreting         [facility])                                 Physician            Cardiology                                                      [facility]  Procedure Type of Study   TTE procedure:ECHOCARDIOGRAM LIMITED.   Procedure Date Date: 07/28/2022 Start: 08:08 AM Study Location: Portable Technical Quality: Limited visualization Indications:LVF. Patient Status: Routine Contrast Medium: Definity. Amount - 2 ml Height: 66 inches Weight: 148 pounds BSA: 1.76 m^2 BMI: 23.89 kg/m^2  Conclusions   Summary  No evidence of thrombus with definity  Left ventricular ejection fraction is visually estimated at 65%. Signature   ----------------------------------------------------------------  Electronically signed by Warner Ricks(Interpreting physician)  on 07/28/2022 08:42 AM  ----------------------------------------------------------------   Findings  Left Ventricle Left ventricular ejection fraction is visually estimated at 65%. IR FLUORO GUIDED CVA DEVICE PLMT/REPLACE/REMOVAL    1. Venogram of the right internal jugular vein demonstrates a thrombus in the right internal jugular vein which completely occludes the vein. Passage of contrast into the chest is through small collateral veins. 2. 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 Passage of contrast into the chest is through a small collateral veins. Radiation dose to the patient was: 24.21 mGy Additional clinical data: Long-term IV access Procedure: 1. Ultrasound guidance for vascular access into the right internal jugular vein. The ultrasound image of the blood vessel was saved to PACS. 2. Venogram via contrast injection of the right internal jugular vein. 3.   Ultrasound guidance for vascular access into the left internal jugular vein. The ultrasound image of the blood vessel was saved to PACS 4. Venogram via contrast injection of the left internal jugular vein. Body of Report: Informed and written consent was obtained from the patient following discussion of risks, benefits and alternatives to this procedure. The was patient placed supine on the angiographic table. The patient's neck and chest were then prepped and draped in  normal sterile fashion.   A small amount of local lidocaine portable technique. Degenerative changes are seen in the visualized portion of the right shoulder. The airspace disease has diminished when compared to previous study. . A small pleural effusion is seen on the right and scarring or atelectasis is again seen bilaterally in the bases. US DUP UPPER EXTREMITY RIGHT VENOUS COMPARISON: HISTORY:  resp failure TECHNIQUE: , US DUP UPPER EXTREMITY RIGHT VENOUS AND LEFT VENOUS FINDINGS: Thrombus is seen in the right internal jugular and proximal subclavian, veins it is also seen in the cephalic vein. The right ulnar and basilic veins are not visualized. A right AV fistula seen. Thrombus is seen in the left internal jugular vein. The left basilic and axillary veins are not visualized. IMPRESSION: Deep venous thrombosis seen in the right and left upper extremities. .    XR CHEST PORTABLE    Result Date: 7/25/2022  XR CHEST PORTABLE : 7/25/2022 CLINICAL HISTORY:  post intubation . COMPARISON: Earlier 7/25/2022 TECHNIQUE: A portable upright AP radiograph of the chest was obtained at approximately 12:46 PM. FINDINGS: Both lung bases have been excluded from the radiograph. An endotracheal tube is present, with its tip approximately 4 to 5 cm above the lucita. An orogastric tube probably extends below the diaphragm out of field of view radiograph. Moderate pleural effusions and mild to moderate probable scarring and/or atelectasis of the mid to lower lung fields has not significantly changed. There is no cardiomegaly, pneumothorax, displaced fractures, or other significant changes identified. ENDOTRACHEAL AND OROGASTRIC TUBES IN EXPECTED POSITIONS. OTHERWISE, STABLE CHEST FROM EARLIER 7/25/2022. XR CHEST PORTABLE    Result Date: 7/25/2022  TECHNIQUE: Single portable view of the chest. CLINICAL INDICATION: Chest pain. COMPARISON: Chest x-ray obtained on June 28, 2022. PROCEDURE AND FINDINGS: Poor inspiratory effort is seen.  The cardiomediastinal silhouette is slightly prominent in size. Mild prominence of the bronchovascular and interstitial lung markings is visualized bilaterally, linear streaky opacities visualized in bilateral lung fields, subsegmental atelectatic streaks, fluid visualized in the right minor fissure. Airspace opacification visualized in the lower lung fields bilaterally with blunting of the costophrenic angles and obliteration of the hemidiaphragms consistent with bilateral pleural effusions. . No evidence of pneumothorax or parenchymal lung mass. Degenerative bone changes. Congestive heart failure versus pneumonia and parapneumonic effusions would recommend clinical correlation. Increased opacification seen in comparison to the prior study. US DUP UPPER EXTREMITY RIGHT VENOUS    Result Date: 7/27/2022  XR CHEST PORTABLE COMPARISON: July 25, 2022. HISTORY: resp failure TECHNIQUE: AP view FINDINGS: Endotracheal tube again seen in place. . There is also an enteric tube seen in place and the tip is below the diaphragm. They appear unchanged in position. A small pleural effusion seen on the right. The left costophrenic angle is not well seen. The lungs are hyperinflated and there is coarsening of the interstitium. Atelectasis and/or scarring is again seen bilaterally in the lower lung fields. The cardiac silhouette appears mildly enlarged but may be accentuated by the portable technique. Degenerative changes are seen in the visualized portion of the right shoulder. The airspace disease has diminished when compared to previous study. . A small pleural effusion is seen on the right and scarring or atelectasis is again seen bilaterally in the bases. US DUP UPPER EXTREMITY RIGHT VENOUS COMPARISON: HISTORY:  resp failure TECHNIQUE: , US DUP UPPER EXTREMITY RIGHT VENOUS AND LEFT VENOUS FINDINGS: Thrombus is seen in the right internal jugular and proximal subclavian, veins it is also seen in the cephalic vein.  The right ulnar and basilic veins are not visualized. A right AV fistula seen. Thrombus is seen in the left internal jugular vein. The left basilic and axillary veins are not visualized. IMPRESSION: Deep venous thrombosis seen in the right and left upper extremities. .    US DUP UPPER EXTREMITY LEFT VENOUS    Result Date: 7/27/2022  XR CHEST PORTABLE COMPARISON: July 25, 2022. HISTORY: resp failure TECHNIQUE: AP view FINDINGS: Endotracheal tube again seen in place. . There is also an enteric tube seen in place and the tip is below the diaphragm. They appear unchanged in position. A small pleural effusion seen on the right. The left costophrenic angle is not well seen. The lungs are hyperinflated and there is coarsening of the interstitium. Atelectasis and/or scarring is again seen bilaterally in the lower lung fields. The cardiac silhouette appears mildly enlarged but may be accentuated by the portable technique. Degenerative changes are seen in the visualized portion of the right shoulder. The airspace disease has diminished when compared to previous study. . A small pleural effusion is seen on the right and scarring or atelectasis is again seen bilaterally in the bases. US DUP UPPER EXTREMITY RIGHT VENOUS COMPARISON: HISTORY:  resp failure TECHNIQUE: , US DUP UPPER EXTREMITY RIGHT VENOUS AND LEFT VENOUS FINDINGS: Thrombus is seen in the right internal jugular and proximal subclavian, veins it is also seen in the cephalic vein. The right ulnar and basilic veins are not visualized. A right AV fistula seen. Thrombus is seen in the left internal jugular vein. The left basilic and axillary veins are not visualized. IMPRESSION: Deep venous thrombosis seen in the right and left upper extremities. .    IR VENOGRAM VENOUS SINUS/JUGULAR    1. Venogram of the right internal jugular vein demonstrates a thrombus in the right internal jugular vein which completely occludes the vein. Passage of contrast into the chest is through small collateral veins.  2. 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 Passage of contrast into the chest is through a small collateral veins. Radiation dose to the patient was: 24.21 mGy Additional clinical data: Long-term IV access Procedure: 1. Ultrasound guidance for vascular access into the right internal jugular vein. The ultrasound image of the blood vessel was saved to PACS. 2. Venogram via contrast injection of the right internal jugular vein. 3.   Ultrasound guidance for vascular access into the left internal jugular vein. The ultrasound image of the blood vessel was saved to PACS 4. Venogram via contrast injection of the left internal jugular vein. Body of Report: Informed and written consent was obtained from the patient following discussion of risks, benefits and alternatives to this procedure. The was patient placed supine on the angiographic table. The patient's neck and chest were then prepped and draped in  normal sterile fashion. A small amount of local lidocaine anesthesia was injected subcutaneously. Ultrasound was used to study the jugular vein we intended to use prior to accessing it. The vein appeared patent. The ultrasound image of the blood vessel was saved to PACS. Using ultrasound access, puncture was made of the right internal jugular vein using a 21 GA needle. A wire was advanced into the right internal jugular vein, though would not pass into the superior vena cava. A 4 Martiniquais short thin sheath was placed, through the sheath digital subtraction venography was performed. Venography demonstrated a thrombus in the right internal jugular vein and complete occlusion of the vein central to the thrombus. This access was aborted. Ultrasound was used to study the left jugular vein we intended to use prior to accessing it. The vein appeared patent. The ultrasound image of the blood vessel was saved to PACS.  Using ultrasound access, puncture was made of the left internal jugular vein using a 21 GA needle. A wire was attempted to be passed, though would not pass to the superior vena  cava. A 4 Hebrew thin sheath was placed and digital subtraction venography was performed. 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 The procedure was aborted. Findings discussed with ICU team who will place a temporary central line in the groin. IR ULTRASOUND GUIDANCE VASCULAR ACCESS    1. Venogram of the right internal jugular vein demonstrates a thrombus in the right internal jugular vein which completely occludes the vein. Passage of contrast into the chest is through small collateral veins. 2. 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 Passage of contrast into the chest is through a small collateral veins. Radiation dose to the patient was: 24.21 mGy Additional clinical data: Long-term IV access Procedure: 1. Ultrasound guidance for vascular access into the right internal jugular vein. The ultrasound image of the blood vessel was saved to PACS. 2. Venogram via contrast injection of the right internal jugular vein. 3.   Ultrasound guidance for vascular access into the left internal jugular vein. The ultrasound image of the blood vessel was saved to PACS 4. Venogram via contrast injection of the left internal jugular vein. Body of Report: Informed and written consent was obtained from the patient following discussion of risks, benefits and alternatives to this procedure. The was patient placed supine on the angiographic table. The patient's neck and chest were then prepped and draped in  normal sterile fashion. A small amount of local lidocaine anesthesia was injected subcutaneously. Ultrasound was used to study the jugular vein we intended to use prior to accessing it. The vein appeared patent.   The ultrasound image of the blood vessel was saved to PACS. Using ultrasound access, puncture was made of the right internal jugular vein using a 21 GA needle. A wire was advanced into the right internal jugular vein, though would not pass into the superior vena cava. A 4 Monegasque short thin sheath was placed, through the sheath digital subtraction venography was performed. Venography demonstrated a thrombus in the right internal jugular vein and complete occlusion of the vein central to the thrombus. This access was aborted. Ultrasound was used to study the left jugular vein we intended to use prior to accessing it. The vein appeared patent. The ultrasound image of the blood vessel was saved to PACS. Using ultrasound access, puncture was made of the left internal jugular vein using a 21 GA needle. A wire was attempted to be passed, though would not pass to the superior vena  cava. A 4 Monegasque thin sheath was placed and digital subtraction venography was performed. 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 The procedure was aborted. Findings discussed with ICU team who will place a temporary central line in the groin. US DUP LOWER EXTREMITIES BILATERAL VENOUS    Result Date: 7/27/2022  EXAMINATION: US DUP LOWER EXTREMITIES BILATERAL VENOUS CLINICAL HISTORY: 42-year-old with lower extremity swelling COMPARISONS: None available. FINDINGS: Duplex and color Doppler ultrasounds were performed of the bilateral lower extremity deep venous systems. Examination was performed portably and limited due to patient condition and access to the lower extremities. Right leg: Visualized portions of the right common femoral vein, femoral vein, popliteal vein demonstrate satisfactory compression, color flow, and augmentation. Deep calf veins are not evaluated. Left leg:  The left common femoral vein is enlarged filled with intraluminal echoes with absent color flow and poor compression consistent with occluding thrombus. Occluding Thrombus extends into the left proximal femoral vein. Mid to distal femoral vein and popliteal vein demonstrate satisfactory compression and color flow. Deep calf veins are not assessed on this portable study     ULTRASOUND FINDINGS ARE POSITIVE FOR OCCLUDING THROMBUS IN THE LEFT COMMON FEMORAL VEIN EXTENDING INTO THE PROXIMAL FEMORAL VEIN. NO ULTRASOUND SIGNS OF DVT IN THE RIGHT LEG FROM THE GROIN TO THE POPLITEAL FOSSA        IMPRESSION AND SUGGESTION:  Acute hypoxic respiratory failure s/p extubation, on 3 L O2 for  Right-sided pleural effusion status for thoracentesis  Right-sided pneumonia status post bronchoscopy  DVT left lower extremity  Extensive upper extremity DVT cannot rule out PE  S/p liver transplant  End-stage renal disease on hemodialysis    Patient had exudative pleural effusion, no growth. Antibiotic as per ID. He is on 3 L O2 via nasal cannula, titrate off FiO2. Keep saturation 90% or above. BiPAP during sleep. On anticoagulation therapy for DVT, left lower extremity and extensive upper extremity DVT. Continue present treatment plan. NOTE: This report was transcribed using voice recognition software. Every effort was made to ensure accuracy; however, inadvertent computerized transcription errors may be present.       Electronically signed by Leonie Kat MD, FCCP on 8/2/2022 at 6:39 PM

## 2022-08-02 NOTE — PROGRESS NOTES
hours.  Liver: No results for input(s): AST, ALT, ALKPHOS, PROT, LABALBU, BILITOT in the last 72 hours. Invalid input(s): BILDIR  Iron:  No results for input(s): IRONS, FERRITIN in the last 72 hours. Invalid input(s): LABIRONS  Urinalysis: No results for input(s): UA in the last 72 hours. Objective:   Vitals: /67   Pulse 71   Temp 97.9 °F (36.6 °C) (Oral)   Resp 20   Ht 5' 6\" (1.676 m)   Wt 138 lb 14.2 oz (63 kg)   SpO2 100%   BMI 22.42 kg/m²    Wt Readings from Last 3 Encounters:   11/05/17 138 lb 14.2 oz (63 kg)      24HR INTAKE/OUTPUT:  No intake or output data in the 24 hours ending 11/05/17 1129    Constitutional:  Alert, awake, no apparent distress   Skin:normal, no rash  HEENT:sclera anicteric.   Head atraumatic normocephalic  Neck:supple with no thyromegally  Cardiovascular:  S1, S2 without m/r/g   Respiratory:  CTA B without w/r/r   Abdomen: +bs, soft, nt  Ext: no LE edema  Musculoskeletal:Intact  Neuro:Alert and oriented with no deficit      Electronically signed by Abiel Andrew MD on 11/5/2017 at 11:29 AM No

## 2022-08-02 NOTE — PLAN OF CARE
Nutrition Problem #1: Swallowing difficulty  Intervention: Food and/or Nutrient Delivery: Continue Current Diet, Start Oral Nutrition Supplement  Nutritional  Goals: PO intake >75%. Weight gain.

## 2022-08-02 NOTE — PROGRESS NOTES
Nephrology Progress Note  Afebrile  labs pendiing  Assessment:  UTI-sepsis  stable  ESRDX  Liver transplant        Plan:  labs pending     Patient Active Problem List:     Hypotension     ESRD (end stage renal disease) on dialysis ([facility] [address]     Liver transplanted ([facility] [address]     Liver transplant recipient ([facility] [address]     Sepsis ([facility] [address]     Gastroenteritis     C. difficile colitis     Severe sepsis ([facility] [address]     Vomiting and diarrhea     Generalized abdominal pain     Acute renal failure (HCC)     Acute respiratory failure with hypoxia (HCC)     Pneumonia due to infectious organism     Pleural effusion      Subjective:  Admit Date: 7/25/2022    Interval History: no issues    Medications:  Scheduled Meds:   midodrine  2.5 mg Oral TID WC    metoprolol tartrate  12.5 mg Oral BID    pantoprazole  40 mg Oral QAM AC    apixaban  10 mg Oral BID    Followed by    Yefri Frausto ON 8/6/2022] apixaban  5 mg Oral BID    sulfamethoxazole-trimethoprim (BACTRIM) IVPB  7.5 mg/kg IntraVENous Daily    aspirin  81 mg Oral Daily    docusate sodium  100 mg Oral BID    insulin lispro  0-4 Units SubCUTAneous 4x Daily AC & HS    polyethylene glycol  17 g Oral Daily    tacrolimus  1 mg Oral BID    sodium chloride flush  5-40 mL IntraVENous 2 times per day    lidocaine  5 mL IntraDERmal Once     Continuous Infusions:   sodium chloride      dextrose      sodium chloride         CBC:   Recent Labs     07/31/22  0600   WBC 10.0   HGB 10.5*        CMP:    Recent Labs     07/31/22  0600   *   K 4.1   CL 89*   CO2 28   BUN 33*   CREATININE 4.89*   GLUCOSE 77   CALCIUM 8.9   LABGLOM 11.9*     Troponin: No results for input(s): TROPONINI in the last 72 hours. BNP: No results for input(s): BNP in the last 72 hours. INR: No results for input(s): INR in the last 72 hours. Lipids: No results for input(s): CHOL, LDLDIRECT, TRIG, HDL, AMYLASE, LIPASE in the last 72 hours.   Liver:   Recent Labs     07/31/22  0600   LABALBU 2.9*     Iron:  No results for input(s): IRONS,

## 2022-08-02 NOTE — PROGRESS NOTES
Cardiovascular: Negative. Gastrointestinal: Negative. Physical Exam  Constitutional:       Appearance: He is not ill-appearing. Cardiovascular:      Heart sounds: Normal heart sounds. No murmur heard. Pulmonary:      Effort: Pulmonary effort is normal. No respiratory distress. Breath sounds: No wheezing, rhonchi or rales. Abdominal:      General: Abdomen is flat. Bowel sounds are normal. There is no distension. Palpations: There is no mass. Tenderness: There is no abdominal tenderness. There is no guarding. Blood pressure (!) 117/28, pulse 94, temperature 97.7 °F (36.5 °C), temperature source Oral, resp. rate 18, height 5' 6\" (1.676 m), weight 142 lb 6.7 oz (64.6 kg), SpO2 98 %.       .   Lab Results   Component Value Date    WBC 10.0 07/31/2022    HGB 10.5 (L) 07/31/2022    HCT 31.9 (L) 07/31/2022    .1 (H) 07/31/2022     07/31/2022     Lab Results   Component Value Date/Time     07/31/2022 06:00 AM    K 4.1 07/31/2022 06:00 AM    CL 89 07/31/2022 06:00 AM    CO2 28 07/31/2022 06:00 AM    BUN 33 07/31/2022 06:00 AM    CREATININE 4.89 07/31/2022 06:00 AM    GLUCOSE 77 07/31/2022 06:00 AM    GLUCOSE 136 02/25/2012 05:10 PM    CALCIUM 8.9 07/31/2022 06:00 AM          Chest x-ray shows right effusion much improved      ASSESSMENT:  Patient Active Problem List   Diagnosis    Hypotension    ESRD (end stage renal disease) on dialysis (HCC)    Liver transplanted ([facility] [address]    Liver transplant recipient ([facility] [address]    Sepsis ([facility] [address]    Gastroenteritis    C. difficile colitis    Severe sepsis ([facility] [address]    Vomiting and diarrhea    Generalized abdominal pain    Acute renal failure (HCC)    Acute respiratory failure with hypoxia (HCC)    Pneumonia due to infectious organism    Pleural effusion         PLAN:  Pneumonia  Stenotrophomonas from sputum  Continue Bactrim

## 2022-08-02 NOTE — CARE COORDINATION
Met with patient to discuss dc plan. I brought up that therapy would be seeing him and that he hasn't been up in 8 days and will likely need rehab/snf. I explained [id number] rehab and [facility] to him as well as letting him know he has [address] and all nursing homes have area where pt can go short term for rehab. We do not have therapy notes yet to assess findings and he right now is saying he thinks he will be able to go home. I let him know to think of going somewhere short term before home. I let him know we will see him again tomorrow and explained how insurance [employer] is needed.

## 2022-08-02 NOTE — PROGRESS NOTES
Internal Medicine   Hospitalist   Progress Note    2022   10:00 AM    Name:  Rolanda Diggs  MRN:    [id number]     IP Day: 8     Admit Date: 2022 11:15 AM  PCP: Geovanny Garcia MD    Code Status:  Full Code    Assessment and Plan: Active Problems/ diagnosis:     Acute hypoxic respiratory failure in the setting of aspiration pneumonia-required intubation, extubated now on nasal cannula  Sepsis present admission  End-stage renal disease  Hypotension in the setting of dialysis-on midodrine  Bilateral upper and lower extremity acute DVT on Eliquis  Anemia  History of liver transplant    Plan  Will place PICC line due to difficult access  Continue to monitor medical floor  Antibiotic as per infectious disease. Currently on IV Bactrim. Zosyn stopped   HD as per nephrology  Resume Eliquis  Monitor H&H  Home medication resumed  Resume immunosuppressants for history of liver transplant, Prograf  Will initiate PT/OT  DVT/PUD PPx     7 pm- 7 am, please contact on call Hospitalist for any needs     Subjective:      no new event.       Physical Examination:      Vitals:  BP (!) 117/28   Pulse 94   Temp 97.7 °F (36.5 °C) (Oral)   Resp 18   Ht 5' 6\" (1.676 m)   Wt 142 lb 6.7 oz (64.6 kg)   SpO2 98%   BMI 22.99 kg/m²   Temp (24hrs), Av.5 °F (36.9 °C), Min:97.5 °F (36.4 °C), Max:99.7 °F (37.6 °C)      General appearance: alert, cooperative and no distress  Mental Status: oriented to person, place and time and normal affect  Lungs: clear to auscultation bilaterally, normal effort  Heart: regular rate and rhythm, no murmur  Abdomen: soft, nontender, nondistended, bowel sounds present, no masses  Extremities: + upper and lower edema, pulses intact   Skin multiple area of bruising related to IV access in upper and lower extremities    Data:     Labs:  Recent Labs     22  0600   WBC 10.0   HGB 10.5*        Recent Labs     22  0600   *   K 4.1   CL 89*   CO2 28   BUN 33* CREATININE 4.89*   GLUCOSE 77     No results for input(s): AST, ALT, ALB, BILITOT, ALKPHOS in the last 72 hours.     Current Facility-Administered Medications   Medication Dose Route Frequency Provider Last Rate Last Admin    midodrine (PROAMATINE) tablet 2.5 mg  2.5 mg Oral TID  Marti Ortiz MD        metoprolol tartrate (LOPRESSOR) tablet 12.5 mg  12.5 mg Oral BID Marti Ortiz MD   12.5 mg at 08/02/22 0945    pantoprazole (PROTONIX) tablet 40 mg  40 mg Oral QAM AC ISI Davies CNP   40 mg at 08/02/22 0452    apixaban (ELIQUIS) tablet 10 mg  10 mg Oral BID Tasha Damon MD   10 mg at 08/01/22 2133    Followed by    Kinza Mendieta ON 8/6/2022] apixaban (ELIQUIS) tablet 5 mg  5 mg Oral BID Tasha Damon MD        sulfamethoxazole-trimethoprim (BACTRIM) 518.4 mg in dextrose 5 % 500 mL IVPB  7.5 mg/kg IntraVENous Daily ISI Davies - CNP   Stopped at 08/02/22 0941    aspirin EC tablet 81 mg  81 mg Oral Daily Marti Ortiz MD   81 mg at 08/01/22 1345    docusate sodium (COLACE) capsule 100 mg  100 mg Oral BID ISI Davies - CNP   100 mg at 07/30/22 2009    insulin lispro (HUMALOG) injection vial 0-4 Units  0-4 Units SubCUTAneous 4x Daily AC & HS Suzanne Medel MD        polyethylene glycol (GLYCOLAX) packet 17 g  17 g Oral Daily Darrell Mccormick APRN - CNP   17 g at 08/01/22 1337    tacrolimus (PROGRAF) capsule 1 mg  1 mg Oral BID ISI Davies - CNP   1 mg at 08/02/22 0945    sodium chloride flush 0.9 % injection 5-40 mL  5-40 mL IntraVENous 2 times per day ISI Davies - CNP   10 mL at 08/02/22 0942    sodium chloride flush 0.9 % injection 5-40 mL  5-40 mL IntraVENous PRN ISI Davies - CNP        0.9 % sodium chloride infusion   IntraVENous PRN Darrell Mccormick APRN - CNP        acetaminophen (TYLENOL) tablet 650 mg  650 mg Oral Q6H PRN Suzanne Medel MD   650 mg at 08/01/22 2132    Or    acetaminophen (TYLENOL) suppository 650 mg  650 mg Rectal Q6H PRN Suzanne Medel MD fentaNYL bolus from bag  50 mcg IntraVENous Q30 Min PRN Sergey Stoddard, APRN - CNP        glucose chewable tablet 16 g  4 tablet Oral PRN Sergey Bay, APRN - CNP        dextrose bolus 10% 125 mL  125 mL IntraVENous PRN Sergey Bay, APRN - CNP        Or    dextrose bolus 10% 250 mL  250 mL IntraVENous PRN Sergey Bay, APRN - CNP        glucagon (rDNA) injection 1 mg  1 mg SubCUTAneous PRN Sergey Bay, APRN - CNP        dextrose 10 % infusion   IntraVENous Continuous PRN Sergey Bay, APRN - CNP        heparin (porcine) injection 1,000 Units  1,000 Units IntraVENous PRN Etelvina Gonzales MD        lidocaine 2 % injection 5 mL  5 mL IntraDERmal Once Sergey Stoddard, APRN - CNP        0.9 % sodium chloride infusion  250 mL IntraVENous Once Sergey Bay, APRN - CNP           Additional work up or/and treatment plan may be added today or then after based on clinical progression. I am managing a portion of pt care. Some medical issues are handled by other specialists. Additional work up and treatment should be done in out pt setting by pt PCP and other out pt providers. In addition to examining and evaluating pt, I spent additional time explaining care, normaland abnormal findings, and treatment plan. All of pt questions were answered. Counseling, diet and education were provided. Case will be discussed with nursing staff when appropriate. Family will be updated if and when appropriate.        Electronically signed by Karl Hernandez DO on 8/2/2022 at 10:00 AM

## 2022-08-02 NOTE — PROGRESS NOTES
25-Jul-2020 11:05 Comprehensive Nutrition Assessment    Type and Reason for Visit:  Reassess    Nutrition Recommendations/Plan:   Trial high calorie supplement at breakfast, pudding supplement at lunch, frozen supplement and dinner  Continue to monitor   Noted elevated phosphorus levels 7/30, recommend repeat labs to assess need for diet modification     Malnutrition Assessment:  Malnutrition Status: At risk for malnutrition (Comment) (07/29/22 1417)    Context:  Chronic Illness     Findings of the 6 clinical characteristics of malnutrition:  Energy Intake:  Mild decrease in energy intake (Comment)  Weight Loss:  No significant weight loss     Body Fat Loss:  No significant body fat loss     Muscle Mass Loss:  No significant muscle mass loss    Fluid Accumulation:  Mild Generalized   Strength:  Not Performed    Nutrition Assessment:    Pt placed on purreed diet 7/29 per SLP recommendations. Intakes declined since starting dysphagia diet. RD to trial variety of supplements TID and continue to monitor. Nutrition Related Findings:    PMH: hypertension, hepatitis-s/p liver transplant, ESRD on hemodialysis M,W,F. , intubated 7/25-27 with trophic TF. S/p thoracentesis (7/26), labs noted, meds reviewed, anuric, last BM loose 8/1. +1-2 BUE/gen edema. BSE 7/29=pureed. Pt refusing food at times poor intakes since starting dysphagia diet. labs (7/30):phos=5.3. Jj=439 Wound Type: None       Current Nutrition Intake & Therapies:    Average Meal Intake: 26-50%  Average Supplements Intake: None Ordered  ADULT DIET;  Dysphagia - Pureed    Anthropometric Measures:  Height: 5' 6\" (167.6 cm)  Ideal Body Weight (IBW): 142 lbs (65 kg)    Admission Body Weight: 148 lb (67.1 kg)  Current Body Weight: 142 lb 6.7 oz (64.6 kg) (7/31),    Weight Source: Bed Scale  Current BMI (kg/m2): 23  Usual Body Weight: 145 lb (65.8 kg) (stated 6/28/22, 2020)  % Weight Change (Calculated): 2.1  Weight Adjustment For: No Adjustment                 BMI Categories: Normal Weight (BMI 22.0 to 24.9) age over 72    Estimated Daily Nutrient Needs:  Energy Requirements Based On: Kcal/kg  Weight Used for Energy Requirements: Admission  Energy (kcal/day): 0010-2730 kcals @ 25-28 kcal/kg  Weight Used for Protein Requirements: Admission (67.4 kg)  Protein (g/day): 101 gm (kg x 1.5)  Method Used for Fluid Requirements: Standard Renal  Fluid (ml/day): 500-800 + UOP (or as per MD)    Nutrition Diagnosis:   Swallowing difficulty related to cognitive or neurological impairment as evidenced by swallow study results    Nutrition Interventions:   Food and/or Nutrient Delivery: Continue Current Diet, Start Oral Nutrition Supplement  Nutrition Education/Counseling: No recommendation at this time  Coordination of Nutrition Care: Continue to monitor while inpatient       Goals:  Previous Goal Met: No Progress toward Goal(s)  Goals: PO intake 75% or greater       Nutrition Monitoring and Evaluation:      Food/Nutrient Intake Outcomes: Supplement Intake, Food and Nutrient Intake, Diet Advancement/Tolerance  Physical Signs/Symptoms Outcomes: Chewing or Swallowing, Biochemical Data, Weight, Meal Time Behavior    Discharge Planning:     Too soon to determine     Casey Irving, MS, RD, LD 25-Jul-2020 11:08

## 2022-08-02 NOTE — PROGRESS NOTES
PT STATED THAT HE CANNOT WEAR BIPAP AT NIGHT BECAUSE HE CANT SLEEP. ENCOURAGED PT TO WEAR AT NIGHT. PT AGREED TO WEAR IT NOW BEFORE HE GOES  TO BED.    2100- UPDATE PT OFF BIPAP.

## 2022-08-03 PROBLEM — R13.12 DYSPHAGIA, OROPHARYNGEAL PHASE: Status: ACTIVE | Noted: 2022-08-03

## 2022-08-03 PROBLEM — R04.0 EPISTAXIS: Status: ACTIVE | Noted: 2022-08-03

## 2022-08-03 PROBLEM — Z74.09 IMPAIRED MOBILITY AND ACTIVITIES OF DAILY LIVING: Status: ACTIVE | Noted: 2022-08-03

## 2022-08-03 PROBLEM — Z78.9 IMPAIRED MOBILITY AND ACTIVITIES OF DAILY LIVING: Status: ACTIVE | Noted: 2022-08-03

## 2022-08-03 LAB
ALBUMIN SERPL-MCNC: 2.8 G/DL (ref 3.5–4.6)
ANION GAP SERPL CALCULATED.3IONS-SCNC: 15 MEQ/L (ref 9–15)
BASOPHILS ABSOLUTE: 0.1 K/UL (ref 0–0.2)
BASOPHILS RELATIVE PERCENT: 0.7 %
BUN BLDV-MCNC: 39 MG/DL (ref 8–23)
CALCIUM SERPL-MCNC: 8.6 MG/DL (ref 8.5–9.9)
CHLORIDE BLD-SCNC: 88 MEQ/L (ref 95–107)
CO2: 26 MEQ/L (ref 20–31)
CREAT SERPL-MCNC: 5.19 MG/DL (ref 0.7–1.2)
EOSINOPHILS ABSOLUTE: 0.2 K/UL (ref 0–0.7)
EOSINOPHILS RELATIVE PERCENT: 1.6 %
GFR AFRICAN AMERICAN: 13.4
GFR NON-AFRICAN AMERICAN: 11.1
GLUCOSE BLD-MCNC: 124 MG/DL (ref 70–99)
GLUCOSE BLD-MCNC: 81 MG/DL (ref 70–99)
GLUCOSE BLD-MCNC: 85 MG/DL (ref 70–99)
GLUCOSE BLD-MCNC: 94 MG/DL (ref 70–99)
HCT VFR BLD CALC: 25.3 % (ref 42–52)
HCT VFR BLD CALC: 26.5 % (ref 42–52)
HEMOGLOBIN: 8.4 G/DL (ref 14–18)
HEMOGLOBIN: 8.7 G/DL (ref 14–18)
LYMPHOCYTES ABSOLUTE: 0.4 K/UL (ref 1–4.8)
LYMPHOCYTES RELATIVE PERCENT: 4.1 %
MCH RBC QN AUTO: 34.3 PG (ref 27–31.3)
MCHC RBC AUTO-ENTMCNC: 33 % (ref 33–37)
MCV RBC AUTO: 104 FL (ref 80–100)
MONOCYTES ABSOLUTE: 1 K/UL (ref 0.2–0.8)
MONOCYTES RELATIVE PERCENT: 10.8 %
NEUTROPHILS ABSOLUTE: 7.9 K/UL (ref 1.4–6.5)
NEUTROPHILS RELATIVE PERCENT: 82.8 %
PDW BLD-RTO: 14.2 % (ref 11.5–14.5)
PERFORMED ON: ABNORMAL
PERFORMED ON: NORMAL
PERFORMED ON: NORMAL
PHOSPHORUS: 5 MG/DL (ref 2.3–4.8)
PLATELET # BLD: 230 K/UL (ref 130–400)
POTASSIUM SERPL-SCNC: 4 MEQ/L (ref 3.4–4.9)
RBC # BLD: 2.55 M/UL (ref 4.7–6.1)
SODIUM BLD-SCNC: 129 MEQ/L (ref 135–144)
WBC # BLD: 9.5 K/UL (ref 4.8–10.8)

## 2022-08-03 PROCEDURE — 2500000003 HC RX 250 WO HCPCS: Performed by: NURSE PRACTITIONER

## 2022-08-03 PROCEDURE — 85018 HEMOGLOBIN: CPT

## 2022-08-03 PROCEDURE — 97162 PT EVAL MOD COMPLEX 30 MIN: CPT

## 2022-08-03 PROCEDURE — 99232 SBSQ HOSP IP/OBS MODERATE 35: CPT | Performed by: INTERNAL MEDICINE

## 2022-08-03 PROCEDURE — 2580000003 HC RX 258: Performed by: NURSE PRACTITIONER

## 2022-08-03 PROCEDURE — 92526 ORAL FUNCTION THERAPY: CPT

## 2022-08-03 PROCEDURE — 6370000000 HC RX 637 (ALT 250 FOR IP): Performed by: INTERNAL MEDICINE

## 2022-08-03 PROCEDURE — 97166 OT EVAL MOD COMPLEX 45 MIN: CPT

## 2022-08-03 PROCEDURE — 2580000003 HC RX 258: Performed by: INTERNAL MEDICINE

## 2022-08-03 PROCEDURE — 36415 COLL VENOUS BLD VENIPUNCTURE: CPT

## 2022-08-03 PROCEDURE — 85014 HEMATOCRIT: CPT

## 2022-08-03 PROCEDURE — 80069 RENAL FUNCTION PANEL: CPT

## 2022-08-03 PROCEDURE — 6360000002 HC RX W HCPCS: Performed by: NURSE PRACTITIONER

## 2022-08-03 PROCEDURE — 6370000000 HC RX 637 (ALT 250 FOR IP): Performed by: NURSE PRACTITIONER

## 2022-08-03 PROCEDURE — 97535 SELF CARE MNGMENT TRAINING: CPT

## 2022-08-03 PROCEDURE — 2700000000 HC OXYGEN THERAPY PER DAY

## 2022-08-03 PROCEDURE — 1210000000 HC MED SURG R&B

## 2022-08-03 PROCEDURE — 6370000000 HC RX 637 (ALT 250 FOR IP): Performed by: FAMILY MEDICINE

## 2022-08-03 PROCEDURE — 85025 COMPLETE CBC W/AUTO DIFF WBC: CPT

## 2022-08-03 RX ORDER — OXYMETAZOLINE HYDROCHLORIDE 0.05 G/100ML
2 SPRAY NASAL 2 TIMES DAILY
Status: COMPLETED | OUTPATIENT
Start: 2022-08-03 | End: 2022-08-04

## 2022-08-03 RX ORDER — FLUTICASONE PROPIONATE 50 MCG
1 SPRAY, SUSPENSION (ML) NASAL DAILY
Status: DISCONTINUED | OUTPATIENT
Start: 2022-08-03 | End: 2022-08-19 | Stop reason: HOSPADM

## 2022-08-03 RX ORDER — SODIUM CHLORIDE/ALOE VERA
GEL (GRAM) NASAL 2 TIMES DAILY
Status: DISPENSED | OUTPATIENT
Start: 2022-08-03 | End: 2022-08-05

## 2022-08-03 RX ADMIN — METOPROLOL TARTRATE 12.5 MG: 25 TABLET, FILM COATED ORAL at 08:48

## 2022-08-03 RX ADMIN — OXYMETAZOLINE HCL 2 SPRAY: 0.05 SPRAY NASAL at 22:37

## 2022-08-03 RX ADMIN — Medication 10 ML: at 22:35

## 2022-08-03 RX ADMIN — APIXABAN 10 MG: 5 TABLET, FILM COATED ORAL at 08:48

## 2022-08-03 RX ADMIN — APIXABAN 10 MG: 5 TABLET, FILM COATED ORAL at 22:27

## 2022-08-03 RX ADMIN — MIDODRINE HYDROCHLORIDE 2.5 MG: 5 TABLET ORAL at 08:48

## 2022-08-03 RX ADMIN — TACROLIMUS 1 MG: 1 CAPSULE ORAL at 22:28

## 2022-08-03 RX ADMIN — Medication 10 ML: at 08:53

## 2022-08-03 RX ADMIN — OXYMETAZOLINE HCL 2 SPRAY: 0.05 SPRAY NASAL at 09:59

## 2022-08-03 RX ADMIN — PANTOPRAZOLE SODIUM 40 MG: 40 TABLET, DELAYED RELEASE ORAL at 06:40

## 2022-08-03 RX ADMIN — TACROLIMUS 1 MG: 1 CAPSULE ORAL at 08:49

## 2022-08-03 RX ADMIN — ACETAMINOPHEN 650 MG: 325 TABLET, FILM COATED ORAL at 08:49

## 2022-08-03 RX ADMIN — MIDODRINE HYDROCHLORIDE 2.5 MG: 5 TABLET ORAL at 12:00

## 2022-08-03 RX ADMIN — ASPIRIN 81 MG: 81 TABLET, COATED ORAL at 08:48

## 2022-08-03 RX ADMIN — SULFAMETHOXAZOLE AND TRIMETHOPRIM 518.4 MG: 80; 16 INJECTION, SOLUTION, CONCENTRATE INTRAVENOUS at 11:27

## 2022-08-03 ASSESSMENT — ENCOUNTER SYMPTOMS
PHOTOPHOBIA: 0
SHORTNESS OF BREATH: 1
DIARRHEA: 0
BLOOD IN STOOL: 0
STRIDOR: 0
SORE THROAT: 0
CONSTIPATION: 1
GASTROINTESTINAL NEGATIVE: 1
BACK PAIN: 1
ABDOMINAL PAIN: 0
NAUSEA: 0
VOMITING: 0
WHEEZING: 1
COUGH: 0
EYE REDNESS: 0
SHORTNESS OF BREATH: 0
EYE PAIN: 0

## 2022-08-03 ASSESSMENT — PAIN SCALES - GENERAL
PAINLEVEL_OUTOF10: 0
PAINLEVEL_OUTOF10: 0

## 2022-08-03 ASSESSMENT — PAIN DESCRIPTION - DESCRIPTORS: DESCRIPTORS: ACHING

## 2022-08-03 NOTE — PROGRESS NOTES
Nephrology Progress Note    Assessment:  ESRDx  Epistaxis on blood thinners  Bilateral carotid DVT  Hx Liver transplant      Plan: dialysis today  flonase nasal bleed + saline nasal     Patient Active Problem List:     Hypotension     ESRD (end stage renal disease) on dialysis ([facility] [address]     Liver transplanted ([facility] [address]     Liver transplant recipient ([facility] [address]     Sepsis ([facility] [address]     Gastroenteritis     C. difficile colitis     Severe sepsis ([facility] [address]     Vomiting and diarrhea     Generalized abdominal pain     Acute renal failure (HCC)     Acute respiratory failure with hypoxia (HCC)     Pneumonia due to infectious organism     Pleural effusion      Subjective:  Admit Date: 7/25/2022    Interval History: no issues  on-off nose  bleed    Medications:  Scheduled Meds:   sodium chloride flush  5-40 mL IntraVENous 2 times per day    midodrine  2.5 mg Oral TID WC    metoprolol tartrate  12.5 mg Oral BID    pantoprazole  40 mg Oral QAM AC    apixaban  10 mg Oral BID    Followed by    Teddy Simeon ON 8/6/2022] apixaban  5 mg Oral BID    sulfamethoxazole-trimethoprim (BACTRIM) IVPB  7.5 mg/kg IntraVENous Daily    aspirin  81 mg Oral Daily    docusate sodium  100 mg Oral BID    insulin lispro  0-4 Units SubCUTAneous 4x Daily AC & HS    polyethylene glycol  17 g Oral Daily    tacrolimus  1 mg Oral BID     Continuous Infusions:   sodium chloride      dextrose         CBC:   Recent Labs     08/03/22  0524   WBC 9.5   HGB 8.7*        CMP:    Recent Labs     08/03/22  0524   *   K 4.0   CL 88*   CO2 26   BUN 39*   CREATININE 5.19*   GLUCOSE 81   CALCIUM 8.6   LABGLOM 11.1*     Troponin: No results for input(s): TROPONINI in the last 72 hours. BNP: No results for input(s): BNP in the last 72 hours. INR: No results for input(s): INR in the last 72 hours. Lipids: No results for input(s): CHOL, LDLDIRECT, TRIG, HDL, AMYLASE, LIPASE in the last 72 hours.   Liver:   Recent Labs     08/03/22  0524   LABALBU 2.8*     Iron:  No results for input(s): IRONS, FERRITIN in the last 72 hours. Invalid input(s): LABIRONS  Urinalysis: No results for input(s): UA in the last 72 hours.     Objective:  Vitals: /75   Pulse 97   Temp 98.6 °F (37 °C)   Resp 20   Ht 5' 6\" (1.676 m)   Wt 142 lb 6.7 oz (64.6 kg)   SpO2 97%   BMI 22.99 kg/m²    Wt Readings from Last 3 Encounters:   08/01/22 142 lb 6.7 oz (64.6 kg)   06/28/22 145 lb (65.8 kg)   03/18/20 145 lb (65.8 kg)      24HR INTAKE/OUTPUT:  No intake or output data in the 24 hours ending 08/03/22 0828    General: alert, in no apparent distress  HEENT: normocephalic, atraumatic, anicteric  Neck: supple, no mass  Lungs: non-labored respirations, clear to auscultation bilaterally  Heart: regular rate and rhythm, no murmurs or rubs  Abdomen: soft, non-tender, non-distended  Ext: no cyanosis, no peripheral edema left arm access  Neuro: alert and oriented, no gross abnormalities  Psych: normal mood and affect  Skin: no rash      Electronically signed by Anil Pereira DO, MD

## 2022-08-03 NOTE — PLAN OF CARE
See OT evaluation for all goals and OT POC. Electronically signed by LOC lFynn/L on 8/3/2022 at 10:06 AM    Problem: Occupational Therapy - Adult  Goal: By Discharge: Performs self-care activities at highest level of function for planned discharge setting. See evaluation for individualized goals.   Outcome: Not Progressing

## 2022-08-03 NOTE — PROGRESS NOTES
Physical Therapy Med Surg Initial Assessment  Facility/Department: [address] [place] [address] [address]  Room: [place] [address]       NAME: Jozef Glover  : 1955 (75 y.o.)  MRN: [id number]  CODE STATUS: Full Code    Date of Service: 8/3/2022    Patient Diagnosis(es): Acute respiratory failure with hypoxia [facility]) [J96.01]   Chief Complaint   Patient presents with    Shortness of Breath     C/O SOB  AFTER DIALYSIS  88 % ON RA  GIVEN 2 DUONEBS  SOLU MEDROL      Patient Active Problem List    Diagnosis Date Noted    Pneumonia due to infectious organism 2022    Pleural effusion 2022    Acute respiratory failure with hypoxia ([facility] [address] 2022    Vomiting and diarrhea     Generalized abdominal pain     Acute renal failure (HCC)     Gastroenteritis     C. difficile colitis     Severe sepsis ([facility]     Hypotension 2017    ESRD (end stage renal disease) on dialysis ([facility] 2017    Liver transplanted ([facility] 2017    Liver transplant recipient [facility])     Sepsis [facility])         Past Medical History:   Diagnosis Date    ESRD (end stage renal disease) ([facility]     Hepatitis     Hypertension     Liver transplanted ([facility] 2016     Past Surgical History:   Procedure Laterality Date    DIALYSIS FISTULA CREATION Right     IR MIDLINE CATH  2022    IR MIDLINE CATH 2022 MLOZ SPECIAL PROCEDURE    LIVER TRANSPLANT  2017    TUNNELED VENOUS PORT PLACEMENT         Chief Reason: General Medical  Chart Reviewed: Yes  Patient assessed for rehabilitation services?: Yes    Restrictions:  Restrictions/Precautions: Fall Risk     SUBJECTIVE:   Subjective: Pt denies pain. Pt reports bowel incontinence.     Pain   Pre treatment screenin/10   Post treatment screening 0/10    Prior Level of Function:  Social/Functional History  Lives With: Alone  Type of Home: Apartment  Home Layout: One level (4th floor)  Home Access: Elevator, Level entry  Bathroom Shower/Tub: Tub/Shower unit  Bathroom Equipment: Grab bars in shower  Home Equipment: Rollator, Cane  ADL Assistance: Independent  Homemaking Assistance: Independent  Ambulation Assistance: Independent  Transfer Assistance: Independent  Active : Yes  Occupation: Retired  Additional Comments: Sister lives nearby    OBJECTIVE:   Vision  Vision: Impaired  Vision Exceptions: Wears glasses for reading  Hearing: Within functional limits    Cognition:  Orientation Level: Oriented to person, Oriented to place  Follows Commands: Within Functional Limits  Overall Cognitive Status: Exceptions  Cognition Comment: Verbal cues for safety and sequencing    Observation/Palpation  Posture: Fair  Observation: alert, pleasant, cooperative, on RA upon arrival, no acute distress noted, pt incontinent of dark sticky stool- RN notified and aware. During bed change and pt care, pt appeared SOB, SPO2 assessed with difficulty obtaining accurate reading on fingers and ears, reading ~83-84%, Pt placed on O2 per Dr. Enrique Huerta, 2 L with improvement to 90-91%, RN notified and aware. Pt then placed on 4L O2 as this appears to be the last documented SPO2 in pt's medical record last night 8/2/22. SPO2 93%,  bpm upon departure. Additionally, pt experienced bloody nose with large mucus/clot noted. Dr. Enrique Huerta also aware. Pt resting comfortably in bedside recliner with B LEs elevated, blanket given, call light in reach.   Edema: 3+ edema in B UEs    ROM:  AROM: Generally decreased, functional  PROM: Generally decreased, functional (impaired hip flexor and HS flexibility bilaterally)    Strength:  Strength: Generally decreased, functional (functionally >/=3+/5)    Neuro:  Balance  Sitting - Static: Good;-  Sitting - Dynamic: Good;-  Standing - Static: Fair (requires B UE support with CGA to Min A)  Standing - Dynamic: Fair;- (Min to Mod A)      Tone: Normal  Coordination: Generally decreased, functional     Bed mobility  Rolling to Left: Minimal assistance  Rolling to Right: Minimal assistance  Supine to Sit: Moderate assistance  Sit to Supine: Unable to assess (Up to bedside chair)  Bed Mobility Comments: Significant increased time and effort    Transfers  Sit to Stand: Minimal Assistance  Stand to sit: Minimal Assistance    Ambulation  Surface: level tile  Device: Rolling Walker  Other Apparatus: O2 (2L O2 NC, increased to 4L O2 NC RN notified and aware)  Assistance: Moderate assistance  Quality of Gait: flexed posture  Gait Deviations: Slow Whitney;Decreased step length;Decreased step height;Decreased arm swing  Distance: 5ft  Comments: SOB upon exertion, HR elevated 110s with exertional dyspnea observed, O2 increased d/t low O2 sats     Activity Tolerance  Activity Tolerance: Patient limited by endurance      Patient Education  Education Given To: Patient  Education Provided: Role of Therapy; Fall Prevention Strategies;Transfer Training;Plan of Care  Education Method: Verbal  Barriers to Learning: None  Education Outcome: Continued education needed     ASSESSMENT:   Body Structures, Functions, Activity Limitations Requiring Skilled Therapeutic Intervention: Decreased functional mobility ; Decreased safe awareness;Decreased balance; Increased pain;Decreased posture;Decreased endurance  Decision Making: High Complexity  History: high  Exam: high  Clinical Presentation: high    Barriers to Learning: none    DISCHARGE RECOMMENDATIONS:  Discharge Recommendations: Patient would benefit from continued therapy after discharge  Assessment: Pt demonstrates the above deficits and decline in functional mobility status placing them at increased risk for falls. Pt is not safe to return home at his current level of function. Pt would benefit from physical therapy to address above deficits and allow for safe return home at highest level of function, decrease risk for falls, and improve QOL.     Requires PT Follow-Up: Yes       PLAN OF CARE:  Plan  Plan: 1 time a day 3-6 times a week  Current Treatment Recommendations: Strengthening, ROM, Balance training, Functional mobility training, Transfer training, Gait training, Stair training, Cognitive reorientation, Patient/Caregiver education & training, Safety education & training, Equipment evaluation, education, & procurement, Home exercise program, Therapeutic activities, Positioning, Modalities, Neuromuscular re-education    Safety Devices  Type of Devices: All fall risk precautions in place, Call light within reach, Chair alarm in place, Left in chair  Restraints  Restraints Initially in Place: No    Goals:  Patient goals : to get my therapy and get stronger  Long Term Goals  Long term goal 1: Bed mobility with indep  Long term goal 2: Functional transfers with indep  Long term goal 3: Amb 50ft with LRAD and indep  Long term goal 4: TUG <15 seconds to demonstrate decreased fall risk  Long term goal 5: Pt will be supervision with HEP to improve LE strength, ROM, and activity tolerance    [facility] (6 CLICK) BASIC MOBILITY  AM-PAC Inpatient Mobility Raw Score : 12     Therapy Time:   Individual   Time In 0900   Time Out 0936   Minutes [address] [place], [place], 08/03/22 at 9:56 AM         Definitions for assistance levels  Independent = pt does not require any physical supervision or assistance from another person for activity completion. Device may be needed.   Stand by assistance = pt requires verbal cues or instructions from another person, close to but not touching, to perform the activity  Minimal assistance= pt performs 75% or more of the activity; assistance is required to complete the activity  Moderate assistance= pt performs 50% of the activity; assistance is required to complete the activity  Maximal assistance = pt performs 25% of the activity; assistance is required to complete the activity  Dependent = pt requires total physical assistance to accomplish the task

## 2022-08-03 NOTE — PROGRESS NOTES
Internal Medicine   Hospitalist   Progress Note    8/3/2022   10:22 AM    Name:  Juan Schuler  MRN:    [id number]     IP Day: 9     Admit Date: 2022 11:15 AM  PCP: Juan Carlos Oconnell MD    Code Status:  Full Code    Assessment and Plan: Active Problems/ diagnosis:     Acute hypoxic respiratory failure in the setting of aspiration pneumonia-required intubation, extubated now on nasal cannula  Sepsis present admission  End-stage renal disease  Hypotension in the setting of dialysis-on midodrine  Bilateral upper and lower extremity acute DVT on Eliquis  Anemia  History of liver transplant    Plan  Patient is having episode of epistaxis and ?melena. Given stability and stable hemoglobin, will continue Eliquis for now and monitor his H&H closely. Resume PPIs. Continue to monitor medical floor  Antibiotic as per infectious disease. Currently on IV Bactrim. Zosyn stopped   HD as per nephrology  Home medication resumed  Resume immunosuppressants for history of liver transplant, Prograf  Resume PT/OT  DVT/PUD PPx     7 pm- 7 am, please contact on call Hospitalist for any needs     Dispo-we will monitor H&H for the next 24 to 48 hours given epistaxis and ? melena    Subjective:      no new event.       Physical Examination:      Vitals:  /67   Pulse 86   Temp 98.4 °F (36.9 °C) (Oral)   Resp 20   Ht 5' 6\" (1.676 m)   Wt 142 lb 6.7 oz (64.6 kg)   SpO2 96%   BMI 22.99 kg/m²   Temp (24hrs), Av.2 °F (36.8 °C), Min:97.5 °F (36.4 °C), Max:98.6 °F (37 °C)      General appearance: alert, cooperative and no distress  Mental Status: oriented to person, place and time and normal affect  Lungs: clear to auscultation bilaterally, normal effort  Heart: regular rate and rhythm, no murmur  Abdomen: soft, nontender, nondistended, bowel sounds present, no masses  Extremities: + upper and lower edema, pulses intact   Skin multiple area of bruising related to IV access in upper and lower extremities    Data: Labs:  Recent Labs     08/03/22  0524   WBC 9.5   HGB 8.7*        Recent Labs     08/03/22  0524   *   K 4.0   CL 88*   CO2 26   BUN 39*   CREATININE 5.19*   GLUCOSE 81     No results for input(s): AST, ALT, ALB, BILITOT, ALKPHOS in the last 72 hours.     Current Facility-Administered Medications   Medication Dose Route Frequency Provider Last Rate Last Admin    fluticasone (FLONASE) 50 MCG/ACT nasal spray 1 spray  1 spray Each Nostril Daily Tequila Louise, DO        saline nasal gel (AYR)   Nasal BID Isidro Akins, DO        oxymetazoline (AFRIN) 0.05 % nasal spray 2 spray  2 spray Each Nostril BID Pitney David, DO   2 spray at 08/03/22 0959    sodium chloride flush 0.9 % injection 5-40 mL  5-40 mL IntraVENous 2 times per day Pitney David, DO   10 mL at 08/03/22 0853    sodium chloride flush 0.9 % injection 5-40 mL  5-40 mL IntraVENous PRN Pitney David, DO        0.9 % sodium chloride infusion   IntraVENous PRN Pitney David, DO        midodrine (PROAMATINE) tablet 2.5 mg  2.5 mg Oral TID WC Jhonny Centeno MD   2.5 mg at 08/03/22 0848    metoprolol tartrate (LOPRESSOR) tablet 12.5 mg  12.5 mg Oral BID Jhonny Centeno MD   12.5 mg at 08/03/22 0848    pantoprazole (PROTONIX) tablet 40 mg  40 mg Oral QAM AC ISI Stark CNP   40 mg at 08/03/22 0640    apixaban (ELIQUIS) tablet 10 mg  10 mg Oral BID Leticia Langston MD   10 mg at 08/03/22 0848    Followed by    Kemar Rogers ON 8/6/2022] apixaban (ELIQUIS) tablet 5 mg  5 mg Oral BID Leticia Langston MD        sulfamethoxazole-trimethoprim (BACTRIM) 518.4 mg in dextrose 5 % 500 mL IVPB  7.5 mg/kg IntraVENous Daily ISI Stark - CNP   Stopped at 08/02/22 0941    aspirin EC tablet 81 mg  81 mg Oral Daily Jhonny Centeno MD   81 mg at 08/03/22 0848    docusate sodium (COLACE) capsule 100 mg  100 mg Oral BID ISI Stark CNP   100 mg at 07/30/22 2009    insulin lispro (HUMALOG) injection vial 0-4 Units  0-4 Units SubCUTAneous 4x Daily AC & HS Azeb Gutiérrez MD   1 Units at 08/02/22 2248    polyethylene glycol (GLYCOLAX) packet 17 g  17 g Oral Daily Al Tucker APRN - CNP   17 g at 08/01/22 1337    tacrolimus (PROGRAF) capsule 1 mg  1 mg Oral BID Al Tucker, APRN - CNP   1 mg at 08/03/22 0849    acetaminophen (TYLENOL) tablet 650 mg  650 mg Oral Q6H PRN Azeb Gutiérrez MD   650 mg at 08/03/22 3589    Or    acetaminophen (TYLENOL) suppository 650 mg  650 mg Rectal Q6H PRN Azeb Gutiérrez MD        fentaNYL bolus from bag  50 mcg IntraVENous Q30 Min PRN Al Tucker, APRN - CNP        glucose chewable tablet 16 g  4 tablet Oral PRN Al Tucker, APRN - CNP        dextrose bolus 10% 125 mL  125 mL IntraVENous PRN Al Tucker, APRN - CNP        Or    dextrose bolus 10% 250 mL  250 mL IntraVENous PRN Al Tucker, APRN - CNP        glucagon (rDNA) injection 1 mg  1 mg SubCUTAneous PRN Al Tucker, APRN - CNP        dextrose 10 % infusion   IntraVENous Continuous PRN Alraul Ceballos, APRN - CNP        heparin (porcine) injection 1,000 Units  1,000 Units IntraVENous PRN Nadira Alexis MD           Additional work up or/and treatment plan may be added today or then after based on clinical progression. I am managing a portion of pt care. Some medical issues are handled by other specialists. Additional work up and treatment should be done in out pt setting by pt PCP and other out pt providers. In addition to examining and evaluating pt, I spent additional time explaining care, normaland abnormal findings, and treatment plan. All of pt questions were answered. Counseling, diet and education were provided. Case will be discussed with nursing staff when appropriate. Family will be updated if and when appropriate.        Electronically signed by Gagan Verdin DO on 8/3/2022 at 10:22 AM

## 2022-08-03 NOTE — FLOWSHEET NOTE
New dressing applies to right arm Midline. Midline appears to be pulled out several cm's. Medication infusing at this time through alaris pump without difficulty. Per [facility] LPN midline catheter has good blood return. Clot noted to catheter site, new quick clot to site with guaze and Tegaderm dressing and pressure dressing applied on top. [facility] LPN aware of new dressing at this time.

## 2022-08-03 NOTE — CARE COORDINATION
Spoke to therapy re:patients dc plan and they feel he is a good rehab candidate. They discussed with him during eval and pt off unit at present in dialysis. Inpatient rehab eval ordered and rounds with  pt may be ready to go tomorrow and he will need precert.

## 2022-08-03 NOTE — PROGRESS NOTES
[facility] OCCUPATIONAL THERAPY EVALUATION - ACUTE     NAME: Lucrecia Olson  : 1955 (75 y.o.)  MRN: [id number]  CODE STATUS: Full Code  [facility]    Date of Service: 8/3/2022    Patient Diagnosis(es): Acute respiratory failure with hypoxia ([facility] [J96.01]   Patient Active Problem List    Diagnosis Date Noted    Pneumonia due to infectious organism 2022    Pleural effusion 2022    Acute respiratory failure with hypoxia ([address] 2022    Vomiting and diarrhea     Generalized abdominal pain     Acute renal failure (HCC)     Gastroenteritis     C. difficile colitis     Severe sepsis ([address]     Hypotension 2017    ESRD (end stage renal disease) on dialysis ([address] 2017    Liver transplanted ([facility] [address] 2017    Liver transplant recipient [facility])     Sepsis [facility])         Past Medical History:   Diagnosis Date    ESRD (end stage renal disease) ([address]     Hepatitis     Hypertension     Liver transplanted ([facility] [address] 2016     Past Surgical History:   Procedure Laterality Date    DIALYSIS FISTULA CREATION Right     IR MIDLINE CATH  2022    IR MIDLINE CATH 2022 MLOZ SPECIAL PROCEDURE    LIVER TRANSPLANT  2017    TUNNELED VENOUS PORT PLACEMENT          Restrictions  Restrictions/Precautions: Fall Risk      Safety Devices: Safety Devices  Type of Devices: All fall risk precautions in place;Call light within reach; Chair alarm in place; Left in chair     Patient's date of birth confirmed: Yes    General:  Chart Reviewed: Yes  Patient assessed for rehabilitation services?: Yes    Subjective  Subjective: \"I feel okay\"       Pain at start of treatment: No    Pain at end of treatment: No    Location:   Nursing notified: Not Applicable  RN:   Intervention: None    Prior Level of Function:  Social/Functional History  Lives With: Alone  Type of Home: Apartment  Home Layout: One level (4th floor)  Home Access: Elevator, Level entry  Bathroom Shower/Tub: Tub/Shower unit  Bathroom Equipment: Grab bars in shower  Home Equipment: [employer]  Has the patient had two or more falls in the past year or any fall with injury in the past year?:  (One fall about 2 months ago bending over to pick something up)  ADL Assistance: Independent  Homemaking Assistance: Independent  Ambulation Assistance: Independent  Transfer Assistance: Independent  Active : Yes  Occupation: Retired  Additional Comments: Sister lives nearby    OBJECTIVE:     Orientation Status:  Orientation  Orientation Level: Oriented to person;Oriented to place    Observation:  Observation/Palpation  Posture: Fair  Observation: On therapist arrival pt on RA; per chart, pt was on 4L earlier this date. Assessed O2 per physician request.  Pt. with large bloody mucous when he blew his nose. Per LPN, pt had requested O2 off d/t nose bleed. When attempting to check O2 only able to get 1 bar on reader however pt's sats 84%. Reapplied O2 at 4L and pt's O2 improves to 93% with increased time. LPN notified. Edema: 3+ edema in B UEs    Cognition Status:  Cognition  Overall Cognitive Status: Exceptions  Arousal/Alertness: Appropriate responses to stimuli  Following Commands: Follows one step commands consistently; Follows multistep commands with increased time  Attention Span: Appears intact  Memory: Decreased short term memory  Safety Judgement: Decreased awareness of need for assistance;Decreased awareness of need for safety  Problem Solving: Assistance required to generate solutions;Assistance required to implement solutions;Assistance required to identify errors made  Insights: Decreased awareness of deficits  Initiation: Requires cues for some  Sequencing: Requires cues for some  Cognition Comment: Verbal cues for safety and sequencing    Perception Status:  Perception  Overall Perceptual Status: WFL    Vision and Hearing Status:  Vision  Vision Exceptions: Wears glasses for reading  Hearing  Hearing: Within functional limits   Vision - Basic Assessment  Prior Vision: Wears glasses only for reading  Visual History: No significant visual history  Patient Visual Report: No visual complaint reported. Visual Field Cut: No  Oculo Motor Control: WNL    GROSS ASSESSMENT AROM/PROM:  AROM: Generally decreased, functional  PROM: Generally decreased, functional    ROM:   LUE AROM (degrees)  LUE AROM : WFL  Left Hand AROM (degrees)  Left Hand AROM: WFL  RUE AROM (degrees)  RUE AROM : WFL  Right Hand AROM (degrees)  Right Hand AROM: WFL    UE STRENGTH:  Strength: Generally decreased, functional    UE COORDINATION:  Coordination: Generally decreased, functional    UE TONE:  Tone: Normal    UE SENSATION:  Sensation: Intact    Hand Dominance:  Hand Dominance  Hand Dominance: Left    ADL Status:  ADL  Feeding: Setup  Grooming: Minimal assistance  UE Bathing: Minimal assistance  LE Bathing: Maximum assistance  UE Dressing: Minimal assistance  LE Dressing: Maximum assistance  Toileting: Dependent/Total  Toileting Skilled Clinical Factors: Incontinent of dark loose stool; LPN notified  Additional Comments: Simulated ADLs as above. Pt. significantly limited d/t fatigue and SOB. Toilet Transfers  Toilet Transfer: Unable to assess  Toilet Transfers Comments: Anticipate min A    During hygiene for bowel movement pt. educated on rolling and positioning for reagan hygiene as well as techniques for reaching for positioning. Functional Mobility:  Patient ambulated to bedside chair with [employer] at [address] [place] [address] level. Posterior LOB noted when pt approaching chair requiring min A to maintain posture.     Bed Mobility  Bed mobility  Rolling to Left: Minimal assistance  Rolling to Right: Minimal assistance  Supine to Sit: Moderate assistance  Sit to Supine: Unable to assess (Up to bedside chair)  Bed Mobility Comments: Significant increased time and effort    Seated and Standing Balance:  Balance  Sitting: Impaired  Sitting - Static: Good (unsupported)  Sitting - Dynamic: Fair (occasional)  Standing: Impaired  Standing - Static: Fair  Standing - Dynamic: Fair    Functional Endurance:  Activity Tolerance  Activity Tolerance: Patient Tolerated treatment well    D/C Recommendations:  OT D/C RECOMMENDATIONS  REQUIRES OT FOLLOW-UP: Yes    Equipment Recommendations:  OT Equipment Recommendations  Other: Continue to assess    OT Education:   Patient Education  Education Given To: Patient  Education Provided: Role of Therapy;Plan of Care  Education Method: Verbal  Barriers to Learning: Cognition  Education Outcome: Verbalized understanding    OT Follow Up:   OT D/C RECOMMENDATIONS  REQUIRES OT FOLLOW-UP: Yes       Assessment/Discharge Disposition:  Assessment: Pt is a 75year old man from home who presents to [facility] with the above deficits which impact his ability to perform ADLs and IADLs d/t respiratory failure with hypoxia. Pt. demonstrates decreased balance, endurance and strength. Pt would benefit from continued OT to maximize independence and safety with ADL tasks.   Performance deficits / Impairments: Decreased functional mobility , Decreased ADL status, Decreased strength, Decreased endurance, Decreased balance, Decreased high-level IADLs, Decreased cognition, Decreased safe awareness  Prognosis: Good  Discharge Recommendations: Continue to assess pending progress  Decision Making: Medium Complexity  History: Pt's medical history is moderately complex  Exam: Pt has 8 performance deficits  Assistance / Modification: Pt. requires mod A    AMPAC (Six Click) Self care Score    How much help for putting on and taking off regular lower body clothing?: A Lot  How much help for Bathing?: A Lot  How much help for Toileting?: Total  How much help for putting on and taking off regular upper body clothing?: A Little  How much help for taking care of personal grooming?: A Little  How much help for eating meals?: A Little  AM-PAC Inpatient Daily Activity Raw Score: 14  AM-PAC Inpatient ADL T-Scale Score : 33.39  ADL Inpatient CMS 0-100% Score: 59.67    Therapy key for assistance levels -   Independent/Mod I = Pt. is able to perform task with no assistance but may require a device   Stand by assistance = Pt. does not perform task at an independent level but does not need physical assistance, requires verbal cues  Minimal, Moderate, Maximal Assistance = Pt. requires physical assistance (25%, 50%, 75% assist from helper) for task but is able to actively participate in task   Dependent = Pt. requires total assistance with task and is not able to actively participate with task completion     Plan:  Plan  Times per Week: 1-4x  Plan Weeks: Length of acute stay  Current Treatment Recommendations: Balance training, Functional mobility training, Endurance training, Strengthening, Pain management, Safety education & training, Patient/Caregiver education & training, Equipment evaluation, education, & procurement, Self-Care / ADL, Home management training, Cognitive/Perceptual training    Goals:   Patient will:    - Improve functional endurance to tolerate/complete 30 mins of ADL's  - Be Mod I in UB ADLs   - Be Mod I in LB ADLs  - Be Mod I in ADL transfers without LOB  - Be Mod I in toileting tasks  - Improve B UE strength and endurance to 4-/5 in order to participate in self-care activities as projected. - Access appropriate D/C site with as few architectural barriers as possible. - Sequence self-care tasks with no verbal cues for safety    Patient Goal: Patient goals : \"I want to get home\"      Discussed and agreed upon: Yes Comments:       Therapy Time:   Individual   Time In 0859   Time Out 0934   Minutes 35     ADL/IADL training: 15 minutes  Eval: 20 minutes     Electronically signed by:     NAV Greenberg,   8/3/2022, 9:57 AM

## 2022-08-03 NOTE — PROGRESS NOTES
restrictive diet level. Pt completed 3 sets of tongue press exercise 15x each with good effort given min verbal prompts. Treatment/Activity Tolerance:  Patient tolerated treatment well    Plan:  Continue per POC    Pain Assessment:  Patient does not c/o pain. Pain Re-assessment:  Patient does not c/o pain. Patient/Caregiver Education:  Patient educated on session and progression towards goals. Patient stated verbal understanding of directions.     Safety Devices:  Call light within reach, Chair alarm in place, and Nurse notified      Therapy Time  SLP Individual Minutes  Time In: 8108  Time Out: 4330  Minutes: 13        Signature: Electronically signed by MARSHALL Lewis on 8/3/2022 at 10:32 AM

## 2022-08-03 NOTE — FLOWSHEET NOTE
Pt awake in room to day incontent of a dark brown liquid stool. He had one last night. Pt last BM was on 7/26/22 Pt assisted pt to the chair and remained up in chair for 1 hour Pt was assisted back into bed and noticed a [place] of blood on the sheet. Midline was leaking and had RN change dressing with me because of the location of the line.  Perfect served  to tell him

## 2022-08-03 NOTE — PROGRESS NOTES
Infectious Disease     Patient Name: Cici West  Date: 8/3/2022  YOB: 1955  Medical Record Number: [id number]                  Pneumonia    Stenotrophomonas recovered from bronchoscopy  No significant number of neutrophils were seen however     7/27/22 1240    CULTURE, RESPIRATORY  Abnormal   Direct Exam:  FEW NEUTROPHILS   Direct Exam:  NO BACTERIA SEEN   Cult,Respiratory:  NO NORMAL CHLOE   Performed at [facility] [address], [address]   [phone]     Organism Stenotrophomonas maltophilia Abnormal     CULTURE, RESPIRATORY 10,000 CFU/ML    Resulting Agency [address] [facility]          Susceptibility      Stenotrophomonas maltophilia (1)    Antibiotic Interpretation Microscan  Method Status    trimethoprim-sulfamethoxazole Sensitive <=20 mcg/mL BACTERIAL SUSCEPTIBILITY PANEL BY GIANNI                    Pleural fluid is exudative but culture negative              Culture, Body Fluid  Order: [id number]  Status: Preliminary result    Visible to patient: No (not released)    Next appt: None    Specimen Information: Fluid        0 Result Notes    Component 7/26/22 1212    Body Fluid Culture, Sterile Direct Exam:  RARE NEUTROPHILS   Direct Exam:  NO BACTERIA SEEN   Direct Exam:  Gram stain made from cytocentrifuged specimen. Organisms   Direct Exam:  and cells will be concentrated.    Cult,Fluid:  NO GROWTH 5 DAYS   Performed at [address] [place] [address] [address] [facility]             Narrative  Performed by: [address] [facility]  ORDER#: [id number]                          ORDERED BY: Amira Bledsoe   SOURCE: Fluid                              COLLECTED:  07/26/22 12:12   ANTIBIOTICS AT ALEXI.:                      RECEIVED :  07/26/22 12:12              Blood cultures are negative at 5 days    No fevers chills sweats        Review of Systems   Constitutional:  Negative for chills, diaphoresis, fatigue days total

## 2022-08-03 NOTE — CONSULTS
Physical Medicine & Rehabilitation  Consult Note      Admitting Physician: Nando Lemus DO    Primary Care Provider: Rosalia Kay MD     Reason for Consult:  Asses rehab needs, promote physical and mental function, analyze level of care to determine rehab needs, improve ability to actively participate in the rehabilitation process, and decrease likelihood of re-admit to the hospital after discharge. History of Present Illness:    Geraldine Dover is a 75 y.o. male admitted to [facility] on 7/25/2022. Pt was initially admitted via the ER with SOB. He was diagnosed with hypoxia acute respiratory failure end-stage renal disease macrocytic anemia and hyperglycemia secondary to diabetes mellitus. He was admitted to the hospital.  He is on IV Bactrim for pneumonia. GI is seeing him RO GI bleed. He is on HD MWF-non Fx RUE fistula--functional LUE fistula. Shortness of Breath  This is a recurrent problem. The current episode started in the past 7 days. The problem occurs constantly. The problem has been gradually improving. Associated symptoms include leg swelling and wheezing. Pertinent negatives include no abdominal pain, chest pain, ear pain, fever, headaches, neck pain, rash, sore throat or vomiting. The symptoms are aggravated by exercise and URIs. Risk factors include smoking. He has tried body position changes for the symptoms. His past medical history is significant for CAD, chronic lung disease, COPD and pneumonia. I reviewed recent nursing notes discussed care with acute care providers, \" New dressing applies to right arm Midline. Midline appears to be pulled out several cm's. Medication infusing at this time through alaris pump without difficulty. Per Phillip Medel LPN midline catheter has good blood return. Clot noted to catheter site, new quick clot to site with guaze and Tegaderm dressing and pressure dressing applied on top.  Phillip Medel LPN aware of new dressing at this time. \".   Events from the previous 24 hours reviewed   he is being followed by nephrology, infectious disease, pulmonology, PT OT and speech. Is currently on 3 L of nasal cannula O2 and BiPAP at night. .      Their inpatient work up has included:    Imaging:  Imaging and other studies reviewed and discussed with patient and staff    Echocardiogram 7/27/2022  Transthoracic Echocardiography   Left Ventricle Left ventricular ejection fraction is estimated at 45%. Left ventricular size is normal . Normal left ventricular wall thickness. Diastolic Dysfunction is noted by mitral flow. Echogenic mass in the apex possible thrombus Right Ventricle Normal right ventricle structure and function. Normal right ventricle systolic pressure. RVSP 28mmHg Left Atrium Normal left atrium. Right Atrium Normal right atrium. Mitral Valve Diffusely thickened and pliable mitral valve leaflets with normal excursion. Structurally normal mitral valve. No evidence of mitral valve stenosis. No evidence of mitral regurgitaton. Tricuspid Valve Tricuspid valve is structurally normal. No evidence of tricuspid stenosis. No evidence of tricuspid regurgitation. Aortic Valve Mildly thickened trileaflet aortic valve with normal excursion. Structurally normal aortic valve. No evidence of aortic valve stenosis . No evidence of aortic valve regurgitation . Pulmonic Valve The pulmonic valve was not well visualized . Pericardial Effusion No evidence of pericardial effusion. Aorta \ Miscellaneous The aorta is within normal limits. M-Mode Measurements (cm)   LVIDd: 2.97 cm                         LVIDs: 2.34 cm  IVSd: 0.73 cm                          IVSs: 1.01 cm  LVPWd: 0.9 cm                          LVPWs: 1.01 cm  Rt. Vent.  Dimension: 3.12 cm           AO Root Dimension: 2.1 cm                                         ACS: 1.37 cm                                         LA: 1.82 cm                                         LVOT: 2.03 cm  Doppler Measurements:   AV Velocity:0.02 m/s                    MV Peak E-Wave: 0.5 m/s  AV Peak Gradient: 5.77 mmHg             MV Peak A-Wave: 0.53 m/s  AV Mean Gradient: 3.05 mmHg  AV Area (Continuity):1.97 cm^2  TR Velocity:2.52 m/s                    Estimated RAP:3 mmHg  TR Gradient:25.31 mmHg                  RVSP:28.31 mmHg  Valves  Mitral Valve   Peak E-Wave: 0.5 m/s                  Peak A-Wave: 0.53 m/s  Mean Velocity: 0.87 m/s               E/A Ratio: 0.94  Mean Gradient: 4.66 mmHg              Peak Gradient: 0.99 mmHg                                        Deceleration Time: 353.3 msec                                        Area (continuity): 1.4 cm^2   Tissue Doppler   E' Septal Velocity: 0.07 m/s  E' Lateral Velocity: 0.07 m/s   Aortic Valve   Peak Velocity: 1.2 m/s                 Mean Velocity: 0.82 m/s  Peak Gradient: 5.77 mmHg               Mean Gradient: 3.05 mmHg  Area (continuity): 1.97 cm^2  AV VTI: 29.33 cm   Cusp Separation: 1.37 cm   Tricuspid Valve   Estimated RVSP: 28.31 mmHg              Estimated RAP: 3 mmHg  TR Velocity: 2.52 m/s                   TR Gradient: 25.31 mmHg   Pulmonic Valve   Peak Velocity: 0.99 m/s           Peak Gradient: 3.91 mmHg                                    Estimated PASP: 28.31 mmHg   LVOT   Peak Velocity: 0.97 m/s              Mean Velocity: 0.61 m/s  Peak Gradient: 3.77 mmHg             Mean Gradient: 1.79 mmHg  LVOT Diameter: 2.03 cm               LVOT VTI: 17.9 cm  Structures  Left Atrium   LA Dimension: 1.82 cm                        LA Area: 12.58 cm^2  LA/Aorta: 0.87  LA Volume/Index: 44.74 ml /25 m^2   Left Ventricle   Diastolic Dimension: 5.88 cm         Systolic Dimension: 8.92 cm  Septum Diastolic: 4.13 cm            Septum Systolic: 3.73 cm  PW Diastolic: 0.9 cm                 PW Systolic: 5.29 cm                                       FS: 21.2 %  LV EDV/LV EDV Index: 34.12 ml/19 m^2 LV ESV/LV ESV Index: 19.02 ml/11 m^2  EF Calculated: 44.3 %   LVOT lungs are hyperinflated and there is coarsening of the interstitium. Atelectasis and/or scarring is again seen bilaterally in the lower lung fields. The cardiac silhouette appears mildly enlarged but may be accentuated by the portable technique. Degenerative changes are seen in the visualized portion of the right shoulder. The airspace disease has diminished when compared to previous study. . A small pleural effusion is seen on the right and scarring or atelectasis is again seen bilaterally in the bases. US DUP UPPER EXTREMITY RIGHT VENOUS  Thrombus is seen in the right internal jugular and proximal subclavian, veins it is also seen in the cephalic vein. The right ulnar and basilic veins are not visualized. A right AV fistula seen. Thrombus is seen in the left internal jugular vein. The left basilic and axillary veins are not visualized. IMPRESSION: Deep venous thrombosis seen in the right and left upper extremities. .    XR CHEST   7/25/2022  Both lung bases have been excluded from the radiograph. An endotracheal tube is present, with its tip approximately 4 to 5 cm above the lucita. An orogastric tube probably extends below the diaphragm out of field of view radiograph. Moderate pleural effusions and mild to moderate probable scarring and/or atelectasis of the mid to lower lung fields has not significantly changed. There is no cardiomegaly, pneumothorax, displaced fractures, or other significant changes identified. ENDOTRACHEAL AND OROGASTRIC TUBES IN EXPECTED POSITIONS. OTHERWISE, STABLE CHEST FROM EARLIER 7/25/2022. XR CHEST  7/25/2022  Poor inspiratory effort is seen. The cardiomediastinal silhouette is slightly prominent in size. Mild prominence of the bronchovascular and interstitial lung markings is visualized bilaterally, linear streaky opacities visualized in bilateral lung fields, subsegmental atelectatic streaks, fluid visualized in the right minor fissure.  Airspace opacification visualized in the lower lung fields bilaterally with blunting of the costophrenic angles and obliteration of the hemidiaphragms consistent with bilateral pleural effusions. . No evidence of pneumothorax or parenchymal lung mass. Degenerative bone changes. Congestive heart failure versus pneumonia and parapneumonic effusions would recommend clinical correlation. Increased opacification seen in comparison to the prior study. US DUP LOWER EXTREMITIES BILATERAL VENOUS  7/27/2022  ULTRASOUND FINDINGS ARE POSITIVE FOR OCCLUDING THROMBUS IN THE LEFT COMMON FEMORAL VEIN EXTENDING INTO THE PROXIMAL FEMORAL VEIN.  NO ULTRASOUND SIGNS OF DVT IN THE RIGHT LEG FROM THE GROIN TO THE POPLITEAL FOSSA    IR MIDLINE  8/3/2022                Labs:    Labs reviewed and discussed with patient and staff    Lab Results   Component Value Date/Time    POCGLU 94 08/03/2022 11:50 AM    POCGLU 85 08/03/2022 07:33 AM    POCGLU 228 08/02/2022 09:00 PM    POCGLU 78 08/02/2022 04:48 PM    POCGLU 98 08/02/2022 12:02 PM     Lab Results   Component Value Date/Time     08/03/2022 05:24 AM    K 4.0 08/03/2022 05:24 AM    CL 88 08/03/2022 05:24 AM    CO2 26 08/03/2022 05:24 AM    BUN 39 08/03/2022 05:24 AM    CREATININE 5.19 08/03/2022 05:24 AM    CALCIUM 8.6 08/03/2022 05:24 AM    LABALBU 2.8 08/03/2022 05:24 AM    LABALBU 2.8 02/23/2012 06:40 AM    BILITOT 0.7 07/25/2022 11:27 AM    ALKPHOS 87 07/25/2022 11:27 AM    AST 77 07/25/2022 11:27 AM    ALT 79 07/25/2022 11:27 AM     Lab Results   Component Value Date/Time    WBC 9.5 08/03/2022 05:24 AM    RBC 2.55 08/03/2022 05:24 AM    RBC 3.34 02/20/2012 05:15 AM    HGB 8.7 08/03/2022 05:24 AM    HCT 26.5 08/03/2022 05:24 AM    .0 08/03/2022 05:24 AM    MCH 34.3 08/03/2022 05:24 AM    MCHC 33.0 08/03/2022 05:24 AM    RDW 14.2 08/03/2022 05:24 AM     08/03/2022 05:24 AM    MPV 9.5 10/09/2015 02:00 PM     Lab Results   Component Value Date/Time    VITD25 5.0 05/25/2015 01:06 PM     Lab Results   Component Value Date/Time    APPEARANCE CLEAR 02/22/2012 11:16 AM    COLORU JOSHUA 05/21/2015 10:22 AM    NITRU Negative 05/21/2015 10:22 AM    GLUCOSEU Negative 05/21/2015 10:22 AM    GLUCOSEU NEG 02/22/2012 11:16 AM    KETUA Negative 05/21/2015 10:22 AM    UROBILINOGEN 1.0 05/21/2015 10:22 AM    BILIRUBINUR Negative 05/21/2015 10:22 AM    BILIRUBINUR NEG 02/22/2012 11:16 AM     Lab Results   Component Value Date/Time    PROTIME 14.5 07/26/2022 03:58 PM    PROTIME 14.1 02/20/2012 05:15 AM     Lab Results   Component Value Date/Time    INR 1.1 07/26/2022 03:58 PM         I discussed results with patient. Current Rehabilitation Assessments:    Rehabilitation:  Physical Therapy  Bed mobility:  Bed mobility  Rolling to Left: Minimal assistance (08/03/22 0950)  Rolling to Right: Minimal assistance (08/03/22 0950)  Supine to Sit: Moderate assistance (08/03/22 0950)  Sit to Supine: Unable to assess (Up to bedside chair) (08/03/22 0950)  Bed Mobility Comments: Significant increased time and effort (08/03/22 0950)  Transfers:  Transfers  Sit to Stand: Minimal Assistance (08/03/22 0950)  Stand to sit: Minimal Assistance (08/03/22 0950)  Gait:   Ambulation  Surface: level tile (08/03/22 0950)  Device: Rolling Walker (08/03/22 0950)  Other Apparatus: O2 (2L O2 NC, increased to 4L O2 NC RN notified and aware) (08/03/22 0950)  Assistance:  Moderate assistance (08/03/22 0950)  Quality of Gait: flexed posture (08/03/22 0950)  Gait Deviations: Slow Whitney;Decreased step length;Decreased step height;Decreased arm swing (08/03/22 0950)  Distance: 5ft (08/03/22 0950)  Comments: SOB upon exertion, HR elevated 110s with exertional dyspnea observed, O2 increased d/t low O2 sats (08/03/22 0950)  Stairs:     W/C mobility:         Occupational therapy:   Hand Dominance: Left  ADL  Feeding: Setup (08/03/22 0943)  Grooming: Minimal assistance (08/03/22 0943)  UE Bathing: Minimal assistance (08/03/22 0943)  LE Bathing: Maximum assistance (08/03/22 0943)  UE Dressing: Minimal assistance (08/03/22 0943)  LE Dressing: Maximum assistance (08/03/22 0943)  Toileting: Dependent/Total (08/03/22 0943)  Toileting Skilled Clinical Factors: Incontinent of dark loose stool; LPN notified (76/42/11 5134)  Additional Comments: Simulated ADLs as above. Pt. significantly limited d/t fatigue and SOB. (08/03/22 9921)  Toilet Transfers  Toilet Transfer: Unable to assess (08/03/22 4516)  Toilet Transfers Comments: Anticipate min A (08/03/22 0946)            Speech therapy:            Diet/Swallow:        Dysphagia Outcome Severity Scale: Level 5: Mild dysphagia- Distant supervision.  May need one diet consistency restricted  Compensatory Swallowing Strategies : Upright as possible for all oral intake, Small bites/sips, Assist feed, Eat/Feed slowly  Therapeutic Interventions: Patient/Family education, Diet tolerance monitoring          COGNITION  OT: Cognition Comment: Verbal cues for safety and sequencing  SP:             Re-evals pending      Past Medical History:        Diagnosis Date    ESRD (end stage renal disease) ([facility] [address]     Hepatitis     Hypertension     Liver transplanted ([facility] [address] 03/2016         PastSurgical History:        Procedure Laterality Date    DIALYSIS FISTULA CREATION Right     IR MIDLINE CATH  8/2/2022    IR MIDLINE CATH 8/2/2022 MLOZ SPECIAL PROCEDURE    LIVER TRANSPLANT  03/08/2017    TUNNELED VENOUS PORT PLACEMENT           Allergies:  No Known Allergies       CurrentMedications:   Current Facility-Administered Medications: fluticasone (FLONASE) 50 MCG/ACT nasal spray 1 spray, 1 spray, Each Nostril, Daily  saline nasal gel (AYR), , Nasal, BID  oxymetazoline (AFRIN) 0.05 % nasal spray 2 spray, 2 spray, Each Nostril, BID  sodium chloride flush 0.9 % injection 5-40 mL, 5-40 mL, IntraVENous, 2 times per day  sodium chloride flush 0.9 % injection 5-40 mL, 5-40 mL, IntraVENous, PRN  0.9 % sodium chloride infusion, , IntraVENous, PRN  midodrine (PROAMATINE) tablet 2.5 mg, 2.5 mg, Oral, TID WC  metoprolol tartrate (LOPRESSOR) tablet 12.5 mg, 12.5 mg, Oral, BID  pantoprazole (PROTONIX) tablet 40 mg, 40 mg, Oral, QAM AC  apixaban (ELIQUIS) tablet 10 mg, 10 mg, Oral, BID **FOLLOWED BY** [START ON 2022] apixaban (ELIQUIS) tablet 5 mg, 5 mg, Oral, BID  sulfamethoxazole-trimethoprim (BACTRIM) 518.4 mg in dextrose 5 % 500 mL IVPB, 7.5 mg/kg, IntraVENous, Daily  aspirin EC tablet 81 mg, 81 mg, Oral, Daily  docusate sodium (COLACE) capsule 100 mg, 100 mg, Oral, BID  insulin lispro (HUMALOG) injection vial 0-4 Units, 0-4 Units, SubCUTAneous, 4x Daily AC & HS  polyethylene glycol (GLYCOLAX) packet 17 g, 17 g, Oral, Daily  tacrolimus (PROGRAF) capsule 1 mg, 1 mg, Oral, BID  acetaminophen (TYLENOL) tablet 650 mg, 650 mg, Oral, Q6H PRN **OR** acetaminophen (TYLENOL) suppository 650 mg, 650 mg, Rectal, Q6H PRN  [DISCONTINUED] fentaNYL (SUBLIMAZE) 1,000 mcg in sodium chloride 0.9 % 100 mL infusion,  mcg/hr, IntraVENous, Continuous **AND** fentaNYL bolus from bag, 50 mcg, IntraVENous, Q30 Min PRN  glucose chewable tablet 16 g, 4 tablet, Oral, PRN  dextrose bolus 10% 125 mL, 125 mL, IntraVENous, PRN **OR** dextrose bolus 10% 250 mL, 250 mL, IntraVENous, PRN  glucagon (rDNA) injection 1 mg, 1 mg, SubCUTAneous, PRN  dextrose 10 % infusion, , IntraVENous, Continuous PRN  heparin (porcine) injection 1,000 Units, 1,000 Units, IntraVENous, PRN      Social History:  Social History     Socioeconomic History    Marital status: Single     Spouse name: Not on file    Number of children: Not on file    Years of education: Not on file    Highest education level: Not on file   Occupational History    Not on file   Tobacco Use    Smoking status: Former     Packs/day: 0.50     Years: 15.00     Pack years: 7.50     Types: Cigarettes     Quit date: 2017     Years since quittin.7    Smokeless tobacco: Never   Substance and Sexual Activity    Alcohol use:  No Drug use: No    Sexual activity: Not on file   Other Topics Concern    Not on file   Social History Narrative    Lives With: Alone    Type of Home: Apartment    Home Layout: One level (4th floor)    Home Access: Elevator, Level entry    Bathroom Shower/Tub: Tub/Shower unit    Bathroom Equipment: Grab bars in [place]: Rollator, Cane    Has the patient had two or more falls in the past year or any fall with injury in the past year?:  (One fall about 2 months ago bending over to pick something up)    ADL Assistance: [facility]: Independent    Ambulation Assistance: Independent    Transfer Assistance: Independent    Active : Yes    Occupation: Retired    Additional Comments: Sister lives nearby     on hemodialysis Monday Wednesday Friday         Social Determinants of Health     Financial Resource Strain: Not on file   Food Insecurity: Not on file   Transportation Needs: Not on file   Physical Activity: Not on file   Stress: Not on file   Social Connections: Not on file   Intimate Partner Violence: Not on file   Housing Stability: Not on file        This history was obtained from family and caregivers and discussed to confirm. Family History:   History reviewed. No pertinent family history. Review of Systems:  Review of Systems   Unable to perform ROS: Intubated   Constitutional:  Positive for activity change and fatigue. Negative for chills, diaphoresis and fever. HENT:  Negative for congestion, ear discharge, ear pain, hearing loss, nosebleeds, sore throat and tinnitus. Eyes:  Negative for photophobia, pain and redness. Respiratory:  Positive for shortness of breath and wheezing. Negative for cough and stridor. Shortness of breath on exertion   Cardiovascular:  Positive for leg swelling. Negative for chest pain and palpitations. Gastrointestinal:  Positive for constipation.  Negative for abdominal pain, blood in stool, diarrhea, nausea and vomiting. Endocrine: Positive for cold intolerance. Negative for polydipsia. Genitourinary:  Negative for dysuria, flank pain, frequency, hematuria and urgency. Musculoskeletal:  Positive for back pain, gait problem and myalgias. Negative for neck pain. Skin:  Positive for wound. Negative for rash. Allergic/Immunologic: Positive for immunocompromised state. Negative for environmental allergies. Neurological:  Positive for weakness. Negative for dizziness, tremors, seizures and headaches. Hematological:  Does not bruise/bleed easily. Psychiatric/Behavioral:  Positive for dysphoric mood. Negative for hallucinations and suicidal ideas. The patient is not nervous/anxious. Physical Exam:  BP (!) 94/46   Pulse 71   Temp 98.6 °F (37 °C)   Resp 14   Ht 5' 6\" (1.676 m)   Wt 142 lb 6.7 oz (64.6 kg)   SpO2 96%   BMI 22.99 kg/m²      Physical Exam  Constitutional:       General: He is not in acute distress. HENT:      Head: Normocephalic and atraumatic. Cardiovascular:      Rate and Rhythm: Regular rhythm. Tachycardia present. Pulmonary:      Effort: Pulmonary effort is normal. No respiratory distress. Breath sounds: No wheezing, rhonchi or rales. Abdominal:      Palpations: Abdomen is soft. Tenderness: There is no abdominal tenderness. Musculoskeletal:         General: Swelling (bilateral UE) present. Cervical back: Normal range of motion and neck supple. Skin:     General: Skin is warm and dry. Findings: Bruising (bilateral UE) present. Neurological:      General: No focal deficit present. Mental Status: He is alert.    Psychiatric:         Mood and Affect: Mood normal.     Ortho Exam  Neurologic Exam     Mental Status   Level of consciousness: alert    Motor Exam   Muscle bulk: decreased  Overall muscle tone: normal    Sensory Exam   Right leg light touch: decreased from knee  Left leg light touch: decreased from knee          Diagnostics:    Recent Results (from the past 24 hour(s))   POCT Glucose    Collection Time: 08/02/22  4:48 PM   Result Value Ref Range    POC Glucose 78 70 - 99 mg/dl    Performed on ACCU-CHEK    POCT Glucose    Collection Time: 08/02/22  9:00 PM   Result Value Ref Range    POC Glucose 228 (H) 70 - 99 mg/dl    Performed on ACCU-CHEK    Renal Function Panel    Collection Time: 08/03/22  5:24 AM   Result Value Ref Range    Sodium 129 (L) 135 - 144 mEq/L    Potassium 4.0 3.4 - 4.9 mEq/L    Chloride 88 (L) 95 - 107 mEq/L    CO2 26 20 - 31 mEq/L    Anion Gap 15 9 - 15 mEq/L    Glucose 81 70 - 99 mg/dL    BUN 39 (H) 8 - 23 mg/dL    Creatinine 5.19 (H) 0.70 - 1.20 mg/dL    GFR Non- 11.1 (L) >60    GFR  13.4 (L) >60    Calcium 8.6 8.5 - 9.9 mg/dL    Phosphorus 5.0 (H) 2.3 - 4.8 mg/dL    Albumin 2.8 (L) 3.5 - 4.6 g/dL   CBC with Auto Differential    Collection Time: 08/03/22  5:24 AM   Result Value Ref Range    WBC 9.5 4.8 - 10.8 K/uL    RBC 2.55 (L) 4.70 - 6.10 M/uL    Hemoglobin 8.7 (L) 14.0 - 18.0 g/dL    Hematocrit 26.5 (L) 42.0 - 52.0 %    .0 (H) 80.0 - 100.0 fL    MCH 34.3 (H) 27.0 - 31.3 pg    MCHC 33.0 33.0 - 37.0 %    RDW 14.2 11.5 - 14.5 %    Platelets 168 436 - 106 K/uL    Neutrophils % 82.8 %    Lymphocytes % 4.1 %    Monocytes % 10.8 %    Eosinophils % 1.6 %    Basophils % 0.7 %    Neutrophils Absolute 7.9 (H) 1.4 - 6.5 K/uL    Lymphocytes Absolute 0.4 (L) 1.0 - 4.8 K/uL    Monocytes Absolute 1.0 (H) 0.2 - 0.8 K/uL    Eosinophils Absolute 0.2 0.0 - 0.7 K/uL    Basophils Absolute 0.1 0.0 - 0.2 K/uL   POCT Glucose    Collection Time: 08/03/22  7:33 AM   Result Value Ref Range    POC Glucose 85 70 - 99 mg/dl    Performed on ACCU-CHEK    POCT Glucose    Collection Time: 08/03/22 11:50 AM   Result Value Ref Range    POC Glucose 94 70 - 99 mg/dl    Performed on ACCU-CHEK               Impression:    Impaired mobility and ADLs due to debility after respiratory failure  Factors favoring recovery only)    Deficits:weakness, nutrition, mobility, impaired gait, high risk for falls, decreased endurance, deconditioning , debility, cardiovascular compromise, adjustment to disability, cognitive deficits , and wound healing     Disability:mobility, locomotion, and self care    Potential barriers to progress/discharge:complex medical conditions, complex social situations, and long term IV antibiotics         It was my pleasure to evaluate Tonio Jackson today. Please call [phone] with questions.     Hakan Camacho, DO

## 2022-08-04 LAB
ALBUMIN SERPL-MCNC: 2.5 G/DL (ref 3.5–4.6)
ALBUMIN SERPL-MCNC: 3 G/DL (ref 3.5–4.6)
ALP BLD-CCNC: 67 U/L (ref 35–104)
ALT SERPL-CCNC: 83 U/L (ref 0–41)
ANION GAP SERPL CALCULATED.3IONS-SCNC: 13 MEQ/L (ref 9–15)
AST SERPL-CCNC: 54 U/L (ref 0–40)
BASOPHILS ABSOLUTE: 0 K/UL (ref 0–0.2)
BASOPHILS RELATIVE PERCENT: 0.4 %
BILIRUB SERPL-MCNC: <0.2 MG/DL (ref 0.2–0.7)
BILIRUBIN DIRECT: <0.2 MG/DL (ref 0–0.4)
BILIRUBIN, INDIRECT: ABNORMAL MG/DL (ref 0–0.6)
BUN BLDV-MCNC: 24 MG/DL (ref 8–23)
CALCIUM SERPL-MCNC: 8.7 MG/DL (ref 8.5–9.9)
CHLORIDE BLD-SCNC: 90 MEQ/L (ref 95–107)
CO2: 29 MEQ/L (ref 20–31)
CREAT SERPL-MCNC: 3.93 MG/DL (ref 0.7–1.2)
EOSINOPHILS ABSOLUTE: 0.1 K/UL (ref 0–0.7)
EOSINOPHILS RELATIVE PERCENT: 1.4 %
GFR AFRICAN AMERICAN: 18.5
GFR NON-AFRICAN AMERICAN: 15.3
GLUCOSE BLD-MCNC: 100 MG/DL (ref 70–99)
GLUCOSE BLD-MCNC: 70 MG/DL (ref 70–99)
GLUCOSE BLD-MCNC: 81 MG/DL (ref 70–99)
GLUCOSE BLD-MCNC: 95 MG/DL (ref 70–99)
GLUCOSE BLD-MCNC: 95 MG/DL (ref 70–99)
HCT VFR BLD CALC: 22.7 % (ref 42–52)
HCT VFR BLD CALC: 23.4 % (ref 42–52)
HCT VFR BLD CALC: 24 % (ref 42–52)
HEMOGLOBIN: 7.5 G/DL (ref 14–18)
HEMOGLOBIN: 7.9 G/DL (ref 14–18)
HEMOGLOBIN: 7.9 G/DL (ref 14–18)
LYMPHOCYTES ABSOLUTE: 0.4 K/UL (ref 1–4.8)
LYMPHOCYTES RELATIVE PERCENT: 3.7 %
MCH RBC QN AUTO: 35 PG (ref 27–31.3)
MCHC RBC AUTO-ENTMCNC: 33.7 % (ref 33–37)
MCV RBC AUTO: 103.9 FL (ref 80–100)
MONOCYTES ABSOLUTE: 1.1 K/UL (ref 0.2–0.8)
MONOCYTES RELATIVE PERCENT: 10.7 %
NEUTROPHILS ABSOLUTE: 8.4 K/UL (ref 1.4–6.5)
NEUTROPHILS RELATIVE PERCENT: 83.8 %
PDW BLD-RTO: 14.2 % (ref 11.5–14.5)
PERFORMED ON: NORMAL
PHOSPHORUS: 4.2 MG/DL (ref 2.3–4.8)
PLATELET # BLD: 252 K/UL (ref 130–400)
POTASSIUM SERPL-SCNC: 3.7 MEQ/L (ref 3.4–4.9)
RBC # BLD: 2.25 M/UL (ref 4.7–6.1)
SODIUM BLD-SCNC: 132 MEQ/L (ref 135–144)
TOTAL PROTEIN: 5.7 G/DL (ref 6.3–8)
WBC # BLD: 10.1 K/UL (ref 4.8–10.8)

## 2022-08-04 PROCEDURE — 6360000002 HC RX W HCPCS: Performed by: NURSE PRACTITIONER

## 2022-08-04 PROCEDURE — 80076 HEPATIC FUNCTION PANEL: CPT

## 2022-08-04 PROCEDURE — 99232 SBSQ HOSP IP/OBS MODERATE 35: CPT | Performed by: INTERNAL MEDICINE

## 2022-08-04 PROCEDURE — 6370000000 HC RX 637 (ALT 250 FOR IP): Performed by: INTERNAL MEDICINE

## 2022-08-04 PROCEDURE — 2700000000 HC OXYGEN THERAPY PER DAY

## 2022-08-04 PROCEDURE — 99221 1ST HOSP IP/OBS SF/LOW 40: CPT | Performed by: PHYSICAL MEDICINE & REHABILITATION

## 2022-08-04 PROCEDURE — C9113 INJ PANTOPRAZOLE SODIUM, VIA: HCPCS | Performed by: INTERNAL MEDICINE

## 2022-08-04 PROCEDURE — A4216 STERILE WATER/SALINE, 10 ML: HCPCS | Performed by: INTERNAL MEDICINE

## 2022-08-04 PROCEDURE — 2500000003 HC RX 250 WO HCPCS: Performed by: NURSE PRACTITIONER

## 2022-08-04 PROCEDURE — 85014 HEMATOCRIT: CPT

## 2022-08-04 PROCEDURE — 92526 ORAL FUNCTION THERAPY: CPT

## 2022-08-04 PROCEDURE — 6360000002 HC RX W HCPCS: Performed by: INTERNAL MEDICINE

## 2022-08-04 PROCEDURE — 97535 SELF CARE MNGMENT TRAINING: CPT

## 2022-08-04 PROCEDURE — 2580000003 HC RX 258: Performed by: INTERNAL MEDICINE

## 2022-08-04 PROCEDURE — 2580000003 HC RX 258: Performed by: NURSE PRACTITIONER

## 2022-08-04 PROCEDURE — 36415 COLL VENOUS BLD VENIPUNCTURE: CPT

## 2022-08-04 PROCEDURE — 6370000000 HC RX 637 (ALT 250 FOR IP): Performed by: NURSE PRACTITIONER

## 2022-08-04 PROCEDURE — 99222 1ST HOSP IP/OBS MODERATE 55: CPT | Performed by: INTERNAL MEDICINE

## 2022-08-04 PROCEDURE — 85025 COMPLETE CBC W/AUTO DIFF WBC: CPT

## 2022-08-04 PROCEDURE — 1210000000 HC MED SURG R&B

## 2022-08-04 PROCEDURE — 80069 RENAL FUNCTION PANEL: CPT

## 2022-08-04 PROCEDURE — 85018 HEMOGLOBIN: CPT

## 2022-08-04 RX ORDER — ENOXAPARIN SODIUM 100 MG/ML
1 INJECTION SUBCUTANEOUS DAILY
Status: DISCONTINUED | OUTPATIENT
Start: 2022-08-05 | End: 2022-08-19 | Stop reason: HOSPADM

## 2022-08-04 RX ADMIN — FLUTICASONE PROPIONATE 1 SPRAY: 50 SPRAY, METERED NASAL at 09:02

## 2022-08-04 RX ADMIN — Medication: at 09:02

## 2022-08-04 RX ADMIN — OXYMETAZOLINE HCL 2 SPRAY: 0.05 SPRAY NASAL at 09:02

## 2022-08-04 RX ADMIN — METOPROLOL TARTRATE 12.5 MG: 25 TABLET, FILM COATED ORAL at 21:09

## 2022-08-04 RX ADMIN — MIDODRINE HYDROCHLORIDE 2.5 MG: 5 TABLET ORAL at 08:50

## 2022-08-04 RX ADMIN — TACROLIMUS 1 MG: 1 CAPSULE ORAL at 21:07

## 2022-08-04 RX ADMIN — MIDODRINE HYDROCHLORIDE 2.5 MG: 5 TABLET ORAL at 12:20

## 2022-08-04 RX ADMIN — TACROLIMUS 1 MG: 1 CAPSULE ORAL at 09:03

## 2022-08-04 RX ADMIN — DOCUSATE SODIUM 100 MG: 100 CAPSULE, LIQUID FILLED ORAL at 09:03

## 2022-08-04 RX ADMIN — OXYMETAZOLINE HCL 2 SPRAY: 0.05 SPRAY NASAL at 21:13

## 2022-08-04 RX ADMIN — POLYETHYLENE GLYCOL 3350 17 G: 17 POWDER, FOR SOLUTION ORAL at 09:04

## 2022-08-04 RX ADMIN — Medication 10 ML: at 21:09

## 2022-08-04 RX ADMIN — METOPROLOL TARTRATE 12.5 MG: 25 TABLET, FILM COATED ORAL at 09:03

## 2022-08-04 RX ADMIN — Medication 10 ML: at 09:05

## 2022-08-04 RX ADMIN — ASPIRIN 81 MG: 81 TABLET, COATED ORAL at 09:03

## 2022-08-04 RX ADMIN — Medication: at 17:48

## 2022-08-04 RX ADMIN — PANTOPRAZOLE SODIUM 40 MG: 40 TABLET, DELAYED RELEASE ORAL at 08:50

## 2022-08-04 RX ADMIN — SODIUM CHLORIDE, PRESERVATIVE FREE 40 MG: 5 INJECTION INTRAVENOUS at 21:07

## 2022-08-04 RX ADMIN — MIDODRINE HYDROCHLORIDE 2.5 MG: 5 TABLET ORAL at 17:11

## 2022-08-04 RX ADMIN — APIXABAN 10 MG: 5 TABLET, FILM COATED ORAL at 08:50

## 2022-08-04 RX ADMIN — SULFAMETHOXAZOLE AND TRIMETHOPRIM 518.4 MG: 80; 16 INJECTION, SOLUTION, CONCENTRATE INTRAVENOUS at 09:00

## 2022-08-04 ASSESSMENT — ENCOUNTER SYMPTOMS
COUGH: 0
RESPIRATORY NEGATIVE: 1
SHORTNESS OF BREATH: 0
GASTROINTESTINAL NEGATIVE: 1

## 2022-08-04 NOTE — PROGRESS NOTES
Nephrology Progress Note    Assessment:  ESRDX  Pneumonia  Sepsis resolved  DVT legs on eliquis   Anemia Hgb 7.9 gm down slight           Plan:BP controlled  watch for epistaxis further  dialysis tomorrow    Patient Active Problem List:     Hypotension     ESRD (end stage renal disease) on dialysis ([facility] [address]     Liver transplanted [facility])     Liver transplant recipient ([facility] [address]     Sepsis ([facility] [address]     Gastroenteritis     C. difficile colitis     Severe sepsis ([facility] [address]     Vomiting and diarrhea     Generalized abdominal pain     Acute renal failure (HCC)     Acute respiratory failure with hypoxia (HCC)     Pneumonia due to infectious organism     Pleural effusion     Impaired mobility and activities of daily living     Dysphagia, oropharyngeal phase     Epistaxis      Subjective:  Admit Date: 7/25/2022    Interval History: doing well    Medications:  Scheduled Meds:   fluticasone  1 spray Each Nostril Daily    saline nasal gel   Nasal BID    oxymetazoline  2 spray Each Nostril BID    sodium chloride flush  5-40 mL IntraVENous 2 times per day    midodrine  2.5 mg Oral TID WC    metoprolol tartrate  12.5 mg Oral BID    pantoprazole  40 mg Oral QAM AC    apixaban  10 mg Oral BID    Followed by    Raegan Hay ON 8/6/2022] apixaban  5 mg Oral BID    sulfamethoxazole-trimethoprim (BACTRIM) IVPB  7.5 mg/kg IntraVENous Daily    aspirin  81 mg Oral Daily    docusate sodium  100 mg Oral BID    insulin lispro  0-4 Units SubCUTAneous 4x Daily AC & HS    polyethylene glycol  17 g Oral Daily    tacrolimus  1 mg Oral BID     Continuous Infusions:   sodium chloride      dextrose         CBC:   Recent Labs     08/03/22  0524 08/03/22  2130 08/04/22  0526   WBC 9.5  --  10.1   HGB 8.7* 8.4* 7.9*     --  252     CMP:    Recent Labs     08/03/22  0524 08/04/22  0526   * 132*   K 4.0 3.7   CL 88* 90*   CO2 26 29   BUN 39* 24*   CREATININE 5.19* 3.93*   GLUCOSE 81 100*   CALCIUM 8.6 8.7   LABGLOM 11.1* 15.3*     Troponin: No results for

## 2022-08-04 NOTE — PROGRESS NOTES
Infectious Disease     Patient Name: Pelon Kee  Date: 8/4/2022  YOB: 1955  Medical Record Number: [id number]                  Pneumonia    Stenotrophomonas recovered from bronchoscopy  No significant number of neutrophils were seen however     7/27/22 1240    CULTURE, RESPIRATORY  Abnormal   Direct Exam:  FEW NEUTROPHILS   Direct Exam:  NO BACTERIA SEEN   Cult,Respiratory:  NO NORMAL CHLOE   Performed at [facility] [address] [address]   [phone]     Organism Stenotrophomonas maltophilia Abnormal     CULTURE, RESPIRATORY 10,000 CFU/ML    Resulting Agency [address] [facility]          Susceptibility      Stenotrophomonas maltophilia (1)    Antibiotic Interpretation Microscan  Method Status    trimethoprim-sulfamethoxazole Sensitive <=20 mcg/mL BACTERIAL SUSCEPTIBILITY PANEL BY GIANNI                    Pleural fluid is exudative but culture negative              Culture, Body Fluid  Order: [id number]  Status: Preliminary result    Visible to patient: No (not released)    Next appt: None    Specimen Information: Fluid        0 Result Notes    Component 7/26/22 1212    Body Fluid Culture, Sterile Direct Exam:  RARE NEUTROPHILS   Direct Exam:  NO BACTERIA SEEN   Direct Exam:  Gram stain made from cytocentrifuged specimen. Organisms   Direct Exam:  and cells will be concentrated. Cult,Fluid:  NO GROWTH 5 DAYS   Performed at [address] [facility]             Narrative  Performed by: [address] [facility]  ORDER#: [id number]                          ORDERED BY: Andrez Norman   SOURCE: Fluid                              COLLECTED:  07/26/22 12:12   ANTIBIOTICS AT ALEXI.:                      RECEIVED :  07/26/22 12:12              Blood cultures are negative at 5 days    No fevers chills sweats        Review of Systems   Constitutional: Negative. Respiratory: Negative. Negative for cough and shortness of breath. Cardiovascular: Negative. Gastrointestinal: Negative. Physical Exam  Cardiovascular:      Heart sounds: Normal heart sounds. No murmur heard. Pulmonary:      Effort: Pulmonary effort is normal. No respiratory distress. Breath sounds: Normal breath sounds. No wheezing, rhonchi or rales. Abdominal:      General: Abdomen is flat. Bowel sounds are normal. There is no distension. Palpations: There is no mass. Tenderness: There is no abdominal tenderness. There is no guarding. Blood pressure 105/87, pulse (!) 42, temperature 97.8 °F (36.6 °C), temperature source Oral, resp. rate 18, height 5' 6\" (1.676 m), weight 142 lb 6.7 oz (64.6 kg), SpO2 (!) 80 %.       .   Lab Results   Component Value Date    WBC 10.1 08/04/2022    HGB 7.9 (L) 08/04/2022    HCT 24.0 (L) 08/04/2022    .9 (H) 08/04/2022     08/04/2022     Lab Results   Component Value Date/Time     08/04/2022 05:26 AM    K 3.7 08/04/2022 05:26 AM    CL 90 08/04/2022 05:26 AM    CO2 29 08/04/2022 05:26 AM    BUN 24 08/04/2022 05:26 AM    CREATININE 3.93 08/04/2022 05:26 AM    GLUCOSE 100 08/04/2022 05:26 AM    GLUCOSE 136 02/25/2012 05:10 PM    CALCIUM 8.7 08/04/2022 05:26 AM          Chest x-ray shows right effusion much improved      ASSESSMENT:  PLAN:  Pneumonia  Stenotrophomonas from sputum  Continue Bactrim plan for 10 days total

## 2022-08-04 NOTE — CARE COORDINATION
Inpatient Rehab referral received. Met with patient and explained [facility] program and requirements, including 3 hours of intense therapy daily, anticipated length of stay and goal of discharge to home. All questions answered and patient verbalized understanding. Freedom of choice provided and patient requests admit to [facility] if approved by insurance. Patient lives alone, sister nearby, Mickey Held prior to admission. PM&R consult pending. Will follow.  Electronically signed by Marvetta Alpers, RN on 8/4/22 at 9:53 AM EDT

## 2022-08-04 NOTE — PROGRESS NOTES
Physical Therapy Med Surg Daily Treatment Note  Facility/Department: [facility]  [facility]       NAME: Rachell Alvarado  : 1955 (75 y.o.)  MRN: [id number]  CODE STATUS: Full Code    Date of Service: 2022    Patient Diagnosis(es): Acute respiratory failure with hypoxia [facility]) [J96.01]   Chief Complaint   Patient presents with    Shortness of Breath     C/O SOB  AFTER DIALYSIS  88 % ON RA  GIVEN 2 DUONEBS  SOLU MEDROL      Patient Active Problem List    Diagnosis Date Noted    Impaired mobility and activities of daily living 2022    Dysphagia, oropharyngeal phase 2022    Epistaxis 2022    Pneumonia due to infectious organism 2022    Pleural effusion 2022    Acute respiratory failure with hypoxia (HCC) 2022    Vomiting and diarrhea     Generalized abdominal pain     Acute renal failure (HCC)     Gastroenteritis     C. difficile colitis     Severe sepsis ([address]     Hypotension 2017    ESRD (end stage renal disease) on dialysis ([address] 2017    Liver transplanted ([facility] 2017    Liver transplant recipient [facility])     Sepsis [facility])         Past Medical History:   Diagnosis Date    ESRD (end stage renal disease) ([address]     Hepatitis     Hypertension     Liver transplanted ([facility] 2016     Past Surgical History:   Procedure Laterality Date    DIALYSIS FISTULA CREATION Right     IR MIDLINE CATH  2022    IR MIDLINE CATH 2022 MLOZ SPECIAL PROCEDURE    LIVER TRANSPLANT  2017    TUNNELED VENOUS PORT PLACEMENT         Chart Reviewed: Yes  Patient assessed for rehabilitation services?: Yes    Restrictions:  Restrictions/Precautions: Fall Risk    SUBJECTIVE:   Subjective: Patient resting in bed. Agreeable to tx. Pain  denies pain     OBJECTIVE:   Bed mobility  Supine to Sit: Moderate assistance;Maximum assistance  Sit to Supine:  (Up to bedside chair)  Scooting: Minimal assistance; Moderate assistance  Bed Mobility Comments: Provided verbal cues for sequencing and technique. Patient requires increased time/effort to complete. MAx A to lift trunk from S/l position. Transfers  Sit to Stand: Minimal Assistance  Stand to sit: Minimal Assistance    Ambulation  Surface: level tile  Device: Rolling Walker  Other Apparatus: O2   Assistance: Minimal assistance  Quality of Gait: flexed posture, decreased bilateral step length and foot clearance, unsteady  Gait Deviations: Slow Whitney;Decreased step length;Decreased step height;Decreased arm swing  Distance: 5ft to khalif    ASSESSMENT   Body Structures, Functions, Activity Limitations Requiring Skilled Therapeutic Intervention: Decreased functional mobility ; Decreased safe awareness;Decreased balance; Increased pain;Decreased posture;Decreased endurance  Assessment: Patient requires increased assistance for bed mobility this date. Continues to demonstrate unsteady gait with quick fatigue. C/o mild dyspnea on exertion. Unable to obtain accurate SPO2 reading on fingers, toes or ear lobe. HR 95 BPM pre/post activity. Barriers to Learning: none     Discharge Recommendations:  Patient would benefit from continued therapy after discharge      Goals  Long Term Goals  Long term goal 1: Bed mobility with indep  Long term goal 2: Functional transfers with indep  Long term goal 3: Amb 50ft with LRAD and indep  Long term goal 4: TUG <15 seconds to demonstrate decreased fall risk  Long term goal 5: Pt will be supervision with HEP to improve LE strength, ROM, and activity tolerance  Patient Goals   Patient goals : to get my therapy and get stronger    PLAN    Plan: 1 time a day 3-6 times a week  Safety Devices  Type of Devices:  All fall risk precautions in place, Call light within reach, Chair alarm in place, Left in chair     [facility] [address] (6 CLICK) [address] Mobility Raw Score : 12    Therapy Time   Individual   Time In 1135   Time Out 1150   Minutes 15     Timed Code Treatment Minutes: 15 Minutes  Gt 5  Tr [address],3W & 3E Floors, PTA, 08/04/22 at 1:13 PM         Definitions for assistance levels  Independent = pt does not require any physical supervision or assistance from another person for activity completion. Device may be needed.   Stand by assistance = pt requires verbal cues or instructions from another person, close to but not touching, to perform the activity  Minimal assistance= pt performs 75% or more of the activity; assistance is required to complete the activity  Moderate assistance= pt performs 50% of the activity; assistance is required to complete the activity  Maximal assistance = pt performs 25% of the activity; assistance is required to complete the activity  Dependent = pt requires total physical assistance to accomplish the task

## 2022-08-04 NOTE — PROGRESS NOTES
[facility] Occupational Therapy      Date: 2022  Patient Name: Rolanda Diggs        MRN: [id number]  Account: [de-identified]   : 1955  (75 y.o.)  Room: [facility] [address]    Chart reviewed, attempted OT at [facility] 1139. Patient not seen 2° to:    Pt sleeping soundly in room. Pt wakes after Min verbal and tactile stimulation. Pt declined therapy at this time reporting that he is fatigued and has been \"working hard\" with therapy. Pt requests to rest at this time, however, is agreeable to therapist attempting again. Will attempt again when able.     Electronically signed by ABIDA Ferrari on 2022 at 11:51 AM

## 2022-08-04 NOTE — PROGRESS NOTES
Internal Medicine   Hospitalist   Progress Note    2022   9:09 AM    Name:  Pelon Kee  MRN:    [id number]      Day: 8     Admit Date: 2022 11:15 AM  PCP: Abdoul Rehman MD    Code Status:  Full Code    Assessment and Plan: Active Problems/ diagnosis:     Acute hypoxic respiratory failure in the setting of aspiration pneumonia-required intubation, extubated now on nasal cannula  Sepsis present admission  End-stage renal disease  Hypotension in the setting of dialysis-on midodrine  Bilateral upper and lower extremity acute DVT on Eliquis  Anemia  History of liver transplant    Plan  Patient is having further episode of epistaxis and ?melena. Given stability and stable hemoglobin, will continue Eliquis for now and monitor his H&H closely. Resume PPIs. Continue to monitor medical floor  Antibiotic as per infectious disease. Currently on IV Bactrim. Zosyn stopped   HD as per nephrology  Home medication resumed  Resume immunosuppressants for history of liver transplant, Prograf  Resume PT/OT  DVT/PUD PPx     7 pm- 7 am, please contact on call Hospitalist for any needs     Dispo-dc soon if Hb stabilizes       Subjective:      no new event.       Physical Examination:      Vitals:  /83   Pulse 100   Temp 98.8 °F (37.1 °C)   Resp 14   Ht 5' 6\" (1.676 m)   Wt 142 lb 6.7 oz (64.6 kg)   SpO2 (!) 89%   BMI 22.99 kg/m²   Temp (24hrs), Av.7 °F (37.1 °C), Min:98.6 °F (37 °C), Max:98.8 °F (37.1 °C)      General appearance: alert, cooperative and no distress  Mental Status: oriented to person, place and time and normal affect  Lungs: clear to auscultation bilaterally, normal effort  Heart: regular rate and rhythm, no murmur  Abdomen: soft, nontender, nondistended, bowel sounds present, no masses  Extremities: + upper and lower edema, pulses intact   Skin multiple area of bruising related to IV access in upper and lower extremities    Data:     Labs:  Recent Labs     22  1189 08/03/22 2130 08/04/22  0526   WBC 9.5  --  10.1   HGB 8.7* 8.4* 7.9*     --  252     Recent Labs     08/03/22  0524 08/04/22  0526   * 132*   K 4.0 3.7   CL 88* 90*   CO2 26 29   BUN 39* 24*   CREATININE 5.19* 3.93*   GLUCOSE 81 100*     No results for input(s): AST, ALT, ALB, BILITOT, ALKPHOS in the last 72 hours.     Current Facility-Administered Medications   Medication Dose Route Frequency Provider Last Rate Last Admin    fluticasone (FLONASE) 50 MCG/ACT nasal spray 1 spray  1 spray Each Nostril Daily [place] Shoulder, DO   1 spray at 08/04/22 0902    saline nasal gel (AYR)   Nasal BID [place] Shoulder, DO   Given at 08/04/22 0902    oxymetazoline (AFRIN) 0.05 % nasal spray 2 spray  2 spray Each Nostril BID Lovette Purl, DO   2 spray at 08/04/22 0902    sodium chloride flush 0.9 % injection 5-40 mL  5-40 mL IntraVENous 2 times per day Lovette Purl, DO   10 mL at 08/04/22 0905    sodium chloride flush 0.9 % injection 5-40 mL  5-40 mL IntraVENous PRN Lovette Purl, DO        0.9 % sodium chloride infusion   IntraVENous PRN Titus Terrell, DO        midodrine (PROAMATINE) tablet 2.5 mg  2.5 mg Oral TID WC Dolly Sarah MD   2.5 mg at 08/04/22 0850    metoprolol tartrate (LOPRESSOR) tablet 12.5 mg  12.5 mg Oral BID Dolly Sarah MD   12.5 mg at 08/04/22 0903    pantoprazole (PROTONIX) tablet 40 mg  40 mg Oral QAM AC ISI Crawford - CNP   40 mg at 08/04/22 0850    apixaban (ELIQUIS) tablet 10 mg  10 mg Oral BID Carlee Hansen MD   10 mg at 08/04/22 0850    Followed by    Dennard Gosselin ON 8/6/2022] apixaban (ELIQUIS) tablet 5 mg  5 mg Oral BID Carlee Hansen MD        sulfamethoxazole-trimethoprim (BACTRIM) 518.4 mg in dextrose 5 % 500 mL IVPB  7.5 mg/kg IntraVENous Daily ISI Crawford - CNP   Stopped at 08/03/22 1257    aspirin EC tablet 81 mg  81 mg Oral Daily Dolly Sarah MD   81 mg at 08/04/22 0413    docusate sodium (COLACE) capsule 100 mg  100 mg Oral BID ISI Crawford - CNP   100 mg at 08/04/22 0903    insulin lispro (HUMALOG) injection vial 0-4 Units  0-4 Units SubCUTAneous 4x Daily AC & HS Carolynn Najera MD   1 Units at 08/02/22 2248    polyethylene glycol (GLYCOLAX) packet 17 g  17 g Oral Daily Dennys Motts, APRN - CNP   17 g at 08/04/22 0904    tacrolimus (PROGRAF) capsule 1 mg  1 mg Oral BID Dennys Motts, APRN - CNP   1 mg at 08/04/22 9255    acetaminophen (TYLENOL) tablet 650 mg  650 mg Oral Q6H PRN Carolynn Najera MD   650 mg at 08/03/22 7357    Or    acetaminophen (TYLENOL) suppository 650 mg  650 mg Rectal Q6H PRN Carolynn Najera MD        fentaNYL bolus from bag  50 mcg IntraVENous Q30 Min PRN Dennys Motts, APRN - CNP        glucose chewable tablet 16 g  4 tablet Oral PRN Dennys Motts, APRN - CNP        dextrose bolus 10% 125 mL  125 mL IntraVENous PRN Dennys Motts, APRN - CNP        Or    dextrose bolus 10% 250 mL  250 mL IntraVENous PRN [place] Motts, APRN - CNP        glucagon (rDNA) injection 1 mg  1 mg SubCUTAneous PRN [place] Motts, APRN - CNP        dextrose 10 % infusion   IntraVENous Continuous PRN Dennys Motts, APRN - CNP        heparin (porcine) injection 1,000 Units  1,000 Units IntraVENous PRN Finn Leach MD           Additional work up or/and treatment plan may be added today or then after based on clinical progression. I am managing a portion of pt care. Some medical issues are handled by other specialists. Additional work up and treatment should be done in out pt setting by pt PCP and other out pt providers. In addition to examining and evaluating pt, I spent additional time explaining care, normaland abnormal findings, and treatment plan. All of pt questions were answered. Counseling, diet and education were provided. Case will be discussed with nursing staff when appropriate. Family will be updated if and when appropriate.        Electronically signed by Zayda Kennedy DO on 8/4/2022 at 9:09 AM

## 2022-08-04 NOTE — CONSULTS
Inpatient consult to GI  Consult performed by: Tom Campbell MD  Consult ordered by: Eduin Payne DO        Patient Name: Dominique Pride Date: 2022 11:15 AM  MR #: [id number]  : 1955    Attending Physician: Eduin Payne DO  Reason for consult: melena    History of Presenting Illness:      Kamar Watkins is a 75 y.o. male on hospital day 10 with a history of end-stage renal disease on hemodialysis , hypertension, hepatitis, liver transplant . Past surgical history significant for liver transplant, HCV cirrhosis c/b HCC s/p chemoembolization (2015) and esophageal/gastric varices s/p TIPS, OLT (3/2016) and dialysis catheter. Reviewed and is negative for GI malignancies. Street patient reports former nicotine use, no EtOH or illicit drug use. History Obtained From:  patient, electronic medical record    GI consult for anemia and melena-patient was admitted on  for SOB & sepsis,  initially required intubation, now clinically improved and seen on the regular medical floor. Patient carries a diagnosis of end-stage renal disease and is on hemodialysis  and , on arrival hemoglobin was noted to be 13, now 7.9 with associated melena in the context of epistaxis and anticoagulation for bilateral upper extremity DVTs. Was on Eliquis, this has been held. History:      Past Medical History:   Diagnosis Date    ESRD (end stage renal disease) ([address]     Hepatitis     Hypertension     Liver transplanted ([address] 2016     Past Surgical History:   Procedure Laterality Date    DIALYSIS FISTULA CREATION Right     IR MIDLINE CATH  2022    IR MIDLINE CATH 2022 MLOZ SPECIAL PROCEDURE    LIVER TRANSPLANT  2017    TUNNELED VENOUS PORT PLACEMENT       Family History  History reviewed. No pertinent family history.   [] Unable to obtain due to ventilated and/ or neurologic status  Social History     Socioeconomic History    Marital status: Single     Spouse name: Not on file    Number of children: Not on file    Years of education: Not on file    Highest education level: Not on file   Occupational History    Not on file   Tobacco Use    Smoking status: Former     Packs/day: 0.50     Years: 15.00     Pack years: 7.50     Types: Cigarettes     Quit date: 2017     Years since quittin.7    Smokeless tobacco: Never   Substance and Sexual Activity    Alcohol use: No    Drug use: No    Sexual activity: Not on file   Other Topics Concern    Not on file   Social History Narrative    Lives With: Alone    Type of Home: Apartment in [place] [address], [address]: One level (4th floor)    Home Access: Elevator, Level entry    Bathroom Shower/Tub: Tub/Shower unit    Bathroom Equipment: Grab bars in [facility] [address]: Rollator, [employer]    Has the patient had two or more falls in the past year or any fall with injury in the past year?:  (One fall about 2 months ago bending over to pick something up)    ADL Assistance: Independent    Homemaking Assistance: Independent    Ambulation Assistance: Independent    Transfer Assistance: Independent    Active : Yes    Occupation: Retired    Additional Comments: Sister lives nearby     on hemodialysis          Social Determinants of Health     Financial Resource Strain: Not on file   Food Insecurity: Not on file   Transportation Needs: Not on file   Physical Activity: Not on file   Stress: Not on file   Social Connections: Not on file   Intimate Partner Violence: Not on file   Housing Stability: Not on file      [] Unable to obtain due to ventilated and/ or neurologic status    Home Medications:      Medications Prior to Admission: albuterol sulfate HFA (PROAIR HFA) 108 (90 Base) MCG/ACT inhaler, 2 puffs; Use every 4 hours while awake for 7-10 days then PRN wheezing  Dispense with SPACER and Instruct on use.   May sub Ventolin or Proventil as needed per Insurance. carvedilol (COREG) 12.5 MG tablet, Take 12.5 mg by mouth 2 times daily  calcium acetate (PHOSLO) 667 MG capsule, Take 667 mg by mouth 3 times daily (with meals)  Tacrolimus (PROGRAF PO), Take 1 tablet by mouth 2 times daily    Current Hospital Medications:   Scheduled Meds:   pantoprazole (PROTONIX) 40 mg injection  40 mg IntraVENous Q12H    [START ON 8/5/2022] enoxaparin  1 mg/kg SubCUTAneous Daily    fluticasone  1 spray Each Nostril Daily    saline nasal gel   Nasal BID    oxymetazoline  2 spray Each Nostril BID    sodium chloride flush  5-40 mL IntraVENous 2 times per day    midodrine  2.5 mg Oral TID WC    metoprolol tartrate  12.5 mg Oral BID    sulfamethoxazole-trimethoprim (BACTRIM) IVPB  7.5 mg/kg IntraVENous Daily    [Held by provider] aspirin  81 mg Oral Daily    docusate sodium  100 mg Oral BID    insulin lispro  0-4 Units SubCUTAneous 4x Daily AC & HS    polyethylene glycol  17 g Oral Daily    tacrolimus  1 mg Oral BID     Continuous Infusions:   sodium chloride      dextrose       PRN Meds:.sodium chloride flush, sodium chloride, acetaminophen **OR** acetaminophen, [DISCONTINUED] fentaNYL **AND** fentaNYL, glucose, dextrose bolus **OR** dextrose bolus, glucagon (rDNA), dextrose, heparin (porcine)   sodium chloride      dextrose        Allergies:   No Known Allergies   Review of Systems:       [x] CV, Resp, Neuro, , and all other systems reviewed and negative other than listed in HPI. Objective Findings:     Vitals:   Vitals:    08/03/22 1645 08/03/22 1715 08/03/22 2017 08/03/22 2232   BP: (!) 147/84 (!) 144/88 (!) 104/27 120/83   Pulse: 77 98  100   Resp: 14 14     Temp:  98.8 °F (37.1 °C)     TempSrc:       SpO2:    (!) 89%   Weight:       Height:            Physical Examination:  General: AAO X3  HEENT: Normocephalic, No scleral icterus. Fresh blood noted  Neck: No JVD. Heart: Regular, no murmur, no rub/gallop. No RV heave.   Lungs: Clear to ascultation, no rales/wheezing/rhonchi. Good chest wall excursion. Abdomen: Appearance:, Soft. No rigidity no guarding   extremities: No clubbing/cyanosis, no edema. Skin: Warm, dry, normal turgor, no rash, no bruise, no petichiae. Neuro: No myoclonus or tremor. Psych: Normal affect    Results/ Medications reviewed 8/4/2022, 11:32 AM     Laboratory, Microbiology, Pathology, Radiology, Cardiology, Medications and Transcriptions reviewed  Scheduled Meds:   pantoprazole (PROTONIX) 40 mg injection  40 mg IntraVENous Q12H    [START ON 8/5/2022] enoxaparin  1 mg/kg SubCUTAneous Daily    fluticasone  1 spray Each Nostril Daily    saline nasal gel   Nasal BID    oxymetazoline  2 spray Each Nostril BID    sodium chloride flush  5-40 mL IntraVENous 2 times per day    midodrine  2.5 mg Oral TID WC    metoprolol tartrate  12.5 mg Oral BID    sulfamethoxazole-trimethoprim (BACTRIM) IVPB  7.5 mg/kg IntraVENous Daily    [Held by provider] aspirin  81 mg Oral Daily    docusate sodium  100 mg Oral BID    insulin lispro  0-4 Units SubCUTAneous 4x Daily AC & HS    polyethylene glycol  17 g Oral Daily    tacrolimus  1 mg Oral BID     Continuous Infusions:   sodium chloride      dextrose         Recent Labs     08/03/22  0524 08/03/22  2130 08/04/22  0526 08/04/22  0909   WBC 9.5  --  10.1  --    HGB 8.7* 8.4* 7.9* 7.9*   HCT 26.5* 25.3* 23.4* 24.0*   .0*  --  103.9*  --      --  252  --      Recent Labs     08/03/22  0524 08/04/22  0526   * 132*   K 4.0 3.7   CL 88* 90*   CO2 26 29   PHOS 5.0* 4.2   BUN 39* 24*   CREATININE 5.19* 3.93*     No results for input(s): AST, ALT, ALB, BILIDIR, BILITOT, ALKPHOS in the last 72 hours. No results for input(s): LIPASE, AMYLASE in the last 72 hours. No results for input(s): PROT, INR in the last 72 hours.   Echocardiogram complete 2D with doppler with color    Result Date: 7/27/2022  Transthoracic Echocardiography Report (TTE)  Demographics   Patient Name    [facility] Male                  JON   Patient Number  [id number]       Race                                                   Ethnicity   Visit Number    [id number]      Room Number          IC12   Corporate ID                   Date of Study        07/27/2022   Accession       [id number]     Referring Physician  Number   Date of Birth   1955     Sonographer          Juana Morales   Age             75 year(s)     Interpreting         [facility])                                 Physician            Cardiology                                                      [facility]  Procedure Type of Study   TTE procedure:ECHO COMPLETE 2D W/DOP W/COLOR. Procedure Date Date: 07/27/2022 Start: 10:27 AM Study Location: Portable Technical Quality: Limited visualization due to lung interface. Indications:LVF. Patient Status: Routine Height: 66 inches Weight: 148 pounds BSA: 1.76 m^2 BMI: 23.89 kg/m^2 BP: 96/54 mmHg  Conclusions   Summary  Left ventricular ejection fraction is estimated at 45%. Diastolic Dysfunction is noted by mitral flow. Normal right ventricle systolic pressure. RVSP 28mmHg  Echogenic mass in the apex concerning for thrombus recommend limited echo  with definity  No hemodynamic evidence of significant valve disease   Signature   ----------------------------------------------------------------  Electronically signed by Vu Ricks(Interpreting physician)  on 07/27/2022 03:48 PM  ----------------------------------------------------------------   Findings  Left Ventricle Left ventricular ejection fraction is estimated at 45%. Left ventricular size is normal . Normal left ventricular wall thickness. Diastolic Dysfunction is noted by mitral flow. Echogenic mass in the apex possible thrombus Right Ventricle Normal right ventricle structure and function. Normal right ventricle systolic pressure. RVSP 28mmHg Left Atrium Normal left atrium. Right Atrium Normal right atrium.  Mitral Valve Diffusely thickened and pliable mitral valve leaflets with normal excursion. Structurally normal mitral valve. No evidence of mitral valve stenosis. No evidence of mitral regurgitaton. Tricuspid Valve Tricuspid valve is structurally normal. No evidence of tricuspid stenosis. No evidence of tricuspid regurgitation. Aortic Valve Mildly thickened trileaflet aortic valve with normal excursion. Structurally normal aortic valve. No evidence of aortic valve stenosis . No evidence of aortic valve regurgitation . Pulmonic Valve The pulmonic valve was not well visualized . Pericardial Effusion No evidence of pericardial effusion. Aorta \ Miscellaneous The aorta is within normal limits. M-Mode Measurements (cm)   LVIDd: 2.97 cm                         LVIDs: 2.34 cm  IVSd: 0.73 cm                          IVSs: 1.01 cm  LVPWd: 0.9 cm                          LVPWs: 1.01 cm  Rt. Vent.  Dimension: 3.12 cm           AO Root Dimension: 2.1 cm                                         ACS: 1.37 cm                                         LA: 1.82 cm                                         LVOT: 2.03 cm  Doppler Measurements:   AV Velocity:0.02 m/s                    MV Peak E-Wave: 0.5 m/s  AV Peak Gradient: 5.77 mmHg             MV Peak A-Wave: 0.53 m/s  AV Mean Gradient: 3.05 mmHg  AV Area (Continuity):1.97 cm^2  TR Velocity:2.52 m/s                    Estimated RAP:3 mmHg  TR Gradient:25.31 mmHg                  RVSP:28.31 mmHg  Valves  Mitral Valve   Peak E-Wave: 0.5 m/s                  Peak A-Wave: 0.53 m/s  Mean Velocity: 0.87 m/s               E/A Ratio: 0.94  Mean Gradient: 4.66 mmHg              Peak Gradient: 0.99 mmHg                                        Deceleration Time: 353.3 msec                                        Area (continuity): 1.4 cm^2   Tissue Doppler   E' Septal Velocity: 0.07 m/s  E' Lateral Velocity: 0.07 m/s   Aortic Valve   Peak Velocity: 1.2 m/s                 Mean Velocity: 0.82 m/s  Peak Gradient: 5.77 mmHg Mean Gradient: 3.05 mmHg  Area (continuity): 1.97 cm^2  AV VTI: 29.33 cm   Cusp Separation: 1.37 cm   Tricuspid Valve   Estimated RVSP: 28.31 mmHg              Estimated RAP: 3 mmHg  TR Velocity: 2.52 m/s                   TR Gradient: 25.31 mmHg   Pulmonic Valve   Peak Velocity: 0.99 m/s           Peak Gradient: 3.91 mmHg                                    Estimated PASP: 28.31 mmHg   LVOT   Peak Velocity: 0.97 m/s              Mean Velocity: 0.61 m/s  Peak Gradient: 3.77 mmHg             Mean Gradient: 1.79 mmHg  LVOT Diameter: 2.03 cm               LVOT VTI: 17.9 cm  Structures  Left Atrium   LA Dimension: 1.82 cm                        LA Area: 12.58 cm^2  LA/Aorta: 0.87  LA Volume/Index: 44.74 ml /25 m^2   Left Ventricle   Diastolic Dimension: 9.89 cm         Systolic Dimension: 1.25 cm  Septum Diastolic: 6.23 cm            Septum Systolic: 6.50 cm  PW Diastolic: 0.9 cm                 PW Systolic: 5.44 cm                                       FS: 21.2 %  LV EDV/LV EDV Index: 34.12 ml/19 m^2 LV ESV/LV ESV Index: 19.02 ml/11 m^2  EF Calculated: 44.3 %   LVOT Diameter: 2.03 cm   Right Ventricle   Diastolic Dimension: 5.50 cm                                    RV Systolic Pressure: 03.40 mmHg  Aorta/ Miscellaneous Aorta   Aortic Root: 2.1 cm  LVOT Diameter: 2.03 cm      ECHO Limited    Result Date: 7/28/2022  Transthoracic Echocardiography Report (TTE)  Demographics   Patient Name    [facility]       Gender               Male                  [facility]   Patient Number  [id number]       Race                                                   Ethnicity   Visit Number    [id number]      Room Number          IC12   Corporate ID                   Date of Study        07/28/2022   Accession       [id number]     Referring Physician  Number   Date of Birth   1955     Sonographer          Altagracia Sim   Age             75 year(s)     Interpreting         [facility]) Physician            Cardiology                                                      [facility]  Procedure Type of Study   TTE procedure:ECHOCARDIOGRAM LIMITED. Procedure Date Date: 07/28/2022 Start: 08:08 AM Study Location: Portable Technical Quality: Limited visualization Indications:LVF. Patient Status: Routine Contrast Medium: Definity. Amount - 2 ml Height: 66 inches Weight: 148 pounds BSA: 1.76 m^2 BMI: 23.89 kg/m^2  Conclusions   Summary  No evidence of thrombus with definity  Left ventricular ejection fraction is visually estimated at 65%. Signature   ----------------------------------------------------------------  Electronically signed by Isidro Ricks(Interpreting physician)  on 07/28/2022 08:42 AM  ----------------------------------------------------------------   Findings  Left Ventricle Left ventricular ejection fraction is visually estimated at 65%. IR FLUORO GUIDED CVA DEVICE PLMT/REPLACE/REMOVAL    1. Venogram of the right internal jugular vein demonstrates a thrombus in the right internal jugular vein which completely occludes the vein. Passage of contrast into the chest is through small collateral veins. 2. 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 Passage of contrast into the chest is through a small collateral veins. Radiation dose to the patient was: 24.21 mGy Additional clinical data: Long-term IV access Procedure: 1. Ultrasound guidance for vascular access into the right internal jugular vein. The ultrasound image of the blood vessel was saved to PACS. 2. Venogram via contrast injection of the right internal jugular vein. 3.   Ultrasound guidance for vascular access into the left internal jugular vein. The ultrasound image of the blood vessel was saved to PACS 4. Venogram via contrast injection of the left internal jugular vein. Body of Report:  Informed and written consent was obtained from the patient following discussion of risks, benefits and alternatives to this procedure. The was patient placed supine on the angiographic table. The patient's neck and chest were then prepped and draped in  normal sterile fashion. A small amount of local lidocaine anesthesia was injected subcutaneously. Ultrasound was used to study the jugular vein we intended to use prior to accessing it. The vein appeared patent. The ultrasound image of the blood vessel was saved to PACS. Using ultrasound access, puncture was made of the right internal jugular vein using a 21 GA needle. A wire was advanced into the right internal jugular vein, though would not pass into the superior vena cava. A 4 Tristanian short thin sheath was placed, through the sheath digital subtraction venography was performed. Venography demonstrated a thrombus in the right internal jugular vein and complete occlusion of the vein central to the thrombus. This access was aborted. Ultrasound was used to study the left jugular vein we intended to use prior to accessing it. The vein appeared patent. The ultrasound image of the blood vessel was saved to PACS. Using ultrasound access, puncture was made of the left internal jugular vein using a 21 GA needle. A wire was attempted to be passed, though would not pass to the superior vena  cava. A 4 Tristanian thin sheath was placed and digital subtraction venography was performed. 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 The procedure was aborted. Findings discussed with ICU team who will place a temporary central line in the groin. XR CHEST PORTABLE    Result Date: 7/27/2022  XR CHEST PORTABLE COMPARISON: July 25, 2022. HISTORY: resp failure TECHNIQUE: AP view FINDINGS: Endotracheal tube again seen in place. . There is also an enteric tube seen in place and the tip is below the diaphragm. They appear unchanged in position.   A small pleural effusion seen on the right. The left costophrenic angle is not well seen. The lungs are hyperinflated and there is coarsening of the interstitium. Atelectasis and/or scarring is again seen bilaterally in the lower lung fields. The cardiac silhouette appears mildly enlarged but may be accentuated by the portable technique. Degenerative changes are seen in the visualized portion of the right shoulder. The airspace disease has diminished when compared to previous study. . A small pleural effusion is seen on the right and scarring or atelectasis is again seen bilaterally in the bases. US DUP UPPER EXTREMITY RIGHT VENOUS COMPARISON: HISTORY:  resp failure TECHNIQUE: , US DUP UPPER EXTREMITY RIGHT VENOUS AND LEFT VENOUS FINDINGS: Thrombus is seen in the right internal jugular and proximal subclavian, veins it is also seen in the cephalic vein. The right ulnar and basilic veins are not visualized. A right AV fistula seen. Thrombus is seen in the left internal jugular vein. The left basilic and axillary veins are not visualized. IMPRESSION: Deep venous thrombosis seen in the right and left upper extremities. .    XR CHEST PORTABLE    Result Date: 7/25/2022  XR CHEST PORTABLE : 7/25/2022 CLINICAL HISTORY:  post intubation . COMPARISON: Earlier 7/25/2022 TECHNIQUE: A portable upright AP radiograph of the chest was obtained at approximately 12:46 PM. FINDINGS: Both lung bases have been excluded from the radiograph. An endotracheal tube is present, with its tip approximately 4 to 5 cm above the lucita. An orogastric tube probably extends below the diaphragm out of field of view radiograph. Moderate pleural effusions and mild to moderate probable scarring and/or atelectasis of the mid to lower lung fields has not significantly changed. There is no cardiomegaly, pneumothorax, displaced fractures, or other significant changes identified. ENDOTRACHEAL AND OROGASTRIC TUBES IN EXPECTED POSITIONS. OTHERWISE, STABLE CHEST FROM EARLIER 7/25/2022. VENOUS COMPARISON: HISTORY:  resp failure TECHNIQUE: , US DUP UPPER EXTREMITY RIGHT VENOUS AND LEFT VENOUS FINDINGS: Thrombus is seen in the right internal jugular and proximal subclavian, veins it is also seen in the cephalic vein. The right ulnar and basilic veins are not visualized. A right AV fistula seen. Thrombus is seen in the left internal jugular vein. The left basilic and axillary veins are not visualized. IMPRESSION: Deep venous thrombosis seen in the right and left upper extremities. .    US DUP UPPER EXTREMITY LEFT VENOUS    Result Date: 7/27/2022  XR CHEST PORTABLE COMPARISON: July 25, 2022. HISTORY: resp failure TECHNIQUE: AP view FINDINGS: Endotracheal tube again seen in place. . There is also an enteric tube seen in place and the tip is below the diaphragm. They appear unchanged in position. A small pleural effusion seen on the right. The left costophrenic angle is not well seen. The lungs are hyperinflated and there is coarsening of the interstitium. Atelectasis and/or scarring is again seen bilaterally in the lower lung fields. The cardiac silhouette appears mildly enlarged but may be accentuated by the portable technique. Degenerative changes are seen in the visualized portion of the right shoulder. The airspace disease has diminished when compared to previous study. . A small pleural effusion is seen on the right and scarring or atelectasis is again seen bilaterally in the bases. US DUP UPPER EXTREMITY RIGHT VENOUS COMPARISON: HISTORY:  resp failure TECHNIQUE: , US DUP UPPER EXTREMITY RIGHT VENOUS AND LEFT VENOUS FINDINGS: Thrombus is seen in the right internal jugular and proximal subclavian, veins it is also seen in the cephalic vein. The right ulnar and basilic veins are not visualized. A right AV fistula seen. Thrombus is seen in the left internal jugular vein. The left basilic and axillary veins are not visualized.  IMPRESSION: Deep venous thrombosis seen in the right and left upper extremities. .    US DUP UPPER EXTREMITY RIGHT ARTERIES    Result Date: 8/2/2022  US DUP UPPER EXTREMITY RIGHT ARTERIES: 8/1/2022 11:42 AM CLINICAL HISTORY:  cold, poor pulses, extensive dvt . COMPARISON: None available. Grayscale, color and waveform Doppler analysis of the right upper arterial system was performed. FINDINGS: Mild to moderate atherosclerotic disease, with pulsatile mild to moderately diminished waveforms worsening distally. There are no significantly elevated velocities to suggest a flow-limiting stenosis, or arterial occlusion. The arterial system is normal in caliber, without evidence of aneurysm or dissection. Maximum systolic velocities are: RIGHT UPPER EXTREMITY: Right proximal common carotid artery 70 cm/s Right mid common carotid artery 53 cm/s Right proximal subclavian artery 68 cm/s Right mid subclavian artery 81 cm/s Right distal subclavian artery 71 cm/s Right axillary artery 41 cm/s Right brachial artery proximal 111 cm/s Right brachial artery mid 97 cm/s Right brachial artery distal 123 cm/s Right radial artery proximal 53 cm/s Right radial artery mid 43 cm/s Right radial artery distal 18 cm/s Right ulnar artery proximal 61 cm/s Right ulnar artery and mid 46 cm/s Right ulnar artery distal 33 cm/s     NO EVIDENCE OF A FLOW-LIMITING STENOSIS, ARTERIAL OCCLUSION, OR ANEURYSM. IR VENOGRAM VENOUS SINUS/JUGULAR    1. Venogram of the right internal jugular vein demonstrates a thrombus in the right internal jugular vein which completely occludes the vein. Passage of contrast into the chest is through small collateral veins. 2. 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 Passage of contrast into the chest is through a small collateral veins. Radiation dose to the patient was: 24.21 mGy Additional clinical data: Long-term IV access Procedure: 1.    Ultrasound guidance for vascular access into the right internal jugular vein. The ultrasound image of the blood vessel was saved to PACS. 2. Venogram via contrast injection of the right internal jugular vein. 3.   Ultrasound guidance for vascular access into the left internal jugular vein. The ultrasound image of the blood vessel was saved to PACS 4. Venogram via contrast injection of the left internal jugular vein. Body of Report: Informed and written consent was obtained from the patient following discussion of risks, benefits and alternatives to this procedure. The was patient placed supine on the angiographic table. The patient's neck and chest were then prepped and draped in  normal sterile fashion. A small amount of local lidocaine anesthesia was injected subcutaneously. Ultrasound was used to study the jugular vein we intended to use prior to accessing it. The vein appeared patent. The ultrasound image of the blood vessel was saved to PACS. Using ultrasound access, puncture was made of the right internal jugular vein using a 21 GA needle. A wire was advanced into the right internal jugular vein, though would not pass into the superior vena cava. A 4 Trin[place] short thin sheath was placed, through the sheath digital subtraction venography was performed. Venography demonstrated a thrombus in the right internal jugular vein and complete occlusion of the vein central to the thrombus. This access was aborted. Ultrasound was used to study the left jugular vein we intended to use prior to accessing it. The vein appeared patent. The ultrasound image of the blood vessel was saved to PACS. Using ultrasound access, puncture was made of the left internal jugular vein using a 21 GA needle. A wire was attempted to be passed, though would not pass to the superior vena  cava. A 4 Trinid[place] thin sheath was placed and digital subtraction venography was performed. 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 The procedure was aborted. Findings discussed with ICU team who will place a temporary central line in the groin. IR ULTRASOUND GUIDANCE VASCULAR ACCESS    1. Venogram of the right internal jugular vein demonstrates a thrombus in the right internal jugular vein which completely occludes the vein. Passage of contrast into the chest is through small collateral veins. 2. 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 Passage of contrast into the chest is through a small collateral veins. Radiation dose to the patient was: 24.21 mGy Additional clinical data: Long-term IV access Procedure: 1. Ultrasound guidance for vascular access into the right internal jugular vein. The ultrasound image of the blood vessel was saved to PACS. 2. Venogram via contrast injection of the right internal jugular vein. 3.   Ultrasound guidance for vascular access into the left internal jugular vein. The ultrasound image of the blood vessel was saved to PACS 4. Venogram via contrast injection of the left internal jugular vein. Body of Report: Informed and written consent was obtained from the patient following discussion of risks, benefits and alternatives to this procedure. The was patient placed supine on the angiographic table. The patient's neck and chest were then prepped and draped in  normal sterile fashion. A small amount of local lidocaine anesthesia was injected subcutaneously. Ultrasound was used to study the jugular vein we intended to use prior to accessing it. The vein appeared patent. The ultrasound image of the blood vessel was saved to PACS. Using ultrasound access, puncture was made of the right internal jugular vein using a 21 GA needle. A wire was advanced into the right internal jugular vein, though would not pass into the superior vena cava.  A 4 Macedonian short thin sheath was placed, through the sheath digital subtraction venography was performed. Venography demonstrated a thrombus in the right internal jugular vein and complete occlusion of the vein central to the thrombus. This access was aborted. Ultrasound was used to study the left jugular vein we intended to use prior to accessing it. The vein appeared patent. The ultrasound image of the blood vessel was saved to PACS. Using ultrasound access, puncture was made of the left internal jugular vein using a 21 GA needle. A wire was attempted to be passed, though would not pass to the superior vena  cava. A 4 [place] thin sheath was placed and digital subtraction venography was performed. 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 The procedure was aborted. Findings discussed with ICU team who will place a temporary central line in the groin. US DUP LOWER EXTREMITIES BILATERAL VENOUS    Result Date: 7/27/2022  EXAMINATION: US DUP LOWER EXTREMITIES BILATERAL VENOUS CLINICAL HISTORY: 79-year-old with lower extremity swelling COMPARISONS: None available. FINDINGS: Duplex and color Doppler ultrasounds were performed of the bilateral lower extremity deep venous systems. Examination was performed portably and limited due to patient condition and access to the lower extremities. Right leg: Visualized portions of the right common femoral vein, femoral vein, popliteal vein demonstrate satisfactory compression, color flow, and augmentation. Deep calf veins are not evaluated. Left leg: The left common femoral vein is enlarged filled with intraluminal echoes with absent color flow and poor compression consistent with occluding thrombus. Occluding Thrombus extends into the left proximal femoral vein. Mid to distal femoral vein and popliteal vein demonstrate satisfactory compression and color flow.  Deep calf veins are not assessed on this portable study     ULTRASOUND FINDINGS ARE POSITIVE FOR OCCLUDING THROMBUS IN THE LEFT COMMON FEMORAL VEIN EXTENDING INTO THE PROXIMAL FEMORAL VEIN. NO ULTRASOUND SIGNS OF DVT IN THE RIGHT LEG FROM THE GROIN TO THE POPLITEAL FOSSA    IR MIDLINE CATH    Result Date: 8/3/2022  FOR BILLING PURPOSES ONLY. STUDY DICTATED IN [facility] BY PHYSICIAN. Impression:   76 y/o male admitted on 7/25 for SOB & sepsis,  initially required intubation, now clinically improved and seen on the regular medical floor. Patient carries a diagnosis of end-stage renal disease and is on hemodialysis Monday Wednesdays and Fridays, on arrival hemoglobin was noted to be 13, now 7.9 with associated melena in the context of epistaxis and anticoagulation for bilateral upper extremity DVTs. Was on Eliquis, this has been held. 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 Noted patient admitted on to sepsis due to pneumonia and no Prograf level noted currently. Patient is not on tacrolimus inpatient at this time. No recent liver function test  Plan:   -Continue course of care  -Obtain hepatic function test  -H&H, trend and transfuse accordingly  -Investigation pending clinical course  -ENT assessment  -EGD pending course and trend of H&H (potentially on 08/05/2022)  -Continue PPI  Comments: Thank you for allowing us to participate in the care of this patient. Will continue to follow. Please call if questions or concerns arise. Electronically signed by Jackson Arnett MD on 8/4/2022 at 11:32 AM    Please note this report has been partially produced using speech recognition software and may cause contain errors related to that system including grammar, punctuation and spelling as well as words and phrases that may seem inappropriate. If there are questions or concerns please feel free to contact me to clarify.

## 2022-08-04 NOTE — CARE COORDINATION
Care Coordination Updates:     CM, JUDITH, and AUM met with physician regarding patient's discharge plan. Initial plan this morning was for possible discharge to rehab when approved by insurance if Hgb remained stable. Physician reports that patient has developed epistaxis and melena and DC will be held for now pending ENT and GI consults. Patient was seen by GI earlier today, patient may potentially have EGD tomorrow per physician notes.

## 2022-08-04 NOTE — PROGRESS NOTES
INPATIENT PROGRESS NOTES    PATIENT NAME: Justin Navarrete  MRN: [id number]  SERVICE DATE:  August 3, 2022   SERVICE TIME:  9:53 PM      PRIMARY SERVICE: Pulmonary Disease    CHIEF COMPLAIN: Acute respiratory failure      INTERVAL HPI: Patient seen and examined at bedside, Interval Notes, orders reviewed. Nursing notes noted  Patient is feeling much better. He is on 3 L O2 via nasal cannula O2 saturation 98%. He is on BiPAP at night. He said he did not use last night. He denies having shortness of breath at rest.  No chest pain. No cough or sputum production. No fever or chills. No nausea vomiting or diarrhea. OBJECTIVE    Body mass index is 22.99 kg/m². PHYSICAL EXAM:  Vitals:  BP (!) 144/88   Pulse 98   Temp 98.8 °F (37.1 °C)   Resp 14   Ht 5' 6\" (1.676 m)   Wt 142 lb 6.7 oz (64.6 kg)   SpO2 96%   BMI 22.99 kg/m²   General: Alert, awake . comfortable in bed, No distress. Head: Atraumatic , Normocephalic   Eyes: PERRL. No sclera icterus. No conjunctival injection. No discharge   ENT: No nasal  discharge. Pharynx clear. Neck:  Trachea midline. No thyromegaly, no JVD, No cervical adenopathy. Chest : Bilaterally symmetrical ,Normal effort,  No accessory muscle use  Lung : . Fair BS bilateral, decreased BS at bases. Bibasilar Rales. No wheezing. No rhonchi. Heart[de-identified] Normal  rate. Regular rhythm. No mumur ,  Rub or gallop  ABD: Non-tender. Non-distended. No masses. No organmegaly. Normal bowel sounds. No hernia.   Ext : No Pitting both leg , No Cyanosis No clubbing  Neuro: no focal weakness          DATA:   Recent Labs     08/03/22 0524   WBC 9.5   HGB 8.7*   HCT 26.5*   .0*        Recent Labs     08/03/22 0524   *   K 4.0   CL 88*   CO2 26   BUN 39*   CREATININE 5.19*   GLUCOSE 81   CALCIUM 8.6   LABALBU 2.8*   LABGLOM 11.1*   GFRAA 13.4*       MV Settings:     Vent Mode: CPAP  Vt (Set, mL): 390 mL  Resp Rate (Set): 18 bmp  FiO2 : 40 %  PEEP/CPAP (cmH2O): 5  Pressure Support: 5 cmH20  Peak Inspiratory Pressure: 9.3 cmH2O  Mean Airway Pressure: 6 cmH20  I:E Ratio: 1:3    No results for input(s): PHART, VYN0MGO, PO2ART, TZL2GFD, BEART, I9MTAWCP in the last 72 hours. O2 Device: Nasal cannula  O2 Flow Rate (L/min): 4 L/min    ADULT ORAL NUTRITION SUPPLEMENT; Breakfast; Standard High Calorie/High Protein Oral Supplement  ADULT ORAL NUTRITION SUPPLEMENT; Lunch; Fortified Pudding Oral Supplement  ADULT ORAL NUTRITION SUPPLEMENT; Dinner; Frozen Oral Supplement  ADULT DIET;  Dysphagia - Soft and Bite Sized     MEDICATIONS during current hospitalization:    Continuous Infusions:   sodium chloride      dextrose         Scheduled Meds:   fluticasone  1 spray Each Nostril Daily    saline nasal gel   Nasal BID    oxymetazoline  2 spray Each Nostril BID    sodium chloride flush  5-40 mL IntraVENous 2 times per day    midodrine  2.5 mg Oral TID WC    metoprolol tartrate  12.5 mg Oral BID    pantoprazole  40 mg Oral QAM AC    apixaban  10 mg Oral BID    Followed by    Floy Sherry ON 8/6/2022] apixaban  5 mg Oral BID    sulfamethoxazole-trimethoprim (BACTRIM) IVPB  7.5 mg/kg IntraVENous Daily    aspirin  81 mg Oral Daily    docusate sodium  100 mg Oral BID    insulin lispro  0-4 Units SubCUTAneous 4x Daily AC & HS    polyethylene glycol  17 g Oral Daily    tacrolimus  1 mg Oral BID       PRN Meds:sodium chloride flush, sodium chloride, acetaminophen **OR** acetaminophen, [DISCONTINUED] fentaNYL **AND** fentaNYL, glucose, dextrose bolus **OR** dextrose bolus, glucagon (rDNA), dextrose, heparin (porcine)    Radiology  Echocardiogram complete 2D with doppler with color    Result Date: 7/27/2022  Transthoracic Echocardiography Report (TTE)  Demographics   Patient Name    VIA [facility]       Gender               Male                  [facility]   Patient Number  [id number]       Race                                                   Ethnicity   Visit Number    [id number]      Room Number          IC12   Corporate ID Date of Study        07/27/2022   Accession       [id number]     Referring Physician  Number   Date of Birth   1955     Sonographer          Aria Nye   Age             75 year(s)     Interpreting         [facility])                                 Physician            Cardiology                                                      [facility]  Procedure Type of Study   TTE procedure:ECHO COMPLETE 2D W/DOP W/COLOR. Procedure Date Date: 07/27/2022 Start: 10:27 AM Study Location: Portable Technical Quality: Limited visualization due to lung interface. Indications:LVF. Patient Status: Routine Height: 66 inches Weight: 148 pounds BSA: 1.76 m^2 BMI: 23.89 kg/m^2 BP: 96/54 mmHg  Conclusions   Summary  Left ventricular ejection fraction is estimated at 45%. Diastolic Dysfunction is noted by mitral flow. Normal right ventricle systolic pressure. RVSP 28mmHg  Echogenic mass in the apex concerning for thrombus recommend limited echo  with definity  No hemodynamic evidence of significant valve disease   Signature   ----------------------------------------------------------------  Electronically signed by Julietta Schwab Manuel(Interpreting physician)  on 07/27/2022 03:48 PM  ----------------------------------------------------------------   Findings  Left Ventricle Left ventricular ejection fraction is estimated at 45%. Left ventricular size is normal . Normal left ventricular wall thickness. Diastolic Dysfunction is noted by mitral flow. Echogenic mass in the apex possible thrombus Right Ventricle Normal right ventricle structure and function. Normal right ventricle systolic pressure. RVSP 28mmHg Left Atrium Normal left atrium. Right Atrium Normal right atrium. Mitral Valve Diffusely thickened and pliable mitral valve leaflets with normal excursion. Structurally normal mitral valve. No evidence of mitral valve stenosis. No evidence of mitral regurgitaton.  Tricuspid Valve Tricuspid valve is structurally normal. No evidence of tricuspid stenosis. No evidence of tricuspid regurgitation. Aortic Valve Mildly thickened trileaflet aortic valve with normal excursion. Structurally normal aortic valve. No evidence of aortic valve stenosis . No evidence of aortic valve regurgitation . Pulmonic Valve The pulmonic valve was not well visualized . Pericardial Effusion No evidence of pericardial effusion. Aorta \ Miscellaneous The aorta is within normal limits. M-Mode Measurements (cm)   LVIDd: 2.97 cm                         LVIDs: 2.34 cm  IVSd: 0.73 cm                          IVSs: 1.01 cm  LVPWd: 0.9 cm                          LVPWs: 1.01 cm  Rt. Vent.  Dimension: 3.12 cm           AO Root Dimension: 2.1 cm                                         ACS: 1.37 cm                                         LA: 1.82 cm                                         LVOT: 2.03 cm  Doppler Measurements:   AV Velocity:0.02 m/s                    MV Peak E-Wave: 0.5 m/s  AV Peak Gradient: 5.77 mmHg             MV Peak A-Wave: 0.53 m/s  AV Mean Gradient: 3.05 mmHg  AV Area (Continuity):1.97 cm^2  TR Velocity:2.52 m/s                    Estimated RAP:3 mmHg  TR Gradient:25.31 mmHg                  RVSP:28.31 mmHg  Valves  Mitral Valve   Peak E-Wave: 0.5 m/s                  Peak A-Wave: 0.53 m/s  Mean Velocity: 0.87 m/s               E/A Ratio: 0.94  Mean Gradient: 4.66 mmHg              Peak Gradient: 0.99 mmHg                                        Deceleration Time: 353.3 msec                                        Area (continuity): 1.4 cm^2   Tissue Doppler   E' Septal Velocity: 0.07 m/s  E' Lateral Velocity: 0.07 m/s   Aortic Valve   Peak Velocity: 1.2 m/s                 Mean Velocity: 0.82 m/s  Peak Gradient: 5.77 mmHg               Mean Gradient: 3.05 mmHg  Area (continuity): 1.97 cm^2  AV VTI: 29.33 cm   Cusp Separation: 1.37 cm   Tricuspid Valve   Estimated RVSP: 28.31 mmHg              Estimated RAP: 3 mmHg  TR Velocity: 2.52 m/s TR Gradient: 25.31 mmHg   Pulmonic Valve   Peak Velocity: 0.99 m/s           Peak Gradient: 3.91 mmHg                                    Estimated PASP: 28.31 mmHg   LVOT   Peak Velocity: 0.97 m/s              Mean Velocity: 0.61 m/s  Peak Gradient: 3.77 mmHg             Mean Gradient: 1.79 mmHg  LVOT Diameter: 2.03 cm               LVOT VTI: 17.9 cm  Structures  Left Atrium   LA Dimension: 1.82 cm                        LA Area: 12.58 cm^2  LA/Aorta: 0.87  LA Volume/Index: 44.74 ml /25 m^2   Left Ventricle   Diastolic Dimension: 2.16 cm         Systolic Dimension: 6.57 cm  Septum Diastolic: 3.60 cm            Septum Systolic: 0.76 cm  PW Diastolic: 0.9 cm                 PW Systolic: 8.29 cm                                       FS: 21.2 %  LV EDV/LV EDV Index: 34.12 ml/19 m^2 LV ESV/LV ESV Index: 19.02 ml/11 m^2  EF Calculated: 44.3 %   LVOT Diameter: 2.03 cm   Right Ventricle   Diastolic Dimension: 0.04 cm                                    RV Systolic Pressure: 77.58 mmHg  Aorta/ Miscellaneous Aorta   Aortic Root: 2.1 cm  LVOT Diameter: 2.03 cm      ECHO Limited    Result Date: 7/28/2022  Transthoracic Echocardiography Report (TTE)  Demographics   Patient Name    VIA [facility]       Gender               Male                  [facility]   Patient Number  [id number]       Race                                                   Ethnicity   Visit Number    [id number]      Room Number          IC12   Corporate ID                   Date of Study        07/28/2022   Accession       [id number]     Referring Physician  Number   Date of Birth   1955     Sonographer          Altagracia Sim   Age             75 year(s)     Interpreting         [facility])                                 Physician            Cardiology                                                      [facility]  Procedure Type of Study   TTE procedure:ECHOCARDIOGRAM LIMITED.   Procedure Date Date: 07/28/2022 Start: 08:08 AM Study Location: Portable Technical Quality: Limited visualization Indications:LVF. Patient Status: Routine Contrast Medium: Definity. Amount - 2 ml Height: 66 inches Weight: 148 pounds BSA: 1.76 m^2 BMI: 23.89 kg/m^2  Conclusions   Summary  No evidence of thrombus with definity  Left ventricular ejection fraction is visually estimated at 65%. Signature   ----------------------------------------------------------------  Electronically signed by Vu Ricks(Interpreting physician)  on 07/28/2022 08:42 AM  ----------------------------------------------------------------   Findings  Left Ventricle Left ventricular ejection fraction is visually estimated at 65%. IR FLUORO GUIDED CVA DEVICE PLMT/REPLACE/REMOVAL    1. Venogram of the right internal jugular vein demonstrates a thrombus in the right internal jugular vein which completely occludes the vein. Passage of contrast into the chest is through small collateral veins. 2. 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 Passage of contrast into the chest is through a small collateral veins. Radiation dose to the patient was: 24.21 mGy Additional clinical data: Long-term IV access Procedure: 1. Ultrasound guidance for vascular access into the right internal jugular vein. The ultrasound image of the blood vessel was saved to PACS. 2. Venogram via contrast injection of the right internal jugular vein. 3.   Ultrasound guidance for vascular access into the left internal jugular vein. The ultrasound image of the blood vessel was saved to PACS 4. Venogram via contrast injection of the left internal jugular vein. Body of Report: Informed and written consent was obtained from the patient following discussion of risks, benefits and alternatives to this procedure. The was patient placed supine on the angiographic table.   The patient's neck and chest were then prepped and draped in normal sterile fashion. A small amount of local lidocaine anesthesia was injected subcutaneously. Ultrasound was used to study the jugular vein we intended to use prior to accessing it. The vein appeared patent. The ultrasound image of the blood vessel was saved to PACS. Using ultrasound access, puncture was made of the right internal jugular vein using a 21 GA needle. A wire was advanced into the right internal jugular vein, though would not pass into the superior vena cava. A 4 Estonian short thin sheath was placed, through the sheath digital subtraction venography was performed. Venography demonstrated a thrombus in the right internal jugular vein and complete occlusion of the vein central to the thrombus. This access was aborted. Ultrasound was used to study the left jugular vein we intended to use prior to accessing it. The vein appeared patent. The ultrasound image of the blood vessel was saved to PACS. Using ultrasound access, puncture was made of the left internal jugular vein using a 21 GA needle. A wire was attempted to be passed, though would not pass to the superior vena  cava. A 4 Estonian thin sheath was placed and digital subtraction venography was performed. 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 The procedure was aborted. Findings discussed with ICU team who will place a temporary central line in the groin. XR CHEST PORTABLE    Result Date: 7/27/2022  XR CHEST PORTABLE COMPARISON: July 25, 2022. HISTORY: resp failure TECHNIQUE: AP view FINDINGS: Endotracheal tube again seen in place. . There is also an enteric tube seen in place and the tip is below the diaphragm. They appear unchanged in position. A small pleural effusion seen on the right. The left costophrenic angle is not well seen. The lungs are hyperinflated and there is coarsening of the interstitium. Atelectasis and/or scarring is again seen bilaterally in the lower lung fields.  The cardiac effort is seen. The cardiomediastinal silhouette is slightly prominent in size. Mild prominence of the bronchovascular and interstitial lung markings is visualized bilaterally, linear streaky opacities visualized in bilateral lung fields, subsegmental atelectatic streaks, fluid visualized in the right minor fissure. Airspace opacification visualized in the lower lung fields bilaterally with blunting of the costophrenic angles and obliteration of the hemidiaphragms consistent with bilateral pleural effusions. . No evidence of pneumothorax or parenchymal lung mass. Degenerative bone changes. Congestive heart failure versus pneumonia and parapneumonic effusions would recommend clinical correlation. Increased opacification seen in comparison to the prior study. US DUP UPPER EXTREMITY RIGHT VENOUS    Result Date: 7/27/2022  XR CHEST PORTABLE COMPARISON: July 25, 2022. HISTORY: resp failure TECHNIQUE: AP view FINDINGS: Endotracheal tube again seen in place. . There is also an enteric tube seen in place and the tip is below the diaphragm. They appear unchanged in position. A small pleural effusion seen on the right. The left costophrenic angle is not well seen. The lungs are hyperinflated and there is coarsening of the interstitium. Atelectasis and/or scarring is again seen bilaterally in the lower lung fields. The cardiac silhouette appears mildly enlarged but may be accentuated by the portable technique. Degenerative changes are seen in the visualized portion of the right shoulder. The airspace disease has diminished when compared to previous study. . A small pleural effusion is seen on the right and scarring or atelectasis is again seen bilaterally in the bases.  US DUP UPPER EXTREMITY RIGHT VENOUS COMPARISON: HISTORY:  resp failure TECHNIQUE: , US DUP UPPER EXTREMITY RIGHT VENOUS AND LEFT VENOUS FINDINGS: Thrombus is seen in the right internal jugular and proximal subclavian, veins it is also seen in the cephalic vein. The right ulnar and basilic veins are not visualized. A right AV fistula seen. Thrombus is seen in the left internal jugular vein. The left basilic and axillary veins are not visualized. IMPRESSION: Deep venous thrombosis seen in the right and left upper extremities. .    US DUP UPPER EXTREMITY LEFT VENOUS    Result Date: 7/27/2022  XR CHEST PORTABLE COMPARISON: July 25, 2022. HISTORY: resp failure TECHNIQUE: AP view FINDINGS: Endotracheal tube again seen in place. . There is also an enteric tube seen in place and the tip is below the diaphragm. They appear unchanged in position. A small pleural effusion seen on the right. The left costophrenic angle is not well seen. The lungs are hyperinflated and there is coarsening of the interstitium. Atelectasis and/or scarring is again seen bilaterally in the lower lung fields. The cardiac silhouette appears mildly enlarged but may be accentuated by the portable technique. Degenerative changes are seen in the visualized portion of the right shoulder. The airspace disease has diminished when compared to previous study. . A small pleural effusion is seen on the right and scarring or atelectasis is again seen bilaterally in the bases. US DUP UPPER EXTREMITY RIGHT VENOUS COMPARISON: HISTORY:  resp failure TECHNIQUE: , US DUP UPPER EXTREMITY RIGHT VENOUS AND LEFT VENOUS FINDINGS: Thrombus is seen in the right internal jugular and proximal subclavian, veins it is also seen in the cephalic vein. The right ulnar and basilic veins are not visualized. A right AV fistula seen. Thrombus is seen in the left internal jugular vein. The left basilic and axillary veins are not visualized. IMPRESSION: Deep venous thrombosis seen in the right and left upper extremities. .    IR VENOGRAM VENOUS SINUS/JUGULAR    1. Venogram of the right internal jugular vein demonstrates a thrombus in the right internal jugular vein which completely occludes the vein.  Passage of contrast into image of the blood vessel was saved to PACS. Using ultrasound access, puncture was made of the left internal jugular vein using a 21 GA needle. A wire was attempted to be passed, though would not pass to the superior vena  cava. A 4 Guyanese thin sheath was placed and digital subtraction venography was performed. 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 The procedure was aborted. Findings discussed with ICU team who will place a temporary central line in the groin. IR ULTRASOUND GUIDANCE VASCULAR ACCESS    1. Venogram of the right internal jugular vein demonstrates a thrombus in the right internal jugular vein which completely occludes the vein. Passage of contrast into the chest is through small collateral veins. 2. 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 Passage of contrast into the chest is through a small collateral veins. Radiation dose to the patient was: 24.21 mGy Additional clinical data: Long-term IV access Procedure: 1. Ultrasound guidance for vascular access into the right internal jugular vein. The ultrasound image of the blood vessel was saved to PACS. 2. Venogram via contrast injection of the right internal jugular vein. 3.   Ultrasound guidance for vascular access into the left internal jugular vein. The ultrasound image of the blood vessel was saved to PACS 4. Venogram via contrast injection of the left internal jugular vein. Body of Report: Informed and written consent was obtained from the patient following discussion of risks, benefits and alternatives to this procedure. The was patient placed supine on the angiographic table. The patient's neck and chest were then prepped and draped in  normal sterile fashion. A small amount of local lidocaine anesthesia was injected subcutaneously.  Ultrasound was used to study the jugular vein we intended to use prior to accessing it. The vein appeared patent. The ultrasound image of the blood vessel was saved to PACS. Using ultrasound access, puncture was made of the right internal jugular vein using a 21 GA needle. A wire was advanced into the right internal jugular vein, though would not pass into the superior vena cava. A 4 Trinidadian short thin sheath was placed, through the sheath digital subtraction venography was performed. Venography demonstrated a thrombus in the right internal jugular vein and complete occlusion of the vein central to the thrombus. This access was aborted. Ultrasound was used to study the left jugular vein we intended to use prior to accessing it. The vein appeared patent. The ultrasound image of the blood vessel was saved to PACS. Using ultrasound access, puncture was made of the left internal jugular vein using a 21 GA needle. A wire was attempted to be passed, though would not pass to the superior vena  cava. A 4 Trinidadian thin sheath was placed and digital subtraction venography was performed. 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 The procedure was aborted. Findings discussed with ICU team who will place a temporary central line in the groin. US DUP LOWER EXTREMITIES BILATERAL VENOUS    Result Date: 7/27/2022  EXAMINATION: US DUP LOWER EXTREMITIES BILATERAL VENOUS CLINICAL HISTORY: 26-year-old with lower extremity swelling COMPARISONS: None available. FINDINGS: Duplex and color Doppler ultrasounds were performed of the bilateral lower extremity deep venous systems. Examination was performed portably and limited due to patient condition and access to the lower extremities. Right leg: Visualized portions of the right common femoral vein, femoral vein, popliteal vein demonstrate satisfactory compression, color flow, and augmentation. Deep calf veins are not evaluated. Left leg:  The left common femoral vein is enlarged filled with intraluminal echoes with absent color flow and poor compression consistent with occluding thrombus. Occluding Thrombus extends into the left proximal femoral vein. Mid to distal femoral vein and popliteal vein demonstrate satisfactory compression and color flow. Deep calf veins are not assessed on this portable study     ULTRASOUND FINDINGS ARE POSITIVE FOR OCCLUDING THROMBUS IN THE LEFT COMMON FEMORAL VEIN EXTENDING INTO THE PROXIMAL FEMORAL VEIN. NO ULTRASOUND SIGNS OF DVT IN THE RIGHT LEG FROM THE GROIN TO THE POPLITEAL FOSSA        IMPRESSION AND SUGGESTION:  Acute hypoxic respiratory failure s/p extubation, on 3 L O2   Right-sided pleural effusion s/p thoracentesis  Right-sided pneumonia status post bronchoscopy  DVT left lower extremity  Extensive upper extremity DVT cannot rule out PE  S/p liver transplant  End-stage renal disease on hemodialysis    Continue antibiotic as per ID. He is on 3 L O2 via nasal cannula, titrate off FiO2. Keep saturation 90% or above. BiPAP during sleep. On anticoagulation therapy for DVT, left lower extremity and extensive upper extremity DVT. Continue present treatment plan. NOTE: This report was transcribed using voice recognition software. Every effort was made to ensure accuracy; however, inadvertent computerized transcription errors may be present.       Electronically signed by Sade Gandara MD, FCCP on 8/3/2022 at 9:53 PM

## 2022-08-04 NOTE — PROGRESS NOTES
well    Plan:  Continue per POC    Pain Assessment:  Patient does not c/o pain. Pain Re-assessment:  Patient does not c/o pain. Patient/Caregiver Education:  No education provided at this time. Safety Devices:   All fall risk precautions in place, Bed alarm in place, and Call light within reach      Therapy Time  SLP Individual Minutes  Time In: 1508  Time Out: [address]  Minutes: 8        Signature: Electronically signed by MARSHALL Valdes on 8/4/2022 at 3:23 PM

## 2022-08-04 NOTE — PROGRESS NOTES
INPATIENT PROGRESS NOTES    PATIENT NAME: Deysi Vasquez  MRN: [id number]  SERVICE DATE:  August 4, 2022   SERVICE TIME:  4:24 PM      PRIMARY SERVICE: Pulmonary Disease    CHIEF COMPLAIN: Acute respiratory failure      INTERVAL HPI: Patient seen and examined at bedside, Interval Notes, orders reviewed. Nursing notes noted  He is on 4 L O2 via nasal cannula. He said he did not use BiPAP last night. He denies having shortness of breath at rest.  No chest pain. No cough or sputum production. No fever or chills. No nausea vomiting or diarrhea. OBJECTIVE    Body mass index is 22.99 kg/m². PHYSICAL EXAM:  Vitals:  /87   Pulse (!) 42   Temp 97.8 °F (36.6 °C) (Oral)   Resp 18   Ht 5' 6\" (1.676 m)   Wt 142 lb 6.7 oz (64.6 kg)   SpO2 (!) 80%   BMI 22.99 kg/m²   General: Alert, awake . comfortable in bed, No distress. Head: Atraumatic , Normocephalic   Eyes: PERRL. No sclera icterus. No conjunctival injection. No discharge   ENT: No nasal  discharge. Pharynx clear. Neck:  Trachea midline. No thyromegaly, no JVD, No cervical adenopathy. Chest : Bilaterally symmetrical ,Normal effort,  No accessory muscle use  Lung : . Fair BS bilateral, decreased BS at bases. Bibasilar Rales. No wheezing. No rhonchi. Heart[de-identified] Normal  rate. Regular rhythm. No mumur ,  Rub or gallop  ABD: Non-tender. Non-distended. No masses. No organmegaly. Normal bowel sounds. No hernia. Ext : No Pitting both leg , No Cyanosis No clubbing  Neuro: no focal weakness          DATA:   Recent Labs     08/03/22 0524 08/03/22  2130 08/04/22 0526 08/04/22  0909   WBC 9.5  --  10.1  --    HGB 8.7*   < > 7.9* 7.9*   HCT 26.5*   < > 23.4* 24.0*   .0*  --  103.9*  --      --  252  --     < > = values in this interval not displayed.      Recent Labs     08/03/22  0524 08/04/22  0526   * 132*   K 4.0 3.7   CL 88* 90*   CO2 26 29   BUN 39* 24*   CREATININE 5.19* 3.93*   GLUCOSE 81 100*   CALCIUM 8.6 8.7   LABALBU 2.8* 3.0* LABGLOM 11.1* 15.3*   GFRAA 13.4* 18.5*       MV Settings:     Vent Mode: CPAP  Vt (Set, mL): 390 mL  Resp Rate (Set): 18 bmp  FiO2 : 40 %  PEEP/CPAP (cmH2O): 5  Pressure Support: 5 cmH20  Peak Inspiratory Pressure (cmH2O): 9.3 cmH2O  Mean Airway Pressure (cmH2O): 6 cmH20  I:E Ratio: 1:3    No results for input(s): PHART, MEC6SBO, PO2ART, MKN6RQD, BEART, K3ASPSES in the last 72 hours. O2 Device: Nasal cannula  O2 Flow Rate (L/min): 4 L/min    ADULT ORAL NUTRITION SUPPLEMENT; Breakfast; Standard High Calorie/High Protein Oral Supplement  ADULT ORAL NUTRITION SUPPLEMENT; Lunch; Fortified Pudding Oral Supplement  ADULT ORAL NUTRITION SUPPLEMENT; Dinner; Frozen Oral Supplement  ADULT DIET;  Clear Liquid; 4 carb choices (60 gm/meal)     MEDICATIONS during current hospitalization:    Continuous Infusions:   sodium chloride      dextrose         Scheduled Meds:   pantoprazole (PROTONIX) 40 mg injection  40 mg IntraVENous Q12H    [START ON 8/5/2022] enoxaparin  1 mg/kg SubCUTAneous Daily    fluticasone  1 spray Each Nostril Daily    saline nasal gel   Nasal BID    oxymetazoline  2 spray Each Nostril BID    sodium chloride flush  5-40 mL IntraVENous 2 times per day    midodrine  2.5 mg Oral TID WC    metoprolol tartrate  12.5 mg Oral BID    sulfamethoxazole-trimethoprim (BACTRIM) IVPB  7.5 mg/kg IntraVENous Daily    [Held by provider] aspirin  81 mg Oral Daily    docusate sodium  100 mg Oral BID    insulin lispro  0-4 Units SubCUTAneous 4x Daily AC & HS    polyethylene glycol  17 g Oral Daily    tacrolimus  1 mg Oral BID       PRN Meds:sodium chloride flush, sodium chloride, acetaminophen **OR** acetaminophen, [DISCONTINUED] fentaNYL **AND** fentaNYL, glucose, dextrose bolus **OR** dextrose bolus, glucagon (rDNA), dextrose, heparin (porcine)    Radiology  Echocardiogram complete 2D with doppler with color    Result Date: 7/27/2022  Transthoracic Echocardiography Report (TTE)  Demographics   Patient Name    VIA [facility] Diffusely thickened and pliable mitral valve leaflets with normal excursion. Structurally normal mitral valve. No evidence of mitral valve stenosis. No evidence of mitral regurgitaton. Tricuspid Valve Tricuspid valve is structurally normal. No evidence of tricuspid stenosis. No evidence of tricuspid regurgitation. Aortic Valve Mildly thickened trileaflet aortic valve with normal excursion. Structurally normal aortic valve. No evidence of aortic valve stenosis . No evidence of aortic valve regurgitation . Pulmonic Valve The pulmonic valve was not well visualized . Pericardial Effusion No evidence of pericardial effusion. Aorta \ Miscellaneous The aorta is within normal limits. M-Mode Measurements (cm)   LVIDd: 2.97 cm                         LVIDs: 2.34 cm  IVSd: 0.73 cm                          IVSs: 1.01 cm  LVPWd: 0.9 cm                          LVPWs: 1.01 cm  Rt. Vent.  Dimension: 3.12 cm           AO Root Dimension: 2.1 cm                                         ACS: 1.37 cm                                         LA: 1.82 cm                                         LVOT: 2.03 cm  Doppler Measurements:   AV Velocity:0.02 m/s                    MV Peak E-Wave: 0.5 m/s  AV Peak Gradient: 5.77 mmHg             MV Peak A-Wave: 0.53 m/s  AV Mean Gradient: 3.05 mmHg  AV Area (Continuity):1.97 cm^2  TR Velocity:2.52 m/s                    Estimated RAP:3 mmHg  TR Gradient:25.31 mmHg                  RVSP:28.31 mmHg  Valves  Mitral Valve   Peak E-Wave: 0.5 m/s                  Peak A-Wave: 0.53 m/s  Mean Velocity: 0.87 m/s               E/A Ratio: 0.94  Mean Gradient: 4.66 mmHg              Peak Gradient: 0.99 mmHg                                        Deceleration Time: 353.3 msec                                        Area (continuity): 1.4 cm^2   Tissue Doppler   E' Septal Velocity: 0.07 m/s  E' Lateral Velocity: 0.07 m/s   Aortic Valve   Peak Velocity: 1.2 m/s                 Mean Velocity: 0.82 m/s  Peak Gradient: 5.77 mmHg               Mean Gradient: 3.05 mmHg  Area (continuity): 1.97 cm^2  AV VTI: 29.33 cm   Cusp Separation: 1.37 cm   Tricuspid Valve   Estimated RVSP: 28.31 mmHg              Estimated RAP: 3 mmHg  TR Velocity: 2.52 m/s                   TR Gradient: 25.31 mmHg   Pulmonic Valve   Peak Velocity: 0.99 m/s           Peak Gradient: 3.91 mmHg                                    Estimated PASP: 28.31 mmHg   LVOT   Peak Velocity: 0.97 m/s              Mean Velocity: 0.61 m/s  Peak Gradient: 3.77 mmHg             Mean Gradient: 1.79 mmHg  LVOT Diameter: 2.03 cm               LVOT VTI: 17.9 cm  Structures  Left Atrium   LA Dimension: 1.82 cm                        LA Area: 12.58 cm^2  LA/Aorta: 0.87  LA Volume/Index: 44.74 ml /25 m^2   Left Ventricle   Diastolic Dimension: 1.34 cm         Systolic Dimension: 9.50 cm  Septum Diastolic: 2.71 cm            Septum Systolic: 6.95 cm  PW Diastolic: 0.9 cm                 PW Systolic: 3.13 cm                                       FS: 21.2 %  LV EDV/LV EDV Index: 34.12 ml/19 m^2 LV ESV/LV ESV Index: 19.02 ml/11 m^2  EF Calculated: 44.3 %   LVOT Diameter: 2.03 cm   Right Ventricle   Diastolic Dimension: 1.17 cm                                    RV Systolic Pressure: 21.30 mmHg  Aorta/ Miscellaneous Aorta   Aortic Root: 2.1 cm  LVOT Diameter: 2.03 cm      ECHO Limited    Result Date: 7/28/2022  Transthoracic Echocardiography Report (TTE)  Demographics   Patient Name    VIA [facility]       Gender               Male                  [facility]   Patient Number  [id number]       Race                                                   Ethnicity   Visit Number    [id number]      Room Number          IC12   Corporate ID                   Date of Study        07/28/2022   Accession       [id number]     Referring Physician  Number   Date of Birth   1955     Sonographer          Reji Mckinnon   Age             75 year(s)     Interpreting         [address]  Physician            Cardiology                                                      [facility]  Procedure Type of Study   TTE procedure:ECHOCARDIOGRAM LIMITED. Procedure Date Date: 07/28/2022 Start: 08:08 AM Study Location: Portable Technical Quality: Limited visualization Indications:LVF. Patient Status: Routine Contrast Medium: Definity. Amount - 2 ml Height: 66 inches Weight: 148 pounds BSA: 1.76 m^2 BMI: 23.89 kg/m^2  Conclusions   Summary  No evidence of thrombus with definity  Left ventricular ejection fraction is visually estimated at 65%. Signature   ----------------------------------------------------------------  Electronically signed by Warner Ricks(Interpreting physician)  on 07/28/2022 08:42 AM  ----------------------------------------------------------------   Findings  Left Ventricle Left ventricular ejection fraction is visually estimated at 65%. IR FLUORO GUIDED CVA DEVICE PLMT/REPLACE/REMOVAL    1. Venogram of the right internal jugular vein demonstrates a thrombus in the right internal jugular vein which completely occludes the vein. Passage of contrast into the chest is through small collateral veins. 2. 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 Passage of contrast into the chest is through a small collateral veins. Radiation dose to the patient was: 24.21 mGy Additional clinical data: Long-term IV access Procedure: 1. Ultrasound guidance for vascular access into the right internal jugular vein. The ultrasound image of the blood vessel was saved to PACS. 2. Venogram via contrast injection of the right internal jugular vein. 3.   Ultrasound guidance for vascular access into the left internal jugular vein. The ultrasound image of the blood vessel was saved to PACS 4. Venogram via contrast injection of the left internal jugular vein. Body of Report:  Informed and written consent was obtained from the patient following discussion of risks, benefits and alternatives to this procedure. The was patient placed supine on the angiographic table. The patient's neck and chest were then prepped and draped in  normal sterile fashion. A small amount of local lidocaine anesthesia was injected subcutaneously. Ultrasound was used to study the jugular vein we intended to use prior to accessing it. The vein appeared patent. The ultrasound image of the blood vessel was saved to PACS. Using ultrasound access, puncture was made of the right internal jugular vein using a 21 GA needle. A wire was advanced into the right internal jugular vein, though would not pass into the superior vena cava. A 4 [place] short thin sheath was placed, through the sheath digital subtraction venography was performed. Venography demonstrated a thrombus in the right internal jugular vein and complete occlusion of the vein central to the thrombus. This access was aborted. Ultrasound was used to study the left jugular vein we intended to use prior to accessing it. The vein appeared patent. The ultrasound image of the blood vessel was saved to PACS. Using ultrasound access, puncture was made of the left internal jugular vein using a 21 GA needle. A wire was attempted to be passed, though would not pass to the superior vena  cava. A 4 [place] thin sheath was placed and digital subtraction venography was performed. 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 The procedure was aborted. Findings discussed with ICU team who will place a temporary central line in the groin. XR CHEST PORTABLE    Result Date: 7/27/2022  XR CHEST PORTABLE COMPARISON: July 25, 2022. HISTORY: resp failure TECHNIQUE: AP view FINDINGS: Endotracheal tube again seen in place. . There is also an enteric tube seen in place and the tip is below the diaphragm. They appear unchanged in position.   A small pleural effusion seen on the right. The left costophrenic angle is not well seen. The lungs are hyperinflated and there is coarsening of the interstitium. Atelectasis and/or scarring is again seen bilaterally in the lower lung fields. The cardiac silhouette appears mildly enlarged but may be accentuated by the portable technique. Degenerative changes are seen in the visualized portion of the right shoulder. The airspace disease has diminished when compared to previous study. . A small pleural effusion is seen on the right and scarring or atelectasis is again seen bilaterally in the bases. US DUP UPPER EXTREMITY RIGHT VENOUS COMPARISON: HISTORY:  resp failure TECHNIQUE: , US DUP UPPER EXTREMITY RIGHT VENOUS AND LEFT VENOUS FINDINGS: Thrombus is seen in the right internal jugular and proximal subclavian, veins it is also seen in the cephalic vein. The right ulnar and basilic veins are not visualized. A right AV fistula seen. Thrombus is seen in the left internal jugular vein. The left basilic and axillary veins are not visualized. IMPRESSION: Deep venous thrombosis seen in the right and left upper extremities. .    XR CHEST PORTABLE    Result Date: 7/25/2022  XR CHEST PORTABLE : 7/25/2022 CLINICAL HISTORY:  post intubation . COMPARISON: Earlier 7/25/2022 TECHNIQUE: A portable upright AP radiograph of the chest was obtained at approximately 12:46 PM. FINDINGS: Both lung bases have been excluded from the radiograph. An endotracheal tube is present, with its tip approximately 4 to 5 cm above the lucita. An orogastric tube probably extends below the diaphragm out of field of view radiograph. Moderate pleural effusions and mild to moderate probable scarring and/or atelectasis of the mid to lower lung fields has not significantly changed. There is no cardiomegaly, pneumothorax, displaced fractures, or other significant changes identified. ENDOTRACHEAL AND OROGASTRIC TUBES IN EXPECTED POSITIONS. OTHERWISE, STABLE CHEST FROM EARLIER 7/25/2022. XR CHEST PORTABLE    Result Date: 7/25/2022  TECHNIQUE: Single portable view of the chest. CLINICAL INDICATION: Chest pain. COMPARISON: Chest x-ray obtained on June 28, 2022. PROCEDURE AND FINDINGS: Poor inspiratory effort is seen. The cardiomediastinal silhouette is slightly prominent in size. Mild prominence of the bronchovascular and interstitial lung markings is visualized bilaterally, linear streaky opacities visualized in bilateral lung fields, subsegmental atelectatic streaks, fluid visualized in the right minor fissure. Airspace opacification visualized in the lower lung fields bilaterally with blunting of the costophrenic angles and obliteration of the hemidiaphragms consistent with bilateral pleural effusions. . No evidence of pneumothorax or parenchymal lung mass. Degenerative bone changes. Congestive heart failure versus pneumonia and parapneumonic effusions would recommend clinical correlation. Increased opacification seen in comparison to the prior study. US [facility] UPPER EXTREMITY RIGHT VENOUS    Result Date: 7/27/2022  XR CHEST PORTABLE COMPARISON: July 25, 2022. HISTORY: resp failure TECHNIQUE: AP view FINDINGS: Endotracheal tube again seen in place. . There is also an enteric tube seen in place and the tip is below the diaphragm. They appear unchanged in position. A small pleural effusion seen on the right. The left costophrenic angle is not well seen. The lungs are hyperinflated and there is coarsening of the interstitium. Atelectasis and/or scarring is again seen bilaterally in the lower lung fields. The cardiac silhouette appears mildly enlarged but may be accentuated by the portable technique. Degenerative changes are seen in the visualized portion of the right shoulder. The airspace disease has diminished when compared to previous study. . A small pleural effusion is seen on the right and scarring or atelectasis is again seen bilaterally in the bases.  US [facility] UPPER EXTREMITY RIGHT VENOUS COMPARISON: HISTORY:  resp failure TECHNIQUE: , US DUP UPPER EXTREMITY RIGHT VENOUS AND LEFT VENOUS FINDINGS: Thrombus is seen in the right internal jugular and proximal subclavian, veins it is also seen in the cephalic vein. The right ulnar and basilic veins are not visualized. A right AV fistula seen. Thrombus is seen in the left internal jugular vein. The left basilic and axillary veins are not visualized. IMPRESSION: Deep venous thrombosis seen in the right and left upper extremities. .    US DUP UPPER EXTREMITY LEFT VENOUS    Result Date: 7/27/2022  XR CHEST PORTABLE COMPARISON: July 25, 2022. HISTORY: resp failure TECHNIQUE: AP view FINDINGS: Endotracheal tube again seen in place. . There is also an enteric tube seen in place and the tip is below the diaphragm. They appear unchanged in position. A small pleural effusion seen on the right. The left costophrenic angle is not well seen. The lungs are hyperinflated and there is coarsening of the interstitium. Atelectasis and/or scarring is again seen bilaterally in the lower lung fields. The cardiac silhouette appears mildly enlarged but may be accentuated by the portable technique. Degenerative changes are seen in the visualized portion of the right shoulder. The airspace disease has diminished when compared to previous study. . A small pleural effusion is seen on the right and scarring or atelectasis is again seen bilaterally in the bases. US DUP UPPER EXTREMITY RIGHT VENOUS COMPARISON: HISTORY:  resp failure TECHNIQUE: , US DUP UPPER EXTREMITY RIGHT VENOUS AND LEFT VENOUS FINDINGS: Thrombus is seen in the right internal jugular and proximal subclavian, veins it is also seen in the cephalic vein. The right ulnar and basilic veins are not visualized. A right AV fistula seen. Thrombus is seen in the left internal jugular vein. The left basilic and axillary veins are not visualized.  IMPRESSION: Deep venous thrombosis seen in the right and left upper and complete occlusion of the vein central to the thrombus. This access was aborted. Ultrasound was used to study the left jugular vein we intended to use prior to accessing it. The vein appeared patent. The ultrasound image of the blood vessel was saved to PACS. Using ultrasound access, puncture was made of the left internal jugular vein using a 21 GA needle. A wire was attempted to be passed, though would not pass to the superior vena  cava. A 4 [place] thin sheath was placed and digital subtraction venography was performed. 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 The procedure was aborted. Findings discussed with ICU team who will place a temporary central line in the groin. IR ULTRASOUND GUIDANCE VASCULAR ACCESS    1. Venogram of the right internal jugular vein demonstrates a thrombus in the right internal jugular vein which completely occludes the vein. Passage of contrast into the chest is through small collateral veins. 2. 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 Passage of contrast into the chest is through a small collateral veins. Radiation dose to the patient was: 24.21 mGy Additional clinical data: Long-term IV access Procedure: 1. Ultrasound guidance for vascular access into the right internal jugular vein. The ultrasound image of the blood vessel was saved to PACS. 2. Venogram via contrast injection of the right internal jugular vein. 3.   Ultrasound guidance for vascular access into the left internal jugular vein. The ultrasound image of the blood vessel was saved to PACS 4. Venogram via contrast injection of the left internal jugular vein. Body of Report: Informed and written consent was obtained from the patient following discussion of risks, benefits and alternatives to this procedure. The was patient placed supine on the angiographic table.   The patient's neck and chest were then prepped and draped in  normal sterile fashion. A small amount of local lidocaine anesthesia was injected subcutaneously. Ultrasound was used to study the jugular vein we intended to use prior to accessing it. The vein appeared patent. The ultrasound image of the blood vessel was saved to PACS. Using ultrasound access, puncture was made of the right internal jugular vein using a 21 GA needle. A wire was advanced into the right internal jugular vein, though would not pass into the superior vena cava. A 4 [place]an short thin sheath was placed, through the sheath digital subtraction venography was performed. Venography demonstrated a thrombus in the right internal jugular vein and complete occlusion of the vein central to the thrombus. This access was aborted. Ultrasound was used to study the left jugular vein we intended to use prior to accessing it. The vein appeared patent. The ultrasound image of the blood vessel was saved to PACS. Using ultrasound access, puncture was made of the left internal jugular vein using a 21 GA needle. A wire was attempted to be passed, though would not pass to the superior vena  cava. A 4 [place]an thin sheath was placed and digital subtraction venography was performed. 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 The procedure was aborted. Findings discussed with ICU team who will place a temporary central line in the groin. US DUP LOWER EXTREMITIES BILATERAL VENOUS    Result Date: 7/27/2022  EXAMINATION: US DUP LOWER EXTREMITIES BILATERAL VENOUS CLINICAL HISTORY: 27-year-old with lower extremity swelling COMPARISONS: None available. FINDINGS: Duplex and color Doppler ultrasounds were performed of the bilateral lower extremity deep venous systems. Examination was performed portably and limited due to patient condition and access to the lower extremities.  Right leg: Visualized portions of the right common femoral vein, femoral vein, popliteal vein demonstrate satisfactory compression, color flow, and augmentation. Deep calf veins are not evaluated. Left leg: The left common femoral vein is enlarged filled with intraluminal echoes with absent color flow and poor compression consistent with occluding thrombus. Occluding Thrombus extends into the left proximal femoral vein. Mid to distal femoral vein and popliteal vein demonstrate satisfactory compression and color flow. Deep calf veins are not assessed on this portable study     ULTRASOUND FINDINGS ARE POSITIVE FOR OCCLUDING THROMBUS IN THE LEFT COMMON FEMORAL VEIN EXTENDING INTO THE PROXIMAL FEMORAL VEIN. NO ULTRASOUND SIGNS OF DVT IN THE RIGHT LEG FROM THE GROIN TO THE POPLITEAL FOSSA        IMPRESSION AND SUGGESTION:  Acute hypoxic respiratory failure s/p extubation, on 3 L O2   Right-sided pleural effusion s/p thoracentesis  Right-sided pneumonia status post bronchoscopy  DVT left lower extremity  Extensive upper extremity DVT cannot rule out PE  S/p liver transplant  End-stage renal disease on hemodialysis    Continue antibiotic as per ID. He is on 4L O2 via nasal cannula, keep saturation 90% or above. BiPAP during sleep. On anticoagulation therapy for DVT, left lower extremity and extensive upper extremity DVT. Continue present treatment plan. NOTE: This report was transcribed using voice recognition software. Every effort was made to ensure accuracy; however, inadvertent computerized transcription errors may be present.       Electronically signed by Rashid Alcantara MD, FCCP on 8/4/2022 at 4:24 PM

## 2022-08-04 NOTE — CARE COORDINATION
[facility] Pre-Admission Screening Document      Patient Name: Gagandeep Bartlett       MRN: [id number]    : 1955    Age: 75 y.o. Gender: male   Payor: Payor: Demi Number / Plan: [address] / Product Type: *No Product type* /   MSSP: No    Admitted from: [facility] Floor: 2W  Attending Care Provider: Israel Ferrera DO  Inpatient Rehab Referring Care Provider: Israel Ferrera DO  Primary Care Provider: Elizabeth Henderson MD  Inpatient Treatment Team including Consults: Treatment Team: Attending Provider: Israel Ferrera DO; Consulting Physician: Waldemar Paz MD; Consulting Physician: Claudeen Southern, MD; Miguelangel Hugo Nurse: Compa Gomez RN; Consulting Physician: Giselle Wahl MD; Utilization Reviewer: Aye Blanco RN; Registered Nurse: Caleb Asher, RN; Registered Nurse: Morgan Rea, RN; Registered Nurse: Gloria Hansen RN; : Enmanuel Esparza RN; : Tiara Ta RN    Reason for Hospitalization:   1. Acute respiratory failure with hypoxia ([facility] [address]    2. ESRD (end stage renal disease) on dialysis ([facility] [address]    3. Macrocytic anemia    4. Type 2 diabetes mellitus with hyperglycemia, unspecified whether long term insulin use [facility])      Chief Complaint   Patient presents with    Shortness of Breath     C/O SOB  AFTER DIALYSIS  88 % ON RA  GIVEN 2 DUONEBS  SOLU MEDROL      Isolation:No active isolations    Hospital Course:  Admit Date: 2022 11:15 AM  Inpatient Rehab Referral Date: 8/3/22  Narrative of hospital course/history of present illness: 75 yr old male, HD M-W-F,  liver transplant s/p cirrhosis and hypertension, presented to ED for evaluation of worsening shortness of breath. Pox 88% room air per EMS. Of note, patient seen in the ED , dx with pneumonia and prescribed Ceftin.   CXR with CHF vs pneumonia and parapneumonic effusions, increased opacification in comparison to prior study on 6/28. ID: Pneumonia, Stenotrophomonas from sputum. Completed course of Bactrim. Nephrology: ESRD  Pulmonology: Acute respiratory failure with hypoxia on 50% Ventimask, Epistaxis s/p nasal packing  Bilateral upper and lower extremity acute DVT on Eliquis  Gastro: EGD 8/5 revealed duodenal ulcer that was clipped  8/5: Anabel Marus IVC filter placement      Medical & Surgical History/Current Comorbidities:  Past Medical History:   Diagnosis Date    ESRD (end stage renal disease) ([facility] [address]     Hemodialysis patient ([facility] [address]     Hepatitis     Hypertension     Liver transplanted ([facility] [address] 03/2016     Past Surgical History:   Procedure Laterality Date    DIALYSIS FISTULA CREATION Right     IR MIDLINE CATH  8/2/2022    IR MIDLINE CATH 8/2/2022 MLOZ SPECIAL PROCEDURE    LIVER TRANSPLANT  03/08/2017    TUNNELED VENOUS PORT PLACEMENT      UPPER GASTROINTESTINAL ENDOSCOPY N/A 8/5/2022    EGD ESOPHAGOGASTRODUODENOSCOPY WITH INTERVENTION performed by Tom Campbell MD at [address] [place] [address] of [address] [place] [address] ACP-Advance Directive ACP-Power of     Not on File Not on File Not on File Not on File            Labs/Infection Control:  Recent Labs     08/14/22  1419   HCT 24.3*      Blood cultures:  No results for input(s): BC in the last 72 hours. Urinalysis/C&S:  No results for input(s): NITRITE, LABCAST, WBCUA, RBCUA, MUCUS, TRICHOMONAS, YEAST, BACTERIA, LEUKOCYTESUR, BLOODU, GLUCOSEU, KETUA, AMORPHOUS, LABURIN in the last 72 hours. Radiology:  Echocardiogram complete 2D with doppler with color  Result Date: 7/27/2022  Left Ventricle Left ventricular ejection fraction is estimated at 45%. Left ventricular size is normal . Normal left ventricular wall thickness. Diastolic Dysfunction is noted by mitral flow. Echogenic mass in the apex possible thrombus Right Ventricle Normal right ventricle structure and function.  Normal right ventricle systolic pressure. RVSP 28mmHg Left Atrium Normal left atrium. Right Atrium Normal right atrium. Mitral Valve Diffusely thickened and pliable mitral valve leaflets with normal excursion. Structurally normal mitral valve. No evidence of mitral valve stenosis. No evidence of mitral regurgitaton. Tricuspid Valve Tricuspid valve is structurally normal. No evidence of tricuspid stenosis. No evidence of tricuspid regurgitation. Aortic Valve Mildly thickened trileaflet aortic valve with normal excursion. Structurally normal aortic valve. No evidence of aortic valve stenosis . No evidence of aortic valve regurgitation . Pulmonic Valve The pulmonic valve was not well visualized . Pericardial Effusion No evidence of pericardial effusion. ECHO Limited  Result Date: 7/28/2022  Left Ventricle Left ventricular ejection fraction is visually estimated at 65%. XR CHEST PORTABLE  Result Date: 8/7/2022  Moderate to large bilateral pleural effusions, increased from prior. XR CHEST PORTABLE  Result Date: 7/27/2022  The airspace disease has diminished when compared to previous study. . A small pleural effusion is seen on the right and scarring or atelectasis is again seen bilaterally in the bases. XR CHEST PORTABLE  Result Date: 7/25/2022  ENDOTRACHEAL AND OROGASTRIC TUBES IN EXPECTED POSITIONS. OTHERWISE, STABLE CHEST FROM EARLIER 7/25/2022. XR CHEST PORTABLE  Result Date: 7/25/2022  Congestive heart failure versus pneumonia and parapneumonic effusions would recommend clinical correlation. Increased opacification seen in comparison to the prior study. US DUP UPPER EXTREMITY RIGHT VENOUS, LEFT VENOUS  Result Date: 7/27/2022  Thrombus is seen in the right internal jugular and proximal subclavian, veins it is also seen in the cephalic vein. The right ulnar and basilic veins are not visualized. A right AV fistula seen. Thrombus is seen in the left internal jugular vein. The left basilic and axillary veins are not visualized. IMPRESSION: Deep venous thrombosis seen in the right and left upper extremities. .    US DUP UPPER EXTREMITY RIGHT ARTERIES  Result Date: 8/2/2022  NO EVIDENCE OF A FLOW-LIMITING STENOSIS, ARTERIAL OCCLUSION, OR ANEURYSM. US DUP LOWER EXTREMITIES BILATERAL VENOUS  Result Date: 7/27/2022  ULTRASOUND FINDINGS ARE POSITIVE FOR OCCLUDING THROMBUS IN THE LEFT COMMON FEMORAL VEIN EXTENDING INTO THE PROXIMAL FEMORAL VEIN. NO ULTRASOUND SIGNS OF DVT IN THE RIGHT LEG FROM THE GROIN TO THE POPLITEAL FOSSA    IR MIDLINE CATH  Result Date: 8/3/2022  FOR BILLING PURPOSES ONLY. STUDY DICTATED IN [facility] BY PHYSICIAN. Medications/IV's:  The patient is currently on aspirin and eliquis for DVT prophylaxis.      Scheduled:    midodrine, 5 mg, Oral, TID WC    epoetin ryan-epbx, 20,000 Units, SubCUTAneous, Q7 Days    sucralfate, 1 g, Oral, 4 times per day    pill splitter, , Does not apply, Once    chlorhexidine, 15 mL, Mouth/Throat, BID    sodium chloride flush, 5-40 mL, IntraVENous, 2 times per day    pantoprazole (PROTONIX) 40 mg injection, 40 mg, IntraVENous, Q12H    [Held by provider] enoxaparin, 1 mg/kg, SubCUTAneous, Daily    fluticasone, 1 spray, Each Nostril, Daily    sodium chloride flush, 5-40 mL, IntraVENous, 2 times per day    metoprolol tartrate, 12.5 mg, Oral, BID    [Held by provider] aspirin, 81 mg, Oral, Daily    docusate sodium, 100 mg, Oral, BID    insulin lispro, 0-4 Units, SubCUTAneous, 4x Daily AC & HS    polyethylene glycol, 17 g, Oral, Daily    tacrolimus, 1 mg, Oral, BID    PRN:  sodium chloride, sodium chloride flush, sodium chloride, acetaminophen, ondansetron, hydrALAZINE, labetalol, sodium chloride flush, sodium chloride, acetaminophen **OR** acetaminophen, glucose, dextrose bolus **OR** dextrose bolus, glucagon (rDNA), dextrose, heparin (porcine)    Allergies:  No Known Allergies      Most Recent Vitals, Height and Weight  BP (!) 121/97   Pulse (!) 102   Temp 98.2 °F (36.8 °C) (Oral)   Resp 18 Ht 5' 6\" (1.676 m)   Wt 139 lb 4.8 oz (63.2 kg)   SpO2 100%   BMI 22.48 kg/m²     Weight Bearing Restrictions: wbat    Current Diet Order: ADULT ORAL NUTRITION SUPPLEMENT; Breakfast, Dinner; Fortified Pudding Oral Supplement  ADULT DIET; Dysphagia - Soft and Bite Sized; No Added Salt (3-4 gm); Mildly Thick (Nectar)    Skin: redness buttocks, generalized bruising  Wound Care Documentation:  Wound 07/27/22 Perineum Incontinence Associated Dermatitis (Active)   Wound Cleansed Soap and water 08/03/22 2008   Dressing/Treatment Zinc paste 08/03/22 2008   Wound Assessment Superficial 07/31/22 1200   Drainage Amount None 08/01/22 2127   Jemima-wound Assessment Blanchable erythema 07/31/22 1200   Number of days: 8       Wound 07/27/22 Buttocks Left; Lower Incontinence Associated Dermatitis (Active)   Wound Cleansed Soap and water 08/03/22 2008   Dressing/Treatment Zinc paste 08/03/22 2008   Wound Length (cm) 2.5 cm 07/30/22 0800   Wound Width (cm) 1 cm 07/30/22 0800   Wound Surface Area (cm^2) 2.5 cm^2 07/30/22 0800   Wound Assessment Purple/maroon 07/30/22 1600   Drainage Amount None 07/30/22 1600   Odor None 07/30/22 1600   Jemima-wound Assessment Blanchable erythema 07/29/22 1500   Margins Defined edges 07/28/22 1200   Number of days: 8       Wound 07/27/22 Sacrum Medial (Active)   Number of days: 8          Lungs:   Respiratory  Respiratory Pattern: Regular  Respiratory Depth: Normal  Respiratory Quality/Effort: Unlabored  Chest Assessment: Chest expansion symmetrical  L Breath Sounds: Clear, Diminished  R Breath Sounds: Clear  Level of Activity/Mobility: Bedrest  Subcutaneous Air/Crepitus: None  Breath Sounds  Right Upper Lobe: Diminished  Right Middle Lobe: Diminished  Right Lower Lobe: Diminished  Left Upper Lobe: Diminished  Left Lower Lobe: Diminished      Cognition and Behavior:  Language Preference (if other than English):      Alertness/Behavior  Neuro (WDL): Within Defined Limits  Level of Consciousness: Alert (0)  History of Falling: No Cognition Comment: Verbal cues for safety and sequencing    Short Term Memory Deficits     History of Falling: No    Safety          Prior Level of Function and Living Arrangements:  Social/Functional History  Lives With: Alone  Type of Home: Apartment  Home Layout: One level (4th floor)  Home Access: Elevator, Level entry  Bathroom Shower/Tub: Tub/Shower unit  Bathroom Equipment: Grab bars in shower  Home Equipment: Rollator, Cane  Has the patient had two or more falls in the past year or any fall with injury in the past year?:  (One fall about 2 months ago bending over to pick something up)  Receives Help From: Family (sister assists)  ADL Assistance: Independent  Homemaking Assistance: Independent  Ambulation Assistance: Independent  Transfer Assistance: Independent  Active : Yes  Occupation: Retired  Leisure & Hobbies: used to fish, work around the house  Additional Comments: Sister lives nearby  Living Arrangements: [employer] [address]: Family Members  Type of [address] None  Dental Appliances: None  Vision - Corrective Lenses: None  Hearing Aid: None  Personal Equipment:   Dental Appliances: None  Vision - Corrective Lenses: None  Hearing Aid: None      CURRENT FUNCTIONAL LEVEL:  Physical Therapy  Bed mobility:  Bed mobility  Rolling to Left: Contact guard assistance;Minimal assistance (08/15/22 1522)  Rolling to Right: Minimal assistance (08/03/22 0950)  Supine to Sit: Minimal assistance (08/15/22 1522)  Sit to Supine: Contact guard assistance (08/15/22 1522)  Scooting: Stand by assistance (08/15/22 1522)  Bed Mobility Comments: HOB elevated due to SpO2 dropping; Pt requied max assist to scoot up in bed but able to scoot from side of bed over to middle; Able to maintain balance at EOB with perturbations in all directions. SpO2 would drop with pt looking and saying he was ok with immediate come back up to %. Not sure if getting good reading on finger. dysphagia- Distant supervision.  May need one diet consistency restricted  Compensatory Swallowing Strategies : Upright as possible for all oral intake, Small bites/sips, Assist feed, Eat/Feed slowly  Therapeutic Interventions: Patient/Family education, Diet tolerance monitoring      Current Conditions Requiring Inpatient Rehabilitation  Bowel/Bladder Dysfunction: Yes  Intervention Required = Frequent toileting  Risk for Medical/Clinical Complications = moderate  Skin Healing/Breakdown Risk: Yes  Intervention Required = Side to side turns  Risk for Medical/Clinical Complications = moderate  Nutrition/Hydration Deficiency: Yes  Intervention Required = Monitor I&Os and Check Labs, SLP  Risk for Medical/Clinical Complications = high  Medical Comorbidities: Yes  Intervention Required = DVT risk, Renal disease, and liver transplant  Risk for Medical/Clinical Complications = high    Rehab/Skilled Needs:   900 minutes of Intensive Acute Rehab therapy over 7 days/week  PT Treatment Time:  1.5 hrs/day  OT Treatment Time: 1.5 hrs/day  SLP Treatment Time: 0.5 hrs/day  Rehabilitation Nursing   Case management/Social work  Dietitian/Nutrition  Oxygen/CPAP/BiPAP  PICC/IV Medications  Hemodialysis    Cultural needs:   Values / Beliefs  Do You Have Any Ethnic, Cultural, Sacramental, or Spiritual Religion Needs You Would Like Us To Be Aware of While You Are in the Hospital : No   Funding needs:   Potential Assistance Purchasing Medications: No     Expected Level of Improvement with Rehab  Assist for ADL Modified [place]  Assist for Transfers [place]  Assist for Gait Modified [place]    Patient's willingness to participate: Yes  Patient's ability to tolerate proposed care: Yes  Patient/Family Goals of Rehab (in patient's/family's own words): return home alone    Anticipated Discharge Plan:  Home with   Home Health, RN PT OT SLP Aide Social Work to be determined      Barriers to Discharge:  Caregiver availability  Inaccessible transportation  Resource availability      Rehab evaluation plan:   Transfer to [facility] cancelled, no bed available and patient stabilized.    Recommend Acute Rehab and precert started 0/15/20  Insurance denial received 8/17/22    Rehabilitation Impairment Group Code: 2.1  Rehab Impairment Group: Non-traumatic Brain Dysfunction   Rehab Diagnosis: Impaired mobility and ADL's due to hypoxic encephalopathy  Reviewer's Signature: Electronically signed by Rai Mccauley RN on 8/17/22 at 7:36 AM EDT

## 2022-08-05 ENCOUNTER — APPOINTMENT (OUTPATIENT)
Dept: CARDIAC CATH/INVASIVE PROCEDURES | Age: 67
DRG: 871 | End: 2022-08-05
Payer: MEDICARE

## 2022-08-05 ENCOUNTER — ANESTHESIA (OUTPATIENT)
Dept: ENDOSCOPY | Age: 67
DRG: 871 | End: 2022-08-05
Payer: MEDICARE

## 2022-08-05 ENCOUNTER — ANESTHESIA EVENT (OUTPATIENT)
Dept: ENDOSCOPY | Age: 67
DRG: 871 | End: 2022-08-05
Payer: MEDICARE

## 2022-08-05 PROBLEM — K26.9 DUODENAL ULCER: Status: ACTIVE | Noted: 2022-08-05

## 2022-08-05 LAB
ABO/RH: NORMAL
ALBUMIN SERPL-MCNC: 2.7 G/DL (ref 3.5–4.6)
ANION GAP SERPL CALCULATED.3IONS-SCNC: 16 MEQ/L (ref 9–15)
ANTIBODY SCREEN: NORMAL
BASOPHILS ABSOLUTE: 0.1 K/UL (ref 0–0.2)
BASOPHILS RELATIVE PERCENT: 0.5 %
BLOOD BANK DISPENSE STATUS: NORMAL
BLOOD BANK DISPENSE STATUS: NORMAL
BLOOD BANK PRODUCT CODE: NORMAL
BLOOD BANK PRODUCT CODE: NORMAL
BPU ID: NORMAL
BPU ID: NORMAL
BUN BLDV-MCNC: 31 MG/DL (ref 8–23)
CALCIUM SERPL-MCNC: 8.6 MG/DL (ref 8.5–9.9)
CHLORIDE BLD-SCNC: 89 MEQ/L (ref 95–107)
CO2: 25 MEQ/L (ref 20–31)
CREAT SERPL-MCNC: 4.94 MG/DL (ref 0.7–1.2)
DESCRIPTION BLOOD BANK: NORMAL
DESCRIPTION BLOOD BANK: NORMAL
EOSINOPHILS ABSOLUTE: 0.2 K/UL (ref 0–0.7)
EOSINOPHILS RELATIVE PERCENT: 1.7 %
GFR AFRICAN AMERICAN: 14.2
GFR NON-AFRICAN AMERICAN: 11.8
GLUCOSE BLD-MCNC: 75 MG/DL (ref 70–99)
GLUCOSE BLD-MCNC: 77 MG/DL (ref 70–99)
GLUCOSE BLD-MCNC: 81 MG/DL (ref 70–99)
HCT VFR BLD CALC: 22 % (ref 42–52)
HCT VFR BLD CALC: 22.1 % (ref 42–52)
HEMOGLOBIN: 7.3 G/DL (ref 14–18)
HEMOGLOBIN: 7.3 G/DL (ref 14–18)
INR BLD: 1.4
LYMPHOCYTES ABSOLUTE: 0.4 K/UL (ref 1–4.8)
LYMPHOCYTES RELATIVE PERCENT: 3.4 %
MCH RBC QN AUTO: 34.7 PG (ref 27–31.3)
MCHC RBC AUTO-ENTMCNC: 33.1 % (ref 33–37)
MCV RBC AUTO: 104.7 FL (ref 80–100)
MONOCYTES ABSOLUTE: 0.9 K/UL (ref 0.2–0.8)
MONOCYTES RELATIVE PERCENT: 8.3 %
NEUTROPHILS ABSOLUTE: 9.4 K/UL (ref 1.4–6.5)
NEUTROPHILS RELATIVE PERCENT: 86.1 %
PDW BLD-RTO: 14.2 % (ref 11.5–14.5)
PERFORMED ON: NORMAL
PERFORMED ON: NORMAL
PHOSPHORUS: 4.5 MG/DL (ref 2.3–4.8)
PLATELET # BLD: 247 K/UL (ref 130–400)
POTASSIUM SERPL-SCNC: 4.7 MEQ/L (ref 3.4–4.9)
PROTHROMBIN TIME: 16.8 SEC (ref 12.3–14.9)
RBC # BLD: 2.11 M/UL (ref 4.7–6.1)
REASON FOR REJECTION: NORMAL
REJECTED TEST: NORMAL
SODIUM BLD-SCNC: 130 MEQ/L (ref 135–144)
WBC # BLD: 10.9 K/UL (ref 4.8–10.8)

## 2022-08-05 PROCEDURE — 86850 RBC ANTIBODY SCREEN: CPT

## 2022-08-05 PROCEDURE — 2580000003 HC RX 258: Performed by: INTERNAL MEDICINE

## 2022-08-05 PROCEDURE — 36415 COLL VENOUS BLD VENIPUNCTURE: CPT

## 2022-08-05 PROCEDURE — 43255 EGD CONTROL BLEEDING ANY: CPT | Performed by: INTERNAL MEDICINE

## 2022-08-05 PROCEDURE — C1769 GUIDE WIRE: HCPCS

## 2022-08-05 PROCEDURE — 85025 COMPLETE CBC W/AUTO DIFF WBC: CPT

## 2022-08-05 PROCEDURE — 2580000003 HC RX 258: Performed by: NURSE ANESTHETIST, CERTIFIED REGISTERED

## 2022-08-05 PROCEDURE — 85610 PROTHROMBIN TIME: CPT

## 2022-08-05 PROCEDURE — 6360000002 HC RX W HCPCS

## 2022-08-05 PROCEDURE — 97535 SELF CARE MNGMENT TRAINING: CPT

## 2022-08-05 PROCEDURE — 99232 SBSQ HOSP IP/OBS MODERATE 35: CPT | Performed by: INTERNAL MEDICINE

## 2022-08-05 PROCEDURE — C1894 INTRO/SHEATH, NON-LASER: HCPCS

## 2022-08-05 PROCEDURE — 6370000000 HC RX 637 (ALT 250 FOR IP): Performed by: NURSE PRACTITIONER

## 2022-08-05 PROCEDURE — 37191 INS ENDOVAS VENA CAVA FILTR: CPT

## 2022-08-05 PROCEDURE — 3700000000 HC ANESTHESIA ATTENDED CARE: Performed by: INTERNAL MEDICINE

## 2022-08-05 PROCEDURE — 2580000003 HC RX 258: Performed by: NURSE PRACTITIONER

## 2022-08-05 PROCEDURE — 3700000001 HC ADD 15 MINUTES (ANESTHESIA): Performed by: INTERNAL MEDICINE

## 2022-08-05 PROCEDURE — 6370000000 HC RX 637 (ALT 250 FOR IP): Performed by: INTERNAL MEDICINE

## 2022-08-05 PROCEDURE — 99232 SBSQ HOSP IP/OBS MODERATE 35: CPT | Performed by: PHYSICAL MEDICINE & REHABILITATION

## 2022-08-05 PROCEDURE — 80069 RENAL FUNCTION PANEL: CPT

## 2022-08-05 PROCEDURE — 6360000004 HC RX CONTRAST MEDICATION: Performed by: INTERNAL MEDICINE

## 2022-08-05 PROCEDURE — 86923 COMPATIBILITY TEST ELECTRIC: CPT

## 2022-08-05 PROCEDURE — 94761 N-INVAS EAR/PLS OXIMETRY MLT: CPT

## 2022-08-05 PROCEDURE — C9113 INJ PANTOPRAZOLE SODIUM, VIA: HCPCS | Performed by: INTERNAL MEDICINE

## 2022-08-05 PROCEDURE — 86901 BLOOD TYPING SEROLOGIC RH(D): CPT

## 2022-08-05 PROCEDURE — 6360000002 HC RX W HCPCS: Performed by: INTERNAL MEDICINE

## 2022-08-05 PROCEDURE — 2500000003 HC RX 250 WO HCPCS: Performed by: NURSE ANESTHETIST, CERTIFIED REGISTERED

## 2022-08-05 PROCEDURE — 6360000002 HC RX W HCPCS: Performed by: NURSE PRACTITIONER

## 2022-08-05 PROCEDURE — 2Y41X5Z PACKING OF NASAL REGION USING PACKING MATERIAL: ICD-10-PCS | Performed by: INTERNAL MEDICINE

## 2022-08-05 PROCEDURE — 2500000003 HC RX 250 WO HCPCS: Performed by: NURSE PRACTITIONER

## 2022-08-05 PROCEDURE — A4216 STERILE WATER/SALINE, 10 ML: HCPCS | Performed by: INTERNAL MEDICINE

## 2022-08-05 PROCEDURE — 85014 HEMATOCRIT: CPT

## 2022-08-05 PROCEDURE — 2709999900 HC NON-CHARGEABLE SUPPLY: Performed by: INTERNAL MEDICINE

## 2022-08-05 PROCEDURE — 06H03DZ INSERTION OF INTRALUMINAL DEVICE INTO INFERIOR VENA CAVA, PERCUTANEOUS APPROACH: ICD-10-PCS | Performed by: INTERNAL MEDICINE

## 2022-08-05 PROCEDURE — 6370000000 HC RX 637 (ALT 250 FOR IP): Performed by: NURSE ANESTHETIST, CERTIFIED REGISTERED

## 2022-08-05 PROCEDURE — 99232 SBSQ HOSP IP/OBS MODERATE 35: CPT | Performed by: NURSE PRACTITIONER

## 2022-08-05 PROCEDURE — 90935 HEMODIALYSIS ONE EVALUATION: CPT

## 2022-08-05 PROCEDURE — 7100000011 HC PHASE II RECOVERY - ADDTL 15 MIN: Performed by: INTERNAL MEDICINE

## 2022-08-05 PROCEDURE — C1880 VENA CAVA FILTER: HCPCS

## 2022-08-05 PROCEDURE — 36430 TRANSFUSION BLD/BLD COMPNT: CPT

## 2022-08-05 PROCEDURE — P9016 RBC LEUKOCYTES REDUCED: HCPCS

## 2022-08-05 PROCEDURE — 2580000003 HC RX 258

## 2022-08-05 PROCEDURE — 37191 INS ENDOVAS VENA CAVA FILTR: CPT | Performed by: INTERNAL MEDICINE

## 2022-08-05 PROCEDURE — 86900 BLOOD TYPING SEROLOGIC ABO: CPT

## 2022-08-05 PROCEDURE — 7100000010 HC PHASE II RECOVERY - FIRST 15 MIN: Performed by: INTERNAL MEDICINE

## 2022-08-05 PROCEDURE — 2000000000 HC ICU R&B

## 2022-08-05 PROCEDURE — 85018 HEMOGLOBIN: CPT

## 2022-08-05 PROCEDURE — 3609017100 HC EGD: Performed by: INTERNAL MEDICINE

## 2022-08-05 RX ORDER — SODIUM CHLORIDE 9 MG/ML
INJECTION, SOLUTION INTRAVENOUS CONTINUOUS
Status: DISCONTINUED | OUTPATIENT
Start: 2022-08-05 | End: 2022-08-09

## 2022-08-05 RX ORDER — LABETALOL HYDROCHLORIDE 5 MG/ML
10 INJECTION, SOLUTION INTRAVENOUS EVERY 30 MIN PRN
Status: DISCONTINUED | OUTPATIENT
Start: 2022-08-05 | End: 2022-08-19 | Stop reason: HOSPADM

## 2022-08-05 RX ORDER — SODIUM CHLORIDE 9 MG/ML
INJECTION, SOLUTION INTRAVENOUS CONTINUOUS PRN
Status: DISCONTINUED | OUTPATIENT
Start: 2022-08-05 | End: 2022-08-05 | Stop reason: SDUPTHER

## 2022-08-05 RX ORDER — ETOMIDATE 2 MG/ML
INJECTION INTRAVENOUS PRN
Status: DISCONTINUED | OUTPATIENT
Start: 2022-08-05 | End: 2022-08-05 | Stop reason: SDUPTHER

## 2022-08-05 RX ORDER — SODIUM CHLORIDE 0.9 % (FLUSH) 0.9 %
5-40 SYRINGE (ML) INJECTION EVERY 12 HOURS SCHEDULED
Status: DISCONTINUED | OUTPATIENT
Start: 2022-08-05 | End: 2022-08-19 | Stop reason: HOSPADM

## 2022-08-05 RX ORDER — MAGNESIUM HYDROXIDE 1200 MG/15ML
LIQUID ORAL PRN
Status: DISCONTINUED | OUTPATIENT
Start: 2022-08-05 | End: 2022-08-05 | Stop reason: ALTCHOICE

## 2022-08-05 RX ORDER — SODIUM CHLORIDE 9 MG/ML
INJECTION, SOLUTION INTRAVENOUS
Status: COMPLETED
Start: 2022-08-05 | End: 2022-08-05

## 2022-08-05 RX ORDER — SIMETHICONE 20 MG/.3ML
EMULSION ORAL PRN
Status: DISCONTINUED | OUTPATIENT
Start: 2022-08-05 | End: 2022-08-05 | Stop reason: ALTCHOICE

## 2022-08-05 RX ORDER — ETOMIDATE 2 MG/ML
INJECTION INTRAVENOUS
Status: COMPLETED
Start: 2022-08-05 | End: 2022-08-05

## 2022-08-05 RX ORDER — SODIUM CHLORIDE 9 MG/ML
INJECTION, SOLUTION INTRAVENOUS PRN
Status: DISCONTINUED | OUTPATIENT
Start: 2022-08-05 | End: 2022-08-19 | Stop reason: HOSPADM

## 2022-08-05 RX ORDER — HYDRALAZINE HYDROCHLORIDE 20 MG/ML
10 INJECTION INTRAMUSCULAR; INTRAVENOUS EVERY 10 MIN PRN
Status: DISCONTINUED | OUTPATIENT
Start: 2022-08-05 | End: 2022-08-19 | Stop reason: HOSPADM

## 2022-08-05 RX ORDER — 0.9 % SODIUM CHLORIDE 0.9 %
500 INTRAVENOUS SOLUTION INTRAVENOUS ONCE
Status: COMPLETED | OUTPATIENT
Start: 2022-08-05 | End: 2022-08-05

## 2022-08-05 RX ORDER — ACETAMINOPHEN 325 MG/1
650 TABLET ORAL EVERY 4 HOURS PRN
Status: DISCONTINUED | OUTPATIENT
Start: 2022-08-05 | End: 2022-08-19 | Stop reason: HOSPADM

## 2022-08-05 RX ORDER — FENTANYL CITRATE 50 UG/ML
25 INJECTION, SOLUTION INTRAMUSCULAR; INTRAVENOUS
Status: ACTIVE | OUTPATIENT
Start: 2022-08-05 | End: 2022-08-05

## 2022-08-05 RX ORDER — MIDAZOLAM HYDROCHLORIDE 2 MG/2ML
2 INJECTION, SOLUTION INTRAMUSCULAR; INTRAVENOUS
Status: ACTIVE | OUTPATIENT
Start: 2022-08-05 | End: 2022-08-05

## 2022-08-05 RX ORDER — MORPHINE SULFATE 2 MG/ML
2 INJECTION, SOLUTION INTRAMUSCULAR; INTRAVENOUS
Status: ACTIVE | OUTPATIENT
Start: 2022-08-05 | End: 2022-08-05

## 2022-08-05 RX ORDER — ONDANSETRON 2 MG/ML
4 INJECTION INTRAMUSCULAR; INTRAVENOUS EVERY 6 HOURS PRN
Status: DISCONTINUED | OUTPATIENT
Start: 2022-08-05 | End: 2022-08-19 | Stop reason: HOSPADM

## 2022-08-05 RX ORDER — SODIUM CHLORIDE 0.9 % (FLUSH) 0.9 %
5-40 SYRINGE (ML) INJECTION PRN
Status: DISCONTINUED | OUTPATIENT
Start: 2022-08-05 | End: 2022-08-19 | Stop reason: HOSPADM

## 2022-08-05 RX ORDER — LIDOCAINE HYDROCHLORIDE 20 MG/ML
SOLUTION OROPHARYNGEAL PRN
Status: DISCONTINUED | OUTPATIENT
Start: 2022-08-05 | End: 2022-08-05 | Stop reason: SDUPTHER

## 2022-08-05 RX ADMIN — ETOMIDATE 5 MG: 2 INJECTION, SOLUTION INTRAVENOUS at 13:25

## 2022-08-05 RX ADMIN — TACROLIMUS 1 MG: 1 CAPSULE ORAL at 20:30

## 2022-08-05 RX ADMIN — SODIUM CHLORIDE, PRESERVATIVE FREE 40 MG: 5 INJECTION INTRAVENOUS at 20:34

## 2022-08-05 RX ADMIN — ETOMIDATE 5 MG: 2 INJECTION, SOLUTION INTRAVENOUS at 13:20

## 2022-08-05 RX ADMIN — Medication 10 ML: at 20:42

## 2022-08-05 RX ADMIN — SODIUM CHLORIDE, PRESERVATIVE FREE 40 MG: 5 INJECTION INTRAVENOUS at 10:51

## 2022-08-05 RX ADMIN — ETOMIDATE 5 MG: 2 INJECTION, SOLUTION INTRAVENOUS at 13:13

## 2022-08-05 RX ADMIN — DOCUSATE SODIUM 100 MG: 100 CAPSULE, LIQUID FILLED ORAL at 20:30

## 2022-08-05 RX ADMIN — SODIUM CHLORIDE, PRESERVATIVE FREE 40 MG: 5 INJECTION INTRAVENOUS at 10:46

## 2022-08-05 RX ADMIN — ETOMIDATE 5 MG: 2 INJECTION, SOLUTION INTRAVENOUS at 13:16

## 2022-08-05 RX ADMIN — ETOMIDATE 5 MG: 2 INJECTION, SOLUTION INTRAVENOUS at 13:10

## 2022-08-05 RX ADMIN — SODIUM CHLORIDE: 9 INJECTION, SOLUTION INTRAVENOUS at 13:04

## 2022-08-05 RX ADMIN — SODIUM CHLORIDE 500 ML: 9 INJECTION, SOLUTION INTRAVENOUS at 15:30

## 2022-08-05 RX ADMIN — LIDOCAINE HYDROCHLORIDE 10 ML: 20 SOLUTION ORAL; TOPICAL at 12:58

## 2022-08-05 RX ADMIN — SULFAMETHOXAZOLE AND TRIMETHOPRIM 518.4 MG: 80; 16 INJECTION, SOLUTION, CONCENTRATE INTRAVENOUS at 10:49

## 2022-08-05 RX ADMIN — IOPAMIDOL 16 ML: 612 INJECTION, SOLUTION INTRAVENOUS at 17:37

## 2022-08-05 RX ADMIN — METOPROLOL TARTRATE 12.5 MG: 25 TABLET, FILM COATED ORAL at 20:30

## 2022-08-05 ASSESSMENT — PAIN SCALES - GENERAL
PAINLEVEL_OUTOF10: 0

## 2022-08-05 ASSESSMENT — ENCOUNTER SYMPTOMS
SHORTNESS OF BREATH: 0
GASTROINTESTINAL NEGATIVE: 1
RESPIRATORY NEGATIVE: 1
COUGH: 0

## 2022-08-05 ASSESSMENT — PAIN - FUNCTIONAL ASSESSMENT: PAIN_FUNCTIONAL_ASSESSMENT: 0-10

## 2022-08-05 NOTE — PROGRESS NOTES
Subjective: The patient complains of severe acute on chronic progressive fatigue and generalized weakness partially relieved by rest, PT, OT and meds and hemodialysis and exacerbated by exertion and recent illness. He was initially admitted via the ER with SOB. He was diagnosed with hypoxia acute respiratory failure end-stage renal disease macrocytic anemia and hyperglycemia secondary to diabetes mellitus. He was admitted to the hospital.  He is on IV Bactrim for pneumonia. GI is seeing him RO GI bleed. I am concerned about his Lovenox dose given his renal failure. I am concerned about patients medical complexities including:  Principal Problem:    Acute respiratory failure with hypoxia (HCC)  Active Problems:    Pneumonia due to infectious organism    Pleural effusion    Impaired mobility and activities of daily living    Dysphagia, oropharyngeal phase    Epistaxis    ESRD (end stage renal disease) on dialysis [facility])    Liver transplant recipient [facility])  Resolved Problems:    * No resolved hospital problems. *      .    Reviewed recent nursing note and discussed current status and planned care with acute care providers, \" Pt refusing bipap. \".    ROS x10: The patient also complains of severely impaired mobility and activities of daily living. Otherwise no new problems with vision, hearing, nose, mouth, throat, dermal, cardiovascular, GI, , pulmonary, musculoskeletal, psychiatric or neurological.        Vital signs:  /64   Pulse 100   Temp 97.4 °F (36.3 °C) (Oral)   Resp 18   Ht 5' 6\" (1.676 m)   Wt 142 lb 6.7 oz (64.6 kg)   SpO2 (!) 89%   BMI 22.99 kg/m²   I/O:   PO/Intake:    fair PO intake, monitoring for dysphagia    Bowel/Bladder:   continent,    General:  Patient is well developed, adequately nourished, and    well kempt. HEENT:    PERRLA, hearing intact to loud voice, external inspection of ear and nose benign.   Inspection of lips, tongue and gums benign, nosebleed from nasal cannula O2 patient is also on some blood thinners. Musculoskeletal: No significant change in strength or tone. All joints stable. Inspection and palpation of digits and nails show no clubbing, cyanosis or inflammatory conditions. Neuro/Psychiatric: Affect: flat-  Alert and oriented to self and situation with  min-mod cues. No significant change in deep tendon reflexes or sensation  Lungs:  Diminished, CTA-B  . Respiration effort is normal at rest.   Heart:   S1 = S2,   RRR. Abdomen:  Soft, non-tender    Extremities:  Trace  lower extremity edema but no unusual tenderness. non Fx RUE fistula--functional LUE fistula. Skin:   BUE bruises dt blood draws-his bruises and skin tears. Rehabilitation:  Physical Therapy:   Bed mobility:  Bed mobility  Rolling to Left: Minimal assistance (08/03/22 0950)  Rolling to Right: Minimal assistance (08/03/22 0950)  Supine to Sit: Moderate assistance;Maximum assistance (08/04/22 1304)  Sit to Supine:  (Up to bedside chair) (08/04/22 1304)  Scooting: Minimal assistance; Moderate assistance (08/04/22 1304)  Bed Mobility Comments: Provided verbal cues for sequencing and technique. Patient requires increased time/effort to complete.  MAx A to lift trunk from S/l position. (08/04/22 1304)  Transfers:  Transfers  Sit to Stand: Minimal Assistance (08/04/22 1306)  Stand to sit: Minimal Assistance (08/04/22 1306)  Gait:   Ambulation  Surface: level tile (08/04/22 1306)  Device: Rolling Walker (08/04/22 1306)  Other Apparatus: O2 (2L O2 NC, increased to 4L O2 NC RN notified and aware) (08/04/22 1306)  Assistance: Minimal assistance (08/04/22 1306)  Quality of Gait: flexed posture, decresaed bilateral step length and foot clearance, unsteady (08/04/22 1306)  Gait Deviations: Slow Whitney;Decreased step length;Decreased step height;Decreased arm swing (08/04/22 1306)  Distance: 5ft to chair (08/04/22 1306)  Comments: SOB upon exertion, HR elevated 110s with exertional dyspnea observed, O2 increased d/t low O2 sats (08/03/22 0950)  Stairs:     W/C mobility:       Occupational Therapy:   Hand Dominance: Left  ADL  Feeding: Setup (08/03/22 0943)  Grooming: Minimal assistance (08/03/22 0943)  UE Bathing: Minimal assistance (08/03/22 0943)  LE Bathing: Maximum assistance (08/03/22 0943)  UE Dressing: Minimal assistance (08/03/22 0943)  LE Dressing: Maximum assistance (08/03/22 0943)  Toileting: Dependent/Total (08/03/22 1369)  Toileting Skilled Clinical Factors: Incontinent of dark loose stool; LPN notified (60/28/20 7876)  Additional Comments: Simulated ADLs as above. Pt. significantly limited d/t fatigue and SOB. (08/03/22 4638)  Toilet Transfers  Toilet Transfer: Unable to assess (08/03/22 0946)  Toilet Transfers Comments: Anticipate min A (08/03/22 0946)          Speech Therapy:            Diet/Swallow:        Dysphagia Outcome Severity Scale: Level 5: Mild dysphagia- Distant supervision.  May need one diet consistency restricted  Compensatory Swallowing Strategies : Upright as possible for all oral intake, Small bites/sips, Assist feed, Eat/Feed slowly  Therapeutic Interventions: Patient/Family education, Diet tolerance monitoring    COGNITION  OT: Cognition Comment: Verbal cues for safety and sequencing  SP:           Lab/X-ray studies reviewed, analyzed and discussed with patient and staff:   Recent Results (from the past 24 hour(s))   Renal Function Panel    Collection Time: 08/04/22  5:26 AM   Result Value Ref Range    Sodium 132 (L) 135 - 144 mEq/L    Potassium 3.7 3.4 - 4.9 mEq/L    Chloride 90 (L) 95 - 107 mEq/L    CO2 29 20 - 31 mEq/L    Anion Gap 13 9 - 15 mEq/L    Glucose 100 (H) 70 - 99 mg/dL    BUN 24 (H) 8 - 23 mg/dL    Creatinine 3.93 (H) 0.70 - 1.20 mg/dL    GFR Non-African American 15.3 (L) >60    GFR  18.5 (L) >60    Calcium 8.7 8.5 - 9.9 mg/dL    Phosphorus 4.2 2.3 - 4.8 mg/dL    Albumin 3.0 (L) 3.5 - 4.6 g/dL   CBC with Auto Differential    Collection Time: 08/04/22  5:26 AM   Result Value Ref Range    WBC 10.1 4.8 - 10.8 K/uL    RBC 2.25 (L) 4.70 - 6.10 M/uL    Hemoglobin 7.9 (L) 14.0 - 18.0 g/dL    Hematocrit 23.4 (L) 42.0 - 52.0 %    .9 (H) 80.0 - 100.0 fL    MCH 35.0 (H) 27.0 - 31.3 pg    MCHC 33.7 33.0 - 37.0 %    RDW 14.2 11.5 - 14.5 %    Platelets 867 519 - 398 K/uL    Neutrophils % 83.8 %    Lymphocytes % 3.7 %    Monocytes % 10.7 %    Eosinophils % 1.4 %    Basophils % 0.4 %    Neutrophils Absolute 8.4 (H) 1.4 - 6.5 K/uL    Lymphocytes Absolute 0.4 (L) 1.0 - 4.8 K/uL    Monocytes Absolute 1.1 (H) 0.2 - 0.8 K/uL    Eosinophils Absolute 0.1 0.0 - 0.7 K/uL    Basophils Absolute 0.0 0.0 - 0.2 K/uL   POCT Glucose    Collection Time: 08/04/22  7:55 AM   Result Value Ref Range    POC Glucose 81 70 - 99 mg/dl    Performed on ACCU-CHEK    Hemoglobin and Hematocrit    Collection Time: 08/04/22  9:09 AM   Result Value Ref Range    Hemoglobin 7.9 (L) 14.0 - 18.0 g/dL    Hematocrit 24.0 (L) 42.0 - 52.0 %   POCT Glucose    Collection Time: 08/04/22 11:48 AM   Result Value Ref Range    POC Glucose 95 70 - 99 mg/dl    Performed on ACCU-CHEK    Hepatic Function Panel    Collection Time: 08/04/22  3:24 PM   Result Value Ref Range    Total Protein 5.7 (L) 6.3 - 8.0 g/dL    Albumin 2.5 (L) 3.5 - 4.6 g/dL    Alkaline Phosphatase 67 35 - 104 U/L    ALT 83 (H) 0 - 41 U/L    AST 54 (H) 0 - 40 U/L    Total Bilirubin <0.2 0.2 - 0.7 mg/dL    Bilirubin, Direct <0.2 0.0 - 0.4 mg/dL    Bilirubin, Indirect see below 0.0 - 0.6 mg/dL   POCT Glucose    Collection Time: 08/04/22  5:00 PM   Result Value Ref Range    POC Glucose 70 70 - 99 mg/dl    Performed on ACCU-CHEK    POCT Glucose    Collection Time: 08/04/22  7:51 PM   Result Value Ref Range    POC Glucose 95 70 - 99 mg/dl    Performed on ACCU-CHEK    Hemoglobin and Hematocrit    Collection Time: 08/04/22  9:16 PM   Result Value Ref Range    Hemoglobin 7.5 (L) 14.0 - 18.0 g/dL    Hematocrit 22.7 (L) 42.0 - 52.0 %     Previous extensive, complex labs, notes and diagnostics reviewed and analyzed. ALLERGIES:    Allergies as of 07/25/2022    (No Known Allergies)      (please also verify by checking [facility])     Complex Physical Medicine & Rehab Issues Assess & Plan:   Severe abnormality of gait and mobility and impaired self-care and ADL's secondary to hypoxic encephalopathy. Updated functional and medical status reassessed regarding patients ability to participate in therapies and patient found to be able to participate in:        acute intensive comprehensive inpatient rehabilitation program including PT/OT to improve balance, ambulation, ADLs, and to improve the P/AROM. It is my opinion that they will be able to tolerate 3 hours of therapy a day and benefit from it at an acute level. I again discussed acute rehab with the patient and verify that the patient is able and willing to participate in 3 hours of therapy a day. Rehab and Acute Care Case Management has also reinforced this expectation. Will continue to follow to attempt to get patient to the most efficient but most effective level of care will be in their best interest.  Continue to focus on energy conservation heart rate and blood pressure monitoring before during and after therapy endurance and consistency of function. Bowel constipation   and Bladder dysfunction   overactive, neurogenic bladder:  frequent toileting, ambulate to bathroom with assistance, check post void residuals. Check for C.difficile x1 if >2 loose stools in 24 hours, continue bowel & bladder program.  Monitor for UTI symptoms including lethargy and confusion    Moderate back pain and generalized body aches and pain likely secondary to renal osteodystrophy and generalized OA pain: reassess pain every shift and prior to and after each therapy session, give prn Tylenol   and consider scheduled Tylenol, modalities prn in therapy, consider Lidoderm, K-pad prn.      Skin healing   multiple upper extremity skin tears breakdown   risk:  continue pressure relief program.  Daily skin exams and reports from nursing. Severe fatigue due to immobility and nutritional deficits: continue to monitor closely for dehydration   Add vitamin B12 vitamin D and CoQ10 titrate dosing and add protein supplementation with low carb content. Complex discharge planning:   Discussed with care team-last 24 hour events noted. I will continue to follow along and reassess functional and medical status as we strive to improve patient's functional and medical outcomes progressing to the most efficient and lowest level of care. Complex Active General Medical Issues that complicate care:     1. Principal Problem:    Acute respiratory failure with hypoxia (HCC)  Active Problems:    Pneumonia due to infectious organism    Pleural effusion    Impaired mobility and activities of daily living    Dysphagia, oropharyngeal phase    Epistaxis    ESRD (end stage renal disease) on dialysis [facility])    Liver transplant recipient [facility])  Resolved Problems:    * No resolved hospital problems. *          Events and functional changes in the past 24 hours reviewed improvements in functional status are encouraging       Focus of today's plan-   treat nosebleed Afrin and Ocean nasal spray and bacitracin. It are renally adjusting Lovenox dosing.       Shaun Watkins D.O., PM&R     Attending    [address] [facility]

## 2022-08-05 NOTE — ANESTHESIA POSTPROCEDURE EVALUATION
Department of Anesthesiology  Postprocedure Note    Patient: Lucrecia Olson  MRN: [id number]  YOB: 1955  Date of evaluation: 8/5/2022      Procedure Summary     Date: 08/05/22 Room / Location: [facility] OR  / [facility]    Anesthesia Start: 8648 Anesthesia Stop: 5750    Procedure: EGD ESOPHAGOGASTRODUODENOSCOPY WITH INTERVENTION Diagnosis:       Gastrointestinal hemorrhage with melena      (Gastrointestinal hemorrhage with melena [K92.1])    Surgeons: Larry Serra MD Responsible Provider: ISI Quintana CRNA    Anesthesia Type: MAC ASA Status: 4          Anesthesia Type: No value filed.     Allie Phase I:      Allie Phase II:        Anesthesia Post Evaluation    Patient location during evaluation: PACU  Patient participation: waiting for patient participation  Level of consciousness: awake  Pain score: 1  Airway patency: patent  Nausea & Vomiting: no nausea and no vomiting  Complications: no  Cardiovascular status: hemodynamically stable  Respiratory status: acceptable and face mask  Hydration status: euvolemic  Comments: Report to RN,  sinus rhythm

## 2022-08-05 NOTE — PROGRESS NOTES
HD unable to run, fistula still bleeding from where it was accessed Wednesday, dialysis RN attempted to access above the bleed, profuse bleeding was noted around needle upon cannulation, needle was removed right away and pressure was held until bleeding stopped with surgicel. Dr. Olivia Willett to be notified. Patient sent back to home floor in stable condition.

## 2022-08-05 NOTE — PROGRESS NOTES
Infectious Disease     Patient Name: Floyd Desai  Date: 8/5/2022  YOB: 1955  Medical Record Number: [id number]                  Pneumonia    Stenotrophomonas recovered from bronchoscopy  No significant number of neutrophils were seen however    Pleural fluid exudative but culture negative      Developed GI bleed today transferred to intensive care          Review of Systems   Constitutional:  Negative for chills, diaphoresis and fatigue. Respiratory: Negative. Negative for cough and shortness of breath. Cardiovascular: Negative. Gastrointestinal: Negative. Physical Exam  Cardiovascular:      Heart sounds: Normal heart sounds. No murmur heard. Pulmonary:      Effort: Pulmonary effort is normal. No respiratory distress. Breath sounds: Normal breath sounds. No wheezing, rhonchi or rales. Abdominal:      General: Abdomen is flat. Bowel sounds are normal. There is no distension. Palpations: There is no mass. Tenderness: There is no abdominal tenderness. There is no guarding. Blood pressure (!) 129/57, pulse 93, temperature 99 °F (37.2 °C), temperature source Temporal, resp. rate 30, height 5' 6\" (1.676 m), weight 142 lb (64.4 kg), SpO2 97 %.       .   Lab Results   Component Value Date    WBC 10.9 (H) 08/05/2022    HGB 7.3 (L) 08/05/2022    HCT 22.0 (L) 08/05/2022    .7 (H) 08/05/2022     08/05/2022     Lab Results   Component Value Date/Time     08/05/2022 05:07 AM    K 4.7 08/05/2022 05:07 AM    CL 89 08/05/2022 05:07 AM    CO2 25 08/05/2022 05:07 AM    BUN 31 08/05/2022 05:07 AM    CREATININE 4.94 08/05/2022 05:07 AM    GLUCOSE 75 08/05/2022 05:07 AM    GLUCOSE 136 02/25/2012 05:10 PM    CALCIUM 8.6 08/05/2022 05:07 AM          Chest x-ray shows right effusion much improved      ASSESSMENT:  PLAN:  Pneumonia  Stenotrophomonas from sputum  Completed Bactrim

## 2022-08-05 NOTE — PROGRESS NOTES
Gastroenterology Progress Note    Lul Reilly is a 75 y.o. male patient. Hospitalization Day:11    Chief C/O: Melena    SUBJECTIVE: Patient reports feeling better. However, per nursing patient continues to have melanotic stools last night and this morning. He is s/p nasal packing per ENT this morning as well, per nursing was told bleed was more anterior. Patient denies swallowing significant amount of blood with epistaxis. He additionally reports epigastric pain/discomfort. Physical    VITALS:  /64   Pulse 100   Temp 97.4 °F (36.3 °C) (Oral)   Resp 18   Ht 5' 6\" (1.676 m)   Wt 142 lb 6.7 oz (64.6 kg)   SpO2 (!) 89%   BMI 22.99 kg/m²   TEMPERATURE:  Current - Temp: 97.4 °F (36.3 °C); Max - Temp  Av.6 °F (36.4 °C)  Min: 97.4 °F (36.3 °C)  Max: 97.8 °F (36.6 °C)    General -alert, oriented, in no acute distress  Eyes -no icterus, no pallor  Cardiovascular - RRR  Lungs -clear to auscultation bilaterally  Abdomen -+ central obesity, + epigastric tenderness, bowel sounds present   Extremities -no edema  Skin -warm/dry, without jaundice  Neuro: Without focal deficit, moves all extremities     Data    Data Review:    Recent Labs     22  2130 22  0526 22  0909 22  2116 22  0507 22  0908   WBC 9.5  --  10.1  --   --  10.9*  --    HGB 8.7*   < > 7.9*   < > 7.5* 7.3* 7.3*   HCT 26.5*   < > 23.4*   < > 22.7* 22.1* 22.0*   .0*  --  103.9*  --   --  104.7*  --      --  252  --   --  247  --     < > = values in this interval not displayed. Recent Labs     22  0522  0526 22  0507   * 132* 130*   K 4.0 3.7 4.7   CL 88* 90* 89*   CO2 26 29 25   PHOS 5.0* 4.2 4.5   BUN 39* 24* 31*   CREATININE 5.19* 3.93* 4.94*     Recent Labs     22  1524   AST 54*   ALT 83*   BILIDIR <0.2   BILITOT <0.2   ALKPHOS 67     No results for input(s): LIPASE, AMYLASE in the last 72 hours.   No results for input(s): PROTIME, INR in the last 72 hours. ASSESSMENT:  76 y/o male admitted on 7/25 for SOB & sepsis,  initially required intubation, now clinically improved and seen on the regular medical floor. Patient carries a diagnosis of end-stage renal disease and is on hemodialysis Monday Wednesdays and Fridays, on arrival hemoglobin was noted to be 13, now 7.9 with associated melena in the context of epistaxis and anticoagulation for bilateral upper extremity DVTs. Was on Eliquis, this has been held. 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 Noted patient admitted on to sepsis due to pneumonia and no Prograf level noted currently. Patient is not on tacrolimus inpatient at this time. No recent liver function test  8/5/22: Patient continues to have melanotic stools and hemoglobin trending down, currently 7.3. He remains off Eliquis at this time however on Lovenox. Also noted to have elevated transaminases. PLAN :  -Continue course of care  -H&H, trend and transfuse accordingly  -Plan for EGD today, 8/5/22. -NPO  -Continue PPI    Thank you for allowing me to participate in the care of your patient. Please feel free to contact me with any concerns.     ISI Rodríguez - CNP

## 2022-08-05 NOTE — PROGRESS NOTES
Discussed patient with gastroenterologist, patient had his EGD, he has duodenal ulcer that was clipped, he had some oozing and friable mucosa at the site. Patient will be monitored in the ICU. We will hold anticoagulation for now as patient has epistaxis, bleeding duodenal ulcer and bleeding at the site of his fistula. I discussed the patient with interventional cardiologist, , for placement of an IVC filter given the patient has left lower extremity DVT and active bleeding. IVC filter will be placed today. Patient also had bilateral upper extremity DVT but these were in the setting of central line placement, central line has since been removed.     Sandy Kramer, [facility]

## 2022-08-05 NOTE — PROGRESS NOTES
Comprehensive Nutrition Assessment    Type and Reason for Visit:  Reassess    Nutrition Recommendations/Plan:   Monitor for ability to resume po diet, pt needs dysphagia diet ' Soft and bite sized' when advanced  Resume oral supplements when  po diet advanced     Malnutrition Assessment:  Malnutrition Status: At risk for malnutrition (Comment) (07/29/22 1417)    Context:  Chronic Illness     Findings of the 6 clinical characteristics of malnutrition:  Energy Intake:  Mild decrease in energy intake (Comment)  Weight Loss:  No significant weight loss     Body Fat Loss:  No significant body fat loss     Muscle Mass Loss:  No significant muscle mass loss    Fluid Accumulation:  Mild Generalized   Strength:  Not Performed    Nutrition Assessment:    Pt at risk for malnutrition, currently > 7 days not meeting nutrition related goals, s/p EGD today with clipping of duodenal ulcer, currently NPO, will resume oral supplements once pt able to take po. Nutrition Related Findings:    PMH: hypertension, hepatitis-s/p liver transplant, ESRD on hemodialysis M,W,F. , intubated 7/25-27 with trophic TF. S/p thoracentesis (7/26), labs noted, meds reviewed, anuric, last BM loose 8/1. +1-3+ BUE/gen edema. BSE 7/29=pureed. , 8/3 Soft bite sized, (8/4) Clears/NPO for EGD.  Intake < 50% since extubation, continues on dysphagia therapy,, IVF = .9 NS @ 100 ml/hr, last dialysis 8/3, Labs noted, meds reviewed Wound Type: Moisture Associate Skin Damage       Current Nutrition Intake & Therapies:    Average Meal Intake: 26-50%  Average Supplements Intake: None Ordered  Diet NPO    Anthropometric Measures:  Height: 5' 6\" (167.6 cm)  Ideal Body Weight (IBW): 142 lbs (65 kg)    Admission Body Weight: 148 lb (67.1 kg)  Current Body Weight: 142 lb (64.4 kg) (8/5 * edema present),  Weight Source: Bed Scale  Current BMI (kg/m2): 22.9  Usual Body Weight: 145 lb (65.8 kg) (stated 6/28/22, 2020)  % Weight Change (Calculated): 2.1  Weight Adjustment For: No Adjustment                 BMI Categories: Normal Weight (BMI 22.0 to 24.9) age over 72    Estimated Daily Nutrient Needs:  Energy Requirements Based On: Kcal/kg  Weight Used for Energy Requirements: Admission  Energy (kcal/day): 9046-3332 kcals @ 25-28 kcal/kg  Weight Used for Protein Requirements: Admission (67.4 kg)  Protein (g/day): 101 gm (kg x 1.5)  Method Used for Fluid Requirements: Standard Renal  Fluid (ml/day): 500-800 + UOP (or as per MD)    Nutrition Diagnosis:   Swallowing difficulty related to cognitive or neurological impairment as evidenced by swallow study results  Inadequate protein-energy intake related to swallowing difficulty, altered GI function as evidenced by intake 0-25%, intake 26-50%    Nutrition Interventions:   Food and/or Nutrient Delivery: Continue NPO  Nutrition Education/Counseling: No recommendation at this time  Coordination of Nutrition Care: Continue to monitor while inpatient       Goals:  Previous Goal Met: No Progress toward Goal(s)  Goals: PO intake 75% or greater       Nutrition Monitoring and Evaluation:      Food/Nutrient Intake Outcomes: Supplement Intake, Food and Nutrient Intake, Diet Advancement/Tolerance  Physical Signs/Symptoms Outcomes: Chewing or Swallowing, Biochemical Data, Weight, Meal Time Behavior    Discharge Planning:     Too soon to determine     Humaira Connors RD, LD

## 2022-08-05 NOTE — PROGRESS NOTES
Internal Medicine   Hospitalist   Progress Note    2022   10:17 AM    Name:  Duane Chacon  MRN:    [id number]     IP Day: 6     Admit Date: 2022 11:15 AM  PCP: Kevin Thayer MD    Code Status:  Full Code    Assessment and Plan: Active Problems/ diagnosis:     Acute hypoxic respiratory failure in the setting of aspiration pneumonia-required intubation, extubated now on nasal cannula  Sepsis present admission  End-stage renal disease  Hypotension in the setting of dialysis-on midodrine  Bilateral upper and lower extremity acute DVT on Eliquis  Anemia  History of liver transplant    Plan  Patient is having more melena and epistaxis. He does not feel like epistaxis is going on in the back of his throat, seems more anterior, discussed with ENT who will be packing his nose today, discussed with GI for possibly obtain an EGD, resume PPI. Monitor H&H. Will hold Eliquis and use Lovenox therapeutic dose for now, hold Lovenox dose this morning until determination for EGD is made. Continue to monitor medical floor  Antibiotic as per infectious disease. Currently on IV Bactrim. Zosyn stopped   HD as per nephrology  Home medication resumed  Resume immunosuppressants for history of liver transplant, Prograf  Resume PT/OT  DVT/PUD PPx     7 pm- 7 am, please contact on call Hospitalist for any needs     Dispo-dc soon if Hb stabilizes       Subjective:      no new event.       Physical Examination:      Vitals:  /64   Pulse 100   Temp 97.4 °F (36.3 °C) (Oral)   Resp 18   Ht 5' 6\" (1.676 m)   Wt 142 lb 6.7 oz (64.6 kg)   SpO2 (!) 89%   BMI 22.99 kg/m²   Temp (24hrs), Av.6 °F (36.4 °C), Min:97.4 °F (36.3 °C), Max:97.8 °F (36.6 °C)      General appearance: alert, cooperative and no distress  Mental Status: oriented to person, place and time and normal affect  Lungs: clear to auscultation bilaterally, normal effort  Heart: regular rate and rhythm, no murmur  Abdomen: soft, nontender, nondistended, bowel sounds present, no masses  Extremities: + upper and lower edema, pulses intact   Skin multiple area of bruising related to IV access in upper and lower extremities    Data:     Labs:  Recent Labs     08/04/22  0526 08/04/22  0909 08/05/22  0507 08/05/22  0908   WBC 10.1  --  10.9*  --    HGB 7.9*   < > 7.3* 7.3*     --  247  --     < > = values in this interval not displayed.      Recent Labs     08/04/22  0526 08/05/22  0507   * 130*   K 3.7 4.7   CL 90* 89*   CO2 29 25   BUN 24* 31*   CREATININE 3.93* 4.94*   GLUCOSE 100* 75     Recent Labs     08/04/22  1524   AST 54*   ALT 83*   BILITOT <0.2   ALKPHOS 67       Current Facility-Administered Medications   Medication Dose Route Frequency Provider Last Rate Last Admin    pantoprazole (PROTONIX) 40 mg in sodium chloride (PF) 10 mL injection  40 mg IntraVENous Q12H Asuncion Alexandre DO   40 mg at 08/04/22 2107    [Held by provider] enoxaparin (LOVENOX) injection 60 mg  1 mg/kg SubCUTAneous Daily Carmella Terrell DO        fluticasone (FLONASE) 50 MCG/ACT nasal spray 1 spray  1 spray Each Nostril Daily Billy Barclay DO   1 spray at 08/04/22 0902    sodium chloride flush 0.9 % injection 5-40 mL  5-40 mL IntraVENous 2 times per day Asuncion Alexandre DO   10 mL at 08/04/22 2109    sodium chloride flush 0.9 % injection 5-40 mL  5-40 mL IntraVENous PRN Asuncion Alexandre DO        0.9 % sodium chloride infusion   IntraVENous PRN Alisa Terrell DO        midodrine (PROAMATINE) tablet 2.5 mg  2.5 mg Oral TID  oSnya Guthrie MD   2.5 mg at 08/04/22 1711    metoprolol tartrate (LOPRESSOR) tablet 12.5 mg  12.5 mg Oral BID Sonya Guthrie MD   12.5 mg at 08/04/22 2109    sulfamethoxazole-trimethoprim (BACTRIM) 518.4 mg in dextrose 5 % 500 mL IVPB  7.5 mg/kg IntraVENous Daily ISI Solis - CNP   Stopped at 08/04/22 1030    [Held by provider] aspirin EC tablet 81 mg  81 mg Oral Daily Sonya Guthrie MD   81 mg at 08/04/22 0903    docusate sodium (COLACE) capsule 100 mg  100 mg Oral BID Cathye , APRN - CNP   100 mg at 08/04/22 0903    insulin lispro (HUMALOG) injection vial 0-4 Units  0-4 Units SubCUTAneous 4x Daily AC & HS Gpoi High MD   1 Units at 08/02/22 2248    polyethylene glycol (GLYCOLAX) packet 17 g  17 g Oral Daily Cathye , APRN - CNP   17 g at 08/04/22 0904    tacrolimus (PROGRAF) capsule 1 mg  1 mg Oral BID Cathye , APRN - CNP   1 mg at 08/04/22 2107    acetaminophen (TYLENOL) tablet 650 mg  650 mg Oral Q6H PRN Gopi High MD   650 mg at 08/03/22 4592    Or    acetaminophen (TYLENOL) suppository 650 mg  650 mg Rectal Q6H PRN Gopi High MD        fentaNYL bolus from bag  50 mcg IntraVENous Q30 Min PRN Cathye , APRN - CNP        glucose chewable tablet 16 g  4 tablet Oral PRN Cathye , APRN - CNP        dextrose bolus 10% 125 mL  125 mL IntraVENous PRN Cathye , APRN - CNP        Or    dextrose bolus 10% 250 mL  250 mL IntraVENous PRN Cathye , APRN - CNP        glucagon (rDNA) injection 1 mg  1 mg SubCUTAneous PRN Cathye , APRN - CNP        dextrose 10 % infusion   IntraVENous Continuous PRN Cathye , APRN - CNP        heparin (porcine) injection 1,000 Units  1,000 Units IntraVENous PRN Ivana Maurer MD           Additional work up or/and treatment plan may be added today or then after based on clinical progression. I am managing a portion of pt care. Some medical issues are handled by other specialists. Additional work up and treatment should be done in out pt setting by pt PCP and other out pt providers. In addition to examining and evaluating pt, I spent additional time explaining care, normaland abnormal findings, and treatment plan. All of pt questions were answered. Counseling, diet and education were provided. Case will be discussed with nursing staff when appropriate. Family will be updated if and when appropriate.        Electronically signed by Lalit Phipps DO Xander on 8/5/2022 at 10:17 AM

## 2022-08-05 NOTE — PROGRESS NOTES
1418 report given to [facility] in ICU Electronically signed by Michelle Duran RN on 8/5/2022 at 2:18 PM     [phone] sister Talita Rodriguezs aware of transfer to ICU Electronically signed by Michelle Duran RN on 8/5/2022 at 2:36 PM

## 2022-08-05 NOTE — BRIEF OP NOTE
Section of Cardiology  Adult Brief Cardiac Cath Procedure Note        Procedure(s): Cook IVC filter placement    Pre-operative Diagnosis: Left lower extremity DVT, patient unable to tolerate anticoagulation given GI bleed and epistaxis    H&P Status: Completed and reviewed.      Post-operative Diagnosis: Successful placement of a Cook IVC filter below the renal veins    Findings:  See full report    Complications:  none    Recommendations:  Bedrest for 4 hours  Okay to resume diet from cardiology standpoint  Continue ICU management rest of comorbidities  Patient should follow-up with me in clinic after discharge for consideration of IVC filter removal    Primary Proceduralist:   Dima López MD    Full procedure note to follow

## 2022-08-05 NOTE — ADDENDUM NOTE
Addendum  created 08/05/22 1343 by ISI Fonseca CRNA    Intraprocedure Meds edited, Orders acknowledged in Narrator

## 2022-08-05 NOTE — ANESTHESIA PRE PROCEDURE
Department of Anesthesiology  Preprocedure Note       Name:  Perri Cardoza   Age:  75 y.o.  :  1955                                          MRN:  [id number]         Date:  2022      Surgeon: Enmanuel Anderson):  Florinda Cushing, MD    Procedure: Procedure(s):  EGD ESOPHAGOGASTRODUODENOSCOPY    Medications prior to admission:   Prior to Admission medications    Medication Sig Start Date End Date Taking? Authorizing Provider   albuterol sulfate HFA (PROAIR HFA) 108 (90 Base) MCG/ACT inhaler 2 puffs; Use every 4 hours while awake for 7-10 days then PRN wheezing  Dispense with SPACER and Instruct on use. May sub Ventolin or Proventil as needed per [employer].  22   Chuckie Kussmaul A Romito, DO   carvedilol (COREG) 12.5 MG tablet Take 12.5 mg by mouth 2 times daily    Historical Provider, MD   calcium acetate (PHOSLO) 667 MG capsule Take 667 mg by mouth 3 times daily (with meals)    Historical Provider, MD   Tacrolimus (PROGRAF PO) Take 1 tablet by mouth 2 times daily    Historical Provider, MD       Current medications:    Current Facility-Administered Medications   Medication Dose Route Frequency Provider Last Rate Last Admin    sodium chloride 0.9 % infusion             pantoprazole (PROTONIX) 40 mg in sodium chloride (PF) 10 mL injection  40 mg IntraVENous Q12H Katelin Meléndez, DO   40 mg at 22 1051    [Held by provider] enoxaparin (LOVENOX) injection 60 mg  1 mg/kg SubCUTAneous Daily Katelin Meléndez DO        fluticasone Doren Morales) 50 MCG/ACT nasal spray 1 spray  1 spray Each Nostril Daily Mick Fox DO   1 spray at 22 0902    sodium chloride flush 0.9 % injection 5-40 mL  5-40 mL IntraVENous 2 times per day Katelin Cheers, DO   10 mL at 22 210    sodium chloride flush 0.9 % injection 5-40 mL  5-40 mL IntraVENous PRN Katelin Cheers, DO        0.9 % sodium chloride infusion   IntraVENous PRN Katelin Cheers, DO        midodrine (PROAMATINE) tablet 2.5 mg  2.5 mg Oral TID VINEET Lara Norman Cespedes MD   2.5 mg at 08/04/22 1711    metoprolol tartrate (LOPRESSOR) tablet 12.5 mg  12.5 mg Oral BID Marti Ortiz MD   12.5 mg at 08/04/22 2109    [Held by provider] aspirin EC tablet 81 mg  81 mg Oral Daily Marti Ortiz MD   81 mg at 08/04/22 2154    docusate sodium (COLACE) capsule 100 mg  100 mg Oral BID ISI Davies - CNP   100 mg at 08/04/22 0903    insulin lispro (HUMALOG) injection vial 0-4 Units  0-4 Units SubCUTAneous 4x Daily AC & HS Suzanne Medel MD   1 Units at 08/02/22 2248    polyethylene glycol (GLYCOLAX) packet 17 g  17 g Oral Daily ISI Davies - CNP   17 g at 08/04/22 0904    tacrolimus (PROGRAF) capsule 1 mg  1 mg Oral BID ISI Davies - CNP   1 mg at 08/04/22 2107    acetaminophen (TYLENOL) tablet 650 mg  650 mg Oral Q6H PRN Suzanne Medel MD   650 mg at 08/03/22 9914    Or    acetaminophen (TYLENOL) suppository 650 mg  650 mg Rectal Q6H PRN Suzanne Medel MD        fentaNYL bolus from bag  50 mcg IntraVENous Q30 Min PRN ISI Davies CNP        glucose chewable tablet 16 g  4 tablet Oral PRN ISI Davies - CNP        dextrose bolus 10% 125 mL  125 mL IntraVENous PRN ISI Davies CNP        Or    dextrose bolus 10% 250 mL  250 mL IntraVENous PRN ISI Davies - CNP        glucagon (rDNA) injection 1 mg  1 mg SubCUTAneous PRN ISI Davies - CNP        dextrose 10 % infusion   IntraVENous Continuous PRN ISI Davies CNP        heparin (porcine) injection 1,000 Units  1,000 Units IntraVENous PRN Sally Bernard MD           Allergies:  No Known Allergies    Problem List:    Patient Active Problem List   Diagnosis Code    Hypotension I95.9    ESRD (end stage renal disease) on dialysis ([facility] N18.6, Z99.2    Liver transplanted ([facility] Z94.4    Liver transplant recipient [facility]) Z94.4    Sepsis ([facility] A41.9    Gastroenteritis K52.9    C. difficile colitis A04.72    Severe sepsis ([facility] A41.9, R65.20    Vomiting and diarrhea R11.10, R19.7    Generalized abdominal pain R10.84    Acute renal failure (HCC) N17.9    Acute respiratory failure with hypoxia (HCC) J96.01    Pneumonia due to infectious organism J18.9    Pleural effusion J90    Impaired mobility and activities of daily living Z74.09, Z78.9    Dysphagia, oropharyngeal phase R13.12    Epistaxis R04.0       Past Medical History:        Diagnosis Date    [facility]) ([facility]     Hemodialysis patient ([facility]     Hepatitis     Hypertension     Liver transplanted ([facility] 2016       Past Surgical History:        Procedure Laterality Date    DIALYSIS FISTULA CREATION Right     IR MIDLINE CATH  2022    IR MIDLINE CATH 2022 [facility]Z SPECIAL PROCEDURE    LIVER TRANSPLANT  2017    TUNNELED VENOUS PORT PLACEMENT         Social History:    Social History     Tobacco Use    Smoking status: Former     Packs/day: 0.50     Years: 15.00     Pack years: 7.50     Types: Cigarettes     Quit date: 2017     Years since quittin.7    Smokeless tobacco: Never   Substance Use Topics    Alcohol use:  No                                Counseling given: Not Answered      Vital Signs (Current):   Vitals:    22 1208 22 1923 22 0734 22 1221   BP: 105/87 122/64 107/79 (!) 101/49   Pulse:  100 76 80   Resp: 18 18 18 18   Temp: 36.6 °C (97.8 °F) 36.3 °C (97.4 °F) 36.4 °C (97.5 °F) 37.2 °C (99 °F)   TempSrc: Oral Oral Oral Temporal   SpO2:  (!) 89% 94% 96%   Weight:    142 lb (64.4 kg)   Height:    5' 6\" (1.676 m)                                              BP Readings from Last 3 Encounters:   22 (!) 101/49   22 (!) 166/76   22 (!) 108/52       NPO Status: Time of last liquid consumption: 1700                        Time of last solid consumption: 1700                        Date of last liquid consumption: 22                        Date of last solid food consumption: 22    BMI:   Wt Readings from Last 3 Encounters:   08/05/22 142 lb (64.4 kg)   06/28/22 145 lb (65.8 kg)   03/18/20 145 lb (65.8 kg)     Body mass index is 22.92 kg/m².     CBC:   Lab Results   Component Value Date/Time    WBC 10.9 08/05/2022 05:07 AM    RBC 2.11 08/05/2022 05:07 AM    RBC 3.34 02/20/2012 05:15 AM    HGB 7.3 08/05/2022 09:08 AM    HCT 22.0 08/05/2022 09:08 AM    .7 08/05/2022 05:07 AM    RDW 14.2 08/05/2022 05:07 AM     08/05/2022 05:07 AM       CMP:   Lab Results   Component Value Date/Time     08/05/2022 05:07 AM    K 4.7 08/05/2022 05:07 AM    CL 89 08/05/2022 05:07 AM    CO2 25 08/05/2022 05:07 AM    BUN 31 08/05/2022 05:07 AM    CREATININE 4.94 08/05/2022 05:07 AM    GFRAA 14.2 08/05/2022 05:07 AM    LABGLOM 11.8 08/05/2022 05:07 AM    GLUCOSE 75 08/05/2022 05:07 AM    GLUCOSE 136 02/25/2012 05:10 PM    PROT 5.7 08/04/2022 03:24 PM    CALCIUM 8.6 08/05/2022 05:07 AM    BILITOT <0.2 08/04/2022 03:24 PM    ALKPHOS 67 08/04/2022 03:24 PM    AST 54 08/04/2022 03:24 PM    ALT 83 08/04/2022 03:24 PM       POC Tests:   Recent Labs     08/05/22  0755   POCGLU 77       Coags:   Lab Results   Component Value Date/Time    PROTIME 14.5 07/26/2022 03:58 PM    PROTIME 14.1 02/20/2012 05:15 AM    INR 1.1 07/26/2022 03:58 PM    APTT >150.0 07/26/2022 03:58 PM       HCG (If Applicable): No results found for: PREGTESTUR, PREGSERUM, HCG, HCGQUANT     ABGs:   Lab Results   Component Value Date/Time    PHART 7.381 07/28/2022 11:00 AM    PO2ART 98 07/28/2022 11:00 AM    BPL3CWK 47 07/28/2022 11:00 AM    FWC0RTC 28.0 07/28/2022 11:00 AM    BEART 3 07/28/2022 11:00 AM    Z6HSKQBT 97 07/28/2022 11:00 AM        Type & Screen (If Applicable):  No results found for: LABABO, LABRH    Drug/Infectious Status (If Applicable):  No results found for: HIV, HEPCAB    COVID-19 Screening (If Applicable):   Lab Results   Component Value Date/Time    COVID19 Not Detected 07/25/2022 11:34 AM           Anesthesia Evaluation  Patient summary reviewed and Nursing notes reviewed  Airway: Mallampati: III  TM distance: >3 FB   Neck ROM: full  Mouth opening: > = 3 FB   Dental:    (+) other      Pulmonary: breath sounds clear to auscultation            Patient did not smoke on day of surgery. Cardiovascular:  Exercise tolerance: no interval change,   (+) hypertension:,       NYHA Classification: IV    Rhythm: irregular  Rate: abnormal           Beta Blocker:  Not on Beta Blocker         Neuro/Psych:   Negative Neuro/Psych ROS              GI/Hepatic/Renal:   (+) hepatitis:, liver disease:, renal disease: dialysis,           Endo/Other: Negative Endo/Other ROS                    Abdominal:       Abdomen: soft. Vascular: negative vascular ROS. Other Findings:           Anesthesia Plan      MAC     ASA 4       Induction: intravenous. continuous noninvasive hemodynamic monitor    Anesthetic plan and risks discussed with patient. Plan discussed with attending.                     Channing Schmitt, APRN - CRNA   8/5/2022

## 2022-08-05 NOTE — PROGRESS NOTES
Nephrology Progress Note    Assessment:  ESRDX  Sepsis  Anemia  Hx Liver transplant      Plan: unable to access fistua today will try AM    Patient Active Problem List:     Hypotension     ESRD (end stage renal disease) on dialysis ([facility]     Liver transplanted [facility])     Liver transplant recipient ([facility]     Sepsis ([facility]     Gastroenteritis     C. difficile colitis     Severe sepsis ([facility]     Vomiting and diarrhea     Generalized abdominal pain     Acute renal failure (HCC)     Acute respiratory failure with hypoxia ([facility]     Pneumonia due to infectious organism     Pleural effusion     Impaired mobility and activities of daily living     Dysphagia, oropharyngeal phase     Epistaxis      Subjective:  Admit Date: 7/25/2022    Interval History: no issues    Medications:  Scheduled Meds:   pantoprazole (PROTONIX) 40 mg injection  40 mg IntraVENous Q12H    [Held by provider] enoxaparin  1 mg/kg SubCUTAneous Daily    fluticasone  1 spray Each Nostril Daily    sodium chloride flush  5-40 mL IntraVENous 2 times per day    midodrine  2.5 mg Oral TID WC    metoprolol tartrate  12.5 mg Oral BID    sulfamethoxazole-trimethoprim (BACTRIM) IVPB  7.5 mg/kg IntraVENous Daily    [Held by provider] aspirin  81 mg Oral Daily    docusate sodium  100 mg Oral BID    insulin lispro  0-4 Units SubCUTAneous 4x Daily AC & HS    polyethylene glycol  17 g Oral Daily    tacrolimus  1 mg Oral BID     Continuous Infusions:   sodium chloride      dextrose         CBC:   Recent Labs     08/04/22  0526 08/04/22  0909 08/05/22  0507 08/05/22  0908   WBC 10.1  --  10.9*  --    HGB 7.9*   < > 7.3* 7.3*     --  247  --     < > = values in this interval not displayed.      CMP:    Recent Labs     08/03/22  0524 08/04/22  0526 08/05/22  0507   * 132* 130*   K 4.0 3.7 4.7   CL 88* 90* 89*   CO2 26 29 25   BUN 39* 24* 31*   CREATININE 5.19* 3.93* 4.94*   GLUCOSE 81 100* 75   CALCIUM 8.6 8.7 8.6   LABGLOM 11.1* 15.3* 11.8*     Troponin: No results for input(s): TROPONINI in the last 72 hours. BNP: No results for input(s): BNP in the last 72 hours. INR: No results for input(s): INR in the last 72 hours. Lipids: No results for input(s): CHOL, LDLDIRECT, TRIG, HDL, AMYLASE, LIPASE in the last 72 hours. Liver:   Recent Labs     08/04/22  1524 08/05/22  0507   AST 54*  --    ALT 83*  --    ALKPHOS 67  --    PROT 5.7*  --    LABALBU 2.5* 2.7*   BILITOT <0.2  --      Iron:  No results for input(s): IRONS, FERRITIN in the last 72 hours. Invalid input(s): LABIRONS  Urinalysis: No results for input(s): UA in the last 72 hours.     Objective:  Vitals: /64   Pulse 100   Temp 97.4 °F (36.3 °C) (Oral)   Resp 18   Ht 5' 6\" (1.676 m)   Wt 142 lb 6.7 oz (64.6 kg)   SpO2 (!) 89%   BMI 22.99 kg/m²    Wt Readings from Last 3 Encounters:   08/01/22 142 lb 6.7 oz (64.6 kg)   06/28/22 145 lb (65.8 kg)   03/18/20 145 lb (65.8 kg)      24HR INTAKE/OUTPUT:  No intake or output data in the 24 hours ending 08/05/22 0922    General: alert, in no apparent distress  HEENT: normocephalic, atraumatic, anicteric  Neck: supple, no mass  Lungs: non-labored respirations, clear to auscultation bilaterally  Heart: regular rate and rhythm, no murmurs or rubs  Abdomen: soft, non-tender, non-distended  Ext: no cyanosis, no peripheral edema  Neuro: alert and oriented, no gross abnormalities  Psych: normal mood and affect  Skin: no rash      Electronically signed by Yash Mendez DO, MD

## 2022-08-06 LAB
ALBUMIN SERPL-MCNC: 3.1 G/DL (ref 3.5–4.6)
ALBUMIN SERPL-MCNC: 3.2 G/DL (ref 3.5–4.6)
ANION GAP SERPL CALCULATED.3IONS-SCNC: 14 MEQ/L (ref 9–15)
ANION GAP SERPL CALCULATED.3IONS-SCNC: 15 MEQ/L (ref 9–15)
BASOPHILS ABSOLUTE: 0 K/UL (ref 0–0.2)
BASOPHILS RELATIVE PERCENT: 0.3 %
BUN BLDV-MCNC: 38 MG/DL (ref 8–23)
BUN BLDV-MCNC: 40 MG/DL (ref 8–23)
CALCIUM SERPL-MCNC: 8.5 MG/DL (ref 8.5–9.9)
CALCIUM SERPL-MCNC: 8.8 MG/DL (ref 8.5–9.9)
CHLORIDE BLD-SCNC: 85 MEQ/L (ref 95–107)
CHLORIDE BLD-SCNC: 86 MEQ/L (ref 95–107)
CO2: 26 MEQ/L (ref 20–31)
CO2: 27 MEQ/L (ref 20–31)
CREAT SERPL-MCNC: 5.48 MG/DL (ref 0.7–1.2)
CREAT SERPL-MCNC: 5.62 MG/DL (ref 0.7–1.2)
EOSINOPHILS ABSOLUTE: 0.1 K/UL (ref 0–0.7)
EOSINOPHILS RELATIVE PERCENT: 0.6 %
GFR AFRICAN AMERICAN: 12.3
GFR AFRICAN AMERICAN: 12.6
GFR NON-AFRICAN AMERICAN: 10.1
GFR NON-AFRICAN AMERICAN: 10.4
GLUCOSE BLD-MCNC: 56 MG/DL (ref 70–99)
GLUCOSE BLD-MCNC: 60 MG/DL (ref 70–99)
GLUCOSE BLD-MCNC: 66 MG/DL (ref 70–99)
GLUCOSE BLD-MCNC: 71 MG/DL (ref 70–99)
GLUCOSE BLD-MCNC: 74 MG/DL (ref 70–99)
GLUCOSE BLD-MCNC: 75 MG/DL (ref 70–99)
GLUCOSE BLD-MCNC: 79 MG/DL (ref 70–99)
HCT VFR BLD CALC: 27 % (ref 42–52)
HCT VFR BLD CALC: 27.9 % (ref 42–52)
HCT VFR BLD CALC: 28 % (ref 42–52)
HEMOGLOBIN: 9 G/DL (ref 14–18)
HEMOGLOBIN: 9.1 G/DL (ref 14–18)
HEMOGLOBIN: 9.3 G/DL (ref 14–18)
LYMPHOCYTES ABSOLUTE: 0.4 K/UL (ref 1–4.8)
LYMPHOCYTES RELATIVE PERCENT: 2.7 %
MCH RBC QN AUTO: 32.7 PG (ref 27–31.3)
MCHC RBC AUTO-ENTMCNC: 32.7 % (ref 33–37)
MCV RBC AUTO: 99.8 FL (ref 80–100)
MONOCYTES ABSOLUTE: 0.9 K/UL (ref 0.2–0.8)
MONOCYTES RELATIVE PERCENT: 6.8 %
NEUTROPHILS ABSOLUTE: 11.7 K/UL (ref 1.4–6.5)
NEUTROPHILS RELATIVE PERCENT: 89.6 %
PDW BLD-RTO: 16.6 % (ref 11.5–14.5)
PERFORMED ON: ABNORMAL
PERFORMED ON: NORMAL
PHOSPHORUS: 5.2 MG/DL (ref 2.3–4.8)
PHOSPHORUS: 5.2 MG/DL (ref 2.3–4.8)
PLATELET # BLD: 265 K/UL (ref 130–400)
POTASSIUM SERPL-SCNC: 4.4 MEQ/L (ref 3.4–4.9)
POTASSIUM SERPL-SCNC: 4.6 MEQ/L (ref 3.4–4.9)
RBC # BLD: 2.8 M/UL (ref 4.7–6.1)
SODIUM BLD-SCNC: 126 MEQ/L (ref 135–144)
SODIUM BLD-SCNC: 127 MEQ/L (ref 135–144)
WBC # BLD: 13.1 K/UL (ref 4.8–10.8)

## 2022-08-06 PROCEDURE — 99232 SBSQ HOSP IP/OBS MODERATE 35: CPT | Performed by: PHYSICAL MEDICINE & REHABILITATION

## 2022-08-06 PROCEDURE — C9113 INJ PANTOPRAZOLE SODIUM, VIA: HCPCS | Performed by: INTERNAL MEDICINE

## 2022-08-06 PROCEDURE — 2700000000 HC OXYGEN THERAPY PER DAY

## 2022-08-06 PROCEDURE — 2580000003 HC RX 258: Performed by: INTERNAL MEDICINE

## 2022-08-06 PROCEDURE — 6360000002 HC RX W HCPCS: Performed by: INTERNAL MEDICINE

## 2022-08-06 PROCEDURE — 90935 HEMODIALYSIS ONE EVALUATION: CPT

## 2022-08-06 PROCEDURE — 92610 EVALUATE SWALLOWING FUNCTION: CPT

## 2022-08-06 PROCEDURE — 6360000002 HC RX W HCPCS: Performed by: NURSE PRACTITIONER

## 2022-08-06 PROCEDURE — 6370000000 HC RX 637 (ALT 250 FOR IP): Performed by: INTERNAL MEDICINE

## 2022-08-06 PROCEDURE — 36415 COLL VENOUS BLD VENIPUNCTURE: CPT

## 2022-08-06 PROCEDURE — 1210000000 HC MED SURG R&B

## 2022-08-06 PROCEDURE — 6370000000 HC RX 637 (ALT 250 FOR IP): Performed by: PHYSICAL MEDICINE & REHABILITATION

## 2022-08-06 PROCEDURE — 99232 SBSQ HOSP IP/OBS MODERATE 35: CPT | Performed by: INTERNAL MEDICINE

## 2022-08-06 PROCEDURE — 85014 HEMATOCRIT: CPT

## 2022-08-06 PROCEDURE — 6370000000 HC RX 637 (ALT 250 FOR IP): Performed by: NURSE PRACTITIONER

## 2022-08-06 PROCEDURE — A4216 STERILE WATER/SALINE, 10 ML: HCPCS | Performed by: INTERNAL MEDICINE

## 2022-08-06 PROCEDURE — 2580000003 HC RX 258

## 2022-08-06 PROCEDURE — 80069 RENAL FUNCTION PANEL: CPT

## 2022-08-06 PROCEDURE — 2580000003 HC RX 258: Performed by: NURSE PRACTITIONER

## 2022-08-06 PROCEDURE — 85018 HEMOGLOBIN: CPT

## 2022-08-06 PROCEDURE — 99233 SBSQ HOSP IP/OBS HIGH 50: CPT | Performed by: NURSE PRACTITIONER

## 2022-08-06 PROCEDURE — 99233 SBSQ HOSP IP/OBS HIGH 50: CPT | Performed by: INTERNAL MEDICINE

## 2022-08-06 RX ORDER — CHLORHEXIDINE GLUCONATE 0.12 MG/ML
15 RINSE ORAL 2 TIMES DAILY
Status: DISCONTINUED | OUTPATIENT
Start: 2022-08-06 | End: 2022-08-19 | Stop reason: HOSPADM

## 2022-08-06 RX ORDER — SODIUM CHLORIDE, SODIUM LACTATE, POTASSIUM CHLORIDE, AND CALCIUM CHLORIDE .6; .31; .03; .02 G/100ML; G/100ML; G/100ML; G/100ML
500 INJECTION, SOLUTION INTRAVENOUS ONCE
Status: COMPLETED | OUTPATIENT
Start: 2022-08-06 | End: 2022-08-06

## 2022-08-06 RX ORDER — SODIUM CHLORIDE, SODIUM LACTATE, POTASSIUM CHLORIDE, CALCIUM CHLORIDE 600; 310; 30; 20 MG/100ML; MG/100ML; MG/100ML; MG/100ML
INJECTION, SOLUTION INTRAVENOUS
Status: COMPLETED
Start: 2022-08-06 | End: 2022-08-06

## 2022-08-06 RX ORDER — 0.9 % SODIUM CHLORIDE 0.9 %
500 INTRAVENOUS SOLUTION INTRAVENOUS ONCE
Status: COMPLETED | OUTPATIENT
Start: 2022-08-06 | End: 2022-08-06

## 2022-08-06 RX ADMIN — DEXTROSE MONOHYDRATE 125 ML: 100 INJECTION, SOLUTION INTRAVENOUS at 05:57

## 2022-08-06 RX ADMIN — Medication 10 ML: at 21:13

## 2022-08-06 RX ADMIN — Medication 10 ML: at 11:49

## 2022-08-06 RX ADMIN — 0.12% CHLORHEXIDINE GLUCONATE 15 ML: 1.2 RINSE ORAL at 21:13

## 2022-08-06 RX ADMIN — SODIUM CHLORIDE, PRESERVATIVE FREE 40 MG: 5 INJECTION INTRAVENOUS at 11:41

## 2022-08-06 RX ADMIN — SODIUM CHLORIDE, SODIUM LACTATE, POTASSIUM CHLORIDE, AND CALCIUM CHLORIDE 500 ML: .6; .31; .03; .02 INJECTION, SOLUTION INTRAVENOUS at 22:30

## 2022-08-06 RX ADMIN — SODIUM CHLORIDE, POTASSIUM CHLORIDE, SODIUM LACTATE AND CALCIUM CHLORIDE 500 ML: 600; 310; 30; 20 INJECTION, SOLUTION INTRAVENOUS at 22:30

## 2022-08-06 RX ADMIN — DOCUSATE SODIUM 100 MG: 100 CAPSULE, LIQUID FILLED ORAL at 21:13

## 2022-08-06 RX ADMIN — SODIUM CHLORIDE 500 ML: 9 INJECTION, SOLUTION INTRAVENOUS at 13:02

## 2022-08-06 RX ADMIN — TACROLIMUS 1 MG: 1 CAPSULE ORAL at 11:41

## 2022-08-06 RX ADMIN — TACROLIMUS 1 MG: 1 CAPSULE ORAL at 21:13

## 2022-08-06 RX ADMIN — MIDODRINE HYDROCHLORIDE 2.5 MG: 5 TABLET ORAL at 09:13

## 2022-08-06 RX ADMIN — SODIUM CHLORIDE, PRESERVATIVE FREE 40 MG: 5 INJECTION INTRAVENOUS at 21:13

## 2022-08-06 RX ADMIN — MIDODRINE HYDROCHLORIDE 2.5 MG: 5 TABLET ORAL at 11:41

## 2022-08-06 ASSESSMENT — PAIN SCALES - GENERAL
PAINLEVEL_OUTOF10: 0

## 2022-08-06 ASSESSMENT — ENCOUNTER SYMPTOMS
GASTROINTESTINAL NEGATIVE: 1
SHORTNESS OF BREATH: 0
COUGH: 0
RESPIRATORY NEGATIVE: 1

## 2022-08-06 NOTE — PROGRESS NOTES
Infectious Disease     Patient Name: Ana Last  Date: 8/6/2022  YOB: 1955  Medical Record Number: [id number]                  Pneumonia    Stenotrophomonas recovered from bronchoscopy  No significant number of neutrophils were seen however    Pleural fluid exudative but culture negative      No fevers chills sweats  Nosebleed controlled        Review of Systems   Constitutional:  Negative for chills, diaphoresis and fatigue. Respiratory: Negative. Negative for cough and shortness of breath. Cardiovascular: Negative. Gastrointestinal: Negative. Physical Exam  Cardiovascular:      Heart sounds: Normal heart sounds. No murmur heard. Pulmonary:      Effort: Pulmonary effort is normal. No respiratory distress. Breath sounds: Normal breath sounds. No wheezing, rhonchi or rales. Abdominal:      General: Abdomen is flat. Bowel sounds are normal. There is no distension. Palpations: There is no mass. Tenderness: There is no abdominal tenderness. There is no guarding. Blood pressure 124/60, pulse 85, temperature 97.6 °F (36.4 °C), temperature source Axillary, resp. rate 25, height 5' 6\" (1.676 m), weight 142 lb (64.4 kg), SpO2 95 %.       .   Lab Results   Component Value Date    WBC 10.9 (H) 08/05/2022    HGB 9.0 (L) 08/06/2022    HCT 27.0 (L) 08/06/2022    .7 (H) 08/05/2022     08/05/2022     Lab Results   Component Value Date/Time     08/05/2022 05:07 AM    K 4.7 08/05/2022 05:07 AM    CL 89 08/05/2022 05:07 AM    CO2 25 08/05/2022 05:07 AM    BUN 31 08/05/2022 05:07 AM    CREATININE 4.94 08/05/2022 05:07 AM    GLUCOSE 75 08/05/2022 05:07 AM    GLUCOSE 136 02/25/2012 05:10 PM    CALCIUM 8.6 08/05/2022 05:07 AM          Chest x-ray shows right effusion much improved      ASSESSMENT:  PLAN:  Pneumonia  Stenotrophomonas from sputum  Completed Bactrim

## 2022-08-06 NOTE — PROGRESS NOTES
[facility]   Facility/Department: [facility]  Speech Language Pathology    Cesar Lloyd  : 1955  ROOM: [facility]/[id number]      DATE: 2022      This patient was being seen by Speech Therapy for:  Dysphagia Treatment    However, due to this patient's change in medical status, Speech Therapy will require new orders to continue to follow the patient. Please re-order, as needed. Discussed with Blane Duque RN.       Thank you,  Isaiah Mix SLP, CCC-SLP, Date: 2022, Time: 8:27 AM

## 2022-08-06 NOTE — PROGRESS NOTES
Shift Summary     Patient had uneventful night patient received 2 units of blood. Tolerated well. H/H drawn 1 hour after blood finished. Am labs drawn per midline. Brisk blood return. Patient is stating that the Nasal packing is very uncomfortable for him and he is having trouble breathing. Patient stated this multiple times during shift. Reassurance given and at 65 am Dr Shannon Mejia spoke with patient and reinforced need to keep packing in place and patient is doing well. See flow sheets for assessment and MAR for meds. SR up times 3 bed alarm on . Patient had 2 black tarry stools during the night.  Complete linen change and partial baths given zinc ointment applied to buttock

## 2022-08-06 NOTE — PROGRESS NOTES
Pulmonary & Critical Care Medicine ICU Progress Note    Subjective:     No overnight events reported. He states he did not rest well secondary to nasal packing. He states the nasal packing was placed yesterday. He states that this is \"annoying\". He does have a difficult time resting secondary to the packing. He did ask when this is coming out. EXAM:  General: Resting comfortably in bed, no acute distress  HEENT: Normocephalic, nasal packing in place  Lungs : Clear to auscultation anteriorly.   Decreased breath sounds at the bases posteriorly, no respiratory distress, no increased work of breathing  Heart: Regular rate and rhythm  ABD: Soft, positive bowel sounds, nontender to palpation  Extremities : Warm, dry, no edema noted in lower extremities, edema noted in upper extremities  Neuro: Awake, alert, oriented x4, moves all 4 extremities continuously  Skin: No rashes      IV:   sodium chloride      sodium chloride      sodium chloride      sodium chloride      sodium chloride      dextrose         Vitals:  BP 99/81   Pulse 79   Temp 97.6 °F (36.4 °C) (Axillary)   Resp 23   Ht 5' 6\" (1.676 m)   Wt 142 lb (64.4 kg)   SpO2 (!) 89%   BMI 22.92 kg/m²          Intake/Output Summary (Last 24 hours) at 8/6/2022 0718  Last data filed at 8/5/2022 2339  Gross per 24 hour   Intake 867.92 ml   Output --   Net 867.92 ml       Medications:  Scheduled Meds:   sodium chloride flush  5-40 mL IntraVENous 2 times per day    pantoprazole (PROTONIX) 40 mg injection  40 mg IntraVENous Q12H    [Held by provider] enoxaparin  1 mg/kg SubCUTAneous Daily    fluticasone  1 spray Each Nostril Daily    sodium chloride flush  5-40 mL IntraVENous 2 times per day    midodrine  2.5 mg Oral TID WC    metoprolol tartrate  12.5 mg Oral BID    [Held by provider] aspirin  81 mg Oral Daily    docusate sodium  100 mg Oral BID    insulin lispro  0-4 Units SubCUTAneous 4x Daily AC & HS    polyethylene glycol  17 g Oral Daily    tacrolimus  1 mg Oral BID       Labs:   CBC:   Recent Labs     08/04/22  0526 08/04/22  0909 08/05/22  0507 08/05/22  0908 08/06/22  0030   WBC 10.1  --  10.9*  --   --    HGB 7.9*   < > 7.3* 7.3* 9.0*   HCT 23.4*   < > 22.1* 22.0* 27.0*   .9*  --  104.7*  --   --      --  247  --   --     < > = values in this interval not displayed. BMP:   Recent Labs     08/04/22  0526 08/05/22  0507   * 130*   K 3.7 4.7   CL 90* 89*   CO2 29 25   PHOS 4.2 4.5   BUN 24* 31*   CREATININE 3.93* 4.94*     LIVER PROFILE:   Recent Labs     08/04/22  1524   AST 54*   ALT 83*   BILIDIR <0.2   BILITOT <0.2   ALKPHOS 67     PT/INR:   Recent Labs     08/05/22  1500   PROTIME 16.8*   INR 1.4     APTT: No results for input(s): APTT in the last 72 hours. UA:No results for input(s): NITRITE, COLORU, PHUR, LABCAST, WBCUA, RBCUA, MUCUS, TRICHOMONAS, YEAST, BACTERIA, CLARITYU, SPECGRAV, LEUKOCYTESUR, UROBILINOGEN, BILIRUBINUR, BLOODU, GLUCOSEU, AMORPHOUS in the last 72 hours. Invalid input(s): Ammy Joseph    Radiology:    CXR: 2-view: Results for orders placed during the hospital encounter of 06/28/22    XR CHEST (2 VW)    Narrative  EXAMINATION:  CHEST. CLINICAL HISTORY:  SHORTNESS OF BREATH.    COMPARISONS:  11/4/2017. TECHNIQUE:  PA and lateral views. FINDINGS:  Heart size and contour are within normal limits. Pulmonary vascularity appears normal. There are small effusions bilaterally. There is a suggestion of small interstitial infiltrates. No definite evidence of a mass or adenopathy. No significant  bony abnormality. Impression  POSSIBLE SLIGHT INTERSTITIAL PNEUMONIA BOTH LOWER LUNGS. SMALL PLEURAL EFFUSIONS BILATERALLY. Results for orders placed during the hospital encounter of 10/31/17    XR CHEST STANDARD (2 VW)    Narrative  XR CHEST STANDARD TWO VW: 11/4/2017    CLINICAL HISTORY:  Pneumonia . COMPARISON: Portable chest 5/20/2015 and 2 view chest 12/23/2013.     A portable upright AP radiograph of the chest was obtained. FINDINGS:    A right internal jugular approach hemodialysis catheter has been placed since the prior study with its tip within the right atrium. There is no developing pulmonary infiltrate, cardiomegaly, pleural effusion, significant vascular congestion, pneumothorax, or acute fractures identified. A moderate compression fracture T9 appears old. The cardiac and mediastinal silhouettes appear within normal limits for technique. Impression  NO EVIDENCE OF ACTIVE CARDIOPULMONARY DISEASE. Portable: Results for orders placed during the hospital encounter of 07/25/22    XR CHEST PORTABLE    Narrative  XR CHEST PORTABLE    COMPARISON: July 25, 2022. HISTORY: resp failure    TECHNIQUE: AP view      FINDINGS:  Endotracheal tube again seen in place. . There is also an enteric tube seen in place and the tip is below the diaphragm. They appear unchanged in position. A small pleural effusion seen on the right. The left costophrenic angle is not well seen. The lungs are hyperinflated and there is coarsening of the interstitium. Atelectasis and/or scarring is again seen bilaterally in the lower lung fields. The  cardiac silhouette appears mildly enlarged but may be accentuated by the portable technique. Degenerative changes are seen in the visualized portion of the right shoulder. Impression  The airspace disease has diminished when compared to previous study. . A small pleural effusion is seen on the right and scarring or atelectasis is again seen bilaterally in the bases. US DUP UPPER EXTREMITY RIGHT VENOUS    COMPARISON:    HISTORY:  resp failure    TECHNIQUE: , US DUP UPPER EXTREMITY RIGHT VENOUS AND LEFT VENOUS    FINDINGS: Thrombus is seen in the right internal jugular and proximal subclavian, veins it is also seen in the cephalic vein. The right ulnar and basilic veins are not visualized. A right AV fistula seen. Thrombus is seen in the left internal jugular vein.  The left basilic and axillary veins are not visualized. IMPRESSION: Deep venous thrombosis seen in the right and left upper extremities. .      Assessment/Plan:    Acute hypoxic respiratory failure, resolved-he does remain on oxygen at this point. He is currently on a Ventimask given that he does have nasal packing in place. His oxygen can be weaned down as able. He is in no respiratory distress at this time. Pneumonia-infectious disease does continue to follow. He was found to have stenotrophomonas in his bronchoscopy fluid. He completed a course of Bactrim. Epistaxis-patient did have nasal packing placed yesterday by ENT per his report. Continue with management of nasal packing per ENT. Left lower extremity DVT-his anticoagulation remains on hold secondary to epistaxis and GI bleed. He did undergo IVC filter placement yesterday. Duodenal ulcer-he did undergo EGD on 8/5 with clipping of the duodenal ulcer. Continue with management per gastroenterology. His anticoagulation remains on hold. He is currently receiving twice daily PPI. Nutrition: Oral diet as tolerated    Prophylaxis: Mechanical DVT prophylaxis given epistaxis and GI bleed, Protonix for GI treatment/prophylaxis    Code Status: Full code    Patient had been transferred back to the ICU after his procedures yesterday for closer monitoring. He is currently doing well and can transfer back out to the medical floor from a critical care standpoint. Pulmonary will continue to follow while on the floor as they had been previously following him. Electronically signed by Raoul Bravo DO on 8/6/2022 at 7:18 AM      __________________________________________________________________        Addendum: Patient did receive hemodialysis today with 1.5 L removed per report of the bedside staff. His heart rate has increased steadily since saw him this morning. He is now in the 110s. His blood pressure is also somewhat variable.   At this time I will give him 500 mL of fluid back over a few hours to see if this helps with his blood pressure as well as his heart rate. Patient is without any other complaints at this time. He is awake, alert, and oriented x3.

## 2022-08-06 NOTE — PROGRESS NOTES
26   BUN 24* 31* 40*   CREATININE 3.93* 4.94* 5.62*   GLUCOSE 100* 75 66*   CALCIUM 8.7 8.6 8.5   LABGLOM 15.3* 11.8* 10.1*     Troponin: No results for input(s): TROPONINI in the last 72 hours. BNP: No results for input(s): BNP in the last 72 hours. INR:   Recent Labs     08/05/22  1500   INR 1.4     Lipids: No results for input(s): CHOL, LDLDIRECT, TRIG, HDL, AMYLASE, LIPASE in the last 72 hours. Liver:   Recent Labs     08/04/22  1524 08/05/22  0507 08/06/22  0600   AST 54*  --   --    ALT 83*  --   --    ALKPHOS 67  --   --    PROT 5.7*  --   --    LABALBU 2.5*   < > 3.2*   BILITOT <0.2  --   --     < > = values in this interval not displayed. Iron:  No results for input(s): IRONS, FERRITIN in the last 72 hours. Invalid input(s): LABIRONS  Urinalysis: No results for input(s): UA in the last 72 hours.     Objective:  Vitals: /60   Pulse 85   Temp 97.6 °F (36.4 °C) (Axillary)   Resp 25   Ht 5' 6\" (1.676 m)   Wt 142 lb (64.4 kg)   SpO2 95%   BMI 22.92 kg/m²    Wt Readings from Last 3 Encounters:   08/05/22 142 lb (64.4 kg)   06/28/22 145 lb (65.8 kg)   03/18/20 145 lb (65.8 kg)      24HR INTAKE/OUTPUT:    Intake/Output Summary (Last 24 hours) at 8/6/2022 2450  Last data filed at 8/5/2022 2339  Gross per 24 hour   Intake 867.92 ml   Output --   Net 867.92 ml       General: alert, in no apparent distress  HEENT: normocephalic, atraumatic, anicteric  Neck: supple, no mass  Lungs: non-labored respirations, clear to auscultation bilaterally  Heart: regular rate and rhythm, no murmurs or rubs  Abdomen: soft, non-tender, non-distended  Ext: no cyanosis, no peripheral edema  Neuro: alert and oriented, no gross abnormalities  Psych: normal mood and affect  Skin: no rash      Electronically signed by Carly Forman DO, MD

## 2022-08-06 NOTE — PROGRESS NOTES
Critical Care Registered Nurse Shift Summary  22   Patient Name: Floyd Desai  Attending Provider: Crista Frausto MD      Admit Date: 2022  MRN: [id number]                           : 1955  Full Code  Report received from Marge AVALOS at bedside, I assumed care at [facility]    Last set of vitals:  Enc Vitals  BP: (!) 149/89 (22 1801)  Heart Rate: (!) 113 (22 180)  Resp: 20 (22 1245)  Temp: 98.1 °F (36.7 °C) (22)  Temp Source: Axillary (22 0400)  SpO2: 95 % (22)  Weight: 138 lb 7.2 oz (62.8 kg) (22 115)  Height: 5' 6\" (167.6 cm) (22 1221)     I/O    Intake/Output Summary (Last 24 hours) at 2022  Last data filed at 2022 1152  Gross per 24 hour   Intake 917.92 ml   Output 1900 ml   Net -982.08 ml     No U/O - Dialyzed today  Positive BM x2 this shift, last bm was 22 - Melanic and tarry    GTT's   sodium chloride      sodium chloride      sodium chloride      sodium chloride      sodium chloride      dextrose          Vent/o2    At start of shift  At handoff   6L NC -> 6L NC      LDA  Midline R Upper Arm  Peripheral IV R Shoulder 20g   AV Fistula LUE    Labs:    Lab Results   Component Value Date/Time     2022 06:00 AM    K 4.6 2022 06:00 AM    CL 86 2022 06:00 AM    CO2 26 2022 06:00 AM    BUN 40 2022 06:00 AM    CREATININE 5.62 2022 06:00 AM    GLUCOSE 66 2022 06:00 AM    GLUCOSE 136 2012 05:10 PM    CALCIUM 8.5 2022 06:00 AM      Lab Results   Component Value Date    WBC 10.9 (H) 2022    HGB 9.3 (L) 2022    HCT 28.0 (L) 2022    .7 (H) 2022     2022   Review/Comments:  Pt verbalizing heightened discomfort secondary to L nare nasal packing. Dr. Eliana Palomino notified and he advised to remove packing from L Nare, removed with no complications.  Pt challenging to get a blood pressure on due to disseminated thrombus contraindicating NIBP in the LLE and RUE as well as an AV fistula in the LUE. Additionally, extremities so edematous that the pulse oximeter was difficult to get a reading. However, the patient was alert, oriented and denied discomfort or any difficulty breathing and was happily watching TV and feeding himself clear liquids. Dr. Pedro Luis El notified of inability to measure blood pressure, but given the circumstances there are limited options. Patient had two additional melanic stools for us while in ICU. Pt said that he was picking at his L Nare this evening, leading it to begin bleeding again. Dr. Hanna Hernandez notified and a dry dressing was applied. Pt received dialysis treatment today with 1.5L removed per dialysis RN.      Report given to Alexandria Ingram, handoff of care complete at [facility], RN

## 2022-08-06 NOTE — PROGRESS NOTES
Pt from 2W, brought down to GI center for EGD. In the process, a large duodenal ulcer was identified and 5 clips were placed. Pt transferred to ICU for 2 units of RBC's to be transfused as well as monitoring. Upon arrival, the patient is pleasant and conversing with staff, verbalizes no concerns except for discomfort related to nasal packing. Pt transferred off unit to cath lab for IVC filter placement, return to unit with no changes.  Blood transfusion initiated after return to unit

## 2022-08-06 NOTE — PROGRESS NOTES
Internal Medicine   Hospitalist   Progress Note    2022   4:52 PM    Name:  Placido Gómez  MRN:    [id number]     IP Day: 12     Admit Date: 2022 11:15 AM  PCP: Alejandro Borrego MD    Code Status:  Full Code    Assessment and Plan: Active Problems/ diagnosis:     Acute hypoxic respiratory failure in the setting of aspiration pneumonia-required intubation, extubated now on nasal cannula  Sepsis present admission  End-stage renal disease  Hypotension in the setting of dialysis-on midodrine  Bilateral upper and lower extremity acute DVT on Eliquis  Anemia  History of liver transplant    Plan  Patient is having more melena and epistaxis. He does not feel like epistaxis is going on in the back of his throat, seems more anterior, discussed with ENT who will be packing his nose today, discussed with GI for possibly obtain an EGD, resume PPI. Monitor H&H. Will hold Eliquis and use Lovenox therapeutic dose for now, hold Lovenox dose this morning until determination for EGD is made. Continue to monitor medical floor  Antibiotic as per infectious disease. Currently on IV Bactrim. Zosyn stopped   HD as per nephrology  Home medication resumed  Resume immunosuppressants for history of liver transplant, Prograf  Resume PT/OT  DVT/PUD PPx     8/6 - h/h improved. Off a/c due to duodenal ulcer. D/W GI, concerns for need for transfer to tertiary care center. Will d/w patient and sister. Patient transferring to floor. 7 pm- 7 am, please contact on call Hospitalist for any needs     Dispo-dc soon if Hb stabilizes       Subjective:      no new event.       Physical Examination:      Vitals:  BP (!) 107/46   Pulse (!) 115   Temp 97.4 °F (36.3 °C)   Resp 20   Ht 5' 6\" (1.676 m)   Wt 138 lb 7.2 oz (62.8 kg)   SpO2 (!) 85%   BMI 22.35 kg/m²   Temp (24hrs), Av.5 °F (36.4 °C), Min:96.6 °F (35.9 °C), Max:97.7 °F (36.5 °C)      General appearance: alert, cooperative and no distress  Mental Status: oriented to person, place and time and normal affect  Lungs: clear to auscultation bilaterally, normal effort  Heart: regular rate and rhythm, no murmur  Abdomen: soft, nontender, nondistended, bowel sounds present, no masses  Extremities: + upper and lower edema, pulses intact   Skin multiple area of bruising related to IV access in upper and lower extremities    Data:     Labs:  Recent Labs     08/04/22  0526 08/04/22  0909 08/05/22  0507 08/05/22  0908 08/06/22  0030 08/06/22  1200   WBC 10.1  --  10.9*  --   --   --    HGB 7.9*   < > 7.3*   < > 9.0* 9.3*     --  247  --   --   --     < > = values in this interval not displayed.      Recent Labs     08/05/22  0507 08/06/22  0600   * 127*   K 4.7 4.6   CL 89* 86*   CO2 25 26   BUN 31* 40*   CREATININE 4.94* 5.62*   GLUCOSE 75 66*     Recent Labs     08/04/22  1524   AST 54*   ALT 83*   BILITOT <0.2   ALKPHOS 67       Current Facility-Administered Medications   Medication Dose Route Frequency Provider Last Rate Last Admin    chlorhexidine (PERIDEX) 0.12 % solution 15 mL  15 mL Mouth/Throat BID Lilliam Scullin, DO        0.9 % sodium chloride infusion   IntraVENous PRN Pitney David, DO        0.9 % sodium chloride infusion   IntraVENous Continuous Pitney David, DO        0.9 % sodium chloride infusion   IntraVENous Continuous Alba Rubin MD        sodium chloride flush 0.9 % injection 5-40 mL  5-40 mL IntraVENous 2 times per day Alba Rubin MD   10 mL at 08/06/22 1149    sodium chloride flush 0.9 % injection 5-40 mL  5-40 mL IntraVENous PRN Alba Rubin MD        0.9 % sodium chloride infusion   IntraVENous PRN Alba Rubin MD        acetaminophen (TYLENOL) tablet 650 mg  650 mg Oral Q4H PRN Alba Rubin MD        [facility]ron [facility]) injection 4 mg  4 mg IntraVENous Q6H PRN Alba Rubin MD        hydrALAZINE (APRESOLINE) injection 10 mg  10 mg IntraVENous Q10 Min PRN Alba Rubin MD        labetalol (NORMODYNE;TRANDATE) injection 10 mg  10 mg IntraVENous Q30 Min PRN Chad Godwin MD        pantoprazole (PROTONIX) 40 mg in sodium chloride (PF) 10 mL injection  40 mg IntraVENous Q12H Marlane Avers, DO   40 mg at 08/06/22 1141    [Held by provider] enoxaparin (LOVENOX) injection 60 mg  1 mg/kg SubCUTAneous Daily Carmella Deckerin, DO        fluticasone (FLONASE) 50 MCG/ACT nasal spray 1 spray  1 spray Each Nostril Daily Vermaverick Morales, DO   1 spray at 08/04/22 0902    sodium chloride flush 0.9 % injection 5-40 mL  5-40 mL IntraVENous 2 times per day Marlane Avers, DO   10 mL at 08/06/22 1149    sodium chloride flush 0.9 % injection 5-40 mL  5-40 mL IntraVENous PRN Marlane Avers, DO        0.9 % sodium chloride infusion   IntraVENous PRN Carol Deckerin, DO        midodrine (PROAMATINE) tablet 2.5 mg  2.5 mg Oral TID WC Chad Godwin MD   2.5 mg at 08/06/22 1141    metoprolol tartrate (LOPRESSOR) tablet 12.5 mg  12.5 mg Oral BID Chad Godwin MD   12.5 mg at 08/05/22 2030    [Held by provider] aspirin EC tablet 81 mg  81 mg Oral Daily Chad Godwin MD   81 mg at 08/04/22 8971    docusate sodium (COLACE) capsule 100 mg  100 mg Oral BID ISI Alcantara CNP   100 mg at 08/05/22 2030    insulin lispro (HUMALOG) injection vial 0-4 Units  0-4 Units SubCUTAneous 4x Daily AC & HS Severo Seltzer, MD   1 Units at 08/02/22 2248    polyethylene glycol (GLYCOLAX) packet 17 g  17 g Oral Daily ISI Alcantara CNP   17 g at 08/04/22 0904    tacrolimus (PROGRAF) capsule 1 mg  1 mg Oral BID ISI Alcantara CNP   1 mg at 08/06/22 1141    acetaminophen (TYLENOL) tablet 650 mg  650 mg Oral Q6H PRN Severo Seltzer, MD   650 mg at 08/03/22 2896    Or    acetaminophen (TYLENOL) suppository 650 mg  650 mg Rectal Q6H PRN Severo Seltzer, MD        fentaNYL bolus from bag  50 mcg IntraVENous Q30 Min PRN Garcia Madera, APRN - CNP        glucose chewable tablet 16 g  4 tablet Oral PRN Garcia Madera, APRN - CNP        dextrose bolus 10% 125 mL 125 mL IntraVENous PRN Clemencia Gell, APRN - CNP   Stopped at 08/06/22 0612    Or    dextrose bolus 10% 250 mL  250 mL IntraVENous PRN Clemencia Gell, APRN - CNP        glucagon (rDNA) injection 1 mg  1 mg SubCUTAneous PRN Clemencia Gell, APRN - CNP        dextrose 10 % infusion   IntraVENous Continuous PRN Clemencia Gell, APRN - CNP        heparin (porcine) injection 1,000 Units  1,000 Units IntraVENous PRN Kathia Durán MD           Additional work up or/and treatment plan may be added today or then after based on clinical progression. I am managing a portion of pt care. Some medical issues are handled by other specialists. Additional work up and treatment should be done in out pt setting by pt PCP and other out pt providers. In addition to examining and evaluating pt, I spent additional time explaining care, normaland abnormal findings, and treatment plan. All of pt questions were answered. Counseling, diet and education were provided. Case will be discussed with nursing staff when appropriate. Family will be updated if and when appropriate.        Electronically signed by Monique Wilkinson MD on 8/6/2022 at 4:52 PM

## 2022-08-06 NOTE — PROGRESS NOTES
Gastroenterology Progress Note    Kennedy Rhoades is a 75 y.o. male patient. Hospitalization Day:12    Chief C/O: Melena    SUBJECTIVE: Seen and examined in the ICU. Patient denies nausea/vomiting, hematemesis, abdominal pain. States he did have a bowel movement yesterday evening and possibly 1 this morning, believes they were dark. Per nursing, patient had a single dark bowel movement last night. Receiving Protonix 40 mg IV every 12 hours. On 22 INR 1.4, Today Hgb 9.0, HCT 27, sodium 127, BUN 40, creatinine 5.62    Physical    VITALS:  /60   Pulse 85   Temp 97.6 °F (36.4 °C) (Axillary)   Resp 25   Ht 5' 6\" (1.676 m)   Wt 142 lb (64.4 kg)   SpO2 95%   BMI 22.92 kg/m²   TEMPERATURE:  Current - Temp: 97.6 °F (36.4 °C); Max - Temp  Av.7 °F (36.5 °C)  Min: 96.6 °F (35.9 °C)  Max: 99 °F (37.2 °C)    General -alert, oriented, in no acute distress  Eyes -no icterus, no pallor  Cardiovascular - RRR  Lungs -clear to auscultation bilaterally  Abdomen -+ central obesity, + epigastric tenderness, bowel sounds present   Extremities -no edema  Skin -warm/dry, without jaundice  Neuro: Without focal deficit, moves all extremities     Data    Data Review:    Recent Labs     22  0522  0909 22  0507 22  0908 22  0030   WBC 10.1  --  10.9*  --   --    HGB 7.9*   < > 7.3* 7.3* 9.0*   HCT 23.4*   < > 22.1* 22.0* 27.0*   .9*  --  104.7*  --   --      --  247  --   --     < > = values in this interval not displayed. Recent Labs     22  0522  0507 22  0600   * 130* 127*   K 3.7 4.7 4.6   CL 90* 89* 86*   CO2 29 25 26   PHOS 4.2 4.5 5.2*   BUN 24* 31* 40*   CREATININE 3.93* 4.94* 5.62*     Recent Labs     22  1524   AST 54*   ALT 83*   BILIDIR <0.2   BILITOT <0.2   ALKPHOS 67     No results for input(s): LIPASE, AMYLASE in the last 72 hours.   Recent Labs     22  1500   PROTIME 16.8*   INR 1.4       ASSESSMENT:  74 y/o male admitted on 7/25 for SOB & sepsis,  initially required intubation, now clinically improved and seen on the regular medical floor. Patient carries a diagnosis of end-stage renal disease and is on hemodialysis Monday Wednesdays and Fridays, on arrival hemoglobin was noted to be 13, now 7.9 with associated melena in the context of epistaxis and anticoagulation for bilateral upper extremity DVTs. Was on Eliquis, this has been held. 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 Noted patient admitted on to sepsis due to pneumonia and no Prograf level noted currently. Patient is not on tacrolimus inpatient at this time. No recent liver function test  8/5/22: Patient continues to have melanotic stools and hemoglobin trending down, currently 7.3. He remains off Eliquis at this time however on Lovenox. Also noted to have elevated transaminases. S/P EGD 8/5/22 with Dr. Jose Ellington with red blood in the upper third of the esophagus. Oozing duodenal ulcer with oozing hemorrhage (Hayder Class Ib). Clips X 4 were placed. Persistent though decreased oozing at the site of the ulcer. Friable gastric mucosa as well as duodenal mucosa spontaneously oozing. Noted recent anticoagulation use. Gastritis. He was transferred to ICU and given 2 units PRBCs after his EGD.  8/6/22: Patient with a single dark bowel movement overnight, Hgb up this morning to 9.0 after blood transfusion yesterday. No GI complaints per patient.     PLAN :  -Continue course of care  -Monitor serial H&H, trend and transfuse accordingly  -Continue clear liquid diet for today, may advance to full liquids tomorrow pending patient's clinical course  -Will liase with primary team regarding continued plan in regards to his liver transplant status.  -Further recommendations pending patient's clinical course    Thank you for allowing me to participate in the care of your patient. Please feel free to contact me with any concerns.     ISI Guillaume - CNP

## 2022-08-06 NOTE — PROGRESS NOTES
[address]   Facility/Department: [address] [place] [address]  Speech Language Pathology  Clinical Bedside Swallow Evaluation    Julian Barrios  : 1955 (75 y.o.)   [x]   confirmed    MRN: [id number]  ROOM: [place] [address]  ADMISSION DATE: 2022  PATIENT DIAGNOSIS(ES): Acute respiratory failure with hypoxia ([address] [J96.01]  Chief Complaint   Patient presents with    Shortness of Breath     C/O SOB  AFTER DIALYSIS  88 % ON RA  GIVEN 2 DUONEBS  SOLU MEDROL      Patient Active Problem List    Diagnosis Date Noted    Duodenal ulcer 2022    Impaired mobility and activities of daily living 2022    Dysphagia, oropharyngeal phase 2022    Epistaxis 2022    Pneumonia due to infectious organism 2022    Pleural effusion 2022    Acute respiratory failure with hypoxia (HCC) 2022    Vomiting and diarrhea     Generalized abdominal pain     Acute renal failure (HCC)     Gastroenteritis     C. difficile colitis     Severe sepsis ([address]     Hypotension 2017    ESRD (end stage renal disease) on dialysis ([address] 2017    Liver transplanted ([facility] 2017    Liver transplant recipient [facility])     Sepsis [facility])      Past Medical History:   Diagnosis Date    ESRD (end stage renal disease) ([address]     Hemodialysis patient ([address]     Hepatitis     Hypertension     Liver transplanted ([facility] 2016     Past Surgical History:   Procedure Laterality Date    DIALYSIS FISTULA CREATION Right     IR MIDLINE CATH  2022    IR MIDLINE CATH 2022 MLOZ SPECIAL PROCEDURE    LIVER TRANSPLANT  2017    TUNNELED VENOUS PORT PLACEMENT      UPPER GASTROINTESTINAL ENDOSCOPY N/A 2022    EGD ESOPHAGOGASTRODUODENOSCOPY WITH INTERVENTION performed by Ruthy Anderson MD at [facility]     No Known Allergies    DATE ONSET: 2022    Date of Evaluation: 2022   Evaluating Therapist: Sanjuanita Russell SLP    Dysphagia Diagnosis  Dysphagia Impression : pt presents with mild oral phase limits    Current Diet level  Current Diet : Soft and Bite-Sized  Current Liquid Diet : Thin    Oral Motor  Labial: No impairment  Dentition: Edentulous (Has dentures, but does not wear. Dentures not at hospital with pt)  Oral Hygiene: Clean;Moist  Lingual: No impairment  Velum: No Impairment  Mandible: No impairment    Oral/Pharyngeal Phase  Oral Phase - Comment: Mild oral phase dysphagia  Pharyngeal Phase: Pharyngeal phase of swallow WFL at bedside. PO Trials  Neuromuscular Estim Used: No  Assessment Method(s): Observation  Patient Position: Upright in bed  Vocal Quality: No Impairment  Consistency Presented: Regular;Pureed  How Presented: Self-fed/presented  Bolus Acceptance: No impairment  Bolus Formation/Control: No impairment  Type of Impairment: Other (comment)  Propulsion: Discoordination  Oral Residue: Less than 10% of bolus  Initiation of Swallow: No impairment  Laryngeal Elevation: Functional  Aspiration Signs/Symptoms: None  Pharyngeal Phase Characteristics: No impairment, issues, or problems  Additional PO Trials: 1      PO Trials 2  Consistency Presented: Thin  How Presented: Cup/gulp  Bolus Acceptance: No impairment  Bolus Formation/Control: No impairment  Propulsion: No impairment  Oral Residue: None  Initiation of Swallow: No impairment  Aspiration Signs/Symptoms: None  Pharyngeal Phase Characteristics: No impairment, issues, or problems  Effective Modifications: None    Dysphagia Diagnosis  Dysphagia Impression : pt presents with mild oral phase dysphagia characterized by impaired mastication, increased mastication time and decreased propulsion with regular solids, resulting in mild lingual residuals. Pt was able to form and clear a cohesive bolus with puree solids. Residuals were cleared with use of liquid wash. No overt s/s of aspiration observed following any of the presented consistencies. Hyolaryngeal movement was present during the evaluation, noted via observation.   Dysphagia Outcome Severity Scale: Level 5: Mild dysphagia- Distant supervision. May need one diet consistency restricted     Recommendations  Requires SLP Intervention: Yes  Recommendations: Dysphagia treatment  D/C Recommendations: Ongoing speech therapy is recommended during this hospitalization  Diet Solids Recommendation: Soft & Bite Sized  Liquid Consistency Recommendation: Thin  Compensatory Swallowing Strategies : Upright as possible for all oral intake;Small bites/sips;Assist feed;Eat/Feed slowly  Recommended Form of Meds: PO  Therapeutic Interventions: Patient/Family education;Diet tolerance monitoring  Duration of Treatment: during LOS or until goals met  Frequency of Treatment: 2-3x/week    Prognosis  Speech Therapy Prognosis  Prognosis: Good  Prognosis Considerations: Previous Level of Function; Motivation    Education  Individuals consulted  Consulted and agree with results and recommendations: Patient;RN  RN Name: Marylene Prudent    Treatment/Goals  Short-term Goals  Timeframe for Short-term Goals: 2 weeks  Goal 1: Pt will tolerate the recommended diet level without clinical s/s of aspiration. Goal 2: Pt will tolerate 5/5 trials of easy to chew solids with adequate mastication and oral clearance of bolus in all opportunities. Goal 3: Pt will complete lingual exercises that promote anterior to posterior propulsion of bolus and improve tongue base retraction with 80% accuracy in order to strengthen the muscles of the swallow to decrease risk of aspiration and to increase ability to safely handle the least restrictive diet level. Long-term Goals  Timeframe for Long-term Goals: 1-2 weeks  Goal 1: Pt will tolerate the least restrictive diet level without adverse outcomes. Safety Devices  Safety Devices  Safety Devices in place: Yes  Type of devices: Bed alarm in place;Call light within reach  Restraints Initially in Place: No    Pain Assessment  Patient does not c/o pain.     Pain Re-assessment  Patient does not c/o pain.      Therapy Time  SLP Individual Minutes  Time In: 3590  Time Out: 5627  Minutes: 10        Signature: Electronically signed by MARSHALL Marcial on 8/6/2022 at 1:37 PM

## 2022-08-06 NOTE — PROGRESS NOTES
Physical Therapy   Facility/Department: [facility] MED SURG IC11/IC11-01    NAME: Cephus Peabody    : 1955 (75 y.o.)  MRN: [id number]    Account: [de-identified]  Gender: male    Hold PT treatment on this date d/t patient had a change in medical status. Pt moved from [facility] to [address]. Change in medical status discussed with supervising PT on this date. Please re-consult physical therapy when appropriate. Note written to attending hospitalist  via \"sticky note\" requesting updated Physical Therapy eval and treat orders when pt is appropriate for out of bed activity.       [facility]) Physical Therapy Department      Electronically signed by Vishnu Elena PTA on 22 at 7:37 AM EDT

## 2022-08-06 NOTE — PROGRESS NOTES
Subjective: The patient complains of severe acute on chronic progressive fatigue and generalized weakness partially relieved by rest, PT, OT and meds and hemodialysis and exacerbated by exertion and recent illness. He was initially admitted via the ER with SOB. He was diagnosed with hypoxia acute respiratory failure end-stage renal disease macrocytic anemia and hyperglycemia secondary to diabetes mellitus. He was admitted to the ICU. He is on IV Bactrim for pneumonia. GI is seeing him an duodenal ulcer GI bleed. I am concerned about his Lovenox dose given his renal failure-Lovenox is currently on hold-due to bleeding. There are plans for an IVC filter in the future. Medically is complicated by history of a liver transplant for hepatitis. He is on immunosuppressants. He gets dialysis Monday Wednesday and Friday    I am concerned about patients medical complexities including:  Principal Problem:    Acute respiratory failure with hypoxia (HCC)  Active Problems:    Pneumonia due to infectious organism    Pleural effusion    Impaired mobility and activities of daily living    Dysphagia, oropharyngeal phase    Epistaxis    Duodenal ulcer    ESRD (end stage renal disease) on dialysis [facility])    Liver transplant recipient [facility])  Resolved Problems:    * No resolved hospital problems. *      .    Reviewed recent nursing note and discussed current status and planned care with acute care providers, \" Patient had uneventful night patient received 2 units of blood. Tolerated well. H/H drawn 1 hour after blood finished. Am labs drawn per midline. Brisk blood return. Patient is stating that the Nasal packing is very uncomfortable for him and he is having trouble breathing. Patient stated this multiple times during shift. Reassurance given and at 65 am Dr Leif Dockery spoke with patient and reinforced need to keep packing in place and patient is doing well. See flow sheets for assessment and MAR for meds.  SR up times 3 bed alarm on . Patient had 2 black tarry stools during the night. Complete linen change and partial baths given zinc ointment applied to buttock \". Lovenox is now on hold    ROS x10: The patient also complains of severely impaired mobility and activities of daily living. Otherwise no new problems with vision, hearing, nose, mouth, throat, dermal, cardiovascular, GI, , pulmonary, musculoskeletal, psychiatric or neurological.        Vital signs:  /60   Pulse 85   Temp 97.6 °F (36.4 °C) (Axillary)   Resp 25   Ht 5' 6\" (1.676 m)   Wt 142 lb (64.4 kg)   SpO2 95%   BMI 22.92 kg/m²   I/O:   PO/Intake:    fair PO intake, monitoring for dysphagia    Bowel/Bladder:   continent,    General:  Patient is well developed, adequately nourished, and    well kempt. HEENT:    PERRLA, hearing intact to loud voice, external inspection of ear and nose -nasal packing. Inspection of lips, tongue -dark and coated-gums benign, nosebleed from nasal cannula O2 patient is also on some blood thinners. Musculoskeletal: No significant change in strength or tone. All joints stable. Inspection and palpation of digits and nails show no clubbing, cyanosis or inflammatory conditions. Neuro/Psychiatric: Affect: flat-  Alert and oriented to self and situation with  min-mod cues. No significant change in deep tendon reflexes or sensation  Lungs:  Diminished, CTA-B  . Respiration effort is normal at rest.   Heart:   S1 = S2,   RRR. Abdomen:  Soft, non-tender    Extremities:  Trace  lower extremity edema but no unusual tenderness. non Fx RUE fistula--functional LUE fistula. Skin:   BUE bruises dt blood draws-his bruises and skin tears.       Rehabilitation:  Physical Therapy:   Bed mobility:  Bed mobility  Rolling to Left: Minimal assistance (08/03/22 0950)  Rolling to Right: Minimal assistance (08/03/22 0950)  Supine to Sit: Moderate assistance (08/05/22 1130)  Sit to Supine: Minimal assistance (Up to bedside chair) (08/05/22 1130)  Scooting: Minimal assistance; Moderate assistance (08/05/22 1130)  Bed Mobility Comments: Requires increased time, effort and use of bed rail. Difficulty maintaining seated balance at midline at EOB due to left lateral lean. (08/05/22 1130)  Transfers:  Transfers  Sit to Stand: Minimal Assistance (08/05/22 1131)  Stand to sit: Minimal Assistance (08/05/22 1131)  Comment: Completes STS x1 with ww (08/05/22 1131)  Gait:   Ambulation  Surface: level tile (08/04/22 1306)  Device: Rolling Walker (08/04/22 1306)  Other Apparatus: O2 (2L O2 NC, increased to 4L O2 NC RN notified and aware) (08/04/22 1306)  Assistance: Minimal assistance (08/04/22 1306)  Quality of Gait: flexed posture, decresaed bilateral step length and foot clearance, unsteady (08/04/22 1306)  Gait Deviations: Slow Whitney;Decreased step length;Decreased step height;Decreased arm swing (08/04/22 1306)  Distance: 5ft to chair (08/04/22 1306)  Comments: SOB upon exertion, HR elevated 110s with exertional dyspnea observed, O2 increased d/t low O2 sats (08/03/22 0950)  Stairs:     W/C mobility:       Occupational Therapy:   Hand Dominance: Left  ADL  Feeding: Setup (08/03/22 0943)  Grooming: Minimal assistance (08/03/22 0943)  UE Bathing: Minimal assistance (08/03/22 0943)  LE Bathing: Maximum assistance (08/03/22 0943)  UE Dressing: Minimal assistance (08/03/22 0943)  LE Dressing: Maximum assistance (08/03/22 0943)  Toileting: Dependent/Total (08/03/22 5825)  Toileting Skilled Clinical Factors: Incontinent of dark loose stool; LPN notified (99/49/36 3324)  Additional Comments: Simulated ADLs as above. Pt. significantly limited d/t fatigue and SOB. (08/03/22 8443)  Toilet Transfers  Toilet Transfer: Unable to assess (08/03/22 0946)  Toilet Transfers Comments: Anticipate min A (08/03/22 0946)          Speech Therapy:            Diet/Swallow:        Dysphagia Outcome Severity Scale: Level 5: Mild dysphagia- Distant supervision.  May need one diet consistency restricted  Compensatory Swallowing Strategies : Upright as possible for all oral intake, Small bites/sips, Assist feed, Eat/Feed slowly  Therapeutic Interventions: Patient/Family education, Diet tolerance monitoring    COGNITION  OT: Cognition Comment: Verbal cues for safety and sequencing  SP:           Lab/X-ray studies reviewed, analyzed and discussed with patient and staff:   Recent Results (from the past 24 hour(s))   POCT Glucose    Collection Time: 08/05/22  7:55 AM   Result Value Ref Range    POC Glucose 77 70 - 99 mg/dl    Performed on ACCU-CHEK    Hemoglobin and Hematocrit    Collection Time: 08/05/22  9:08 AM   Result Value Ref Range    Hemoglobin 7.3 (L) 14.0 - 18.0 g/dL    Hematocrit 22.0 (L) 42.0 - 52.0 %   Protime-INR    Collection Time: 08/05/22  3:00 PM   Result Value Ref Range    Protime 16.8 (H) 12.3 - 14.9 sec    INR 1.4    SPECIMEN REJECTION    Collection Time: 08/05/22  3:30 PM   Result Value Ref Range    Rejected Test TS3C     Reason for Rejection see below    TYPE AND SCREEN    Collection Time: 08/05/22  4:09 PM   Result Value Ref Range    ABO/Rh O POS     Antibody Screen NEG    PREPARE RBC (CROSSMATCH)    Collection Time: 08/05/22  4:09 PM   Result Value Ref Range    Product Code Blood Bank [id number]     Description Blood Bank Red Blood Cells, Apheresis, Leuko-reduced     Unit Number [id number]     Dispense Status Blood Bank issued     Product Code Blood Bank [id number]     Description Blood Bank Red Blood Cells, Apheresis, Leuko-reduced     Unit Number [id number]     Dispense Status Blood Bank issued    POCT Glucose    Collection Time: 08/05/22  8:40 PM   Result Value Ref Range    POC Glucose 81 70 - 99 mg/dl    Performed on ACCU-CHEK    Hemoglobin and Hematocrit    Collection Time: 08/06/22 12:30 AM   Result Value Ref Range    Hemoglobin 9.0 (L) 14.0 - 18.0 g/dL    Hematocrit 27.0 (L) 42.0 - 52.0 %   POCT Glucose    Collection Time: 08/06/22  5:46 AM   Result Value Ref Range    POC Glucose 60 (L) 70 - 99 mg/dl    Performed on ACCU-CHEK    POCT Glucose    Collection Time: 08/06/22  7:23 AM   Result Value Ref Range    POC Glucose 71 70 - 99 mg/dl    Performed on ACCU-CHEK      Previous extensive, complex labs, notes and diagnostics reviewed and analyzed. ALLERGIES:    Allergies as of 07/25/2022    (No Known Allergies)      (please also verify by checking [facility])     Complex Physical Medicine & Rehab Issues Assess & Plan:   Severe abnormality of gait and mobility and impaired self-care and ADL's secondary to hypoxic encephalopathy. Updated functional and medical status reassessed regarding patients ability to participate in therapies and patient found to be able to participate in:        acute intensive comprehensive inpatient rehabilitation program including PT/OT to improve balance, ambulation, ADLs, and to improve the P/AROM. It is my opinion that they will be able to tolerate 3 hours of therapy a day and benefit from it at an acute level. I again discussed acute rehab with the patient and verify that the patient is able and willing to participate in 3 hours of therapy a day. Rehab and Acute Care Case Management has also reinforced this expectation. Will continue to follow to attempt to get patient to the most efficient but most effective level of care will be in their best interest.  Continue to focus on energy conservation heart rate and blood pressure monitoring before during and after therapy endurance and consistency of function. Bowel constipation   and Bladder dysfunction   overactive, neurogenic bladder:  frequent toileting, ambulate to bathroom with assistance, check post void residuals.   Check for C.difficile x1 if >2 loose stools in 24 hours, continue bowel & bladder program.  Monitor for UTI symptoms including lethargy and confusion    Moderate back pain and generalized body aches and pain likely secondary to renal osteodystrophy and generalized OA pain:

## 2022-08-07 ENCOUNTER — APPOINTMENT (OUTPATIENT)
Dept: GENERAL RADIOLOGY | Age: 67
DRG: 871 | End: 2022-08-07
Payer: MEDICARE

## 2022-08-07 LAB
ALBUMIN SERPL-MCNC: 3.3 G/DL (ref 3.5–4.6)
ANION GAP SERPL CALCULATED.3IONS-SCNC: 16 MEQ/L (ref 9–15)
BASE EXCESS MIXED: 2
BASOPHILS ABSOLUTE: 0.1 K/UL (ref 0–0.2)
BASOPHILS RELATIVE PERCENT: 0.3 %
BUN BLDV-MCNC: 23 MG/DL (ref 8–23)
CALCIUM IONIZED: 1.16 MMOL/L (ref 1.12–1.32)
CALCIUM SERPL-MCNC: 9 MG/DL (ref 8.5–9.9)
CHLORIDE BLD-SCNC: 93 MEQ/L (ref 95–107)
CO2: 27 MEQ/L (ref 20–31)
CREAT SERPL-MCNC: 4.07 MG/DL (ref 0.7–1.2)
EOSINOPHILS ABSOLUTE: 0 K/UL (ref 0–0.7)
EOSINOPHILS RELATIVE PERCENT: 0.2 %
GFR AFRICAN AMERICAN: 17.8
GFR AFRICAN AMERICAN: 18
GFR NON-AFRICAN AMERICAN: 14.7
GFR NON-AFRICAN AMERICAN: 15
GLUCOSE BLD-MCNC: 123 MG/DL (ref 70–99)
GLUCOSE BLD-MCNC: 63 MG/DL (ref 70–99)
GLUCOSE BLD-MCNC: 63 MG/DL (ref 70–99)
GLUCOSE BLD-MCNC: 65 MG/DL (ref 70–99)
GLUCOSE BLD-MCNC: 73 MG/DL (ref 70–99)
HCO3, MIXED: 27.7 MMOL/L
HCT VFR BLD CALC: 26.4 % (ref 42–52)
HEMOGLOBIN: 8.7 G/DL (ref 14–18)
HEMOGLOBIN: 9 GM/DL (ref 13.5–17.5)
LACTATE: 1.95 MMOL/L (ref 0.4–2)
LYMPHOCYTES ABSOLUTE: 0.3 K/UL (ref 1–4.8)
LYMPHOCYTES RELATIVE PERCENT: 1.9 %
MCH RBC QN AUTO: 33.1 PG (ref 27–31.3)
MCHC RBC AUTO-ENTMCNC: 33.1 % (ref 33–37)
MCV RBC AUTO: 100.1 FL (ref 80–100)
MONOCYTES ABSOLUTE: 1 K/UL (ref 0.2–0.8)
MONOCYTES RELATIVE PERCENT: 5.9 %
NEUTROPHILS ABSOLUTE: 14.7 K/UL (ref 1.4–6.5)
NEUTROPHILS RELATIVE PERCENT: 91.7 %
O2 SAT, MIXED: 40 %
PCO2 MIXED: 53.2 MM HG
PDW BLD-RTO: 16 % (ref 11.5–14.5)
PERFORMED ON: ABNORMAL
PH, MIXED: 7.32 (ref 7.35–7.45)
PHOSPHORUS: 5.1 MG/DL (ref 2.3–4.8)
PLATELET # BLD: 275 K/UL (ref 130–400)
PO2 MIXED: 25 MMHG
POC CHLORIDE: 94 MEQ/L (ref 99–110)
POC CREATININE: 4 MG/DL (ref 0.8–1.3)
POC FIO2: 50
POC HEMATOCRIT: 26 % (ref 41–53)
POC POTASSIUM: 4.2 MEQ/L (ref 3.5–5.1)
POC SAMPLE TYPE: ABNORMAL
POC SODIUM: 131 MEQ/L (ref 136–145)
POTASSIUM SERPL-SCNC: 4.7 MEQ/L (ref 3.4–4.9)
RBC # BLD: 2.64 M/UL (ref 4.7–6.1)
REASON FOR REJECTION: NORMAL
REJECTED TEST: NORMAL
SODIUM BLD-SCNC: 136 MEQ/L (ref 135–144)
TCO2 CALC MIXED: 29 MMOL/L
WBC # BLD: 16 K/UL (ref 4.8–10.8)

## 2022-08-07 PROCEDURE — 99232 SBSQ HOSP IP/OBS MODERATE 35: CPT | Performed by: INTERNAL MEDICINE

## 2022-08-07 PROCEDURE — 94761 N-INVAS EAR/PLS OXIMETRY MLT: CPT

## 2022-08-07 PROCEDURE — 6360000002 HC RX W HCPCS: Performed by: INTERNAL MEDICINE

## 2022-08-07 PROCEDURE — 6370000000 HC RX 637 (ALT 250 FOR IP): Performed by: NURSE PRACTITIONER

## 2022-08-07 PROCEDURE — 36415 COLL VENOUS BLD VENIPUNCTURE: CPT

## 2022-08-07 PROCEDURE — 2580000003 HC RX 258: Performed by: INTERNAL MEDICINE

## 2022-08-07 PROCEDURE — 80069 RENAL FUNCTION PANEL: CPT

## 2022-08-07 PROCEDURE — 85025 COMPLETE CBC W/AUTO DIFF WBC: CPT

## 2022-08-07 PROCEDURE — A4216 STERILE WATER/SALINE, 10 ML: HCPCS | Performed by: INTERNAL MEDICINE

## 2022-08-07 PROCEDURE — 6360000002 HC RX W HCPCS: Performed by: NURSE PRACTITIONER

## 2022-08-07 PROCEDURE — 6370000000 HC RX 637 (ALT 250 FOR IP): Performed by: INTERNAL MEDICINE

## 2022-08-07 PROCEDURE — 36600 WITHDRAWAL OF ARTERIAL BLOOD: CPT

## 2022-08-07 PROCEDURE — 1210000000 HC MED SURG R&B

## 2022-08-07 PROCEDURE — 71045 X-RAY EXAM CHEST 1 VIEW: CPT

## 2022-08-07 PROCEDURE — 99233 SBSQ HOSP IP/OBS HIGH 50: CPT | Performed by: INTERNAL MEDICINE

## 2022-08-07 PROCEDURE — 99232 SBSQ HOSP IP/OBS MODERATE 35: CPT | Performed by: NURSE PRACTITIONER

## 2022-08-07 PROCEDURE — 37799 UNLISTED PX VASCULAR SURGERY: CPT

## 2022-08-07 PROCEDURE — C9113 INJ PANTOPRAZOLE SODIUM, VIA: HCPCS | Performed by: INTERNAL MEDICINE

## 2022-08-07 PROCEDURE — 6370000000 HC RX 637 (ALT 250 FOR IP): Performed by: PHYSICAL MEDICINE & REHABILITATION

## 2022-08-07 PROCEDURE — 2700000000 HC OXYGEN THERAPY PER DAY

## 2022-08-07 RX ORDER — ACETAMINOPHEN 80 MG
TABLET,CHEWABLE ORAL ONCE
Status: DISCONTINUED | OUTPATIENT
Start: 2022-08-07 | End: 2022-08-19 | Stop reason: HOSPADM

## 2022-08-07 RX ADMIN — Medication 10 ML: at 11:32

## 2022-08-07 RX ADMIN — Medication 10 ML: at 22:19

## 2022-08-07 RX ADMIN — DOCUSATE SODIUM 100 MG: 100 CAPSULE, LIQUID FILLED ORAL at 11:30

## 2022-08-07 RX ADMIN — 0.12% CHLORHEXIDINE GLUCONATE 15 ML: 1.2 RINSE ORAL at 11:29

## 2022-08-07 RX ADMIN — SODIUM CHLORIDE, PRESERVATIVE FREE 40 MG: 5 INJECTION INTRAVENOUS at 22:07

## 2022-08-07 RX ADMIN — 0.12% CHLORHEXIDINE GLUCONATE 15 ML: 1.2 RINSE ORAL at 22:07

## 2022-08-07 RX ADMIN — SODIUM CHLORIDE, PRESERVATIVE FREE 40 MG: 5 INJECTION INTRAVENOUS at 11:30

## 2022-08-07 RX ADMIN — MIDODRINE HYDROCHLORIDE 2.5 MG: 5 TABLET ORAL at 11:30

## 2022-08-07 RX ADMIN — TACROLIMUS 1 MG: 1 CAPSULE ORAL at 22:07

## 2022-08-07 RX ADMIN — Medication 10 ML: at 12:01

## 2022-08-07 RX ADMIN — DOCUSATE SODIUM 100 MG: 100 CAPSULE, LIQUID FILLED ORAL at 22:07

## 2022-08-07 RX ADMIN — TACROLIMUS 1 MG: 1 CAPSULE ORAL at 12:06

## 2022-08-07 RX ADMIN — METOPROLOL TARTRATE 12.5 MG: 25 TABLET, FILM COATED ORAL at 11:30

## 2022-08-07 RX ADMIN — MIDODRINE HYDROCHLORIDE 2.5 MG: 5 TABLET ORAL at 18:29

## 2022-08-07 ASSESSMENT — ENCOUNTER SYMPTOMS
RESPIRATORY NEGATIVE: 1
COUGH: 0
GASTROINTESTINAL NEGATIVE: 1
SHORTNESS OF BREATH: 0

## 2022-08-07 NOTE — CARE COORDINATION
Met with patient, freedom of choice offered. Discussed possible transfer to [facility]. States thinking about it. Patient very sob, on 50%vm. Will need updated pt/ot orders and eval. Lsw/cm to follow.

## 2022-08-07 NOTE — PROGRESS NOTES
Infectious Disease     Patient Name: Humaira Grant  Date: 8/7/2022  YOB: 1955  Medical Record Number: [id number]                  Pneumonia    Stenotrophomonas recovered from bronchoscopy        Review of Systems   Constitutional:  Negative for chills, diaphoresis and fatigue. Respiratory: Negative. Negative for cough and shortness of breath. Cardiovascular: Negative. Gastrointestinal: Negative. Physical Exam  Constitutional:       Appearance: He is not ill-appearing. Cardiovascular:      Heart sounds: Normal heart sounds. No murmur heard. Pulmonary:      Effort: Pulmonary effort is normal. No respiratory distress. Breath sounds: Normal breath sounds. No wheezing, rhonchi or rales. Abdominal:      General: Abdomen is flat. Bowel sounds are normal. There is no distension. Palpations: There is no mass. Tenderness: There is no abdominal tenderness. There is no guarding. Blood pressure 115/84, pulse (!) 185, temperature 98.1 °F (36.7 °C), resp. rate 20, height 5' 6\" (1.676 m), weight 138 lb 7.2 oz (62.8 kg), SpO2 99 %.       .   Lab Results   Component Value Date    WBC 16.0 (H) 08/07/2022    HGB 8.7 (L) 08/07/2022    HCT 26.4 (L) 08/07/2022    .1 (H) 08/07/2022     08/07/2022     Lab Results   Component Value Date/Time     08/07/2022 06:45 AM    K 4.7 08/07/2022 06:45 AM    CL 93 08/07/2022 06:45 AM    CO2 27 08/07/2022 06:45 AM    BUN 23 08/07/2022 06:45 AM    CREATININE 4.07 08/07/2022 06:45 AM    GLUCOSE 73 08/07/2022 06:45 AM    GLUCOSE 136 02/25/2012 05:10 PM    CALCIUM 9.0 08/07/2022 06:45 AM          Chest x-ray shows right effusion much improved      ASSESSMENT:  PLAN:  Pneumonia  Stenotrophomonas from sputum  Completed Bactrim    ID sign off

## 2022-08-07 NOTE — PROGRESS NOTES
Nephrology Progress Note    Assessment:  ESRDX  Epistaxis  Liver transplant  Hypertension  Anemia        Plan:dialysis 3x week epo dosing    Patient Active Problem List:     Hypotension     ESRD (end stage renal disease) on dialysis ([facility]     Liver transplanted ([facility]     Liver transplant recipient ([facility]     Sepsis ([facility]     Gastroenteritis     C. difficile colitis     Severe sepsis ([facility]     Vomiting and diarrhea     Generalized abdominal pain     Acute renal failure (HCC)     Acute respiratory failure with hypoxia (HCC)     Pneumonia due to infectious organism     Pleural effusion     Impaired mobility and activities of daily living     Dysphagia, oropharyngeal phase     Epistaxis     Duodenal ulcer      Subjective:  Admit Date: 7/25/2022    Interval History: breathing stable nose bleed last night    Medications:  Scheduled Meds:   chlorhexidine  15 mL Mouth/Throat BID    sodium chloride flush  5-40 mL IntraVENous 2 times per day    pantoprazole (PROTONIX) 40 mg injection  40 mg IntraVENous Q12H    [Held by provider] enoxaparin  1 mg/kg SubCUTAneous Daily    fluticasone  1 spray Each Nostril Daily    sodium chloride flush  5-40 mL IntraVENous 2 times per day    midodrine  2.5 mg Oral TID WC    metoprolol tartrate  12.5 mg Oral BID    [Held by provider] aspirin  81 mg Oral Daily    docusate sodium  100 mg Oral BID    insulin lispro  0-4 Units SubCUTAneous 4x Daily AC & HS    polyethylene glycol  17 g Oral Daily    tacrolimus  1 mg Oral BID     Continuous Infusions:   sodium chloride      sodium chloride      sodium chloride      sodium chloride      sodium chloride      dextrose         CBC:   Recent Labs     08/05/22  0507 08/05/22  0908 08/06/22  1200 08/07/22  0100   WBC 10.9*  --   --  16.0*   HGB 7.3*   < > 9.3* 8.7*     --   --  275    < > = values in this interval not displayed.      CMP:    Recent Labs     08/05/22  0507 08/06/22  0600 08/07/22  0645   * 127* 136   K 4.7 4.6 4.7   CL 89* 86* 93* CO2 25 26 27   BUN 31* 40* 23   CREATININE 4.94* 5.62* 4.07*   GLUCOSE 75 66* 73   CALCIUM 8.6 8.5 9.0   LABGLOM 11.8* 10.1* 14.7*     Troponin: No results for input(s): TROPONINI in the last 72 hours. BNP: No results for input(s): BNP in the last 72 hours. INR:   Recent Labs     08/05/22  1500   INR 1.4     Lipids: No results for input(s): CHOL, LDLDIRECT, TRIG, HDL, AMYLASE, LIPASE in the last 72 hours. Liver:   Recent Labs     08/04/22  1524 08/05/22  0507 08/07/22  0645   AST 54*  --   --    ALT 83*  --   --    ALKPHOS 67  --   --    PROT 5.7*  --   --    LABALBU 2.5*   < > 3.3*   BILITOT <0.2  --   --     < > = values in this interval not displayed. Iron:  No results for input(s): IRONS, FERRITIN in the last 72 hours. Invalid input(s): LABIRONS  Urinalysis: No results for input(s): UA in the last 72 hours.     Objective:  Vitals: /84   Pulse (!) 185   Temp 98.1 °F (36.7 °C)   Resp 20   Ht 5' 6\" (1.676 m)   Wt 138 lb 7.2 oz (62.8 kg)   SpO2 99%   BMI 22.35 kg/m²    Wt Readings from Last 3 Encounters:   08/06/22 138 lb 7.2 oz (62.8 kg)   06/28/22 145 lb (65.8 kg)   03/18/20 145 lb (65.8 kg)      24HR INTAKE/OUTPUT:    Intake/Output Summary (Last 24 hours) at 8/7/2022 0850  Last data filed at 8/6/2022 1152  Gross per 24 hour   Intake 400 ml   Output 1900 ml   Net -1500 ml       General: alert, in no apparent distress  HEENT: normocephalic, atraumatic, anicteric  Neck: supple, no mass  Lungs: non-labored respirations, clear to auscultation bilaterally  Heart: regular rate and rhythm, no murmurs or rubs  Abdomen: soft, non-tender, non-distended  Ext: no cyanosis, no peripheral edema  Neuro: alert and oriented, no gross abnormalities  Psych: normal mood and affect  Skin: no rash      Electronically signed by Coreen Moon DO, MD

## 2022-08-07 NOTE — FLOWSHEET NOTE
2111: Assessment completed. A&Ox4. Unable to get accurate/ consistent BP at moment-BP taken at LLE; Right arm restriction r/t fistula;  PM meds given ex Metoprolol not given because of inconsistent BP. Lung clear, diminished; on 6L NC. Abd sounds active; Pt having green loose BM. Redness, +3 pitting edema at RUE, Redness,+2 pitting edema at LUE; Ecchymosis at BUE, Chest; BLE dry, flaky. 4X4 drsg in left nare to alleviate nose bleed; Transparent drsg at right thigh  intact, no hematoma, pain noted; Sacrum red-Zinc applied; Protective foam on heel. Call light within reach. Bed in lowest position    2157: Refer to RRT note    0026: Lab blood drawn sample rejected after multiple unsuccessful attempt of RRT ordered specimen. Roxan Heimlich, RN able to draw a little blood via Peripheral IV line-Dr. Guy aware.  Standard safety precaution in place

## 2022-08-07 NOTE — PROGRESS NOTES
Internal Medicine   Hospitalist   Progress Note    2022   12:27 PM    Name:  Placido Gómez  MRN:    [id number]     IP Day: 15     Admit Date: 2022 11:15 AM  PCP: Alejandro Borrego MD    Code Status:  Full Code    Assessment and Plan: Active Problems/ diagnosis:     Acute hypoxic respiratory failure in the setting of aspiration pneumonia-required intubation, extubated now on nasal cannula  Sepsis present admission  End-stage renal disease  Hypotension in the setting of dialysis-on midodrine  Bilateral upper and lower extremity acute DVT on Eliquis  Anemia  History of liver transplant    Plan  Patient is having more melena and epistaxis. He does not feel like epistaxis is going on in the back of his throat, seems more anterior, discussed with ENT who will be packing his nose today, discussed with GI for possibly obtain an EGD, resume PPI. Monitor H&H. Will hold Eliquis and use Lovenox therapeutic dose for now, hold Lovenox dose this morning until determination for EGD is made. Continue to monitor medical floor  Antibiotic as per infectious disease. Currently on IV Bactrim. Zosyn stopped   HD as per nephrology  Home medication resumed  Resume immunosuppressants for history of liver transplant, Prograf  Resume PT/OT  DVT/PUD PPx      - h/h improved. Off a/c due to duodenal ulcer. D/W GI, concerns for need for transfer to tertiary care center. Will d/w patient and sister. Patient transferring to floor.  - mild sob today, abg and port chest xray ordered. D/w patient, does not want transfer at this time. 7 pm- 7 am, please contact on call Hospitalist for any needs       Subjective:     SOB this am, audible wheezing. No cp, n/v/d.     Physical Examination:      Vitals:  BP 86/60   Pulse (!) 107   Temp 98.4 °F (36.9 °C) (Axillary)   Resp 20   Ht 5' 6\" (1.676 m)   Wt 138 lb 7.2 oz (62.8 kg)   SpO2 99%   BMI 22.35 kg/m²   Temp (24hrs), Av.3 °F (36.8 °C), Min:98.1 °F (36.7 °C), Max:98.4 °F (36.9 °C)      General appearance: alert, cooperative and no distress  Mental Status: oriented to person, place and time and normal affect  Lungs: clear to auscultation bilaterally, normal effort  Heart: regular rate and rhythm, no murmur  Abdomen: soft, nontender, nondistended, bowel sounds present, no masses  Extremities: + upper and lower edema, pulses intact   Skin multiple area of bruising related to IV access in upper and lower extremities    Data:     Labs:  Recent Labs     08/05/22  0507 08/05/22  0908 08/06/22  1200 08/07/22  0100   WBC 10.9*  --   --  16.0*   HGB 7.3*   < > 9.3* 8.7*     --   --  275    < > = values in this interval not displayed.      Recent Labs     08/06/22  0600 08/07/22  0645   * 136   K 4.6 4.7   CL 86* 93*   CO2 26 27   BUN 40* 23   CREATININE 5.62* 4.07*   GLUCOSE 66* 73     Recent Labs     08/04/22  1524   AST 54*   ALT 83*   BILITOT <0.2   ALKPHOS 67       Current Facility-Administered Medications   Medication Dose Route Frequency Provider Last Rate Last Admin    pill splitter   Does not apply Once Le Perez MD        chlorhexidine (PERIDEX) 0.12 % solution 15 mL  15 mL Mouth/Throat BID Lilliam Perez DO   15 mL at 08/07/22 1129    0.9 % sodium chloride infusion   IntraVENous PRN Karl Hernandez, DO        0.9 % sodium chloride infusion   IntraVENous Continuous Carmella Terrell DO        0.9 % sodium chloride infusion   IntraVENous Continuous Karrie Cornelius MD        sodium chloride flush 0.9 % injection 5-40 mL  5-40 mL IntraVENous 2 times per day Karrie Cornelius MD   10 mL at 08/07/22 1132    sodium chloride flush 0.9 % injection 5-40 mL  5-40 mL IntraVENous PRN Karrie Cornelius MD        0.9 % sodium chloride infusion   IntraVENous PRN Karrie Cornelius MD        acetaminophen (TYLENOL) tablet 650 mg  650 mg Oral Q4H PRN Karrie Cornelius MD        ondansetron [facility]) injection 4 mg  4 mg IntraVENous Q6H PRN Karrie Cornelius MD        hydrALAZINE (APRESOLINE) injection 10 mg  10 mg IntraVENous Q10 Min PRN Usman Blankenship MD        labetalol (NORMODYNE;TRANDATE) injection 10 mg  10 mg IntraVENous Q30 Min PRN Usman Blankenship MD        pantoprazole (PROTONIX) 40 mg in sodium chloride (PF) 10 mL injection  40 mg IntraVENous Q12H Israel Ferrera DO   40 mg at 08/07/22 1130    [Held by provider] enoxaparin (LOVENOX) injection 60 mg  1 mg/kg SubCUTAneous Daily Carmella Terrell DO        fluticasone (FLONASE) 50 MCG/ACT nasal spray 1 spray  1 spray Each Nostril Daily Jewell Rangel DO   1 spray at 08/04/22 0902    sodium chloride flush 0.9 % injection 5-40 mL  5-40 mL IntraVENous 2 times per day Israel Ferrera DO   10 mL at 08/07/22 1201    sodium chloride flush 0.9 % injection 5-40 mL  5-40 mL IntraVENous PRN Israel Ferrera DO        0.9 % sodium chloride infusion   IntraVENous PRN Slade Terrell DO        midodrine (PROAMATINE) tablet 2.5 mg  2.5 mg Oral TID WC Usman Blankenship MD   2.5 mg at 08/07/22 1130    metoprolol tartrate (LOPRESSOR) tablet 12.5 mg  12.5 mg Oral BID Usman Blankenship MD   12.5 mg at 08/07/22 1130    [Held by provider] aspirin EC tablet 81 mg  81 mg Oral Daily Usman Blankenship MD   81 mg at 08/04/22 7443    docusate sodium (COLACE) capsule 100 mg  100 mg Oral BID Graylin Carney, APRN - CNP   100 mg at 08/07/22 1130    insulin lispro (HUMALOG) injection vial 0-4 Units  0-4 Units SubCUTAneous 4x Daily AC & HS Jus Cruz MD   1 Units at 08/02/22 2248    polyethylene glycol (GLYCOLAX) packet 17 g  17 g Oral Daily Graylin Carney, APRN - CNP   17 g at 08/04/22 0904    tacrolimus (PROGRAF) capsule 1 mg  1 mg Oral BID Graylin Carney, APRN - CNP   1 mg at 08/07/22 1206    acetaminophen (TYLENOL) tablet 650 mg  650 mg Oral Q6H PRN Jus Cruz MD   650 mg at 08/03/22 8814    Or    acetaminophen (TYLENOL) suppository 650 mg  650 mg Rectal Q6H PRN uJs Cruz MD        fentaNYL bolus from bag  50 mcg IntraVENous Q30 Min PRN Ewa Shettytor, APRN - CNP        glucose chewable tablet 16 g  4 tablet Oral PRN Juan Carlos Berkeley, APRN - CNP        dextrose bolus 10% 125 mL  125 mL IntraVENous PRN Juan Carlos Berkeley, APRN - CNP   Stopped at 08/06/22 6084    Or    dextrose bolus 10% 250 mL  250 mL IntraVENous PRN Juan Carlos Berkeley, APRN - CNP        glucagon (rDNA) injection 1 mg  1 mg SubCUTAneous PRN Juan Carlos Berkeley, APRN - CNP        dextrose 10 % infusion   IntraVENous Continuous PRN Juan Carlos Berkeley, APRN - CNP        heparin (porcine) injection 1,000 Units  1,000 Units IntraVENous PRN Brenda Leon MD           Additional work up or/and treatment plan may be added today or then after based on clinical progression. I am managing a portion of pt care. Some medical issues are handled by other specialists. Additional work up and treatment should be done in out pt setting by pt PCP and other out pt providers. In addition to examining and evaluating pt, I spent additional time explaining care, normaland abnormal findings, and treatment plan. All of pt questions were answered. Counseling, diet and education were provided. Case will be discussed with nursing staff when appropriate. Family will be updated if and when appropriate.        Electronically signed by Diogo Lee MD on 8/7/2022 at 12:28 PM

## 2022-08-07 NOTE — PROGRESS NOTES
Subjective: The patient complains of severe acute on chronic progressive fatigue and generalized weakness partially relieved by rest, PT, OT and meds and hemodialysis and exacerbated by exertion and recent illness. He was initially admitted via the ER with SOB. He was diagnosed with hypoxia acute respiratory failure end-stage renal disease macrocytic anemia and hyperglycemia secondary to diabetes mellitus. He was admitted to the ICU. He is on IV Bactrim for pneumonia. GI is seeing him an duodenal ulcer GI bleed. I am concerned about his Lovenox dose given his renal failure-Lovenox is currently on hold-due to bleeding. There are plans for an IVC filter in the future. Medically is complicated by history of a liver transplant for hepatitis. He is on immunosuppressants. He gets dialysis Monday Wednesday and Friday    I am concerned about patients medical complexities including:  Principal Problem:    Acute respiratory failure with hypoxia (HCC)  Active Problems:    Pneumonia due to infectious organism    Pleural effusion    Impaired mobility and activities of daily living    Dysphagia, oropharyngeal phase    Epistaxis    Duodenal ulcer    ESRD (end stage renal disease) on dialysis [facility])    Liver transplant recipient [facility])  Resolved Problems:    * No resolved hospital problems. *      .    Reviewed recent nursing note and discussed current status and planned care with acute care providers, \" Patient had uneventful night patient received 2 units of blood. Tolerated well. H/H drawn 1 hour after blood finished. Am labs drawn per midline. Brisk blood return. Patient is stating that the Nasal packing is very uncomfortable for him and he is having trouble breathing. Patient stated this multiple times during shift. Reassurance given and at 65 am Dr Keyur Copeland spoke with patient and reinforced need to keep packing in place and patient is doing well. See flow sheets for assessment and MAR for meds.  SR up times 3 bed alarm on . Patient had 2 black tarry stools during the night. Complete linen change and partial baths given zinc ointment applied to buttock \". Lovenox is now on hold    ROS x10: The patient also complains of severely impaired mobility and activities of daily living. Otherwise no new problems with vision, hearing, nose, mouth, throat, dermal, cardiovascular, GI, , pulmonary, musculoskeletal, psychiatric or neurological.        Vital signs:  BP 86/60   Pulse (!) 107   Temp 98.4 °F (36.9 °C) (Axillary)   Resp 20   Ht 5' 6\" (1.676 m)   Wt 138 lb 7.2 oz (62.8 kg)   SpO2 99%   BMI 22.35 kg/m²   I/O:   PO/Intake:    fair PO intake, monitoring for dysphagia    Bowel/Bladder:   continent,    General:  Patient is well developed, adequately nourished, and    well kempt. HEENT:    PERRLA, hearing intact to loud voice, external inspection of ear and nose -nasal packing. Inspection of lips, tongue -dark and coated-gums benign, nosebleed from nasal cannula O2 patient is also on some blood thinners. Musculoskeletal: No significant change in strength or tone. All joints stable. Inspection and palpation of digits and nails show no clubbing, cyanosis or inflammatory conditions. Neuro/Psychiatric: Affect: flat-  Alert and oriented to self and situation with  min-mod cues. No significant change in deep tendon reflexes or sensation  Lungs:  Diminished, CTA-B  . Respiration effort is normal at rest.   Heart:   S1 = S2,   RRR. Abdomen:  Soft, non-tender    Extremities:  Trace  lower extremity edema but no unusual tenderness. non Fx RUE fistula--functional LUE fistula. Skin:   BUE bruises dt blood draws-his bruises and skin tears.       Rehabilitation:  Physical Therapy:   Bed mobility:  Bed mobility  Rolling to Left: Minimal assistance (08/03/22 0950)  Rolling to Right: Minimal assistance (08/03/22 0950)  Supine to Sit: Moderate assistance (08/05/22 1130)  Sit to Supine: Minimal assistance (Up to bedside chair) (08/05/22 1130)  Scooting: Minimal assistance; Moderate assistance (08/05/22 1130)  Bed Mobility Comments: Requires increased time, effort and use of bed rail. Difficulty maintaining seated balance at midline at EOB due to left lateral lean. (08/05/22 1130)  Transfers:  Transfers  Sit to Stand: Minimal Assistance (08/05/22 1131)  Stand to sit: Minimal Assistance (08/05/22 1131)  Comment: Completes STS x1 with ww (08/05/22 1131)  Gait:   Ambulation  Surface: level tile (08/04/22 1306)  Device: Rolling Walker (08/04/22 1306)  Other Apparatus: O2 (2L O2 NC, increased to 4L O2 NC RN notified and aware) (08/04/22 1306)  Assistance: Minimal assistance (08/04/22 1306)  Quality of Gait: flexed posture, decresaed bilateral step length and foot clearance, unsteady (08/04/22 1306)  Gait Deviations: Slow Whitney;Decreased step length;Decreased step height;Decreased arm swing (08/04/22 1306)  Distance: 5ft to chair (08/04/22 1306)  Comments: SOB upon exertion, HR elevated 110s with exertional dyspnea observed, O2 increased d/t low O2 sats (08/03/22 0950)  Stairs:     W/C mobility:       Occupational Therapy:   Hand Dominance: Left  ADL  Feeding: Setup (08/03/22 0943)  Grooming: Minimal assistance (08/03/22 0943)  UE Bathing: Minimal assistance (08/03/22 0943)  LE Bathing: Maximum assistance (08/03/22 0943)  UE Dressing: Minimal assistance (08/03/22 0943)  LE Dressing: Maximum assistance (08/03/22 0943)  Toileting: Dependent/Total (08/03/22 1125)  Toileting Skilled Clinical Factors: Incontinent of dark loose stool; LPN notified (82/64/80 6912)  Additional Comments: Simulated ADLs as above. Pt. significantly limited d/t fatigue and SOB. (08/03/22 5369)  Toilet Transfers  Toilet Transfer: Unable to assess (08/03/22 0946)  Toilet Transfers Comments: Anticipate min A (08/03/22 0946)          Speech Therapy:            Diet/Swallow:        Dysphagia Outcome Severity Scale: Level 5: Mild dysphagia- Distant supervision.  May need one diet consistency restricted  Compensatory Swallowing Strategies : Upright as possible for all oral intake, Small bites/sips, Assist feed, Eat/Feed slowly  Therapeutic Interventions: Patient/Family education, Diet tolerance monitoring    COGNITION  OT: Cognition Comment: Verbal cues for safety and sequencing  SP:           Lab/X-ray studies reviewed, analyzed and discussed with patient and staff:   Recent Results (from the past 24 hour(s))   POCT Glucose    Collection Time: 08/06/22  8:28 PM   Result Value Ref Range    POC Glucose 79 70 - 99 mg/dl    Performed on ACCU-CHEK    POCT Glucose    Collection Time: 08/06/22 11:25 PM   Result Value Ref Range    POC Glucose 74 70 - 99 mg/dl    Performed on ACCU-CHEK    SPECIMEN REJECTION    Collection Time: 08/07/22 12:26 AM   Result Value Ref Range    Rejected Test CBC     Reason for Rejection see below    CBC with Auto Differential    Collection Time: 08/07/22  1:00 AM   Result Value Ref Range    WBC 16.0 (H) 4.8 - 10.8 K/uL    RBC 2.64 (L) 4.70 - 6.10 M/uL    Hemoglobin 8.7 (L) 14.0 - 18.0 g/dL    Hematocrit 26.4 (L) 42.0 - 52.0 %    .1 (H) 80.0 - 100.0 fL    MCH 33.1 (H) 27.0 - 31.3 pg    MCHC 33.1 33.0 - 37.0 %    RDW 16.0 (H) 11.5 - 14.5 %    Platelets 426 105 - 107 K/uL    Neutrophils % 91.7 %    Lymphocytes % 1.9 %    Monocytes % 5.9 %    Eosinophils % 0.2 %    Basophils % 0.3 %    Neutrophils Absolute 14.7 (H) 1.4 - 6.5 K/uL    Lymphocytes Absolute 0.3 (L) 1.0 - 4.8 K/uL    Monocytes Absolute 1.0 (H) 0.2 - 0.8 K/uL    Eosinophils Absolute 0.0 0.0 - 0.7 K/uL    Basophils Absolute 0.1 0.0 - 0.2 K/uL   Renal Function Panel    Collection Time: 08/07/22  6:45 AM   Result Value Ref Range    Sodium 136 135 - 144 mEq/L    Potassium 4.7 3.4 - 4.9 mEq/L    Chloride 93 (L) 95 - 107 mEq/L    CO2 27 20 - 31 mEq/L    Anion Gap 16 (H) 9 - 15 mEq/L    Glucose 73 70 - 99 mg/dL    BUN 23 8 - 23 mg/dL    Creatinine 4.07 (H) 0.70 - 1.20 mg/dL    GFR Non-African American 14.7 (L) >60    GFR  17.8 (L) >60    Calcium 9.0 8.5 - 9.9 mg/dL    Phosphorus 5.1 (H) 2.3 - 4.8 mg/dL    Albumin 3.3 (L) 3.5 - 4.6 g/dL   POCT Glucose    Collection Time: 08/07/22 11:19 AM   Result Value Ref Range    POC Glucose 63 (L) 70 - 99 mg/dl    Performed on ACCU-CHEK    POCT Mixed    Collection Time: 08/07/22  1:09 PM   Result Value Ref Range    POC Sodium 131 (L) 136 - 145 mEq/L    POC Potassium 4.2 3.5 - 5.1 mEq/L    POC Chloride 94 (L) 99 - 110 mEq/L    POC Glucose 65 (L) 70 - 99 mg/dl    POC Creatinine 4.0 (H) 0.8 - 1.3 mg/dL    GFR Non-African American 15 (A) >60    GFR  18 (A) >60    Calcium, Ionized 1.16 1.12 - 1.32 mmol/L    PH MIXED 7.324 (L) 7.350 - 7.450    PCO2, Mixed 53.2 Not Established mm Hg    PO2, Mixed 25 Not Established mmHg    HCO3, Mixed 27.7 Not Established mmol/L    Base Exc, Mixed 2 Not Established    O2 Sat, Mixed 40 Not Established %    tCO2, Mixed 29 Not Established mmol/L    Lactate 1.95 0.40 - 2.00 mmol/L    Hemoglobin 9.0 (L) 13.5 - 17.5 gm/dL    POC Hematocrit 26 (L) 41 - 53 %    FIO2 50.000     Sample Type MIXED     Performed on SEE BELOW    POCT Glucose    Collection Time: 08/07/22  4:14 PM   Result Value Ref Range    POC Glucose 63 (L) 70 - 99 mg/dl    Performed on ACCU-CHEK      Previous extensive, complex labs, notes and diagnostics reviewed and analyzed. ALLERGIES:    Allergies as of 07/25/2022    (No Known Allergies)      (please also verify by checking [facility])     Complex Physical Medicine & Rehab Issues Assess & Plan:   Severe abnormality of gait and mobility and impaired self-care and ADL's secondary to hypoxic encephalopathy. Updated functional and medical status reassessed regarding patients ability to participate in therapies and patient found to be able to participate in:        acute intensive comprehensive inpatient rehabilitation program including PT/OT to improve balance, ambulation, ADLs, and to improve the P/AROM.    It is my opinion that they will be able to tolerate 3 hours of therapy a day and benefit from it at an acute level. I again discussed acute rehab with the patient and verify that the patient is able and willing to participate in 3 hours of therapy a day. Rehab and Acute Care Case Management has also reinforced this expectation. Will continue to follow to attempt to get patient to the most efficient but most effective level of care will be in their best interest.  Continue to focus on energy conservation heart rate and blood pressure monitoring before during and after therapy endurance and consistency of function. Bowel constipation   and Bladder dysfunction   overactive, neurogenic bladder:  frequent toileting, ambulate to bathroom with assistance, check post void residuals. Check for C.difficile x1 if >2 loose stools in 24 hours, continue bowel & bladder program.  Monitor for UTI symptoms including lethargy and confusion    Moderate back pain and generalized body aches and pain likely secondary to renal osteodystrophy and generalized OA pain: reassess pain every shift and prior to and after each therapy session, I advise giving prn OxyIR--avoiod Tylenol DT liver risks and consider scheduled Tylenol, modalities prn in therapy, consider Lidoderm, K-pad prn. Skin healing   multiple upper extremity skin tears breakdown   risk:  continue pressure relief program.  Daily skin exams and reports from nursing. Severe fatigue due to immobility and nutritional deficits: continue to monitor closely for dehydration   Add vitamin B12 vitamin D and CoQ10 titrate dosing and add protein supplementation with low carb content. Complex discharge planning:   Discussed with care team-last 24 hour events noted. I will continue to follow along and reassess functional and medical status as we strive to improve patient's functional and medical outcomes progressing to the most efficient and lowest level of care.        Complex Active General Medical Issues that complicate care:     1. Principal Problem:    Acute respiratory failure with hypoxia (HCC)  Active Problems:    Pneumonia due to infectious organism    Pleural effusion    Impaired mobility and activities of daily living    Dysphagia, oropharyngeal phase    Epistaxis    Duodenal ulcer    ESRD (end stage renal disease) on dialysis [facility])    Liver transplant recipient [facility])  Resolved Problems:    * No resolved hospital problems.  *          Will obtain new functioanl evals Armstrong MD Annette Heimlich, D.O., PM&R     Attending    [facility]

## 2022-08-07 NOTE — FLOWSHEET NOTE
Rapid Response called by Barbara Anders RN at 2157 due to pt have rapid nose bleed after an attempt to change saturated nose drsg and BP 65/19 MAP 34 . Dr. Isabel Davila and pt now on venturi mask. Unable to get accurate/consistent BP or SPO2 on pt. But BP currently 115/84 . Pt denies SOB, alert, talking and shows no sign of breathing distress. Dr. Bharti Ayala NS bolus and CBC. Presented in RRT are Raina Rivas RN, Julia Lemus RN, Yoel Astudillo supervisor, Elizabeth Ferguson from lab.

## 2022-08-07 NOTE — PROGRESS NOTES
Gastroenterology Progress Note    Ana Last is a 75 y.o. male patient. Hospitalization Day:13    Chief C/O: Melena    SUBJECTIVE: Seen and examined on medical surgical unit. He denies nausea/vomiting, abdominal pain, hematemesis. He does report recurrence of epistaxis yesterday evening that has continued today. He endorses continued dark bowel movements. Hgb today stable at 8.7, WBC 16.0, platelets 722, BUN 23, creatinine 4.07. Physical    VITALS:  /84   Pulse 100   Temp 98.1 °F (36.7 °C)   Resp 20   Ht 5' 6\" (1.676 m)   Wt 138 lb 7.2 oz (62.8 kg)   SpO2 99%   BMI 22.35 kg/m²   TEMPERATURE:  Current - Temp: 98.1 °F (36.7 °C); Max - Temp  Av.2 °F (36.8 °C)  Min: 98.1 °F (36.7 °C)  Max: 98.4 °F (36.9 °C)    General -alert, oriented, in no acute distress  Eyes -no icterus, no pallor  Cardiovascular - RRR  Lungs -clear to auscultation bilaterally  Abdomen -+ central obesity, + epigastric tenderness, bowel sounds present   Extremities -no edema  Skin -warm/dry, without jaundice  Neuro: Without focal deficit, moves all extremities     Data    Data Review:    Recent Labs     22  0507 22  0908 22  0030 22  1200 22  0100   WBC 10.9*  --   --   --  16.0*   HGB 7.3*   < > 9.0* 9.3* 8.7*   HCT 22.1*   < > 27.0* 28.0* 26.4*   .7*  --   --   --  100.1*     --   --   --  275    < > = values in this interval not displayed. Recent Labs     22  0507 22  0600 22  0645   * 127* 136   K 4.7 4.6 4.7   CL 89* 86* 93*   CO2 25 26 27   PHOS 4.5 5.2* 5.1*   BUN 31* 40* 23   CREATININE 4.94* 5.62* 4.07*     Recent Labs     22  1524   AST 54*   ALT 83*   BILIDIR <0.2   BILITOT <0.2   ALKPHOS 67     No results for input(s): LIPASE, AMYLASE in the last 72 hours.   Recent Labs     22  1500   PROTIME 16.8*   INR 1.4       ASSESSMENT:  76 y/o male admitted on  for SOB & sepsis,  initially required intubation, now clinically improved and seen on the regular medical floor. Patient carries a diagnosis of end-stage renal disease and is on hemodialysis Monday Wednesdays and Fridays, on arrival hemoglobin was noted to be 13, now 7.9 with associated melena in the context of epistaxis and anticoagulation for bilateral upper extremity DVTs. Was on Eliquis, this has been held. 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 Noted patient admitted on to sepsis due to pneumonia and no Prograf level noted currently. Patient is not on tacrolimus inpatient at this time. No recent liver function test  8/5/22: Patient continues to have melanotic stools and hemoglobin trending down, currently 7.3. He remains off Eliquis at this time however on Lovenox. Also noted to have elevated transaminases. S/P EGD 8/5/22 with Dr. Daniel Villeda with red blood in the upper third of the esophagus. Oozing duodenal ulcer with oozing hemorrhage (Hayder Class Ib). Clips X 4 were placed. Persistent though decreased oozing at the site of the ulcer. Friable gastric mucosa as well as duodenal mucosa spontaneously oozing. Noted recent anticoagulation use. Gastritis. He was transferred to ICU and given 2 units PRBCs after his EGD. Hgb remains stable today at 8.7, however patient with recurrence of epistaxis yesterday evening/today. Patient continues to report dark-colored stool. PLAN :  -Continue course of care  -Monitor serial H&H, trend and transfuse accordingly  -Advance to full liquid diet  -Discussed with patient at length option to attempt to transfer to tertiary care center for continued care regarding his bilateral upper and lower extremity DVTs requiring anticoagulation, recurrent epistaxis, oozing duodenal ulcer S/P clip placement, and history of liver transplant.   With shared decision making, patient declines transfer to tertiary facility at this time, however is aware it is an option.  -Further recommendations pending patient's clinical course    Thank you for allowing me to participate in the care of your patient. Please feel free to contact me with any concerns.     ISI Richmond - CNP

## 2022-08-07 NOTE — PROGRESS NOTES
INPATIENT PROGRESS NOTES    PATIENT NAME: Perri Cardoza  MRN: [id number]  SERVICE DATE:  August 7, 2022   SERVICE TIME:  12:43 PM      PRIMARY SERVICE: Pulmonary Disease    CHIEF COMPLAIN: Respiratory failure      INTERVAL HPI: Patient seen and examined at bedside, Interval Notes, orders reviewed. Nursing notes noted  He is on 50% Ventimask. His transfer out of the ICU. He still said he is having short of breath with any exertion. He has nasal packing on the left side. His O2 saturation is 99%. No chest pain. No fever or chills. He has low blood pressure    OBJECTIVE    Body mass index is 22.35 kg/m². PHYSICAL EXAM:  Vitals:  BP 86/60   Pulse (!) 107   Temp 98.4 °F (36.9 °C) (Axillary)   Resp 20   Ht 5' 6\" (1.676 m)   Wt 138 lb 7.2 oz (62.8 kg)   SpO2 99%   BMI 22.35 kg/m²   General: Alert, awake . comfortable in bed, No distress. Head: Atraumatic , Normocephalic   Eyes: PERRL. No sclera icterus. No conjunctival injection. No discharge   ENT: Nasal packing on the left side. No active bleeding. Pharynx clear. Neck:  Trachea midline. No thyromegaly, no JVD, No cervical adenopathy. Chest : Bilaterally symmetrical ,Normal effort,  No accessory muscle use  Lung : . Fair BS bilateral, decreased BS at bases. No Rales. No wheezing. No rhonchi. Heart[de-identified] Normal  rate. Regular rhythm. No mumur ,  Rub or gallop  ABD: Non-tender. Non-distended. No masses. No organmegaly. Normal bowel sounds. No hernia. Ext : Edema in both upper extremity. No edema in lower extremity. No Cyanosis No clubbing  Neuro: no focal weakness          DATA:   Recent Labs     08/05/22  0507 08/05/22  0908 08/06/22  1200 08/07/22  0100   WBC 10.9*  --   --  16.0*   HGB 7.3*   < > 9.3* 8.7*   HCT 22.1*   < > 28.0* 26.4*   .7*  --   --  100.1*     --   --  275    < > = values in this interval not displayed.      Recent Labs     08/04/22  1524 08/05/22  0507 08/06/22  0600 08/07/22  0645   NA  --    < > 127* 136   K  -- < > 4.6 4.7   CL  --    < > 86* 93*   CO2  --    < > 26 27   BUN  --    < > 40* 23   CREATININE  --    < > 5.62* 4.07*   GLUCOSE  --    < > 66* 73   CALCIUM  --    < > 8.5 9.0   PROT 5.7*  --   --   --    LABALBU 2.5*   < > 3.2* 3.3*   BILITOT <0.2  --   --   --    ALKPHOS 67  --   --   --    AST 54*  --   --   --    ALT 83*  --   --   --    LABGLOM  --    < > 10.1* 14.7*   GFRAA  --    < > 12.3* 17.8*    < > = values in this interval not displayed. MV Settings:     Vent Mode: CPAP  Vt (Set, mL): 390 mL  Resp Rate (Set): 18 bmp  FiO2 : 50 %  PEEP/CPAP (cmH2O): 5  Pressure Support: 5 cmH20  Peak Inspiratory Pressure (cmH2O): 9.3 cmH2O  Mean Airway Pressure (cmH2O): 6 cmH20  I:E Ratio: 1:3    No results for input(s): PHART, FFQ1AZJ, PO2ART, CSB9EGS, BEART, R9DEXPOE in the last 72 hours. O2 Device: Venturi-mask  O2 Flow Rate (L/min): 12 L/min    ADULT DIET;  Clear Liquid     MEDICATIONS during current hospitalization:    Continuous Infusions:   sodium chloride      sodium chloride      sodium chloride      sodium chloride      sodium chloride      dextrose         Scheduled Meds:   pill splitter   Does not apply Once    chlorhexidine  15 mL Mouth/Throat BID    sodium chloride flush  5-40 mL IntraVENous 2 times per day    pantoprazole (PROTONIX) 40 mg injection  40 mg IntraVENous Q12H    [Held by provider] enoxaparin  1 mg/kg SubCUTAneous Daily    fluticasone  1 spray Each Nostril Daily    sodium chloride flush  5-40 mL IntraVENous 2 times per day    midodrine  2.5 mg Oral TID WC    metoprolol tartrate  12.5 mg Oral BID    [Held by provider] aspirin  81 mg Oral Daily    docusate sodium  100 mg Oral BID    insulin lispro  0-4 Units SubCUTAneous 4x Daily AC & HS    polyethylene glycol  17 g Oral Daily    tacrolimus  1 mg Oral BID       PRN Meds:sodium chloride, sodium chloride flush, sodium chloride, acetaminophen, ondansetron, hydrALAZINE, labetalol, sodium chloride flush, sodium chloride, acetaminophen **OR** acetaminophen, [DISCONTINUED] fentaNYL **AND** fentaNYL, glucose, dextrose bolus **OR** dextrose bolus, glucagon (rDNA), dextrose, heparin (porcine)    Radiology  Echocardiogram complete 2D with doppler with color    Result Date: 7/27/2022  Transthoracic Echocardiography Report (TTE)  Demographics   Patient Name    VIA [facility]       Gender               Male                  [facility] [place]   Patient Number  [id number]       Race                                                   Ethnicity   Visit Number    [id number]      Room Number          IC12   Corporate ID                   Date of Study        07/27/2022   Accession       [id number]     Referring Physician  Number   Date of Birth   1955     Sonographer          Jaiden Baeza   Age             75 year(s)     Interpreting         [facility])                                 Physician            Cardiology                                                      Chris Nunez  Procedure Type of Study   TTE procedure:ECHO COMPLETE 2D W/DOP W/COLOR. Procedure Date Date: 07/27/2022 Start: 10:27 AM Study Location: Portable Technical Quality: Limited visualization due to lung interface. Indications:LVF. Patient Status: Routine Height: 66 inches Weight: 148 pounds BSA: 1.76 m^2 BMI: 23.89 kg/m^2 BP: 96/54 mmHg  Conclusions   Summary  Left ventricular ejection fraction is estimated at 45%. Diastolic Dysfunction is noted by mitral flow. Normal right ventricle systolic pressure. RVSP 28mmHg  Echogenic mass in the apex concerning for thrombus recommend limited echo  with definity  No hemodynamic evidence of significant valve disease   Signature   ----------------------------------------------------------------  Electronically signed by Leticia Ricks(Interpreting physician)  on 07/27/2022 03:48 PM  ----------------------------------------------------------------   Findings  Left Ventricle Left ventricular ejection fraction is estimated at 45%.  Left ventricular size is normal . Normal left ventricular wall thickness. Diastolic Dysfunction is noted by mitral flow. Echogenic mass in the apex possible thrombus Right Ventricle Normal right ventricle structure and function. Normal right ventricle systolic pressure. RVSP 28mmHg Left Atrium Normal left atrium. Right Atrium Normal right atrium. Mitral Valve Diffusely thickened and pliable mitral valve leaflets with normal excursion. Structurally normal mitral valve. No evidence of mitral valve stenosis. No evidence of mitral regurgitaton. Tricuspid Valve Tricuspid valve is structurally normal. No evidence of tricuspid stenosis. No evidence of tricuspid regurgitation. Aortic Valve Mildly thickened trileaflet aortic valve with normal excursion. Structurally normal aortic valve. No evidence of aortic valve stenosis . No evidence of aortic valve regurgitation . Pulmonic Valve The pulmonic valve was not well visualized . Pericardial Effusion No evidence of pericardial effusion. Aorta \ Miscellaneous The aorta is within normal limits. M-Mode Measurements (cm)   LVIDd: 2.97 cm                         LVIDs: 2.34 cm  IVSd: 0.73 cm                          IVSs: 1.01 cm  LVPWd: 0.9 cm                          LVPWs: 1.01 cm  Rt. Vent.  Dimension: 3.12 cm           AO Root Dimension: 2.1 cm                                         ACS: 1.37 cm                                         LA: 1.82 cm                                         LVOT: 2.03 cm  Doppler Measurements:   AV Velocity:0.02 m/s                    MV Peak E-Wave: 0.5 m/s  AV Peak Gradient: 5.77 mmHg             MV Peak A-Wave: 0.53 m/s  AV Mean Gradient: 3.05 mmHg  AV Area (Continuity):1.97 cm^2  TR Velocity:2.52 m/s                    Estimated RAP:3 mmHg  TR Gradient:25.31 mmHg                  RVSP:28.31 mmHg  Valves  Mitral Valve   Peak E-Wave: 0.5 m/s                  Peak A-Wave: 0.53 m/s  Mean Velocity: 0.87 m/s               E/A Ratio: 0.94  Mean Gradient: 4.66 mmHg Peak Gradient: 0.99 mmHg                                        Deceleration Time: 353.3 msec                                        Area (continuity): 1.4 cm^2   Tissue Doppler   E' Septal Velocity: 0.07 m/s  E' Lateral Velocity: 0.07 m/s   Aortic Valve   Peak Velocity: 1.2 m/s                 Mean Velocity: 0.82 m/s  Peak Gradient: 5.77 mmHg               Mean Gradient: 3.05 mmHg  Area (continuity): 1.97 cm^2  AV VTI: 29.33 cm   Cusp Separation: 1.37 cm   Tricuspid Valve   Estimated RVSP: 28.31 mmHg              Estimated RAP: 3 mmHg  TR Velocity: 2.52 m/s                   TR Gradient: 25.31 mmHg   Pulmonic Valve   Peak Velocity: 0.99 m/s           Peak Gradient: 3.91 mmHg                                    Estimated PASP: 28.31 mmHg   LVOT   Peak Velocity: 0.97 m/s              Mean Velocity: 0.61 m/s  Peak Gradient: 3.77 mmHg             Mean Gradient: 1.79 mmHg  LVOT Diameter: 2.03 cm               LVOT VTI: 17.9 cm  Structures  Left Atrium   LA Dimension: 1.82 cm                        LA Area: 12.58 cm^2  LA/Aorta: 0.87  LA Volume/Index: 44.74 ml /25 m^2   Left Ventricle   Diastolic Dimension: 9.22 cm         Systolic Dimension: 1.74 cm  Septum Diastolic: 3.93 cm            Septum Systolic: 9.85 cm  PW Diastolic: 0.9 cm                 PW Systolic: 1.99 cm                                       FS: 21.2 %  LV EDV/LV EDV Index: 34.12 ml/19 m^2 LV ESV/LV ESV Index: 19.02 ml/11 m^2  EF Calculated: 44.3 %   LVOT Diameter: 2.03 cm   Right Ventricle   Diastolic Dimension: 5.52 cm                                    RV Systolic Pressure: 63.79 mmHg  Aorta/ Miscellaneous Aorta   Aortic Root: 2.1 cm  LVOT Diameter: 2.03 cm      ECHO Limited    Result Date: 7/28/2022  Transthoracic Echocardiography Report (TTE)  Demographics   Patient Name    VIA [facility]       Gender               Male                  [facility]   Patient Number  [id number]       Race                                                   Ethnicity   Visit Number    [id number]      Room Number          IC12   Corporate ID                   Date of Study        07/28/2022   Accession       [id number]     Referring Physician  Number   Date of Birth   1955     Sonographer          Della Melisa   Age             75 year(s)     Interpreting         [facility])                                 Physician            Cardiology                                                      [facility]  Procedure Type of Study   TTE procedure:ECHOCARDIOGRAM LIMITED. Procedure Date Date: 07/28/2022 Start: 08:08 AM Study Location: Portable Technical Quality: Limited visualization Indications:LVF. Patient Status: Routine Contrast Medium: Definity. Amount - 2 ml Height: 66 inches Weight: 148 pounds BSA: 1.76 m^2 BMI: 23.89 kg/m^2  Conclusions   Summary  No evidence of thrombus with definity  Left ventricular ejection fraction is visually estimated at 65%. Signature   ----------------------------------------------------------------  Electronically signed by Elaine Ricks(Interpreting physician)  on 07/28/2022 08:42 AM  ----------------------------------------------------------------   Findings  Left Ventricle Left ventricular ejection fraction is visually estimated at 65%. IR FLUORO GUIDED CVA DEVICE PLMT/REPLACE/REMOVAL    1. Venogram of the right internal jugular vein demonstrates a thrombus in the right internal jugular vein which completely occludes the vein. Passage of contrast into the chest is through small collateral veins. 2. 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 Passage of contrast into the chest is through a small collateral veins. Radiation dose to the patient was: 24.21 mGy Additional clinical data: Long-term IV access Procedure: 1. Ultrasound guidance for vascular access into the right internal jugular vein.  The ultrasound image of the blood vessel was saved to PACS. 2. Venogram via contrast injection of the right internal jugular vein. 3.   Ultrasound guidance for vascular access into the left internal jugular vein. The ultrasound image of the blood vessel was saved to PACS 4. Venogram via contrast injection of the left internal jugular vein. Body of Report: Informed and written consent was obtained from the patient following discussion of risks, benefits and alternatives to this procedure. The was patient placed supine on the angiographic table. The patient's neck and chest were then prepped and draped in  normal sterile fashion. A small amount of local lidocaine anesthesia was injected subcutaneously. Ultrasound was used to study the jugular vein we intended to use prior to accessing it. The vein appeared patent. The ultrasound image of the blood vessel was saved to PACS. Using ultrasound access, puncture was made of the right internal jugular vein using a 21 GA needle. A wire was advanced into the right internal jugular vein, though would not pass into the superior vena cava. A 4 [place] short thin sheath was placed, through the sheath digital subtraction venography was performed. Venography demonstrated a thrombus in the right internal jugular vein and complete occlusion of the vein central to the thrombus. This access was aborted. Ultrasound was used to study the left jugular vein we intended to use prior to accessing it. The vein appeared patent. The ultrasound image of the blood vessel was saved to PACS. Using ultrasound access, puncture was made of the left internal jugular vein using a 21 GA needle. A wire was attempted to be passed, though would not pass to the superior vena  cava. A 4 [place] thin sheath was placed and digital subtraction venography was performed. 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 The procedure was aborted. Findings discussed with ICU team who will place a temporary central line in the groin. XR CHEST PORTABLE    Result Date: 7/27/2022  XR CHEST PORTABLE COMPARISON: July 25, 2022. HISTORY: resp failure TECHNIQUE: AP view FINDINGS: Endotracheal tube again seen in place. . There is also an enteric tube seen in place and the tip is below the diaphragm. They appear unchanged in position. A small pleural effusion seen on the right. The left costophrenic angle is not well seen. The lungs are hyperinflated and there is coarsening of the interstitium. Atelectasis and/or scarring is again seen bilaterally in the lower lung fields. The cardiac silhouette appears mildly enlarged but may be accentuated by the portable technique. Degenerative changes are seen in the visualized portion of the right shoulder. The airspace disease has diminished when compared to previous study. . A small pleural effusion is seen on the right and scarring or atelectasis is again seen bilaterally in the bases. US DUP UPPER EXTREMITY RIGHT VENOUS COMPARISON: HISTORY:  resp failure TECHNIQUE: , US DUP UPPER EXTREMITY RIGHT VENOUS AND LEFT VENOUS FINDINGS: Thrombus is seen in the right internal jugular and proximal subclavian, veins it is also seen in the cephalic vein. The right ulnar and basilic veins are not visualized. A right AV fistula seen. Thrombus is seen in the left internal jugular vein. The left basilic and axillary veins are not visualized. IMPRESSION: Deep venous thrombosis seen in the right and left upper extremities. .    XR CHEST PORTABLE    Result Date: 7/25/2022  XR CHEST PORTABLE : 7/25/2022 CLINICAL HISTORY:  post intubation . COMPARISON: Earlier 7/25/2022 TECHNIQUE: A portable upright AP radiograph of the chest was obtained at approximately 12:46 PM. FINDINGS: Both lung bases have been excluded from the radiograph. An endotracheal tube is present, with its tip approximately 4 to 5 cm above the lucita. An orogastric tube probably extends below the diaphragm out of field of view radiograph.  Moderate pleural effusions and mild to moderate probable scarring and/or atelectasis of the mid to lower lung fields has not significantly changed. There is no cardiomegaly, pneumothorax, displaced fractures, or other significant changes identified. ENDOTRACHEAL AND OROGASTRIC TUBES IN EXPECTED POSITIONS. OTHERWISE, STABLE CHEST FROM EARLIER 7/25/2022. XR CHEST PORTABLE    Result Date: 7/25/2022  TECHNIQUE: Single portable view of the chest. CLINICAL INDICATION: Chest pain. COMPARISON: Chest x-ray obtained on June 28, 2022. PROCEDURE AND FINDINGS: Poor inspiratory effort is seen. The cardiomediastinal silhouette is slightly prominent in size. Mild prominence of the bronchovascular and interstitial lung markings is visualized bilaterally, linear streaky opacities visualized in bilateral lung fields, subsegmental atelectatic streaks, fluid visualized in the right minor fissure. Airspace opacification visualized in the lower lung fields bilaterally with blunting of the costophrenic angles and obliteration of the hemidiaphragms consistent with bilateral pleural effusions. . No evidence of pneumothorax or parenchymal lung mass. Degenerative bone changes. Congestive heart failure versus pneumonia and parapneumonic effusions would recommend clinical correlation. Increased opacification seen in comparison to the prior study. US DUP UPPER EXTREMITY RIGHT VENOUS    Result Date: 7/27/2022  XR CHEST PORTABLE COMPARISON: July 25, 2022. HISTORY: resp failure TECHNIQUE: AP view FINDINGS: Endotracheal tube again seen in place. . There is also an enteric tube seen in place and the tip is below the diaphragm. They appear unchanged in position. A small pleural effusion seen on the right. The left costophrenic angle is not well seen. The lungs are hyperinflated and there is coarsening of the interstitium. Atelectasis and/or scarring is again seen bilaterally in the lower lung fields.  The cardiac silhouette appears mildly enlarged but may be accentuated by the portable technique. Degenerative changes are seen in the visualized portion of the right shoulder. The airspace disease has diminished when compared to previous study. . A small pleural effusion is seen on the right and scarring or atelectasis is again seen bilaterally in the bases. US DUP UPPER EXTREMITY RIGHT VENOUS COMPARISON: HISTORY:  resp failure TECHNIQUE: , US DUP UPPER EXTREMITY RIGHT VENOUS AND LEFT VENOUS FINDINGS: Thrombus is seen in the right internal jugular and proximal subclavian, veins it is also seen in the cephalic vein. The right ulnar and basilic veins are not visualized. A right AV fistula seen. Thrombus is seen in the left internal jugular vein. The left basilic and axillary veins are not visualized. IMPRESSION: Deep venous thrombosis seen in the right and left upper extremities. .    US DUP UPPER EXTREMITY LEFT VENOUS    Result Date: 7/27/2022  XR CHEST PORTABLE COMPARISON: July 25, 2022. HISTORY: resp failure TECHNIQUE: AP view FINDINGS: Endotracheal tube again seen in place. . There is also an enteric tube seen in place and the tip is below the diaphragm. They appear unchanged in position. A small pleural effusion seen on the right. The left costophrenic angle is not well seen. The lungs are hyperinflated and there is coarsening of the interstitium. Atelectasis and/or scarring is again seen bilaterally in the lower lung fields. The cardiac silhouette appears mildly enlarged but may be accentuated by the portable technique. Degenerative changes are seen in the visualized portion of the right shoulder. The airspace disease has diminished when compared to previous study. . A small pleural effusion is seen on the right and scarring or atelectasis is again seen bilaterally in the bases.  US DUP UPPER EXTREMITY RIGHT VENOUS COMPARISON: HISTORY:  resp failure TECHNIQUE: , US DUP UPPER EXTREMITY RIGHT VENOUS AND LEFT VENOUS FINDINGS: Thrombus is seen in the right internal jugular and proximal subclavian, veins it is also seen in the cephalic vein. The right ulnar and basilic veins are not visualized. A right AV fistula seen. Thrombus is seen in the left internal jugular vein. The left basilic and axillary veins are not visualized. IMPRESSION: Deep venous thrombosis seen in the right and left upper extremities. .    US DUP UPPER EXTREMITY RIGHT ARTERIES    Result Date: 8/2/2022  US DUP UPPER EXTREMITY RIGHT ARTERIES: 8/1/2022 11:42 AM CLINICAL HISTORY:  cold, poor pulses, extensive dvt . COMPARISON: None available. Grayscale, color and waveform Doppler analysis of the right upper arterial system was performed. FINDINGS: Mild to moderate atherosclerotic disease, with pulsatile mild to moderately diminished waveforms worsening distally. There are no significantly elevated velocities to suggest a flow-limiting stenosis, or arterial occlusion. The arterial system is normal in caliber, without evidence of aneurysm or dissection. Maximum systolic velocities are: RIGHT UPPER EXTREMITY: Right proximal common carotid artery 70 cm/s Right mid common carotid artery 53 cm/s Right proximal subclavian artery 68 cm/s Right mid subclavian artery 81 cm/s Right distal subclavian artery 71 cm/s Right axillary artery 41 cm/s Right brachial artery proximal 111 cm/s Right brachial artery mid 97 cm/s Right brachial artery distal 123 cm/s Right radial artery proximal 53 cm/s Right radial artery mid 43 cm/s Right radial artery distal 18 cm/s Right ulnar artery proximal 61 cm/s Right ulnar artery and mid 46 cm/s Right ulnar artery distal 33 cm/s     NO EVIDENCE OF A FLOW-LIMITING STENOSIS, ARTERIAL OCCLUSION, OR ANEURYSM. IR VENOGRAM VENOUS SINUS/JUGULAR    1. Venogram of the right internal jugular vein demonstrates a thrombus in the right internal jugular vein which completely occludes the vein. Passage of contrast into the chest is through small collateral veins.  2. 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 Passage of contrast into the chest is through a small collateral veins. Radiation dose to the patient was: 24.21 mGy Additional clinical data: Long-term IV access Procedure: 1. Ultrasound guidance for vascular access into the right internal jugular vein. The ultrasound image of the blood vessel was saved to PACS. 2. Venogram via contrast injection of the right internal jugular vein. 3.   Ultrasound guidance for vascular access into the left internal jugular vein. The ultrasound image of the blood vessel was saved to PACS 4. Venogram via contrast injection of the left internal jugular vein. Body of Report: Informed and written consent was obtained from the patient following discussion of risks, benefits and alternatives to this procedure. The was patient placed supine on the angiographic table. The patient's neck and chest were then prepped and draped in  normal sterile fashion. A small amount of local lidocaine anesthesia was injected subcutaneously. Ultrasound was used to study the jugular vein we intended to use prior to accessing it. The vein appeared patent. The ultrasound image of the blood vessel was saved to PACS. Using ultrasound access, puncture was made of the right internal jugular vein using a 21 GA needle. A wire was advanced into the right internal jugular vein, though would not pass into the superior vena cava. A 4 [place] short thin sheath was placed, through the sheath digital subtraction venography was performed. Venography demonstrated a thrombus in the right internal jugular vein and complete occlusion of the vein central to the thrombus. This access was aborted. Ultrasound was used to study the left jugular vein we intended to use prior to accessing it. The vein appeared patent. The ultrasound image of the blood vessel was saved to PACS.  Using ultrasound access, puncture was made of the left internal jugular vein using a 21 GA needle. A wire was attempted to be passed, though would not pass to the superior vena  cava. A 4 Bengali thin sheath was placed and digital subtraction venography was performed. 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 The procedure was aborted. Findings discussed with ICU team who will place a temporary central line in the groin. IR ULTRASOUND GUIDANCE VASCULAR ACCESS    1. Venogram of the right internal jugular vein demonstrates a thrombus in the right internal jugular vein which completely occludes the vein. Passage of contrast into the chest is through small collateral veins. 2. 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 Passage of contrast into the chest is through a small collateral veins. Radiation dose to the patient was: 24.21 mGy Additional clinical data: Long-term IV access Procedure: 1. Ultrasound guidance for vascular access into the right internal jugular vein. The ultrasound image of the blood vessel was saved to PACS. 2. Venogram via contrast injection of the right internal jugular vein. 3.   Ultrasound guidance for vascular access into the left internal jugular vein. The ultrasound image of the blood vessel was saved to PACS 4. Venogram via contrast injection of the left internal jugular vein. Body of Report: Informed and written consent was obtained from the patient following discussion of risks, benefits and alternatives to this procedure. The was patient placed supine on the angiographic table. The patient's neck and chest were then prepped and draped in  normal sterile fashion. A small amount of local lidocaine anesthesia was injected subcutaneously. Ultrasound was used to study the jugular vein we intended to use prior to accessing it. The vein appeared patent.   The ultrasound image of the blood vessel was saved to PACS. Using ultrasound access, puncture was made of the right internal jugular vein using a 21 GA needle. A wire was advanced into the right internal jugular vein, though would not pass into the superior vena cava. A 4 Malian short thin sheath was placed, through the sheath digital subtraction venography was performed. Venography demonstrated a thrombus in the right internal jugular vein and complete occlusion of the vein central to the thrombus. This access was aborted. Ultrasound was used to study the left jugular vein we intended to use prior to accessing it. The vein appeared patent. The ultrasound image of the blood vessel was saved to PACS. Using ultrasound access, puncture was made of the left internal jugular vein using a 21 GA needle. A wire was attempted to be passed, though would not pass to the superior vena  cava. A 4 Malian thin sheath was placed and digital subtraction venography was performed. 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 The procedure was aborted. Findings discussed with ICU team who will place a temporary central line in the groin. US DUP LOWER EXTREMITIES BILATERAL VENOUS    Result Date: 7/27/2022  EXAMINATION: US DUP LOWER EXTREMITIES BILATERAL VENOUS CLINICAL HISTORY: 79-year-old with lower extremity swelling COMPARISONS: None available. FINDINGS: Duplex and color Doppler ultrasounds were performed of the bilateral lower extremity deep venous systems. Examination was performed portably and limited due to patient condition and access to the lower extremities. Right leg: Visualized portions of the right common femoral vein, femoral vein, popliteal vein demonstrate satisfactory compression, color flow, and augmentation. Deep calf veins are not evaluated. Left leg: The left common femoral vein is enlarged filled with intraluminal echoes with absent color flow and poor compression consistent with occluding thrombus.  Occluding Thrombus extends into the left proximal femoral vein. Mid to distal femoral vein and popliteal vein demonstrate satisfactory compression and color flow. Deep calf veins are not assessed on this portable study     ULTRASOUND FINDINGS ARE POSITIVE FOR OCCLUDING THROMBUS IN THE LEFT COMMON FEMORAL VEIN EXTENDING INTO THE PROXIMAL FEMORAL VEIN. NO ULTRASOUND SIGNS OF DVT IN THE RIGHT LEG FROM THE GROIN TO THE POPLITEAL FOSSA    IR MIDLINE CATH    Result Date: 8/3/2022  FOR BILLING PURPOSES ONLY. STUDY DICTATED IN [facility] BY PHYSICIAN. IMPRESSION AND SUGGESTION:  Acute respiratory failure with hypoxia on 50% Ventimask  Epistaxis s/p nasal packing  Pneumonia  Duodenal ulcer  DVT in lower extremity    Continue O2 via 50% to keep saturation 90% or above. Titrate down to nasal cannula once nasal packing is out. He does complain of shortness of breath but O2 saturation is 99% and he is not having any discharge. He has DVT study in lower extremity but anticoagulation remains on hold due to epistaxis and GI bleed. He has IVC filter placement. For duodenal ulcer he has been on PPI and GI is following. He completed a course of Bactrim and ID is following. Continue present treatment plan      NOTE: This report was transcribed using voice recognition software. Every effort was made to ensure accuracy; however, inadvertent computerized transcription errors may be present.       Electronically signed by Elsa Parr MD, FCCP on 8/7/2022 at 12:43 PM

## 2022-08-08 LAB
ALBUMIN SERPL-MCNC: 3.1 G/DL (ref 3.5–4.6)
ALP BLD-CCNC: 83 U/L (ref 35–104)
ALT SERPL-CCNC: 62 U/L (ref 0–41)
ANION GAP SERPL CALCULATED.3IONS-SCNC: 17 MEQ/L (ref 9–15)
AST SERPL-CCNC: 57 U/L (ref 0–40)
BILIRUB SERPL-MCNC: <0.2 MG/DL (ref 0.2–0.7)
BILIRUBIN DIRECT: <0.2 MG/DL (ref 0–0.4)
BILIRUBIN, INDIRECT: ABNORMAL MG/DL (ref 0–0.6)
BUN BLDV-MCNC: 29 MG/DL (ref 8–23)
CALCIUM SERPL-MCNC: 8.7 MG/DL (ref 8.5–9.9)
CHLORIDE BLD-SCNC: 93 MEQ/L (ref 95–107)
CO2: 24 MEQ/L (ref 20–31)
CREAT SERPL-MCNC: 5.1 MG/DL (ref 0.7–1.2)
GFR AFRICAN AMERICAN: 13.7
GFR NON-AFRICAN AMERICAN: 11.3
GLUCOSE BLD-MCNC: 123 MG/DL (ref 70–99)
GLUCOSE BLD-MCNC: 49 MG/DL (ref 70–99)
GLUCOSE BLD-MCNC: 57 MG/DL (ref 70–99)
GLUCOSE BLD-MCNC: 60 MG/DL (ref 70–99)
GLUCOSE BLD-MCNC: 86 MG/DL (ref 70–99)
GLUCOSE BLD-MCNC: 87 MG/DL (ref 70–99)
GLUCOSE BLD-MCNC: 88 MG/DL (ref 70–99)
GLUCOSE BLD-MCNC: 93 MG/DL (ref 70–99)
HCT VFR BLD CALC: 25.9 % (ref 42–52)
HEMOGLOBIN: 8.4 G/DL (ref 14–18)
MCH RBC QN AUTO: 33.1 PG (ref 27–31.3)
MCHC RBC AUTO-ENTMCNC: 32.6 % (ref 33–37)
MCV RBC AUTO: 101.6 FL (ref 80–100)
PDW BLD-RTO: 16 % (ref 11.5–14.5)
PERFORMED ON: ABNORMAL
PERFORMED ON: NORMAL
PHOSPHORUS: 5.8 MG/DL (ref 2.3–4.8)
PLATELET # BLD: 295 K/UL (ref 130–400)
POTASSIUM SERPL-SCNC: 4.9 MEQ/L (ref 3.4–4.9)
RBC # BLD: 2.55 M/UL (ref 4.7–6.1)
SODIUM BLD-SCNC: 134 MEQ/L (ref 135–144)
TOTAL PROTEIN: 6.7 G/DL (ref 6.3–8)
WBC # BLD: 14.5 K/UL (ref 4.8–10.8)

## 2022-08-08 PROCEDURE — 6370000000 HC RX 637 (ALT 250 FOR IP): Performed by: PHYSICAL MEDICINE & REHABILITATION

## 2022-08-08 PROCEDURE — 99233 SBSQ HOSP IP/OBS HIGH 50: CPT | Performed by: INTERNAL MEDICINE

## 2022-08-08 PROCEDURE — 6370000000 HC RX 637 (ALT 250 FOR IP): Performed by: NURSE PRACTITIONER

## 2022-08-08 PROCEDURE — 2580000003 HC RX 258: Performed by: INTERNAL MEDICINE

## 2022-08-08 PROCEDURE — 80076 HEPATIC FUNCTION PANEL: CPT

## 2022-08-08 PROCEDURE — 36415 COLL VENOUS BLD VENIPUNCTURE: CPT

## 2022-08-08 PROCEDURE — 99231 SBSQ HOSP IP/OBS SF/LOW 25: CPT | Performed by: PHYSICAL MEDICINE & REHABILITATION

## 2022-08-08 PROCEDURE — 2700000000 HC OXYGEN THERAPY PER DAY

## 2022-08-08 PROCEDURE — 84100 ASSAY OF PHOSPHORUS: CPT

## 2022-08-08 PROCEDURE — 6370000000 HC RX 637 (ALT 250 FOR IP): Performed by: INTERNAL MEDICINE

## 2022-08-08 PROCEDURE — 2580000003 HC RX 258: Performed by: NURSE PRACTITIONER

## 2022-08-08 PROCEDURE — 2580000003 HC RX 258: Performed by: FAMILY MEDICINE

## 2022-08-08 PROCEDURE — 99232 SBSQ HOSP IP/OBS MODERATE 35: CPT | Performed by: NURSE PRACTITIONER

## 2022-08-08 PROCEDURE — 85027 COMPLETE CBC AUTOMATED: CPT

## 2022-08-08 PROCEDURE — 80048 BASIC METABOLIC PNL TOTAL CA: CPT

## 2022-08-08 PROCEDURE — A4216 STERILE WATER/SALINE, 10 ML: HCPCS | Performed by: INTERNAL MEDICINE

## 2022-08-08 PROCEDURE — 6360000002 HC RX W HCPCS: Performed by: NURSE PRACTITIONER

## 2022-08-08 PROCEDURE — 6360000002 HC RX W HCPCS: Performed by: INTERNAL MEDICINE

## 2022-08-08 PROCEDURE — 1210000000 HC MED SURG R&B

## 2022-08-08 PROCEDURE — C9113 INJ PANTOPRAZOLE SODIUM, VIA: HCPCS | Performed by: INTERNAL MEDICINE

## 2022-08-08 RX ORDER — SUCRALFATE 1 G/1
1 TABLET ORAL EVERY 6 HOURS SCHEDULED
Status: DISCONTINUED | OUTPATIENT
Start: 2022-08-08 | End: 2022-08-19 | Stop reason: HOSPADM

## 2022-08-08 RX ORDER — ALBUMIN (HUMAN) 12.5 G/50ML
25 SOLUTION INTRAVENOUS ONCE
Status: DISCONTINUED | OUTPATIENT
Start: 2022-08-08 | End: 2022-08-08

## 2022-08-08 RX ORDER — DEXTROSE MONOHYDRATE 50 MG/ML
INJECTION, SOLUTION INTRAVENOUS CONTINUOUS
Status: DISCONTINUED | OUTPATIENT
Start: 2022-08-08 | End: 2022-08-13

## 2022-08-08 RX ADMIN — Medication 10 ML: at 08:34

## 2022-08-08 RX ADMIN — SODIUM CHLORIDE, PRESERVATIVE FREE 40 MG: 5 INJECTION INTRAVENOUS at 20:26

## 2022-08-08 RX ADMIN — SODIUM CHLORIDE, PRESERVATIVE FREE 40 MG: 5 INJECTION INTRAVENOUS at 08:28

## 2022-08-08 RX ADMIN — 0.12% CHLORHEXIDINE GLUCONATE 15 ML: 1.2 RINSE ORAL at 08:29

## 2022-08-08 RX ADMIN — DEXTROSE MONOHYDRATE 125 ML: 100 INJECTION, SOLUTION INTRAVENOUS at 08:20

## 2022-08-08 RX ADMIN — Medication 10 ML: at 20:27

## 2022-08-08 RX ADMIN — DEXTROSE MONOHYDRATE 125 ML: 100 INJECTION, SOLUTION INTRAVENOUS at 17:24

## 2022-08-08 RX ADMIN — DOCUSATE SODIUM 100 MG: 100 CAPSULE, LIQUID FILLED ORAL at 20:26

## 2022-08-08 RX ADMIN — 0.12% CHLORHEXIDINE GLUCONATE 15 ML: 1.2 RINSE ORAL at 20:26

## 2022-08-08 RX ADMIN — DEXTROSE MONOHYDRATE: 50 INJECTION, SOLUTION INTRAVENOUS at 10:15

## 2022-08-08 RX ADMIN — DEXTROSE MONOHYDRATE: 50 INJECTION, SOLUTION INTRAVENOUS at 17:40

## 2022-08-08 RX ADMIN — TACROLIMUS 1 MG: 1 CAPSULE ORAL at 08:28

## 2022-08-08 RX ADMIN — Medication 10 ML: at 08:33

## 2022-08-08 RX ADMIN — TACROLIMUS 1 MG: 1 CAPSULE ORAL at 20:26

## 2022-08-08 ASSESSMENT — PAIN SCALES - GENERAL: PAINLEVEL_OUTOF10: 0

## 2022-08-08 NOTE — PROGRESS NOTES
[facility]   Facility/Department: [address] [facility]  Speech Language Pathology    Perri Cardoza  1955  [id number]    Date: 8/8/2022      Speech Therapy attempted to see Perri Cardoza on this date for a/an:    Treatment    Pt was unable to be seen due to:   Nursing deferred: and Other:Pt deferred this date. Pt is unable to tolerate nasal canula. Requires venti mask at all times. Spoke with RN, Isabela Rojo. RN reports, pt's oxygen level drop dramatically when attempting to eat. Pt is not eating currently due to shortness of breath. She has already spoken with Dr. Navarro Childers to notify him of changes to patient.          Electronically signed by MARSHALL Murillo on 8/8/22 at 9:59 AM EDT

## 2022-08-08 NOTE — PROGRESS NOTES
Subjective: The patient complains of severe acute on chronic progressive fatigue and generalized weakness partially relieved by rest, PT, OT and meds and hemodialysis and exacerbated by exertion and recent illness. He was initially admitted via the ER with SOB. He was diagnosed with hypoxia acute respiratory failure end-stage renal disease macrocytic anemia and hyperglycemia secondary to diabetes mellitus. He was admitted to the ICU. He is on IV Bactrim for pneumonia. GI is seeing him an duodenal ulcer GI bleed. I am concerned about his Lovenox dose given his renal failure-Lovenox is currently on hold-due to bleeding. There are plans for an IVC filter in the future. Medically is complicated by history of a liver transplant for hepatitis. He is on immunosuppressants. He gets dialysis Monday Wednesday and Friday. I am concerned about patients medical complexities including:  Principal Problem:    Acute respiratory failure with hypoxia (HCC)  Active Problems:    Pneumonia due to infectious organism    Pleural effusion    Impaired mobility and activities of daily living    Dysphagia, oropharyngeal phase    Epistaxis    Duodenal ulcer    ESRD (end stage renal disease) on dialysis [facility])    Liver transplant recipient [facility])  Resolved Problems:    * No resolved hospital problems. *      .    Reviewed recent nursing note and discussed current status and planned care with acute care providers, \"A&Ox4. PM meds given ex Lopressor because currently unable to get concrete BP. Lung clear, diminished; on 12L Venturi mask. Lung clear, diminished. Abd sounds active; 1 BM so far in shift. +3 pitting weeping edema at RUE; +2 pittindg edema at LUE; + non-Pitting edema at RLE; Trace edema LLE. Skin ashen, dry, cool; Ecchymosis, redness at BUE, Chest; BLE dry, flaky. Packing intact in left nare. Right arm fistula drsg intact. BP at RLE. Call light within reach. Bed in lowest position. ... 0030:  This RN talk to pt sister, Samantha-She ask for update regarding pt care and request to inform Dr. Neil Cruz that she want to discuss getting pt to [facility] transplant team. This RN inform that a Sticky note to  was written on Epic this am and that am RN, [employer] perfectserve call request to Dr. Maximus Armijo RN will pass this info on to am shift. ..[phone]: This RN talk to pt about conversation with sister and his previous refusal of [facility] when he talked to Dr. Neil Cruz. Pt state that he had changed his mind and is wiling to go to [facility] if it will make him better. He wants to talk to  In today about it  \". ROS x10: The patient also complains of severely impaired mobility and activities of daily living. Otherwise no new problems with vision, hearing, nose, mouth, throat, dermal, cardiovascular, GI, , pulmonary, musculoskeletal, psychiatric or neurological.        Vital signs:  BP (!) 113/53   Pulse 90   Temp 98 °F (36.7 °C)   Resp 20   Ht 5' 6\" (1.676 m)   Wt 138 lb 7.2 oz (62.8 kg)   SpO2 97%   BMI 22.35 kg/m²   I/O:   PO/Intake:    fair PO intake, monitoring for dysphagia    Bowel/Bladder:   continent,    General:  Patient is well developed, adequately nourished, and    well kempt. HEENT:    PERRLA, hearing intact to loud voice, external inspection of ear and nose -nasal packing. Inspection of lips, tongue -dark and coated-gums benign, nosebleed left nares has nasal packing in place. From nasal cannula O2 patient is also on some blood thinners. Musculoskeletal: No significant change in strength or tone. All joints stable. Inspection and palpation of digits and nails show no clubbing, cyanosis or inflammatory conditions. Neuro/Psychiatric: Affect: flat-  Alert and oriented to self and situation with  min-mod cues. No significant change in deep tendon reflexes or sensation  Lungs:  Diminished, CTA-B  . Respiration effort is normal at rest.   Heart:   S1 = S2,   RRR.       Abdomen:  Soft, non-tender    Extremities:  Trace  lower extremity edema but no unusual tenderness. non Fx RUE fistula--functional LUE fistula. Skin:   BUE bruises dt blood draws-his bruises and skin tears. Rehabilitation:  Physical Therapy:   Bed mobility:  Bed mobility  Rolling to Left: Minimal assistance (08/03/22 0950)  Rolling to Right: Minimal assistance (08/03/22 0950)  Supine to Sit: Moderate assistance (08/05/22 1130)  Sit to Supine: Minimal assistance (Up to bedside chair) (08/05/22 1130)  Scooting: Minimal assistance; Moderate assistance (08/05/22 1130)  Bed Mobility Comments: Requires increased time, effort and use of bed rail. Difficulty maintaining seated balance at midline at EOB due to left lateral lean.  (08/05/22 1130)  Transfers:  Transfers  Sit to Stand: Minimal Assistance (08/05/22 1131)  Stand to sit: Minimal Assistance (08/05/22 1131)  Comment: Completes STS x1 with ww (08/05/22 1131)  Gait:   Ambulation  Surface: level tile (08/04/22 1306)  Device: Rolling Walker (08/04/22 1306)  Other Apparatus: O2 (2L O2 NC, increased to 4L O2 NC RN notified and aware) (08/04/22 1306)  Assistance: Minimal assistance (08/04/22 1306)  Quality of Gait: flexed posture, decresaed bilateral step length and foot clearance, unsteady (08/04/22 1306)  Gait Deviations: Slow Whitney;Decreased step length;Decreased step height;Decreased arm swing (08/04/22 1306)  Distance: 5ft to chair (08/04/22 1306)  Comments: SOB upon exertion, HR elevated 110s with exertional dyspnea observed, O2 increased d/t low O2 sats (08/03/22 0950)  Stairs:     W/C mobility:       Occupational Therapy:   Hand Dominance: Left  ADL  Feeding: Setup (08/03/22 0943)  Grooming: Minimal assistance (08/03/22 0943)  UE Bathing: Minimal assistance (08/03/22 0943)  LE Bathing: Maximum assistance (08/03/22 0943)  UE Dressing: Minimal assistance (08/03/22 0943)  LE Dressing: Maximum assistance (08/03/22 0943)  Toileting: Dependent/Total (08/03/22 9514)  Toileting Skilled Clinical Factors: Incontinent of dark loose stool; LPN notified (45/62/10 7352)  Additional Comments: Simulated ADLs as above. Pt. significantly limited d/t fatigue and SOB. (08/03/22 6867)  Toilet Transfers  Toilet Transfer: Unable to assess (08/03/22 0946)  Toilet Transfers Comments: Anticipate min A (08/03/22 0946)          Speech Therapy:            Diet/Swallow:        Dysphagia Outcome Severity Scale: Level 5: Mild dysphagia- Distant supervision.  May need one diet consistency restricted  Compensatory Swallowing Strategies : Upright as possible for all oral intake, Small bites/sips, Assist feed, Eat/Feed slowly  Therapeutic Interventions: Patient/Family education, Diet tolerance monitoring    COGNITION  OT: Cognition Comment: Verbal cues for safety and sequencing  SP:           Lab/X-ray studies reviewed, analyzed and discussed with patient and staff:   Recent Results (from the past 24 hour(s))   POCT Glucose    Collection Time: 08/07/22  4:14 PM   Result Value Ref Range    POC Glucose 63 (L) 70 - 99 mg/dl    Performed on ACCU-CHEK    POCT Glucose    Collection Time: 08/07/22  8:56 PM   Result Value Ref Range    POC Glucose 123 (H) 70 - 99 mg/dl    Performed on ACCU-CHEK    Renal Function Panel    Collection Time: 08/08/22  6:20 AM   Result Value Ref Range    Sodium 134 (L) 135 - 144 mEq/L    Potassium 4.9 3.4 - 4.9 mEq/L    Chloride 93 (L) 95 - 107 mEq/L    CO2 24 20 - 31 mEq/L    Anion Gap 17 (H) 9 - 15 mEq/L    Glucose 57 (L) 70 - 99 mg/dL    BUN 29 (H) 8 - 23 mg/dL    Creatinine 5.10 (H) 0.70 - 1.20 mg/dL    GFR Non-African American 11.3 (L) >60    GFR  13.7 (L) >60    Calcium 8.7 8.5 - 9.9 mg/dL    Phosphorus 5.8 (H) 2.3 - 4.8 mg/dL    Albumin 3.1 (L) 3.5 - 4.6 g/dL   Hepatic Function Panel    Collection Time: 08/08/22  6:20 AM   Result Value Ref Range    Total Protein 6.7 6.3 - 8.0 g/dL    Alkaline Phosphatase 83 35 - 104 U/L    ALT 62 (H) 0 - 41 U/L    AST 57 (H) 0 - 40 U/L Total Bilirubin <0.2 0.2 - 0.7 mg/dL    Bilirubin, Direct <0.2 0.0 - 0.4 mg/dL    Bilirubin, Indirect see below 0.0 - 0.6 mg/dL   POCT Glucose    Collection Time: 08/08/22  8:02 AM   Result Value Ref Range    POC Glucose 49 (L) 70 - 99 mg/dl    Performed on ACCU-CHEK    POCT Glucose    Collection Time: 08/08/22  8:40 AM   Result Value Ref Range    POC Glucose 86 70 - 99 mg/dl    Performed on ACCU-CHEK    POCT Glucose    Collection Time: 08/08/22 11:08 AM   Result Value Ref Range    POC Glucose 123 (H) 70 - 99 mg/dl    Performed on ACCU-CHEK    POCT Glucose    Collection Time: 08/08/22 11:20 AM   Result Value Ref Range    POC Glucose 87 70 - 99 mg/dl    Performed on ACCU-CHEK    CBC    Collection Time: 08/08/22  1:02 PM   Result Value Ref Range    WBC 14.5 (H) 4.8 - 10.8 K/uL    RBC 2.55 (L) 4.70 - 6.10 M/uL    Hemoglobin 8.4 (L) 14.0 - 18.0 g/dL    Hematocrit 25.9 (L) 42.0 - 52.0 %    .6 (H) 80.0 - 100.0 fL    MCH 33.1 (H) 27.0 - 31.3 pg    MCHC 32.6 (L) 33.0 - 37.0 %    RDW 16.0 (H) 11.5 - 14.5 %    Platelets 635 766 - 014 K/uL     Previous extensive, complex labs, notes and diagnostics reviewed and analyzed. ALLERGIES:    Allergies as of 07/25/2022    (No Known Allergies)      (please also verify by checking [facility])     Complex Physical Medicine & Rehab Issues Assess & Plan:   Severe abnormality of gait and mobility and impaired self-care and ADL's secondary to hypoxic encephalopathy. Updated functional and medical status reassessed regarding patients ability to participate in therapies and patient found to be able to participate in:        acute intensive comprehensive inpatient rehabilitation program including PT/OT to improve balance, ambulation, ADLs, and to improve the P/AROM. It is my opinion that they will be able to tolerate 3 hours of therapy a day and benefit from it at an acute level.  I again discussed acute rehab with the patient and verify that the patient is able and willing to participate in 3 hours of therapy a day. Rehab and Acute Care Case Management has also reinforced this expectation. Will continue to follow to attempt to get patient to the most efficient but most effective level of care will be in their best interest.  Continue to focus on energy conservation heart rate and blood pressure monitoring before during and after therapy endurance and consistency of function. Bowel constipation   and Bladder dysfunction   overactive, neurogenic bladder:  frequent toileting, ambulate to bathroom with assistance, check post void residuals. Check for C.difficile x1 if >2 loose stools in 24 hours, continue bowel & bladder program.  Monitor for UTI symptoms including lethargy and confusion    Moderate back pain and generalized body aches and pain likely secondary to renal osteodystrophy and generalized OA pain: reassess pain every shift and prior to and after each therapy session, I advise giving prn OxyIR--avoid Tylenol DT liver risks, modalities prn in therapy, consider Lidoderm, K-pad prn. Skin healing   multiple upper extremity skin tears breakdown   risk:  continue pressure relief program.  Daily skin exams and reports from nursing. Severe fatigue due to immobility and nutritional deficits: continue to monitor closely for dehydration   Add vitamin B12 vitamin D and CoQ10 titrate dosing and add protein supplementation with low carb content. 6.  DVT with DVT prophylaxis and end-stage renal disease holding Lovenox patient is status post IVC filter 8/5/2022. Complex discharge planning:   Discussed with care team-last 24 hour events noted. I will continue to follow along and reassess functional and medical status as we strive to improve patient's functional and medical outcomes progressing to the most efficient and lowest level of care. Complex Active General Medical Issues that complicate care:     1.  Principal Problem:    Acute respiratory failure with hypoxia [facility])  Active Problems:    Pneumonia due to infectious organism    Pleural effusion    Impaired mobility and activities of daily living    Dysphagia, oropharyngeal phase    Epistaxis    Duodenal ulcer    ESRD (end stage renal disease) on dialysis [facility])    Liver transplant recipient [facility])  Resolved Problems:    * No resolved hospital problems. *          Events and functional changes in the past 24 hours reviewed improvements in functional status are encouraging       Focus of today's plan-   treat nosebleed Afrin and Ocean nasal spray and bacitracin. Hold thinners including-Lovenox dosing-received 2 units of blood-has renal failure and is on hemodialysis. Await medical stability recheck PT and OT eval's. Peridex--and advise dc Tylenol--use OXYIR instead.         Ernesto Felix D.O., PM&R     Attending    [address]

## 2022-08-08 NOTE — PROGRESS NOTES
Internal Medicine   Hospitalist   Progress Note    8/8/2022   7:41 PM    Name:  Lucrecia Olson  MRN:    [id number]     IP Day: 15     Admit Date: 7/25/2022 11:15 AM  PCP: Claudio Whitfield MD    Code Status:  Full Code    Assessment and Plan: Active Problems/ diagnosis:     Acute hypoxic respiratory failure in the setting of aspiration pneumonia-required intubation, extubated now on nasal cannula  Sepsis present admission  End-stage renal disease  Hypotension in the setting of dialysis-on midodrine  Bilateral upper and lower extremity acute DVT on Eliquis  Anemia  History of liver transplant    Plan  Patient is having more melena and epistaxis. He does not feel like epistaxis is going on in the back of his throat, seems more anterior, discussed with ENT who will be packing his nose today, discussed with GI for possibly obtain an EGD, resume PPI. Monitor H&H. Will hold Eliquis and use Lovenox therapeutic dose for now, hold Lovenox dose this morning until determination for EGD is made. Continue to monitor medical floor  Antibiotic as per infectious disease. Currently on IV Bactrim. Zosyn stopped   HD as per nephrology  Home medication resumed  Resume immunosuppressants for history of liver transplant, Prograf  Resume PT/OT  DVT/PUD PPx     8/6 - h/h improved. Off a/c due to duodenal ulcer. D/W GI, concerns for need for transfer to tertiary care center. Will d/w patient and sister. Patient transferring to floor. 8/7 - mild sob today, abg and port chest xray ordered. D/w patient, does not want transfer at this time. 8/8 - remains on increased o2. Attempted to call sister as requested, no answer. Received message that the patient and sister request transfer to [facility] if possible. Called transfer center to initiate however transfer center states [facility] transfer line was down, and will keep trying. 7 pm- 7 am, please contact on call Hospitalist for any needs       Subjective:     SOB unchanged.   No cp, n/v/d.    Physical Examination:      Vitals:  BP (!) 93/34   Pulse (!) 111   Temp 99.7 °F (37.6 °C) (Oral)   Resp 17   Ht 5' 6\" (1.676 m)   Wt 135 lb 2.3 oz (61.3 kg)   SpO2 100%   BMI 21.81 kg/m²   Temp (24hrs), Av.6 °F (37 °C), Min:98 °F (36.7 °C), Max:99.7 °F (37.6 °C)      General appearance: alert, cooperative and no distress  Mental Status: oriented to person, place and time and normal affect  Lungs: clear to auscultation bilaterally, normal effort  Heart: regular rate and rhythm, no murmur  Abdomen: soft, nontender, nondistended, bowel sounds present, no masses  Extremities: + upper and lower edema, pulses intact   Skin multiple area of bruising related to IV access in upper and lower extremities    Data:     Labs:  Recent Labs     22  0100 22  1309 22  1302   WBC 16.0*  --  14.5*   HGB 8.7* 9.0* 8.4*     --  295     Recent Labs     22  0645 22  1309 22  0620     --  134*   K 4.7  --  4.9   CL 93*  --  93*   CO2 27  --  24   BUN 23  --  29*   CREATININE 4.07* 4.0* 5.10*   GLUCOSE 73  --  57*     Recent Labs     22  0620   AST 57*   ALT 62*   BILITOT <0.2   ALKPHOS 83       Current Facility-Administered Medications   Medication Dose Route Frequency Provider Last Rate Last Admin    dextrose 5 % solution   IntraVENous Continuous Le Perez MD 75 mL/hr at 22 180 Rate Verify at 22 180    sucralfate (CARAFATE) tablet 1 g  1 g Oral 4 times per day ISI Rodríguez - CNP        pill splitter   Does not apply Once Le Perez MD        chlorhexidine (PERIDEX) 0.12 % solution 15 mL  15 mL Mouth/Throat BID Lilliam Perez DO   15 mL at 22 0829    0.9 % sodium chloride infusion   IntraVENous PRN Maria Guadalupe Romano, DO        0.9 % sodium chloride infusion   IntraVENous Continuous Carmella Terrell,         0.9 % sodium chloride infusion   IntraVENous Continuous Karrie Cornelius MD        sodium chloride flush 0.9 % injection 5-40 mL  5-40 mL IntraVENous 2 times per day Melinda Grossman MD   10 mL at 08/08/22 0691    sodium chloride flush 0.9 % injection 5-40 mL  5-40 mL IntraVENous PRN Melinda Grossman MD        0.9 % sodium chloride infusion   IntraVENous PRN Melinda Grossman MD        acetaminophen (TYLENOL) tablet 650 mg  650 mg Oral Q4H PRN Melinda Grossman MD        ondansetron [facility]) injection 4 mg  4 mg IntraVENous Q6H PRN Melinda Grossman MD        hydrALAZINE (APRESOLINE) injection 10 mg  10 mg IntraVENous Q10 Min PRN Melinda Grossman MD        labetalol (NORMODYNE;TRANDATE) injection 10 mg  10 mg IntraVENous Q30 Min PRN Melinda Grossman MD        pantoprazole (PROTONIX) 40 mg in sodium chloride (PF) 10 mL injection  40 mg IntraVENous Q12H [address], DO   40 mg at 08/08/22 9060    [Held by provider] enoxaparin (LOVENOX) injection 60 mg  1 mg/kg SubCUTAneous Daily Carmella Terrell DO        fluticasone (FLONASE) 50 MCG/ACT nasal spray 1 spray  1 spray Each Nostril Daily Duey Distance, DO   1 spray at 08/04/22 0902    sodium chloride flush 0.9 % injection 5-40 mL  5-40 mL IntraVENous 2 times per day [address], DO   10 mL at 08/08/22 0834    sodium chloride flush 0.9 % injection 5-40 mL  5-40 mL IntraVENous PRN [address], DO        0.9 % sodium chloride infusion   IntraVENous PRN [address], DO        midodrine (PROAMATINE) tablet 2.5 mg  2.5 mg Oral TID WC Melinda Grossman MD   2.5 mg at 08/07/22 1829    metoprolol tartrate (LOPRESSOR) tablet 12.5 mg  12.5 mg Oral BID Melinda Grossman MD   12.5 mg at 08/07/22 1130    [Held by provider] aspirin EC tablet 81 mg  81 mg Oral Daily Melinda Grossman MD   81 mg at 08/04/22 9807    docusate sodium (COLACE) capsule 100 mg  100 mg Oral BID ISI Luke CNP   100 mg at 08/07/22 2207    insulin lispro (HUMALOG) injection vial 0-4 Units  0-4 Units SubCUTAneous 4x Daily AC & HS Edenilson Mccarthy MD   1 Units at 08/02/22 2248    polyethylene glycol (GLYCOLAX) packet 17 g  17 g Oral Daily Elesa Challenger, APRN - CNP   17 g at 08/04/22 0904    tacrolimus (PROGRAF) capsule 1 mg  1 mg Oral BID Elesa Challenger, APRN - CNP   1 mg at 08/08/22 6796    acetaminophen (TYLENOL) tablet 650 mg  650 mg Oral Q6H PRN Shanell Kauffman MD   650 mg at 08/03/22 8445    Or    acetaminophen (TYLENOL) suppository 650 mg  650 mg Rectal Q6H PRN Shanell Kauffman MD        fentaNYL bolus from bag  50 mcg IntraVENous Q30 Min PRN Elesa Challenger, APRN - CNP        glucose chewable tablet 16 g  4 tablet Oral PRN Elesa Challenger, APRN - CNP        dextrose bolus 10% 125 mL  125 mL IntraVENous PRN Elesa Challenger, APRN - CNP   Stopped at 08/08/22 1737    Or    dextrose bolus 10% 250 mL  250 mL IntraVENous PRN Elesa Challenger, APRN - CNP        glucagon (rDNA) injection 1 mg  1 mg SubCUTAneous PRN Elesa Challenger, APRN - CNP        dextrose 10 % infusion   IntraVENous Continuous PRN Elesa Challenger, APRN - CNP        heparin (porcine) injection 1,000 Units  1,000 Units IntraVENous PRN Seymour Ramos MD           Additional work up or/and treatment plan may be added today or then after based on clinical progression. I am managing a portion of pt care. Some medical issues are handled by other specialists. Additional work up and treatment should be done in out pt setting by pt PCP and other out pt providers. In addition to examining and evaluating pt, I spent additional time explaining care, normaland abnormal findings, and treatment plan. All of pt questions were answered. Counseling, diet and education were provided. Case will be discussed with nursing staff when appropriate. Family will be updated if and when appropriate.        Electronically signed by Brittaney Mann MD on 8/8/2022 at 7:41 PM

## 2022-08-08 NOTE — PROGRESS NOTES
[facility] Occupational Therapy      Date: 2022  Patient Name: Alvaro Beauchamp        MRN: [id number]  Account: [de-identified]   : 1955  (75 y.o.)  [id number]    Chart reviewed, attempted OT at 1350 for eval. Patient not seen 2° to:    Pt out of room, at dialysis     RN aware. Will attempt again when able.     Electronically signed by Saritha Roe OT on 2022 at 1:52 PM

## 2022-08-08 NOTE — PROGRESS NOTES
Gastroenterology Progress Note    Barrie High is a 75 y.o. male patient. Hospitalization Day:14    Chief C/O: Melena    SUBJECTIVE: Seen and examined on medical surgical unit. He endorses some epigastric burning this morning. Denies any GI other complaints. Per nursing, he struggles to eat his meals due to shortness of breath/cannot tolerate being off Ventimask, reports no bowel movement yet today and epistaxis has resolved on its own last night. Hgb stable today at 8.4, WBCs 14.5, platelets 557, ALT 62, AST 57, alkaline phosphatase 83, total bilirubin less than 0.2, albumin 2.5    Physical    VITALS:  BP (!) 148/111   Pulse 97   Temp 98.2 °F (36.8 °C) (Axillary)   Resp 20   Ht 5' 6\" (1.676 m)   Wt 138 lb 7.2 oz (62.8 kg)   SpO2 99%   BMI 22.35 kg/m²   TEMPERATURE:  Current - Temp: 98.2 °F (36.8 °C); Max - Temp  Av °F (36.7 °C)  Min: 97.3 °F (36.3 °C)  Max: 98.4 °F (36.9 °C)    General -alert, oriented, in no acute distress  Eyes -no icterus, no pallor  Cardiovascular - RRR  Lungs -clear to auscultation bilaterally  Abdomen -+ central obesity, + epigastric tenderness, bowel sounds present   Extremities -no edema  Skin -warm/dry, without jaundice  Neuro: Without focal deficit, moves all extremities     Data    Data Review:    Recent Labs     22  0030 22  1200 22  0100 22  1309   WBC  --   --  16.0*  --    HGB 9.0* 9.3* 8.7* 9.0*   HCT 27.0* 28.0* 26.4*  --    MCV  --   --  100.1*  --    PLT  --   --  275  --      Recent Labs     22  0600 22  0645 22  1309 22  0620   * 136  --  134*   K 4.6 4.7  --  4.9   CL 86* 93*  --  93*   CO2 26 27  --  24   PHOS 5.2* 5.1*  --  5.8*   BUN 40* 23  --  29*   CREATININE 5.62* 4.07* 4.0* 5.10*     Recent Labs     22  0620   AST 57*   ALT 62*   BILIDIR <0.2   BILITOT <0.2   ALKPHOS 83     No results for input(s): LIPASE, AMYLASE in the last 72 hours.   Recent Labs     22  1500   PROTIME 16.8*   INR 1. 4       ASSESSMENT:  74 y/o male admitted on 7/25 for SOB & sepsis,  initially required intubation, now clinically improved and seen on the regular medical floor. Patient carries a diagnosis of end-stage renal disease and is on hemodialysis Monday Wednesdays and Fridays, on arrival hemoglobin was noted to be 13, now 7.9 with associated melena in the context of epistaxis and anticoagulation for bilateral upper extremity DVTs. Was on Eliquis, this has been held. 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 Noted patient admitted on to sepsis due to pneumonia and no Prograf level noted currently. Patient is not on tacrolimus inpatient at this time. No recent liver function test  8/5/22: Patient continues to have melanotic stools and hemoglobin trending down, currently 7.3. He remains off Eliquis at this time however on Lovenox. Also noted to have elevated transaminases. S/P EGD 8/5/22 with Dr. Cindy Arteaga with red blood in the upper third of the esophagus. Oozing duodenal ulcer with oozing hemorrhage (Hayder Class Ib). Clips X 4 were placed. Persistent though decreased oozing at the site of the ulcer. Friable gastric mucosa as well as duodenal mucosa spontaneously oozing. Noted recent anticoagulation use. Gastritis. He was transferred to ICU and given 2 units PRBCs after his EGD.  8/7/22 Hgb remains stable today at 8.7, however patient with recurrence of epistaxis yesterday evening/today. Patient continues to report dark-colored stool. 8/8/22: Hgb remained stable today at 8.4, epistaxis appears to have subsided, no bowel movement yet today.     PLAN :  -Continue course of care  -Monitor serial H&H, trend and transfuse accordingly  -Advance to full liquid diet  -Continue PPI twice daily, carafate 1 gram by mouth 4 times daily (make into a slurry with 10mLs of water)  -Discussed with patient at length option to attempt to transfer to tertiary care center for continued care regarding his bilateral upper and lower extremity DVTs requiring anticoagulation, recurrent epistaxis, oozing duodenal ulcer S/P clip placement, and history of liver transplant. He discussed the option with his sister and is now agreeable to transfer. Transfer already initiated per primary team.    Thank you for allowing me to participate in the care of your patient. Please feel free to contact me with any concerns.     ISI Manzo - CNP

## 2022-08-08 NOTE — FLOWSHEET NOTE
Tx complete 1500 ml uf removed tolerated well       08/08/22 1645   Vital Signs   BP (!) 169/66   Temp 98.3 °F (36.8 °C)   Heart Rate 91   Resp 20   Weight 135 lb 2.3 oz (61.3 kg)   Percent Weight Change -2.39   Post-Hemodialysis Assessment   Post-Treatment Procedures Blood returned; Access bleeding time < 10 minutes   Machine Disinfection Process Acid/Vinegar Clean;Heat Disinfect; Exterior Machine Disinfection   Rinseback Volume (ml) 200 ml   Blood Volume Processed (Liters) 68 l/min   Dialyzer Clearance Lightly streaked   Duration of Treatment (minutes) 210 minutes   Heparin Amount Administered During Treatment (mL) 0 mL   Hemodialysis Intake (ml) 600 ml   Hemodialysis Output (ml) 2100 ml   NET Removed (ml) 1500   Tolerated Treatment Good   Bilateral Breath Sounds Diminished   Edema Generalized   Edema Generalized +2   RUE Edema +2   LUE Edema +2

## 2022-08-08 NOTE — PROGRESS NOTES
Physical [facility]  [facility] N547[facility]V155-03    NAME: Mildred Smith    : 1955 (75 y.o.)  MRN: [id number]    Account: [de-identified]  Gender: male      PT referral received. Chart reviewed. Per RN- PT eval hold due to low blood sugar and pt medically complex. Will follow and attempt PT evaluation again at earliest availability.        Lilliam Hou, PT, 22 at 9:11 AM

## 2022-08-08 NOTE — PROGRESS NOTES
per day  sodium chloride flush 0.9 % injection 5-40 mL, 5-40 mL, IntraVENous, PRN  0.9 % sodium chloride infusion, , IntraVENous, PRN  midodrine (PROAMATINE) tablet 2.5 mg, 2.5 mg, Oral, TID WC  metoprolol tartrate (LOPRESSOR) tablet 12.5 mg, 12.5 mg, Oral, BID  [Held by provider] aspirin EC tablet 81 mg, 81 mg, Oral, Daily  docusate sodium (COLACE) capsule 100 mg, 100 mg, Oral, BID  insulin lispro (HUMALOG) injection vial 0-4 Units, 0-4 Units, SubCUTAneous, 4x Daily AC & HS  polyethylene glycol (GLYCOLAX) packet 17 g, 17 g, Oral, Daily  tacrolimus (PROGRAF) capsule 1 mg, 1 mg, Oral, BID  acetaminophen (TYLENOL) tablet 650 mg, 650 mg, Oral, Q6H PRN **OR** acetaminophen (TYLENOL) suppository 650 mg, 650 mg, Rectal, Q6H PRN  [DISCONTINUED] fentaNYL (SUBLIMAZE) 1,000 mcg in sodium chloride 0.9 % 100 mL infusion,  mcg/hr, IntraVENous, Continuous **AND** fentaNYL bolus from bag, 50 mcg, IntraVENous, Q30 Min PRN  glucose chewable tablet 16 g, 4 tablet, Oral, PRN  dextrose bolus 10% 125 mL, 125 mL, IntraVENous, PRN **OR** dextrose bolus 10% 250 mL, 250 mL, IntraVENous, PRN  glucagon (rDNA) injection 1 mg, 1 mg, SubCUTAneous, PRN  dextrose 10 % infusion, , IntraVENous, Continuous PRN  heparin (porcine) injection 1,000 Units, 1,000 Units, IntraVENous, PRN  ASSESSMENT AND PLAN    Recurrent epistaxis and s/p packing. Plan I will remove the packing tomorrow as he is going for dialysis now.     Electronically signed by Rojas Bravo MD on 8/8/22 at 12:46 PM EDT

## 2022-08-08 NOTE — PROGRESS NOTES
PT RESTING LONG INTERVALS CALLS APPROPRIATELY FOR ASSIST TOTAL CARE GIVEN APPETITE FAIR BOWELS MOVED DENIES ANY ACUTE PAIN, RT ARM EDEMA ENC TO ELEVATE NOTED SOME SEEPAGE OF CLEAR FLUID PT RESP STATUS UNCHANGED STILL VERY SOB WITH MIN ACTIVITY CANNOT JERED BEING OFF VM TOO LONG EVEN TO EAT MEAL (NASAL CANULA PLACED WHILE EATING) NASAL PACKING IN PLACE NO ACTIVE BLEEDING NOTED

## 2022-08-08 NOTE — PROGRESS NOTES
8/8/22    From: HOME ALONE, INDEP, DRIVES, CANE, HD MWF NO 02    Admit:SOB AT HD 88% ON RA, JVD, SWELLING     PMH:HTN LIVER TRANSPLANT ESRD-HD     Anticipated Discharge Disposition:[facility]    Patient Mobility or PT/OT ordered:YES    Consults: HOLIDAY, IR, PODIATRY, RENAL, PALL CARE PULM ID    Clinical:   8/2 MIDLINE  SPUTUM-+STENTOPHOMONAS  8/5: EGD + CLIPS TX TO ICU S/P PROCEDURE     Barriers to Discharge:50%VM, SOB    Assessments: CMI DONE

## 2022-08-08 NOTE — PROGRESS NOTES
0930: Upon arrival to shift pt blood sugar was 49. Per protocol given per [facility] with recheck 86. Call placed to Dr. Danny Soto as pt vital signs are reading vastly different with only available site RLE. OK to hold lopressor and midondrine per Dr. Danyn Soto. Request RN reach out to Dr. Montrell Payton as this is a dialysis pt regarding continuous fluids. Pt unable to take off 12L venti mask as his O2 drops to the 80s and cannout tolerate a cannula due to nasal packing. OK for dextrose 75mL/hour. Pt requesting transfer to -- Dr. Danny Soto made aware. 1000: Pt not eating as upon removing venti mask he becomes extremely SOB. SLP to come at different time as following medication administration pt was SOB. 1100: Cici NP on floor-- order for CBC in as last H&H was from yesterday 8/7/22. Blood sugar stable at 123 with continuous D5W per Dr. Danny Soto. 1215: Lab called regarding CBC. Awaiting.    1300:  having issues with handheld regarding CBC order. Reordered, ensuring time and drawer (lab) are correct. Awaiting.    1300: Pt to dialysis at this time. 1800: Back from dialysis. VSS. Pt blood sugar 60-- given PRN orders per [facility] with recheck 93. Pt repositioned throughout shift. Pt had 1 large, green/brown BM. Zinc applied. Missy Philip-- sister, wants to speak with Dr. Danny Soto @ [phone]-- [employer] sent.      Electronically signed by Christen Anne RN on 8/8/2022 at 9:38 AM

## 2022-08-08 NOTE — PROGRESS NOTES
BUN 40* 23  --  29*   CREATININE 5.62* 4.07* 4.0* 5.10*   GLUCOSE 66* 73  --  57*   CALCIUM 8.5 9.0  --  8.7   LABGLOM 10.1* 14.7* 15* 11.3*     Troponin: No results for input(s): TROPONINI in the last 72 hours. BNP: No results for input(s): BNP in the last 72 hours. INR:   Recent Labs     08/05/22  1500   INR 1.4     Lipids: No results for input(s): CHOL, LDLDIRECT, TRIG, HDL, AMYLASE, LIPASE in the last 72 hours. Liver:   Recent Labs     08/08/22  0620   AST 57*   ALT 62*   ALKPHOS 83   PROT 6.7   LABALBU 3.1*   BILITOT <0.2     Iron:  No results for input(s): IRONS, FERRITIN in the last 72 hours. Invalid input(s): LABIRONS  Urinalysis: No results for input(s): UA in the last 72 hours.     Objective:  Vitals: BP (!) 101/51   Pulse 87   Temp 97.3 °F (36.3 °C)   Resp 20   Ht 5' 6\" (1.676 m)   Wt 138 lb 7.2 oz (62.8 kg)   SpO2 97%   BMI 22.35 kg/m²    Wt Readings from Last 3 Encounters:   08/06/22 138 lb 7.2 oz (62.8 kg)   06/28/22 145 lb (65.8 kg)   03/18/20 145 lb (65.8 kg)      24HR INTAKE/OUTPUT:    Intake/Output Summary (Last 24 hours) at 8/8/2022 0846  Last data filed at 8/8/2022 1658  Gross per 24 hour   Intake 60 ml   Output --   Net 60 ml       General: alert, weak  in mil resp apparent distress  HEENT: normocephalic, atraumatic, anicteric  Neck: supple, no mass  Lungs: non-labored respirations, clear to auscultation bilaterally  Heart: regular rate and rhythm, no murmurs or rubs  Abdomen: soft, non-tender, non-distended  Ext: no cyanosis, no peripheral edema  Neuro: alert and oriented, no gross abnormalities  Psych: normal mood and affect  Skin: no rash      Electronically signed by Odilia Ware DO, MD

## 2022-08-09 PROBLEM — E43 SEVERE MALNUTRITION (HCC): Status: ACTIVE | Noted: 2022-08-09

## 2022-08-09 LAB
ALBUMIN SERPL-MCNC: 3.2 G/DL (ref 3.5–4.6)
ALP BLD-CCNC: 89 U/L (ref 35–104)
ALT SERPL-CCNC: 51 U/L (ref 0–41)
AST SERPL-CCNC: 43 U/L (ref 0–40)
BILIRUB SERPL-MCNC: 0.3 MG/DL (ref 0.2–0.7)
BILIRUBIN DIRECT: <0.2 MG/DL (ref 0–0.4)
BILIRUBIN, INDIRECT: ABNORMAL MG/DL (ref 0–0.6)
GLUCOSE BLD-MCNC: 52 MG/DL (ref 70–99)
GLUCOSE BLD-MCNC: 64 MG/DL (ref 70–99)
GLUCOSE BLD-MCNC: 72 MG/DL (ref 70–99)
GLUCOSE BLD-MCNC: 82 MG/DL (ref 70–99)
GLUCOSE BLD-MCNC: 90 MG/DL (ref 70–99)
HCT VFR BLD CALC: 26.9 % (ref 42–52)
HEMOGLOBIN: 9 G/DL (ref 14–18)
MCH RBC QN AUTO: 33.7 PG (ref 27–31.3)
MCHC RBC AUTO-ENTMCNC: 33.3 % (ref 33–37)
MCV RBC AUTO: 101.5 FL (ref 80–100)
PDW BLD-RTO: 16 % (ref 11.5–14.5)
PERFORMED ON: ABNORMAL
PERFORMED ON: ABNORMAL
PERFORMED ON: NORMAL
PLATELET # BLD: 314 K/UL (ref 130–400)
RBC # BLD: 2.66 M/UL (ref 4.7–6.1)
TOTAL PROTEIN: 6.8 G/DL (ref 6.3–8)
WBC # BLD: 12.7 K/UL (ref 4.8–10.8)

## 2022-08-09 PROCEDURE — 6370000000 HC RX 637 (ALT 250 FOR IP): Performed by: NURSE PRACTITIONER

## 2022-08-09 PROCEDURE — 92526 ORAL FUNCTION THERAPY: CPT

## 2022-08-09 PROCEDURE — 80076 HEPATIC FUNCTION PANEL: CPT

## 2022-08-09 PROCEDURE — 6360000002 HC RX W HCPCS: Performed by: NURSE PRACTITIONER

## 2022-08-09 PROCEDURE — 99232 SBSQ HOSP IP/OBS MODERATE 35: CPT | Performed by: NURSE PRACTITIONER

## 2022-08-09 PROCEDURE — 6360000002 HC RX W HCPCS: Performed by: INTERNAL MEDICINE

## 2022-08-09 PROCEDURE — 2580000003 HC RX 258: Performed by: INTERNAL MEDICINE

## 2022-08-09 PROCEDURE — A4216 STERILE WATER/SALINE, 10 ML: HCPCS | Performed by: INTERNAL MEDICINE

## 2022-08-09 PROCEDURE — 85027 COMPLETE CBC AUTOMATED: CPT

## 2022-08-09 PROCEDURE — 6370000000 HC RX 637 (ALT 250 FOR IP): Performed by: PHYSICAL MEDICINE & REHABILITATION

## 2022-08-09 PROCEDURE — 2700000000 HC OXYGEN THERAPY PER DAY

## 2022-08-09 PROCEDURE — C9113 INJ PANTOPRAZOLE SODIUM, VIA: HCPCS | Performed by: INTERNAL MEDICINE

## 2022-08-09 PROCEDURE — 36415 COLL VENOUS BLD VENIPUNCTURE: CPT

## 2022-08-09 PROCEDURE — 1210000000 HC MED SURG R&B

## 2022-08-09 RX ADMIN — 0.12% CHLORHEXIDINE GLUCONATE 15 ML: 1.2 RINSE ORAL at 08:54

## 2022-08-09 RX ADMIN — DOCUSATE SODIUM 100 MG: 100 CAPSULE, LIQUID FILLED ORAL at 19:59

## 2022-08-09 RX ADMIN — TACROLIMUS 1 MG: 1 CAPSULE ORAL at 19:59

## 2022-08-09 RX ADMIN — 0.12% CHLORHEXIDINE GLUCONATE 15 ML: 1.2 RINSE ORAL at 19:58

## 2022-08-09 RX ADMIN — SODIUM CHLORIDE, PRESERVATIVE FREE 40 MG: 5 INJECTION INTRAVENOUS at 19:59

## 2022-08-09 RX ADMIN — DOCUSATE SODIUM 100 MG: 100 CAPSULE, LIQUID FILLED ORAL at 08:54

## 2022-08-09 RX ADMIN — TACROLIMUS 1 MG: 1 CAPSULE ORAL at 08:55

## 2022-08-09 RX ADMIN — Medication 10 ML: at 08:55

## 2022-08-09 RX ADMIN — SODIUM CHLORIDE, PRESERVATIVE FREE 40 MG: 5 INJECTION INTRAVENOUS at 08:54

## 2022-08-09 RX ADMIN — Medication 10 ML: at 20:01

## 2022-08-09 RX ADMIN — SUCRALFATE 1 G: 1 TABLET ORAL at 05:40

## 2022-08-09 RX ADMIN — Medication 16 G: at 19:01

## 2022-08-09 NOTE — PROGRESS NOTES
Comprehensive Nutrition Assessment    Type and Reason for Visit:  Reassess    Nutrition Recommendations/Plan:   Recommend Corpak placement for tube feeding if access can be safely obtained. maintained with venti mask  Recommend Renal TF  bolus 250 ml 4 x daily  with 100 ml water 6 x daily     Malnutrition Assessment:  Malnutrition Status:  Severe malnutrition (08/09/22 1504)    Context:  Acute Illness     Findings of the 6 clinical characteristics of malnutrition:  Energy Intake:  75% or less of estimated energy requirements for 7 or more days  Weight Loss:  Greater than 5% over 1 month     Body Fat Loss:  Unable to assess     Muscle Mass Loss:  Unable to assess    Fluid Accumulation:  Moderate to Severe Generalized   Strength:  Not Performed    Nutrition Assessment:    Pt progressing to severe malnutrition , ~ 2 weeks inadequate energy/protein inake. > 5% weight loss < 1 month, Per notes, pt appears to require ventimask majority of the time, mauricio not be able to have corpak places, has hx of liver transplant, TPN also not ideal, unable to interview, or assess acceptance of supplements, pt off floor for dialysis at time of visit    Nutrition Related Findings:    PMH: hypertension, hepatitis-s/p liver transplant, ESRD on hemodialysis M,W,F. , intubated 7/25-27 with trophic TF. S/p thoracentesis (7/26), labs noted, meds reviewed, anuric, last BM loose 8/8. +2-3+ BUE/gen edema. BSE 7/29=pureed. , 8/3 Soft bite sized, (8/4) Clears/NPO (8/5) FULL liquids. Intake < 50% since extubation, continues on dysphagia therapy,, IVF = D5. @ 50 ml/hr (200 kcals), last dialysis 8/9, Labs noted, meds reviewed Wound Type: Moisture Associate Skin Damage       Current Nutrition Intake & Therapies:    Average Meal Intake: 0%, 1-25%  Average Supplements Intake: Unable to assess  ADULT DIET;  Full Liquid  ADULT ORAL NUTRITION SUPPLEMENT; Breakfast, Dinner; Renal Oral Supplement  ADULT ORAL NUTRITION SUPPLEMENT; Lunch; Frozen Oral Supplement    Anthropometric Measures:  Height: 5' 6\" (167.6 cm)  Ideal Body Weight (IBW): 142 lbs (65 kg)    Admission Body Weight: 148 lb (67.1 kg)  Current Body Weight: 135 lb (61.2 kg) (8/8),* edema present    UTD edema present IBW. Weight Source: Bed Scale  Current BMI (kg/m2): 21.8  Usual Body Weight: 145 lb (65.8 kg) (stated 6/28/22, 2020)  % Weight Change (Calculated): 2.1  Weight Adjustment For: No Adjustment                 BMI Categories: Normal Weight (BMI 22.0 to 24.9) age over 72    Estimated Daily Nutrient Needs:  Energy Requirements Based On: Kcal/kg  Weight Used for Energy Requirements: Current  Energy (kcal/day): 1705-6863 kcals @ 28-30 kcal/kg  Weight Used for Protein Requirements: Current  Protein (g/day): 91.5 g protein @ 1.5 g/kg  Method Used for Fluid Requirements: Standard Renal  Fluid (ml/day): 500-800 + UOP (or as per MD)    Nutrition Diagnosis:   Severe malnutrition, In context of acute illness or injury related to inadequate protein-energy intake, increase demand for energy/nutrients as evidenced by Criteria as identified in malnutrition assessment  Inadequate protein-energy intake related to swallowing difficulty, altered GI function as evidenced by intake 0-25%, intake 26-50%    Nutrition Interventions:   Food and/or Nutrient Delivery: Start Tube Feeding  Nutrition Education/Counseling: No recommendation at this time  Coordination of Nutrition Care: Continue to monitor while inpatient       Goals:  Previous Goal Met: No Progress toward Goal(s)  Goals: Initiate nutrition support, by next RD assessment       Nutrition Monitoring and Evaluation:      Food/Nutrient Intake Outcomes: Supplement Intake, Food and Nutrient Intake, Diet Advancement/Tolerance  Physical Signs/Symptoms Outcomes: Chewing or Swallowing, Biochemical Data, Weight, Meal Time Behavior    Discharge Planning:     Too soon to determine     Humaira Connors RD, LD

## 2022-08-09 NOTE — PROGRESS NOTES
Gastroenterology Progress Note    Lucrecia Olson is a 75 y.o. male patient. Hospitalization Day:15    Chief C/O: Melena    SUBJECTIVE: Seen and examined on medical surgical unit. He denies any GI complaints. Per nursing, he struggles to eat his meals due to shortness of breath/cannot tolerate being off Ventimask, reports green bowel movement last night and epistaxis has not recurred in 24 hours. Physical    VITALS:  BP (!) 164/126   Pulse (!) 134   Temp 98.4 °F (36.9 °C) (Axillary)   Resp 24   Ht 5' 6\" (1.676 m)   Wt 135 lb 2.3 oz (61.3 kg)   SpO2 92%   BMI 21.81 kg/m²   TEMPERATURE:  Current - Temp: 98.4 °F (36.9 °C); Max - Temp  Av.1 °F (37.3 °C)  Min: 98.4 °F (36.9 °C)  Max: 99.7 °F (37.6 °C)    General -alert, oriented, in no acute distress  Eyes -no icterus, no pallor  Cardiovascular - RRR  Lungs -clear to auscultation bilaterally  Abdomen -+ central obesity, nontender, bowel sounds present   Extremities -+ BLE/BUE edema  Skin -warm/dry, without jaundice  Neuro: Without focal deficit, moves all extremities     Data    Data Review:    Recent Labs     22  0100 22  1309 22  1302   WBC 16.0*  --  14.5*   HGB 8.7* 9.0* 8.4*   HCT 26.4*  --  25.9*   .1*  --  101.6*     --  295     Recent Labs     22  0645 22  1309 22  0620     --  134*   K 4.7  --  4.9   CL 93*  --  93*   CO2 27  --  24   PHOS 5.1*  --  5.8*   BUN 23  --  29*   CREATININE 4.07* 4.0* 5.10*     Recent Labs     22  0620   AST 57*   ALT 62*   BILIDIR <0.2   BILITOT <0.2   ALKPHOS 83     No results for input(s): LIPASE, AMYLASE in the last 72 hours. No results for input(s): PROTIME, INR in the last 72 hours. ASSESSMENT:  74 y/o male admitted on  for SOB & sepsis,  initially required intubation, now clinically improved and seen on the regular medical floor.   Patient carries a diagnosis of end-stage renal disease and is on hemodialysis  and ,

## 2022-08-09 NOTE — PROGRESS NOTES
INPATIENT PROGRESS NOTES    PATIENT NAME: Anu Flores  MRN: [id number]  SERVICE DATE:  August 8, 2022   SERVICE TIME:  10:14 PM      PRIMARY SERVICE: Pulmonary Disease    CHIEF COMPLAIN: Respiratory failure      INTERVAL HPI: Patient seen and examined at bedside, Interval Notes, orders reviewed. Nursing notes noted  He said he is feeling better today. He is on 50% Ventimask O2 saturation 100%. Shortness of breath is much less. He has nasal packing on the left side. His O2 saturation is 99%. No chest pain. No fever or chills. OBJECTIVE    Body mass index is 21.81 kg/m². PHYSICAL EXAM:  Vitals:  BP (!) 93/34   Pulse (!) 111   Temp 99.7 °F (37.6 °C) (Oral)   Resp 17   Ht 5' 6\" (1.676 m)   Wt 135 lb 2.3 oz (61.3 kg)   SpO2 100%   BMI 21.81 kg/m²   General: Alert, awake . comfortable in bed, No distress. Head: Atraumatic , Normocephalic   Eyes: PERRL. No sclera icterus. No conjunctival injection. No discharge   ENT: Nasal packing on the left side. No active bleeding. Pharynx clear. Neck:  Trachea midline. No thyromegaly, no JVD, No cervical adenopathy. Chest : Bilaterally symmetrical ,Normal effort,  No accessory muscle use  Lung : . Fair BS bilateral, decreased BS at bases. No Rales. No wheezing. No rhonchi. Heart[de-identified] Normal  rate. Regular rhythm. No mumur ,  Rub or gallop  ABD: Non-tender. Non-distended. No masses. No organmegaly. Normal bowel sounds. No hernia. Ext : Edema in both upper extremity. No edema in lower extremity.   No Cyanosis No clubbing  Neuro: no focal weakness          DATA:   Recent Labs     08/07/22  0100 08/07/22  1309 08/08/22  1302   WBC 16.0*  --  14.5*   HGB 8.7* 9.0* 8.4*   HCT 26.4*  --  25.9*   .1*  --  101.6*     --  295     Recent Labs     08/07/22  0645 08/07/22  1309 08/08/22  0620     --  134*   K 4.7  --  4.9   CL 93*  --  93*   CO2 27  --  24   BUN 23  --  29*   CREATININE 4.07* 4.0* 5.10*   GLUCOSE 73  --  57*   CALCIUM 9.0  --  8.7 glucose, dextrose bolus **OR** dextrose bolus, glucagon (rDNA), dextrose, heparin (porcine)    Radiology  Echocardiogram complete 2D with doppler with color    Result Date: 7/27/2022  Transthoracic Echocardiography Report (TTE)  Demographics   Patient Name    [facility] BLAINE       Gender               Male                  Mckay MartinBeaver   Patient Number  [id number]       Race                                                   Ethnicity   Visit Number    [id number]      Room Number          IC12   Corporate ID                   Date of Study        07/27/2022   Accession       [id number]     Referring Physician  Number   Date of Birth   1955     Sonographer          Hanna Lawdanay   Age             75 year(s)     Interpreting         [facility])                                 Physician            Cardiology                                                      [facility]  Procedure Type of Study   TTE procedure:ECHO COMPLETE 2D W/DOP W/COLOR. Procedure Date Date: 07/27/2022 Start: 10:27 AM Study Location: Portable Technical Quality: Limited visualization due to lung interface. Indications:LVF. Patient Status: Routine Height: 66 inches Weight: 148 pounds BSA: 1.76 m^2 BMI: 23.89 kg/m^2 BP: 96/54 mmHg  Conclusions   Summary  Left ventricular ejection fraction is estimated at 45%. Diastolic Dysfunction is noted by mitral flow. Normal right ventricle systolic pressure. RVSP 28mmHg  Echogenic mass in the apex concerning for thrombus recommend limited echo  with definity  No hemodynamic evidence of significant valve disease   Signature   ----------------------------------------------------------------  Electronically signed by Lokesh Ricks(Interpreting physician)  on 07/27/2022 03:48 PM  ----------------------------------------------------------------   Findings  Left Ventricle Left ventricular ejection fraction is estimated at 45%. Left ventricular size is normal . Normal left ventricular wall thickness.  Diastolic Dysfunction is noted by mitral flow. Echogenic mass in the apex possible thrombus Right Ventricle Normal right ventricle structure and function. Normal right ventricle systolic pressure. RVSP 28mmHg Left Atrium Normal left atrium. Right Atrium Normal right atrium. Mitral Valve Diffusely thickened and pliable mitral valve leaflets with normal excursion. Structurally normal mitral valve. No evidence of mitral valve stenosis. No evidence of mitral regurgitaton. Tricuspid Valve Tricuspid valve is structurally normal. No evidence of tricuspid stenosis. No evidence of tricuspid regurgitation. Aortic Valve Mildly thickened trileaflet aortic valve with normal excursion. Structurally normal aortic valve. No evidence of aortic valve stenosis . No evidence of aortic valve regurgitation . Pulmonic Valve The pulmonic valve was not well visualized . Pericardial Effusion No evidence of pericardial effusion. Aorta \ Miscellaneous The aorta is within normal limits. M-Mode Measurements (cm)   LVIDd: 2.97 cm                         LVIDs: 2.34 cm  IVSd: 0.73 cm                          IVSs: 1.01 cm  LVPWd: 0.9 cm                          LVPWs: 1.01 cm  Rt. Vent.  Dimension: 3.12 cm           AO Root Dimension: 2.1 cm                                         ACS: 1.37 cm                                         LA: 1.82 cm                                         LVOT: 2.03 cm  Doppler Measurements:   AV Velocity:0.02 m/s                    MV Peak E-Wave: 0.5 m/s  AV Peak Gradient: 5.77 mmHg             MV Peak A-Wave: 0.53 m/s  AV Mean Gradient: 3.05 mmHg  AV Area (Continuity):1.97 cm^2  TR Velocity:2.52 m/s                    Estimated RAP:3 mmHg  TR Gradient:25.31 mmHg                  RVSP:28.31 mmHg  Valves  Mitral Valve   Peak E-Wave: 0.5 m/s                  Peak A-Wave: 0.53 m/s  Mean Velocity: 0.87 m/s               E/A Ratio: 0.94  Mean Gradient: 4.66 mmHg              Peak Gradient: 0.99 mmHg Deceleration Time: 353.3 msec                                        Area (continuity): 1.4 cm^2   Tissue Doppler   E' Septal Velocity: 0.07 m/s  E' Lateral Velocity: 0.07 m/s   Aortic Valve   Peak Velocity: 1.2 m/s                 Mean Velocity: 0.82 m/s  Peak Gradient: 5.77 mmHg               Mean Gradient: 3.05 mmHg  Area (continuity): 1.97 cm^2  AV VTI: 29.33 cm   Cusp Separation: 1.37 cm   Tricuspid Valve   Estimated RVSP: 28.31 mmHg              Estimated RAP: 3 mmHg  TR Velocity: 2.52 m/s                   TR Gradient: 25.31 mmHg   Pulmonic Valve   Peak Velocity: 0.99 m/s           Peak Gradient: 3.91 mmHg                                    Estimated PASP: 28.31 mmHg   LVOT   Peak Velocity: 0.97 m/s              Mean Velocity: 0.61 m/s  Peak Gradient: 3.77 mmHg             Mean Gradient: 1.79 mmHg  LVOT Diameter: 2.03 cm               LVOT VTI: 17.9 cm  Structures  Left Atrium   LA Dimension: 1.82 cm                        LA Area: 12.58 cm^2  LA/Aorta: 0.87  LA Volume/Index: 44.74 ml /25 m^2   Left Ventricle   Diastolic Dimension: 5.03 cm         Systolic Dimension: 9.59 cm  Septum Diastolic: 5.68 cm            Septum Systolic: 0.47 cm  PW Diastolic: 0.9 cm                 PW Systolic: 0.49 cm                                       FS: 21.2 %  LV EDV/LV EDV Index: 34.12 ml/19 m^2 LV ESV/LV ESV Index: 19.02 ml/11 m^2  EF Calculated: 44.3 %   LVOT Diameter: 2.03 cm   Right Ventricle   Diastolic Dimension: 7.77 cm                                    RV Systolic Pressure: 66.46 mmHg  Aorta/ Miscellaneous Aorta   Aortic Root: 2.1 cm  LVOT Diameter: 2.03 cm      ECHO Limited    Result Date: 7/28/2022  Transthoracic Echocardiography Report (TTE)  Demographics   Patient Name    [facility]IA [facility]       Gender               Male                  [facility]   Patient Number  [id number]       Race                                                   Ethnicity   Visit Number    [id number]      Room Number          JO70 [employer] ID                   Date of Study        07/28/2022   Accession       [id number]     Referring Physician  Number   Date of Birth   1955     Sonographer          Serge De Leon   Age             75 year(s)     Interpreting         [facility])                                 Physician            Cardiology                                                      [facility]  Procedure Type of Study   TTE procedure:ECHOCARDIOGRAM LIMITED. Procedure Date Date: 07/28/2022 Start: 08:08 AM Study Location: Portable Technical Quality: Limited visualization Indications:LVF. Patient Status: Routine Contrast Medium: Definity. Amount - 2 ml Height: 66 inches Weight: 148 pounds BSA: 1.76 m^2 BMI: 23.89 kg/m^2  Conclusions   Summary  No evidence of thrombus with definity  Left ventricular ejection fraction is visually estimated at 65%. Signature   ----------------------------------------------------------------  Electronically signed by Micaela Ricks(Interpreting physician)  on 07/28/2022 08:42 AM  ----------------------------------------------------------------   Findings  Left Ventricle Left ventricular ejection fraction is visually estimated at 65%. IR FLUORO GUIDED CVA DEVICE PLMT/REPLACE/REMOVAL    1. Venogram of the right internal jugular vein demonstrates a thrombus in the right internal jugular vein which completely occludes the vein. Passage of contrast into the chest is through small collateral veins. 2. 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 Passage of contrast into the chest is through a small collateral veins. Radiation dose to the patient was: 24.21 mGy Additional clinical data: Long-term IV access Procedure: 1. Ultrasound guidance for vascular access into the right internal jugular vein. The ultrasound image of the blood vessel was saved to PACS.  2. Venogram via contrast injection of the right internal jugular vein. 3.   Ultrasound guidance for vascular access into the left internal jugular vein. The ultrasound image of the blood vessel was saved to PACS 4. Venogram via contrast injection of the left internal jugular vein. Body of Report: Informed and written consent was obtained from the patient following discussion of risks, benefits and alternatives to this procedure. The was patient placed supine on the angiographic table. The patient's neck and chest were then prepped and draped in  normal sterile fashion. A small amount of local lidocaine anesthesia was injected subcutaneously. Ultrasound was used to study the jugular vein we intended to use prior to accessing it. The vein appeared patent. The ultrasound image of the blood vessel was saved to PACS. Using ultrasound access, puncture was made of the right internal jugular vein using a 21 GA needle. A wire was advanced into the right internal jugular vein, though would not pass into the superior vena cava. A 4 Guinean short thin sheath was placed, through the sheath digital subtraction venography was performed. Venography demonstrated a thrombus in the right internal jugular vein and complete occlusion of the vein central to the thrombus. This access was aborted. Ultrasound was used to study the left jugular vein we intended to use prior to accessing it. The vein appeared patent. The ultrasound image of the blood vessel was saved to PACS. Using ultrasound access, puncture was made of the left internal jugular vein using a 21 GA needle. A wire was attempted to be passed, though would not pass to the superior vena  cava. A 4 Guinean thin sheath was placed and digital subtraction venography was performed. 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 The procedure was aborted. Findings discussed with ICU team who will place a temporary central line in the groin.      XR CHEST PORTABLE    Result Date: 7/27/2022  XR CHEST PORTABLE COMPARISON: July 25, 2022. HISTORY: resp failure TECHNIQUE: AP view FINDINGS: Endotracheal tube again seen in place. . There is also an enteric tube seen in place and the tip is below the diaphragm. They appear unchanged in position. A small pleural effusion seen on the right. The left costophrenic angle is not well seen. The lungs are hyperinflated and there is coarsening of the interstitium. Atelectasis and/or scarring is again seen bilaterally in the lower lung fields. The cardiac silhouette appears mildly enlarged but may be accentuated by the portable technique. Degenerative changes are seen in the visualized portion of the right shoulder. The airspace disease has diminished when compared to previous study. . A small pleural effusion is seen on the right and scarring or atelectasis is again seen bilaterally in the bases. US DUP UPPER EXTREMITY RIGHT VENOUS COMPARISON: HISTORY:  resp failure TECHNIQUE: , US DUP UPPER EXTREMITY RIGHT VENOUS AND LEFT VENOUS FINDINGS: Thrombus is seen in the right internal jugular and proximal subclavian, veins it is also seen in the cephalic vein. The right ulnar and basilic veins are not visualized. A right AV fistula seen. Thrombus is seen in the left internal jugular vein. The left basilic and axillary veins are not visualized. IMPRESSION: Deep venous thrombosis seen in the right and left upper extremities. .    XR CHEST PORTABLE    Result Date: 7/25/2022  XR CHEST PORTABLE : 7/25/2022 CLINICAL HISTORY:  post intubation . COMPARISON: Earlier 7/25/2022 TECHNIQUE: A portable upright AP radiograph of the chest was obtained at approximately 12:46 PM. FINDINGS: Both lung bases have been excluded from the radiograph. An endotracheal tube is present, with its tip approximately 4 to 5 cm above the lucita. An orogastric tube probably extends below the diaphragm out of field of view radiograph.  Moderate pleural effusions and mild to moderate probable scarring and/or atelectasis of the mid to lower lung fields has not significantly changed. There is no cardiomegaly, pneumothorax, displaced fractures, or other significant changes identified. ENDOTRACHEAL AND OROGASTRIC TUBES IN EXPECTED POSITIONS. OTHERWISE, STABLE CHEST FROM EARLIER 7/25/2022. XR CHEST PORTABLE    Result Date: 7/25/2022  TECHNIQUE: Single portable view of the chest. CLINICAL INDICATION: Chest pain. COMPARISON: Chest x-ray obtained on June 28, 2022. PROCEDURE AND FINDINGS: Poor inspiratory effort is seen. The cardiomediastinal silhouette is slightly prominent in size. Mild prominence of the bronchovascular and interstitial lung markings is visualized bilaterally, linear streaky opacities visualized in bilateral lung fields, subsegmental atelectatic streaks, fluid visualized in the right minor fissure. Airspace opacification visualized in the lower lung fields bilaterally with blunting of the costophrenic angles and obliteration of the hemidiaphragms consistent with bilateral pleural effusions. . No evidence of pneumothorax or parenchymal lung mass. Degenerative bone changes. Congestive heart failure versus pneumonia and parapneumonic effusions would recommend clinical correlation. Increased opacification seen in comparison to the prior study. US DUP UPPER EXTREMITY RIGHT VENOUS    Result Date: 7/27/2022  XR CHEST PORTABLE COMPARISON: July 25, 2022. HISTORY: resp failure TECHNIQUE: AP view FINDINGS: Endotracheal tube again seen in place. . There is also an enteric tube seen in place and the tip is below the diaphragm. They appear unchanged in position. A small pleural effusion seen on the right. The left costophrenic angle is not well seen. The lungs are hyperinflated and there is coarsening of the interstitium. Atelectasis and/or scarring is again seen bilaterally in the lower lung fields. The cardiac silhouette appears mildly enlarged but may be accentuated by the portable technique.  Degenerative changes are seen in the visualized portion of the right shoulder. The airspace disease has diminished when compared to previous study. . A small pleural effusion is seen on the right and scarring or atelectasis is again seen bilaterally in the bases. US DUP UPPER EXTREMITY RIGHT VENOUS COMPARISON: HISTORY:  resp failure TECHNIQUE: , US DUP UPPER EXTREMITY RIGHT VENOUS AND LEFT VENOUS FINDINGS: Thrombus is seen in the right internal jugular and proximal subclavian, veins it is also seen in the cephalic vein. The right ulnar and basilic veins are not visualized. A right AV fistula seen. Thrombus is seen in the left internal jugular vein. The left basilic and axillary veins are not visualized. IMPRESSION: Deep venous thrombosis seen in the right and left upper extremities. .    US DUP UPPER EXTREMITY LEFT VENOUS    Result Date: 7/27/2022  XR CHEST PORTABLE COMPARISON: July 25, 2022. HISTORY: resp failure TECHNIQUE: AP view FINDINGS: Endotracheal tube again seen in place. . There is also an enteric tube seen in place and the tip is below the diaphragm. They appear unchanged in position. A small pleural effusion seen on the right. The left costophrenic angle is not well seen. The lungs are hyperinflated and there is coarsening of the interstitium. Atelectasis and/or scarring is again seen bilaterally in the lower lung fields. The cardiac silhouette appears mildly enlarged but may be accentuated by the portable technique. Degenerative changes are seen in the visualized portion of the right shoulder. The airspace disease has diminished when compared to previous study. . A small pleural effusion is seen on the right and scarring or atelectasis is again seen bilaterally in the bases.  US DUP UPPER EXTREMITY RIGHT VENOUS COMPARISON: HISTORY:  resp failure TECHNIQUE: , US DUP UPPER EXTREMITY RIGHT VENOUS AND LEFT VENOUS FINDINGS: Thrombus is seen in the right internal jugular and proximal subclavian, veins it is also seen in the cephalic vein. The right ulnar and basilic veins are not visualized. A right AV fistula seen. Thrombus is seen in the left internal jugular vein. The left basilic and axillary veins are not visualized. IMPRESSION: Deep venous thrombosis seen in the right and left upper extremities. .    US DUP UPPER EXTREMITY RIGHT ARTERIES    Result Date: 8/2/2022  US DUP UPPER EXTREMITY RIGHT ARTERIES: 8/1/2022 11:42 AM CLINICAL HISTORY:  cold, poor pulses, extensive dvt . COMPARISON: None available. Grayscale, color and waveform Doppler analysis of the right upper arterial system was performed. FINDINGS: Mild to moderate atherosclerotic disease, with pulsatile mild to moderately diminished waveforms worsening distally. There are no significantly elevated velocities to suggest a flow-limiting stenosis, or arterial occlusion. The arterial system is normal in caliber, without evidence of aneurysm or dissection. Maximum systolic velocities are: RIGHT UPPER EXTREMITY: Right proximal common carotid artery 70 cm/s Right mid common carotid artery 53 cm/s Right proximal subclavian artery 68 cm/s Right mid subclavian artery 81 cm/s Right distal subclavian artery 71 cm/s Right axillary artery 41 cm/s Right brachial artery proximal 111 cm/s Right brachial artery mid 97 cm/s Right brachial artery distal 123 cm/s Right radial artery proximal 53 cm/s Right radial artery mid 43 cm/s Right radial artery distal 18 cm/s Right ulnar artery proximal 61 cm/s Right ulnar artery and mid 46 cm/s Right ulnar artery distal 33 cm/s     NO EVIDENCE OF A FLOW-LIMITING STENOSIS, ARTERIAL OCCLUSION, OR ANEURYSM. IR VENOGRAM VENOUS SINUS/JUGULAR    1. Venogram of the right internal jugular vein demonstrates a thrombus in the right internal jugular vein which completely occludes the vein. Passage of contrast into the chest is through small collateral veins.  2. 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 Passage of contrast into the chest is through a small collateral veins. Radiation dose to the patient was: 24.21 mGy Additional clinical data: Long-term IV access Procedure: 1. Ultrasound guidance for vascular access into the right internal jugular vein. The ultrasound image of the blood vessel was saved to PACS. 2. Venogram via contrast injection of the right internal jugular vein. 3.   Ultrasound guidance for vascular access into the left internal jugular vein. The ultrasound image of the blood vessel was saved to PACS 4. Venogram via contrast injection of the left internal jugular vein. Body of Report: Informed and written consent was obtained from the patient following discussion of risks, benefits and alternatives to this procedure. The was patient placed supine on the angiographic table. The patient's neck and chest were then prepped and draped in  normal sterile fashion. A small amount of local lidocaine anesthesia was injected subcutaneously. Ultrasound was used to study the jugular vein we intended to use prior to accessing it. The vein appeared patent. The ultrasound image of the blood vessel was saved to PACS. Using ultrasound access, puncture was made of the right internal jugular vein using a 21 GA needle. A wire was advanced into the right internal jugular vein, though would not pass into the superior vena cava. A 4 Japanese short thin sheath was placed, through the sheath digital subtraction venography was performed. Venography demonstrated a thrombus in the right internal jugular vein and complete occlusion of the vein central to the thrombus. This access was aborted. Ultrasound was used to study the left jugular vein we intended to use prior to accessing it. The vein appeared patent. The ultrasound image of the blood vessel was saved to PACS.  Using ultrasound access, puncture was made of the left internal jugular vein using a 21 GA puncture was made of the right internal jugular vein using a 21 GA needle. A wire was advanced into the right internal jugular vein, though would not pass into the superior vena cava. A 4 Slovak short thin sheath was placed, through the sheath digital subtraction venography was performed. Venography demonstrated a thrombus in the right internal jugular vein and complete occlusion of the vein central to the thrombus. This access was aborted. Ultrasound was used to study the left jugular vein we intended to use prior to accessing it. The vein appeared patent. The ultrasound image of the blood vessel was saved to PACS. Using ultrasound access, puncture was made of the left internal jugular vein using a 21 GA needle. A wire was attempted to be passed, though would not pass to the superior vena  cava. A 4 Slovak thin sheath was placed and digital subtraction venography was performed. 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 The procedure was aborted. Findings discussed with ICU team who will place a temporary central line in the groin. US DUP LOWER EXTREMITIES BILATERAL VENOUS    Result Date: 7/27/2022  EXAMINATION: US DUP LOWER EXTREMITIES BILATERAL VENOUS CLINICAL HISTORY: 70-year-old with lower extremity swelling COMPARISONS: None available. FINDINGS: Duplex and color Doppler ultrasounds were performed of the bilateral lower extremity deep venous systems. Examination was performed portably and limited due to patient condition and access to the lower extremities. Right leg: Visualized portions of the right common femoral vein, femoral vein, popliteal vein demonstrate satisfactory compression, color flow, and augmentation. Deep calf veins are not evaluated. Left leg: The left common femoral vein is enlarged filled with intraluminal echoes with absent color flow and poor compression consistent with occluding thrombus.  Occluding Thrombus extends into the left proximal femoral vein. Mid to distal femoral vein and popliteal vein demonstrate satisfactory compression and color flow. Deep calf veins are not assessed on this portable study     ULTRASOUND FINDINGS ARE POSITIVE FOR OCCLUDING THROMBUS IN THE LEFT COMMON FEMORAL VEIN EXTENDING INTO THE PROXIMAL FEMORAL VEIN. NO ULTRASOUND SIGNS OF DVT IN THE RIGHT LEG FROM THE GROIN TO THE POPLITEAL FOSSA    IR MIDLINE CATH    Result Date: 8/3/2022  FOR BILLING PURPOSES ONLY. STUDY DICTATED IN [facility] BY PHYSICIAN. IMPRESSION AND SUGGESTION:  Acute respiratory failure with hypoxia on 50% Ventimask  Epistaxis s/p nasal packing  Pneumonia  Duodenal ulcer  DVT in lower extremity    Continue O2 via 50%  Titrate down to nasal cannula once nasal packing is out. Minimal decrease FiO2 to 40% Ventimask. He has DVT study in lower extremity but anticoagulation remains on hold due to epistaxis and GI bleed. He has IVC filter placement. For duodenal ulcer he has been on PPI and GI is following. He completed a course of Bactrim and ID is following. Continue present treatment plan. NOTE: This report was transcribed using voice recognition software. Every effort was made to ensure accuracy; however, inadvertent computerized transcription errors may be present.       Electronically signed by Disha Pearson MD, FCCP on 8/8/2022 at 10:14 PM

## 2022-08-09 NOTE — PROGRESS NOTES
8/9/22    From: HOME ALONE, INDEP, DRIVES, CANE, HD MWF NO 02    Admit:SOB AT HD 88% ON RA, JVD, SWELLING     PMH:HTN LIVER TRANSPLANT ESRD-HD     Anticipated Discharge Disposition:[facility] IF AUTH / SNF VS [facility] TRANSFER    Patient Mobility or PT/OT ordered:YES    Consults: HOLIDAY, IR, PODIATRY, RENAL, PALL CARE PULM ID    Clinical:   8/2 MIDLINE  SPUTUM-+STENTOPHOMONAS  8/5: EGD + CLIPS TX TO ICU S/P PROCEDURE     Barriers to Discharge:50%VM, SOB    Assessments: CMI DONE

## 2022-08-09 NOTE — PROGRESS NOTES
Subjective: The patient complains of severe acute on chronic progressive fatigue and generalized weakness partially relieved by rest, PT, OT and meds and hemodialysis and exacerbated by exertion and recent illness. He was initially admitted via the ER with SOB. He was diagnosed with hypoxia acute respiratory failure end-stage renal disease macrocytic anemia and hyperglycemia secondary to diabetes mellitus. He was admitted to the ICU. He is on IV Bactrim for pneumonia. GI is seeing him an duodenal ulcer GI bleed. I am concerned about his Lovenox dose given his renal failure-Lovenox is currently on hold-due to bleeding. There are plans for an IVC filter in the future. Medically is complicated by history of a liver transplant for hepatitis. He is on immunosuppressants. He gets dialysis Monday Wednesday and Friday. I am concerned about patients medical complexities including:  Principal Problem:    Acute respiratory failure with hypoxia (HCC)  Active Problems:    Pneumonia due to infectious organism    Pleural effusion    Impaired mobility and activities of daily living    Dysphagia, oropharyngeal phase    Epistaxis    Duodenal ulcer    ESRD (end stage renal disease) on dialysis [facility])    Liver transplant recipient [facility])  Resolved Problems:    * No resolved hospital problems. *      .    Reviewed recent nursing note and discussed current status and planned care with acute care providers, \"A&Ox4. PM meds given ex Lopressor because currently unable to get concrete BP. Lung clear, diminished; on 12L Venturi mask. Lung clear, diminished. Abd sounds active; 1 BM so far in shift. +3 pitting weeping edema at RUE; +2 pittindg edema at LUE; + non-Pitting edema at RLE; Trace edema LLE. Skin ashen, dry, cool; Ecchymosis, redness at BUE, Chest; BLE dry, flaky. Packing intact in left nare. Right arm fistula drsg intact. BP at RLE. Call light within reach. Bed in lowest position. ... 0030:  This RN talk to pt sister, Samantha-She ask for update regarding pt care and request to inform Dr. Radha Mitchell that she want to discuss getting pt to [facility] transplant team. This RN inform that a Sticky note to  was written on Epic this am and that am RN, Broyd serrano call request to Dr. Estefania Blank RN will pass this info on to am shift. ..[phone]: This RN talk to pt about conversation with sister and his previous refusal of [facility] when he talked to Dr. Radha Mitchell. Pt state that he had changed his mind and is wiling to go to [facility] if it will make him better. He wants to talk to  In today about it  \". ROS x10: The patient also complains of severely impaired mobility and activities of daily living. Otherwise no new problems with vision, hearing, nose, mouth, throat, dermal, cardiovascular, GI, , pulmonary, musculoskeletal, psychiatric or neurological.        Vital signs:  BP (!) 93/34   Pulse (!) 111   Temp 99.7 °F (37.6 °C) (Oral)   Resp 17   Ht 5' 6\" (1.676 m)   Wt 135 lb 2.3 oz (61.3 kg)   SpO2 100%   BMI 21.81 kg/m²   I/O:   PO/Intake:    fair PO intake, monitoring for dysphagia    Bowel/Bladder:   continent,    General:  Patient is well developed, adequately nourished, and    well kempt. HEENT:    PERRLA, hearing intact to loud voice, external inspection of ear and nose -nasal packing. Inspection of lips, tongue -dark and coated-gums benign, nosebleed left nares has nasal packing in place. From nasal cannula O2 patient is also on some blood thinners. Musculoskeletal: No significant change in strength or tone. All joints stable. Inspection and palpation of digits and nails show no clubbing, cyanosis or inflammatory conditions. Neuro/Psychiatric: Affect: flat-  Alert and oriented to self and situation with  min-mod cues. No significant change in deep tendon reflexes or sensation  Lungs:  Diminished, CTA-B  . Respiration effort is normal at rest.   Heart:   S1 = S2,   RRR. Abdomen:  Soft, non-tender    Extremities:  Trace  lower extremity edema but no unusual tenderness. non Fx RUE fistula--functional LUE fistula. Skin:   BUE bruises dt blood draws-his bruises and skin tears. Rehabilitation:  Physical Therapy:   Bed mobility:  Bed mobility  Rolling to Left: Minimal assistance (08/03/22 0950)  Rolling to Right: Minimal assistance (08/03/22 0950)  Supine to Sit: Moderate assistance (08/05/22 1130)  Sit to Supine: Minimal assistance (Up to bedside chair) (08/05/22 1130)  Scooting: Minimal assistance; Moderate assistance (08/05/22 1130)  Bed Mobility Comments: Requires increased time, effort and use of bed rail. Difficulty maintaining seated balance at midline at EOB due to left lateral lean.  (08/05/22 1130)  Transfers:  Transfers  Sit to Stand: Minimal Assistance (08/05/22 1131)  Stand to sit: Minimal Assistance (08/05/22 1131)  Comment: Completes STS x1 with ww (08/05/22 1131)  Gait:   Ambulation  Surface: level tile (08/04/22 1306)  Device: Rolling Walker (08/04/22 1306)  Other Apparatus: O2 (2L O2 NC, increased to 4L O2 NC RN notified and aware) (08/04/22 1306)  Assistance: Minimal assistance (08/04/22 1306)  Quality of Gait: flexed posture, decresaed bilateral step length and foot clearance, unsteady (08/04/22 1306)  Gait Deviations: Slow Whitney;Decreased step length;Decreased step height;Decreased arm swing (08/04/22 1306)  Distance: 5ft to chair (08/04/22 1306)  Comments: SOB upon exertion, HR elevated 110s with exertional dyspnea observed, O2 increased d/t low O2 sats (08/03/22 0950)  Stairs:     W/C mobility:       Occupational Therapy:   Hand Dominance: Left  ADL  Feeding: Setup (08/03/22 0943)  Grooming: Minimal assistance (08/03/22 0943)  UE Bathing: Minimal assistance (08/03/22 0943)  LE Bathing: Maximum assistance (08/03/22 0943)  UE Dressing: Minimal assistance (08/03/22 0943)  LE Dressing: Maximum assistance (08/03/22 0943)  Toileting: Dependent/Total (08/03/22 1427)  Toileting Skilled Clinical Factors: Incontinent of dark loose stool; LPN notified (57/94/95 8612)  Additional Comments: Simulated ADLs as above. Pt. significantly limited d/t fatigue and SOB. (08/03/22 6697)  Toilet Transfers  Toilet Transfer: Unable to assess (08/03/22 0946)  Toilet Transfers Comments: Anticipate min A (08/03/22 0946)          Speech Therapy:            Diet/Swallow:        Dysphagia Outcome Severity Scale: Level 5: Mild dysphagia- Distant supervision.  May need one diet consistency restricted  Compensatory Swallowing Strategies : Upright as possible for all oral intake, Small bites/sips, Assist feed, Eat/Feed slowly  Therapeutic Interventions: Patient/Family education, Diet tolerance monitoring    COGNITION  OT: Cognition Comment: Verbal cues for safety and sequencing  SP:           Lab/X-ray studies reviewed, analyzed and discussed with patient and staff:   Recent Results (from the past 24 hour(s))   Renal Function Panel    Collection Time: 08/08/22  6:20 AM   Result Value Ref Range    Sodium 134 (L) 135 - 144 mEq/L    Potassium 4.9 3.4 - 4.9 mEq/L    Chloride 93 (L) 95 - 107 mEq/L    CO2 24 20 - 31 mEq/L    Anion Gap 17 (H) 9 - 15 mEq/L    Glucose 57 (L) 70 - 99 mg/dL    BUN 29 (H) 8 - 23 mg/dL    Creatinine 5.10 (H) 0.70 - 1.20 mg/dL    GFR Non-African American 11.3 (L) >60    GFR  13.7 (L) >60    Calcium 8.7 8.5 - 9.9 mg/dL    Phosphorus 5.8 (H) 2.3 - 4.8 mg/dL    Albumin 3.1 (L) 3.5 - 4.6 g/dL   Hepatic Function Panel    Collection Time: 08/08/22  6:20 AM   Result Value Ref Range    Total Protein 6.7 6.3 - 8.0 g/dL    Alkaline Phosphatase 83 35 - 104 U/L    ALT 62 (H) 0 - 41 U/L    AST 57 (H) 0 - 40 U/L    Total Bilirubin <0.2 0.2 - 0.7 mg/dL    Bilirubin, Direct <0.2 0.0 - 0.4 mg/dL    Bilirubin, Indirect see below 0.0 - 0.6 mg/dL   POCT Glucose    Collection Time: 08/08/22  8:02 AM   Result Value Ref Range    POC Glucose 49 (L) 70 - 99 mg/dl    Performed on ACCU-CHEK POCT Glucose    Collection Time: 08/08/22  8:40 AM   Result Value Ref Range    POC Glucose 86 70 - 99 mg/dl    Performed on ACCU-CHEK    POCT Glucose    Collection Time: 08/08/22 11:08 AM   Result Value Ref Range    POC Glucose 123 (H) 70 - 99 mg/dl    Performed on ACCU-CHEK    POCT Glucose    Collection Time: 08/08/22 11:20 AM   Result Value Ref Range    POC Glucose 87 70 - 99 mg/dl    Performed on ACCU-CHEK    CBC    Collection Time: 08/08/22  1:02 PM   Result Value Ref Range    WBC 14.5 (H) 4.8 - 10.8 K/uL    RBC 2.55 (L) 4.70 - 6.10 M/uL    Hemoglobin 8.4 (L) 14.0 - 18.0 g/dL    Hematocrit 25.9 (L) 42.0 - 52.0 %    .6 (H) 80.0 - 100.0 fL    MCH 33.1 (H) 27.0 - 31.3 pg    MCHC 32.6 (L) 33.0 - 37.0 %    RDW 16.0 (H) 11.5 - 14.5 %    Platelets 973 498 - 291 K/uL   POCT Glucose    Collection Time: 08/08/22  5:18 PM   Result Value Ref Range    POC Glucose 60 (L) 70 - 99 mg/dl    Performed on ACCU-CHEK    POCT Glucose    Collection Time: 08/08/22  5:39 PM   Result Value Ref Range    POC Glucose 93 70 - 99 mg/dl    Performed on ACCU-CHEK    POCT Glucose    Collection Time: 08/08/22  9:16 PM   Result Value Ref Range    POC Glucose 88 70 - 99 mg/dl    Performed on ACCU-CHEK      Previous extensive, complex labs, notes and diagnostics reviewed and analyzed. ALLERGIES:    Allergies as of 07/25/2022    (No Known Allergies)      (please also verify by checking [facility])     Complex Physical Medicine & Rehab Issues Assess & Plan:   Severe abnormality of gait and mobility and impaired self-care and ADL's secondary to hypoxic encephalopathy. Updated functional and medical status reassessed regarding patients ability to participate in therapies and patient found to be able to participate in:        acute intensive comprehensive inpatient rehabilitation program including PT/OT to improve balance, ambulation, ADLs, and to improve the P/AROM.    It is my opinion that they will be able to tolerate 3 hours of therapy a day Complex Active General Medical Issues that complicate care:     1. Principal Problem:    Acute respiratory failure with hypoxia (HCC)  Active Problems:    Pneumonia due to infectious organism    Pleural effusion    Impaired mobility and activities of daily living    Dysphagia, oropharyngeal phase    Epistaxis    Duodenal ulcer    ESRD (end stage renal disease) on dialysis [facility])    Liver transplant recipient [facility])  Resolved Problems:    * No resolved hospital problems. *          Events and functional changes in the past 24 hours reviewed improvements in functional status are encouraging       Focus of today's plan-   treat nosebleed Afrin and Ocean nasal spray and bacitracin. Hold thinners including-Lovenox dosing-received 2 units of blood-has renal failure and is on hemodialysis. Await medical stability recheck PT and OT eval's. Peridex--and advise dc Tylenol--use OXYIR instead.         Leonarda Hilliard D.O., PM&R     Attending    [facility] Rosana Burger

## 2022-08-09 NOTE — PROGRESS NOTES
Department of Otolaryngology  [place] Reynoso MD  Progress Note      SUBJECTIVE: I did not have any further episode of bleeding from the nose. OBJECTIVE patient is in his room and appears comfortable. Physical    BP (!) 164/126   Pulse (!) 134   Temp 98.4 °F (36.9 °C) (Axillary)   Resp 24   Ht 5' 6\" (1.676 m)   Wt 135 lb 2.3 oz (61.3 kg)   SpO2 92%   BMI 21.81 kg/m²     Intake/Output Summary (Last 24 hours) at 8/9/2022 1229  Last data filed at 8/9/2022 0617  Gross per 24 hour   Intake 2654.1 ml   Output 2100 ml   Net 554.1 ml         Physical Exam   Examination of the nose reveals no external nasal deformity and anterior rhinoscopy reveals rapid Rhino in place and left naris. This was removed. No bleeding ensued. Examination of the oral cavity reveals no mucosal membrane abnormalities and no blood.     Data      Current Inpatient Medications  Current Facility-Administered Medications: dextrose 5 % solution, , IntraVENous, Continuous  sucralfate (CARAFATE) tablet 1 g, 1 g, Oral, 4 times per day  pill splitter, , Does not apply, Once  chlorhexidine (PERIDEX) 0.12 % solution 15 mL, 15 mL, Mouth/Throat, BID  0.9 % sodium chloride infusion, , IntraVENous, PRN  sodium chloride flush 0.9 % injection 5-40 mL, 5-40 mL, IntraVENous, 2 times per day  sodium chloride flush 0.9 % injection 5-40 mL, 5-40 mL, IntraVENous, PRN  0.9 % sodium chloride infusion, , IntraVENous, PRN  acetaminophen (TYLENOL) tablet 650 mg, 650 mg, Oral, Q4H PRN  ondansetron (ZOFRAN) injection 4 mg, 4 mg, IntraVENous, Q6H PRN  hydrALAZINE (APRESOLINE) injection 10 mg, 10 mg, IntraVENous, Q10 Min PRN  labetalol (NORMODYNE;TRANDATE) injection 10 mg, 10 mg, IntraVENous, Q30 Min PRN  pantoprazole (PROTONIX) 40 mg in sodium chloride (PF) 10 mL injection, 40 mg, IntraVENous, Q12H  [Held by provider] enoxaparin (LOVENOX) injection 60 mg, 1 mg/kg, SubCUTAneous, Daily  fluticasone (FLONASE) 50 MCG/ACT nasal spray 1 spray, 1 spray, Each Nostril, Daily  sodium chloride flush 0.9 % injection 5-40 mL, 5-40 mL, IntraVENous, 2 times per day  sodium chloride flush 0.9 % injection 5-40 mL, 5-40 mL, IntraVENous, PRN  0.9 % sodium chloride infusion, , IntraVENous, PRN  midodrine (PROAMATINE) tablet 2.5 mg, 2.5 mg, Oral, TID WC  metoprolol tartrate (LOPRESSOR) tablet 12.5 mg, 12.5 mg, Oral, BID  [Held by provider] aspirin EC tablet 81 mg, 81 mg, Oral, Daily  docusate sodium (COLACE) capsule 100 mg, 100 mg, Oral, BID  insulin lispro (HUMALOG) injection vial 0-4 Units, 0-4 Units, SubCUTAneous, 4x Daily AC & HS  polyethylene glycol (GLYCOLAX) packet 17 g, 17 g, Oral, Daily  tacrolimus (PROGRAF) capsule 1 mg, 1 mg, Oral, BID  acetaminophen (TYLENOL) tablet 650 mg, 650 mg, Oral, Q6H PRN **OR** acetaminophen (TYLENOL) suppository 650 mg, 650 mg, Rectal, Q6H PRN  [DISCONTINUED] fentaNYL (SUBLIMAZE) 1,000 mcg in sodium chloride 0.9 % 100 mL infusion,  mcg/hr, IntraVENous, Continuous **AND** fentaNYL bolus from bag, 50 mcg, IntraVENous, Q30 Min PRN  glucose chewable tablet 16 g, 4 tablet, Oral, PRN  dextrose bolus 10% 125 mL, 125 mL, IntraVENous, PRN **OR** dextrose bolus 10% 250 mL, 250 mL, IntraVENous, PRN  glucagon (rDNA) injection 1 mg, 1 mg, SubCUTAneous, PRN  dextrose 10 % infusion, , IntraVENous, Continuous PRN  heparin (porcine) injection 1,000 Units, 1,000 Units, IntraVENous, PRN  ASSESSMENT AND PLAN    Epistaxis, recurrent and s/p packing    Plan I have removed the packing and I will watch him for overnight. If he should get the bleeding he may need packing again.     I will follow him while in the hospital.    Electronically signed by Renetta Li MD on 8/9/22 at 12:29 PM EDT

## 2022-08-09 NOTE — PROGRESS NOTES
Shift assessments completed and documented, see flowsheets. A&OX4. Medications administered per MAR. Call light within reach. No further needs verbalized at this time by pt.

## 2022-08-09 NOTE — PROGRESS NOTES
Internal Medicine   Hospitalist   Progress Note    8/9/2022   6:55 PM    Name:  Humaira Grant  MRN:    [id number]     IP Day: 13     Admit Date: 7/25/2022 11:15 AM  PCP: Abbie Ayala MD    Code Status:  Full Code    Assessment and Plan: Active Problems/ diagnosis:     Acute hypoxic respiratory failure in the setting of aspiration pneumonia-required intubation, extubated now on nasal cannula  Sepsis present admission  End-stage renal disease  Hypotension in the setting of dialysis-on midodrine  Bilateral upper and lower extremity acute DVT on Eliquis  Anemia  History of liver transplant    Plan  Patient is having more melena and epistaxis. He does not feel like epistaxis is going on in the back of his throat, seems more anterior, discussed with ENT who will be packing his nose today, discussed with GI for possibly obtain an EGD, resume PPI. Monitor H&H. Will hold Eliquis and use Lovenox therapeutic dose for now, hold Lovenox dose this morning until determination for EGD is made. Continue to monitor medical floor  Antibiotic as per infectious disease. Currently on IV Bactrim. Zosyn stopped   HD as per nephrology  Home medication resumed  Resume immunosuppressants for history of liver transplant, Prograf  Resume PT/OT  DVT/PUD PPx     8/6 - h/h improved. Off a/c due to duodenal ulcer. D/W GI, concerns for need for transfer to tertiary care center. Will d/w patient and sister. Patient transferring to floor. 8/7 - mild sob today, abg and port chest xray ordered. D/w patient, does not want transfer at this time. 8/8 - remains on increased o2. Attempted to call sister as requested, no answer. Received message that the patient and sister request transfer to [facility] if possible. Called transfer center to initiate however transfer center states [facility] transfer line was down, and will keep trying. 8/9 - patient accepted for transfer to [facility] however no beds. Resp care, cont monitor labs.   Patient has very guarded prognosis. 7 pm- 7 am, please contact on call Hospitalist for any needs       Subjective:     SOB unchanged. No cp, n/v/d.     Physical Examination:      Vitals:  /83   Pulse 99   Temp 98.4 °F (36.9 °C)   Resp 20   Ht 5' 6\" (1.676 m)   Wt 135 lb 2.3 oz (61.3 kg)   SpO2 100%   BMI 21.81 kg/m²   Temp (24hrs), Av.6 °F (37.6 °C), Min:98.4 °F (36.9 °C), Max:101.8 °F (38.8 °C)      General appearance: alert, cooperative and no distress  Mental Status: oriented to person, place and time and normal affect  Lungs: clear to auscultation bilaterally, normal effort  Heart: regular rate and rhythm, no murmur  Abdomen: soft, nontender, nondistended, bowel sounds present, no masses  Extremities: + upper and lower edema, pulses intact   Skin multiple area of bruising related to IV access in upper and lower extremities    Data:     Labs:  Recent Labs     22  0100 22  1309 22  1302   WBC 16.0*  --  14.5*   HGB 8.7* 9.0* 8.4*     --  295     Recent Labs     22  0645 22  1309 22  0620     --  134*   K 4.7  --  4.9   CL 93*  --  93*   CO2 27  --  24   BUN 23  --  29*   CREATININE 4.07* 4.0* 5.10*   GLUCOSE 73  --  57*     Recent Labs     22  0620   AST 57*   ALT 62*   BILITOT <0.2   ALKPHOS 83       Current Facility-Administered Medications   Medication Dose Route Frequency Provider Last Rate Last Admin    dextrose 5 % solution   IntraVENous Continuous Mark Anthony Dixon MD 50 mL/hr at 22 0857 Rate Change at 22 0857    sucralfate (CARAFATE) tablet 1 g  1 g Oral 4 times per day ISI Dowling - CNP   1 g at 22 0540    pill splitter   Does not apply Once German Keita MD        chlorhexidine (PERIDEX) 0.12 % solution 15 mL  15 mL Mouth/Throat BID Lilliam Goodrichin, DO   15 mL at 22 0854    0.9 % sodium chloride infusion   IntraVENous PRN Elzbieta Deckerin,         sodium chloride flush 0.9 % injection 5-40 mL  5-40 mL IntraVENous 2 times per day Niranjan Roberts MD   10 mL at 08/09/22 0855    sodium chloride flush 0.9 % injection 5-40 mL  5-40 mL IntraVENous PRN Niranjan Roberts MD        0.9 % sodium chloride infusion   IntraVENous PRN Niranjan Roberts MD        acetaminophen (TYLENOL) tablet 650 mg  650 mg Oral Q4H PRN Niranjan Roberts MD        ondansetron [facility]) injection 4 mg  4 mg IntraVENous Q6H PRN Niranjan Roberts MD        hydrALAZINE (APRESOLINE) injection 10 mg  10 mg IntraVENous Q10 Min PRN Niranjan Roberts MD        labetalol (NORMODYNE;TRANDATE) injection 10 mg  10 mg IntraVENous Q30 Min PRN Niranjan Roberts MD        pantoprazole (PROTONIX) 40 mg in sodium chloride (PF) 10 mL injection  40 mg IntraVENous Q12H Pitxochitl David, DO   40 mg at 08/09/22 0854    [Held by provider] enoxaparin (LOVENOX) injection 60 mg  1 mg/kg SubCUTAneous Daily Carmella Terrell DO        fluticasone (FLONASE) 50 MCG/ACT nasal spray 1 spray  1 spray Each Nostril Daily Odilia Ware, DO   1 spray at 08/04/22 0902    sodium chloride flush 0.9 % injection 5-40 mL  5-40 mL IntraVENous 2 times per day Pitney David, DO   10 mL at 08/08/22 0834    sodium chloride flush 0.9 % injection 5-40 mL  5-40 mL IntraVENous PRN Pitney David, DO        0.9 % sodium chloride infusion   IntraVENous PRN Pitney David, DO        midodrine (PROAMATINE) tablet 2.5 mg  2.5 mg Oral TID  Niranjan Roberts MD   2.5 mg at 08/07/22 1829    metoprolol tartrate (LOPRESSOR) tablet 12.5 mg  12.5 mg Oral BID Niranjan Roberts MD   12.5 mg at 08/07/22 1130    [Held by provider] aspirin EC tablet 81 mg  81 mg Oral Daily Niranjan Roberts MD   81 mg at 08/04/22 3679    docusate sodium (COLACE) capsule 100 mg  100 mg Oral BID ISI Garza - CNP   100 mg at 08/09/22 0854    insulin lispro (HUMALOG) injection vial 0-4 Units  0-4 Units SubCUTAneous 4x Daily AC & HS Jennifer Lozano MD   1 Units at 08/02/22 1822    polyethylene glycol (GLYCOLAX) packet 17 g  17 g Oral Daily Corby Kirby Donna Lee, APRN - CNP   17 g at 08/04/22 0904    tacrolimus (PROGRAF) capsule 1 mg  1 mg Oral BID Servandoter Perfecto, APRN - CNP   1 mg at 08/09/22 0855    acetaminophen (TYLENOL) tablet 650 mg  650 mg Oral Q6H PRN Kartik Schmidt MD   650 mg at 08/03/22 8209    Or    acetaminophen (TYLENOL) suppository 650 mg  650 mg Rectal Q6H PRN Kartik Schmidt MD        fentaNYL bolus from bag  50 mcg IntraVENous Q30 Min PRN Buster Haralson, APRN - CNP        glucose chewable tablet 16 g  4 tablet Oral PRN Buster Haralson, APRN - CNP        dextrose bolus 10% 125 mL  125 mL IntraVENous PRN Buster Haralson, APRN - CNP   Stopped at 08/08/22 1737    Or    dextrose bolus 10% 250 mL  250 mL IntraVENous PRN Servandoter Haralson, APRN - CNP        glucagon (rDNA) injection 1 mg  1 mg SubCUTAneous PRN Servandoter Haralson, APRN - CNP        dextrose 10 % infusion   IntraVENous Continuous PRN Servandoter Haralson, APRN - CNP        heparin (porcine) injection 1,000 Units  1,000 Units IntraVENous PRN Vamsi Hart MD           Additional work up or/and treatment plan may be added today or then after based on clinical progression. I am managing a portion of pt care. Some medical issues are handled by other specialists. Additional work up and treatment should be done in out pt setting by pt PCP and other out pt providers. In addition to examining and evaluating pt, I spent additional time explaining care, normaland abnormal findings, and treatment plan. All of pt questions were answered. Counseling, diet and education were provided. Case will be discussed with nursing staff when appropriate. Family will be updated if and when appropriate.        Electronically signed by Roberta Cook MD on 8/9/2022 at 6:55 PM

## 2022-08-09 NOTE — PROGRESS NOTES
[facility] Occupational Therapy      Date: 2022  Patient Name: Rolanda Diggs        MRN: [id number]  Account: [de-identified]   : 1955  (75 y.o.)  Room: [place] [address]    Chart reviewed, attempted OT at 0945 for OT Eval. Patient unavailable 2° to:    [x] Hold per nsg request- high BP/ [facility] Transfer    [] Pt declined     [] Pt. . off floor for test/procedure. Will attempt again when able.     Electronically signed by NAV Cesar on 2022 at 9:53 AM

## 2022-08-09 NOTE — PROGRESS NOTES
[facility]  Facility/Department: [facility] UNIT  Speech Language Pathology   Treatment Note      Cici West  1955  [id number]  [x]   confirmed      Date: 2022    Acute respiratory failure with hypoxia ([address] [J96.01]    Restrictions/Precautions: Fall Risk    Weight: 135 lb 2.3 oz (61.3 kg)     ADULT DIET; Full Liquid  ADULT ORAL NUTRITION SUPPLEMENT; Breakfast, Dinner; Renal Oral Supplement  ADULT ORAL NUTRITION SUPPLEMENT; Lunch; Frozen Oral Supplement    SpO2: 92 % (22)  O2 Flow Rate (L/min): 12 L/min (22)  No active isolations    Speech Dx: Dysphagia    Subjective:  Alert, Cooperative, and Pleasant      Patient requiring continuous venti mask 12L at 50%, requesting something to drink. Poor PO intake per chart review. Currently on full liquid diet per GI. BAILEY Yoder present for treatment session. Interventions used this date:  Dysphagia Treatment      Objective/Assessment:  Patient progressing towards goals:  Short-term Goals  Timeframe for Short-term Goals: 2 weeks  Goal 1: Pt will tolerate the recommended diet level without clinical s/s of aspiration. Patient tolerated single sips of thin liquid via straw x5 without overt s/s of aspiration or change in SpO2. Patient currently on a full liquid diet per GI. Given patient's increase in O2 needs over the last several days and fatigue, an MBS is recommended to rule out silent aspiration. Notified BAILEY Yoder of recommendation. Goal 2: Pt will tolerate 5/5 trials of easy to chew solids with adequate mastication and oral clearance of bolus in all opportunities. Not addressed, currently on full liquid diet.    Goal 3: Pt will complete lingual exercises that promote anterior to posterior propulsion of bolus and improve tongue base retraction with 80% accuracy in order to strengthen the muscles of the swallow to decrease risk of aspiration and to increase ability to safely handle the least restrictive diet level.  Not addressed  Goal 4: Pt will complete pharyngeal strengthening exercises (such as the Lashay, Effortful swallow, etc) with 80% acc in order to strengthen and establish and  more effective swallow. Not addressed    MBS is recommended to objectively assess the pharyngeal stage of the swallow to rule out silent aspiration. Treatment/Activity Tolerance:  Patient limited by fatigue, O2 status    Plan: Alter current POC (see above)    Pain Assessment:  Patient does not c/o pain. Pain Re-assessment:  Patient does not c/o pain. Patient/Caregiver Education:  Patient educated on session and progression towards goals. Education needs reinforcement.     Safety Devices:  Bed alarm in place and Call light within reach      Therapy Time  Time in: 0840  Time out: 0850  Total minutes: 10      Signature: Electronically signed by MARSHALL Greenwood on 8/9/2022 at 9:47 AM

## 2022-08-09 NOTE — PROGRESS NOTES
Nephrology Progress Note    Assessment:  ESRDX  Liver transplanr  COPD  Sepsis  Anemia  epistaxis      Plan:  sig fluid overloaded. Will do uf today  Hd tomorrow  Decrease d5w to 50    Patient Active Problem List:     Hypotension     ESRD (end stage renal disease) on dialysis ([facility] [address]     Liver transplanted [facility])     Liver transplant recipient ([facility] [address]     Sepsis ([facility] [address]     Gastroenteritis     C. difficile colitis     Severe sepsis ([facility] [address]     Vomiting and diarrhea     Generalized abdominal pain     Acute renal failure (HCC)     Acute respiratory failure with hypoxia (HCC)     Pneumonia due to infectious organism     Pleural effusion     Impaired mobility and activities of daily living     Dysphagia, oropharyngeal phase     Epistaxis     Duodenal ulcer      Subjective:  Admit Date: 7/25/2022    Interval History: fbp low. On midodrine. Still with sob. Echo ok.   On dextrose for low sugar    Medications:  Scheduled Meds:   sucralfate  1 g Oral 4 times per day    pill splitter   Does not apply Once    chlorhexidine  15 mL Mouth/Throat BID    sodium chloride flush  5-40 mL IntraVENous 2 times per day    pantoprazole (PROTONIX) 40 mg injection  40 mg IntraVENous Q12H    [Held by provider] enoxaparin  1 mg/kg SubCUTAneous Daily    fluticasone  1 spray Each Nostril Daily    sodium chloride flush  5-40 mL IntraVENous 2 times per day    midodrine  2.5 mg Oral TID WC    metoprolol tartrate  12.5 mg Oral BID    [Held by provider] aspirin  81 mg Oral Daily    docusate sodium  100 mg Oral BID    insulin lispro  0-4 Units SubCUTAneous 4x Daily AC & HS    polyethylene glycol  17 g Oral Daily    tacrolimus  1 mg Oral BID     Continuous Infusions:   dextrose 75 mL/hr at 08/09/22 0617    sodium chloride      sodium chloride      sodium chloride      sodium chloride      sodium chloride      dextrose         CBC:   Recent Labs     08/07/22  0100 08/07/22  1309 08/08/22  1302   WBC 16.0*  --  14.5*   HGB 8.7* 9.0* 8.4*     --  295 CMP:    Recent Labs     08/07/22  0645 08/07/22  1309 08/08/22  0620     --  134*   K 4.7  --  4.9   CL 93*  --  93*   CO2 27  --  24   BUN 23  --  29*   CREATININE 4.07* 4.0* 5.10*   GLUCOSE 73  --  57*   CALCIUM 9.0  --  8.7   LABGLOM 14.7* 15* 11.3*       Troponin: No results for input(s): TROPONINI in the last 72 hours. BNP: No results for input(s): BNP in the last 72 hours. INR:   No results for input(s): INR in the last 72 hours. Lipids: No results for input(s): CHOL, LDLDIRECT, TRIG, HDL, AMYLASE, LIPASE in the last 72 hours. Liver:   Recent Labs     08/08/22  0620   AST 57*   ALT 62*   ALKPHOS 83   PROT 6.7   LABALBU 3.1*   BILITOT <0.2       Iron:  No results for input(s): IRONS, FERRITIN in the last 72 hours. Invalid input(s): LABIRONS  Urinalysis: No results for input(s): UA in the last 72 hours.     Objective:  Vitals: BP (!) 93/34   Pulse (!) 111   Temp 99.7 °F (37.6 °C) (Oral)   Resp 17   Ht 5' 6\" (1.676 m)   Wt 135 lb 2.3 oz (61.3 kg)   SpO2 100%   BMI 21.81 kg/m²    Wt Readings from Last 3 Encounters:   08/08/22 135 lb 2.3 oz (61.3 kg)   06/28/22 145 lb (65.8 kg)   03/18/20 145 lb (65.8 kg)      24HR INTAKE/OUTPUT:    Intake/Output Summary (Last 24 hours) at 8/9/2022 0854  Last data filed at 8/9/2022 0617  Gross per 24 hour   Intake 2864.1 ml   Output 2100 ml   Net 764.1 ml         General: alert, weak  in mil resp apparent distress  HEENT: normocephalic, atraumatic, anicteric  Neck: supple, no mass  Lungs: non-labored respirations, clear to auscultation bilaterally  Heart: regular rate and rhythm, no murmurs or rubs  Abdomen: soft, non-tender, non-distended  Ext: 2+ edema UE  Neuro: alert and oriented, no gross abnormalities  Psych: normal mood and affect  Skin: no rash      Electronically signed by Cory Smith MD, MD

## 2022-08-09 NOTE — PROGRESS NOTES
Shift assessment complete. BP is 157/139 (143), 87/49 (55), 164/126 (134). Dr. Amira Mo aware- hold BP meds. Satting 92% on 50% ventimask. Rhonchi heard bilaterally. Pt is very edematous in upper extremities- +3, pitting, weeping. Dr. Vasquez Niall to run an extra dialysis treatment. Blood sugar was 82 this AM- pt has dextrose running at 50 ml/hr. Meds given per mar. Pt repositioned in bed. Call light within reach. Will continue to monitor. Electronically signed by Michelle Escalera RN on 8/9/2022 at 9:29 AM      1800 Pt back from dialysis. Changed and repositioned. 1 BM. New linens provided. Placed on 6L NC, 100% on portable pulse ox reader. Pts sugar was 64, dextrose continuous infusion restarted. Temp was 99.1, axillary. Pts midline fell out when arm lifted. Will message Dr. Amira Mo and make him aware. Electronically signed by Michelle Escalera RN on 8/9/2022 at 6:40 PM      [phone] 82 Pts blood sugar on recheck was 52. Two OJs given and 16g glucose chewable tablets. Will continue to monitor.      Electronically signed by Michelle Escalera RN on 8/9/2022 at 7:12 PM

## 2022-08-09 NOTE — PROGRESS NOTES
Physical Therapy Missed Treatment   Facility/Department: [facility] MED SURG [id number]    NAME: Brayan Jiang    : 1955 (75 y.o.)  MRN: [id number]    Account: [de-identified]  Gender: male      PT evaluation and treatment orders received. Chart reviewed. Pt off floor at this time for HD. Nursing staff aware. Will follow and attempt PT evaluation again at earliest availability.        Venice Atkinson, PT, 22 at 1:40 PM

## 2022-08-10 LAB
ALBUMIN SERPL-MCNC: 2.8 G/DL (ref 3.5–4.6)
ALP BLD-CCNC: 90 U/L (ref 35–104)
ALT SERPL-CCNC: 46 U/L (ref 0–41)
AST SERPL-CCNC: 46 U/L (ref 0–40)
BILIRUB SERPL-MCNC: <0.2 MG/DL (ref 0.2–0.7)
BILIRUBIN DIRECT: <0.2 MG/DL (ref 0–0.4)
BILIRUBIN, INDIRECT: ABNORMAL MG/DL (ref 0–0.6)
GLUCOSE BLD-MCNC: 107 MG/DL (ref 70–99)
GLUCOSE BLD-MCNC: 141 MG/DL (ref 70–99)
GLUCOSE BLD-MCNC: 317 MG/DL (ref 70–99)
GLUCOSE BLD-MCNC: 87 MG/DL (ref 70–99)
HCT VFR BLD CALC: 24.9 % (ref 42–52)
HEMOGLOBIN: 8 G/DL (ref 14–18)
MCH RBC QN AUTO: 33.4 PG (ref 27–31.3)
MCHC RBC AUTO-ENTMCNC: 32.3 % (ref 33–37)
MCV RBC AUTO: 103.5 FL (ref 80–100)
PDW BLD-RTO: 16.7 % (ref 11.5–14.5)
PERFORMED ON: ABNORMAL
PERFORMED ON: NORMAL
PLATELET # BLD: 270 K/UL (ref 130–400)
RBC # BLD: 2.41 M/UL (ref 4.7–6.1)
TOTAL PROTEIN: 6.5 G/DL (ref 6.3–8)
WBC # BLD: 11.2 K/UL (ref 4.8–10.8)

## 2022-08-10 PROCEDURE — 90935 HEMODIALYSIS ONE EVALUATION: CPT

## 2022-08-10 PROCEDURE — 1210000000 HC MED SURG R&B

## 2022-08-10 PROCEDURE — 6370000000 HC RX 637 (ALT 250 FOR IP): Performed by: NURSE PRACTITIONER

## 2022-08-10 PROCEDURE — 99211 OFF/OP EST MAY X REQ PHY/QHP: CPT

## 2022-08-10 PROCEDURE — C9113 INJ PANTOPRAZOLE SODIUM, VIA: HCPCS | Performed by: INTERNAL MEDICINE

## 2022-08-10 PROCEDURE — 92526 ORAL FUNCTION THERAPY: CPT

## 2022-08-10 PROCEDURE — 2580000003 HC RX 258: Performed by: INTERNAL MEDICINE

## 2022-08-10 PROCEDURE — 36415 COLL VENOUS BLD VENIPUNCTURE: CPT

## 2022-08-10 PROCEDURE — 99232 SBSQ HOSP IP/OBS MODERATE 35: CPT | Performed by: NURSE PRACTITIONER

## 2022-08-10 PROCEDURE — 6360000002 HC RX W HCPCS: Performed by: NURSE PRACTITIONER

## 2022-08-10 PROCEDURE — 85027 COMPLETE CBC AUTOMATED: CPT

## 2022-08-10 PROCEDURE — 99232 SBSQ HOSP IP/OBS MODERATE 35: CPT | Performed by: INTERNAL MEDICINE

## 2022-08-10 PROCEDURE — 6360000002 HC RX W HCPCS: Performed by: INTERNAL MEDICINE

## 2022-08-10 PROCEDURE — 6370000000 HC RX 637 (ALT 250 FOR IP): Performed by: PHYSICAL MEDICINE & REHABILITATION

## 2022-08-10 PROCEDURE — 2700000000 HC OXYGEN THERAPY PER DAY

## 2022-08-10 PROCEDURE — 80076 HEPATIC FUNCTION PANEL: CPT

## 2022-08-10 PROCEDURE — 6370000000 HC RX 637 (ALT 250 FOR IP): Performed by: INTERNAL MEDICINE

## 2022-08-10 PROCEDURE — A4216 STERILE WATER/SALINE, 10 ML: HCPCS | Performed by: INTERNAL MEDICINE

## 2022-08-10 RX ADMIN — Medication 10 ML: at 08:01

## 2022-08-10 RX ADMIN — SUCRALFATE 1 G: 1 TABLET ORAL at 11:55

## 2022-08-10 RX ADMIN — MIDODRINE HYDROCHLORIDE 2.5 MG: 5 TABLET ORAL at 17:44

## 2022-08-10 RX ADMIN — SODIUM CHLORIDE, PRESERVATIVE FREE 40 MG: 5 INJECTION INTRAVENOUS at 08:01

## 2022-08-10 RX ADMIN — 0.12% CHLORHEXIDINE GLUCONATE 15 ML: 1.2 RINSE ORAL at 21:27

## 2022-08-10 RX ADMIN — TACROLIMUS 1 MG: 1 CAPSULE ORAL at 21:26

## 2022-08-10 RX ADMIN — DOCUSATE SODIUM 100 MG: 100 CAPSULE, LIQUID FILLED ORAL at 21:26

## 2022-08-10 RX ADMIN — TACROLIMUS 1 MG: 1 CAPSULE ORAL at 08:00

## 2022-08-10 RX ADMIN — MIDODRINE HYDROCHLORIDE 2.5 MG: 5 TABLET ORAL at 08:00

## 2022-08-10 RX ADMIN — DEXTROSE MONOHYDRATE: 50 INJECTION, SOLUTION INTRAVENOUS at 17:47

## 2022-08-10 RX ADMIN — EPOETIN ALFA-EPBX 20000 UNITS: 10000 INJECTION, SOLUTION INTRAVENOUS; SUBCUTANEOUS at 18:02

## 2022-08-10 RX ADMIN — SUCRALFATE 1 G: 1 TABLET ORAL at 17:45

## 2022-08-10 RX ADMIN — SODIUM CHLORIDE, PRESERVATIVE FREE 40 MG: 5 INJECTION INTRAVENOUS at 21:25

## 2022-08-10 RX ADMIN — SUCRALFATE 1 G: 1 TABLET ORAL at 05:30

## 2022-08-10 RX ADMIN — MIDODRINE HYDROCHLORIDE 2.5 MG: 5 TABLET ORAL at 11:55

## 2022-08-10 RX ADMIN — 0.12% CHLORHEXIDINE GLUCONATE 15 ML: 1.2 RINSE ORAL at 08:01

## 2022-08-10 NOTE — PROGRESS NOTES
Department of Otolaryngology  Mirna Sexton MD  Progress Note      SUBJECTIVE: I have not had any further episode of bleeding from the nose and I am awaiting transfer to [facility] facility. OBJECTIVE is in his room and appears comfortable. Physical    BP (!) 89/51   Pulse 99   Temp 97.5 °F (36.4 °C) (Oral)   Resp 18   Ht 5' 6\" (1.676 m)   Wt 135 lb 2.3 oz (61.3 kg)   SpO2 100%   BMI 21.81 kg/m²     Intake/Output Summary (Last 24 hours) at 8/10/2022 0820  Last data filed at 8/10/2022 0557  Gross per 24 hour   Intake 2589.41 ml   Output --   Net 2589.41 ml         Physical Exam   Examination of the nose reveals no external nasal deformity and anterior rhinoscopy reveals no active bleeding. Old blood clots and some crusting is noted.     Data      Current Inpatient Medications  Current Facility-Administered Medications: dextrose 5 % solution, , IntraVENous, Continuous  sucralfate (CARAFATE) tablet 1 g, 1 g, Oral, 4 times per day  pill splitter, , Does not apply, Once  chlorhexidine (PERIDEX) 0.12 % solution 15 mL, 15 mL, Mouth/Throat, BID  0.9 % sodium chloride infusion, , IntraVENous, PRN  sodium chloride flush 0.9 % injection 5-40 mL, 5-40 mL, IntraVENous, 2 times per day  sodium chloride flush 0.9 % injection 5-40 mL, 5-40 mL, IntraVENous, PRN  0.9 % sodium chloride infusion, , IntraVENous, PRN  acetaminophen (TYLENOL) tablet 650 mg, 650 mg, Oral, Q4H PRN  ondansetron (ZOFRAN) injection 4 mg, 4 mg, IntraVENous, Q6H PRN  hydrALAZINE (APRESOLINE) injection 10 mg, 10 mg, IntraVENous, Q10 Min PRN  labetalol (NORMODYNE;TRANDATE) injection 10 mg, 10 mg, IntraVENous, Q30 Min PRN  pantoprazole (PROTONIX) 40 mg in sodium chloride (PF) 10 mL injection, 40 mg, IntraVENous, Q12H  [Held by provider] enoxaparin (LOVENOX) injection 60 mg, 1 mg/kg, SubCUTAneous, Daily  fluticasone (FLONASE) 50 MCG/ACT nasal spray 1 spray, 1 spray, Each Nostril, Daily  sodium chloride flush 0.9 % injection 5-40 mL, 5-40 mL, IntraVENous, 2 times per day  sodium chloride flush 0.9 % injection 5-40 mL, 5-40 mL, IntraVENous, PRN  0.9 % sodium chloride infusion, , IntraVENous, PRN  midodrine (PROAMATINE) tablet 2.5 mg, 2.5 mg, Oral, TID WC  metoprolol tartrate (LOPRESSOR) tablet 12.5 mg, 12.5 mg, Oral, BID  [Held by provider] aspirin EC tablet 81 mg, 81 mg, Oral, Daily  docusate sodium (COLACE) capsule 100 mg, 100 mg, Oral, BID  insulin lispro (HUMALOG) injection vial 0-4 Units, 0-4 Units, SubCUTAneous, 4x Daily AC & HS  polyethylene glycol (GLYCOLAX) packet 17 g, 17 g, Oral, Daily  tacrolimus (PROGRAF) capsule 1 mg, 1 mg, Oral, BID  acetaminophen (TYLENOL) tablet 650 mg, 650 mg, Oral, Q6H PRN **OR** acetaminophen (TYLENOL) suppository 650 mg, 650 mg, Rectal, Q6H PRN  [DISCONTINUED] fentaNYL (SUBLIMAZE) 1,000 mcg in sodium chloride 0.9 % 100 mL infusion,  mcg/hr, IntraVENous, Continuous **AND** fentaNYL bolus from bag, 50 mcg, IntraVENous, Q30 Min PRN  glucose chewable tablet 16 g, 4 tablet, Oral, PRN  dextrose bolus 10% 125 mL, 125 mL, IntraVENous, PRN **OR** dextrose bolus 10% 250 mL, 250 mL, IntraVENous, PRN  glucagon (rDNA) injection 1 mg, 1 mg, SubCUTAneous, PRN  dextrose 10 % infusion, , IntraVENous, Continuous PRN  heparin (porcine) injection 1,000 Units, 1,000 Units, IntraVENous, PRN  ASSESSMENT AND PLAN    Recurrent epistaxis, not active. Plan patient is going to be transferred to [facility] clinic facility. Patient is awaiting bed availability. I will follow him periodically and as needed.     Electronically signed by Reny Golden MD on 8/10/22 at 8:20 AM EDT

## 2022-08-10 NOTE — PROGRESS NOTES
[facility]   Facility/Department: [address] [place] [address]  Speech Language Pathology    Anu Pennville  1955  [id number]    Date: 8/10/2022      Speech Therapy attempted to see Anu Flores on this date for a/an:    Modified Barium Swallow    Pt was unable to be seen due to: Other: MBS order received but unable to complete this date as pt has dialysis. Re-attempt 8/11/22.         Electronically signed by MARSHALL Houston on 8/10/22 at 3:50 PM EDT

## 2022-08-10 NOTE — PROGRESS NOTES
Gastroenterology Progress Note    Althea Cornejo is a 75 y.o. male patient. Hospitalization Day:16    Chief C/O: Melena    SUBJECTIVE: Seen and examined on medical unit. Breathing somewhat improved, switched to nasal cannula this morning. Tolerating diet, however appetite is poor. Per nursing, he had loose stools yesterday evening without hematochezia or melena. No further epistaxis in the last 48 hours. Physical    VITALS:  BP (!) 89/51   Pulse 99   Temp 97.5 °F (36.4 °C) (Oral)   Resp 18   Ht 5' 6\" (1.676 m)   Wt 135 lb 2.3 oz (61.3 kg)   SpO2 100%   BMI 21.81 kg/m²   TEMPERATURE:  Current - Temp: 97.5 °F (36.4 °C); Max - Temp  Av.2 °F (37.3 °C)  Min: 97.5 °F (36.4 °C)  Max: 101.8 °F (38.8 °C)    General -alert, oriented, in no acute distress  Eyes -no icterus, no pallor  Cardiovascular - RRR  Lungs -clear to auscultation bilaterally  Abdomen -+ central obesity, nontender, bowel sounds present   Extremities -+ BLE/BUE edema  Skin -warm/dry, without jaundice  Neuro: Without focal deficit, moves all extremities     Data    Data Review:    Recent Labs     22  1302 22  1844 08/10/22  0733   WBC 14.5* 12.7* 11.2*   HGB 8.4* 9.0* 8.0*   HCT 25.9* 26.9* 24.9*   .6* 101.5* 103.5*    314 270     Recent Labs     22  1309 22  0620   NA  --  134*   K  --  4.9   CL  --  93*   CO2  --  24   PHOS  --  5.8*   BUN  --  29*   CREATININE 4.0* 5.10*     Recent Labs     22  0620 22  1844 08/10/22  0734   AST 57* 43* 46*   ALT 62* 51* 46*   BILIDIR <0.2 <0.2 <0.2   BILITOT <0.2 0.3 <0.2   ALKPHOS 83 89 90     No results for input(s): LIPASE, AMYLASE in the last 72 hours. No results for input(s): PROTIME, INR in the last 72 hours. ASSESSMENT:  76 y/o male admitted on  for SOB & sepsis,  initially required intubation, now clinically improved and seen on the regular medical floor.   Patient carries a diagnosis of end-stage renal disease and is on hemodialysis Monday Wednesdays and Fridays, on arrival hemoglobin was noted to be 13, now 7.9 with associated melena in the context of epistaxis and anticoagulation for bilateral upper extremity DVTs. Was on Eliquis, this has been held. 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 Noted patient admitted on to sepsis due to pneumonia and no Prograf level noted currently. Patient is not on tacrolimus inpatient at this time. S/P EGD 8/5/22 with Dr. Estefany Haji with red blood in the upper third of the esophagus. Oozing duodenal ulcer with oozing hemorrhage (Hayder Class Ib). Clips X 4 were placed. Persistent though decreased oozing at the site of the ulcer. Friable gastric mucosa as well as duodenal mucosa spontaneously oozing. Noted recent anticoagulation use. Gastritis. He was transferred to ICU and given 2 units PRBCs after his EGD. PLAN :  -Continue course of care  -Monitor serial H&H, trend and transfuse accordingly  -Advance diet as tolerated  -Continue PPI twice daily, carafate 1 gram by mouth 4 times daily (make into a slurry with 10mLs of water)  -Anticoagulation: Given no further melena, optimize anticoagulation per primary team recommendations  -Discussed with patient at length option to attempt to transfer to tertiary care center for continued care regarding his bilateral upper and lower extremity DVTs requiring anticoagulation, recurrent epistaxis, duodenal ulcer S/P clip placement, and history of liver transplant. He discussed the option with his sister and is now agreeable to transfer. Transfer already initiated per primary team.  -GI to sign off. Please re-consult if further melena/overt GI bleed recurs. Thank you for allowing me to participate in the care of your patient. Please feel free to contact me with any concerns.     Samra Mtz, APRN - CNP

## 2022-08-10 NOTE — PROGRESS NOTES
[facility]  Facility/Department: [facility] MED SURG UNIT  Speech Language Pathology   Treatment Note      Gagandeep Last  1955  [id number]  [x]   confirmed      Date: 8/10/2022    Acute respiratory failure with hypoxia ([address] [J96.01]    Restrictions/Precautions: Fall Risk    Weight: 135 lb 2.3 oz (61.3 kg)     ADULT ORAL NUTRITION SUPPLEMENT; Breakfast, Dinner; Renal Oral Supplement  ADULT ORAL NUTRITION SUPPLEMENT; Lunch; Frozen Oral Supplement  ADULT DIET; Easy to Chew    SpO2: 100 % (08/10/22 0745)  O2 Flow Rate (L/min): 6 L/min (08/10/22 0745)  No active isolations    Speech Dx: Dysphagia    Subjective:  Alert, Cooperative, and Pleasant      Pt no longer on liquid diet. Physician upgraded pt to easy to chew solids with thin liquids. Per RN Orlando Srivastava, pt's respiratory status has improved since yesterday. Interventions used this date:  Dysphagia Treatment    Objective/Assessment:  Patient progressing towards goals:  Short-term Goals  Timeframe for Short-term Goals: 2 weeks  Goal 1: Pt will tolerate the recommended diet level without clinical s/s of aspiration. Pt tolerated 8/8 single sips of thin liquids via cup with no overt clinical indicators of aspiration. SpO2 remained 97-95% during all trials. Goal 2: Pt will tolerate 5/5 trials of easy to chew solids with adequate mastication and oral clearance of bolus in all opportunities. Pt tolerated 5/5 trials of easy to chew solids with adequate mastication, oral clearance, and no noted s/s of difficulty or aspiration. SpO2 remained 95-97% during all trials. - Goal met   Goal 3: Pt will complete lingual exercises that promote anterior to posterior propulsion of bolus and improve tongue base retraction with 80% accuracy in order to strengthen the muscles of the swallow to decrease risk of aspiration and to increase ability to safely handle the least restrictive diet level.   Pt completed x10 lingual press with good effort and 90% acc   Goal 4: Pt will complete pharyngeal strengthening exercises (such as the Lashay, Effortful swallow, etc) with 80% acc in order to strengthen and establish and  more effective swallow. Not addressed     Updated goals:   Pt will participate in an instrumental procedure to assess oropharyngeal function and determine safest  least restrictive diet during LOS. SLP recommends continuation of easy to chew solids and thin liquids. MBS continues to be recommended to assess oropharyngeal function and r/o aspiration. RN Orlando Atif notified. Treatment/Activity Tolerance:  Patient tolerated treatment well    Plan: Alter current POC (see above)    Pain Assessment:  Patient does not c/o pain. Pain Re-assessment:  Patient does not c/o pain. Patient/Caregiver Education:  Patient educated on session and progression towards goals. Patient stated verbal understanding of directions.     Safety Devices:  Bed alarm in place, Call light within reach, and Nurse notified      Therapy Time  SLP Individual Minutes  Time In: 6771  Time Out: 1000  Minutes: 10            Signature: Electronically signed by MARSHALL Marques on 8/10/2022 at 10:25 AM

## 2022-08-10 NOTE — PROGRESS NOTES
Nephrology Progress Note    Assessment:  ESRDX  Liver transplanr  COPD  Sepsis  Anemia  epistaxis      Plan:  sig fluid overloaded. Had UF yesterday. Dialysis today. We will do extra UF again tomorrow  Decrease d5w to 50. Please stop this when sugars are stable enough  Retacrit for anemia    Patient Active Problem List:     Hypotension     ESRD (end stage renal disease) on dialysis ([facility] [address]     Liver transplanted [facility])     Liver transplant recipient ([facility] [address]     Sepsis ([facility] [address]     Gastroenteritis     C. difficile colitis     Severe sepsis ([facility] [address]     Vomiting and diarrhea     Generalized abdominal pain     Acute renal failure (HCC)     Acute respiratory failure with hypoxia (HCC)     Pneumonia due to infectious organism     Pleural effusion     Impaired mobility and activities of daily living     Dysphagia, oropharyngeal phase     Epistaxis     Duodenal ulcer      Subjective:  Admit Date: 7/25/2022    Interval History: Patient with much less shortness of breath today.   Seen on hemodialysis    Medications:  Scheduled Meds:   sucralfate  1 g Oral 4 times per day    pill splitter   Does not apply Once    chlorhexidine  15 mL Mouth/Throat BID    sodium chloride flush  5-40 mL IntraVENous 2 times per day    pantoprazole (PROTONIX) 40 mg injection  40 mg IntraVENous Q12H    [Held by provider] enoxaparin  1 mg/kg SubCUTAneous Daily    fluticasone  1 spray Each Nostril Daily    sodium chloride flush  5-40 mL IntraVENous 2 times per day    midodrine  2.5 mg Oral TID WC    metoprolol tartrate  12.5 mg Oral BID    [Held by provider] aspirin  81 mg Oral Daily    docusate sodium  100 mg Oral BID    insulin lispro  0-4 Units SubCUTAneous 4x Daily AC & HS    polyethylene glycol  17 g Oral Daily    tacrolimus  1 mg Oral BID     Continuous Infusions:   dextrose 50 mL/hr at 08/10/22 0557    sodium chloride      sodium chloride      sodium chloride      dextrose         CBC:   Recent Labs     08/09/22  1844 08/10/22  0733   WBC 12.7* 11.2* HGB 9.0* 8.0*    270       CMP:    Recent Labs     08/08/22  0620   *   K 4.9   CL 93*   CO2 24   BUN 29*   CREATININE 5.10*   GLUCOSE 57*   CALCIUM 8.7   LABGLOM 11.3*       Troponin: No results for input(s): TROPONINI in the last 72 hours. BNP: No results for input(s): BNP in the last 72 hours. INR:   No results for input(s): INR in the last 72 hours. Lipids: No results for input(s): CHOL, LDLDIRECT, TRIG, HDL, AMYLASE, LIPASE in the last 72 hours. Liver:   Recent Labs     08/10/22  0734   AST 46*   ALT 46*   ALKPHOS 90   PROT 6.5   LABALBU 2.8*   BILITOT <0.2       Iron:  No results for input(s): IRONS, FERRITIN in the last 72 hours. Invalid input(s): LABIRONS  Urinalysis: No results for input(s): UA in the last 72 hours.     Objective:  Vitals: BP (!) 137/54   Pulse (!) 102   Temp 97.7 °F (36.5 °C)   Resp 16   Ht 5' 6\" (1.676 m)   Wt 135 lb 2.3 oz (61.3 kg)   SpO2 97%   BMI 21.81 kg/m²    Wt Readings from Last 3 Encounters:   08/08/22 135 lb 2.3 oz (61.3 kg)   06/28/22 145 lb (65.8 kg)   03/18/20 145 lb (65.8 kg)      24HR INTAKE/OUTPUT:    Intake/Output Summary (Last 24 hours) at 8/10/2022 1433  Last data filed at 8/10/2022 1244  Gross per 24 hour   Intake 2829.41 ml   Output --   Net 2829.41 ml         General: alert, weak  in mil resp apparent distress  HEENT: normocephalic, atraumatic, anicteric  Neck: supple, no mass  Lungs: non-labored respirations, clear to auscultation bilaterally  Heart: regular rate and rhythm, no murmurs or rubs  Abdomen: soft, non-tender, non-distended  Ext: 2+ edema UE  Neuro: alert and oriented, no gross abnormalities  Psych: normal mood and affect  Skin: no rash      Electronically signed by Justine Manuel MD, MD

## 2022-08-10 NOTE — CARE COORDINATION
SPOKE W/DTR WHO IS REQUESTING THAT PT BE TRANSFERRED TO [facility] [address]. MESSAGE TO DR. King Wei VIA P/S SENT. WILL FOLLOW.

## 2022-08-10 NOTE — PROGRESS NOTES
Physical Therapy Missed Treatment   Facility/Department: [facility] MED SURG Y746/Q140-10    NAME: Leticia Carlin    : 1955 (75 y.o.)  MRN: [id number]    Account: [de-identified]  Gender: male      PT evaluation and treatment orders received. Chart reviewed. Pt off floor at this time for HD. Nursing staff aware. Will follow and attempt PT evaluation again at earliest availability.        Walter Benjamin, PT, 08/10/22 at 2:15 PM

## 2022-08-10 NOTE — PROGRESS NOTES
Wound Ostomy Continence Nurse  Follow-up Progress Note       NAME:  Shruthi Brower  MEDICAL RECORD NUMBER:  [id number]  AGE:  75 y.o. GENDER:  male  :  1955  TODAY'S DATE:  8/10/2022    Subjective:   Wound Identification:  Wound Type: pressure  Contributing Factors: chronic pressure, decreased mobility, shear force, obesity, malnutrition, and incontinence of stool        Patient Goal of Care:  [x] Wound Healing  [] Odor Control  [] Palliative Care  [] Pain Control   [] Other:     Objective:    BP (!) 114/94   Pulse 94   Temp 97.9 °F (36.6 °C) (Oral)   Resp 16   Ht 5' 6\" (1.676 m)   Wt 135 lb 2.3 oz (61.3 kg)   SpO2 97%   BMI 21.81 kg/m²   Rei Risk Score: Rei Scale Score: 13  Assessment:   Measurements:  Wound 22 Perineum Incontinence Associated Dermatitis (Active)   Wound Cleansed Soap and water 22   Dressing/Treatment Zinc paste 22 233   Wound Assessment Superficial 22   Drainage Amount None 22   Jemima-wound Assessment Blanchable erythema 22   Number of days: 14       Wound 22 Buttocks Left; Lower Incontinence Associated Dermatitis (Active)   Wound Cleansed Soap and water 22   Dressing/Treatment Zinc paste 22   Wound Length (cm) 2.5 cm 22 0800   Wound Width (cm) 1 cm 22 0800   Wound Surface Area (cm^2) 2.5 cm^2 22 0800   Wound Assessment Purple/maroon 22 2334   Drainage Amount None 22 2334   Odor None 22   Jemima-wound Assessment Blanchable erythema 22   Margins Defined edges 22   Number of days: 14       Wound 22 Sacrum Medial (Active)   Number of days: 14     Assessment:    Follow-up with patient today, evaluation of the sacral pressure injury that developed during hospitalization. The DTI to the sacrum has completely resolved, no pressure injury to the sacrum noted at this time.  Patient does have some IAd with few areas of denudation from fecal incontinence. Continue use of protective barrier cream for incontinence care. Plan:   Plan of Care: Wound 07/27/22 Perineum Incontinence Associated Dermatitis-Dressing/Treatment: Zinc paste  Wound 07/27/22 Buttocks Left; Lower Incontinence Associated Dermatitis-Dressing/Treatment: Zinc paste    Specialty Bed Required : Yes   [x] Low Air Loss   [] Pressure Redistribution  [] Fluid Immersion  [] Bariatric  [] Other:     Current Diet: ADULT ORAL NUTRITION SUPPLEMENT; Breakfast, Dinner; Renal Oral Supplement  ADULT ORAL NUTRITION SUPPLEMENT; Lunch; Frozen Oral Supplement  ADULT DIET; Easy to [email or web address]  Dietician consult:  Yes    Discharge Plan:  Placement for patient upon discharge: skilled nursing   Patient appropriate for Outpatient [address]: N/A    Referrals:  []   [] [address] [place] [employer] [facility]  [] Supplies  [] Other    Patient/Caregiver Teaching:  Level of patient/caregiver understanding able to:   [] Indicates understanding       [] Needs reinforcement  [] Unsuccessful      [] Verbal Understanding  [] Demonstrated understanding       [] No evidence of learning  [] Refused teaching         [x] N/A       Electronically signed by HANG Landa, RN, Elana Gonzalez on 8/10/2022 at 12:42 PM

## 2022-08-10 NOTE — PROGRESS NOTES
[facility] Occupational Therapy      Date: 8/10/2022  Patient Name: Tonio Jackson        MRN: [id number]  Account: [de-identified]   : 1955  (75 y.o.)  Room: [place] [address]    Chart reviewed, attempted OT at 55 for OTE. Patient not seen 2° to:    Hold per nursing request due to: multiple blood clots    Spoke to BAILEY Waller RN aware. Will attempt again when able.     Electronically signed by Madi Dorantes OT on 8/10/2022 at 9:46 AM

## 2022-08-10 NOTE — PROGRESS NOTES
INPATIENT PROGRESS NOTES    PATIENT NAME: Kennedy Rhoades  MRN: [id number]  SERVICE DATE:  August 10, 2022   SERVICE TIME:  5:55 PM      PRIMARY SERVICE: Pulmonary Disease    CHIEF COMPLAIN: Respiratory failure      INTERVAL HPI: Patient seen and examined at bedside, Interval Notes, orders reviewed. Nursing notes noted  I have seen patient earlier when patient was going for dialysis. He said he is feeling better today. Nasal packing is out. He is on 6 L O2 via nasal cannula. He also went for dialysis yesterday. Denies having short of breath at rest.  No chest pain. No fever or chills. OBJECTIVE    Body mass index is 21.81 kg/m². PHYSICAL EXAM:  Vitals:  BP 98/73   Pulse (!) 111   Temp 97.3 °F (36.3 °C)   Resp 17   Ht 5' 6\" (1.676 m)   Wt 135 lb 2.3 oz (61.3 kg)   SpO2 91%   BMI 21.81 kg/m²   General: Alert, awake . comfortable in bed, No distress. Head: Atraumatic , Normocephalic   Eyes: PERRL. No sclera icterus. No conjunctival injection. No discharge   ENT: Nasal packing on the left side. No active bleeding. Pharynx clear. Neck:  Trachea midline. No thyromegaly, no JVD, No cervical adenopathy. Chest : Bilaterally symmetrical ,Normal effort,  No accessory muscle use  Lung : . Fair BS bilateral, decreased BS at bases. Few bibasilar Rales. No wheezing. No rhonchi. Heart[de-identified] Normal  rate. Regular rhythm. No mumur ,  Rub or gallop  ABD: Non-tender. Non-distended. No masses. No organmegaly. Normal bowel sounds. No hernia. Ext : Edema in both upper extremity. No edema in lower extremity.   No Cyanosis No clubbing  Neuro: no focal weakness          DATA:   Recent Labs     08/09/22  1844 08/10/22  0733   WBC 12.7* 11.2*   HGB 9.0* 8.0*   HCT 26.9* 24.9*   .5* 103.5*    270     Recent Labs     08/08/22  0620 08/09/22  1844 08/10/22  0734   *  --   --    K 4.9  --   --    CL 93*  --   --    CO2 24  --   --    BUN 29*  --   --    CREATININE 5.10*  --   --    GLUCOSE 57*  --   -- [DISCONTINUED] fentaNYL **AND** fentaNYL, glucose, dextrose bolus **OR** dextrose bolus, glucagon (rDNA), dextrose, heparin (porcine)    Radiology  Echocardiogram complete 2D with doppler with color    Result Date: 7/27/2022  Transthoracic Echocardiography Report (TTE)  Demographics   Patient Name    VIA [facility]       Gender               Male                  [facility]   Patient Number  [id number]       Race                                                   Ethnicity   Visit Number    [id number]      Room Number          IC12   Corporate ID                   Date of Study        07/27/2022   Accession       [id number]     Referring Physician  Number   Date of Birth   1955     Sonographer          Salima Aguila   Age             75 year(s)     Interpreting         [facility])                                 Physician            Cardiology                                                      Vira Glaser  Procedure Type of Study   TTE procedure:ECHO COMPLETE 2D W/DOP W/COLOR. Procedure Date Date: 07/27/2022 Start: 10:27 AM Study Location: Portable Technical Quality: Limited visualization due to lung interface. Indications:LVF. Patient Status: Routine Height: 66 inches Weight: 148 pounds BSA: 1.76 m^2 BMI: 23.89 kg/m^2 BP: 96/54 mmHg  Conclusions   Summary  Left ventricular ejection fraction is estimated at 45%. Diastolic Dysfunction is noted by mitral flow. Normal right ventricle systolic pressure. RVSP 28mmHg  Echogenic mass in the apex concerning for thrombus recommend limited echo  with definity  No hemodynamic evidence of significant valve disease   Signature   ----------------------------------------------------------------  Electronically signed by Savage Ricks(Interpreting physician)  on 07/27/2022 03:48 PM  ----------------------------------------------------------------   Findings  Left Ventricle Left ventricular ejection fraction is estimated at 45%.  Left ventricular size is normal . Normal left ventricular wall thickness. Diastolic Dysfunction is noted by mitral flow. Echogenic mass in the apex possible thrombus Right Ventricle Normal right ventricle structure and function. Normal right ventricle systolic pressure. RVSP 28mmHg Left Atrium Normal left atrium. Right Atrium Normal right atrium. Mitral Valve Diffusely thickened and pliable mitral valve leaflets with normal excursion. Structurally normal mitral valve. No evidence of mitral valve stenosis. No evidence of mitral regurgitaton. Tricuspid Valve Tricuspid valve is structurally normal. No evidence of tricuspid stenosis. No evidence of tricuspid regurgitation. Aortic Valve Mildly thickened trileaflet aortic valve with normal excursion. Structurally normal aortic valve. No evidence of aortic valve stenosis . No evidence of aortic valve regurgitation . Pulmonic Valve The pulmonic valve was not well visualized . Pericardial Effusion No evidence of pericardial effusion. Aorta \ Miscellaneous The aorta is within normal limits. M-Mode Measurements (cm)   LVIDd: 2.97 cm                         LVIDs: 2.34 cm  IVSd: 0.73 cm                          IVSs: 1.01 cm  LVPWd: 0.9 cm                          LVPWs: 1.01 cm  Rt. Vent.  Dimension: 3.12 cm           AO Root Dimension: 2.1 cm                                         ACS: 1.37 cm                                         LA: 1.82 cm                                         LVOT: 2.03 cm  Doppler Measurements:   AV Velocity:0.02 m/s                    MV Peak E-Wave: 0.5 m/s  AV Peak Gradient: 5.77 mmHg             MV Peak A-Wave: 0.53 m/s  AV Mean Gradient: 3.05 mmHg  AV Area (Continuity):1.97 cm^2  TR Velocity:2.52 m/s                    Estimated RAP:3 mmHg  TR Gradient:25.31 mmHg                  RVSP:28.31 mmHg  Valves  Mitral Valve   Peak E-Wave: 0.5 m/s                  Peak A-Wave: 0.53 m/s  Mean Velocity: 0.87 m/s               E/A Ratio: 0.94  Mean Gradient: 4.66 mmHg              Peak Gradient: 0.99 mmHg                                        Deceleration Time: 353.3 msec                                        Area (continuity): 1.4 cm^2   Tissue Doppler   E' Septal Velocity: 0.07 m/s  E' Lateral Velocity: 0.07 m/s   Aortic Valve   Peak Velocity: 1.2 m/s                 Mean Velocity: 0.82 m/s  Peak Gradient: 5.77 mmHg               Mean Gradient: 3.05 mmHg  Area (continuity): 1.97 cm^2  AV VTI: 29.33 cm   Cusp Separation: 1.37 cm   Tricuspid Valve   Estimated RVSP: 28.31 mmHg              Estimated RAP: 3 mmHg  TR Velocity: 2.52 m/s                   TR Gradient: 25.31 mmHg   Pulmonic Valve   Peak Velocity: 0.99 m/s           Peak Gradient: 3.91 mmHg                                    Estimated PASP: 28.31 mmHg   LVOT   Peak Velocity: 0.97 m/s              Mean Velocity: 0.61 m/s  Peak Gradient: 3.77 mmHg             Mean Gradient: 1.79 mmHg  LVOT Diameter: 2.03 cm               LVOT VTI: 17.9 cm  Structures  Left Atrium   LA Dimension: 1.82 cm                        LA Area: 12.58 cm^2  LA/Aorta: 0.87  LA Volume/Index: 44.74 ml /25 m^2   Left Ventricle   Diastolic Dimension: 3.51 cm         Systolic Dimension: 7.74 cm  Septum Diastolic: 8.53 cm            Septum Systolic: 9.77 cm  PW Diastolic: 0.9 cm                 PW Systolic: 0.01 cm                                       FS: 21.2 %  LV EDV/LV EDV Index: 34.12 ml/19 m^2 LV ESV/LV ESV Index: 19.02 ml/11 m^2  EF Calculated: 44.3 %   LVOT Diameter: 2.03 cm   Right Ventricle   Diastolic Dimension: 5.14 cm                                    RV Systolic Pressure: 87.19 mmHg  Aorta/ Miscellaneous Aorta   Aortic Root: 2.1 cm  LVOT Diameter: 2.03 cm      ECHO Limited    Result Date: 7/28/2022  Transthoracic Echocardiography Report (TTE)  Demographics   Patient Name    [facility]       Gender               Male                  [facility]   Patient Number  [id number]       Race                                                   Ethnicity   Visit Number    [id number] Room Number          IC12   Corporate ID                   Date of Study        07/28/2022   Accession       [id number]     Referring Physician  Number   Date of Birth   1955     Sonographer          Dennisannel PichardoGrande   Age             75 year(s)     Interpreting         [facility])                                 Physician            Cardiology                                                      [facility]  Procedure Type of Study   TTE procedure:ECHOCARDIOGRAM LIMITED. Procedure Date Date: 07/28/2022 Start: 08:08 AM Study Location: Portable Technical Quality: Limited visualization Indications:LVF. Patient Status: Routine Contrast Medium: Definity. Amount - 2 ml Height: 66 inches Weight: 148 pounds BSA: 1.76 m^2 BMI: 23.89 kg/m^2  Conclusions   Summary  No evidence of thrombus with definity  Left ventricular ejection fraction is visually estimated at 65%. Signature   ----------------------------------------------------------------  Electronically signed by Bernie Ricks(Interpreting physician)  on 07/28/2022 08:42 AM  ----------------------------------------------------------------   Findings  Left Ventricle Left ventricular ejection fraction is visually estimated at 65%. IR FLUORO GUIDED CVA DEVICE PLMT/REPLACE/REMOVAL    1. Venogram of the right internal jugular vein demonstrates a thrombus in the right internal jugular vein which completely occludes the vein. Passage of contrast into the chest is through small collateral veins. 2. 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 Passage of contrast into the chest is through a small collateral veins. Radiation dose to the patient was: 24.21 mGy Additional clinical data: Long-term IV access Procedure: 1. Ultrasound guidance for vascular access into the right internal jugular vein. The ultrasound image of the blood vessel was saved to PACS.  2. Venogram via contrast injection of the right internal jugular vein. 3.   Ultrasound guidance for vascular access into the left internal jugular vein. The ultrasound image of the blood vessel was saved to PACS 4. Venogram via contrast injection of the left internal jugular vein. Body of Report: Informed and written consent was obtained from the patient following discussion of risks, benefits and alternatives to this procedure. The was patient placed supine on the angiographic table. The patient's neck and chest were then prepped and draped in  normal sterile fashion. A small amount of local lidocaine anesthesia was injected subcutaneously. Ultrasound was used to study the jugular vein we intended to use prior to accessing it. The vein appeared patent. The ultrasound image of the blood vessel was saved to PACS. Using ultrasound access, puncture was made of the right internal jugular vein using a 21 GA needle. A wire was advanced into the right internal jugular vein, though would not pass into the superior vena cava. A 4 [place]an short thin sheath was placed, through the sheath digital subtraction venography was performed. Venography demonstrated a thrombus in the right internal jugular vein and complete occlusion of the vein central to the thrombus. This access was aborted. Ultrasound was used to study the left jugular vein we intended to use prior to accessing it. The vein appeared patent. The ultrasound image of the blood vessel was saved to PACS. Using ultrasound access, puncture was made of the left internal jugular vein using a 21 GA needle. A wire was attempted to be passed, though would not pass to the superior vena  cava. A 4 Bel[place]an thin sheath was placed and digital subtraction venography was performed. 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 The procedure was aborted. Findings discussed with ICU team who will place a temporary central line in the groin.      XR CHEST PORTABLE    Result Date: 7/27/2022  XR CHEST PORTABLE COMPARISON: July 25, 2022. HISTORY: resp failure TECHNIQUE: AP view FINDINGS: Endotracheal tube again seen in place. . There is also an enteric tube seen in place and the tip is below the diaphragm. They appear unchanged in position. A small pleural effusion seen on the right. The left costophrenic angle is not well seen. The lungs are hyperinflated and there is coarsening of the interstitium. Atelectasis and/or scarring is again seen bilaterally in the lower lung fields. The cardiac silhouette appears mildly enlarged but may be accentuated by the portable technique. Degenerative changes are seen in the visualized portion of the right shoulder. The airspace disease has diminished when compared to previous study. . A small pleural effusion is seen on the right and scarring or atelectasis is again seen bilaterally in the bases. US DUP UPPER EXTREMITY RIGHT VENOUS COMPARISON: HISTORY:  resp failure TECHNIQUE: , US DUP UPPER EXTREMITY RIGHT VENOUS AND LEFT VENOUS FINDINGS: Thrombus is seen in the right internal jugular and proximal subclavian, veins it is also seen in the cephalic vein. The right ulnar and basilic veins are not visualized. A right AV fistula seen. Thrombus is seen in the left internal jugular vein. The left basilic and axillary veins are not visualized. IMPRESSION: Deep venous thrombosis seen in the right and left upper extremities. .    XR CHEST PORTABLE    Result Date: 7/25/2022  XR CHEST PORTABLE : 7/25/2022 CLINICAL HISTORY:  post intubation . COMPARISON: Earlier 7/25/2022 TECHNIQUE: A portable upright AP radiograph of the chest was obtained at approximately 12:46 PM. FINDINGS: Both lung bases have been excluded from the radiograph. An endotracheal tube is present, with its tip approximately 4 to 5 cm above the lucita. An orogastric tube probably extends below the diaphragm out of field of view radiograph.  Moderate pleural effusions and mild to moderate probable scarring and/or atelectasis of the mid to lower lung fields has not significantly changed. There is no cardiomegaly, pneumothorax, displaced fractures, or other significant changes identified. ENDOTRACHEAL AND OROGASTRIC TUBES IN EXPECTED POSITIONS. OTHERWISE, STABLE CHEST FROM EARLIER 7/25/2022. XR CHEST PORTABLE    Result Date: 7/25/2022  TECHNIQUE: Single portable view of the chest. CLINICAL INDICATION: Chest pain. COMPARISON: Chest x-ray obtained on June 28, 2022. PROCEDURE AND FINDINGS: Poor inspiratory effort is seen. The cardiomediastinal silhouette is slightly prominent in size. Mild prominence of the bronchovascular and interstitial lung markings is visualized bilaterally, linear streaky opacities visualized in bilateral lung fields, subsegmental atelectatic streaks, fluid visualized in the right minor fissure. Airspace opacification visualized in the lower lung fields bilaterally with blunting of the costophrenic angles and obliteration of the hemidiaphragms consistent with bilateral pleural effusions. . No evidence of pneumothorax or parenchymal lung mass. Degenerative bone changes. Congestive heart failure versus pneumonia and parapneumonic effusions would recommend clinical correlation. Increased opacification seen in comparison to the prior study. US DUP UPPER EXTREMITY RIGHT VENOUS    Result Date: 7/27/2022  XR CHEST PORTABLE COMPARISON: July 25, 2022. HISTORY: resp failure TECHNIQUE: AP view FINDINGS: Endotracheal tube again seen in place. . There is also an enteric tube seen in place and the tip is below the diaphragm. They appear unchanged in position. A small pleural effusion seen on the right. The left costophrenic angle is not well seen. The lungs are hyperinflated and there is coarsening of the interstitium. Atelectasis and/or scarring is again seen bilaterally in the lower lung fields.  The cardiac silhouette appears mildly enlarged but may be accentuated by the portable technique. Degenerative changes are seen in the visualized portion of the right shoulder. The airspace disease has diminished when compared to previous study. . A small pleural effusion is seen on the right and scarring or atelectasis is again seen bilaterally in the bases. US DUP UPPER EXTREMITY RIGHT VENOUS COMPARISON: HISTORY:  resp failure TECHNIQUE: , US DUP UPPER EXTREMITY RIGHT VENOUS AND LEFT VENOUS FINDINGS: Thrombus is seen in the right internal jugular and proximal subclavian, veins it is also seen in the cephalic vein. The right ulnar and basilic veins are not visualized. A right AV fistula seen. Thrombus is seen in the left internal jugular vein. The left basilic and axillary veins are not visualized. IMPRESSION: Deep venous thrombosis seen in the right and left upper extremities. .    US DUP UPPER EXTREMITY LEFT VENOUS    Result Date: 7/27/2022  XR CHEST PORTABLE COMPARISON: July 25, 2022. HISTORY: resp failure TECHNIQUE: AP view FINDINGS: Endotracheal tube again seen in place. . There is also an enteric tube seen in place and the tip is below the diaphragm. They appear unchanged in position. A small pleural effusion seen on the right. The left costophrenic angle is not well seen. The lungs are hyperinflated and there is coarsening of the interstitium. Atelectasis and/or scarring is again seen bilaterally in the lower lung fields. The cardiac silhouette appears mildly enlarged but may be accentuated by the portable technique. Degenerative changes are seen in the visualized portion of the right shoulder. The airspace disease has diminished when compared to previous study. . A small pleural effusion is seen on the right and scarring or atelectasis is again seen bilaterally in the bases.  US DUP UPPER EXTREMITY RIGHT VENOUS COMPARISON: HISTORY:  resp failure TECHNIQUE: , US DUP UPPER EXTREMITY RIGHT VENOUS AND LEFT VENOUS FINDINGS: Thrombus is seen in the right internal jugular and proximal subclavian, veins it is also seen in the cephalic vein. The right ulnar and basilic veins are not visualized. A right AV fistula seen. Thrombus is seen in the left internal jugular vein. The left basilic and axillary veins are not visualized. IMPRESSION: Deep venous thrombosis seen in the right and left upper extremities. .    US DUP UPPER EXTREMITY RIGHT ARTERIES    Result Date: 8/2/2022  US DUP UPPER EXTREMITY RIGHT ARTERIES: 8/1/2022 11:42 AM CLINICAL HISTORY:  cold, poor pulses, extensive dvt . COMPARISON: None available. Grayscale, color and waveform Doppler analysis of the right upper arterial system was performed. FINDINGS: Mild to moderate atherosclerotic disease, with pulsatile mild to moderately diminished waveforms worsening distally. There are no significantly elevated velocities to suggest a flow-limiting stenosis, or arterial occlusion. The arterial system is normal in caliber, without evidence of aneurysm or dissection. Maximum systolic velocities are: RIGHT UPPER EXTREMITY: Right proximal common carotid artery 70 cm/s Right mid common carotid artery 53 cm/s Right proximal subclavian artery 68 cm/s Right mid subclavian artery 81 cm/s Right distal subclavian artery 71 cm/s Right axillary artery 41 cm/s Right brachial artery proximal 111 cm/s Right brachial artery mid 97 cm/s Right brachial artery distal 123 cm/s Right radial artery proximal 53 cm/s Right radial artery mid 43 cm/s Right radial artery distal 18 cm/s Right ulnar artery proximal 61 cm/s Right ulnar artery and mid 46 cm/s Right ulnar artery distal 33 cm/s     NO EVIDENCE OF A FLOW-LIMITING STENOSIS, ARTERIAL OCCLUSION, OR ANEURYSM. IR VENOGRAM VENOUS SINUS/JUGULAR    1. Venogram of the right internal jugular vein demonstrates a thrombus in the right internal jugular vein which completely occludes the vein. Passage of contrast into the chest is through small collateral veins.  2. 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 Passage of contrast into the chest is through a small collateral veins. Radiation dose to the patient was: 24.21 mGy Additional clinical data: Long-term IV access Procedure: 1. Ultrasound guidance for vascular access into the right internal jugular vein. The ultrasound image of the blood vessel was saved to PACS. 2. Venogram via contrast injection of the right internal jugular vein. 3.   Ultrasound guidance for vascular access into the left internal jugular vein. The ultrasound image of the blood vessel was saved to PACS 4. Venogram via contrast injection of the left internal jugular vein. Body of Report: Informed and written consent was obtained from the patient following discussion of risks, benefits and alternatives to this procedure. The was patient placed supine on the angiographic table. The patient's neck and chest were then prepped and draped in  normal sterile fashion. A small amount of local lidocaine anesthesia was injected subcutaneously. Ultrasound was used to study the jugular vein we intended to use prior to accessing it. The vein appeared patent. The ultrasound image of the blood vessel was saved to PACS. Using ultrasound access, puncture was made of the right internal jugular vein using a 21 GA needle. A wire was advanced into the right internal jugular vein, though would not pass into the superior vena cava. A 4 [place]i short thin sheath was placed, through the sheath digital subtraction venography was performed. Venography demonstrated a thrombus in the right internal jugular vein and complete occlusion of the vein central to the thrombus. This access was aborted. Ultrasound was used to study the left jugular vein we intended to use prior to accessing it. The vein appeared patent. The ultrasound image of the blood vessel was saved to PACS.  Using ultrasound access, puncture was made of the left internal jugular vein using a 21 GA needle. A wire was attempted to be passed, though would not pass to the superior vena  cava. A 4 Danish thin sheath was placed and digital subtraction venography was performed. 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 The procedure was aborted. Findings discussed with ICU team who will place a temporary central line in the groin. IR ULTRASOUND GUIDANCE VASCULAR ACCESS    1. Venogram of the right internal jugular vein demonstrates a thrombus in the right internal jugular vein which completely occludes the vein. Passage of contrast into the chest is through small collateral veins. 2. 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 Passage of contrast into the chest is through a small collateral veins. Radiation dose to the patient was: 24.21 mGy Additional clinical data: Long-term IV access Procedure: 1. Ultrasound guidance for vascular access into the right internal jugular vein. The ultrasound image of the blood vessel was saved to PACS. 2. Venogram via contrast injection of the right internal jugular vein. 3.   Ultrasound guidance for vascular access into the left internal jugular vein. The ultrasound image of the blood vessel was saved to PACS 4. Venogram via contrast injection of the left internal jugular vein. Body of Report: Informed and written consent was obtained from the patient following discussion of risks, benefits and alternatives to this procedure. The was patient placed supine on the angiographic table. The patient's neck and chest were then prepped and draped in  normal sterile fashion. A small amount of local lidocaine anesthesia was injected subcutaneously. Ultrasound was used to study the jugular vein we intended to use prior to accessing it. The vein appeared patent.   The ultrasound image of the blood vessel was saved to PACS. Using ultrasound access, puncture was made of the right internal jugular vein using a 21 GA needle. A wire was advanced into the right internal jugular vein, though would not pass into the superior vena cava. A 4 Barbadian short thin sheath was placed, through the sheath digital subtraction venography was performed. Venography demonstrated a thrombus in the right internal jugular vein and complete occlusion of the vein central to the thrombus. This access was aborted. Ultrasound was used to study the left jugular vein we intended to use prior to accessing it. The vein appeared patent. The ultrasound image of the blood vessel was saved to PACS. Using ultrasound access, puncture was made of the left internal jugular vein using a 21 GA needle. A wire was attempted to be passed, though would not pass to the superior vena  cava. A 4 [place] thin sheath was placed and digital subtraction venography was performed. 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 The procedure was aborted. Findings discussed with ICU team who will place a temporary central line in the groin. US DUP LOWER EXTREMITIES BILATERAL VENOUS    Result Date: 7/27/2022  EXAMINATION: US DUP LOWER EXTREMITIES BILATERAL VENOUS CLINICAL HISTORY: 68-year-old with lower extremity swelling COMPARISONS: None available. FINDINGS: Duplex and color Doppler ultrasounds were performed of the bilateral lower extremity deep venous systems. Examination was performed portably and limited due to patient condition and access to the lower extremities. Right leg: Visualized portions of the right common femoral vein, femoral vein, popliteal vein demonstrate satisfactory compression, color flow, and augmentation. Deep calf veins are not evaluated. Left leg: The left common femoral vein is enlarged filled with intraluminal echoes with absent color flow and poor compression consistent with occluding thrombus.  Occluding Thrombus extends into the left proximal femoral vein. Mid to distal femoral vein and popliteal vein demonstrate satisfactory compression and color flow. Deep calf veins are not assessed on this portable study     ULTRASOUND FINDINGS ARE POSITIVE FOR OCCLUDING THROMBUS IN THE LEFT COMMON FEMORAL VEIN EXTENDING INTO THE PROXIMAL FEMORAL VEIN. NO ULTRASOUND SIGNS OF DVT IN THE RIGHT LEG FROM THE GROIN TO THE POPLITEAL FOSSA    IR MIDLINE CATH    Result Date: 8/3/2022  FOR BILLING PURPOSES ONLY. STUDY DICTATED IN EPIC BY PHYSICIAN. IMPRESSION AND SUGGESTION:  Acute respiratory failure with hypoxia   Epistaxis s/p nasal packing  Pneumonia  Duodenal ulcer  DVT in lower extremity    He is on 6 L O2 via nasal cannula. He had ultrafiltration yesterday and going for dialysis again today. Miladis Irving He has significant fluid overload and he is also again going for ultrafiltration tomorrow. He has DVT study in lower extremity but anticoagulation remains on hold due to epistaxis and GI bleed. He has IVC filter placement. Continue present treatment plan. NOTE: This report was transcribed using voice recognition software. Every effort was made to ensure accuracy; however, inadvertent computerized transcription errors may be present.       Electronically signed by Damian Reyes MD, FCCP on 8/10/2022 at 5:55 PM

## 2022-08-11 ENCOUNTER — APPOINTMENT (OUTPATIENT)
Dept: GENERAL RADIOLOGY | Age: 67
DRG: 871 | End: 2022-08-11
Payer: MEDICARE

## 2022-08-11 LAB
GLUCOSE BLD-MCNC: 108 MG/DL (ref 70–99)
GLUCOSE BLD-MCNC: 112 MG/DL (ref 70–99)
GLUCOSE BLD-MCNC: 121 MG/DL (ref 70–99)
GLUCOSE BLD-MCNC: 133 MG/DL (ref 70–99)
GLUCOSE BLD-MCNC: 196 MG/DL (ref 70–99)
HCT VFR BLD CALC: 24.9 % (ref 42–52)
HEMOGLOBIN: 8.4 G/DL (ref 14–18)
HEPATITIS B SURFACE ANTIGEN INTERPRETATION: NORMAL
MCH RBC QN AUTO: 34.5 PG (ref 27–31.3)
MCHC RBC AUTO-ENTMCNC: 33.9 % (ref 33–37)
MCV RBC AUTO: 101.9 FL (ref 80–100)
PDW BLD-RTO: 16.5 % (ref 11.5–14.5)
PERFORMED ON: ABNORMAL
PLATELET # BLD: 270 K/UL (ref 130–400)
RBC # BLD: 2.44 M/UL (ref 4.7–6.1)
WBC # BLD: 10 K/UL (ref 4.8–10.8)

## 2022-08-11 PROCEDURE — 90935 HEMODIALYSIS ONE EVALUATION: CPT

## 2022-08-11 PROCEDURE — 2500000003 HC RX 250 WO HCPCS: Performed by: FAMILY MEDICINE

## 2022-08-11 PROCEDURE — 2700000000 HC OXYGEN THERAPY PER DAY

## 2022-08-11 PROCEDURE — 6360000002 HC RX W HCPCS: Performed by: INTERNAL MEDICINE

## 2022-08-11 PROCEDURE — 6370000000 HC RX 637 (ALT 250 FOR IP): Performed by: NURSE PRACTITIONER

## 2022-08-11 PROCEDURE — 92611 MOTION FLUOROSCOPY/SWALLOW: CPT

## 2022-08-11 PROCEDURE — 1210000000 HC MED SURG R&B

## 2022-08-11 PROCEDURE — 2580000003 HC RX 258: Performed by: INTERNAL MEDICINE

## 2022-08-11 PROCEDURE — 87340 HEPATITIS B SURFACE AG IA: CPT

## 2022-08-11 PROCEDURE — 85027 COMPLETE CBC AUTOMATED: CPT

## 2022-08-11 PROCEDURE — 6370000000 HC RX 637 (ALT 250 FOR IP): Performed by: PHYSICAL MEDICINE & REHABILITATION

## 2022-08-11 PROCEDURE — 74230 X-RAY XM SWLNG FUNCJ C+: CPT

## 2022-08-11 PROCEDURE — 6360000002 HC RX W HCPCS: Performed by: NURSE PRACTITIONER

## 2022-08-11 PROCEDURE — A4216 STERILE WATER/SALINE, 10 ML: HCPCS | Performed by: INTERNAL MEDICINE

## 2022-08-11 PROCEDURE — 6370000000 HC RX 637 (ALT 250 FOR IP): Performed by: INTERNAL MEDICINE

## 2022-08-11 PROCEDURE — C9113 INJ PANTOPRAZOLE SODIUM, VIA: HCPCS | Performed by: INTERNAL MEDICINE

## 2022-08-11 PROCEDURE — 36415 COLL VENOUS BLD VENIPUNCTURE: CPT

## 2022-08-11 PROCEDURE — 99232 SBSQ HOSP IP/OBS MODERATE 35: CPT | Performed by: INTERNAL MEDICINE

## 2022-08-11 RX ADMIN — MIDODRINE HYDROCHLORIDE 2.5 MG: 5 TABLET ORAL at 16:45

## 2022-08-11 RX ADMIN — BARIUM SULFATE 80 ML: 400 SUSPENSION ORAL at 14:13

## 2022-08-11 RX ADMIN — SUCRALFATE 1 G: 1 TABLET ORAL at 06:23

## 2022-08-11 RX ADMIN — SUCRALFATE 1 G: 1 TABLET ORAL at 00:36

## 2022-08-11 RX ADMIN — SODIUM CHLORIDE, PRESERVATIVE FREE 40 MG: 5 INJECTION INTRAVENOUS at 20:40

## 2022-08-11 RX ADMIN — MIDODRINE HYDROCHLORIDE 2.5 MG: 5 TABLET ORAL at 08:07

## 2022-08-11 RX ADMIN — 0.12% CHLORHEXIDINE GLUCONATE 15 ML: 1.2 RINSE ORAL at 08:07

## 2022-08-11 RX ADMIN — SUCRALFATE 1 G: 1 TABLET ORAL at 16:45

## 2022-08-11 RX ADMIN — BARIUM SULFATE 50 ML: 0.81 POWDER, FOR SUSPENSION ORAL at 14:12

## 2022-08-11 RX ADMIN — SUCRALFATE 1 G: 1 TABLET ORAL at 12:59

## 2022-08-11 RX ADMIN — DEXTROSE MONOHYDRATE: 50 INJECTION, SOLUTION INTRAVENOUS at 14:42

## 2022-08-11 RX ADMIN — TACROLIMUS 1 MG: 1 CAPSULE ORAL at 20:39

## 2022-08-11 RX ADMIN — DOCUSATE SODIUM 100 MG: 100 CAPSULE, LIQUID FILLED ORAL at 20:39

## 2022-08-11 RX ADMIN — MIDODRINE HYDROCHLORIDE 2.5 MG: 5 TABLET ORAL at 12:59

## 2022-08-11 RX ADMIN — TACROLIMUS 1 MG: 1 CAPSULE ORAL at 08:07

## 2022-08-11 RX ADMIN — 0.12% CHLORHEXIDINE GLUCONATE 15 ML: 1.2 RINSE ORAL at 20:40

## 2022-08-11 RX ADMIN — SODIUM CHLORIDE, PRESERVATIVE FREE 40 MG: 5 INJECTION INTRAVENOUS at 08:07

## 2022-08-11 ASSESSMENT — PAIN SCALES - GENERAL
PAINLEVEL_OUTOF10: 0

## 2022-08-11 NOTE — PROGRESS NOTES
[facility]   Facility/Department: [facility]  Speech Language Pathology    Brayan Jiang  1955  [id number]    Date: 8/11/2022      Speech Therapy attempted to see Brayan Jiang on this date for a/an:    Treatment    Pt was unable to be seen due to:   Patient is currently off unit. Pt off floor in dialysis. RN - Bill Wynne reports pt should return to floor around 12pm. SLP informed RN of MBS and if pt returns to floor, it will be completed at 1315. RN stated verbal understanding.     Electronically signed by MARSHALL Kat on 8/11/22 at 10:30 AM EDT

## 2022-08-11 NOTE — PROGRESS NOTES
Physical [id number]    NAME: Lul Reilly    : 1955 (75 y.o.)  MRN: [id number]    Account: [de-identified]  Gender: male    PT evaluation and treatment orders received. Chart reviewed. Hold PT eval:  Per RN note on  \"Dr. Cassie Velarde notified that PT/OT was wondering if they could work with pt or if they should hold off due to blood clots. Dr. Cassie Velarde states that we should hold off on therapy at this time. \"    Pt has been removed from PT eval order list at this time. Please re-order PT eval and treat when pt is appropriate to participate.        Electronically signed by Marquis Vila PT on 22 at 3:22 PM EDT

## 2022-08-11 NOTE — PROCEDURES
[facility]   Facility/Department: [facility] MED SURG UNIT  Speech Language Pathology  Modified Barium Swallow ([address])    Cesilia Murray  : 1955 (75 y.o.)   [x]   confirmed    MRN: [id number]  ROOM: [place] [address]  ADMISSION DATE: 2022  PATIENT DIAGNOSIS(ES): Acute respiratory failure with hypoxia ([facility] [address] [J96.01]  Chief Complaint   Patient presents with    Shortness of Breath     C/O SOB  AFTER DIALYSIS  88 % ON RA  GIVEN 2 DUONEBS  SOLU MEDROL      Patient Active Problem List    Diagnosis Date Noted    Severe malnutrition ([facility] [address] 2022    Duodenal ulcer 2022    Impaired mobility and activities of daily living 2022    Dysphagia, oropharyngeal phase 2022    Epistaxis 2022    Pneumonia due to infectious organism 2022    Pleural effusion 2022    Acute respiratory failure with hypoxia (HCC) 2022    Vomiting and diarrhea     Generalized abdominal pain     Acute renal failure (HCC)     Gastroenteritis     C. difficile colitis     Severe sepsis ([facility] [address]     Hypotension 2017    ESRD (end stage renal disease) on dialysis ([facility] [address] 2017    Liver transplanted ([facility] [address] 2017    Liver transplant recipient [facility])     Sepsis [facility])      Past Medical History:   Diagnosis Date    ESRD (end stage renal disease) ([facility] [address]     Hemodialysis patient ([facility] [address]     Hepatitis     Hypertension     Liver transplanted ([facility] [address] 2016     Past Surgical History:   Procedure Laterality Date    DIALYSIS FISTULA CREATION Right     IR MIDLINE CATH  2022    IR MIDLINE CATH 2022 MLOZ SPECIAL PROCEDURE    LIVER TRANSPLANT  2017    TUNNELED VENOUS PORT PLACEMENT      UPPER GASTROINTESTINAL ENDOSCOPY N/A 2022    EGD ESOPHAGOGASTRODUODENOSCOPY WITH INTERVENTION performed by Florinda Cushing, MD at [facility]     No Known Allergies    Date of Onset:  2022      Date of Evaluation: 2022   Evaluating Therapist: MARSHALL Catalan      DYSPHAGIA DIAGNOSIS:  Mild to moderate oral and mild pharyngeal dysphagia    DIET RECOMMENDATIONS:  Solid consistency: Soft and Bite Sized  Liquid consistency: Mildly Thick  Liquid administration via: Cup  Medication administration: Whole in puree  Supervision: Close  Compensatory Swallowing Strategies: Small bites/sips  Swallow 2 times per bite/sip  Eat/Feed Slowly  No straws     Reason for Referral  Floyd Desai was referred for a modified barium swallow evaluation to assess the efficiency of his swallow function, assess for aspiration, and make recommendations regarding safe dietary consistencies, effective compensatory strategies, and safe eating environment. GENERAL  Patient complaints: Patient reported globus sensation within in his throat when consuming solids. Patient denies choking with liquids and solids.      Previous swallow testing: BSE 08/06/2022 Soft, bite size solids with thin liquids    MBS was recommended secondary to increased oxygen needs    Cognitive status:   Oriented   Alert    Communication observation:   Functional    Follows Directions:   Yes    Diet Level Prior to this Evaluation:    Soft and Bite Sized  Thin    Current respiratory status:      Oxygen via nasal cannula  SpO2: 99 % (08/11/22 0737)  O2 Flow Rate (L/min): 6 L/min (08/11/22 0737)    Baseline Vocal Quality:  Weak    Oral Hygiene:  Clean    Dentition:  Intact    Oral mechanism examination:  Labial: No impairment  Lingual: Decreased coordination  Velum: No impairment  Mandible: No impairment      PROCEDURE   Patient Positioning: Seated 70-90°  Viewing Plane(s): Lateral  Contrast: commercially prepared, non-standardized barium viscosities; ranging from thin liquid to pudding consistency  Consistencies Administered and # of Trials:  Puree x 2 trials, Soft solid x 2 trials, Thin x 4 trials, and Mildly thick x 5 trials  How Presented:  Self fed      ORAL PHASE:  Bolus Acceptance:   none    Bolus Formation/Control:   Reduced mastication: Soft solid  Decreased bolus cohesion: All trialed consistencies  Premature bolus loss to pharynx: All trialed consistencies    Bolus Propulsion:  Delayed: All trialed consistencies  Lingual pumping: All trialed consistencies  Reduced posterior propulsion: All trialed consistencies    Oral Residue:   Superior tongue: All trialed consistencies       ORAL STAGE IMPRESSION: Mild to moderate oral dysphagia was noted characterized by reduced mastication with soft, bite size solids with piecemeal swallow. Premature bolus loss was noted with all consistencies with solids to the level of the vallecula and liquids to the level of the pyriform sinus. Lingual pumping, reduced bolus cohesion and trace oral residue along the tongue was noted with all consistencies. Bolus controlled improved with smaller bolus size. PHARYNGEAL STAGE:     Initiation of Pharyngeal Swallow  Valleculae: Puree and Soft solid  Pyriform sinuses: Thin (cup) and Mildly thick/[place] (cup)    Tongue Base Retraction  No impairment    Laryngeal Elevation  Reduced: All trialed consistencies    Anterior Hyoid Excursion  Reduced: All trialed consistencies    Epiglottic Inversion  Partial: All trialed consistencies    Laryngeal Vestibule Closure  Complete: All trialed consistencies    Pharyngeal Residue  Complete pharyngeal clearance: All    Laryngeal Penetration  Yes:       During the swallow: Thin from cup and Mildly Thick (Nectar) from cup      Shallow: Mildly Thick (Nectar) from cup      Deep: Thin from cup    Response to Laryngeal Penetration  Automatically cleared:  Thin from cup and Mildly Thick (Nectar) from cup    Aspiration  No: All        Penetration-Aspiration Scale  Puree, soft, nectar, thin 1 Material does not enter the airway   nectar 2 Material enters the airway, remains above the vocal folds, and is ejected from the airway    3 Material enters the airway, remains above the vocal folds, and is not ejected from the airway   thin 4 Material enters the airway, contacts the vocal folds, and is ejected from the airway    5 Material enters the airway, contacts the vocal folds, and is not ejected from the airway    6 Material enters the airway, passes below the vocal folds and is ejected into the larynx or out of the airway    7 Material enters the airway, passes below the vocal folds, and is not ejected from the trachea despite effort    8 Material enters the airway, passes below the vocal folds, and no effort is made to eject. PHARYNGEAL STAGE IMPRESSION:  Mild pharyngeal dysphagia was noted characterized by deep laryngeal penetration with thin liquids to the level of the true vocal cords with immediate ejection with normal size bolus. Shallow penetration with immediate ejection occurred X1 with nectar thick liquids via normal size bolus. No aspiration was noted during the MBS. Patient consumed small single sips of nectar thick liquids and thin liquids without penetration/aspiration. No pharyngeal residuals were noted. Reduced posterior epiglottic inversion was noted. Patient presented with delayed swallow initiation with all po trials. CERVICAL ESOPHAGEAL STAGE :   Esophageal backflow into upper esophagus: Other: WNL             THERAPY RECOMMENDATIONS:   Therapy is recommended to:  Assess tolerance of recommended diet  Improve oral motor strength and range of motion      Nursing notified: Zak Meraz OF CARE:   Long Term Goals:    Patient will tolerate least restrictive diet with no overt s/s of difficulty or aspiration. Short Term Goals:   Pt to be seen 2-3x/week during LOS or until goals met    1. Pt will complete oral motor ROM and strengthening exercises (labial/buccal) with 90% accuracy, in order to strengthen lingual/labial/buccal musculature to promote safety and efficiency of oral phase of swallow and decrease risk for pocketing.    2.  Pt will complete lingual ROM and strengthening exercises (lingual press, lingual pull backs) with 90% accuracy, in order to promote anterior to posterior propulsion of bolus and improve tongue base retraction. 3.   Pt will tolerate thermal stimulation of anterior faucial pillars followed by Nectar thick bolus in all opportunities to decrease swallow onset time. 4.  Pt will tolerate therapeutic trials of Thin liquids via small single sips with adequate mastication and oral clearance of bolus with no overt s/s of aspiration on 100% trials. 5  Pt will demonstrate Small-single sips strategies for safe and efficient swallow of recommended diet in all given opportunities. 6.  Pt will tolerate the recommended diet level with no s/s of aspiration. Prognosis for improvements is: Good,  motivation      Pain Assessment:  Patient does not c/o pain. Pain Re-assessment:  Patient does not c/o pain. Safety Devices:  Chair alarm in place      Radiologist:  Available on Consult as needed, Radiology Tech present    Treatment goals were discussed with the:   Patient. , who gives verbal understanding of recommendations. Thank you for your referral.  Please direct any questions or concerns to Speech Pathology. Images are available through [facility]/PACS. Please see radiologist report for additional details.       Therapy Time  SLP Individual Minutes  Time In: 1402  Time Out: 7940  Minutes: 18            Signature:  Electronically signed by MARSHALL Almendarez on 8/11/2022 at 2:42 PM

## 2022-08-11 NOTE — PROGRESS NOTES
Nephrology Progress Note    Assessment:  ESRDX  Liver transplanr  COPD  Sepsis  Anemia  epistaxis      Plan:  sig fluid overloaded. Had daily UF for dialysis this week  Decrease d5w to 50. Please stop this when sugars are stable enough  Retacrit for anemia    Patient Active Problem List:     Hypotension     ESRD (end stage renal disease) on dialysis ([facility] [address]     Liver transplanted [facility])     Liver transplant recipient ([facility] [address]     Sepsis ([facility] [address]     Gastroenteritis     C. difficile colitis     Severe sepsis ([facility] [address]     Vomiting and diarrhea     Generalized abdominal pain     Acute renal failure (HCC)     Acute respiratory failure with hypoxia (HCC)     Pneumonia due to infectious organism     Pleural effusion     Impaired mobility and activities of daily living     Dysphagia, oropharyngeal phase     Epistaxis     Duodenal ulcer      Subjective:  Admit Date: 7/25/2022    Interval History: 5 daily dialysis or ultrafiltration this week. Had ultrafiltration today with 2 L removed. Had some hemodynamic instability. Still has a fair amount of swelling.   On D5W for low sugars    Medications:  Scheduled Meds:   epoetin ryan-epbx  20,000 Units SubCUTAneous Q7 Days    sucralfate  1 g Oral 4 times per day    pill splitter   Does not apply Once    chlorhexidine  15 mL Mouth/Throat BID    sodium chloride flush  5-40 mL IntraVENous 2 times per day    pantoprazole (PROTONIX) 40 mg injection  40 mg IntraVENous Q12H    [Held by provider] enoxaparin  1 mg/kg SubCUTAneous Daily    fluticasone  1 spray Each Nostril Daily    sodium chloride flush  5-40 mL IntraVENous 2 times per day    midodrine  2.5 mg Oral TID WC    metoprolol tartrate  12.5 mg Oral BID    [Held by provider] aspirin  81 mg Oral Daily    docusate sodium  100 mg Oral BID    insulin lispro  0-4 Units SubCUTAneous 4x Daily AC & HS    polyethylene glycol  17 g Oral Daily    tacrolimus  1 mg Oral BID     Continuous Infusions:   dextrose 50 mL/hr at 08/11/22 0625    sodium chloride      sodium chloride      sodium chloride      dextrose         CBC:   Recent Labs     08/10/22  0733 08/11/22  0517   WBC 11.2* 10.0   HGB 8.0* 8.4*    270       CMP:    No results for input(s): NA, K, CL, CO2, BUN, CREATININE, GLUCOSE, CALCIUM, LABGLOM in the last 72 hours. Troponin: No results for input(s): TROPONINI in the last 72 hours. BNP: No results for input(s): BNP in the last 72 hours. INR:   No results for input(s): INR in the last 72 hours. Lipids: No results for input(s): CHOL, LDLDIRECT, TRIG, HDL, AMYLASE, LIPASE in the last 72 hours. Liver:   Recent Labs     08/10/22  0734   AST 46*   ALT 46*   ALKPHOS 90   PROT 6.5   LABALBU 2.8*   BILITOT <0.2       Iron:  No results for input(s): IRONS, FERRITIN in the last 72 hours. Invalid input(s): LABIRONS  Urinalysis: No results for input(s): UA in the last 72 hours.     Objective:  Vitals: BP (!) 102/59   Pulse (!) 101   Temp 98.4 °F (36.9 °C)   Resp 16   Ht 5' 6\" (1.676 m)   Wt 130 lb 11.7 oz (59.3 kg)   SpO2 99%   BMI 21.10 kg/m²    Wt Readings from Last 3 Encounters:   08/11/22 130 lb 11.7 oz (59.3 kg)   06/28/22 145 lb (65.8 kg)   03/18/20 145 lb (65.8 kg)      24HR INTAKE/OUTPUT:    Intake/Output Summary (Last 24 hours) at 8/11/2022 1435  Last data filed at 8/11/2022 1230  Gross per 24 hour   Intake 2421.87 ml   Output 5600 ml   Net -3178.13 ml         General: alert, weak  in mil resp apparent distress  HEENT: normocephalic, atraumatic, anicteric  Neck: supple, no mass  Lungs: non-labored respirations, clear to auscultation bilaterally  Heart: regular rate and rhythm, no murmurs or rubs  Abdomen: soft, non-tender, non-distended  Ext: 2+ edema UE  Neuro: alert and oriented, no gross abnormalities  Psych: normal mood and affect  Skin: no rash      Electronically signed by Marge Cruz MD, MD

## 2022-08-11 NOTE — PROGRESS NOTES
INPATIENT PROGRESS NOTES    PATIENT NAME: Humaira Grant  MRN: [id number]  SERVICE DATE:  August 11, 2022   SERVICE TIME:  7:06 PM      PRIMARY SERVICE: Pulmonary Disease    CHIEF COMPLAIN: Respiratory failure      INTERVAL HPI: Patient seen and examined at bedside, Interval Notes, orders reviewed. Nursing notes noted  I have seen patient earlier when patient was going for dialysis. He said he is feeling better today. Nasal packing is out. He is on 6 L O2 via nasal cannula. He also went for dialysis yesterday. Denies having short of breath at rest.  No chest pain. No fever or chills. OBJECTIVE    Body mass index is 21.1 kg/m². PHYSICAL EXAM:  Vitals:  /76   Pulse 95   Temp 97.9 °F (36.6 °C) (Oral)   Resp 20   Ht 5' 6\" (1.676 m)   Wt 130 lb 11.7 oz (59.3 kg)   SpO2 100%   BMI 21.10 kg/m²   General: Alert, awake . comfortable in bed, No distress. Head: Atraumatic , Normocephalic   Eyes: PERRL. No sclera icterus. No conjunctival injection. No discharge   ENT: Nasal packing on the left side. No active bleeding. Pharynx clear. Neck:  Trachea midline. No thyromegaly, no JVD, No cervical adenopathy. Chest : Bilaterally symmetrical ,Normal effort,  No accessory muscle use  Lung : . Fair BS bilateral, decreased BS at bases. Few bibasilar Rales. No wheezing. No rhonchi. Heart[de-identified] Normal  rate. Regular rhythm. No mumur ,  Rub or gallop  ABD: Non-tender. Non-distended. No masses. No organmegaly. Normal bowel sounds. No hernia. Ext : Edema in both upper extremity. No edema in lower extremity.   No Cyanosis No clubbing  Neuro: no focal weakness          DATA:   Recent Labs     08/10/22  0733 08/11/22  0517   WBC 11.2* 10.0   HGB 8.0* 8.4*   HCT 24.9* 24.9*   .5* 101.9*    270     Recent Labs     08/09/22  1844 08/10/22  0734   PROT 6.8 6.5   LABALBU 3.2* 2.8*   BILITOT 0.3 <0.2   ALKPHOS 89 90   AST 43* 46*   ALT 51* 46*       MV Settings:     Vent Mode: CPAP  Vt (Set, mL): 390 (TTE)  Demographics   Patient Name    VIA [facility]       Gender               Male                  Mckay Salinas   Patient Number  [id number]       Race                                                   Ethnicity   Visit Number    [id number]      Room Number          IC12   Corporate ID                   Date of Study        07/27/2022   Accession       [id number]     Referring Physician  Number   Date of Birth   1955     Sonographer          Aria Nye   Age             75 year(s)     Interpreting         [facility]                                 Physician            Cardiology                                                      [facility]  Procedure Type of Study   TTE procedure:ECHO COMPLETE 2D W/DOP W/COLOR. Procedure Date Date: 07/27/2022 Start: 10:27 AM Study Location: Portable Technical Quality: Limited visualization due to lung interface. Indications:LVF. Patient Status: Routine Height: 66 inches Weight: 148 pounds BSA: 1.76 m^2 BMI: 23.89 kg/m^2 BP: 96/54 mmHg  Conclusions   Summary  Left ventricular ejection fraction is estimated at 45%. Diastolic Dysfunction is noted by mitral flow. Normal right ventricle systolic pressure. RVSP 28mmHg  Echogenic mass in the apex concerning for thrombus recommend limited echo  with definity  No hemodynamic evidence of significant valve disease   Signature   ----------------------------------------------------------------  Electronically signed by Julietta Schwab Manuel(Interpreting physician)  on 07/27/2022 03:48 PM  ----------------------------------------------------------------   Findings  Left Ventricle Left ventricular ejection fraction is estimated at 45%. Left ventricular size is normal . Normal left ventricular wall thickness. Diastolic Dysfunction is noted by mitral flow. Echogenic mass in the apex possible thrombus Right Ventricle Normal right ventricle structure and function. Normal right ventricle systolic pressure.  RVSP 28mmHg Left Atrium Normal left atrium. Right Atrium Normal right atrium. Mitral Valve Diffusely thickened and pliable mitral valve leaflets with normal excursion. Structurally normal mitral valve. No evidence of mitral valve stenosis. No evidence of mitral regurgitaton. Tricuspid Valve Tricuspid valve is structurally normal. No evidence of tricuspid stenosis. No evidence of tricuspid regurgitation. Aortic Valve Mildly thickened trileaflet aortic valve with normal excursion. Structurally normal aortic valve. No evidence of aortic valve stenosis . No evidence of aortic valve regurgitation . Pulmonic Valve The pulmonic valve was not well visualized . Pericardial Effusion No evidence of pericardial effusion. Aorta \ Miscellaneous The aorta is within normal limits. M-Mode Measurements (cm)   LVIDd: 2.97 cm                         LVIDs: 2.34 cm  IVSd: 0.73 cm                          IVSs: 1.01 cm  LVPWd: 0.9 cm                          LVPWs: 1.01 cm  Rt. Vent.  Dimension: 3.12 cm           AO Root Dimension: 2.1 cm                                         ACS: 1.37 cm                                         LA: 1.82 cm                                         LVOT: 2.03 cm  Doppler Measurements:   AV Velocity:0.02 m/s                    MV Peak E-Wave: 0.5 m/s  AV Peak Gradient: 5.77 mmHg             MV Peak A-Wave: 0.53 m/s  AV Mean Gradient: 3.05 mmHg  AV Area (Continuity):1.97 cm^2  TR Velocity:2.52 m/s                    Estimated RAP:3 mmHg  TR Gradient:25.31 mmHg                  RVSP:28.31 mmHg  Valves  Mitral Valve   Peak E-Wave: 0.5 m/s                  Peak A-Wave: 0.53 m/s  Mean Velocity: 0.87 m/s               E/A Ratio: 0.94  Mean Gradient: 4.66 mmHg              Peak Gradient: 0.99 mmHg                                        Deceleration Time: 353.3 msec                                        Area (continuity): 1.4 cm^2   Tissue Doppler   E' Septal Velocity: 0.07 m/s  E' Lateral Velocity: 0.07 m/s   Aortic Valve   Peak Velocity: 1.2 m/s Mean Velocity: 0.82 m/s  Peak Gradient: 5.77 mmHg               Mean Gradient: 3.05 mmHg  Area (continuity): 1.97 cm^2  AV VTI: 29.33 cm   Cusp Separation: 1.37 cm   Tricuspid Valve   Estimated RVSP: 28.31 mmHg              Estimated RAP: 3 mmHg  TR Velocity: 2.52 m/s                   TR Gradient: 25.31 mmHg   Pulmonic Valve   Peak Velocity: 0.99 m/s           Peak Gradient: 3.91 mmHg                                    Estimated PASP: 28.31 mmHg   LVOT   Peak Velocity: 0.97 m/s              Mean Velocity: 0.61 m/s  Peak Gradient: 3.77 mmHg             Mean Gradient: 1.79 mmHg  LVOT Diameter: 2.03 cm               LVOT VTI: 17.9 cm  Structures  Left Atrium   LA Dimension: 1.82 cm                        LA Area: 12.58 cm^2  LA/Aorta: 0.87  LA Volume/Index: 44.74 ml /25 m^2   Left Ventricle   Diastolic Dimension: 6.84 cm         Systolic Dimension: 9.57 cm  Septum Diastolic: 7.47 cm            Septum Systolic: 9.46 cm  PW Diastolic: 0.9 cm                 PW Systolic: 3.34 cm                                       FS: 21.2 %  LV EDV/LV EDV Index: 34.12 ml/19 m^2 LV ESV/LV ESV Index: 19.02 ml/11 m^2  EF Calculated: 44.3 %   LVOT Diameter: 2.03 cm   Right Ventricle   Diastolic Dimension: 8.74 cm                                    RV Systolic Pressure: 98.04 mmHg  Aorta/ Miscellaneous Aorta   Aortic Root: 2.1 cm  LVOT Diameter: 2.03 cm      ECHO Limited    Result Date: 7/28/2022  Transthoracic Echocardiography Report (TTE)  Demographics   Patient Name    VIA [facility]       Gender               Male                  [facility]   Patient Number  [id number]       Race                                                   Ethnicity   Visit Number    [id number]      Room Number          IC12   Corporate ID                   Date of Study        07/28/2022   Accession       [id number]     Referring Physician  Number   Date of Birth   1955     Sonographer          Rhett Bergeron   Age             75 year(s) Interpreting         [facility])                                 Physician            Cardiology                                                      [facility]  Procedure Type of Study   TTE procedure:ECHOCARDIOGRAM LIMITED. Procedure Date Date: 07/28/2022 Start: 08:08 AM Study Location: Portable Technical Quality: Limited visualization Indications:LVF. Patient Status: Routine Contrast Medium: Definity. Amount - 2 ml Height: 66 inches Weight: 148 pounds BSA: 1.76 m^2 BMI: 23.89 kg/m^2  Conclusions   Summary  No evidence of thrombus with definity  Left ventricular ejection fraction is visually estimated at 65%. Signature   ----------------------------------------------------------------  Electronically signed by Lashay Ricks(Interpreting physician)  on 07/28/2022 08:42 AM  ----------------------------------------------------------------   Findings  Left Ventricle Left ventricular ejection fraction is visually estimated at 65%. IR FLUORO GUIDED CVA DEVICE PLMT/REPLACE/REMOVAL    1. Venogram of the right internal jugular vein demonstrates a thrombus in the right internal jugular vein which completely occludes the vein. Passage of contrast into the chest is through small collateral veins. 2. 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 Passage of contrast into the chest is through a small collateral veins. Radiation dose to the patient was: 24.21 mGy Additional clinical data: Long-term IV access Procedure: 1. Ultrasound guidance for vascular access into the right internal jugular vein. The ultrasound image of the blood vessel was saved to PACS. 2. Venogram via contrast injection of the right internal jugular vein. 3.   Ultrasound guidance for vascular access into the left internal jugular vein. The ultrasound image of the blood vessel was saved to PACS 4.  Venogram via contrast injection of the left internal jugular vein. Body of Report: Informed and written consent was obtained from the patient following discussion of risks, benefits and alternatives to this procedure. The was patient placed supine on the angiographic table. The patient's neck and chest were then prepped and draped in  normal sterile fashion. A small amount of local lidocaine anesthesia was injected subcutaneously. Ultrasound was used to study the jugular vein we intended to use prior to accessing it. The vein appeared patent. The ultrasound image of the blood vessel was saved to PACS. Using ultrasound access, puncture was made of the right internal jugular vein using a 21 GA needle. A wire was advanced into the right internal jugular vein, though would not pass into the superior vena cava. A 4 [place]ander short thin sheath was placed, through the sheath digital subtraction venography was performed. Venography demonstrated a thrombus in the right internal jugular vein and complete occlusion of the vein central to the thrombus. This access was aborted. Ultrasound was used to study the left jugular vein we intended to use prior to accessing it. The vein appeared patent. The ultrasound image of the blood vessel was saved to PACS. Using ultrasound access, puncture was made of the left internal jugular vein using a 21 GA needle. A wire was attempted to be passed, though would not pass to the superior vena  cava. A 4 [place] thin sheath was placed and digital subtraction venography was performed. 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 The procedure was aborted. Findings discussed with ICU team who will place a temporary central line in the groin. XR CHEST PORTABLE    Result Date: 7/27/2022  XR CHEST PORTABLE COMPARISON: July 25, 2022. HISTORY: resp failure TECHNIQUE: AP view FINDINGS: Endotracheal tube again seen in place. . There is also an enteric tube seen in place and the tip is below the diaphragm.  They appear unchanged in position. A small pleural effusion seen on the right. The left costophrenic angle is not well seen. The lungs are hyperinflated and there is coarsening of the interstitium. Atelectasis and/or scarring is again seen bilaterally in the lower lung fields. The cardiac silhouette appears mildly enlarged but may be accentuated by the portable technique. Degenerative changes are seen in the visualized portion of the right shoulder. The airspace disease has diminished when compared to previous study. . A small pleural effusion is seen on the right and scarring or atelectasis is again seen bilaterally in the bases. US DUP UPPER EXTREMITY RIGHT VENOUS COMPARISON: HISTORY:  resp failure TECHNIQUE: , US DUP UPPER EXTREMITY RIGHT VENOUS AND LEFT VENOUS FINDINGS: Thrombus is seen in the right internal jugular and proximal subclavian, veins it is also seen in the cephalic vein. The right ulnar and basilic veins are not visualized. A right AV fistula seen. Thrombus is seen in the left internal jugular vein. The left basilic and axillary veins are not visualized. IMPRESSION: Deep venous thrombosis seen in the right and left upper extremities. .    XR CHEST PORTABLE    Result Date: 7/25/2022  XR CHEST PORTABLE : 7/25/2022 CLINICAL HISTORY:  post intubation . COMPARISON: Earlier 7/25/2022 TECHNIQUE: A portable upright AP radiograph of the chest was obtained at approximately 12:46 PM. FINDINGS: Both lung bases have been excluded from the radiograph. An endotracheal tube is present, with its tip approximately 4 to 5 cm above the lucita. An orogastric tube probably extends below the diaphragm out of field of view radiograph. Moderate pleural effusions and mild to moderate probable scarring and/or atelectasis of the mid to lower lung fields has not significantly changed. There is no cardiomegaly, pneumothorax, displaced fractures, or other significant changes identified.      ENDOTRACHEAL AND OROGASTRIC TUBES IN EXPECTED POSITIONS. OTHERWISE, STABLE CHEST FROM EARLIER 7/25/2022. XR CHEST PORTABLE    Result Date: 7/25/2022  TECHNIQUE: Single portable view of the chest. CLINICAL INDICATION: Chest pain. COMPARISON: Chest x-ray obtained on June 28, 2022. PROCEDURE AND FINDINGS: Poor inspiratory effort is seen. The cardiomediastinal silhouette is slightly prominent in size. Mild prominence of the bronchovascular and interstitial lung markings is visualized bilaterally, linear streaky opacities visualized in bilateral lung fields, subsegmental atelectatic streaks, fluid visualized in the right minor fissure. Airspace opacification visualized in the lower lung fields bilaterally with blunting of the costophrenic angles and obliteration of the hemidiaphragms consistent with bilateral pleural effusions. . No evidence of pneumothorax or parenchymal lung mass. Degenerative bone changes. Congestive heart failure versus pneumonia and parapneumonic effusions would recommend clinical correlation. Increased opacification seen in comparison to the prior study. US DUP UPPER EXTREMITY RIGHT VENOUS    Result Date: 7/27/2022  XR CHEST PORTABLE COMPARISON: July 25, 2022. HISTORY: resp failure TECHNIQUE: AP view FINDINGS: Endotracheal tube again seen in place. . There is also an enteric tube seen in place and the tip is below the diaphragm. They appear unchanged in position. A small pleural effusion seen on the right. The left costophrenic angle is not well seen. The lungs are hyperinflated and there is coarsening of the interstitium. Atelectasis and/or scarring is again seen bilaterally in the lower lung fields. The cardiac silhouette appears mildly enlarged but may be accentuated by the portable technique. Degenerative changes are seen in the visualized portion of the right shoulder. The airspace disease has diminished when compared to previous study. . A small pleural effusion is seen on the right and scarring or atelectasis is again seen bilaterally in the bases. US DUP UPPER EXTREMITY RIGHT VENOUS COMPARISON: HISTORY:  resp failure TECHNIQUE: , US DUP UPPER EXTREMITY RIGHT VENOUS AND LEFT VENOUS FINDINGS: Thrombus is seen in the right internal jugular and proximal subclavian, veins it is also seen in the cephalic vein. The right ulnar and basilic veins are not visualized. A right AV fistula seen. Thrombus is seen in the left internal jugular vein. The left basilic and axillary veins are not visualized. IMPRESSION: Deep venous thrombosis seen in the right and left upper extremities. .    US DUP UPPER EXTREMITY LEFT VENOUS    Result Date: 7/27/2022  XR CHEST PORTABLE COMPARISON: July 25, 2022. HISTORY: resp failure TECHNIQUE: AP view FINDINGS: Endotracheal tube again seen in place. . There is also an enteric tube seen in place and the tip is below the diaphragm. They appear unchanged in position. A small pleural effusion seen on the right. The left costophrenic angle is not well seen. The lungs are hyperinflated and there is coarsening of the interstitium. Atelectasis and/or scarring is again seen bilaterally in the lower lung fields. The cardiac silhouette appears mildly enlarged but may be accentuated by the portable technique. Degenerative changes are seen in the visualized portion of the right shoulder. The airspace disease has diminished when compared to previous study. . A small pleural effusion is seen on the right and scarring or atelectasis is again seen bilaterally in the bases. US DUP UPPER EXTREMITY RIGHT VENOUS COMPARISON: HISTORY:  resp failure TECHNIQUE: , US DUP UPPER EXTREMITY RIGHT VENOUS AND LEFT VENOUS FINDINGS: Thrombus is seen in the right internal jugular and proximal subclavian, veins it is also seen in the cephalic vein. The right ulnar and basilic veins are not visualized. A right AV fistula seen. Thrombus is seen in the left internal jugular vein. The left basilic and axillary veins are not visualized.  IMPRESSION: Deep venous thrombosis seen in the right and left upper extremities. .    US DUP UPPER EXTREMITY RIGHT ARTERIES    Result Date: 8/2/2022  US DUP UPPER EXTREMITY RIGHT ARTERIES: 8/1/2022 11:42 AM CLINICAL HISTORY:  cold, poor pulses, extensive dvt . COMPARISON: None available. Grayscale, color and waveform Doppler analysis of the right upper arterial system was performed. FINDINGS: Mild to moderate atherosclerotic disease, with pulsatile mild to moderately diminished waveforms worsening distally. There are no significantly elevated velocities to suggest a flow-limiting stenosis, or arterial occlusion. The arterial system is normal in caliber, without evidence of aneurysm or dissection. Maximum systolic velocities are: RIGHT UPPER EXTREMITY: Right proximal common carotid artery 70 cm/s Right mid common carotid artery 53 cm/s Right proximal subclavian artery 68 cm/s Right mid subclavian artery 81 cm/s Right distal subclavian artery 71 cm/s Right axillary artery 41 cm/s Right brachial artery proximal 111 cm/s Right brachial artery mid 97 cm/s Right brachial artery distal 123 cm/s Right radial artery proximal 53 cm/s Right radial artery mid 43 cm/s Right radial artery distal 18 cm/s Right ulnar artery proximal 61 cm/s Right ulnar artery and mid 46 cm/s Right ulnar artery distal 33 cm/s     NO EVIDENCE OF A FLOW-LIMITING STENOSIS, ARTERIAL OCCLUSION, OR ANEURYSM. IR VENOGRAM VENOUS SINUS/JUGULAR    1. Venogram of the right internal jugular vein demonstrates a thrombus in the right internal jugular vein which completely occludes the vein. Passage of contrast into the chest is through small collateral veins. 2. 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 Passage of contrast into the chest is through a small collateral veins.  Radiation dose to the patient was: 24.21 mGy Additional clinical data: Long-term IV access Procedure: 1.   Ultrasound guidance for vascular access into the right internal jugular vein. The ultrasound image of the blood vessel was saved to PACS. 2. Venogram via contrast injection of the right internal jugular vein. 3.   Ultrasound guidance for vascular access into the left internal jugular vein. The ultrasound image of the blood vessel was saved to PACS 4. Venogram via contrast injection of the left internal jugular vein. Body of Report: Informed and written consent was obtained from the patient following discussion of risks, benefits and alternatives to this procedure. The was patient placed supine on the angiographic table. The patient's neck and chest were then prepped and draped in  normal sterile fashion. A small amount of local lidocaine anesthesia was injected subcutaneously. Ultrasound was used to study the jugular vein we intended to use prior to accessing it. The vein appeared patent. The ultrasound image of the blood vessel was saved to PACS. Using ultrasound access, puncture was made of the right internal jugular vein using a 21 GA needle. A wire was advanced into the right internal jugular vein, though would not pass into the superior vena cava. A 4 [place]ian short thin sheath was placed, through the sheath digital subtraction venography was performed. Venography demonstrated a thrombus in the right internal jugular vein and complete occlusion of the vein central to the thrombus. This access was aborted. Ultrasound was used to study the left jugular vein we intended to use prior to accessing it. The vein appeared patent. The ultrasound image of the blood vessel was saved to PACS. Using ultrasound access, puncture was made of the left internal jugular vein using a 21 GA needle. A wire was attempted to be passed, though would not pass to the superior vena  cava. A 4 Colombian thin sheath was placed and digital subtraction venography was performed.  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 The procedure was aborted. Findings discussed with ICU team who will place a temporary central line in the groin. IR ULTRASOUND GUIDANCE VASCULAR ACCESS    1. Venogram of the right internal jugular vein demonstrates a thrombus in the right internal jugular vein which completely occludes the vein. Passage of contrast into the chest is through small collateral veins. 2. 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 Passage of contrast into the chest is through a small collateral veins. Radiation dose to the patient was: 24.21 mGy Additional clinical data: Long-term IV access Procedure: 1. Ultrasound guidance for vascular access into the right internal jugular vein. The ultrasound image of the blood vessel was saved to PACS. 2. Venogram via contrast injection of the right internal jugular vein. 3.   Ultrasound guidance for vascular access into the left internal jugular vein. The ultrasound image of the blood vessel was saved to PACS 4. Venogram via contrast injection of the left internal jugular vein. Body of Report: Informed and written consent was obtained from the patient following discussion of risks, benefits and alternatives to this procedure. The was patient placed supine on the angiographic table. The patient's neck and chest were then prepped and draped in  normal sterile fashion. A small amount of local lidocaine anesthesia was injected subcutaneously. Ultrasound was used to study the jugular vein we intended to use prior to accessing it. The vein appeared patent. The ultrasound image of the blood vessel was saved to PACS. Using ultrasound access, puncture was made of the right internal jugular vein using a 21 GA needle. A wire was advanced into the right internal jugular vein, though would not pass into the superior vena cava.  A 4 Slovak short thin sheath was placed, through COMMON FEMORAL VEIN EXTENDING INTO THE PROXIMAL FEMORAL VEIN. NO ULTRASOUND SIGNS OF DVT IN THE RIGHT LEG FROM THE GROIN TO THE POPLITEAL FOSSA    IR MIDLINE CATH    Result Date: 8/3/2022  FOR BILLING PURPOSES ONLY. STUDY DICTATED IN [facility] BY PHYSICIAN. IMPRESSION AND SUGGESTION:  Acute respiratory failure with hypoxia   Epistaxis s/p nasal packing  Pneumonia  Duodenal ulcer  DVT in lower extremity    He is doing better. He has ultrafiltration done today. No complaint of shortness of breath. He is on 6 L O2 via nasal cannula. Continue O2 to keep saturation 90% or above. Nasal packing removed no more epistaxis. He has DVT study in lower extremity but anticoagulation remains on hold due to epistaxis and GI bleed. He has IVC filter placement. Continue present treatment plan. NOTE: This report was transcribed using voice recognition software. Every effort was made to ensure accuracy; however, inadvertent computerized transcription errors may be present.       Electronically signed by Destiny De La Rosa MD, FCCP on 8/11/2022 at 7:06 PM

## 2022-08-11 NOTE — PROGRESS NOTES
[facility] Occupational Therapy      Date: 2022  Patient Name: Gagandeep Bartlett        MRN: [id number]  Account: [de-identified]   : 1955  (75 y.o.)  [facility]    Chart reviewed, attempted OT at 12 for evaluation. Patient not seen 2° to:    Hold per nursing request due to: Hold per MD request    Spoke to BAILEY Srivastava RN aware. Will attempt again when able.     Electronically signed by NAV Mercado on  at 1:30 PM

## 2022-08-11 NOTE — PROGRESS NOTES
Internal Medicine   Hospitalist   Progress Note    8/11/2022   2:56 AM    Name:  Garrett Bhandari  MRN:    [id number]     IP Day: 16     Admit Date: 7/25/2022 11:15 AM  PCP: Sara Reza MD    Code Status:  Full Code    Assessment and Plan: Active Problems/ diagnosis:     Acute hypoxic respiratory failure in the setting of aspiration pneumonia-required intubation, extubated now on nasal cannula  Sepsis present admission  End-stage renal disease  Hypotension in the setting of dialysis-on midodrine  Bilateral upper and lower extremity acute DVT on Eliquis  Anemia  History of liver transplant    Plan  Patient is having more melena and epistaxis. He does not feel like epistaxis is going on in the back of his throat, seems more anterior, discussed with ENT who will be packing his nose today, discussed with GI for possibly obtain an EGD, resume PPI. Monitor H&H. Will hold Eliquis and use Lovenox therapeutic dose for now, hold Lovenox dose this morning until determination for EGD is made. Continue to monitor medical floor  Antibiotic as per infectious disease. Currently on IV Bactrim. Zosyn stopped   HD as per nephrology  Home medication resumed  Resume immunosuppressants for history of liver transplant, Prograf  Resume PT/OT  DVT/PUD PPx     8/6 - h/h improved. Off a/c due to duodenal ulcer. D/W GI, concerns for need for transfer to tertiary care center. Will d/w patient and sister. Patient transferring to floor. 8/7 - mild sob today, abg and port chest xray ordered. D/w patient, does not want transfer at this time. 8/8 - remains on increased o2. Attempted to call sister as requested, no answer. Received message that the patient and sister request transfer to [facility] if possible. Called transfer center to initiate however transfer center states [facility] transfer line was down, and will keep trying. 8/9 - patient accepted for transfer to [facility] however no beds. Resp care, cont monitor labs.   Patient has very guarded prognosis. 8/10 - for HD today, await [facility] transfer. Supportive care. Will d/w gi regarding need for a/c.    7 pm- 7 am, please contact on call Hospitalist for any needs       Subjective:     SOB unchanged. No cp, n/v/d.     Physical Examination:      Vitals:  /67   Pulse (!) 103   Temp 98.4 °F (36.9 °C) (Oral)   Resp 16   Ht 5' 6\" (1.676 m)   Wt 135 lb 2.3 oz (61.3 kg)   SpO2 95%   BMI 21.81 kg/m²   Temp (24hrs), Av.8 °F (36.6 °C), Min:97.3 °F (36.3 °C), Max:98.4 °F (36.9 °C)      General appearance: alert, cooperative and no distress  Mental Status: oriented to person, place and time and normal affect  Lungs: clear to auscultation bilaterally, normal effort  Heart: regular rate and rhythm, no murmur  Abdomen: soft, nontender, nondistended, bowel sounds present, no masses  Extremities: + upper and lower edema, pulses intact   Skin multiple area of bruising related to IV access in upper and lower extremities    Data:     Labs:  Recent Labs     22  1844 08/10/22  0733   WBC 12.7* 11.2*   HGB 9.0* 8.0*    270     Recent Labs     22  0620   *   K 4.9   CL 93*   CO2 24   BUN 29*   CREATININE 5.10*   GLUCOSE 57*     Recent Labs     22  1844 08/10/22  0734   AST 43* 46*   ALT 51* 46*   BILITOT 0.3 <0.2   ALKPHOS 89 90       Current Facility-Administered Medications   Medication Dose Route Frequency Provider Last Rate Last Admin    epoetin ryan-epbx (RETACRIT) injection 20,000 Units  20,000 Units SubCUTAneous Q7 Days Brenda Leon MD   20,000 Units at 08/10/22 180    dextrose 5 % solution   IntraVENous Continuous Brenda Leon MD 50 mL/hr at 08/10/22 1747 New Bag at 08/10/22 174    sucralfate (CARAFATE) tablet 1 g  1 g Oral 4 times per day ISI Adamson CNP   1 g at 22 0036    pill splitter   Does not apply Once Edenilson Mccarthy MD        chlorhexidine (PERIDEX) 0.12 % solution 15 mL  15 mL Mouth/Throat BID Lilliam Perez, DO   15 mL at SubCUTAneous 4x Daily AC & HS Gopi High MD   1 Units at 08/02/22 2248    polyethylene glycol (GLYCOLAX) packet 17 g  17 g Oral Daily Cathye , APRN - CNP   17 g at 08/04/22 0904    tacrolimus (PROGRAF) capsule 1 mg  1 mg Oral BID Cathye , APRN - CNP   1 mg at 08/10/22 2126    acetaminophen (TYLENOL) tablet 650 mg  650 mg Oral Q6H PRN Gopi High MD   650 mg at 08/03/22 0807    Or    acetaminophen (TYLENOL) suppository 650 mg  650 mg Rectal Q6H PRN Gopi High MD        fentaNYL bolus from bag  50 mcg IntraVENous Q30 Min PRN Cathye , APRN - CNP        glucose chewable tablet 16 g  4 tablet Oral PRN Cathye , APRN - CNP   16 g at 08/09/22 1901    dextrose bolus 10% 125 mL  125 mL IntraVENous PRN Cathye , APRN - CNP   Stopped at 08/08/22 1737    Or    dextrose bolus 10% 250 mL  250 mL IntraVENous PRN Cathye , APRN - CNP        glucagon (rDNA) injection 1 mg  1 mg SubCUTAneous PRN Cathye , APRN - CNP        dextrose 10 % infusion   IntraVENous Continuous PRN Cathye , APRN - CNP        heparin (porcine) injection 1,000 Units  1,000 Units IntraVENous PRN Ivana Maurer MD           Additional work up or/and treatment plan may be added today or then after based on clinical progression. I am managing a portion of pt care. Some medical issues are handled by other specialists. Additional work up and treatment should be done in out pt setting by pt PCP and other out pt providers. In addition to examining and evaluating pt, I spent additional time explaining care, normaland abnormal findings, and treatment plan. All of pt questions were answered. Counseling, diet and education were provided. Case will be discussed with nursing staff when appropriate. Family will be updated if and when appropriate.        Electronically signed by Ricco Madrid MD on 8/11/2022 at 2:56 AM

## 2022-08-11 NOTE — FLOWSHEET NOTE
08/11/22 1230   Vital Signs   BP (!) 102/59   Temp 98.4 °F (36.9 °C)   Heart Rate 99   Resp 16   Weight 130 lb 11.7 oz (59.3 kg)   Weight Method Estimated   Percent Weight Change -3.26   Pain Assessment   Pain Level 0   Post-Hemodialysis Assessment   Post-Treatment Procedures Blood returned; Access bleeding time > 10 minutes   Machine Disinfection Process Acid/Vinegar Clean;Bleach   Rinseback Volume (ml) 300 ml   Dialyzer Clearance Clear   Duration of Treatment (minutes) 150 minutes   Heparin Amount Administered During Treatment (mL) 0 mL   Hemodialysis Intake (ml) 800 ml   Hemodialysis Output (ml) 2800 ml   NET Removed (ml) 2000   Tolerated Treatment Fair   Patient Response to Treatment Pt + nausea with hypotension with 30 minutes remaining. Requested to end tx.    Bilateral Breath Sounds Diminished   Edema Generalized   Edema Generalized +2   RUE Edema +2   LUE Edema +2   Time Off 1158   Patient Disposition Return to room

## 2022-08-11 NOTE — FLOWSHEET NOTE
0668 - PT's metoprolol held for hypotension. Pt's colace, glycolax  and Flonase refused by pt. Pt down to dialysis, to have MBS after. 36 - Dr. Christiano Navarrete notified that PT/OT was wondering if they could work with pt or if they should hold off due to blood clots. Dr. Christiano Navarrete states that we should hold off on therapy at this time. Pt's call light within reach and bed alarm active.

## 2022-08-11 NOTE — PROGRESS NOTES
[facility] Occupational Therapy Department  Change in Status Communication Form      To the referring physician:    [address] and Treatment orders are required at this time. Pt has been a medical hold x 3 days and is pending transfer to tertiary care center . Please reorder when pt. is medically stable to resume OOB activity, as appropriate. We thank you for your referral.     Faby Montejo,   [facility] EMERGENCY MEDICAL [facility] [place] OT Department.      Electronically signed by NAV Bhatia on 8/11/22 at 2:51 PM EDT

## 2022-08-11 NOTE — FLOWSHEET NOTE
08/11/22 1230   Vital Signs   BP (!) 102/59   Temp 98.4 °F (36.9 °C)   Heart Rate 99   Resp 16   Weight 130 lb 11.7 oz (59.3 kg)   Weight Method Estimated   Percent Weight Change -3.26   Pain Assessment   Pain Level 0   Treatment   Time Off 1158   Observations & Evaluations   Level of Consciousness Alert (0)   Oriented X 3   Heart Rhythm Regular   O2 Device Nasal cannula   Bilateral Breath Sounds Diminished   Skin Condition/Temp Dry; Warm   Abdomen Inspection Soft   Edema Generalized   Edema Generalized +2   RUE Edema +2   LUE Edema +2

## 2022-08-12 LAB
GLUCOSE BLD-MCNC: 102 MG/DL (ref 70–99)
GLUCOSE BLD-MCNC: 150 MG/DL (ref 70–99)
GLUCOSE BLD-MCNC: 171 MG/DL (ref 70–99)
GLUCOSE BLD-MCNC: 222 MG/DL (ref 70–99)
PERFORMED ON: ABNORMAL

## 2022-08-12 PROCEDURE — 92526 ORAL FUNCTION THERAPY: CPT

## 2022-08-12 PROCEDURE — A4216 STERILE WATER/SALINE, 10 ML: HCPCS | Performed by: INTERNAL MEDICINE

## 2022-08-12 PROCEDURE — 2700000000 HC OXYGEN THERAPY PER DAY

## 2022-08-12 PROCEDURE — C9113 INJ PANTOPRAZOLE SODIUM, VIA: HCPCS | Performed by: INTERNAL MEDICINE

## 2022-08-12 PROCEDURE — 90935 HEMODIALYSIS ONE EVALUATION: CPT

## 2022-08-12 PROCEDURE — 6370000000 HC RX 637 (ALT 250 FOR IP): Performed by: NURSE PRACTITIONER

## 2022-08-12 PROCEDURE — 6370000000 HC RX 637 (ALT 250 FOR IP): Performed by: PHYSICAL MEDICINE & REHABILITATION

## 2022-08-12 PROCEDURE — 6360000002 HC RX W HCPCS: Performed by: INTERNAL MEDICINE

## 2022-08-12 PROCEDURE — 6360000002 HC RX W HCPCS: Performed by: NURSE PRACTITIONER

## 2022-08-12 PROCEDURE — 1210000000 HC MED SURG R&B

## 2022-08-12 PROCEDURE — 6370000000 HC RX 637 (ALT 250 FOR IP): Performed by: INTERNAL MEDICINE

## 2022-08-12 PROCEDURE — 2580000003 HC RX 258: Performed by: INTERNAL MEDICINE

## 2022-08-12 PROCEDURE — 99232 SBSQ HOSP IP/OBS MODERATE 35: CPT | Performed by: INTERNAL MEDICINE

## 2022-08-12 RX ORDER — MIDODRINE HYDROCHLORIDE 5 MG/1
5 TABLET ORAL
Status: DISCONTINUED | OUTPATIENT
Start: 2022-08-12 | End: 2022-08-18

## 2022-08-12 RX ADMIN — Medication 10 ML: at 20:31

## 2022-08-12 RX ADMIN — MIDODRINE HYDROCHLORIDE 5 MG: 5 TABLET ORAL at 14:36

## 2022-08-12 RX ADMIN — TACROLIMUS 1 MG: 1 CAPSULE ORAL at 20:30

## 2022-08-12 RX ADMIN — SODIUM CHLORIDE, PRESERVATIVE FREE 40 MG: 5 INJECTION INTRAVENOUS at 08:20

## 2022-08-12 RX ADMIN — SODIUM CHLORIDE, PRESERVATIVE FREE 40 MG: 5 INJECTION INTRAVENOUS at 20:30

## 2022-08-12 RX ADMIN — SUCRALFATE 1 G: 1 TABLET ORAL at 18:30

## 2022-08-12 RX ADMIN — 0.12% CHLORHEXIDINE GLUCONATE 15 ML: 1.2 RINSE ORAL at 20:29

## 2022-08-12 RX ADMIN — TACROLIMUS 1 MG: 1 CAPSULE ORAL at 14:36

## 2022-08-12 RX ADMIN — MIDODRINE HYDROCHLORIDE 5 MG: 5 TABLET ORAL at 18:30

## 2022-08-12 RX ADMIN — MIDODRINE HYDROCHLORIDE 2.5 MG: 5 TABLET ORAL at 08:20

## 2022-08-12 RX ADMIN — SUCRALFATE 1 G: 1 TABLET ORAL at 00:41

## 2022-08-12 RX ADMIN — SUCRALFATE 1 G: 1 TABLET ORAL at 05:07

## 2022-08-12 ASSESSMENT — PAIN SCALES - GENERAL: PAINLEVEL_OUTOF10: 0

## 2022-08-12 NOTE — CARE COORDINATION
Quality round completed. Pt is currently still waiting for a bed at [facility] to be transferred. DC plan: [facility] Transfer.      Electronically signed by JOYCE Estevez on 8/12/2022 at 9:20 AM

## 2022-08-12 NOTE — PROGRESS NOTES
[facility]  Facility/Department: [facility]  Speech Language Pathology   Treatment Note      Mildred Sas  1955  [id number]  [x]   confirmed      Date: 2022    Acute respiratory failure with hypoxia ([facility] [address] [J96.01]    Restrictions/Precautions: Fall Risk    Weight: 136 lb 11 oz (62 kg)     ADULT DIET; Dysphagia - Soft and Bite Sized; Mildly Thick ([place])  ADULT ORAL NUTRITION SUPPLEMENT; Breakfast, Dinner; Fortified Pudding Oral Supplement  ADULT ORAL NUTRITION SUPPLEMENT; Lunch; Frozen Oral Supplement    SpO2: 100 % (22 0749)  O2 Flow Rate (L/min): 5 L/min (22 1839)  No active isolations    Speech Dx: Dysphagia    Subjective:  Alert, Cooperative, and Pleasant        Interventions used this date:  Dysphagia Treatment and Instruction in Compensatory Strategies      Objective/Assessment:  Patient progressing towards goals:    Timeframe for Short-term Goals: 2 weeks  Goal 1: Pt will complete oral motor ROM and strengthening exercises (labial/buccal) with 90% accuracy, in order to strengthen lingual/labial/buccal musculature to promote safety and efficiency of oral phase of swallow and decrease risk for pocketing. Patient completed labial and buccal exercises with 100% accuracy following an initial model    Goal 2: Pt will complete lingual ROM and strengthening exercises (lingual press, lingual pull backs) with 90% accuracy, in order to promote anterior to posterior propulsion of bolus and improve tongue base retraction. Patient completed 1 set of 10 lingual press exercises. Patient demonstrated good effort and participation. SLP provided education on purpose of exercise and suggested patient complete several throughout the day. Goal 3: Pt will tolerate thermal stimulation of anterior faucial pillars followed by Nectar thick bolus in all opportunities to decrease swallow onset time. Not targeted.      Goal 4: Pt will tolerate therapeutic trials of Thin liquids via small single sips with adequate mastication and oral clearance of bolus with no overt s/s of aspiration on 100% trials. Not targeted. Goal 5: Pt will demonstrate Small-single sips strategies for safe and efficient swallow of recommended diet in all given opportunities. Patient demonstrated independent small-single sips following an initial model when consuming 3oz of NTL. Patient demonstrated no s/s of aspiration. Goal 6: Pt will tolerate the recommended diet level with no s/s of aspiration  Patient reports good tolerance of diet. Patient reports that he consumed >75% of breakfast and lunch trays. Patient declined PO trials of soft and bite size consistency due to being full from lunch. Patient agreeable to trial NTL via side of cup. Patient consumed 3oz of apple juice and demonstrated no s/s of aspiration. When SLP arrived in patient's room, he was seated reclined in bed. Patient's bedside table was over him and a pepsi with a straw sat in the middle of the table. Patient also had a full jug of thin liquid water, a 1/2 full cup of thin water, and an unopened can of ginger ale. SLP located [place] [id number] and stated that patient was on NTL. Kady stated that the liquids were from this AM before diet was changed in the system. Kady reported that patient had received NTL during lunch. SLP informed RN that thin liquids were removed from room. Recommend continuing with soft/NTL diet. Treatment/Activity Tolerance:  Patient tolerated treatment well    Plan:  Continue per POC    Pain Assessment:  Patient does not c/o pain. Pain Re-assessment:  Patient does not c/o pain. Patient/Caregiver Education:  Patient educated on session and progression towards goals. Patient stated verbal understanding of directions.     Safety Devices:  Bed alarm in place and Call light within reach      Therapy Time  SLP Individual Minutes  Time In: 1315  Time Out: 1335  Minutes: 20            Signature: Electronically signed by MARSHALL Saravia on 8/12/2022 at 1:53 PM

## 2022-08-12 NOTE — PROGRESS NOTES
Comprehensive Nutrition Assessment    Type and Reason for Visit:  Reassess    Nutrition Recommendations/Plan:   Recommend CorPak placement for Tube feeding     Malnutrition Assessment:  Malnutrition Status:  Severe malnutrition (08/09/22 1504)    Context:  Acute Illness     Findings of the 6 clinical characteristics of malnutrition:  Energy Intake:  75% or less of estimated energy requirements for 7 or more days  Weight Loss:  Greater than 5% over 1 month     Body Fat Loss:  Unable to assess     Muscle Mass Loss:  Unable to assess    Fluid Accumulation:  Moderate to Severe Generalized   Strength:  Not Performed    Nutrition Assessment:    Severe malnutrition continues, progress towards nutrition related goals declining, pt had MBS yesterday with recommendations to downgrade to Soft and bite sized diet with mildly thick liquids, which will further limit and interfere patients ability to consume adequate energy, protein and fluids needs, Recommendations on chart since 8/9 recommeding Corpak placment for TF, per notes, pt is pending transfer to [facility] , when bed availalbe    Nutrition Related Findings:    PMH: hypertension, hepatitis-s/p liver transplant, ESRD on hemodialysis M,W,F. , intubated 7/25-27 with trophic TF. S/p thoracentesis (7/26), labs noted, meds reviewed, anuric, last BM loose 8/11. +2-3+ BUE/gen edema. MBS 8/11 = SBS with mildly thick liquids by cup, Intake < 25%, meals often interrpted by additional dialysis session,, therapy,, IVF = D5. @ 50 ml/hr (200 kcals), last dialysis 8/12, Labs noted, meds reviewed Wound Type: Moisture Associate Skin Damage       Current Nutrition Intake & Therapies:    Average Meal Intake: 0%, 1-25%  Average Supplements Intake: Unable to assess  ADULT DIET;  Dysphagia - Soft and Bite Sized; Mildly Thick ([place])  ADULT ORAL NUTRITION SUPPLEMENT; Breakfast, Dinner; Fortified Pudding Oral Supplement  ADULT ORAL NUTRITION SUPPLEMENT; Lunch; Frozen Oral Supplement    Anthropometric Measures:  Height: 5' 6\" (167.6 cm)  Ideal Body Weight (IBW): 142 lbs (65 kg)    Admission Body Weight: 148 lb (67.1 kg)  Current Body Weight: 135 lb (61.2 kg) (8/8 * edema present),  Weight Source: Bed Scale  Current BMI (kg/m2): 21.8  Usual Body Weight: 145 lb (65.8 kg) (stated 6/28/22, 2020)  % Weight Change (Calculated): 2.1  Weight Adjustment For: No Adjustment                 BMI Categories: Normal Weight (BMI 22.0 to 24.9) age over 72    Estimated Daily Nutrient Needs:  Energy Requirements Based On: Kcal/kg  Weight Used for Energy Requirements: Current  Energy (kcal/day): 4780-2226 kcals @ 28-30 kcal/kg  Weight Used for Protein Requirements: Current  Protein (g/day): 91.5 g protein @ 1.5 g/kg  Method Used for Fluid Requirements: Standard Renal  Fluid (ml/day): 500-800 + UOP (or as per MD)    Nutrition Diagnosis:   Severe malnutrition, In context of acute illness or injury related to inadequate protein-energy intake, increase demand for energy/nutrients as evidenced by Criteria as identified in malnutrition assessment  Inadequate protein-energy intake related to swallowing difficulty, altered GI function as evidenced by intake 0-25%, intake 26-50%    Nutrition Interventions:   Food and/or Nutrient Delivery: Start Tube Feeding  Nutrition Education/Counseling: No recommendation at this time  Coordination of Nutrition Care: Continue to monitor while inpatient       Goals:  Previous Goal Met: No Progress toward Goal(s)  Goals: Initiate nutrition support, by next RD assessment       Nutrition Monitoring and Evaluation:      Food/Nutrient Intake Outcomes: Supplement Intake, Food and Nutrient Intake, Diet Advancement/Tolerance  Physical Signs/Symptoms Outcomes: Chewing or Swallowing, Biochemical Data, Weight, Meal Time Behavior    Discharge Planning:     Too soon to determine     Ely Goyal, RD, LD

## 2022-08-12 NOTE — PROGRESS NOTES
nephNephrology Progress Note    Assessment:  ESRDX  Liver transplanr  COPD  Sepsis  Anemia  epistaxis      Plan:  sig fluid overloaded. Had daily UF for dialysis this week  Increase midodrine to 5 tid for low bps  Decrease d5w to 50. Please stop this when sugars are stable enough  Retacrit for anemia  [facility]    Patient Active Problem List:     Hypotension     ESRD (end stage renal disease) on dialysis ([facility] [address]     Liver transplanted [facility])     Liver transplant recipient ([facility] [address]     Sepsis ([facility] [address]     Gastroenteritis     C. difficile colitis     Severe sepsis ([facility] [address]     Vomiting and diarrhea     Generalized abdominal pain     Acute renal failure (HCC)     Acute respiratory failure with hypoxia (HCC)     Pneumonia due to infectious organism     Pleural effusion     Impaired mobility and activities of daily living     Dysphagia, oropharyngeal phase     Epistaxis     Duodenal ulcer      Subjective:  Admit Date: 7/25/2022    Interval History: 5 daily dialysis or ultrafiltration this week. Some drop in bp. For hd today. Plan for transfer to [facility].   Awaiting bed    Medications:  Scheduled Meds:   epoetin ryan-epbx  20,000 Units SubCUTAneous Q7 Days    sucralfate  1 g Oral 4 times per day    pill splitter   Does not apply Once    chlorhexidine  15 mL Mouth/Throat BID    sodium chloride flush  5-40 mL IntraVENous 2 times per day    pantoprazole (PROTONIX) 40 mg injection  40 mg IntraVENous Q12H    [Held by provider] enoxaparin  1 mg/kg SubCUTAneous Daily    fluticasone  1 spray Each Nostril Daily    sodium chloride flush  5-40 mL IntraVENous 2 times per day    midodrine  2.5 mg Oral TID WC    metoprolol tartrate  12.5 mg Oral BID    [Held by provider] aspirin  81 mg Oral Daily    docusate sodium  100 mg Oral BID    insulin lispro  0-4 Units SubCUTAneous 4x Daily AC & HS    polyethylene glycol  17 g Oral Daily    tacrolimus  1 mg Oral BID     Continuous Infusions:   dextrose 50 mL/hr at 08/11/22 1442    sodium chloride      sodium chloride      sodium chloride      dextrose         CBC:   Recent Labs     08/10/22  0733 08/11/22  0517   WBC 11.2* 10.0   HGB 8.0* 8.4*    270       CMP:    No results for input(s): NA, K, CL, CO2, BUN, CREATININE, GLUCOSE, CALCIUM, LABGLOM in the last 72 hours. Troponin: No results for input(s): TROPONINI in the last 72 hours. BNP: No results for input(s): BNP in the last 72 hours. INR:   No results for input(s): INR in the last 72 hours. Lipids: No results for input(s): CHOL, LDLDIRECT, TRIG, HDL, AMYLASE, LIPASE in the last 72 hours. Liver:   Recent Labs     08/10/22  0734   AST 46*   ALT 46*   ALKPHOS 90   PROT 6.5   LABALBU 2.8*   BILITOT <0.2       Iron:  No results for input(s): IRONS, FERRITIN in the last 72 hours. Invalid input(s): LABIRONS  Urinalysis: No results for input(s): UA in the last 72 hours.     Objective:  Vitals: BP (!) 81/51   Pulse 93   Temp 98.1 °F (36.7 °C) (Oral)   Resp 18   Ht 5' 6\" (1.676 m)   Wt 140 lb 14 oz (63.9 kg)   SpO2 100%   BMI 22.74 kg/m²    Wt Readings from Last 3 Encounters:   08/12/22 140 lb 14 oz (63.9 kg)   06/28/22 145 lb (65.8 kg)   03/18/20 145 lb (65.8 kg)      24HR INTAKE/OUTPUT:    Intake/Output Summary (Last 24 hours) at 8/12/2022 0843  Last data filed at 8/12/2022 0742  Gross per 24 hour   Intake 1333.04 ml   Output 2800 ml   Net -1466.96 ml         General: alert, weak  in mil resp apparent distress  HEENT: normocephalic, atraumatic, anicteric  Neck: supple, no mass  Lungs: non-labored respirations, clear to auscultation bilaterally  Heart: regular rate and rhythm, no murmurs or rubs  Abdomen: soft, non-tender, non-distended  Ext: 2+ edema UE  Neuro: alert and oriented, no gross abnormalities  Psych: normal mood and affect  Skin: no rash      Electronically signed by Edel Palomares MD, MD

## 2022-08-12 NOTE — FLOWSHEET NOTE
Assessment completed. VS WNL. A&OX4. PM meds given ex metoprolol of low BP and insulin for . Jie Dahl, RN stated during repot that Dr. Neil Cruz stated to stop continuous D5 fluid if Pt RN think pt BG is stable enough-This RN did not stop fluid because BG on lower side of normal and pt having decreased PO intake. Pt on 5L NC & SPO2 WML; Expiratory, Inspiratory wheezes noted in lungs, Dyspnea with exertion. +2 pitting weeping edema noted at BUE with redness and excoriation. Bruit noted in right arm fistula with drsg intact. This RN talk to pt sister, Yong Collins via phone and update on pt care and inform that we are still waiting for bed to be available for pt transfer. Pt denies pain or further needs at the moment. Call light within reach. Bed in lowest position.

## 2022-08-12 NOTE — PROGRESS NOTES
INPATIENT PROGRESS NOTES    PATIENT NAME: Rachell Alvarado  MRN: [id number]  SERVICE DATE:  August 12, 2022   SERVICE TIME:  6:20 PM      PRIMARY SERVICE: Pulmonary Disease    CHIEF COMPLAIN: Respiratory failure      INTERVAL HPI: Patient seen and examined at bedside, Interval Notes, orders reviewed. Nursing notes noted     He said he is feeling better today. He is on 6 L O2 via nasal cannula. He also went for dialysis yesterday. Denies having short of breath at rest.  No cough or sputum production. No chest pain. No fever or chills. OBJECTIVE    Body mass index is 22.06 kg/m². PHYSICAL EXAM:  Vitals:  BP (!) 103/57   Pulse 66   Temp 97.2 °F (36.2 °C)   Resp 16   Ht 5' 6\" (1.676 m)   Wt 136 lb 11 oz (62 kg)   SpO2 100%   BMI 22.06 kg/m²   General: Alert, awake . comfortable in bed, No distress. Head: Atraumatic , Normocephalic   Eyes: PERRL. No sclera icterus. No conjunctival injection. No discharge   ENT: Nasal packing on the left side. No active bleeding. Pharynx clear. Neck:  Trachea midline. No thyromegaly, no JVD, No cervical adenopathy. Chest : Bilaterally symmetrical ,Normal effort,  No accessory muscle use  Lung : . Fair BS bilateral, decreased BS at bases. Few bibasilar Rales. No wheezing. No rhonchi. Heart[de-identified] Normal  rate. Regular rhythm. No mumur ,  Rub or gallop  ABD: Non-tender. Non-distended. No masses. No organmegaly. Normal bowel sounds. No hernia. Ext : Edema in both upper extremity. No edema in lower extremity.   No Cyanosis No clubbing  Neuro: no focal weakness          DATA:   Recent Labs     08/10/22  0733 08/11/22  0517   WBC 11.2* 10.0   HGB 8.0* 8.4*   HCT 24.9* 24.9*   .5* 101.9*    270     Recent Labs     08/09/22  1844 08/10/22  0734   PROT 6.8 6.5   LABALBU 3.2* 2.8*   BILITOT 0.3 <0.2   ALKPHOS 89 90   AST 43* 46*   ALT 51* 46*       MV Settings:     Vent Mode: CPAP  Vt (Set, mL): 390 mL  Resp Rate (Set): 18 bmp  FiO2 : 50 %  PEEP/CPAP (cmH2O): Gender               Male                  [facility]   Patient Number  [id number]       Race                                                   Ethnicity   Visit Number    [id number]      Room Number          IC12   Corporate ID                   Date of Study        07/27/2022   Accession       [id number]     Referring Physician  Number   Date of Birth   1955     Sonographer          Kathrine Patricio   Age             75 year(s)     Interpreting         [facility])                                 Physician            Cardiology                                                      [facility]  Procedure Type of Study   TTE procedure:ECHO COMPLETE 2D W/DOP W/COLOR. Procedure Date Date: 07/27/2022 Start: 10:27 AM Study Location: Portable Technical Quality: Limited visualization due to lung interface. Indications:LVF. Patient Status: Routine Height: 66 inches Weight: 148 pounds BSA: 1.76 m^2 BMI: 23.89 kg/m^2 BP: 96/54 mmHg  Conclusions   Summary  Left ventricular ejection fraction is estimated at 45%. Diastolic Dysfunction is noted by mitral flow. Normal right ventricle systolic pressure. RVSP 28mmHg  Echogenic mass in the apex concerning for thrombus recommend limited echo  with definity  No hemodynamic evidence of significant valve disease   Signature   ----------------------------------------------------------------  Electronically signed by Karlee Ricks(Interpreting physician)  on 07/27/2022 03:48 PM  ----------------------------------------------------------------   Findings  Left Ventricle Left ventricular ejection fraction is estimated at 45%. Left ventricular size is normal . Normal left ventricular wall thickness. Diastolic Dysfunction is noted by mitral flow. Echogenic mass in the apex possible thrombus Right Ventricle Normal right ventricle structure and function. Normal right ventricle systolic pressure. RVSP 28mmHg Left Atrium Normal left atrium. Right Atrium Normal right atrium.  Mitral Valve Diffusely thickened and pliable mitral valve leaflets with normal excursion. Structurally normal mitral valve. No evidence of mitral valve stenosis. No evidence of mitral regurgitaton. Tricuspid Valve Tricuspid valve is structurally normal. No evidence of tricuspid stenosis. No evidence of tricuspid regurgitation. Aortic Valve Mildly thickened trileaflet aortic valve with normal excursion. Structurally normal aortic valve. No evidence of aortic valve stenosis . No evidence of aortic valve regurgitation . Pulmonic Valve The pulmonic valve was not well visualized . Pericardial Effusion No evidence of pericardial effusion. Aorta \ Miscellaneous The aorta is within normal limits. M-Mode Measurements (cm)   LVIDd: 2.97 cm                         LVIDs: 2.34 cm  IVSd: 0.73 cm                          IVSs: 1.01 cm  LVPWd: 0.9 cm                          LVPWs: 1.01 cm  Rt. Vent.  Dimension: 3.12 cm           AO Root Dimension: 2.1 cm                                         ACS: 1.37 cm                                         LA: 1.82 cm                                         LVOT: 2.03 cm  Doppler Measurements:   AV Velocity:0.02 m/s                    MV Peak E-Wave: 0.5 m/s  AV Peak Gradient: 5.77 mmHg             MV Peak A-Wave: 0.53 m/s  AV Mean Gradient: 3.05 mmHg  AV Area (Continuity):1.97 cm^2  TR Velocity:2.52 m/s                    Estimated RAP:3 mmHg  TR Gradient:25.31 mmHg                  RVSP:28.31 mmHg  Valves  Mitral Valve   Peak E-Wave: 0.5 m/s                  Peak A-Wave: 0.53 m/s  Mean Velocity: 0.87 m/s               E/A Ratio: 0.94  Mean Gradient: 4.66 mmHg              Peak Gradient: 0.99 mmHg                                        Deceleration Time: 353.3 msec                                        Area (continuity): 1.4 cm^2   Tissue Doppler   E' Septal Velocity: 0.07 m/s  E' Lateral Velocity: 0.07 m/s   Aortic Valve   Peak Velocity: 1.2 m/s                 Mean Velocity: 0.82 m/s  Peak Gradient: 5.77 mmHg               Mean Gradient: 3.05 mmHg  Area (continuity): 1.97 cm^2  AV VTI: 29.33 cm   Cusp Separation: 1.37 cm   Tricuspid Valve   Estimated RVSP: 28.31 mmHg              Estimated RAP: 3 mmHg  TR Velocity: 2.52 m/s                   TR Gradient: 25.31 mmHg   Pulmonic Valve   Peak Velocity: 0.99 m/s           Peak Gradient: 3.91 mmHg                                    Estimated PASP: 28.31 mmHg   LVOT   Peak Velocity: 0.97 m/s              Mean Velocity: 0.61 m/s  Peak Gradient: 3.77 mmHg             Mean Gradient: 1.79 mmHg  LVOT Diameter: 2.03 cm               LVOT VTI: 17.9 cm  Structures  Left Atrium   LA Dimension: 1.82 cm                        LA Area: 12.58 cm^2  LA/Aorta: 0.87  LA Volume/Index: 44.74 ml /25 m^2   Left Ventricle   Diastolic Dimension: 8.65 cm         Systolic Dimension: 4.96 cm  Septum Diastolic: 4.54 cm            Septum Systolic: 5.69 cm  PW Diastolic: 0.9 cm                 PW Systolic: 6.83 cm                                       FS: 21.2 %  LV EDV/LV EDV Index: 34.12 ml/19 m^2 LV ESV/LV ESV Index: 19.02 ml/11 m^2  EF Calculated: 44.3 %   LVOT Diameter: 2.03 cm   Right Ventricle   Diastolic Dimension: 5.16 cm                                    RV Systolic Pressure: 11.90 mmHg  Aorta/ Miscellaneous Aorta   Aortic Root: 2.1 cm  LVOT Diameter: 2.03 cm      ECHO Limited    Result Date: 7/28/2022  Transthoracic Echocardiography Report (TTE)  Demographics   Patient Name    VIA        Gender               Male                  [facility]   Patient Number  [id number]       Race                                                   Ethnicity   Visit Number    [id number]      Room Number          IC12   Corporate ID                   Date of Study        07/28/2022   Accession       [id number]     Referring Physician  Number   Date of Birth   1955     Sonographer          Radha Estevez   Age             75 year(s)     Interpreting         [employer] Physician            Cardiology                                                      [facility]  Procedure Type of Study   TTE procedure:ECHOCARDIOGRAM LIMITED. Procedure Date Date: 07/28/2022 Start: 08:08 AM Study Location: Portable Technical Quality: Limited visualization Indications:LVF. Patient Status: Routine Contrast Medium: Definity. Amount - 2 ml Height: 66 inches Weight: 148 pounds BSA: 1.76 m^2 BMI: 23.89 kg/m^2  Conclusions   Summary  No evidence of thrombus with definity  Left ventricular ejection fraction is visually estimated at 65%. Signature   ----------------------------------------------------------------  Electronically signed by Micky Ricks(Interpreting physician)  on 07/28/2022 08:42 AM  ----------------------------------------------------------------   Findings  Left Ventricle Left ventricular ejection fraction is visually estimated at 65%. IR FLUORO GUIDED CVA DEVICE PLMT/REPLACE/REMOVAL    1. Venogram of the right internal jugular vein demonstrates a thrombus in the right internal jugular vein which completely occludes the vein. Passage of contrast into the chest is through small collateral veins. 2. 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 Passage of contrast into the chest is through a small collateral veins. Radiation dose to the patient was: 24.21 mGy Additional clinical data: Long-term IV access Procedure: 1. Ultrasound guidance for vascular access into the right internal jugular vein. The ultrasound image of the blood vessel was saved to PACS. 2. Venogram via contrast injection of the right internal jugular vein. 3.   Ultrasound guidance for vascular access into the left internal jugular vein. The ultrasound image of the blood vessel was saved to PACS 4. Venogram via contrast injection of the left internal jugular vein. Body of Report:  Informed and written consent was obtained from the patient following discussion of risks, benefits and alternatives to this procedure. The was patient placed supine on the angiographic table. The patient's neck and chest were then prepped and draped in  normal sterile fashion. A small amount of local lidocaine anesthesia was injected subcutaneously. Ultrasound was used to study the jugular vein we intended to use prior to accessing it. The vein appeared patent. The ultrasound image of the blood vessel was saved to PACS. Using ultrasound access, puncture was made of the right internal jugular vein using a 21 GA needle. A wire was advanced into the right internal jugular vein, though would not pass into the superior vena cava. A 4 Ethiopian short thin sheath was placed, through the sheath digital subtraction venography was performed. Venography demonstrated a thrombus in the right internal jugular vein and complete occlusion of the vein central to the thrombus. This access was aborted. Ultrasound was used to study the left jugular vein we intended to use prior to accessing it. The vein appeared patent. The ultrasound image of the blood vessel was saved to PACS. Using ultrasound access, puncture was made of the left internal jugular vein using a 21 GA needle. A wire was attempted to be passed, though would not pass to the superior vena  cava. A 4 Ethiopian thin sheath was placed and digital subtraction venography was performed. 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 The procedure was aborted. Findings discussed with ICU team who will place a temporary central line in the groin. XR CHEST PORTABLE    Result Date: 7/27/2022  XR CHEST PORTABLE COMPARISON: July 25, 2022. HISTORY: resp failure TECHNIQUE: AP view FINDINGS: Endotracheal tube again seen in place. . There is also an enteric tube seen in place and the tip is below the diaphragm. They appear unchanged in position.   A small pleural effusion seen on the right. The left costophrenic angle is not well seen. The lungs are hyperinflated and there is coarsening of the interstitium. Atelectasis and/or scarring is again seen bilaterally in the lower lung fields. The cardiac silhouette appears mildly enlarged but may be accentuated by the portable technique. Degenerative changes are seen in the visualized portion of the right shoulder. The airspace disease has diminished when compared to previous study. . A small pleural effusion is seen on the right and scarring or atelectasis is again seen bilaterally in the bases. US DUP UPPER EXTREMITY RIGHT VENOUS COMPARISON: HISTORY:  resp failure TECHNIQUE: , US DUP UPPER EXTREMITY RIGHT VENOUS AND LEFT VENOUS FINDINGS: Thrombus is seen in the right internal jugular and proximal subclavian, veins it is also seen in the cephalic vein. The right ulnar and basilic veins are not visualized. A right AV fistula seen. Thrombus is seen in the left internal jugular vein. The left basilic and axillary veins are not visualized. IMPRESSION: Deep venous thrombosis seen in the right and left upper extremities. .    XR CHEST PORTABLE    Result Date: 7/25/2022  XR CHEST PORTABLE : 7/25/2022 CLINICAL HISTORY:  post intubation . COMPARISON: Earlier 7/25/2022 TECHNIQUE: A portable upright AP radiograph of the chest was obtained at approximately 12:46 PM. FINDINGS: Both lung bases have been excluded from the radiograph. An endotracheal tube is present, with its tip approximately 4 to 5 cm above the lucita. An orogastric tube probably extends below the diaphragm out of field of view radiograph. Moderate pleural effusions and mild to moderate probable scarring and/or atelectasis of the mid to lower lung fields has not significantly changed. There is no cardiomegaly, pneumothorax, displaced fractures, or other significant changes identified. ENDOTRACHEAL AND OROGASTRIC TUBES IN EXPECTED POSITIONS. OTHERWISE, STABLE CHEST FROM EARLIER 7/25/2022. XR CHEST PORTABLE    Result Date: 7/25/2022  TECHNIQUE: Single portable view of the chest. CLINICAL INDICATION: Chest pain. COMPARISON: Chest x-ray obtained on June 28, 2022. PROCEDURE AND FINDINGS: Poor inspiratory effort is seen. The cardiomediastinal silhouette is slightly prominent in size. Mild prominence of the bronchovascular and interstitial lung markings is visualized bilaterally, linear streaky opacities visualized in bilateral lung fields, subsegmental atelectatic streaks, fluid visualized in the right minor fissure. Airspace opacification visualized in the lower lung fields bilaterally with blunting of the costophrenic angles and obliteration of the hemidiaphragms consistent with bilateral pleural effusions. . No evidence of pneumothorax or parenchymal lung mass. Degenerative bone changes. Congestive heart failure versus pneumonia and parapneumonic effusions would recommend clinical correlation. Increased opacification seen in comparison to the prior study. US [facility] UPPER EXTREMITY RIGHT VENOUS    Result Date: 7/27/2022  XR CHEST PORTABLE COMPARISON: July 25, 2022. HISTORY: resp failure TECHNIQUE: AP view FINDINGS: Endotracheal tube again seen in place. . There is also an enteric tube seen in place and the tip is below the diaphragm. They appear unchanged in position. A small pleural effusion seen on the right. The left costophrenic angle is not well seen. The lungs are hyperinflated and there is coarsening of the interstitium. Atelectasis and/or scarring is again seen bilaterally in the lower lung fields. The cardiac silhouette appears mildly enlarged but may be accentuated by the portable technique. Degenerative changes are seen in the visualized portion of the right shoulder. The airspace disease has diminished when compared to previous study. . A small pleural effusion is seen on the right and scarring or atelectasis is again seen bilaterally in the bases.  US [facility] UPPER EXTREMITY RIGHT VENOUS COMPARISON: HISTORY:  resp failure TECHNIQUE: , US DUP UPPER EXTREMITY RIGHT VENOUS AND LEFT VENOUS FINDINGS: Thrombus is seen in the right internal jugular and proximal subclavian, veins it is also seen in the cephalic vein. The right ulnar and basilic veins are not visualized. A right AV fistula seen. Thrombus is seen in the left internal jugular vein. The left basilic and axillary veins are not visualized. IMPRESSION: Deep venous thrombosis seen in the right and left upper extremities. .    US DUP UPPER EXTREMITY LEFT VENOUS    Result Date: 7/27/2022  XR CHEST PORTABLE COMPARISON: July 25, 2022. HISTORY: resp failure TECHNIQUE: AP view FINDINGS: Endotracheal tube again seen in place. . There is also an enteric tube seen in place and the tip is below the diaphragm. They appear unchanged in position. A small pleural effusion seen on the right. The left costophrenic angle is not well seen. The lungs are hyperinflated and there is coarsening of the interstitium. Atelectasis and/or scarring is again seen bilaterally in the lower lung fields. The cardiac silhouette appears mildly enlarged but may be accentuated by the portable technique. Degenerative changes are seen in the visualized portion of the right shoulder. The airspace disease has diminished when compared to previous study. . A small pleural effusion is seen on the right and scarring or atelectasis is again seen bilaterally in the bases. US DUP UPPER EXTREMITY RIGHT VENOUS COMPARISON: HISTORY:  resp failure TECHNIQUE: , US DUP UPPER EXTREMITY RIGHT VENOUS AND LEFT VENOUS FINDINGS: Thrombus is seen in the right internal jugular and proximal subclavian, veins it is also seen in the cephalic vein. The right ulnar and basilic veins are not visualized. A right AV fistula seen. Thrombus is seen in the left internal jugular vein. The left basilic and axillary veins are not visualized.  IMPRESSION: Deep venous thrombosis seen in the right and left upper extremities. .    US DUP UPPER EXTREMITY RIGHT ARTERIES    Result Date: 8/2/2022  US DUP UPPER EXTREMITY RIGHT ARTERIES: 8/1/2022 11:42 AM CLINICAL HISTORY:  cold, poor pulses, extensive dvt . COMPARISON: None available. Grayscale, color and waveform Doppler analysis of the right upper arterial system was performed. FINDINGS: Mild to moderate atherosclerotic disease, with pulsatile mild to moderately diminished waveforms worsening distally. There are no significantly elevated velocities to suggest a flow-limiting stenosis, or arterial occlusion. The arterial system is normal in caliber, without evidence of aneurysm or dissection. Maximum systolic velocities are: RIGHT UPPER EXTREMITY: Right proximal common carotid artery 70 cm/s Right mid common carotid artery 53 cm/s Right proximal subclavian artery 68 cm/s Right mid subclavian artery 81 cm/s Right distal subclavian artery 71 cm/s Right axillary artery 41 cm/s Right brachial artery proximal 111 cm/s Right brachial artery mid 97 cm/s Right brachial artery distal 123 cm/s Right radial artery proximal 53 cm/s Right radial artery mid 43 cm/s Right radial artery distal 18 cm/s Right ulnar artery proximal 61 cm/s Right ulnar artery and mid 46 cm/s Right ulnar artery distal 33 cm/s     NO EVIDENCE OF A FLOW-LIMITING STENOSIS, ARTERIAL OCCLUSION, OR ANEURYSM. IR VENOGRAM VENOUS SINUS/JUGULAR    1. Venogram of the right internal jugular vein demonstrates a thrombus in the right internal jugular vein which completely occludes the vein. Passage of contrast into the chest is through small collateral veins. 2. 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 Passage of contrast into the chest is through a small collateral veins. Radiation dose to the patient was: 24.21 mGy Additional clinical data: Long-term IV access Procedure: 1.    Ultrasound guidance for vascular access into the right internal jugular vein. The ultrasound image of the blood vessel was saved to PACS. 2. Venogram via contrast injection of the right internal jugular vein. 3.   Ultrasound guidance for vascular access into the left internal jugular vein. The ultrasound image of the blood vessel was saved to PACS 4. Venogram via contrast injection of the left internal jugular vein. Body of Report: Informed and written consent was obtained from the patient following discussion of risks, benefits and alternatives to this procedure. The was patient placed supine on the angiographic table. The patient's neck and chest were then prepped and draped in  normal sterile fashion. A small amount of local lidocaine anesthesia was injected subcutaneously. Ultrasound was used to study the jugular vein we intended to use prior to accessing it. The vein appeared patent. The ultrasound image of the blood vessel was saved to PACS. Using ultrasound access, puncture was made of the right internal jugular vein using a 21 GA needle. A wire was advanced into the right internal jugular vein, though would not pass into the superior vena cava. A 4 Wallisian short thin sheath was placed, through the sheath digital subtraction venography was performed. Venography demonstrated a thrombus in the right internal jugular vein and complete occlusion of the vein central to the thrombus. This access was aborted. Ultrasound was used to study the left jugular vein we intended to use prior to accessing it. The vein appeared patent. The ultrasound image of the blood vessel was saved to PACS. Using ultrasound access, puncture was made of the left internal jugular vein using a 21 GA needle. A wire was attempted to be passed, though would not pass to the superior vena  cava. A 4 Wallisian thin sheath was placed and digital subtraction venography was performed. 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 The procedure was aborted. Findings discussed with ICU team who will place a temporary central line in the groin. IR ULTRASOUND GUIDANCE VASCULAR ACCESS    1. Venogram of the right internal jugular vein demonstrates a thrombus in the right internal jugular vein which completely occludes the vein. Passage of contrast into the chest is through small collateral veins. 2. 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 Passage of contrast into the chest is through a small collateral veins. Radiation dose to the patient was: 24.21 mGy Additional clinical data: Long-term IV access Procedure: 1. Ultrasound guidance for vascular access into the right internal jugular vein. The ultrasound image of the blood vessel was saved to PACS. 2. Venogram via contrast injection of the right internal jugular vein. 3.   Ultrasound guidance for vascular access into the left internal jugular vein. The ultrasound image of the blood vessel was saved to PACS 4. Venogram via contrast injection of the left internal jugular vein. Body of Report: Informed and written consent was obtained from the patient following discussion of risks, benefits and alternatives to this procedure. The was patient placed supine on the angiographic table. The patient's neck and chest were then prepped and draped in  normal sterile fashion. A small amount of local lidocaine anesthesia was injected subcutaneously. Ultrasound was used to study the jugular vein we intended to use prior to accessing it. The vein appeared patent. The ultrasound image of the blood vessel was saved to PACS. Using ultrasound access, puncture was made of the right internal jugular vein using a 21 GA needle. A wire was advanced into the right internal jugular vein, though would not pass into the superior vena cava.  A 4 Yi short thin sheath was placed, through the sheath digital subtraction venography was performed. Venography demonstrated a thrombus in the right internal jugular vein and complete occlusion of the vein central to the thrombus. This access was aborted. Ultrasound was used to study the left jugular vein we intended to use prior to accessing it. The vein appeared patent. The ultrasound image of the blood vessel was saved to PACS. Using ultrasound access, puncture was made of the left internal jugular vein using a 21 GA needle. A wire was attempted to be passed, though would not pass to the superior vena  cava. A 4 Czech thin sheath was placed and digital subtraction venography was performed. 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 The procedure was aborted. Findings discussed with ICU team who will place a temporary central line in the groin. US DUP LOWER EXTREMITIES BILATERAL VENOUS    Result Date: 7/27/2022  EXAMINATION: US DUP LOWER EXTREMITIES BILATERAL VENOUS CLINICAL HISTORY: 26-year-old with lower extremity swelling COMPARISONS: None available. FINDINGS: Duplex and color Doppler ultrasounds were performed of the bilateral lower extremity deep venous systems. Examination was performed portably and limited due to patient condition and access to the lower extremities. Right leg: Visualized portions of the right common femoral vein, femoral vein, popliteal vein demonstrate satisfactory compression, color flow, and augmentation. Deep calf veins are not evaluated. Left leg: The left common femoral vein is enlarged filled with intraluminal echoes with absent color flow and poor compression consistent with occluding thrombus. Occluding Thrombus extends into the left proximal femoral vein. Mid to distal femoral vein and popliteal vein demonstrate satisfactory compression and color flow.  Deep calf veins are not assessed on this portable study     ULTRASOUND FINDINGS ARE POSITIVE FOR OCCLUDING THROMBUS IN THE LEFT COMMON FEMORAL VEIN EXTENDING INTO THE PROXIMAL FEMORAL VEIN. NO ULTRASOUND SIGNS OF DVT IN THE RIGHT LEG FROM THE GROIN TO THE POPLITEAL FOSSA    IR MIDLINE CATH    Result Date: 8/3/2022  FOR BILLING PURPOSES ONLY. STUDY DICTATED IN [facility] BY PHYSICIAN. IMPRESSION AND SUGGESTION:  Acute respiratory failure with hypoxia   Epistaxis s/p nasal packing  Pneumonia  Duodenal ulcer  DVT in lower extremity    He is doing better. No complaint of shortness of breath. He is on 6 L O2 via nasal cannula. Continue O2 to keep saturation 90% or above. Nasal packing removed no more epistaxis. He has DVT study in lower extremity but anticoagulation remains on hold due to epistaxis and GI bleed. He has IVC filter placement. Continue present treatment plan. NOTE: This report was transcribed using voice recognition software. Every effort was made to ensure accuracy; however, inadvertent computerized transcription errors may be present.       Electronically signed by Lora Lujan MD, FCCP on 8/12/2022 at 6:20 PM

## 2022-08-12 NOTE — PROGRESS NOTES
Internal Medicine   Hospitalist   Progress Note    8/12/2022   12:23 PM    Name:  Alvaro Beauchamp  MRN:    [id number]     IP Day: 25     Admit Date: 7/25/2022 11:15 AM  PCP: Anali Guillen MD    Code Status:  Full Code    Assessment and Plan: Active Problems/ diagnosis:     Acute hypoxic respiratory failure in the setting of aspiration pneumonia-required intubation, extubated now on nasal cannula  Sepsis present admission  End-stage renal disease  Hypotension in the setting of dialysis-on midodrine  Bilateral upper and lower extremity acute DVT on Eliquis  Anemia  History of liver transplant    Plan  Patient is having more melena and epistaxis. He does not feel like epistaxis is going on in the back of his throat, seems more anterior, discussed with ENT who will be packing his nose today, discussed with GI for possibly obtain an EGD, resume PPI. Monitor H&H. Will hold Eliquis and use Lovenox therapeutic dose for now, hold Lovenox dose this morning until determination for EGD is made. Continue to monitor medical floor  Antibiotic as per infectious disease. Currently on IV Bactrim. Zosyn stopped   HD as per nephrology  Home medication resumed  Resume immunosuppressants for history of liver transplant, Prograf  Resume PT/OT  DVT/PUD PPx     8/6 - h/h improved. Off a/c due to duodenal ulcer. D/W GI, concerns for need for transfer to tertiary care center. Will d/w patient and sister. Patient transferring to floor. 8/7 - mild sob today, abg and port chest xray ordered. D/w patient, does not want transfer at this time. 8/8 - remains on increased o2. Attempted to call sister as requested, no answer. Received message that the patient and sister request transfer to [facility] if possible. Called transfer center to initiate however transfer center states [facility] transfer line was down, and will keep trying. 8/9 - patient accepted for transfer to [facility] however no beds. Resp care, cont monitor labs.   Patient has very guarded prognosis. 8/10 - for HD today, await [facility] transfer. Supportive care. Will d/w gi regarding need for a/c.   - cont supportive care, await transfer    7 pm- 7 am, please contact on call Hospitalist for any needs       Subjective:     SOB unchanged. No cp, n/v/d. Physical Examination:      Vitals:  BP (!) 103/57   Pulse 66   Temp 97.2 °F (36.2 °C)   Resp 16   Ht 5' 6\" (1.676 m)   Wt 136 lb 11 oz (62 kg)   SpO2 100%   BMI 22.06 kg/m²   Temp (24hrs), Av.6 °F (35.9 °C), Min:95.9 °F (35.5 °C), Max:98.4 °F (36.9 °C)      General appearance: alert, cooperative and no distress  Mental Status: oriented to person, place and time and normal affect  Lungs: clear to auscultation bilaterally, normal effort  Heart: regular rate and rhythm, no murmur  Abdomen: soft, nontender, nondistended, bowel sounds present, no masses  Extremities: + upper and lower edema, pulses intact   Skin multiple area of bruising related to IV access in upper and lower extremities    Data:     Labs:  Recent Labs     08/10/22  0733 22  0517   WBC 11.2* 10.0   HGB 8.0* 8.4*    270     No results for input(s): NA, K, CL, CO2, BUN, CREATININE, GLUCOSE in the last 72 hours.     Recent Labs     22  1844 08/10/22  0734   AST 43* 46*   ALT 51* 46*   BILITOT 0.3 <0.2   ALKPHOS 89 90       Current Facility-Administered Medications   Medication Dose Route Frequency Provider Last Rate Last Admin    midodrine (PROAMATINE) tablet 5 mg  5 mg Oral TID  Roderick Diez MD        epoetin ryan-epbx (RETACRIT) injection 20,000 Units  20,000 Units SubCUTAneous Q7 Days Roderick Diez MD   20,000 Units at 08/10/22 1802    dextrose 5 % solution   IntraVENous Continuous Roderick Diez MD 50 mL/hr at 22 1442 New Bag at 22 1442    sucralfate (CARAFATE) tablet 1 g  1 g Oral 4 times per day ISI Matthews CNP   1 g at 22 1628    pill splitter   Does not apply Once Gail Santoyo MD        chlorhexidine (PERIDEX) 0.12 % solution 15 mL  15 mL Mouth/Throat BID Lilliam Sccandicein, DO   15 mL at 08/11/22 2040    0.9 % sodium chloride infusion   IntraVENous PRN Asuncion Alexandre, DO        sodium chloride flush 0.9 % injection 5-40 mL  5-40 mL IntraVENous 2 times per day Sonya Guthrie MD   10 mL at 08/10/22 0801    sodium chloride flush 0.9 % injection 5-40 mL  5-40 mL IntraVENous PRN Sonya Guthrie MD        0.9 % sodium chloride infusion   IntraVENous PRN Sonya Guthrie MD        acetaminophen (TYLENOL) tablet 650 mg  650 mg Oral Q4H PRN Sonya Guthrie MD        ondansetron [facility]) injection 4 mg  4 mg IntraVENous Q6H PRN Sonya Guthrie MD        hydrALAZINE (APRESOLINE) injection 10 mg  10 mg IntraVENous Q10 Min PRN Sonya Guthrie MD        labetalol (NORMODYNE;TRANDATE) injection 10 mg  10 mg IntraVENous Q30 Min PRN Sonya Guthrie MD        pantoprazole (PROTONIX) 40 mg in sodium chloride (PF) 10 mL injection  40 mg IntraVENous Q12H Asuncion Alexandre, DO   40 mg at 08/12/22 0820    [Held by provider] enoxaparin (LOVENOX) injection 60 mg  1 mg/kg SubCUTAneous Daily Carmella Terrell DO        fluticasone (FLONASE) 50 MCG/ACT nasal spray 1 spray  1 spray Each Nostril Daily Billy Jarettata, DO   1 spray at 08/04/22 0902    sodium chloride flush 0.9 % injection 5-40 mL  5-40 mL IntraVENous 2 times per day Ltanya Baldomero, DO   10 mL at 08/08/22 0834    sodium chloride flush 0.9 % injection 5-40 mL  5-40 mL IntraVENous PRN Ltanya Jakego, DO        0.9 % sodium chloride infusion   IntraVENous PRN Asuncion Alexandre, DO        metoprolol tartrate (LOPRESSOR) tablet 12.5 mg  12.5 mg Oral BID Sonya Guthrie MD   12.5 mg at 08/07/22 1130    [Held by provider] aspirin EC tablet 81 mg  81 mg Oral Daily Sonya Guthrie MD   81 mg at 08/04/22 8276    docusate sodium (COLACE) capsule 100 mg  100 mg Oral BID ISI Solis CNP   100 mg at 08/11/22 2039    insulin lispro (HUMALOG) injection vial 0-4 Units  0-4 Units SubCUTAneous 4x Daily AC & HS Raji Smith MD   1 Units at 08/02/22 2248    polyethylene glycol (GLYCOLAX) packet 17 g  17 g Oral Daily ISI Stark CNP   17 g at 08/04/22 0904    tacrolimus (PROGRAF) capsule 1 mg  1 mg Oral BID ISI Stark CNP   1 mg at 08/11/22 2039    acetaminophen (TYLENOL) tablet 650 mg  650 mg Oral Q6H PRN Raji Smith MD   650 mg at 08/03/22 4259    Or    acetaminophen (TYLENOL) suppository 650 mg  650 mg Rectal Q6H PRN Raji Smith MD        glucose chewable tablet 16 g  4 tablet Oral PRN ISI Stark CNP   16 g at 08/09/22 1901    dextrose bolus 10% 125 mL  125 mL IntraVENous PRN ISI Stark CNP   Stopped at 08/08/22 1737    Or    dextrose bolus 10% 250 mL  250 mL IntraVENous PRN ISI Stark - CNP        glucagon (rDNA) injection 1 mg  1 mg SubCUTAneous PRN ISI Stark CNP        dextrose 10 % infusion   IntraVENous Continuous PRN ISI Stark CNP        heparin (porcine) injection 1,000 Units  1,000 Units IntraVENous PRN Justine Manuel MD           Additional work up or/and treatment plan may be added today or then after based on clinical progression. I am managing a portion of pt care. Some medical issues are handled by other specialists. Additional work up and treatment should be done in out pt setting by pt PCP and other out pt providers. In addition to examining and evaluating pt, I spent additional time explaining care, normaland abnormal findings, and treatment plan. All of pt questions were answered. Counseling, diet and education were provided. Case will be discussed with nursing staff when appropriate. Family will be updated if and when appropriate.        Electronically signed by Emanuel Carson MD on 8/12/2022 at 12:23 PM

## 2022-08-13 LAB
ANION GAP SERPL CALCULATED.3IONS-SCNC: 11 MEQ/L (ref 9–15)
BASOPHILS ABSOLUTE: 0.1 K/UL (ref 0–0.2)
BASOPHILS RELATIVE PERCENT: 0.7 %
BUN BLDV-MCNC: 11 MG/DL (ref 8–23)
CALCIUM SERPL-MCNC: 8.3 MG/DL (ref 8.5–9.9)
CHLORIDE BLD-SCNC: 91 MEQ/L (ref 95–107)
CO2: 27 MEQ/L (ref 20–31)
CREAT SERPL-MCNC: 3.31 MG/DL (ref 0.7–1.2)
EOSINOPHILS ABSOLUTE: 0.1 K/UL (ref 0–0.7)
EOSINOPHILS RELATIVE PERCENT: 1.3 %
GFR AFRICAN AMERICAN: 22.6
GFR NON-AFRICAN AMERICAN: 18.7
GLUCOSE BLD-MCNC: 102 MG/DL (ref 70–99)
GLUCOSE BLD-MCNC: 140 MG/DL (ref 70–99)
GLUCOSE BLD-MCNC: 215 MG/DL (ref 70–99)
GLUCOSE BLD-MCNC: 77 MG/DL (ref 70–99)
GLUCOSE BLD-MCNC: 94 MG/DL (ref 70–99)
HCT VFR BLD CALC: 24.5 % (ref 42–52)
HEMOGLOBIN: 8.1 G/DL (ref 14–18)
LYMPHOCYTES ABSOLUTE: 0.5 K/UL (ref 1–4.8)
LYMPHOCYTES RELATIVE PERCENT: 6.5 %
MAGNESIUM: 1.8 MG/DL (ref 1.7–2.4)
MCH RBC QN AUTO: 33.8 PG (ref 27–31.3)
MCHC RBC AUTO-ENTMCNC: 33.2 % (ref 33–37)
MCV RBC AUTO: 101.9 FL (ref 80–100)
MONOCYTES ABSOLUTE: 0.9 K/UL (ref 0.2–0.8)
MONOCYTES RELATIVE PERCENT: 11.5 %
NEUTROPHILS ABSOLUTE: 6.4 K/UL (ref 1.4–6.5)
NEUTROPHILS RELATIVE PERCENT: 80 %
PDW BLD-RTO: 16.2 % (ref 11.5–14.5)
PERFORMED ON: ABNORMAL
PERFORMED ON: NORMAL
PLATELET # BLD: 338 K/UL (ref 130–400)
POTASSIUM REFLEX MAGNESIUM: 3.5 MEQ/L (ref 3.4–4.9)
RBC # BLD: 2.4 M/UL (ref 4.7–6.1)
SODIUM BLD-SCNC: 129 MEQ/L (ref 135–144)
WBC # BLD: 8 K/UL (ref 4.8–10.8)

## 2022-08-13 PROCEDURE — C9113 INJ PANTOPRAZOLE SODIUM, VIA: HCPCS | Performed by: INTERNAL MEDICINE

## 2022-08-13 PROCEDURE — 99232 SBSQ HOSP IP/OBS MODERATE 35: CPT | Performed by: INTERNAL MEDICINE

## 2022-08-13 PROCEDURE — 6370000000 HC RX 637 (ALT 250 FOR IP): Performed by: NURSE PRACTITIONER

## 2022-08-13 PROCEDURE — 85025 COMPLETE CBC W/AUTO DIFF WBC: CPT

## 2022-08-13 PROCEDURE — 2580000003 HC RX 258: Performed by: INTERNAL MEDICINE

## 2022-08-13 PROCEDURE — 6370000000 HC RX 637 (ALT 250 FOR IP): Performed by: FAMILY MEDICINE

## 2022-08-13 PROCEDURE — 83735 ASSAY OF MAGNESIUM: CPT

## 2022-08-13 PROCEDURE — 6370000000 HC RX 637 (ALT 250 FOR IP): Performed by: INTERNAL MEDICINE

## 2022-08-13 PROCEDURE — 2700000000 HC OXYGEN THERAPY PER DAY

## 2022-08-13 PROCEDURE — 80048 BASIC METABOLIC PNL TOTAL CA: CPT

## 2022-08-13 PROCEDURE — 36415 COLL VENOUS BLD VENIPUNCTURE: CPT

## 2022-08-13 PROCEDURE — 1210000000 HC MED SURG R&B

## 2022-08-13 PROCEDURE — 6360000002 HC RX W HCPCS: Performed by: NURSE PRACTITIONER

## 2022-08-13 PROCEDURE — 6370000000 HC RX 637 (ALT 250 FOR IP): Performed by: PHYSICAL MEDICINE & REHABILITATION

## 2022-08-13 PROCEDURE — 6360000002 HC RX W HCPCS: Performed by: INTERNAL MEDICINE

## 2022-08-13 PROCEDURE — A4216 STERILE WATER/SALINE, 10 ML: HCPCS | Performed by: INTERNAL MEDICINE

## 2022-08-13 RX ADMIN — POLYETHYLENE GLYCOL 3350 17 G: 17 POWDER, FOR SOLUTION ORAL at 09:51

## 2022-08-13 RX ADMIN — DOCUSATE SODIUM 100 MG: 100 CAPSULE, LIQUID FILLED ORAL at 09:51

## 2022-08-13 RX ADMIN — Medication 10 ML: at 09:54

## 2022-08-13 RX ADMIN — SUCRALFATE 1 G: 1 TABLET ORAL at 05:10

## 2022-08-13 RX ADMIN — SUCRALFATE 1 G: 1 TABLET ORAL at 00:33

## 2022-08-13 RX ADMIN — DOCUSATE SODIUM 100 MG: 100 CAPSULE, LIQUID FILLED ORAL at 20:25

## 2022-08-13 RX ADMIN — INSULIN LISPRO 1 UNITS: 100 INJECTION, SOLUTION INTRAVENOUS; SUBCUTANEOUS at 11:47

## 2022-08-13 RX ADMIN — TACROLIMUS 1 MG: 1 CAPSULE ORAL at 20:25

## 2022-08-13 RX ADMIN — 0.12% CHLORHEXIDINE GLUCONATE 15 ML: 1.2 RINSE ORAL at 09:51

## 2022-08-13 RX ADMIN — MIDODRINE HYDROCHLORIDE 5 MG: 5 TABLET ORAL at 09:58

## 2022-08-13 RX ADMIN — 0.12% CHLORHEXIDINE GLUCONATE 15 ML: 1.2 RINSE ORAL at 20:25

## 2022-08-13 RX ADMIN — MIDODRINE HYDROCHLORIDE 5 MG: 5 TABLET ORAL at 12:00

## 2022-08-13 RX ADMIN — TACROLIMUS 1 MG: 1 CAPSULE ORAL at 09:50

## 2022-08-13 RX ADMIN — SODIUM CHLORIDE, PRESERVATIVE FREE 40 MG: 5 INJECTION INTRAVENOUS at 20:25

## 2022-08-13 RX ADMIN — METOPROLOL TARTRATE 12.5 MG: 25 TABLET, FILM COATED ORAL at 09:59

## 2022-08-13 RX ADMIN — SUCRALFATE 1 G: 1 TABLET ORAL at 16:46

## 2022-08-13 RX ADMIN — SUCRALFATE 1 G: 1 TABLET ORAL at 12:01

## 2022-08-13 RX ADMIN — MIDODRINE HYDROCHLORIDE 5 MG: 5 TABLET ORAL at 16:45

## 2022-08-13 RX ADMIN — SODIUM CHLORIDE, PRESERVATIVE FREE 40 MG: 5 INJECTION INTRAVENOUS at 09:53

## 2022-08-13 RX ADMIN — Medication 10 ML: at 20:26

## 2022-08-13 NOTE — PROGRESS NOTES
INPATIENT PROGRESS NOTES    PATIENT NAME: Benja Jimenez  MRN: [id number]  SERVICE DATE:  August 13, 2022   SERVICE TIME:  12:00 PM      PRIMARY SERVICE: Pulmonary Disease    CHIEF COMPLAIN: Respiratory failure      INTERVAL HPI: Patient seen and examined at bedside, Interval Notes, orders reviewed. Nursing notes noted     He said he is feeling better today. He is on 6 L O2 via nasal cannula. O2 saturation 97%. Denies having short of breath at rest.  No cough or sputum production. No chest pain. No fever or chills. No new problem overnight. OBJECTIVE    Body mass index is 22.06 kg/m². PHYSICAL EXAM:  Vitals:  BP (!) 104/50   Pulse 96   Temp 98.6 °F (37 °C) (Oral)   Resp 16   Ht 5' 6\" (1.676 m)   Wt 136 lb 11 oz (62 kg)   SpO2 97%   BMI 22.06 kg/m²   General: Alert, awake . comfortable in bed, No distress. Head: Atraumatic , Normocephalic   Eyes: PERRL. No sclera icterus. No conjunctival injection. No discharge   ENT: Nasal packing on the left side. No active bleeding. Pharynx clear. Neck:  Trachea midline. No thyromegaly, no JVD, No cervical adenopathy. Chest : Bilaterally symmetrical ,Normal effort,  No accessory muscle use  Lung : . Fair BS bilateral, decreased BS at bases. Few bibasilar Rales. No wheezing. No rhonchi. Heart[de-identified] Normal  rate. Regular rhythm. No mumur ,  Rub or gallop  ABD: Non-tender. Non-distended. No masses. No organmegaly. Normal bowel sounds. No hernia. Ext : Edema in both upper extremity. No edema in lower extremity.   No Cyanosis No clubbing  Neuro: no focal weakness          DATA:   Recent Labs     08/11/22 0517 08/13/22 0523   WBC 10.0 8.0   HGB 8.4* 8.1*   HCT 24.9* 24.5*   .9* 101.9*    338     Recent Labs     08/13/22 0523   *   K 3.5   CL 91*   CO2 27   BUN 11   CREATININE 3.31*   GLUCOSE 77   CALCIUM 8.3*   LABGLOM 18.7*   GFRAA 22.6*       MV Settings:     Vent Mode: CPAP  Vt (Set, mL): 390 mL  Resp Rate (Set): 18 bmp  FiO2 : 50 %  PEEP/CPAP (cmH2O): 5  Pressure Support: 5 cmH20  Peak Inspiratory Pressure (cmH2O): 9.3 cmH2O  Mean Airway Pressure (cmH2O): 6 cmH20  I:E Ratio: 1:3    No results for input(s): PHART, TZW6ZHJ, PO2ART, QTB7XHM, BEART, U3MRZYDH in the last 72 hours. O2 Device: Nasal cannula  O2 Flow Rate (L/min): 6 L/min    ADULT DIET;  Dysphagia - Soft and Bite Sized; Mildly Thick ([place])  ADULT ORAL NUTRITION SUPPLEMENT; Breakfast, Dinner; Fortified Pudding Oral Supplement  ADULT ORAL NUTRITION SUPPLEMENT; Lunch; Frozen Oral Supplement     MEDICATIONS during current hospitalization:    Continuous Infusions:   dextrose Stopped (08/12/22 1303)    sodium chloride      sodium chloride      sodium chloride      dextrose         Scheduled Meds:   midodrine  5 mg Oral TID WC    epoetin ryan-epbx  20,000 Units SubCUTAneous Q7 Days    sucralfate  1 g Oral 4 times per day    pill splitter   Does not apply Once    chlorhexidine  15 mL Mouth/Throat BID    sodium chloride flush  5-40 mL IntraVENous 2 times per day    pantoprazole (PROTONIX) 40 mg injection  40 mg IntraVENous Q12H    [Held by provider] enoxaparin  1 mg/kg SubCUTAneous Daily    fluticasone  1 spray Each Nostril Daily    sodium chloride flush  5-40 mL IntraVENous 2 times per day    metoprolol tartrate  12.5 mg Oral BID    [Held by provider] aspirin  81 mg Oral Daily    docusate sodium  100 mg Oral BID    insulin lispro  0-4 Units SubCUTAneous 4x Daily AC & HS    polyethylene glycol  17 g Oral Daily    tacrolimus  1 mg Oral BID       PRN Meds:sodium chloride, sodium chloride flush, sodium chloride, acetaminophen, ondansetron, hydrALAZINE, labetalol, sodium chloride flush, sodium chloride, acetaminophen **OR** acetaminophen, glucose, dextrose bolus **OR** dextrose bolus, glucagon (rDNA), dextrose, heparin (porcine)    Radiology  Echocardiogram complete 2D with doppler with color    Result Date: 7/27/2022  Transthoracic Echocardiography Report (TTE)  Demographics   Patient Name [facility]       Gender               Male                  [facility]   Patient Number  [id number]       Race                                                   Ethnicity   Visit Number    [id number]      Room Number          IC12   Corporate ID                   Date of Study        07/27/2022   Accession       [id number]     Referring Physician  Number   Date of Birth   1955     Sonographer          Marcie Vogt   Age             75 year(s)     Interpreting         [facility])                                 Physician            Cardiology                                                      [facility]  Procedure Type of Study   TTE procedure:ECHO COMPLETE 2D W/DOP W/COLOR. Procedure Date Date: 07/27/2022 Start: 10:27 AM Study Location: Portable Technical Quality: Limited visualization due to lung interface. Indications:LVF. Patient Status: Routine Height: 66 inches Weight: 148 pounds BSA: 1.76 m^2 BMI: 23.89 kg/m^2 BP: 96/54 mmHg  Conclusions   Summary  Left ventricular ejection fraction is estimated at 45%. Diastolic Dysfunction is noted by mitral flow. Normal right ventricle systolic pressure. RVSP 28mmHg  Echogenic mass in the apex concerning for thrombus recommend limited echo  with definity  No hemodynamic evidence of significant valve disease   Signature   ----------------------------------------------------------------  Electronically signed by Martha Ricks(Interpreting physician)  on 07/27/2022 03:48 PM  ----------------------------------------------------------------   Findings  Left Ventricle Left ventricular ejection fraction is estimated at 45%. Left ventricular size is normal . Normal left ventricular wall thickness. Diastolic Dysfunction is noted by mitral flow. Echogenic mass in the apex possible thrombus Right Ventricle Normal right ventricle structure and function. Normal right ventricle systolic pressure. RVSP 28mmHg Left Atrium Normal left atrium.  Right Atrium Normal right atrium. Mitral Valve Diffusely thickened and pliable mitral valve leaflets with normal excursion. Structurally normal mitral valve. No evidence of mitral valve stenosis. No evidence of mitral regurgitaton. Tricuspid Valve Tricuspid valve is structurally normal. No evidence of tricuspid stenosis. No evidence of tricuspid regurgitation. Aortic Valve Mildly thickened trileaflet aortic valve with normal excursion. Structurally normal aortic valve. No evidence of aortic valve stenosis . No evidence of aortic valve regurgitation . Pulmonic Valve The pulmonic valve was not well visualized . Pericardial Effusion No evidence of pericardial effusion. Aorta \ Miscellaneous The aorta is within normal limits. M-Mode Measurements (cm)   LVIDd: 2.97 cm                         LVIDs: 2.34 cm  IVSd: 0.73 cm                          IVSs: 1.01 cm  LVPWd: 0.9 cm                          LVPWs: 1.01 cm  Rt. Vent.  Dimension: 3.12 cm           AO Root Dimension: 2.1 cm                                         ACS: 1.37 cm                                         LA: 1.82 cm                                         LVOT: 2.03 cm  Doppler Measurements:   AV Velocity:0.02 m/s                    MV Peak E-Wave: 0.5 m/s  AV Peak Gradient: 5.77 mmHg             MV Peak A-Wave: 0.53 m/s  AV Mean Gradient: 3.05 mmHg  AV Area (Continuity):1.97 cm^2  TR Velocity:2.52 m/s                    Estimated RAP:3 mmHg  TR Gradient:25.31 mmHg                  RVSP:28.31 mmHg  Valves  Mitral Valve   Peak E-Wave: 0.5 m/s                  Peak A-Wave: 0.53 m/s  Mean Velocity: 0.87 m/s               E/A Ratio: 0.94  Mean Gradient: 4.66 mmHg              Peak Gradient: 0.99 mmHg                                        Deceleration Time: 353.3 msec                                        Area (continuity): 1.4 cm^2   Tissue Doppler   E' Septal Velocity: 0.07 m/s  E' Lateral Velocity: 0.07 m/s   Aortic Valve   Peak Velocity: 1.2 m/s                 Mean Velocity: 0.82 Physician            Cardiology                                                      [facility]  Procedure Type of Study   TTE procedure:ECHOCARDIOGRAM LIMITED. Procedure Date Date: 07/28/2022 Start: 08:08 AM Study Location: Portable Technical Quality: Limited visualization Indications:LVF. Patient Status: Routine Contrast Medium: Definity. Amount - 2 ml Height: 66 inches Weight: 148 pounds BSA: 1.76 m^2 BMI: 23.89 kg/m^2  Conclusions   Summary  No evidence of thrombus with definity  Left ventricular ejection fraction is visually estimated at 65%. Signature   ----------------------------------------------------------------  Electronically signed by Azul Ricks(Interpreting physician)  on 07/28/2022 08:42 AM  ----------------------------------------------------------------   Findings  Left Ventricle Left ventricular ejection fraction is visually estimated at 65%. IR FLUORO GUIDED CVA DEVICE PLMT/REPLACE/REMOVAL    1. Venogram of the right internal jugular vein demonstrates a thrombus in the right internal jugular vein which completely occludes the vein. Passage of contrast into the chest is through small collateral veins. 2. 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 Passage of contrast into the chest is through a small collateral veins. Radiation dose to the patient was: 24.21 mGy Additional clinical data: Long-term IV access Procedure: 1. Ultrasound guidance for vascular access into the right internal jugular vein. The ultrasound image of the blood vessel was saved to PACS. 2. Venogram via contrast injection of the right internal jugular vein. 3.   Ultrasound guidance for vascular access into the left internal jugular vein. The ultrasound image of the blood vessel was saved to PACS 4. Venogram via contrast injection of the left internal jugular vein. Body of Report:  Informed and written consent was obtained from the patient following discussion of risks, benefits and alternatives to this procedure. The was patient placed supine on the angiographic table. The patient's neck and chest were then prepped and draped in  normal sterile fashion. A small amount of local lidocaine anesthesia was injected subcutaneously. Ultrasound was used to study the jugular vein we intended to use prior to accessing it. The vein appeared patent. The ultrasound image of the blood vessel was saved to PACS. Using ultrasound access, puncture was made of the right internal jugular vein using a 21 GA needle. A wire was advanced into the right internal jugular vein, though would not pass into the superior vena cava. A 4 Lebanese short thin sheath was placed, through the sheath digital subtraction venography was performed. Venography demonstrated a thrombus in the right internal jugular vein and complete occlusion of the vein central to the thrombus. This access was aborted. Ultrasound was used to study the left jugular vein we intended to use prior to accessing it. The vein appeared patent. The ultrasound image of the blood vessel was saved to PACS. Using ultrasound access, puncture was made of the left internal jugular vein using a 21 GA needle. A wire was attempted to be passed, though would not pass to the superior vena  cava. A 4 Lebanese thin sheath was placed and digital subtraction venography was performed. 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 The procedure was aborted. Findings discussed with ICU team who will place a temporary central line in the groin. XR CHEST PORTABLE    Result Date: 7/27/2022  XR CHEST PORTABLE COMPARISON: July 25, 2022. HISTORY: resp failure TECHNIQUE: AP view FINDINGS: Endotracheal tube again seen in place. . There is also an enteric tube seen in place and the tip is below the diaphragm. They appear unchanged in position.   A small pleural effusion seen on the right. The left costophrenic angle is not well seen. The lungs are hyperinflated and there is coarsening of the interstitium. Atelectasis and/or scarring is again seen bilaterally in the lower lung fields. The cardiac silhouette appears mildly enlarged but may be accentuated by the portable technique. Degenerative changes are seen in the visualized portion of the right shoulder. The airspace disease has diminished when compared to previous study. . A small pleural effusion is seen on the right and scarring or atelectasis is again seen bilaterally in the bases. US DUP UPPER EXTREMITY RIGHT VENOUS COMPARISON: HISTORY:  resp failure TECHNIQUE: , US DUP UPPER EXTREMITY RIGHT VENOUS AND LEFT VENOUS FINDINGS: Thrombus is seen in the right internal jugular and proximal subclavian, veins it is also seen in the cephalic vein. The right ulnar and basilic veins are not visualized. A right AV fistula seen. Thrombus is seen in the left internal jugular vein. The left basilic and axillary veins are not visualized. IMPRESSION: Deep venous thrombosis seen in the right and left upper extremities. .    XR CHEST PORTABLE    Result Date: 7/25/2022  XR CHEST PORTABLE : 7/25/2022 CLINICAL HISTORY:  post intubation . COMPARISON: Earlier 7/25/2022 TECHNIQUE: A portable upright AP radiograph of the chest was obtained at approximately 12:46 PM. FINDINGS: Both lung bases have been excluded from the radiograph. An endotracheal tube is present, with its tip approximately 4 to 5 cm above the lucita. An orogastric tube probably extends below the diaphragm out of field of view radiograph. Moderate pleural effusions and mild to moderate probable scarring and/or atelectasis of the mid to lower lung fields has not significantly changed. There is no cardiomegaly, pneumothorax, displaced fractures, or other significant changes identified. ENDOTRACHEAL AND OROGASTRIC TUBES IN EXPECTED POSITIONS.  OTHERWISE, STABLE CHEST FROM EARLIER 7/25/2022. XR CHEST PORTABLE    Result Date: 7/25/2022  TECHNIQUE: Single portable view of the chest. CLINICAL INDICATION: Chest pain. COMPARISON: Chest x-ray obtained on June 28, 2022. PROCEDURE AND FINDINGS: Poor inspiratory effort is seen. The cardiomediastinal silhouette is slightly prominent in size. Mild prominence of the bronchovascular and interstitial lung markings is visualized bilaterally, linear streaky opacities visualized in bilateral lung fields, subsegmental atelectatic streaks, fluid visualized in the right minor fissure. Airspace opacification visualized in the lower lung fields bilaterally with blunting of the costophrenic angles and obliteration of the hemidiaphragms consistent with bilateral pleural effusions. . No evidence of pneumothorax or parenchymal lung mass. Degenerative bone changes. Congestive heart failure versus pneumonia and parapneumonic effusions would recommend clinical correlation. Increased opacification seen in comparison to the prior study. US DUP UPPER EXTREMITY RIGHT VENOUS    Result Date: 7/27/2022  XR CHEST PORTABLE COMPARISON: July 25, 2022. HISTORY: resp failure TECHNIQUE: AP view FINDINGS: Endotracheal tube again seen in place. . There is also an enteric tube seen in place and the tip is below the diaphragm. They appear unchanged in position. A small pleural effusion seen on the right. The left costophrenic angle is not well seen. The lungs are hyperinflated and there is coarsening of the interstitium. Atelectasis and/or scarring is again seen bilaterally in the lower lung fields. The cardiac silhouette appears mildly enlarged but may be accentuated by the portable technique. Degenerative changes are seen in the visualized portion of the right shoulder. The airspace disease has diminished when compared to previous study. . A small pleural effusion is seen on the right and scarring or atelectasis is again seen bilaterally in the bases.  US DUP UPPER EXTREMITY RIGHT VENOUS COMPARISON: HISTORY:  resp failure TECHNIQUE: , US DUP UPPER EXTREMITY RIGHT VENOUS AND LEFT VENOUS FINDINGS: Thrombus is seen in the right internal jugular and proximal subclavian, veins it is also seen in the cephalic vein. The right ulnar and basilic veins are not visualized. A right AV fistula seen. Thrombus is seen in the left internal jugular vein. The left basilic and axillary veins are not visualized. IMPRESSION: Deep venous thrombosis seen in the right and left upper extremities. .    US DUP UPPER EXTREMITY LEFT VENOUS    Result Date: 7/27/2022  XR CHEST PORTABLE COMPARISON: July 25, 2022. HISTORY: resp failure TECHNIQUE: AP view FINDINGS: Endotracheal tube again seen in place. . There is also an enteric tube seen in place and the tip is below the diaphragm. They appear unchanged in position. A small pleural effusion seen on the right. The left costophrenic angle is not well seen. The lungs are hyperinflated and there is coarsening of the interstitium. Atelectasis and/or scarring is again seen bilaterally in the lower lung fields. The cardiac silhouette appears mildly enlarged but may be accentuated by the portable technique. Degenerative changes are seen in the visualized portion of the right shoulder. The airspace disease has diminished when compared to previous study. . A small pleural effusion is seen on the right and scarring or atelectasis is again seen bilaterally in the bases. US DUP UPPER EXTREMITY RIGHT VENOUS COMPARISON: HISTORY:  resp failure TECHNIQUE: , US DUP UPPER EXTREMITY RIGHT VENOUS AND LEFT VENOUS FINDINGS: Thrombus is seen in the right internal jugular and proximal subclavian, veins it is also seen in the cephalic vein. The right ulnar and basilic veins are not visualized. A right AV fistula seen. Thrombus is seen in the left internal jugular vein. The left basilic and axillary veins are not visualized.  IMPRESSION: Deep venous thrombosis seen in the right and left upper extremities. .    US DUP UPPER EXTREMITY RIGHT ARTERIES    Result Date: 8/2/2022  US DUP UPPER EXTREMITY RIGHT ARTERIES: 8/1/2022 11:42 AM CLINICAL HISTORY:  cold, poor pulses, extensive dvt . COMPARISON: None available. Grayscale, color and waveform Doppler analysis of the right upper arterial system was performed. FINDINGS: Mild to moderate atherosclerotic disease, with pulsatile mild to moderately diminished waveforms worsening distally. There are no significantly elevated velocities to suggest a flow-limiting stenosis, or arterial occlusion. The arterial system is normal in caliber, without evidence of aneurysm or dissection. Maximum systolic velocities are: RIGHT UPPER EXTREMITY: Right proximal common carotid artery 70 cm/s Right mid common carotid artery 53 cm/s Right proximal subclavian artery 68 cm/s Right mid subclavian artery 81 cm/s Right distal subclavian artery 71 cm/s Right axillary artery 41 cm/s Right brachial artery proximal 111 cm/s Right brachial artery mid 97 cm/s Right brachial artery distal 123 cm/s Right radial artery proximal 53 cm/s Right radial artery mid 43 cm/s Right radial artery distal 18 cm/s Right ulnar artery proximal 61 cm/s Right ulnar artery and mid 46 cm/s Right ulnar artery distal 33 cm/s     NO EVIDENCE OF A FLOW-LIMITING STENOSIS, ARTERIAL OCCLUSION, OR ANEURYSM. IR VENOGRAM VENOUS SINUS/JUGULAR    1. Venogram of the right internal jugular vein demonstrates a thrombus in the right internal jugular vein which completely occludes the vein. Passage of contrast into the chest is through small collateral veins. 2. 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 Passage of contrast into the chest is through a small collateral veins. Radiation dose to the patient was: 24.21 mGy Additional clinical data: Long-term IV access Procedure: 1.    Ultrasound guidance for vascular access into the right internal jugular vein. The ultrasound image of the blood vessel was saved to PACS. 2. Venogram via contrast injection of the right internal jugular vein. 3.   Ultrasound guidance for vascular access into the left internal jugular vein. The ultrasound image of the blood vessel was saved to PACS 4. Venogram via contrast injection of the left internal jugular vein. Body of Report: Informed and written consent was obtained from the patient following discussion of risks, benefits and alternatives to this procedure. The was patient placed supine on the angiographic table. The patient's neck and chest were then prepped and draped in  normal sterile fashion. A small amount of local lidocaine anesthesia was injected subcutaneously. Ultrasound was used to study the jugular vein we intended to use prior to accessing it. The vein appeared patent. The ultrasound image of the blood vessel was saved to PACS. Using ultrasound access, puncture was made of the right internal jugular vein using a 21 GA needle. A wire was advanced into the right internal jugular vein, though would not pass into the superior vena cava. A 4 Khmer short thin sheath was placed, through the sheath digital subtraction venography was performed. Venography demonstrated a thrombus in the right internal jugular vein and complete occlusion of the vein central to the thrombus. This access was aborted. Ultrasound was used to study the left jugular vein we intended to use prior to accessing it. The vein appeared patent. The ultrasound image of the blood vessel was saved to PACS. Using ultrasound access, puncture was made of the left internal jugular vein using a 21 GA needle. A wire was attempted to be passed, though would not pass to the superior vena  cava. A 4 Khmer thin sheath was placed and digital subtraction venography was performed. 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  The procedure was aborted. Findings discussed with ICU team who will place a temporary central line in the groin. IR ULTRASOUND GUIDANCE VASCULAR ACCESS    1. Venogram of the right internal jugular vein demonstrates a thrombus in the right internal jugular vein which completely occludes the vein. Passage of contrast into the chest is through small collateral veins. 2. 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 Passage of contrast into the chest is through a small collateral veins. Radiation dose to the patient was: 24.21 mGy Additional clinical data: Long-term IV access Procedure: 1. Ultrasound guidance for vascular access into the right internal jugular vein. The ultrasound image of the blood vessel was saved to PACS. 2. Venogram via contrast injection of the right internal jugular vein. 3.   Ultrasound guidance for vascular access into the left internal jugular vein. The ultrasound image of the blood vessel was saved to PACS 4. Venogram via contrast injection of the left internal jugular vein. Body of Report: Informed and written consent was obtained from the patient following discussion of risks, benefits and alternatives to this procedure. The was patient placed supine on the angiographic table. The patient's neck and chest were then prepped and draped in  normal sterile fashion. A small amount of local lidocaine anesthesia was injected subcutaneously. Ultrasound was used to study the jugular vein we intended to use prior to accessing it. The vein appeared patent. The ultrasound image of the blood vessel was saved to PACS. Using ultrasound access, puncture was made of the right internal jugular vein using a 21 GA needle. A wire was advanced into the right internal jugular vein, though would not pass into the superior vena cava.  A 4 Salvadorean short thin sheath was placed, through the sheath digital subtraction venography was performed. Venography demonstrated a thrombus in the right internal jugular vein and complete occlusion of the vein central to the thrombus. This access was aborted. Ultrasound was used to study the left jugular vein we intended to use prior to accessing it. The vein appeared patent. The ultrasound image of the blood vessel was saved to PACS. Using ultrasound access, puncture was made of the left internal jugular vein using a 21 GA needle. A wire was attempted to be passed, though would not pass to the superior vena  cava. A 4 Dutch thin sheath was placed and digital subtraction venography was performed. 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 The procedure was aborted. Findings discussed with ICU team who will place a temporary central line in the groin. US DUP LOWER EXTREMITIES BILATERAL VENOUS    Result Date: 7/27/2022  EXAMINATION: US DUP LOWER EXTREMITIES BILATERAL VENOUS CLINICAL HISTORY: 69-year-old with lower extremity swelling COMPARISONS: None available. FINDINGS: Duplex and color Doppler ultrasounds were performed of the bilateral lower extremity deep venous systems. Examination was performed portably and limited due to patient condition and access to the lower extremities. Right leg: Visualized portions of the right common femoral vein, femoral vein, popliteal vein demonstrate satisfactory compression, color flow, and augmentation. Deep calf veins are not evaluated. Left leg: The left common femoral vein is enlarged filled with intraluminal echoes with absent color flow and poor compression consistent with occluding thrombus. Occluding Thrombus extends into the left proximal femoral vein. Mid to distal femoral vein and popliteal vein demonstrate satisfactory compression and color flow.  Deep calf veins are not assessed on this portable study     ULTRASOUND FINDINGS ARE POSITIVE FOR OCCLUDING THROMBUS IN THE LEFT COMMON FEMORAL VEIN EXTENDING INTO THE PROXIMAL FEMORAL VEIN. NO ULTRASOUND SIGNS OF DVT IN THE RIGHT LEG FROM THE GROIN TO THE POPLITEAL FOSSA    IR MIDLINE CATH    Result Date: 8/3/2022  FOR BILLING PURPOSES ONLY. STUDY DICTATED IN [facility] BY PHYSICIAN. IMPRESSION AND SUGGESTION:  Acute respiratory failure with hypoxia on 6 L O2  Epistaxis s/p nasal packing  Pneumonia  Duodenal ulcer  DVT in lower extremity    He is doing better. No complaint of shortness of breath. He is on 6 L O2 via nasal cannula. Continue O2 to keep saturation 90% or above. Current O2 saturation 97%. Titrate down FiO2 as tolerated. Continue present treatment plan. NOTE: This report was transcribed using voice recognition software. Every effort was made to ensure accuracy; however, inadvertent computerized transcription errors may be present.       Electronically signed by Shahida Casillas MD, FCCP on 8/13/2022 at 12:00 PM

## 2022-08-13 NOTE — PROGRESS NOTES
Internal Medicine   Hospitalist   Progress Note    2022   1:17 PM    Name:  Althea Cornejo  MRN:    [id number]     IP Day: 23     Admit Date: 2022 11:15 AM  PCP: Mando Romero MD    Code Status:  Full Code    Assessment and Plan: Active Problems/ diagnosis:     Acute hypoxic respiratory failure-on 6 L oxygen  Recent pneumonia-completed treatment  Epistaxis status post packing, removed, no further epistaxis  Acute upper GI bleeding due to duodenal ulcer-EGD this admission, hemoglobin stable, bleeding resolved  DVT in the lower extremity status post IVC filter this admission  Bilateral upper extremity DVTs in the setting of central line placement, central lines removed  Hypoglycemia-likely due to poor p.o. intake    Plan  Patient continues to improve clinically from respiratory standpoint. Wean down oxygen to achieve saturation above 92%. His hemoglobin is stable, continue to monitor  Patient was accepted by [facility] and he is awaiting bed  Start PT/OT  His glucose is more stable, last checked was above 200, will discontinue D5 as per nephrologist recommendations. Monitor for fluid overload. Appreciate pulmonary medicine input  Nephrologist is following for end-stage renal disease/HD management  Resume PPIs  DVT PPx     7 pm- 7 am, please contact on call Hospitalist for any needs     Subjective:      no new events. Denies any new symptoms. Feels overall better.     Physical Examination:      Vitals:  BP (!) 104/50   Pulse 96   Temp 98.6 °F (37 °C) (Oral)   Resp 16   Ht 5' 6\" (1.676 m)   Wt 136 lb 11 oz (62 kg)   SpO2 97%   BMI 22.06 kg/m²   Temp (24hrs), Av.1 °F (36.7 °C), Min:97.5 °F (36.4 °C), Max:98.6 °F (37 °C)      General appearance: alert, cooperative and no distress  Mental Status: oriented to person, place and time and normal affect  Lungs: clear to auscultation bilaterally, normal effort  Heart: regular rate   Abdomen: soft, nontender, nondistended, bowel sounds present, no masses  Extremities: no tenderness in the calves  Skin: Multiple bruises on upper and lower extremities. No gross lesions, rashes    Data:     Labs:  Recent Labs     08/11/22  0517 08/13/22  0523   WBC 10.0 8.0   HGB 8.4* 8.1*    338     Recent Labs     08/13/22  0523   *   K 3.5   CL 91*   CO2 27   BUN 11   CREATININE 3.31*   GLUCOSE 77     No results for input(s): AST, ALT, ALB, BILITOT, ALKPHOS in the last 72 hours.     Current Facility-Administered Medications   Medication Dose Route Frequency Provider Last Rate Last Admin    midodrine (PROAMATINE) tablet 5 mg  5 mg Oral TID WC Seymour Ramos MD   5 mg at 08/13/22 1200    epoetin ryan-epbx (RETACRIT) injection 20,000 Units  20,000 Units SubCUTAneous Q7 Days Seymour Ramos MD   20,000 Units at 08/10/22 1802    dextrose 5 % solution   IntraVENous Continuous Seymour Ramos MD   Stopped at 08/12/22 1303    sucralfate (CARAFATE) tablet 1 g  1 g Oral 4 times per day ISI Kaminski - CNP   1 g at 08/13/22 1201    pill splitter   Does not apply Once Shanell Kauffman MD        chlorhexidine (PERIDEX) 0.12 % solution 15 mL  15 mL Mouth/Throat BID Lilliam Perez DO   15 mL at 08/13/22 0951    0.9 % sodium chloride infusion   IntraVENous PRN Ronnie Barragan DO        sodium chloride flush 0.9 % injection 5-40 mL  5-40 mL IntraVENous 2 times per day Amarjit Cantor MD   10 mL at 08/13/22 0954    sodium chloride flush 0.9 % injection 5-40 mL  5-40 mL IntraVENous PRN Amarjit Cantor MD        0.9 % sodium chloride infusion   IntraVENous PRN Amarjit Cantor MD        acetaminophen (TYLENOL) tablet 650 mg  650 mg Oral Q4H PRN Amarjit Cantor MD        [facility]etron [facility]) injection 4 mg  4 mg IntraVENous Q6H PRN Amarjit Cantor MD        hydrALAZINE (APRESOLINE) injection 10 mg  10 mg IntraVENous Q10 Min PRN Amarjit Cantor MD        labetalol (NORMODYNE;TRANDATE) injection 10 mg  10 mg IntraVENous Q30 Min PRN Amarjit Cantor MD        pantoprazole (PROTONIX) 40 mg in sodium chloride (PF) 10 mL injection  40 mg IntraVENous Q12H Pitney David, DO   40 mg at 08/13/22 0953    [Held by provider] enoxaparin (LOVENOX) injection 60 mg  1 mg/kg SubCUTAneous Daily Carmella Deckerin, DO        fluticasone (FLONASE) 50 MCG/ACT nasal spray 1 spray  1 spray Each Nostril Daily Arina Shoulder, DO   1 spray at 08/04/22 0902    sodium chloride flush 0.9 % injection 5-40 mL  5-40 mL IntraVENous 2 times per day Pitney David, DO   10 mL at 08/13/22 0954    sodium chloride flush 0.9 % injection 5-40 mL  5-40 mL IntraVENous PRN Pitney David, DO        0.9 % sodium chloride infusion   IntraVENous PRN Pitney David, DO        metoprolol tartrate (LOPRESSOR) tablet 12.5 mg  12.5 mg Oral BID Dolly Sarah MD   12.5 mg at 08/13/22 0959    [Held by provider] aspirin EC tablet 81 mg  81 mg Oral Daily Dolly Sarah MD   81 mg at 08/04/22 8842    docusate sodium (COLACE) capsule 100 mg  100 mg Oral BID Dennys Rosa APRN - CNP   100 mg at 08/13/22 0951    insulin lispro (HUMALOG) injection vial 0-4 Units  0-4 Units SubCUTAneous 4x Daily AC & HS Carolynn Najera MD   1 Units at 08/13/22 1147    polyethylene glycol (GLYCOLAX) packet 17 g  17 g Oral Daily Dennys Rosa, APRN - CNP   17 g at 08/13/22 0951    tacrolimus (PROGRAF) capsule 1 mg  1 mg Oral BID [place] Moe, APRN - CNP   1 mg at 08/13/22 0950    acetaminophen (TYLENOL) tablet 650 mg  650 mg Oral Q6H PRN Carolynn Najera MD   650 mg at 08/03/22 9295    Or    acetaminophen (TYLENOL) suppository 650 mg  650 mg Rectal Q6H PRN Carolynn Najera MD        glucose chewable tablet 16 g  4 tablet Oral PRN Dennys Rosa, APRN - CNP   16 g at 08/09/22 1901    dextrose bolus 10% 125 mL  125 mL IntraVENous PRN Dennys Rosa APRN - CNP   Stopped at 08/08/22 1737    Or    dextrose bolus 10% 250 mL  250 mL IntraVENous PRN ISI Crawford CNP        glucagon (rDNA) injection 1 mg  1 mg SubCUTAneous PRN ISI Crawford - SHELDON dextrose 10 % infusion   IntraVENous Continuous PRN ISI Stark - CNP        heparin (porcine) injection 1,000 Units  1,000 Units IntraVENous PRN Justine Manuel MD           Additional work up or/and treatment plan may be added today or then after based on clinical progression. I am managing a portion of pt care. Some medical issues are handled by other specialists. Additional work up and treatment should be done in out pt setting by pt PCP and other out pt providers. In addition to examining and evaluating pt, I spent additional time explaining care, normaland abnormal findings, and treatment plan. All of pt questions were answered. Counseling, diet and education were provided. Case will be discussed with nursing staff when appropriate. Family will be updated if and when appropriate.        Electronically signed by Konrad Garrett DO on 8/13/2022 at 1:17 PM

## 2022-08-13 NOTE — PROGRESS NOTES
Renal Progress Note       Assessment:  ESRDX  Liver transplanr  COPD  Sepsis  Anemia  epistaxis        Plan:  sig fluid overloaded. Had daily UF for dialysis this week  Increase midodrine to 5 tid for low bps  Decrease d5w to 50.   Please stop this when sugars are stable enough  Retacrit for anemia  [facility]  Over the weekend coverage nephrology 8/13/2022 no clinical or laboratory indication to change or adjust the above plan    Patient Active Problem List:     Hypotension     ESRD (end stage renal disease) on dialysis [facility])     Liver transplanted [facility])     Liver transplant recipient ([facility] [address]     Sepsis ([facility] [address]     Gastroenteritis     C. difficile colitis     Severe sepsis ([facility] [address]     Vomiting and diarrhea     Generalized abdominal pain     Acute renal failure ([facility] [address]     Acute respiratory failure with hypoxia ([facility] [address]     Pneumonia due to infectious organism     Pleural effusion     Impaired mobility and activities of daily living     Dysphagia, oropharyngeal phase     Epistaxis     Duodenal ulcer     Severe malnutrition (HCC)      Subjective:   Admit Date: 7/25/2022    Interval History: Seen and examined no uremic related or fluid volume overload related symptoms      Medications:   Scheduled Meds:   midodrine  5 mg Oral TID WC    epoetin ryan-epbx  20,000 Units SubCUTAneous Q7 Days    sucralfate  1 g Oral 4 times per day    pill splitter   Does not apply Once    chlorhexidine  15 mL Mouth/Throat BID    sodium chloride flush  5-40 mL IntraVENous 2 times per day    pantoprazole (PROTONIX) 40 mg injection  40 mg IntraVENous Q12H    [Held by provider] enoxaparin  1 mg/kg SubCUTAneous Daily    fluticasone  1 spray Each Nostril Daily    sodium chloride flush  5-40 mL IntraVENous 2 times per day    metoprolol tartrate  12.5 mg Oral BID    [Held by provider] aspirin  81 mg Oral Daily    docusate sodium  100 mg Oral BID    insulin lispro  0-4 Units SubCUTAneous 4x Daily AC & HS    polyethylene glycol  17 g Oral

## 2022-08-13 NOTE — PROGRESS NOTES
Shift assessment complete. Meds as ordered. Patient waiting on a bed at the transplant center. No s/s of nose bleed noted. Bed in lowest position. Call light within reach. Will continue to monitor.

## 2022-08-13 NOTE — PROGRESS NOTES
Internal Medicine   Hospitalist   Progress Note    8/13/2022   12:19 AM    Name:  Praful Alvarado  MRN:    [id number]     IP Day: 23     Admit Date: 7/25/2022 11:15 AM  PCP: Erik Multani MD    Code Status:  Full Code    Assessment and Plan: Active Problems/ diagnosis:     Acute hypoxic respiratory failure in the setting of aspiration pneumonia-required intubation, extubated now on nasal cannula  Sepsis present admission  End-stage renal disease  Hypotension in the setting of dialysis-on midodrine  Bilateral upper and lower extremity acute DVT on Eliquis  Anemia  History of liver transplant    Plan  Patient is having more melena and epistaxis. He does not feel like epistaxis is going on in the back of his throat, seems more anterior, discussed with ENT who will be packing his nose today, discussed with GI for possibly obtain an EGD, resume PPI. Monitor H&H. Will hold Eliquis and use Lovenox therapeutic dose for now, hold Lovenox dose this morning until determination for EGD is made. Continue to monitor medical floor  Antibiotic as per infectious disease. Currently on IV Bactrim. Zosyn stopped   HD as per nephrology  Home medication resumed  Resume immunosuppressants for history of liver transplant, Prograf  Resume PT/OT  DVT/PUD PPx     8/6 - h/h improved. Off a/c due to duodenal ulcer. D/W GI, concerns for need for transfer to tertiary care center. Will d/w patient and sister. Patient transferring to floor. 8/7 - mild sob today, abg and port chest xray ordered. D/w patient, does not want transfer at this time. 8/8 - remains on increased o2. Attempted to call sister as requested, no answer. Received message that the patient and sister request transfer to [facility] if possible. Called transfer center to initiate however transfer center states [facility] transfer line was down, and will keep trying. 8/9 - patient accepted for transfer to [facility] however no beds. Resp care, cont monitor labs.   Patient has very guarded prognosis. 8/10 - for HD today, await [facility] transfer. Supportive care. Will d/w gi regarding need for a/c.   - cont supportive care, await transfer   - supportive care, poss restart a/c soon    7 pm- 7 am, please contact on call Hospitalist for any needs       Subjective:     SOB unchanged. No cp, n/v/d. Physical Examination:      Vitals:  BP (!) 85/35   Pulse 99   Temp 97.5 °F (36.4 °C) (Oral)   Resp 18   Ht 5' 6\" (1.676 m)   Wt 136 lb 11 oz (62 kg)   SpO2 100%   BMI 22.06 kg/m²   Temp (24hrs), Av.4 °F (35.8 °C), Min:95.9 °F (35.5 °C), Max:98.1 °F (36.7 °C)      General appearance: alert, cooperative and no distress  Mental Status: oriented to person, place and time and normal affect  Lungs: clear to auscultation bilaterally, normal effort  Heart: regular rate and rhythm, no murmur  Abdomen: soft, nontender, nondistended, bowel sounds present, no masses  Extremities: + upper and lower edema, pulses intact   Skin multiple area of bruising related to IV access in upper and lower extremities    Data:     Labs:  Recent Labs     08/10/22  0733 22  0517   WBC 11.2* 10.0   HGB 8.0* 8.4*    270     No results for input(s): NA, K, CL, CO2, BUN, CREATININE, GLUCOSE in the last 72 hours.     Recent Labs     08/10/22  0734   AST 46*   ALT 46*   BILITOT <0.2   ALKPHOS 90       Current Facility-Administered Medications   Medication Dose Route Frequency Provider Last Rate Last Admin    midodrine (PROAMATINE) tablet 5 mg  5 mg Oral TID  Mark Anthony Dixon MD   5 mg at 22 1830    epoetin ryan-epbx (RETACRIT) injection 20,000 Units  20,000 Units SubCUTAneous Q7 Days Mark Anthony Dixon MD   20,000 Units at 08/10/22 1802    dextrose 5 % solution   IntraVENous Continuous Mark Anthony Dixon MD   Stopped at 22 1303    sucralfate (CARAFATE) tablet 1 g  1 g Oral 4 times per day SII Dowling - CNP   1 g at 22 1830    pill splitter   Does not apply Once German Keita MD chlorhexidine (PERIDEX) 0.12 % solution 15 mL  15 mL Mouth/Throat BID Lilliam Scullin, DO   15 mL at 08/12/22 2029    0.9 % sodium chloride infusion   IntraVENous PRN Lorilee Curet, DO        sodium chloride flush 0.9 % injection 5-40 mL  5-40 mL IntraVENous 2 times per day Angel Garcia MD   10 mL at 08/12/22 2031    sodium chloride flush 0.9 % injection 5-40 mL  5-40 mL IntraVENous PRN Angel Garcia MD        0.9 % sodium chloride infusion   IntraVENous PRN Angel Garcia MD        acetaminophen (TYLENOL) tablet 650 mg  650 mg Oral Q4H PRN Angel Garcia MD        ondansetron [facility]) injection 4 mg  4 mg IntraVENous Q6H PRN Angel Garcia MD        hydrALAZINE (APRESOLINE) injection 10 mg  10 mg IntraVENous Q10 Min PRN Angel Garcia MD        labetalol (NORMODYNE;TRANDATE) injection 10 mg  10 mg IntraVENous Q30 Min PRN Angel Garcia MD        pantoprazole (PROTONIX) 40 mg in sodium chloride (PF) 10 mL injection  40 mg IntraVENous Q12H Lorilee Curet, DO   40 mg at 08/12/22 2030    [Held by provider] enoxaparin (LOVENOX) injection 60 mg  1 mg/kg SubCUTAneous Daily Carmella Terrell, DO        fluticasone (FLONASE) 50 MCG/ACT nasal spray 1 spray  1 spray Each Nostril Daily Migdalia Millard DO   1 spray at 08/04/22 0902    sodium chloride flush 0.9 % injection 5-40 mL  5-40 mL IntraVENous 2 times per day Lorilee Curet, DO   10 mL at 08/08/22 0834    sodium chloride flush 0.9 % injection 5-40 mL  5-40 mL IntraVENous PRN Lorilee Curet, DO        0.9 % sodium chloride infusion   IntraVENous PRN Lorilee Curet, DO        metoprolol tartrate (LOPRESSOR) tablet 12.5 mg  12.5 mg Oral BID Angel Garcia MD   12.5 mg at 08/07/22 1130    [Held by provider] aspirin EC tablet 81 mg  81 mg Oral Daily Angel Garcia MD   81 mg at 08/04/22 9140    docusate sodium (COLACE) capsule 100 mg  100 mg Oral BID ISI Yee CNP   100 mg at 08/11/22 2039    insulin lispro (HUMALOG) injection vial 0-4 Units  0-4 Units SubCUTAneous 4x Daily AC & HS Krystyna Dickinson MD   1 Units at 08/02/22 2248    polyethylene glycol (GLYCOLAX) packet 17 g  17 g Oral Daily ISI Gonzales CNP   17 g at 08/04/22 0904    tacrolimus (PROGRAF) capsule 1 mg  1 mg Oral BID ManISI Koch - CNP   1 mg at 08/12/22 2030    acetaminophen (TYLENOL) tablet 650 mg  650 mg Oral Q6H PRN Krystyna Dickinson MD   650 mg at 08/03/22 9223    Or    acetaminophen (TYLENOL) suppository 650 mg  650 mg Rectal Q6H PRN Krystyna Dickinson MD        glucose chewable tablet 16 g  4 tablet Oral PRN Mannasim Good APRN - CNP   16 g at 08/09/22 1901    dextrose bolus 10% 125 mL  125 mL IntraVENous PRN Shad Good, APRN - CNP   Stopped at 08/08/22 1737    Or    dextrose bolus 10% 250 mL  250 mL IntraVENous PRN Mannasim Good APRN - CNP        glucagon (rDNA) injection 1 mg  1 mg SubCUTAneous PRN MontourISI Koch - CNP        dextrose 10 % infusion   IntraVENous Continuous PRN ManISI Koch - CNP        heparin (porcine) injection 1,000 Units  1,000 Units IntraVENous PRN Benji Abraham MD           Additional work up or/and treatment plan may be added today or then after based on clinical progression. I am managing a portion of pt care. Some medical issues are handled by other specialists. Additional work up and treatment should be done in out pt setting by pt PCP and other out pt providers. In addition to examining and evaluating pt, I spent additional time explaining care, normaland abnormal findings, and treatment plan. All of pt questions were answered. Counseling, diet and education were provided. Case will be discussed with nursing staff when appropriate. Family will be updated if and when appropriate.        Electronically signed by Claven Denver, MD on 8/13/2022 at 12:19 AM

## 2022-08-13 NOTE — CARE COORDINATION
Met with patient at bedside. D/C plan presently for [facility] when bed available. Attending re-ordered PT/OT evals on 8/13/22 to see if patient would be appropriate for SNF. Patient agreeable to SNF. List given. Patient will give top 3 choices to  8/14/22, once therapy has worked with him. CM/SW to continue to follow for d/c planning needs.

## 2022-08-13 NOTE — PROGRESS NOTES
I was contacted by [facility] [employer] [facility] clinic transfer center as the patient disposition change from ICU to medical floor to discuss the patient with their medical floor team.  [facility] politely declined the patient at this point as he is more stable and likely appropriate for discharge to rehab or skilled facility next 24 to 48 hours.     Sindhu Fishman, [facility]

## 2022-08-14 LAB
ALBUMIN SERPL-MCNC: 2.9 G/DL (ref 3.5–4.6)
ANION GAP SERPL CALCULATED.3IONS-SCNC: 14 MEQ/L (ref 9–15)
BUN BLDV-MCNC: 20 MG/DL (ref 8–23)
CALCIUM SERPL-MCNC: 8.5 MG/DL (ref 8.5–9.9)
CHLORIDE BLD-SCNC: 90 MEQ/L (ref 95–107)
CO2: 26 MEQ/L (ref 20–31)
CREAT SERPL-MCNC: 4.5 MG/DL (ref 0.7–1.2)
GFR AFRICAN AMERICAN: 15.8
GFR NON-AFRICAN AMERICAN: 13.1
GLUCOSE BLD-MCNC: 102 MG/DL (ref 70–99)
GLUCOSE BLD-MCNC: 234 MG/DL (ref 70–99)
GLUCOSE BLD-MCNC: 72 MG/DL (ref 70–99)
GLUCOSE BLD-MCNC: 97 MG/DL (ref 70–99)
GLUCOSE BLD-MCNC: 98 MG/DL (ref 70–99)
HCT VFR BLD CALC: 24.3 % (ref 42–52)
PERFORMED ON: ABNORMAL
PERFORMED ON: ABNORMAL
PERFORMED ON: NORMAL
PERFORMED ON: NORMAL
PHOSPHORUS: 3.1 MG/DL (ref 2.3–4.8)
POTASSIUM SERPL-SCNC: 3.9 MEQ/L (ref 3.4–4.9)
RETICULOCYTE ABSOLUTE COUNT: 0.06 M/CUMM (ref 0.02–0.11)
RETICULOCYTE COUNT PCT: 2.4 % (ref 0.6–2.2)
SODIUM BLD-SCNC: 130 MEQ/L (ref 135–144)

## 2022-08-14 PROCEDURE — 80048 BASIC METABOLIC PNL TOTAL CA: CPT

## 2022-08-14 PROCEDURE — 97163 PT EVAL HIGH COMPLEX 45 MIN: CPT

## 2022-08-14 PROCEDURE — 2580000003 HC RX 258: Performed by: INTERNAL MEDICINE

## 2022-08-14 PROCEDURE — 6360000002 HC RX W HCPCS: Performed by: NURSE PRACTITIONER

## 2022-08-14 PROCEDURE — 6370000000 HC RX 637 (ALT 250 FOR IP): Performed by: FAMILY MEDICINE

## 2022-08-14 PROCEDURE — A4216 STERILE WATER/SALINE, 10 ML: HCPCS | Performed by: INTERNAL MEDICINE

## 2022-08-14 PROCEDURE — 6370000000 HC RX 637 (ALT 250 FOR IP): Performed by: PHYSICAL MEDICINE & REHABILITATION

## 2022-08-14 PROCEDURE — 1210000000 HC MED SURG R&B

## 2022-08-14 PROCEDURE — 6370000000 HC RX 637 (ALT 250 FOR IP): Performed by: INTERNAL MEDICINE

## 2022-08-14 PROCEDURE — 36415 COLL VENOUS BLD VENIPUNCTURE: CPT

## 2022-08-14 PROCEDURE — 6360000002 HC RX W HCPCS: Performed by: INTERNAL MEDICINE

## 2022-08-14 PROCEDURE — 2700000000 HC OXYGEN THERAPY PER DAY

## 2022-08-14 PROCEDURE — C9113 INJ PANTOPRAZOLE SODIUM, VIA: HCPCS | Performed by: INTERNAL MEDICINE

## 2022-08-14 PROCEDURE — 82040 ASSAY OF SERUM ALBUMIN: CPT

## 2022-08-14 PROCEDURE — 82728 ASSAY OF FERRITIN: CPT

## 2022-08-14 PROCEDURE — 97166 OT EVAL MOD COMPLEX 45 MIN: CPT

## 2022-08-14 PROCEDURE — 84100 ASSAY OF PHOSPHORUS: CPT

## 2022-08-14 PROCEDURE — 6370000000 HC RX 637 (ALT 250 FOR IP): Performed by: NURSE PRACTITIONER

## 2022-08-14 PROCEDURE — 85046 RETICYTE/HGB CONCENTRATE: CPT

## 2022-08-14 RX ADMIN — MIDODRINE HYDROCHLORIDE 5 MG: 5 TABLET ORAL at 08:43

## 2022-08-14 RX ADMIN — SODIUM CHLORIDE, PRESERVATIVE FREE 40 MG: 5 INJECTION INTRAVENOUS at 08:41

## 2022-08-14 RX ADMIN — DOCUSATE SODIUM 100 MG: 100 CAPSULE, LIQUID FILLED ORAL at 20:18

## 2022-08-14 RX ADMIN — 0.12% CHLORHEXIDINE GLUCONATE 15 ML: 1.2 RINSE ORAL at 08:40

## 2022-08-14 RX ADMIN — 0.12% CHLORHEXIDINE GLUCONATE 15 ML: 1.2 RINSE ORAL at 20:18

## 2022-08-14 RX ADMIN — SUCRALFATE 1 G: 1 TABLET ORAL at 18:00

## 2022-08-14 RX ADMIN — DOCUSATE SODIUM 100 MG: 100 CAPSULE, LIQUID FILLED ORAL at 08:43

## 2022-08-14 RX ADMIN — Medication 10 ML: at 08:49

## 2022-08-14 RX ADMIN — INSULIN LISPRO 1 UNITS: 100 INJECTION, SOLUTION INTRAVENOUS; SUBCUTANEOUS at 11:23

## 2022-08-14 RX ADMIN — TACROLIMUS 1 MG: 1 CAPSULE ORAL at 20:18

## 2022-08-14 RX ADMIN — SODIUM CHLORIDE, PRESERVATIVE FREE 40 MG: 5 INJECTION INTRAVENOUS at 20:18

## 2022-08-14 RX ADMIN — POLYETHYLENE GLYCOL 3350 17 G: 17 POWDER, FOR SOLUTION ORAL at 08:48

## 2022-08-14 RX ADMIN — TACROLIMUS 1 MG: 1 CAPSULE ORAL at 08:43

## 2022-08-14 RX ADMIN — MIDODRINE HYDROCHLORIDE 5 MG: 5 TABLET ORAL at 17:00

## 2022-08-14 RX ADMIN — SUCRALFATE 1 G: 1 TABLET ORAL at 05:11

## 2022-08-14 RX ADMIN — MIDODRINE HYDROCHLORIDE 5 MG: 5 TABLET ORAL at 11:25

## 2022-08-14 RX ADMIN — SUCRALFATE 1 G: 1 TABLET ORAL at 11:25

## 2022-08-14 RX ADMIN — Medication 5 ML: at 08:49

## 2022-08-14 RX ADMIN — Medication 10 ML: at 20:19

## 2022-08-14 NOTE — PROGRESS NOTES
[facility] OCCUPATIONAL THERAPY EVALUATION - ACUTE     NAME: Barrie High  : 1955 (75 y.o.)  MRN: [id number]  CODE STATUS: Full Code  Room: [place] [id number]    Date of Service: 2022    Patient Diagnosis(es): Acute respiratory failure with hypoxia ([address] [J96.01]   Patient Active Problem List    Diagnosis Date Noted    Severe malnutrition ([facility] [address] 2022    Duodenal ulcer 2022    Impaired mobility and activities of daily living 2022    Dysphagia, oropharyngeal phase 2022    Epistaxis 2022    Pneumonia due to infectious organism 2022    Pleural effusion 2022    Acute respiratory failure with hypoxia (HCC) 2022    Vomiting and diarrhea     Generalized abdominal pain     Acute renal failure (HCC)     Gastroenteritis     C. difficile colitis     Severe sepsis ([facility] [address]     Hypotension 2017    ESRD (end stage renal disease) on dialysis ([address] 2017    Liver transplanted ([facility] [address] 2017    Liver transplant recipient [facility])     Sepsis [facility])         Past Medical History:   Diagnosis Date    ESRD (end stage renal disease) ([facility] [address]     Hemodialysis patient ([address]     Hepatitis     Hypertension     Liver transplanted ([facility] [address] 2016     Past Surgical History:   Procedure Laterality Date    DIALYSIS FISTULA CREATION Right     IR MIDLINE CATH  2022    IR MIDLINE CATH 2022 MLOZ SPECIAL PROCEDURE    LIVER TRANSPLANT  2017    TUNNELED VENOUS PORT PLACEMENT      UPPER GASTROINTESTINAL ENDOSCOPY N/A 2022    EGD ESOPHAGOGASTRODUODENOSCOPY WITH INTERVENTION performed by Sarwat Larkin MD at [facility]        Restrictions  Restrictions/Precautions: Fall Risk              Safety Devices: Safety Devices  Type of Devices: All fall risk precautions in place; Bed alarm in place;Call light within reach     Patient's date of birth confirmed: Yes    General:  Chart Reviewed: Yes    Pain at start of treatment: No    Pain at end of treatment: No    Prior Level of Function:  Social/Functional History  Lives With: Alone  Type of Home: Apartment  Home Layout: One level (4th floor)  Home Access: Elevator, Level entry  Bathroom Shower/Tub: Tub/Shower unit  Bathroom Equipment: Grab bars in [place]: Rollator, Cane  Has the patient had two or more falls in the past year or any fall with injury in the past year?:  (One fall about 2 months ago bending over to pick something up)  Receives Help From: Family (sister assists)  ADL Assistance: Independent  Homemaking Assistance: Independent  Ambulation Assistance: Independent  Transfer Assistance: Independent  Active : Yes  Occupation: Retired  Leisure & Hobbies: used to fish, work around the house  Additional Comments: Sister lives nearby    OBJECTIVE:     Orientation Status:  Orientation  Overall Orientation Status: Within Normal Limits    Observation:  Observation/Palpation  Observation: pt pleasant, SOB following standing, reported feeling \"tight in back and body\" d/t decreased movement, 02 NC 5L, edema L UE    Cognition Status:  Cognition  Overall Cognitive Status: WNL    Perception Status:  Perception  Overall Perceptual Status: WFL    Vision and Hearing Status:  Vision  Vision Exceptions: Wears glasses for reading  Hearing  Hearing: Within functional limits        GROSS ASSESSMENT AROM/PROM:  AROM: Generally decreased, functional  PROM: Generally decreased, functional    ROM:        UE STRENGTH:  Strength: Generally decreased, functional    UE COORDINATION:  Coordination: Generally decreased, functional    UE TONE:  Tone: Normal    UE SENSATION:  Sensation: Intact    Hand Dominance:  Hand Dominance  Hand Dominance: Left    ADL Status:  ADL  Feeding: Independent  Grooming: Setup  UE Bathing: Minimal assistance  UE Bathing Skilled Clinical Factors: decreased ROM of B UE  LE Bathing: Maximum assistance  UE Dressing: Minimal assistance  LE Dressing: Maximum assistance  Toileting: Maximum assistance  Toilet Transfers  Equipment Used: Grab bars  Toilet Transfer: Minimal assistance    Functional Mobility:    Transfers  Sit to stand: Minimal assistance  Stand to sit: Minimal assistance  Transfer Comments: Increased time and effort    Bed Mobility  Bed mobility  Rolling to Left: Minimal assistance  Supine to Sit: Minimal assistance  Sit to Supine: Contact guard assistance  Scooting: Stand by assistance    Functional Endurance:  Activity Tolerance  Activity Tolerance: Patient Tolerated treatment well;Patient limited by fatigue  Activity Tolerance Comments: tolerated standing x2 and sitting EOB    D/C Recommendations:  OT D/C RECOMMENDATIONS  REQUIRES OT FOLLOW-UP: Yes    Equipment Recommendations:  OT Equipment Recommendations  Other: continue to assess- may need a shower chair and AD equipment for dressing    OT Education:   Patient Education  Education Provided: Role of Therapy;Plan of Care;Energy Conservation  Education Provided Comments: pursed lip breathing  Education Method: Verbal  Barriers to Learning: None  Education Outcome: Demonstrated understanding;Continued education needed    OT Follow Up:   OT D/C RECOMMENDATIONS  REQUIRES OT FOLLOW-UP: Yes       Assessment/Discharge Disposition:  Assessment: Pt is a 74 y/o male, recently admitted to [facility] d/t acute respritory failure with hypoxia. Pt lives alone in a home and was previously IND with ADLs, IADLs, and functional transfers. Pt has a sister who assists. Pt presents with the above deficits and may benefit from skilled OT intervention for increased indepedence and safety upon d/c to home to reduce the burden of care.   Performance deficits / Impairments: Decreased functional mobility , Decreased ADL status, Decreased ROM, Decreased strength, Decreased endurance, Decreased balance, Decreased high-level IADLs, Decreased coordination, Decreased posture  Prognosis: Fair  Discharge Recommendations: Subacute/Skilled Nursing Facility  Decision Making: Medium Complexity  History: multi comorbidites  Exam: 9 deficits  Assistance / Modification: MIN- MAX A    AMPAC ([employer] Click) Self care Score   How much help for putting on and taking off regular lower body clothing?: A Lot  How much help for Bathing?: A Lot  How much help for Toileting?: A Lot  How much help for putting on and taking off regular upper body clothing?: A Little  How much help for taking care of personal grooming?: A Little  How much help for eating meals?: None  AM-PAC Inpatient Daily Activity Raw Score: 16  AM-PAC Inpatient ADL T-Scale Score : 35.96  ADL Inpatient CMS 0-100% Score: 53.32    Therapy key for assistance levels -   Independent/Mod I = Pt. is able to perform task with no assistance but may require a device   Stand by assistance = Pt. does not perform task at an independent level but does not need physical assistance, requires verbal cues  Minimal, Moderate, Maximal Assistance = Pt. requires physical assistance (25%, 50%, 75% assist from helper) for task but is able to actively participate in task   Dependent = Pt. requires total assistance with task and is not able to actively participate with task completion     Plan:  Plan  Times per Week: 1-4x/week  Times per Day: Daily  Plan Weeks: duration of acute stay  Current Treatment Recommendations: Strengthening, ROM, Balance training, Functional mobility training, Endurance training, Patient/Caregiver education & training, Equipment evaluation, education, & procurement, Self-Care / ADL, Home management training    Goals:   Patient will:    - Improve functional endurance to tolerate/complete 30 mins of ADL's  - Be MOD I  in UB ADLs   - Be MOD I  in LB ADLs  - Be MOD I  in ADL transfers without LOB  - Be MOD I  in toileting tasks  - Improve B UE strength and endurance to [facility] in order to participate in self-care activities as projected. Patient Goal: Patient goals :  To get stronger     Discussed and agreed upon: Yes Comments:       Therapy Time: Individual   Time In 0845   Time Out 0907   Minutes 22          Eval: 22 minutes     Electronically signed by:    NAV Wang,   8/14/2022, 9:22 AM

## 2022-08-14 NOTE — CARE COORDINATION
Met with patient at bedside. PT/OT recommend SNF. Patient agreeable. FOC offered. Patient chose any of the [place] facilities. Referral called to [facility]. Patient will require pre-cert. Patient needs a 20 day note from Attending. Sticky note left for Attending and Perfect serve message sent. CM/SW to continue to follow for d/c planning needs.

## 2022-08-14 NOTE — PROGRESS NOTES
Internal Medicine   Hospitalist   Progress Note  20 day documentation     2022   11:53 AM    Name:  Kassandra Swann  MRN:    [id number]     IP Day: 21     Admit Date: 2022 11:15 AM  PCP: Xavier Vyas MD    Code Status:  Full Code    Assessment and Plan: Active Problems/ diagnosis:     Acute hypoxic respiratory failure-on 6 L oxygen  Recent pneumonia-completed treatment  Epistaxis status post packing, removed, no further epistaxis  Acute upper GI bleeding due to duodenal ulcer-EGD this admission, hemoglobin stable, bleeding resolved  DVT in the lower extremity status post IVC filter this admission  Bilateral upper extremity DVTs in the setting of central line placement, central lines removed  Hypoglycemia-likely due to poor p.o. intake    Plan  Patient continues to improve clinically from respiratory standpoint. Wean down oxygen to achieve saturation above 92%. His hemoglobin is stable, continue to monitor  Contacted by [facility] clinic on 2022, now they declined the patient as he is clinically improving and does not need tertiary center as per [facility] clinic provider. Resume PT/OT  His glucose is more stable, last checked was above 200, discontinued D5 as per nephrologist recommendations. Monitor for fluid overload. Appreciate pulmonary medicine input  Nephrologist is following for end-stage renal disease/HD management  Resume PPIs  DVT PPx     7 pm- 7 am, please contact on call Hospitalist for any needs     Subjective:      no new events. Denies any new symptoms. Feels overall better.     Physical Examination:      Vitals:  BP (!) 91/50   Pulse 96   Temp 97.7 °F (36.5 °C) (Oral)   Resp 17   Ht 5' 6\" (1.676 m)   Wt 136 lb 11 oz (62 kg)   SpO2 100%   BMI 22.06 kg/m²   Temp (24hrs), Av.8 °F (36.6 °C), Min:97.7 °F (36.5 °C), Max:97.9 °F (36.6 °C)      General appearance: alert, cooperative and no distress  Mental Status: oriented to person, place and time and normal affect  Lungs: clear to auscultation bilaterally, normal effort  Heart: regular rate   Abdomen: soft, nontender, nondistended, bowel sounds present, no masses  Extremities: no tenderness in the calves  Skin: Multiple bruises on upper and lower extremities. No gross lesions, rashes    Data:     Labs:  Recent Labs     08/13/22  0523   WBC 8.0   HGB 8.1*        Recent Labs     08/13/22  0523 08/14/22  0519   * 130*   K 3.5 3.9   CL 91* 90*   CO2 27 26   BUN 11 20   CREATININE 3.31* 4.50*   GLUCOSE 77 72     No results for input(s): AST, ALT, ALB, BILITOT, ALKPHOS in the last 72 hours.     Current Facility-Administered Medications   Medication Dose Route Frequency Provider Last Rate Last Admin    midodrine (PROAMATINE) tablet 5 mg  5 mg Oral TID WC Slade Argueta MD   5 mg at 08/14/22 1125    epoetin ryan-epbx (RETACRIT) injection 20,000 Units  20,000 Units SubCUTAneous Q7 Days Slade Argueta MD   20,000 Units at 08/10/22 1802    sucralfate (CARAFATE) tablet 1 g  1 g Oral 4 times per day ISI Duke - CNP   1 g at 08/14/22 1125    pill splitter   Does not apply Once Luz Love MD        chlorhexidine (PERIDEX) 0.12 % solution 15 mL  15 mL Mouth/Throat BID Lilliam Scullin, DO   15 mL at 08/14/22 0840    0.9 % sodium chloride infusion   IntraVENous PRN Liliane Morales DO        sodium chloride flush 0.9 % injection 5-40 mL  5-40 mL IntraVENous 2 times per day Daniel Baeza MD   10 mL at 08/14/22 0849    sodium chloride flush 0.9 % injection 5-40 mL  5-40 mL IntraVENous PRN Daniel Baeza MD        0.9 % sodium chloride infusion   IntraVENous PRN Daniel Baeza MD        acetaminophen (TYLENOL) tablet 650 mg  650 mg Oral Q4H PRN Daniel Baeza MD        ondansetron [facility]) injection 4 mg  4 mg IntraVENous Q6H PRN Daniel Baeza MD        hydrALAZINE (APRESOLINE) injection 10 mg  10 mg IntraVENous Q10 Min PRN Daniel Baeza MD        labetalol (NORMODYNE;TRANDATE) injection 10 mg  10 mg IntraVENous Q30 Min PRN Altagracia Lovelace MD        pantoprazole (PROTONIX) 40 mg in sodium chloride (PF) 10 mL injection  40 mg IntraVENous Q12H Pitney David, DO   40 mg at 08/14/22 0841    [Held by provider] enoxaparin (LOVENOX) injection 60 mg  1 mg/kg SubCUTAneous Daily Carmella Deckerin, DO        fluticasone (FLONASE) 50 MCG/ACT nasal spray 1 spray  1 spray Each Nostril Daily Jayson Lenz, DO   1 spray at 08/04/22 0902    sodium chloride flush 0.9 % injection 5-40 mL  5-40 mL IntraVENous 2 times per day Pitney David, DO   5 mL at 08/14/22 0849    sodium chloride flush 0.9 % injection 5-40 mL  5-40 mL IntraVENous PRN Pitney David, DO        0.9 % sodium chloride infusion   IntraVENous PRN Pitney David, DO        metoprolol tartrate (LOPRESSOR) tablet 12.5 mg  12.5 mg Oral BID Altagracia Lovelace MD   12.5 mg at 08/13/22 0959    [Held by provider] aspirin EC tablet 81 mg  81 mg Oral Daily Altagracia Lovelace MD   81 mg at 08/04/22 3249    docusate sodium (COLACE) capsule 100 mg  100 mg Oral BID ISI Fuentes CNP   100 mg at 08/14/22 0843    insulin lispro (HUMALOG) injection vial 0-4 Units  0-4 Units SubCUTAneous 4x Daily AC & HS Damari Aguilar MD   1 Units at 08/14/22 1123    polyethylene glycol (GLYCOLAX) packet 17 g  17 g Oral Daily ISI Fuentes CNP   17 g at 08/14/22 0848    tacrolimus (PROGRAF) capsule 1 mg  1 mg Oral BID ISI Fuentes - CNP   1 mg at 08/14/22 0843    acetaminophen (TYLENOL) tablet 650 mg  650 mg Oral Q6H PRN Damari Aguilar MD   650 mg at 08/03/22 8329    Or    acetaminophen (TYLENOL) suppository 650 mg  650 mg Rectal Q6H PRN Damari Aguilar MD        glucose chewable tablet 16 g  4 tablet Oral PRN ISI Fuentes CNP   16 g at 08/09/22 1901    dextrose bolus 10% 125 mL  125 mL IntraVENous PRN ISI Fuentes CNP   Stopped at 08/08/22 1737    Or    dextrose bolus 10% 250 mL  250 mL IntraVENous PRN ISI Fuentes CNP        glucagon (rDNA) injection 1 mg  1 mg SubCUTAneous PRN ISI Stephens - CNP        dextrose 10 % infusion   IntraVENous Continuous PRN ISI Stephens - CNP        heparin (porcine) injection 1,000 Units  1,000 Units IntraVENous PRN Ivana Maurer MD           Additional work up or/and treatment plan may be added today or then after based on clinical progression. I am managing a portion of pt care. Some medical issues are handled by other specialists. Additional work up and treatment should be done in out pt setting by pt PCP and other out pt providers. In addition to examining and evaluating pt, I spent additional time explaining care, normaland abnormal findings, and treatment plan. All of pt questions were answered. Counseling, diet and education were provided. Case will be discussed with nursing staff when appropriate. Family will be updated if and when appropriate.        Electronically signed by Nando Lemus DO on 8/14/2022 at 11:53 AM

## 2022-08-14 NOTE — PROGRESS NOTES
18.7* 13.1*  --    GFRAA 22.6* 15.8*  --        MV Settings:     Vent Mode: CPAP  Vt (Set, mL): 390 mL  Resp Rate (Set): 18 bmp  FiO2 : 50 %  PEEP/CPAP (cmH2O): 5  Pressure Support: 5 cmH20  Peak Inspiratory Pressure (cmH2O): 9.3 cmH2O  Mean Airway Pressure (cmH2O): 6 cmH20  I:E Ratio: 1:3    No results for input(s): PHART, CXH4FBA, PO2ART, HSX5XET, BEART, B7DXBZIN in the last 72 hours. O2 Device: Nasal cannula  O2 Flow Rate (L/min): 6 L/min    ADULT DIET;  Dysphagia - Soft and Bite Sized; Mildly Thick ([place])  ADULT ORAL NUTRITION SUPPLEMENT; Breakfast, Dinner; Fortified Pudding Oral Supplement  ADULT ORAL NUTRITION SUPPLEMENT; Lunch; Frozen Oral Supplement     MEDICATIONS during current hospitalization:    Continuous Infusions:   sodium chloride      sodium chloride      sodium chloride      dextrose         Scheduled Meds:   midodrine  5 mg Oral TID WC    epoetin ryan-epbx  20,000 Units SubCUTAneous Q7 Days    sucralfate  1 g Oral 4 times per day    pill splitter   Does not apply Once    chlorhexidine  15 mL Mouth/Throat BID    sodium chloride flush  5-40 mL IntraVENous 2 times per day    pantoprazole (PROTONIX) 40 mg injection  40 mg IntraVENous Q12H    [Held by provider] enoxaparin  1 mg/kg SubCUTAneous Daily    fluticasone  1 spray Each Nostril Daily    sodium chloride flush  5-40 mL IntraVENous 2 times per day    metoprolol tartrate  12.5 mg Oral BID    [Held by provider] aspirin  81 mg Oral Daily    docusate sodium  100 mg Oral BID    insulin lispro  0-4 Units SubCUTAneous 4x Daily AC & HS    polyethylene glycol  17 g Oral Daily    tacrolimus  1 mg Oral BID       PRN Meds:sodium chloride, sodium chloride flush, sodium chloride, acetaminophen, ondansetron, hydrALAZINE, labetalol, sodium chloride flush, sodium chloride, acetaminophen **OR** acetaminophen, glucose, dextrose bolus **OR** dextrose bolus, glucagon (rDNA), dextrose, heparin (porcine)    Radiology  Echocardiogram complete 2D with doppler with color    Result Date: 7/27/2022  Transthoracic Echocardiography Report (TTE)  Demographics   Patient Name    VIA [facility]       Gender               Male                  Mckay Salinas   Patient Number  [id number]       Race                                                   Ethnicity   Visit Number    [id number]      Room Number          IC12   Corporate ID                   Date of Study        07/27/2022   Accession       [id number]     Referring Physician  Number   Date of Birth   1955     Sonographer          Loida Agudelo   Age             75 year(s)     Interpreting         [facility])                                 Physician            Cardiology                                                      [facility]  Procedure Type of Study   TTE procedure:ECHO COMPLETE 2D W/DOP W/COLOR. Procedure Date Date: 07/27/2022 Start: 10:27 AM Study Location: Portable Technical Quality: Limited visualization due to lung interface. Indications:LVF. Patient Status: Routine Height: 66 inches Weight: 148 pounds BSA: 1.76 m^2 BMI: 23.89 kg/m^2 BP: 96/54 mmHg  Conclusions   Summary  Left ventricular ejection fraction is estimated at 45%. Diastolic Dysfunction is noted by mitral flow. Normal right ventricle systolic pressure. RVSP 28mmHg  Echogenic mass in the apex concerning for thrombus recommend limited echo  with definity  No hemodynamic evidence of significant valve disease   Signature   ----------------------------------------------------------------  Electronically signed by Juan Carlos Ricks(Interpreting physician)  on 07/27/2022 03:48 PM  ----------------------------------------------------------------   Findings  Left Ventricle Left ventricular ejection fraction is estimated at 45%. Left ventricular size is normal . Normal left ventricular wall thickness. Diastolic Dysfunction is noted by mitral flow. Echogenic mass in the apex possible thrombus Right Ventricle Normal right ventricle structure and function.  Normal right ventricle systolic pressure. RVSP 28mmHg Left Atrium Normal left atrium. Right Atrium Normal right atrium. Mitral Valve Diffusely thickened and pliable mitral valve leaflets with normal excursion. Structurally normal mitral valve. No evidence of mitral valve stenosis. No evidence of mitral regurgitaton. Tricuspid Valve Tricuspid valve is structurally normal. No evidence of tricuspid stenosis. No evidence of tricuspid regurgitation. Aortic Valve Mildly thickened trileaflet aortic valve with normal excursion. Structurally normal aortic valve. No evidence of aortic valve stenosis . No evidence of aortic valve regurgitation . Pulmonic Valve The pulmonic valve was not well visualized . Pericardial Effusion No evidence of pericardial effusion. Aorta \ Miscellaneous The aorta is within normal limits. M-Mode Measurements (cm)   LVIDd: 2.97 cm                         LVIDs: 2.34 cm  IVSd: 0.73 cm                          IVSs: 1.01 cm  LVPWd: 0.9 cm                          LVPWs: 1.01 cm  Rt. Vent.  Dimension: 3.12 cm           AO Root Dimension: 2.1 cm                                         ACS: 1.37 cm                                         LA: 1.82 cm                                         LVOT: 2.03 cm  Doppler Measurements:   AV Velocity:0.02 m/s                    MV Peak E-Wave: 0.5 m/s  AV Peak Gradient: 5.77 mmHg             MV Peak A-Wave: 0.53 m/s  AV Mean Gradient: 3.05 mmHg  AV Area (Continuity):1.97 cm^2  TR Velocity:2.52 m/s                    Estimated RAP:3 mmHg  TR Gradient:25.31 mmHg                  RVSP:28.31 mmHg  Valves  Mitral Valve   Peak E-Wave: 0.5 m/s                  Peak A-Wave: 0.53 m/s  Mean Velocity: 0.87 m/s               E/A Ratio: 0.94  Mean Gradient: 4.66 mmHg              Peak Gradient: 0.99 mmHg                                        Deceleration Time: 353.3 msec                                        Area (continuity): 1.4 cm^2   Tissue Doppler   E' Septal Velocity: 0.07 m/s E' Lateral Velocity: 0.07 m/s   Aortic Valve   Peak Velocity: 1.2 m/s                 Mean Velocity: 0.82 m/s  Peak Gradient: 5.77 mmHg               Mean Gradient: 3.05 mmHg  Area (continuity): 1.97 cm^2  AV VTI: 29.33 cm   Cusp Separation: 1.37 cm   Tricuspid Valve   Estimated RVSP: 28.31 mmHg              Estimated RAP: 3 mmHg  TR Velocity: 2.52 m/s                   TR Gradient: 25.31 mmHg   Pulmonic Valve   Peak Velocity: 0.99 m/s           Peak Gradient: 3.91 mmHg                                    Estimated PASP: 28.31 mmHg   LVOT   Peak Velocity: 0.97 m/s              Mean Velocity: 0.61 m/s  Peak Gradient: 3.77 mmHg             Mean Gradient: 1.79 mmHg  LVOT Diameter: 2.03 cm               LVOT VTI: 17.9 cm  Structures  Left Atrium   LA Dimension: 1.82 cm                        LA Area: 12.58 cm^2  LA/Aorta: 0.87  LA Volume/Index: 44.74 ml /25 m^2   Left Ventricle   Diastolic Dimension: 7.05 cm         Systolic Dimension: 3.55 cm  Septum Diastolic: 6.65 cm            Septum Systolic: 7.46 cm  PW Diastolic: 0.9 cm                 PW Systolic: 3.43 cm                                       FS: 21.2 %  LV EDV/LV EDV Index: 34.12 ml/19 m^2 LV ESV/LV ESV Index: 19.02 ml/11 m^2  EF Calculated: 44.3 %   LVOT Diameter: 2.03 cm   Right Ventricle   Diastolic Dimension: 2.52 cm                                    RV Systolic Pressure: 30.05 mmHg  Aorta/ Miscellaneous Aorta   Aortic Root: 2.1 cm  LVOT Diameter: 2.03 cm      ECHO Limited    Result Date: 7/28/2022  Transthoracic Echocardiography Report (TTE)  Demographics   Patient Name    VIA [facility]       Gender               Male                  [facility]   Patient Number  [id number]       Race                                                   Ethnicity   Visit Number    [id number]      Room Number          IC12   Corporate ID                   Date of Study        07/28/2022   Accession       [id number]     Referring Physician  Number   Date of Birth   1955 Sonographer          Jaiden Baeza   Age             75 year(s)     Interpreting         [facility])                                 Physician            Cardiology                                                      [facility]  Procedure Type of Study   TTE procedure:ECHOCARDIOGRAM LIMITED. Procedure Date Date: 07/28/2022 Start: 08:08 AM Study Location: Portable Technical Quality: Limited visualization Indications:LVF. Patient Status: Routine Contrast Medium: Definity. Amount - 2 ml Height: 66 inches Weight: 148 pounds BSA: 1.76 m^2 BMI: 23.89 kg/m^2  Conclusions   Summary  No evidence of thrombus with definity  Left ventricular ejection fraction is visually estimated at 65%. Signature   ----------------------------------------------------------------  Electronically signed by Leticia Ricks(Interpreting physician)  on 07/28/2022 08:42 AM  ----------------------------------------------------------------   Findings  Left Ventricle Left ventricular ejection fraction is visually estimated at 65%. IR FLUORO GUIDED CVA DEVICE PLMT/REPLACE/REMOVAL    1. Venogram of the right internal jugular vein demonstrates a thrombus in the right internal jugular vein which completely occludes the vein. Passage of contrast into the chest is through small collateral veins. 2. 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 Passage of contrast into the chest is through a small collateral veins. Radiation dose to the patient was: 24.21 mGy Additional clinical data: Long-term IV access Procedure: 1. Ultrasound guidance for vascular access into the right internal jugular vein. The ultrasound image of the blood vessel was saved to PACS. 2. Venogram via contrast injection of the right internal jugular vein. 3.   Ultrasound guidance for vascular access into the left internal jugular vein.  The ultrasound image of the blood vessel was saved to PACS 4. Venogram via contrast injection of the left internal jugular vein. Body of Report: Informed and written consent was obtained from the patient following discussion of risks, benefits and alternatives to this procedure. The was patient placed supine on the angiographic table. The patient's neck and chest were then prepped and draped in  normal sterile fashion. A small amount of local lidocaine anesthesia was injected subcutaneously. Ultrasound was used to study the jugular vein we intended to use prior to accessing it. The vein appeared patent. The ultrasound image of the blood vessel was saved to PACS. Using ultrasound access, puncture was made of the right internal jugular vein using a 21 GA needle. A wire was advanced into the right internal jugular vein, though would not pass into the superior vena cava. A 4 [place] short thin sheath was placed, through the sheath digital subtraction venography was performed. Venography demonstrated a thrombus in the right internal jugular vein and complete occlusion of the vein central to the thrombus. This access was aborted. Ultrasound was used to study the left jugular vein we intended to use prior to accessing it. The vein appeared patent. The ultrasound image of the blood vessel was saved to PACS. Using ultrasound access, puncture was made of the left internal jugular vein using a 21 GA needle. A wire was attempted to be passed, though would not pass to the superior vena  cava. A 4 [place]ovan thin sheath was placed and digital subtraction venography was performed. 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 The procedure was aborted. Findings discussed with ICU team who will place a temporary central line in the groin. XR CHEST PORTABLE    Result Date: 7/27/2022  XR CHEST PORTABLE COMPARISON: July 25, 2022. HISTORY: resp failure TECHNIQUE: AP view FINDINGS: Endotracheal tube again seen in place. . There is also an enteric tube seen in place and the tip is below the diaphragm. They appear unchanged in position. A small pleural effusion seen on the right. The left costophrenic angle is not well seen. The lungs are hyperinflated and there is coarsening of the interstitium. Atelectasis and/or scarring is again seen bilaterally in the lower lung fields. The cardiac silhouette appears mildly enlarged but may be accentuated by the portable technique. Degenerative changes are seen in the visualized portion of the right shoulder. The airspace disease has diminished when compared to previous study. . A small pleural effusion is seen on the right and scarring or atelectasis is again seen bilaterally in the bases. US DUP UPPER EXTREMITY RIGHT VENOUS COMPARISON: HISTORY:  resp failure TECHNIQUE: , US DUP UPPER EXTREMITY RIGHT VENOUS AND LEFT VENOUS FINDINGS: Thrombus is seen in the right internal jugular and proximal subclavian, veins it is also seen in the cephalic vein. The right ulnar and basilic veins are not visualized. A right AV fistula seen. Thrombus is seen in the left internal jugular vein. The left basilic and axillary veins are not visualized. IMPRESSION: Deep venous thrombosis seen in the right and left upper extremities. .    XR CHEST PORTABLE    Result Date: 7/25/2022  XR CHEST PORTABLE : 7/25/2022 CLINICAL HISTORY:  post intubation . COMPARISON: Earlier 7/25/2022 TECHNIQUE: A portable upright AP radiograph of the chest was obtained at approximately 12:46 PM. FINDINGS: Both lung bases have been excluded from the radiograph. An endotracheal tube is present, with its tip approximately 4 to 5 cm above the lucita. An orogastric tube probably extends below the diaphragm out of field of view radiograph. Moderate pleural effusions and mild to moderate probable scarring and/or atelectasis of the mid to lower lung fields has not significantly changed.  There is no cardiomegaly, pneumothorax, displaced fractures, or other significant changes identified. ENDOTRACHEAL AND OROGASTRIC TUBES IN EXPECTED POSITIONS. OTHERWISE, STABLE CHEST FROM EARLIER 7/25/2022. XR CHEST PORTABLE    Result Date: 7/25/2022  TECHNIQUE: Single portable view of the chest. CLINICAL INDICATION: Chest pain. COMPARISON: Chest x-ray obtained on June 28, 2022. PROCEDURE AND FINDINGS: Poor inspiratory effort is seen. The cardiomediastinal silhouette is slightly prominent in size. Mild prominence of the bronchovascular and interstitial lung markings is visualized bilaterally, linear streaky opacities visualized in bilateral lung fields, subsegmental atelectatic streaks, fluid visualized in the right minor fissure. Airspace opacification visualized in the lower lung fields bilaterally with blunting of the costophrenic angles and obliteration of the hemidiaphragms consistent with bilateral pleural effusions. . No evidence of pneumothorax or parenchymal lung mass. Degenerative bone changes. Congestive heart failure versus pneumonia and parapneumonic effusions would recommend clinical correlation. Increased opacification seen in comparison to the prior study. US DUP UPPER EXTREMITY RIGHT VENOUS    Result Date: 7/27/2022  XR CHEST PORTABLE COMPARISON: July 25, 2022. HISTORY: resp failure TECHNIQUE: AP view FINDINGS: Endotracheal tube again seen in place. . There is also an enteric tube seen in place and the tip is below the diaphragm. They appear unchanged in position. A small pleural effusion seen on the right. The left costophrenic angle is not well seen. The lungs are hyperinflated and there is coarsening of the interstitium. Atelectasis and/or scarring is again seen bilaterally in the lower lung fields. The cardiac silhouette appears mildly enlarged but may be accentuated by the portable technique. Degenerative changes are seen in the visualized portion of the right shoulder. The airspace disease has diminished when compared to previous study. . A small pleural effusion is seen on the right and scarring or atelectasis is again seen bilaterally in the bases. US DUP UPPER EXTREMITY RIGHT VENOUS COMPARISON: HISTORY:  resp failure TECHNIQUE: , US DUP UPPER EXTREMITY RIGHT VENOUS AND LEFT VENOUS FINDINGS: Thrombus is seen in the right internal jugular and proximal subclavian, veins it is also seen in the cephalic vein. The right ulnar and basilic veins are not visualized. A right AV fistula seen. Thrombus is seen in the left internal jugular vein. The left basilic and axillary veins are not visualized. IMPRESSION: Deep venous thrombosis seen in the right and left upper extremities. .    US DUP UPPER EXTREMITY LEFT VENOUS    Result Date: 7/27/2022  XR CHEST PORTABLE COMPARISON: July 25, 2022. HISTORY: resp failure TECHNIQUE: AP view FINDINGS: Endotracheal tube again seen in place. . There is also an enteric tube seen in place and the tip is below the diaphragm. They appear unchanged in position. A small pleural effusion seen on the right. The left costophrenic angle is not well seen. The lungs are hyperinflated and there is coarsening of the interstitium. Atelectasis and/or scarring is again seen bilaterally in the lower lung fields. The cardiac silhouette appears mildly enlarged but may be accentuated by the portable technique. Degenerative changes are seen in the visualized portion of the right shoulder. The airspace disease has diminished when compared to previous study. . A small pleural effusion is seen on the right and scarring or atelectasis is again seen bilaterally in the bases. US DUP UPPER EXTREMITY RIGHT VENOUS COMPARISON: HISTORY:  resp failure TECHNIQUE: , US DUP UPPER EXTREMITY RIGHT VENOUS AND LEFT VENOUS FINDINGS: Thrombus is seen in the right internal jugular and proximal subclavian, veins it is also seen in the cephalic vein. The right ulnar and basilic veins are not visualized. A right AV fistula seen.  Thrombus is seen in the left internal jugular vein. The left basilic and axillary veins are not visualized. IMPRESSION: Deep venous thrombosis seen in the right and left upper extremities. .    US DUP UPPER EXTREMITY RIGHT ARTERIES    Result Date: 8/2/2022  US DUP UPPER EXTREMITY RIGHT ARTERIES: 8/1/2022 11:42 AM CLINICAL HISTORY:  cold, poor pulses, extensive dvt . COMPARISON: None available. Grayscale, color and waveform Doppler analysis of the right upper arterial system was performed. FINDINGS: Mild to moderate atherosclerotic disease, with pulsatile mild to moderately diminished waveforms worsening distally. There are no significantly elevated velocities to suggest a flow-limiting stenosis, or arterial occlusion. The arterial system is normal in caliber, without evidence of aneurysm or dissection. Maximum systolic velocities are: RIGHT UPPER EXTREMITY: Right proximal common carotid artery 70 cm/s Right mid common carotid artery 53 cm/s Right proximal subclavian artery 68 cm/s Right mid subclavian artery 81 cm/s Right distal subclavian artery 71 cm/s Right axillary artery 41 cm/s Right brachial artery proximal 111 cm/s Right brachial artery mid 97 cm/s Right brachial artery distal 123 cm/s Right radial artery proximal 53 cm/s Right radial artery mid 43 cm/s Right radial artery distal 18 cm/s Right ulnar artery proximal 61 cm/s Right ulnar artery and mid 46 cm/s Right ulnar artery distal 33 cm/s     NO EVIDENCE OF A FLOW-LIMITING STENOSIS, ARTERIAL OCCLUSION, OR ANEURYSM. IR VENOGRAM VENOUS SINUS/JUGULAR    1. Venogram of the right internal jugular vein demonstrates a thrombus in the right internal jugular vein which completely occludes the vein. Passage of contrast into the chest is through small collateral veins. 2. 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 Passage of contrast into the chest is through a small collateral veins.  Radiation dose to the patient was: 24.21 mGy Additional clinical data: Long-term IV access Procedure: 1. Ultrasound guidance for vascular access into the right internal jugular vein. The ultrasound image of the blood vessel was saved to PACS. 2. Venogram via contrast injection of the right internal jugular vein. 3.   Ultrasound guidance for vascular access into the left internal jugular vein. The ultrasound image of the blood vessel was saved to PACS 4. Venogram via contrast injection of the left internal jugular vein. Body of Report: Informed and written consent was obtained from the patient following discussion of risks, benefits and alternatives to this procedure. The was patient placed supine on the angiographic table. The patient's neck and chest were then prepped and draped in  normal sterile fashion. A small amount of local lidocaine anesthesia was injected subcutaneously. Ultrasound was used to study the jugular vein we intended to use prior to accessing it. The vein appeared patent. The ultrasound image of the blood vessel was saved to PACS. Using ultrasound access, puncture was made of the right internal jugular vein using a 21 GA needle. A wire was advanced into the right internal jugular vein, though would not pass into the superior vena cava. A 4 [place] short thin sheath was placed, through the sheath digital subtraction venography was performed. Venography demonstrated a thrombus in the right internal jugular vein and complete occlusion of the vein central to the thrombus. This access was aborted. Ultrasound was used to study the left jugular vein we intended to use prior to accessing it. The vein appeared patent. The ultrasound image of the blood vessel was saved to PACS. Using ultrasound access, puncture was made of the left internal jugular vein using a 21 GA needle. A wire was attempted to be passed, though would not pass to the superior vena  cava.  A 4 [place]bour[place] thin sheath was placed and digital subtraction venography was performed. 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 The procedure was aborted. Findings discussed with ICU team who will place a temporary central line in the groin. IR ULTRASOUND GUIDANCE VASCULAR ACCESS    1. Venogram of the right internal jugular vein demonstrates a thrombus in the right internal jugular vein which completely occludes the vein. Passage of contrast into the chest is through small collateral veins. 2. 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 Passage of contrast into the chest is through a small collateral veins. Radiation dose to the patient was: 24.21 mGy Additional clinical data: Long-term IV access Procedure: 1. Ultrasound guidance for vascular access into the right internal jugular vein. The ultrasound image of the blood vessel was saved to PACS. 2. Venogram via contrast injection of the right internal jugular vein. 3.   Ultrasound guidance for vascular access into the left internal jugular vein. The ultrasound image of the blood vessel was saved to PACS 4. Venogram via contrast injection of the left internal jugular vein. Body of Report: Informed and written consent was obtained from the patient following discussion of risks, benefits and alternatives to this procedure. The was patient placed supine on the angiographic table. The patient's neck and chest were then prepped and draped in  normal sterile fashion. A small amount of local lidocaine anesthesia was injected subcutaneously. Ultrasound was used to study the jugular vein we intended to use prior to accessing it. The vein appeared patent. The ultrasound image of the blood vessel was saved to PACS. Using ultrasound access, puncture was made of the right internal jugular vein using a 21 GA needle.   A wire was advanced into the right internal jugular vein, though would not pass into the superior vena cava. A 4 [place]alander short thin sheath was placed, through the sheath digital subtraction venography was performed. Venography demonstrated a thrombus in the right internal jugular vein and complete occlusion of the vein central to the thrombus. This access was aborted. Ultrasound was used to study the left jugular vein we intended to use prior to accessing it. The vein appeared patent. The ultrasound image of the blood vessel was saved to PACS. Using ultrasound access, puncture was made of the left internal jugular vein using a 21 GA needle. A wire was attempted to be passed, though would not pass to the superior vena  cava. A 4 [place] thin sheath was placed and digital subtraction venography was performed. 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 The procedure was aborted. Findings discussed with ICU team who will place a temporary central line in the groin. US DUP LOWER EXTREMITIES BILATERAL VENOUS    Result Date: 7/27/2022  EXAMINATION: US DUP LOWER EXTREMITIES BILATERAL VENOUS CLINICAL HISTORY: 70-year-old with lower extremity swelling COMPARISONS: None available. FINDINGS: Duplex and color Doppler ultrasounds were performed of the bilateral lower extremity deep venous systems. Examination was performed portably and limited due to patient condition and access to the lower extremities. Right leg: Visualized portions of the right common femoral vein, femoral vein, popliteal vein demonstrate satisfactory compression, color flow, and augmentation. Deep calf veins are not evaluated. Left leg: The left common femoral vein is enlarged filled with intraluminal echoes with absent color flow and poor compression consistent with occluding thrombus. Occluding Thrombus extends into the left proximal femoral vein. Mid to distal femoral vein and popliteal vein demonstrate satisfactory compression and color flow.  Deep calf veins are not assessed on this portable study     ULTRASOUND FINDINGS ARE POSITIVE FOR OCCLUDING THROMBUS IN THE LEFT COMMON FEMORAL VEIN EXTENDING INTO THE PROXIMAL FEMORAL VEIN. NO ULTRASOUND SIGNS OF DVT IN THE RIGHT LEG FROM THE GROIN TO THE POPLITEAL FOSSA    IR MIDLINE CATH    Result Date: 8/3/2022  FOR BILLING PURPOSES ONLY. STUDY DICTATED IN [facility] BY PHYSICIAN. IMPRESSION AND SUGGESTION:  Acute respiratory failure with hypoxia on 6 L O2  Epistaxis s/p nasal packing  Pneumonia  Duodenal ulcer  DVT in lower extremity     No complaint of shortness of breath. He is on 6 L O2 via nasal cannula. Continue O2 to keep saturation 90% or above. Current O2 saturation 100 %. Titrate down FiO2 as tolerated. Continue present treatment plan. NOTE: This report was transcribed using voice recognition software. Every effort was made to ensure accuracy; however, inadvertent computerized transcription errors may be present.       Electronically signed by Disha Pearson MD, FCCP on 8/14/2022 at 2:55 PM

## 2022-08-14 NOTE — PROGRESS NOTES
Shift assessments completed and documented, see flowsheets. A&OX4. Denies pain. Medications administered per MAR. Call light within reach. No further needs verbalized at this time by pt.

## 2022-08-14 NOTE — PROGRESS NOTES
Shift assessment complete. Meds as ordered. Free from falls/injuries. No new skin impairments reported/noted. No bleeding reported/noted at this time. Dialysis as ordered. Patient denies any discomfort at this time. Bed in lowest position. Call light within reach. Will continue to monitor.

## 2022-08-14 NOTE — PROGRESS NOTES
[Held by provider] aspirin  81 mg Oral Daily    docusate sodium  100 mg Oral BID    insulin lispro  0-4 Units SubCUTAneous 4x Daily AC & HS    polyethylene glycol  17 g Oral Daily    tacrolimus  1 mg Oral BID     Continuous Infusions:   sodium chloride      sodium chloride      sodium chloride      dextrose         CBC:   Recent Labs     08/13/22  0523   WBC 8.0   HGB 8.1*        CMP:    Recent Labs     08/13/22  0523 08/14/22  0519   * 130*   K 3.5 3.9   CL 91* 90*   CO2 27 26   BUN 11 20   CREATININE 3.31* 4.50*   GLUCOSE 77 72   CALCIUM 8.3* 8.5   LABGLOM 18.7* 13.1*     Troponin: No results for input(s): TROPONINI in the last 72 hours. BNP: No results for input(s): BNP in the last 72 hours. INR: No results for input(s): INR in the last 72 hours. Lipids: No results for input(s): CHOL, LDLDIRECT, TRIG, HDL, AMYLASE, LIPASE in the last 72 hours. Liver: No results for input(s): AST, ALT, ALKPHOS, PROT, LABALBU, BILITOT in the last 72 hours. Invalid input(s): BILDIR  Iron:  No results for input(s): IRONS, FERRITIN in the last 72 hours. Invalid input(s): LABIRONS  Urinalysis: No results for input(s): UA in the last 72 hours. Objective:   Vitals: BP (!) 91/50   Pulse 96   Temp 97.7 °F (36.5 °C) (Oral)   Resp 17   Ht 5' 6\" (1.676 m)   Wt 136 lb 11 oz (62 kg)   SpO2 100%   BMI 22.06 kg/m²    Wt Readings from Last 3 Encounters:   08/12/22 136 lb 11 oz (62 kg)   06/28/22 145 lb (65.8 kg)   03/18/20 145 lb (65.8 kg)      24HR INTAKE/OUTPUT:    Intake/Output Summary (Last 24 hours) at 8/14/2022 1303  Last data filed at 8/13/2022 1746  Gross per 24 hour   Intake 0 ml   Output --   Net 0 ml       Constitutional:  Alert, awake, no apparent distress   Skin:normal, no rash  HEENT:sclera anicteric.   Head atraumatic normocephalic  Neck:supple with no thyromegally  Cardiovascular:  S1, S2 without m/r/g   Respiratory:  CTA B without w/r/r   Abdomen: +bs, soft, nt  Ext: No clinically significant LE edema  Musculoskeletal:Intact  Neuro:Alert and oriented with no deficit      Electronically signed by Ashlee Vasquez MD on 8/14/2022 at 1:03 PM

## 2022-08-14 NOTE — PROGRESS NOTES
Physical Therapy Med Surg Initial Assessment  Facility/Department: [facility]  Room: [place] [id number]       NAME: Praful Alvarado  : 1955 (75 y.o.)  MRN: [id number]  CODE STATUS: Full Code    Date of Service: 2022    Patient Diagnosis(es): Acute respiratory failure with hypoxia [facility]) [J96.01]   Chief Complaint   Patient presents with    Shortness of Breath     C/O SOB  AFTER DIALYSIS  88 % ON RA  GIVEN 2 DUONEBS  SOLU MEDROL      Patient Active Problem List    Diagnosis Date Noted    Severe malnutrition ([address] 2022    Duodenal ulcer 2022    Impaired mobility and activities of daily living 2022    Dysphagia, oropharyngeal phase 2022    Epistaxis 2022    Pneumonia due to infectious organism 2022    Pleural effusion 2022    Acute respiratory failure with hypoxia (HCC) 2022    Vomiting and diarrhea     Generalized abdominal pain     Acute renal failure (HCC)     Gastroenteritis     C. difficile colitis     Severe sepsis ([address]     Hypotension 2017    ESRD (end stage renal disease) on dialysis ([facility] 2017    Liver transplanted ([facility] 2017    Liver transplant recipient [facility])     Sepsis [facility])         Past Medical History:   Diagnosis Date    ESRD (end stage renal disease) ([address]     Hemodialysis patient ([address]     Hepatitis     Hypertension     Liver transplanted ([facility] 2016     Past Surgical History:   Procedure Laterality Date    DIALYSIS FISTULA CREATION Right     IR MIDLINE CATH  2022    IR MIDLINE CATH 2022 MLOZ SPECIAL PROCEDURE    LIVER TRANSPLANT  2017    TUNNELED VENOUS PORT PLACEMENT      UPPER GASTROINTESTINAL ENDOSCOPY N/A 2022    EGD ESOPHAGOGASTRODUODENOSCOPY WITH INTERVENTION performed by Suzy Garcia MD at [facility]       Chart Reviewed: Yes    Restrictions:        SUBJECTIVE:   Subjective: Pt resting in bed agreeable to evaluation.   Reports not sitting up in bed for the last few days.    Pain   No starting or ending pain    Prior Level of Function:  Social/Functional History  Lives With: Alone  Type of Home: Apartment  Home Layout: One level (4th floor)  Home Access: Elevator, Level entry  Bathroom Shower/Tub: Tub/Shower unit  Bathroom Equipment: Grab bars in [address]: Rollator, [address] Help From: Family (sister assists)  ADL Assistance: Independent  Homemaking Assistance: Independent  Ambulation Assistance: Independent  Transfer Assistance: Independent  Active : Yes  Occupation: Retired  Leisure & Hobbies: used to fish, work around the house  Additional Comments: Sister lives nearby    OBJECTIVE:   Vision  Vision: Impaired  Vision Exceptions: Wears glasses for reading  Hearing: Within functional limits    Cognition:  Overall Orientation Status: Within Normal Limits  Orientation Level: Oriented to person, Oriented to place  Follows Commands: Within Functional Limits  Overall Cognitive Status: WNL  Cognition Comment: Verbal cues for safety and sequencing    Observation/Palpation  Observation: SpO2 decreases to 67% on 2L O2 - possible error as when patient rests hands on lap vs on [employer] SpO2 increases to 90s.   SpO2 decreases to 95% after second stand with quick recovery to 100%    ROM:  RLE General AROM: Grossly decreased  LLE General AROM: Grossly decreased    Strength:  Strength RLE  Comment: >/= 3+/5 functionally assessed  Strength LLE  Comment: >/= 3+/5 functionally assessed    Neuro:  Balance  Sitting - Static: Good;-  Sitting - Dynamic: Good;-  Standing - Static: Fair  Standing - Dynamic: Fair;-  Comments: Stands at [employer] for 30s and 45sec with slight FWD flexed posture, gianluca UE support and with increased SOB SBA      Sensation: Intact    Bed mobility  Rolling to Left: Minimal assistance  Supine to Sit: Minimal assistance  Sit to Supine: Contact guard assistance  Scooting: Stand by assistance    Transfers  Sit to Stand: Minimal Assistance  Stand to sit: Contact guard and transfers. Long term goal 2: Pt will be able to demonstrate good static and standing balance to reduce risk for falls with standing with support for >/= 2'. Long term goal 3: Pt will ambulate with LRD >/= 48' CGA to improve pt's overall mobility  Long term goal 4: Improve gianluca LE strength to achieve all goals and increase ease with mobility. [facility] (6 CLICK) BASIC MOBILITY  AM-PAC Inpatient Mobility Raw Score : 13     Therapy Time:   Individual   Time In 0845   Time Out 0908   Minutes 520 Deonna Valiente PT, 08/14/22 at 9:18 AM         Definitions for assistance levels  Independent = pt does not require any physical supervision or assistance from another person for activity completion. Device may be needed.   Stand by assistance = pt requires verbal cues or instructions from another person, close to but not touching, to perform the activity  Minimal assistance= pt performs 75% or more of the activity; assistance is required to complete the activity  Moderate assistance= pt performs 50% of the activity; assistance is required to complete the activity  Maximal assistance = pt performs 25% of the activity; assistance is required to complete the activity  Dependent = pt requires total physical assistance to accomplish the task

## 2022-08-15 LAB
FERRITIN: 1241 NG/ML (ref 30–400)
GLUCOSE BLD-MCNC: 67 MG/DL (ref 70–99)
GLUCOSE BLD-MCNC: 85 MG/DL (ref 70–99)
GLUCOSE BLD-MCNC: 85 MG/DL (ref 70–99)
GLUCOSE BLD-MCNC: 94 MG/DL (ref 70–99)
IRON SATURATION: 25 % (ref 20–55)
IRON: 42 UG/DL (ref 59–158)
PERFORMED ON: ABNORMAL
PERFORMED ON: NORMAL
TOTAL IRON BINDING CAPACITY: 167 UG/DL (ref 250–450)
UNSATURATED IRON BINDING CAPACITY: 125 UG/DL (ref 112–347)

## 2022-08-15 PROCEDURE — 2580000003 HC RX 258: Performed by: INTERNAL MEDICINE

## 2022-08-15 PROCEDURE — A4216 STERILE WATER/SALINE, 10 ML: HCPCS | Performed by: INTERNAL MEDICINE

## 2022-08-15 PROCEDURE — 97110 THERAPEUTIC EXERCISES: CPT

## 2022-08-15 PROCEDURE — 2700000000 HC OXYGEN THERAPY PER DAY

## 2022-08-15 PROCEDURE — 6370000000 HC RX 637 (ALT 250 FOR IP): Performed by: NURSE PRACTITIONER

## 2022-08-15 PROCEDURE — 83550 IRON BINDING TEST: CPT

## 2022-08-15 PROCEDURE — 97535 SELF CARE MNGMENT TRAINING: CPT

## 2022-08-15 PROCEDURE — 99232 SBSQ HOSP IP/OBS MODERATE 35: CPT | Performed by: INTERNAL MEDICINE

## 2022-08-15 PROCEDURE — 6360000002 HC RX W HCPCS: Performed by: NURSE PRACTITIONER

## 2022-08-15 PROCEDURE — 6370000000 HC RX 637 (ALT 250 FOR IP): Performed by: INTERNAL MEDICINE

## 2022-08-15 PROCEDURE — C9113 INJ PANTOPRAZOLE SODIUM, VIA: HCPCS | Performed by: INTERNAL MEDICINE

## 2022-08-15 PROCEDURE — 6370000000 HC RX 637 (ALT 250 FOR IP): Performed by: PHYSICAL MEDICINE & REHABILITATION

## 2022-08-15 PROCEDURE — 99232 SBSQ HOSP IP/OBS MODERATE 35: CPT | Performed by: PHYSICAL MEDICINE & REHABILITATION

## 2022-08-15 PROCEDURE — 6360000002 HC RX W HCPCS: Performed by: INTERNAL MEDICINE

## 2022-08-15 PROCEDURE — 1210000000 HC MED SURG R&B

## 2022-08-15 PROCEDURE — 83540 ASSAY OF IRON: CPT

## 2022-08-15 RX ORDER — SODIUM CHLORIDE 9 MG/ML
INJECTION, SOLUTION INTRAVENOUS
Status: DISPENSED
Start: 2022-08-15 | End: 2022-08-15

## 2022-08-15 RX ADMIN — SUCRALFATE 1 G: 1 TABLET ORAL at 17:23

## 2022-08-15 RX ADMIN — MIDODRINE HYDROCHLORIDE 5 MG: 5 TABLET ORAL at 17:23

## 2022-08-15 RX ADMIN — SODIUM CHLORIDE, PRESERVATIVE FREE 40 MG: 5 INJECTION INTRAVENOUS at 21:16

## 2022-08-15 RX ADMIN — Medication 10 ML: at 11:59

## 2022-08-15 RX ADMIN — Medication 16 G: at 11:36

## 2022-08-15 RX ADMIN — TACROLIMUS 1 MG: 1 CAPSULE ORAL at 11:48

## 2022-08-15 RX ADMIN — TACROLIMUS 1 MG: 1 CAPSULE ORAL at 21:16

## 2022-08-15 RX ADMIN — MIDODRINE HYDROCHLORIDE 5 MG: 5 TABLET ORAL at 11:37

## 2022-08-15 RX ADMIN — DOCUSATE SODIUM 100 MG: 100 CAPSULE, LIQUID FILLED ORAL at 11:48

## 2022-08-15 RX ADMIN — SUCRALFATE 1 G: 1 TABLET ORAL at 11:37

## 2022-08-15 RX ADMIN — SODIUM CHLORIDE, PRESERVATIVE FREE 40 MG: 5 INJECTION INTRAVENOUS at 11:48

## 2022-08-15 RX ADMIN — SUCRALFATE 1 G: 1 TABLET ORAL at 05:25

## 2022-08-15 RX ADMIN — DOCUSATE SODIUM 100 MG: 100 CAPSULE, LIQUID FILLED ORAL at 21:16

## 2022-08-15 RX ADMIN — Medication 10 ML: at 11:40

## 2022-08-15 RX ADMIN — Medication 10 ML: at 21:16

## 2022-08-15 RX ADMIN — 0.12% CHLORHEXIDINE GLUCONATE 15 ML: 1.2 RINSE ORAL at 21:15

## 2022-08-15 ASSESSMENT — PAIN SCALES - GENERAL
PAINLEVEL_OUTOF10: 0
PAINLEVEL_OUTOF10: 0

## 2022-08-15 NOTE — PROGRESS NOTES
INPATIENT PROGRESS NOTES    PATIENT NAME: Jozef Glover  MRN: [id number]  SERVICE DATE:  August 15, 2022   SERVICE TIME:  4:43 PM      PRIMARY SERVICE: Pulmonary Disease    CHIEF COMPLAIN: Respiratory failure      INTERVAL HPI: Patient seen and examined at bedside, Interval Notes, orders reviewed. Nursing notes noted     He said he is feeling better,  He is on 6 L O2 via nasal cannula. O2 saturation 100 %. He had dialysis done earlier today. Denies having short of breath at rest.  No cough or sputum production. No chest pain. No fever or chills. OBJECTIVE    Body mass index is 22.56 kg/m². PHYSICAL EXAM:  Vitals:  BP (!) 137/41   Pulse (!) 101   Temp (!) 120 °F (48.9 °C)   Resp 18   Ht 5' 6\" (1.676 m)   Wt 139 lb 12.4 oz (63.4 kg)   SpO2 100%   BMI 22.56 kg/m²   General: Alert, awake . comfortable in bed, No distress. Head: Atraumatic , Normocephalic   Eyes: PERRL. No sclera icterus. No conjunctival injection. No discharge   ENT: Nasal packing on the left side. No active bleeding. Pharynx clear. Neck:  Trachea midline. No thyromegaly, no JVD, No cervical adenopathy. Chest : Bilaterally symmetrical ,Normal effort,  No accessory muscle use  Lung : . Fair BS bilateral, decreased BS at bases. Few bibasilar Rales. No wheezing. No rhonchi. Heart[de-identified] Normal  rate. Regular rhythm. No mumur ,  Rub or gallop  ABD: Non-tender. Non-distended. No masses. No organmegaly. Normal bowel sounds. No hernia. Ext : Edema in both upper extremity. No edema in lower extremity.   No Cyanosis No clubbing  Neuro: no focal weakness          DATA:   Recent Labs     08/13/22  0523 08/14/22  1419   WBC 8.0  --    HGB 8.1*  --    HCT 24.5* 24.3*   .9*  --      --      Recent Labs     08/13/22  0523 08/14/22  0519 08/14/22  1419   * 130*  --    K 3.5 3.9  --    CL 91* 90*  --    CO2 27 26  --    BUN 11 20  --    CREATININE 3.31* 4.50*  --    GLUCOSE 77 72  --    CALCIUM 8.3* 8.5  --    LABALBU  --   -- 2.9*   LABGLOM 18.7* 13.1*  --    GFRAA 22.6* 15.8*  --        MV Settings:     Vent Mode: CPAP  Vt (Set, mL): 390 mL  Resp Rate (Set): 18 bmp  FiO2 : 50 %  PEEP/CPAP (cmH2O): 5  Pressure Support: 5 cmH20  Peak Inspiratory Pressure (cmH2O): 9.3 cmH2O  Mean Airway Pressure (cmH2O): 6 cmH20  I:E Ratio: 1:3    No results for input(s): PHART, YNE1SKC, PO2ART, KWH0LWF, BEART, A9BRNTEM in the last 72 hours. O2 Device: Nasal cannula  O2 Flow Rate (L/min): 6 L/min    ADULT DIET;  Dysphagia - Soft and Bite Sized; Mildly Thick ([place])  ADULT ORAL NUTRITION SUPPLEMENT; Breakfast, Dinner; Fortified Pudding Oral Supplement  ADULT ORAL NUTRITION SUPPLEMENT; Lunch; Frozen Oral Supplement     MEDICATIONS during current hospitalization:    Continuous Infusions:   sodium chloride      sodium chloride      sodium chloride      sodium chloride      dextrose         Scheduled Meds:   midodrine  5 mg Oral TID WC    epoetin ryan-epbx  20,000 Units SubCUTAneous Q7 Days    sucralfate  1 g Oral 4 times per day    pill splitter   Does not apply Once    chlorhexidine  15 mL Mouth/Throat BID    sodium chloride flush  5-40 mL IntraVENous 2 times per day    pantoprazole (PROTONIX) 40 mg injection  40 mg IntraVENous Q12H    [Held by provider] enoxaparin  1 mg/kg SubCUTAneous Daily    fluticasone  1 spray Each Nostril Daily    sodium chloride flush  5-40 mL IntraVENous 2 times per day    metoprolol tartrate  12.5 mg Oral BID    [Held by provider] aspirin  81 mg Oral Daily    docusate sodium  100 mg Oral BID    insulin lispro  0-4 Units SubCUTAneous 4x Daily AC & HS    polyethylene glycol  17 g Oral Daily    tacrolimus  1 mg Oral BID       PRN Meds:sodium chloride, sodium chloride flush, sodium chloride, acetaminophen, ondansetron, hydrALAZINE, labetalol, sodium chloride flush, sodium chloride, acetaminophen **OR** acetaminophen, glucose, dextrose bolus **OR** dextrose bolus, glucagon (rDNA), dextrose, heparin (continuity): 1.4 cm^2   Tissue Doppler   E' Septal Velocity: 0.07 m/s  E' Lateral Velocity: 0.07 m/s   Aortic Valve   Peak Velocity: 1.2 m/s                 Mean Velocity: 0.82 m/s  Peak Gradient: 5.77 mmHg               Mean Gradient: 3.05 mmHg  Area (continuity): 1.97 cm^2  AV VTI: 29.33 cm   Cusp Separation: 1.37 cm   Tricuspid Valve   Estimated RVSP: 28.31 mmHg              Estimated RAP: 3 mmHg  TR Velocity: 2.52 m/s                   TR Gradient: 25.31 mmHg   Pulmonic Valve   Peak Velocity: 0.99 m/s           Peak Gradient: 3.91 mmHg                                    Estimated PASP: 28.31 mmHg   LVOT   Peak Velocity: 0.97 m/s              Mean Velocity: 0.61 m/s  Peak Gradient: 3.77 mmHg             Mean Gradient: 1.79 mmHg  LVOT Diameter: 2.03 cm               LVOT VTI: 17.9 cm  Structures  Left Atrium   LA Dimension: 1.82 cm                        LA Area: 12.58 cm^2  LA/Aorta: 0.87  LA Volume/Index: 44.74 ml /25 m^2   Left Ventricle   Diastolic Dimension: 6.81 cm         Systolic Dimension: 0.14 cm  Septum Diastolic: 2.81 cm            Septum Systolic: 1.75 cm  PW Diastolic: 0.9 cm                 PW Systolic: 5.10 cm                                       FS: 21.2 %  LV EDV/LV EDV Index: 34.12 ml/19 m^2 LV ESV/LV ESV Index: 19.02 ml/11 m^2  EF Calculated: 44.3 %   LVOT Diameter: 2.03 cm   Right Ventricle   Diastolic Dimension: 2.45 cm                                    RV Systolic Pressure: 94.10 mmHg  Aorta/ Miscellaneous Aorta   Aortic Root: 2.1 cm  LVOT Diameter: 2.03 cm      ECHO Limited    Result Date: 7/28/2022  Transthoracic Echocardiography Report (TTE)  Demographics   Patient Name    [facility]       Gender               Male                  [facility]   Patient Number  [id number]       Race                                                   Ethnicity   Visit Number    [id number]      Room Number          IC12   Corporate ID                   Date of Study        07/28/2022   Accession [id number]     Referring Physician  Number   Date of Birth   1955     Sonographer          Víctor Cassidy   Age             75 year(s)     Interpreting         [facility])                                 Physician            Cardiology                                                      [facility]  Procedure Type of Study   TTE procedure:ECHOCARDIOGRAM LIMITED. Procedure Date Date: 07/28/2022 Start: 08:08 AM Study Location: Portable Technical Quality: Limited visualization Indications:LVF. Patient Status: Routine Contrast Medium: Definity. Amount - 2 ml Height: 66 inches Weight: 148 pounds BSA: 1.76 m^2 BMI: 23.89 kg/m^2  Conclusions   Summary  No evidence of thrombus with definity  Left ventricular ejection fraction is visually estimated at 65%. Signature   ----------------------------------------------------------------  Electronically signed by La Ricks(Interpreting physician)  on 07/28/2022 08:42 AM  ----------------------------------------------------------------   Findings  Left Ventricle Left ventricular ejection fraction is visually estimated at 65%. IR FLUORO GUIDED CVA DEVICE PLMT/REPLACE/REMOVAL    1. Venogram of the right internal jugular vein demonstrates a thrombus in the right internal jugular vein which completely occludes the vein. Passage of contrast into the chest is through small collateral veins. 2. 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 Passage of contrast into the chest is through a small collateral veins. Radiation dose to the patient was: 24.21 mGy Additional clinical data: Long-term IV access Procedure: 1. Ultrasound guidance for vascular access into the right internal jugular vein. The ultrasound image of the blood vessel was saved to PACS. 2. Venogram via contrast injection of the right internal jugular vein.  3.   Ultrasound guidance for vascular access into the left internal jugular vein. The ultrasound image of the blood vessel was saved to PACS 4. Venogram via contrast injection of the left internal jugular vein. Body of Report: Informed and written consent was obtained from the patient following discussion of risks, benefits and alternatives to this procedure. The was patient placed supine on the angiographic table. The patient's neck and chest were then prepped and draped in  normal sterile fashion. A small amount of local lidocaine anesthesia was injected subcutaneously. Ultrasound was used to study the jugular vein we intended to use prior to accessing it. The vein appeared patent. The ultrasound image of the blood vessel was saved to PACS. Using ultrasound access, puncture was made of the right internal jugular vein using a 21 GA needle. A wire was advanced into the right internal jugular vein, though would not pass into the superior vena cava. A 4 Lebanese short thin sheath was placed, through the sheath digital subtraction venography was performed. Venography demonstrated a thrombus in the right internal jugular vein and complete occlusion of the vein central to the thrombus. This access was aborted. Ultrasound was used to study the left jugular vein we intended to use prior to accessing it. The vein appeared patent. The ultrasound image of the blood vessel was saved to PACS. Using ultrasound access, puncture was made of the left internal jugular vein using a 21 GA needle. A wire was attempted to be passed, though would not pass to the superior vena  cava. A 4 Lebanese thin sheath was placed and digital subtraction venography was performed. 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 The procedure was aborted. Findings discussed with ICU team who will place a temporary central line in the groin. XR CHEST PORTABLE    Result Date: 7/27/2022  XR CHEST PORTABLE COMPARISON: July 25, 2022.  HISTORY: resp failure TECHNIQUE: AP view FINDINGS: Endotracheal tube again seen in place. . There is also an enteric tube seen in place and the tip is below the diaphragm. They appear unchanged in position. A small pleural effusion seen on the right. The left costophrenic angle is not well seen. The lungs are hyperinflated and there is coarsening of the interstitium. Atelectasis and/or scarring is again seen bilaterally in the lower lung fields. The cardiac silhouette appears mildly enlarged but may be accentuated by the portable technique. Degenerative changes are seen in the visualized portion of the right shoulder. The airspace disease has diminished when compared to previous study. . A small pleural effusion is seen on the right and scarring or atelectasis is again seen bilaterally in the bases. US DUP UPPER EXTREMITY RIGHT VENOUS COMPARISON: HISTORY:  resp failure TECHNIQUE: , US DUP UPPER EXTREMITY RIGHT VENOUS AND LEFT VENOUS FINDINGS: Thrombus is seen in the right internal jugular and proximal subclavian, veins it is also seen in the cephalic vein. The right ulnar and basilic veins are not visualized. A right AV fistula seen. Thrombus is seen in the left internal jugular vein. The left basilic and axillary veins are not visualized. IMPRESSION: Deep venous thrombosis seen in the right and left upper extremities. .    XR CHEST PORTABLE    Result Date: 7/25/2022  XR CHEST PORTABLE : 7/25/2022 CLINICAL HISTORY:  post intubation . COMPARISON: Earlier 7/25/2022 TECHNIQUE: A portable upright AP radiograph of the chest was obtained at approximately 12:46 PM. FINDINGS: Both lung bases have been excluded from the radiograph. An endotracheal tube is present, with its tip approximately 4 to 5 cm above the lucita. An orogastric tube probably extends below the diaphragm out of field of view radiograph. Moderate pleural effusions and mild to moderate probable scarring and/or atelectasis of the mid to lower lung fields has not significantly changed.  There is no cardiomegaly, pneumothorax, displaced fractures, or other significant changes identified. ENDOTRACHEAL AND OROGASTRIC TUBES IN EXPECTED POSITIONS. OTHERWISE, STABLE CHEST FROM EARLIER 7/25/2022. XR CHEST PORTABLE    Result Date: 7/25/2022  TECHNIQUE: Single portable view of the chest. CLINICAL INDICATION: Chest pain. COMPARISON: Chest x-ray obtained on June 28, 2022. PROCEDURE AND FINDINGS: Poor inspiratory effort is seen. The cardiomediastinal silhouette is slightly prominent in size. Mild prominence of the bronchovascular and interstitial lung markings is visualized bilaterally, linear streaky opacities visualized in bilateral lung fields, subsegmental atelectatic streaks, fluid visualized in the right minor fissure. Airspace opacification visualized in the lower lung fields bilaterally with blunting of the costophrenic angles and obliteration of the hemidiaphragms consistent with bilateral pleural effusions. . No evidence of pneumothorax or parenchymal lung mass. Degenerative bone changes. Congestive heart failure versus pneumonia and parapneumonic effusions would recommend clinical correlation. Increased opacification seen in comparison to the prior study. US DUP UPPER EXTREMITY RIGHT VENOUS    Result Date: 7/27/2022  XR CHEST PORTABLE COMPARISON: July 25, 2022. HISTORY: resp failure TECHNIQUE: AP view FINDINGS: Endotracheal tube again seen in place. . There is also an enteric tube seen in place and the tip is below the diaphragm. They appear unchanged in position. A small pleural effusion seen on the right. The left costophrenic angle is not well seen. The lungs are hyperinflated and there is coarsening of the interstitium. Atelectasis and/or scarring is again seen bilaterally in the lower lung fields. The cardiac silhouette appears mildly enlarged but may be accentuated by the portable technique. Degenerative changes are seen in the visualized portion of the right shoulder.      The airspace disease has diminished when compared to previous study. . A small pleural effusion is seen on the right and scarring or atelectasis is again seen bilaterally in the bases. US DUP UPPER EXTREMITY RIGHT VENOUS COMPARISON: HISTORY:  resp failure TECHNIQUE: , US DUP UPPER EXTREMITY RIGHT VENOUS AND LEFT VENOUS FINDINGS: Thrombus is seen in the right internal jugular and proximal subclavian, veins it is also seen in the cephalic vein. The right ulnar and basilic veins are not visualized. A right AV fistula seen. Thrombus is seen in the left internal jugular vein. The left basilic and axillary veins are not visualized. IMPRESSION: Deep venous thrombosis seen in the right and left upper extremities. .    US DUP UPPER EXTREMITY LEFT VENOUS    Result Date: 7/27/2022  XR CHEST PORTABLE COMPARISON: July 25, 2022. HISTORY: resp failure TECHNIQUE: AP view FINDINGS: Endotracheal tube again seen in place. . There is also an enteric tube seen in place and the tip is below the diaphragm. They appear unchanged in position. A small pleural effusion seen on the right. The left costophrenic angle is not well seen. The lungs are hyperinflated and there is coarsening of the interstitium. Atelectasis and/or scarring is again seen bilaterally in the lower lung fields. The cardiac silhouette appears mildly enlarged but may be accentuated by the portable technique. Degenerative changes are seen in the visualized portion of the right shoulder. The airspace disease has diminished when compared to previous study. . A small pleural effusion is seen on the right and scarring or atelectasis is again seen bilaterally in the bases. US DUP UPPER EXTREMITY RIGHT VENOUS COMPARISON: HISTORY:  resp failure TECHNIQUE: , US DUP UPPER EXTREMITY RIGHT VENOUS AND LEFT VENOUS FINDINGS: Thrombus is seen in the right internal jugular and proximal subclavian, veins it is also seen in the cephalic vein. The right ulnar and basilic veins are not visualized.  A the chest is through a small collateral veins. Radiation dose to the patient was: 24.21 mGy Additional clinical data: Long-term IV access Procedure: 1. Ultrasound guidance for vascular access into the right internal jugular vein. The ultrasound image of the blood vessel was saved to PACS. 2. Venogram via contrast injection of the right internal jugular vein. 3.   Ultrasound guidance for vascular access into the left internal jugular vein. The ultrasound image of the blood vessel was saved to PACS 4. Venogram via contrast injection of the left internal jugular vein. Body of Report: Informed and written consent was obtained from the patient following discussion of risks, benefits and alternatives to this procedure. The was patient placed supine on the angiographic table. The patient's neck and chest were then prepped and draped in  normal sterile fashion. A small amount of local lidocaine anesthesia was injected subcutaneously. Ultrasound was used to study the jugular vein we intended to use prior to accessing it. The vein appeared patent. The ultrasound image of the blood vessel was saved to PACS. Using ultrasound access, puncture was made of the right internal jugular vein using a 21 GA needle. A wire was advanced into the right internal jugular vein, though would not pass into the superior vena cava. A 4 Bahamian short thin sheath was placed, through the sheath digital subtraction venography was performed. Venography demonstrated a thrombus in the right internal jugular vein and complete occlusion of the vein central to the thrombus. This access was aborted. Ultrasound was used to study the left jugular vein we intended to use prior to accessing it. The vein appeared patent. The ultrasound image of the blood vessel was saved to PACS. Using ultrasound access, puncture was made of the left internal jugular vein using a 21 GA needle.  A wire was attempted to be passed, though would not pass to the superior vena cava. A 4 Mongolian thin sheath was placed and digital subtraction venography was performed. 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 The procedure was aborted. Findings discussed with ICU team who will place a temporary central line in the groin. IR ULTRASOUND GUIDANCE VASCULAR ACCESS    1. Venogram of the right internal jugular vein demonstrates a thrombus in the right internal jugular vein which completely occludes the vein. Passage of contrast into the chest is through small collateral veins. 2. 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 Passage of contrast into the chest is through a small collateral veins. Radiation dose to the patient was: 24.21 mGy Additional clinical data: Long-term IV access Procedure: 1. Ultrasound guidance for vascular access into the right internal jugular vein. The ultrasound image of the blood vessel was saved to PACS. 2. Venogram via contrast injection of the right internal jugular vein. 3.   Ultrasound guidance for vascular access into the left internal jugular vein. The ultrasound image of the blood vessel was saved to PACS 4. Venogram via contrast injection of the left internal jugular vein. Body of Report: Informed and written consent was obtained from the patient following discussion of risks, benefits and alternatives to this procedure. The was patient placed supine on the angiographic table. The patient's neck and chest were then prepped and draped in  normal sterile fashion. A small amount of local lidocaine anesthesia was injected subcutaneously. Ultrasound was used to study the jugular vein we intended to use prior to accessing it. The vein appeared patent. The ultrasound image of the blood vessel was saved to PACS. Using ultrasound access, puncture was made of the right internal jugular vein using a 21 GA needle.   A wire was advanced into the right internal jugular vein, though would not pass into the superior vena cava. A 4 Malian short thin sheath was placed, through the sheath digital subtraction venography was performed. Venography demonstrated a thrombus in the right internal jugular vein and complete occlusion of the vein central to the thrombus. This access was aborted. Ultrasound was used to study the left jugular vein we intended to use prior to accessing it. The vein appeared patent. The ultrasound image of the blood vessel was saved to PACS. Using ultrasound access, puncture was made of the left internal jugular vein using a 21 GA needle. A wire was attempted to be passed, though would not pass to the superior vena  cava. A 4 Malian thin sheath was placed and digital subtraction venography was performed. 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 The procedure was aborted. Findings discussed with ICU team who will place a temporary central line in the groin. US DUP LOWER EXTREMITIES BILATERAL VENOUS    Result Date: 7/27/2022  EXAMINATION: US DUP LOWER EXTREMITIES BILATERAL VENOUS CLINICAL HISTORY: 18-year-old with lower extremity swelling COMPARISONS: None available. FINDINGS: Duplex and color Doppler ultrasounds were performed of the bilateral lower extremity deep venous systems. Examination was performed portably and limited due to patient condition and access to the lower extremities. Right leg: Visualized portions of the right common femoral vein, femoral vein, popliteal vein demonstrate satisfactory compression, color flow, and augmentation. Deep calf veins are not evaluated. Left leg: The left common femoral vein is enlarged filled with intraluminal echoes with absent color flow and poor compression consistent with occluding thrombus. Occluding Thrombus extends into the left proximal femoral vein.  Mid to distal femoral vein and popliteal vein demonstrate satisfactory compression and color flow. Deep calf veins are not assessed on this portable study     ULTRASOUND FINDINGS ARE POSITIVE FOR OCCLUDING THROMBUS IN THE LEFT COMMON FEMORAL VEIN EXTENDING INTO THE PROXIMAL FEMORAL VEIN. NO ULTRASOUND SIGNS OF DVT IN THE RIGHT LEG FROM THE GROIN TO THE POPLITEAL FOSSA    IR MIDLINE CATH    Result Date: 8/3/2022  FOR BILLING PURPOSES ONLY. STUDY DICTATED IN [facility] BY PHYSICIAN. IMPRESSION AND SUGGESTION:  Acute respiratory failure with hypoxia on 6 L O2  Epistaxis s/p nasal packing  Pneumonia  Duodenal ulcer  DVT in lower extremity     No complaint of shortness of breath. He is on 6 L O2 via nasal cannula. Continue O2 to keep saturation 90% or above. Current O2 saturation 100 %. Titrate down FiO2 as tolerated. He had dialysis done today. No new problems overnight. Continue present treatment plan. NOTE: This report was transcribed using voice recognition software. Every effort was made to ensure accuracy; however, inadvertent computerized transcription errors may be present.       Electronically signed by Destiny De La Rosa MD, FCCP on 8/15/2022 at 4:43 PM

## 2022-08-15 NOTE — FLOWSHEET NOTE
Tx complete 2000 ml uf removed tolerated well       08/15/22 1055   Vital Signs   BP (!) 137/41   Temp 97.7 °F (36.5 °C)   Heart Rate (!) 101   Resp 18   Weight 139 lb 12.4 oz (63.4 kg)   Percent Weight Change -3.79   Post-Hemodialysis Assessment   Post-Treatment Procedures Blood returned; Access bleeding time < 10 minutes   Machine Disinfection Process Exterior Machine Disinfection   Rinseback Volume (ml) 200 ml   Blood Volume Processed (Liters) 59 l/min   Dialyzer Clearance Lightly streaked   Duration of Treatment (minutes) 180 minutes   Heparin Amount Administered During Treatment (mL) 0 mL   Hemodialysis Intake (ml) 600 ml   Hemodialysis Output (ml) 2600 ml   NET Removed (ml) 2000   Tolerated Treatment Good   Bilateral Breath Sounds Diminished   Edema Generalized   Edema Generalized +1   RUE Edema +2   LUE Edema +2

## 2022-08-15 NOTE — PROGRESS NOTES
Subjective: The patient complains of severe acute on chronic progressive fatigue and generalized weakness partially relieved by rest, PT, OT and meds and hemodialysis and exacerbated by exertion and recent illness. He was initially admitted via the ER with SOB. He was diagnosed with hypoxia acute respiratory failure end-stage renal disease macrocytic anemia and hyperglycemia secondary to diabetes mellitus. He was admitted to the ICU. He is on IV Bactrim for pneumonia. GI is seeing him an duodenal ulcer GI bleed. I am concerned about his Lovenox dose given his renal failure-Lovenox is currently on hold-due to bleeding. There are plans for an IVC filter in the future. Medically is complicated by history of a liver transplant for hepatitis. He is on immunosuppressants. He gets dialysis Monday Wednesday and Friday. I am concerned about patients medical complexities including:  Principal Problem:    Acute respiratory failure with hypoxia (HCC)  Active Problems:    Pneumonia due to infectious organism    Pleural effusion    Impaired mobility and activities of daily living    Dysphagia, oropharyngeal phase    Epistaxis    Duodenal ulcer    Severe malnutrition (HCC)    ESRD (end stage renal disease) on dialysis [facility])    Liver transplant recipient [facility])  Resolved Problems:    * No resolved hospital problems. *      .     ROS x10: The patient also complains of severely impaired mobility and activities of daily living.   Otherwise no new problems with vision, hearing, nose, mouth, throat, dermal, cardiovascular, GI, , pulmonary, musculoskeletal, psychiatric or neurological.        Vital signs:  BP (!) 77/35   Pulse 80   Temp 97.9 °F (36.6 °C) (Oral)   Resp 17   Ht 5' 6\" (1.676 m)   Wt 136 lb 11 oz (62 kg)   SpO2 100%   BMI 22.06 kg/m²   I/O:   PO/Intake:    fair PO intake, monitoring for dysphagia    Bowel/Bladder:   continent,    General:  Patient is well developed, adequately nourished, and    well wilfredo.     HEENT:    PERRLA, hearing intact to loud voice, external inspection of ear and nose -nasal packing. Inspection of lips, tongue -dark and coated-gums benign, nosebleed left nares has nasal packing in place. From nasal cannula O2 patient is also on some blood thinners. Musculoskeletal: No significant change in strength or tone. All joints stable. Inspection and palpation of digits and nails show no clubbing, cyanosis or inflammatory conditions. Neuro/Psychiatric: Affect: flat-  Alert and oriented to self and situation with  min-mod cues. No significant change in deep tendon reflexes or sensation  Lungs:  Diminished, CTA-B  . Respiration effort is normal at rest.   Heart:   S1 = S2,   RRR. Abdomen:  Soft, non-tender    Extremities:  Trace  lower extremity edema but no unusual tenderness. non Fx RUE fistula--functional LUE fistula. Skin:   BUE bruises dt blood draws-his bruises and skin tears. Rehabilitation:  Physical Therapy:   Bed mobility:  Bed mobility  Rolling to Left: Minimal assistance (08/14/22 0911)  Rolling to Right: Minimal assistance (08/03/22 0950)  Supine to Sit: Minimal assistance (08/14/22 0911)  Sit to Supine: Contact guard assistance (08/14/22 0911)  Scooting: Stand by assistance (08/14/22 0911)  Bed Mobility Comments: Requires increased time, effort and use of bed rail. Difficulty maintaining seated balance at midline at EOB due to left lateral lean.  (08/05/22 1130)  Transfers:  Transfers  Sit to Stand: Minimal Assistance (08/14/22 0911)  Stand to sit: Contact guard assistance (08/14/22 0911)  Comment: Completes STS x1 with ww (08/05/22 1131)  Gait:   Ambulation  Surface: level tile (08/04/22 1306)  Device: Rolling Walker (08/04/22 1306)  Other Apparatus: O2 (2L O2 NC, increased to 4L O2 NC RN notified and aware) (08/04/22 1306)  Assistance: Minimal assistance (08/04/22 1306)  Quality of Gait: flexed posture, decresaed bilateral step length and foot clearance, unsteady (08/04/22 1306)  Gait Deviations: Slow Whitney;Decreased step length;Decreased step height;Decreased arm swing (08/04/22 1306)  Distance: 5ft to chair (08/04/22 1306)  Comments: SOB upon exertion, HR elevated 110s with exertional dyspnea observed, O2 increased d/t low O2 sats (08/03/22 0950)  Overall Level of Assistance:  (unable to assess d/t endurance) (08/14/22 0918)  Stairs:     W/C mobility:       Occupational Therapy:   Hand Dominance: Left  ADL  Feeding: Independent (08/14/22 0918)  Grooming: Setup (08/14/22 0918)  UE Bathing: Minimal assistance (08/14/22 0918)  UE Bathing Skilled Clinical Factors: decreased ROM of B UE (08/14/22 0918)  LE Bathing: Maximum assistance (08/14/22 0918)  UE Dressing: Minimal assistance (08/14/22 0918)  LE Dressing: Maximum assistance (08/14/22 0918)  Toileting: Maximum assistance (08/14/22 0918)  Toileting Skilled Clinical Factors: Incontinent of dark loose stool; LPN notified (21/17/60 9306)  Additional Comments: Simulated ADLs as above. Pt. significantly limited d/t fatigue and SOB. (08/03/22 0121)  Toilet Transfers  Equipment Used: Grab bars (08/14/22 3274)  Toilet Transfer: Minimal assistance (08/14/22 0917)  Toilet Transfers Comments: Anticipate min A (08/03/22 0946)     Shower Transfers  Shower Transfers: Not tested (08/14/22 0917)    Speech Therapy:            Diet/Swallow:        Dysphagia Outcome Severity Scale: Level 5: Mild dysphagia- Distant supervision.  May need one diet consistency restricted  Compensatory Swallowing Strategies : Upright as possible for all oral intake, Small bites/sips, Assist feed, Eat/Feed slowly  Therapeutic Interventions: Patient/Family education, Diet tolerance monitoring    COGNITION  OT: Cognition Comment: Verbal cues for safety and sequencing  SP:           Lab/X-ray studies reviewed, analyzed and discussed with patient and staff:   Recent Results (from the past 24 hour(s))   Basic Metabolic Panel    Collection Time: 08/14/22  5:19 AM   Result Value Ref Range    Sodium 130 (L) 135 - 144 mEq/L    Potassium 3.9 3.4 - 4.9 mEq/L    Chloride 90 (L) 95 - 107 mEq/L    CO2 26 20 - 31 mEq/L    Anion Gap 14 9 - 15 mEq/L    Glucose 72 70 - 99 mg/dL    BUN 20 8 - 23 mg/dL    Creatinine 4.50 (H) 0.70 - 1.20 mg/dL    GFR Non-African American 13.1 (L) >60    GFR  15.8 (L) >60    Calcium 8.5 8.5 - 9.9 mg/dL   POCT Glucose    Collection Time: 08/14/22  7:23 AM   Result Value Ref Range    POC Glucose 97 70 - 99 mg/dl    Performed on ACCU-CHEK    POCT Glucose    Collection Time: 08/14/22 11:12 AM   Result Value Ref Range    POC Glucose 234 (H) 70 - 99 mg/dl    Performed on ACCU-CHEK    Reticulocytes    Collection Time: 08/14/22  2:19 PM   Result Value Ref Range    Retic Ct Pct 2.4 (H) 0.6 - 2.2 %    Retic Ct Abs 0.058 0.022 - 0.111 m/cumm    Hematocrit 24.3 (L) 42.0 - 52.0 %   Phosphorus    Collection Time: 08/14/22  2:19 PM   Result Value Ref Range    Phosphorus 3.1 2.3 - 4.8 mg/dL   Albumin    Collection Time: 08/14/22  2:19 PM   Result Value Ref Range    Albumin 2.9 (L) 3.5 - 4.6 g/dL   POCT Glucose    Collection Time: 08/14/22  4:28 PM   Result Value Ref Range    POC Glucose 102 (H) 70 - 99 mg/dl    Performed on ACCU-CHEK      Previous extensive, complex labs, notes and diagnostics reviewed and analyzed. ALLERGIES:    Allergies as of 07/25/2022    (No Known Allergies)      (please also verify by checking [facility])     Complex Physical Medicine & Rehab Issues Assess & Plan:   Severe abnormality of gait and mobility and impaired self-care and ADL's secondary to hypoxic encephalopathy. Updated functional and medical status reassessed regarding patients ability to participate in therapies and patient found to be able to participate in:        acute intensive comprehensive inpatient rehabilitation program including PT/OT to improve balance, ambulation, ADLs, and to improve the P/AROM.    It is my opinion that they will be able to tolerate 3 hours of therapy a day and benefit from it at an acute level. I again discussed acute rehab with the patient and verify that the patient is able and willing to participate in 3 hours of therapy a day. Rehab and Acute Care Case Management has also reinforced this expectation. Will continue to follow to attempt to get patient to the most efficient but most effective level of care will be in their best interest.  Continue to focus on energy conservation heart rate and blood pressure monitoring before during and after therapy endurance and consistency of function. Bowel constipation   and Bladder dysfunction   overactive, neurogenic bladder:  frequent toileting, ambulate to bathroom with assistance, check post void residuals. Check for C.difficile x1 if >2 loose stools in 24 hours, continue bowel & bladder program.  Monitor for UTI symptoms including lethargy and confusion    Moderate back pain and generalized body aches and pain likely secondary to renal osteodystrophy and generalized OA pain: reassess pain every shift and prior to and after each therapy session, I advise giving prn OxyIR--avoid Tylenol DT liver risks, modalities prn in therapy, consider Lidoderm, K-pad prn. Skin healing   multiple upper extremity skin tears breakdown   risk:  continue pressure relief program.  Daily skin exams and reports from nursing. Severe fatigue due to immobility and nutritional deficits: continue to monitor closely for dehydration   Add vitamin B12 vitamin D and CoQ10 titrate dosing and add protein supplementation with low carb content. 6.  DVT with DVT prophylaxis and end-stage renal disease holding Lovenox patient is status post IVC filter 8/5/2022. Complex discharge planning:   Discussed with care team-last 24 hour events noted.   I will continue to follow along and reassess functional and medical status as we strive to improve patient's functional and medical outcomes progressing to the most efficient and

## 2022-08-15 NOTE — PROGRESS NOTES
Internal Medicine   Hospitalist   Progress Note  20 day documentation     8/15/2022   11:12 AM    Name:  Shruthi Brower  MRN:    [id number]     IP Day: 21     Admit Date: 2022 11:15 AM  PCP: Azael Castanon MD    Code Status:  Full Code    Assessment and Plan: Active Problems/ diagnosis:     Acute hypoxic respiratory failure-on 6 L oxygen  Recent pneumonia-completed treatment  Epistaxis status post packing, removed, no further epistaxis  Acute upper GI bleeding due to duodenal ulcer-EGD this admission, hemoglobin stable, bleeding resolved  DVT in the lower extremity status post IVC filter this admission  Bilateral upper extremity DVTs in the setting of central line placement, central lines removed  Hypoglycemia-likely due to poor p.o. intake    Plan  Patient continues to improve clinically from respiratory standpoint. Wean down oxygen to achieve saturation above 92%. His hemoglobin is stable, continue to monitor  Contacted by [facility] on 2022, now they declined the patient as he is clinically improving and does not need tertiary center as per [facility] provider. Resume PT/OT  His glucose is more stable, last checked was above 200, discontinued D5 as per nephrologist recommendations. Monitor for fluid overload. Appreciate pulmonary medicine input  Nephrologist is following for end-stage renal disease/HD management  Resume PPIs  DVT PPx     7 pm- 7 am, please contact on call Hospitalist for any needs     Dispo- dc to rehab     Subjective:      no new events. Denies any new symptoms. Feels overall better.     Physical Examination:      Vitals:  BP (!) 137/41   Pulse (!) 101   Temp 97.7 °F (36.5 °C)   Resp 18   Ht 5' 6\" (1.676 m)   Wt 139 lb 12.4 oz (63.4 kg)   SpO2 100%   BMI 22.56 kg/m²   Temp (24hrs), Av.8 °F (36.6 °C), Min:97.7 °F (36.5 °C), Max:97.9 °F (36.6 °C)      General appearance: alert, cooperative and no distress  Mental Status: oriented to person, place and time and normal affect  Lungs: clear to auscultation bilaterally, normal effort  Heart: regular rate   Abdomen: soft, nontender, nondistended, bowel sounds present, no masses  Extremities: no tenderness in the calves  Skin: Multiple bruises on upper and lower extremities. No gross lesions, rashes    Data:     Labs:  Recent Labs     08/13/22 0523   WBC 8.0   HGB 8.1*        Recent Labs     08/13/22 0523 08/14/22  0519   * 130*   K 3.5 3.9   CL 91* 90*   CO2 27 26   BUN 11 20   CREATININE 3.31* 4.50*   GLUCOSE 77 72     No results for input(s): AST, ALT, ALB, BILITOT, ALKPHOS in the last 72 hours.     Current Facility-Administered Medications   Medication Dose Route Frequency Provider Last Rate Last Admin    sodium chloride 0.9 % infusion             midodrine (PROAMATINE) tablet 5 mg  5 mg Oral TID  Catracho Romero MD   5 mg at 08/14/22 1700    epoetin ryan-epbx (RETACRIT) injection 20,000 Units  20,000 Units SubCUTAneous Q7 Days Catracho Romero MD   20,000 Units at 08/10/22 1802    sucralfate (CARAFATE) tablet 1 g  1 g Oral 4 times per day ISI Guillaume - CNP   1 g at 08/15/22 0525    pill splitter   Does not apply Once Anastacio Law MD        chlorhexidine (PERIDEX) 0.12 % solution 15 mL  15 mL Mouth/Throat BID Lilliam Scullin, DO   15 mL at 08/14/22 2018    0.9 % sodium chloride infusion   IntraVENous PRN Alessia Steen, DO        sodium chloride flush 0.9 % injection 5-40 mL  5-40 mL IntraVENous 2 times per day Yajaira Disla MD   10 mL at 08/14/22 2019    sodium chloride flush 0.9 % injection 5-40 mL  5-40 mL IntraVENous PRN Yajaira Disla MD        0.9 % sodium chloride infusion   IntraVENous PRN Yajaira Disla MD        acetaminophen (TYLENOL) tablet 650 mg  650 mg Oral Q4H PRN Yajaira Disla MD        ondansetron [facility]) injection 4 mg  4 mg IntraVENous Q6H PRN Yajaira Disla MD        hydrALAZINE (APRESOLINE) injection 10 mg  10 mg IntraVENous Q10 Min PRN Yajaira Disla MD labetalol (NORMODYNE;TRANDATE) injection 10 mg  10 mg IntraVENous Q30 Min PRN Sonya Guthrie MD        pantoprazole (PROTONIX) 40 mg in sodium chloride (PF) 10 mL injection  40 mg IntraVENous Q12H Pitoxchitl Hernandez, DO   40 mg at 08/14/22 2018    [Held by provider] enoxaparin (LOVENOX) injection 60 mg  1 mg/kg SubCUTAneous Daily Carmella Terrell, DO        fluticasone (FLONASE) 50 MCG/ACT nasal spray 1 spray  1 spray Each Nostril Daily Billy Kalata, DO   1 spray at 08/04/22 0902    sodium chloride flush 0.9 % injection 5-40 mL  5-40 mL IntraVENous 2 times per day Pitney David, DO   5 mL at 08/14/22 0849    sodium chloride flush 0.9 % injection 5-40 mL  5-40 mL IntraVENous PRN Pitney David, DO        0.9 % sodium chloride infusion   IntraVENous PRN Pitney David, DO        metoprolol tartrate (LOPRESSOR) tablet 12.5 mg  12.5 mg Oral BID Sonya Guthrie MD   12.5 mg at 08/13/22 0959    [Held by provider] aspirin EC tablet 81 mg  81 mg Oral Daily Sonya Guthrie MD   81 mg at 08/04/22 5101    docusate sodium (COLACE) capsule 100 mg  100 mg Oral BID Simone Suzy APRN - CNP   100 mg at 08/14/22 2018    insulin lispro (HUMALOG) injection vial 0-4 Units  0-4 Units SubCUTAneous 4x Daily AC & HS Kamryn Pemberton MD   1 Units at 08/14/22 1123    polyethylene glycol (GLYCOLAX) packet 17 g  17 g Oral Daily Simone Almonte, APRN - CNP   17 g at 08/14/22 0848    tacrolimus (PROGRAF) capsule 1 mg  1 mg Oral BID Simone Suzy, APRN - CNP   1 mg at 08/14/22 2018    acetaminophen (TYLENOL) tablet 650 mg  650 mg Oral Q6H PRN Kamryn Pemberton MD   650 mg at 08/03/22 6753    Or    acetaminophen (TYLENOL) suppository 650 mg  650 mg Rectal Q6H PRN Kamryn Pemberton MD        glucose chewable tablet 16 g  4 tablet Oral PRN Simone Almonte APRN - CNP   16 g at 08/09/22 1901    dextrose bolus 10% 125 mL  125 mL IntraVENous PRN ISI Solis - CNP   Stopped at 08/08/22 1737    Or    dextrose bolus 10% 250 mL  250 mL IntraVENous PRN Shiam Bettye, APRN - CNP        glucagon (rDNA) injection 1 mg  1 mg SubCUTAneous PRN Shima Bettye, APRN - CNP        dextrose 10 % infusion   IntraVENous Continuous PRN Shima Wen APRN - CNP        heparin (porcine) injection 1,000 Units  1,000 Units IntraVENous PRN Aditya Kraft MD           Additional work up or/and treatment plan may be added today or then after based on clinical progression. I am managing a portion of pt care. Some medical issues are handled by other specialists. Additional work up and treatment should be done in out pt setting by pt PCP and other out pt providers. In addition to examining and evaluating pt, I spent additional time explaining care, normaland abnormal findings, and treatment plan. All of pt questions were answered. Counseling, diet and education were provided. Case will be discussed with nursing staff when appropriate. Family will be updated if and when appropriate.        Electronically signed by Karl Hernandez DO on 8/15/2022 at 11:12 AM

## 2022-08-15 NOTE — DISCHARGE INSTR - COC
Continuity of Care Form    Patient Name: Geraldine Palacios   :  1955  MRN:  [id number]    Admit date:  2022  Discharge date:  22    Code Status Order: Full Code   Advance Directives:     Admitting Physician:  Le Perez MD  PCP: Carlo Enriquez MD    Discharging Nurse: [facility] Unit/Room#: T361/E109-39  Discharging Unit Phone Number: [phone]    Emergency Contact:   Extended Emergency Contact Information  Primary Emergency Contact: Kelly Lakhani [address] Phone: [phone]  Mobile Phone: [phone]  Relation: Brother/Sister  Secondary Emergency Contact: El Luke [address] Phone: [phone]  Relation: Child    Past Surgical History:  Past Surgical History:   Procedure Laterality Date    DIALYSIS FISTULA CREATION Right     IR MIDLINE CATH  2022    IR MIDLINE CATH 2022 MLOZ SPECIAL PROCEDURE    LIVER TRANSPLANT  2017    TUNNELED VENOUS PORT PLACEMENT      UPPER GASTROINTESTINAL ENDOSCOPY N/A 2022    EGD ESOPHAGOGASTRODUODENOSCOPY WITH INTERVENTION performed by Sai Raman MD at [facility]       Immunization History:   Immunization History   Administered Date(s) Administered    COVID-19, MODERNA BLUE border, Primary or Immunocompromised, (age 12y+), IM, 100 mcg/0.5mL 03/10/2021, 2021       Active Problems:  Patient Active Problem List   Diagnosis Code    Hypotension I95.9    ESRD (end stage renal disease) on dialysis ([address] N18.6, Z99.2    Liver transplanted ([address] Z94.4    Liver transplant recipient [facility]) Z94.4    Sepsis ([address] A41.9    Gastroenteritis K52.9    C. difficile colitis A04.72    Severe sepsis ([address] A41.9, R65.20    Vomiting and diarrhea R11.10, R19.7    Generalized abdominal pain R10.84    Acute renal failure ([address] N17.9    Acute respiratory failure with hypoxia ([address] J96.01    Pneumonia due to infectious organism J18.9    Pleural effusion J90    Impaired mobility and activities of daily living Z74.09, Z78.9    Dysphagia, oropharyngeal phase R13.12    Epistaxis R04.0    Duodenal ulcer K26.9    Severe malnutrition (HCC) E43       Isolation/Infection:   Isolation            No Isolation          Patient Infection Status       Infection Onset Added Last Indicated Last Indicated By Review Planned Expiration Resolved Resolved By    None active    Resolved    COVID-19 (Rule Out) 07/25/22 07/25/22 07/25/22 COVID-19, Rapid (Ordered)   07/25/22 Rule-Out Test Resulted            Nurse Assessment:  Last Vital Signs: BP (!) 137/41   Pulse (!) 101   Temp 97.7 °F (36.5 °C)   Resp 18   Ht 5' 6\" (1.676 m)   Wt 139 lb 12.4 oz (63.4 kg)   SpO2 100%   BMI 22.56 kg/m²     Last documented pain score (0-10 scale): Pain Level: 0  Last Weight:   Wt Readings from Last 1 Encounters:   08/15/22 139 lb 12.4 oz (63.4 kg)     Mental Status:  oriented and alert    IV Access:  - None    Nursing Mobility/ADLs:  Walking   Assisted  Transfer  Assisted  Bathing  Assisted  Dressing  Assisted  Toileting  Assisted  Feeding  Assisted  Med Admin  Assisted  Med Delivery   whole with water NO STRAW    Wound Care Documentation and Therapy:  Wound 07/27/22 Perineum Incontinence Associated Dermatitis (Active)   Wound Cleansed Soap and water 08/15/22 1214   Dressing/Treatment Zinc paste 08/15/22 1214   Wound Assessment Superficial 08/15/22 1214   Drainage Amount None 08/15/22 1214   Odor None 08/15/22 1214   Jemima-wound Assessment Blanchable erythema 08/15/22 1214   Number of days: 19       Wound 07/27/22 Buttocks Left; Lower Incontinence Associated Dermatitis (Active)   Wound Cleansed Soap and water 08/14/22 2053   Dressing/Treatment Zinc paste 08/15/22 1214   Wound Length (cm) 2.5 cm 07/30/22 0800   Wound Width (cm) 1 cm 07/30/22 0800   Wound Surface Area (cm^2) 2.5 cm^2 07/30/22 0800   Wound Assessment Purple/maroon 08/14/22 2053   Drainage Amount None 08/14/22 2053   Odor None 08/14/22 2053   Jemima-wound signed by JOYCE Cherry on 8/18/2022 at 1:41 PM      PHYSICIAN SECTION    Prognosis: Fair    Condition at Discharge: Stable    Rehab Potential (if transferring to Rehab): Fair    Recommended Labs or Other Treatments After Discharge: rehab     Physician Certification: I certify the above information and transfer of Placido Gómez  is necessary for the continuing treatment of the diagnosis listed and that he requires Acute Rehab for less 30 days.      Update Admission H&P: No change in H&P    PHYSICIAN SIGNATURE:  Electronically signed by Alisson Canchola DO on 8/18/22 at 2:06 PM EDT

## 2022-08-15 NOTE — PROGRESS NOTES
Subjective: The patient complains of severe acute on chronic progressive fatigue and generalized weakness partially relieved by rest, PT, OT and meds and hemodialysis and exacerbated by exertion and recent illness. He was initially admitted via the ER with SOB. He was diagnosed with hypoxia acute respiratory failure end-stage renal disease macrocytic anemia and hyperglycemia secondary to diabetes mellitus. He was admitted to the ICU. He is on IV Bactrim for pneumonia. GI was seeing him an duodenal ulcer GI bleed. Medically is complicated by history of a liver transplant for hepatitis. He is on immunosuppressants. He gets dialysis Monday Wednesday and Friday. I am concerned about patients medical complexities including:  Principal Problem:    Acute respiratory failure with hypoxia (HCC)  Active Problems:    Pneumonia due to infectious organism    Pleural effusion    Impaired mobility and activities of daily living    Dysphagia, oropharyngeal phase    Epistaxis    Duodenal ulcer    Severe malnutrition (HCC)    ESRD (end stage renal disease) on dialysis [facility])    Liver transplant recipient [facility])  Resolved Problems:    * No resolved hospital problems. *      .    Reviewed recent nursing note and discussed current status and planned care with acute care providers, \" Met with patient again regarding [facility]. [facility] transfer was cancelled and patient requiring therapy before returning home alone. Patient is interested in staying at [id number] [facility] if possible, states he needs therapy because he cant even walk right now. Patient lives alone, elevator to apartment, sister local and friend lives floor below in apt building. Message left for PM&R Dr Maldonado Minus for potential ARU. Patient would need precert approval from [facility]. \".       ROS x10: The patient also complains of severely impaired mobility and activities of daily living.   Otherwise no new problems with vision, hearing, nose, mouth, throat, dermal, cardiovascular, GI, , pulmonary, musculoskeletal, psychiatric or neurological.        Vital signs:  BP (!) 62/37   Pulse 67   Temp 97.8 °F (36.6 °C)   Resp 18   Ht 5' 6\" (1.676 m)   Wt 145 lb 4.5 oz (65.9 kg)   SpO2 100%   BMI 23.45 kg/m²   I/O:   PO/Intake:    fair PO intake, monitoring for dysphagia    Bowel/Bladder:   continent,    General:  Patient is well developed, adequately nourished, and    well kempt. HEENT:    PERRLA, hearing intact to loud voice, external inspection of ear and nose -nasal packing. Inspection of lips, tongue -dark and coated-gums benign, nosebleed left nares has nasal packing in place. From nasal cannula O2 patient is also on some blood thinners. Musculoskeletal: No significant change in strength or tone. All joints stable. Inspection and palpation of digits and nails show no clubbing, cyanosis or inflammatory conditions. Neuro/Psychiatric: Affect: flat-  Alert and oriented to self and situation with  min-mod cues. No significant change in deep tendon reflexes or sensation  Lungs:  Diminished, CTA-B  . Respiration effort is normal at rest.   Heart:   S1 = S2,   RRR. Abdomen:  Soft, non-tender    Extremities:  Trace  lower extremity edema but no unusual tenderness. non Fx RUE fistula--functional LUE fistula. Skin:   BUE bruises dt blood draws-his bruises and skin tears. Rehabilitation:  Physical Therapy:   Bed mobility:  Bed mobility  Rolling to Left: Minimal assistance (08/14/22 0911)  Rolling to Right: Minimal assistance (08/03/22 0950)  Supine to Sit: Minimal assistance (08/14/22 0911)  Sit to Supine: Contact guard assistance (08/14/22 0911)  Scooting: Stand by assistance (08/14/22 0911)  Bed Mobility Comments: Requires increased time, effort and use of bed rail. Difficulty maintaining seated balance at midline at EOB due to left lateral lean.  (08/05/22 1130)  Transfers:  Transfers  Sit to Stand: Minimal Assistance (08/14/22 0911)  Stand to sit: Contact guard assistance (08/14/22 0911)  Comment: Completes STS x1 with ww (08/05/22 1131)  Gait:   Ambulation  Surface: level tile (08/04/22 1306)  Device: Rolling Walker (08/04/22 1306)  Other Apparatus: O2 (2L O2 NC, increased to 4L O2 NC RN notified and aware) (08/04/22 1306)  Assistance: Minimal assistance (08/04/22 1306)  Quality of Gait: flexed posture, decresaed bilateral step length and foot clearance, unsteady (08/04/22 1306)  Gait Deviations: Slow Whitney;Decreased step length;Decreased step height;Decreased arm swing (08/04/22 1306)  Distance: 5ft to chair (08/04/22 1306)  Comments: SOB upon exertion, HR elevated 110s with exertional dyspnea observed, O2 increased d/t low O2 sats (08/03/22 0950)  Overall Level of Assistance:  (unable to assess d/t endurance) (08/14/22 0918)  Stairs:     W/C mobility:       Occupational Therapy:   Hand Dominance: Left  ADL  Feeding: Independent (08/14/22 0918)  Grooming: Setup (08/14/22 0918)  UE Bathing: Minimal assistance (08/14/22 0918)  UE Bathing Skilled Clinical Factors: decreased ROM of B UE (08/14/22 0918)  LE Bathing: Maximum assistance (08/14/22 0918)  UE Dressing: Minimal assistance (08/14/22 0918)  LE Dressing: Maximum assistance (08/14/22 0918)  Toileting: Maximum assistance (08/14/22 0918)  Toileting Skilled Clinical Factors: Incontinent of dark loose stool; LPN notified (19/95/55 3813)  Additional Comments: Simulated ADLs as above. Pt. significantly limited d/t fatigue and SOB. (08/03/22 7693)  Toilet Transfers  Equipment Used: Grab bars (08/14/22 4128)  Toilet Transfer: Minimal assistance (08/14/22 0917)  Toilet Transfers Comments: Anticipate min A (08/03/22 0946)     Shower Transfers  Shower Transfers: Not tested (08/14/22 0917)    Speech Therapy:            Diet/Swallow:        Dysphagia Outcome Severity Scale: Level 5: Mild dysphagia- Distant supervision.  May need one diet consistency restricted  Compensatory Swallowing Strategies : Upright as possible for all oral intake, Small bites/sips, Assist feed, Eat/Feed slowly  Therapeutic Interventions: Patient/Family education, Diet tolerance monitoring    COGNITION  OT: Cognition Comment: Verbal cues for safety and sequencing  SP:           Lab/X-ray studies reviewed, analyzed and discussed with patient and staff:   Recent Results (from the past 24 hour(s))   POCT Glucose    Collection Time: 08/14/22 11:12 AM   Result Value Ref Range    POC Glucose 234 (H) 70 - 99 mg/dl    Performed on ACCU-CHEK    Ferritin    Collection Time: 08/14/22  2:19 PM   Result Value Ref Range    Ferritin 1,241 (H) 30 - 400 ng/mL   Reticulocytes    Collection Time: 08/14/22  2:19 PM   Result Value Ref Range    Retic Ct Pct 2.4 (H) 0.6 - 2.2 %    Retic Ct Abs 0.058 0.022 - 0.111 m/cumm    Hematocrit 24.3 (L) 42.0 - 52.0 %   Phosphorus    Collection Time: 08/14/22  2:19 PM   Result Value Ref Range    Phosphorus 3.1 2.3 - 4.8 mg/dL   Albumin    Collection Time: 08/14/22  2:19 PM   Result Value Ref Range    Albumin 2.9 (L) 3.5 - 4.6 g/dL   POCT Glucose    Collection Time: 08/14/22  4:28 PM   Result Value Ref Range    POC Glucose 102 (H) 70 - 99 mg/dl    Performed on ACCU-CHEK    POCT Glucose    Collection Time: 08/14/22  9:00 PM   Result Value Ref Range    POC Glucose 98 70 - 99 mg/dl    Performed on ACCU-CHEK    POCT Glucose    Collection Time: 08/15/22  7:20 AM   Result Value Ref Range    POC Glucose 85 70 - 99 mg/dl    Performed on ACCU-CHEK      Previous extensive, complex labs, notes and diagnostics reviewed and analyzed. ALLERGIES:    Allergies as of 07/25/2022    (No Known Allergies)      (please also verify by checking [facility])     Complex Physical Medicine & Rehab Issues Assess & Plan:   Severe abnormality of gait and mobility and impaired self-care and ADL's secondary to hypoxic encephalopathy.   Updated functional and medical status reassessed regarding patients ability to participate in therapies and patient found to be able to participate in:      acute intensive comprehensive inpatient rehabilitation program including PT/OT to improve balance, ambulation, ADLs, and to improve the P/AROM. It is my opinion that they will be able to tolerate 3 hours of therapy a day and benefit from it at an acute level. I again discussed acute rehab with the patient and verify that the patient is able and willing to participate in 3 hours of therapy a day. Rehab and Acute Care Case Management has also reinforced this expectation. Will continue to follow to attempt to get patient to the most efficient but most effective level of care will be in their best interest.  Continue to focus on energy conservation heart rate and blood pressure monitoring before during and after therapy endurance and consistency of function. Bowel constipation   and Bladder dysfunction   overactive, neurogenic bladder:  frequent toileting, ambulate to bathroom with assistance, check post void residuals. Check for C.difficile x1 if >2 loose stools in 24 hours, continue bowel & bladder program.  Monitor for UTI symptoms including lethargy and confusion    Moderate back pain and generalized body aches and pain likely secondary to renal osteodystrophy and generalized OA pain: reassess pain every shift and prior to and after each therapy session, I advise giving prn OxyIR--avoid Tylenol DT liver risks, modalities prn in therapy, consider Lidoderm, K-pad prn. Skin healing   multiple upper extremity skin tears breakdown   risk:  continue pressure relief program.  Daily skin exams and reports from nursing. Severe fatigue due to immobility and nutritional deficits: continue to monitor closely for dehydration   Add vitamin B12 vitamin D and CoQ10 titrate dosing and add protein supplementation with low carb content. 6.  DVT with DVT prophylaxis and end-stage renal disease holding Lovenox patient is status post IVC filter 8/5/2022.     Complex discharge planning:   Discussed with care team-last 24 hour events noted. I will continue to follow along and reassess functional and medical status as we strive to improve patient's functional and medical outcomes progressing to the most efficient and lowest level of care. Complex Active General Medical Issues that complicate care:     1. Principal Problem:    Acute respiratory failure with hypoxia (HCC)  Active Problems:    Pneumonia due to infectious organism    Pleural effusion    Impaired mobility and activities of daily living    Dysphagia, oropharyngeal phase    Epistaxis    Duodenal ulcer    Severe malnutrition (HCC)    ESRD (end stage renal disease) on dialysis [facility])    Liver transplant recipient [facility])  Resolved Problems:    * No resolved hospital problems. *          Events and functional changes in the past 24 hours reviewed improvements in functional status are encouraging       Focus of today's plan-   treat nosebleed Afrin and Ocean nasal spray and bacitracin. Is now medically more stable able to participate in PT and OT and we will attempt recertification with will require   [facility] who had certified him for his medical complications.       Angie Jenkins D.O., PM&R     Attending    [address]

## 2022-08-15 NOTE — PROGRESS NOTES
chloride      dextrose         CBC:   Recent Labs     08/13/22  0523   WBC 8.0   HGB 8.1*          CMP:    Recent Labs     08/13/22  0523 08/14/22  0519   * 130*   K 3.5 3.9   CL 91* 90*   CO2 27 26   BUN 11 20   CREATININE 3.31* 4.50*   GLUCOSE 77 72   CALCIUM 8.3* 8.5   LABGLOM 18.7* 13.1*       Troponin: No results for input(s): TROPONINI in the last 72 hours. BNP: No results for input(s): BNP in the last 72 hours. INR:   No results for input(s): INR in the last 72 hours. Lipids: No results for input(s): CHOL, LDLDIRECT, TRIG, HDL, AMYLASE, LIPASE in the last 72 hours. Liver:   Recent Labs     08/14/22  1419   LABALBU 2.9*       Iron:    Recent Labs     08/14/22  1419   FERRITIN 1,241*     Urinalysis: No results for input(s): UA in the last 72 hours. Objective:  Vitals: BP (!) 68/54   Pulse 100   Temp 97.8 °F (36.6 °C)   Resp 18   Ht 5' 6\" (1.676 m)   Wt 145 lb 4.5 oz (65.9 kg)   SpO2 100%   BMI 23.45 kg/m²    Wt Readings from Last 3 Encounters:   08/15/22 145 lb 4.5 oz (65.9 kg)   06/28/22 145 lb (65.8 kg)   03/18/20 145 lb (65.8 kg)      24HR INTAKE/OUTPUT:  No intake or output data in the 24 hours ending 08/15/22 0942      General: alert, weak improved though  HEENT: normocephalic, atraumatic, anicteric  Neck: supple, no mass  Lungs: non-labored respirations, clear to auscultation bilaterally  Heart: regular rate and rhythm, no murmurs or rubs  Abdomen: soft, non-tender, non-distended  Ext: 2+ edema UE.   None in LE  Neuro: alert and oriented, no gross abnormalities  Psych: normal mood and affect  Skin: no rash      Electronically signed by Etelvina Gonzales MD, MD

## 2022-08-15 NOTE — PROGRESS NOTES
Physical Therapy Med Surg Daily Treatment Note  Facility/Department: [facility]  Room: [place] [id number]       NAME: Ana Last  : 1955 (75 y.o.)  MRN: [id number]  CODE STATUS: Full Code    Date of Service: 8/15/2022    Patient Diagnosis(es): Acute respiratory failure with hypoxia [facility]) [J96.01]   Chief Complaint   Patient presents with    Shortness of Breath     C/O SOB  AFTER DIALYSIS  88 % ON RA  GIVEN 2 DUONEBS  SOLU MEDROL      Patient Active Problem List    Diagnosis Date Noted    Severe malnutrition ([facility] 2022    Duodenal ulcer 2022    Impaired mobility and activities of daily living 2022    Dysphagia, oropharyngeal phase 2022    Epistaxis 2022    Pneumonia due to infectious organism 2022    Pleural effusion 2022    Acute respiratory failure with hypoxia (HCC) 2022    Vomiting and diarrhea     Generalized abdominal pain     Acute renal failure (HCC)     Gastroenteritis     C. difficile colitis     Severe sepsis ([address]     Hypotension 2017    ESRD (end stage renal disease) on dialysis ([facility] [address] 2017    Liver transplanted ([facility] 2017    Liver transplant recipient [facility])     Sepsis [facility])         Past Medical History:   Diagnosis Date    ESRD (end stage renal disease) ([address]     Hemodialysis patient ([facility] [address]     Hepatitis     Hypertension     Liver transplanted ([facility] [address] 2016     Past Surgical History:   Procedure Laterality Date    DIALYSIS FISTULA CREATION Right     IR MIDLINE CATH  2022    IR MIDLINE CATH 2022 MLOZ SPECIAL PROCEDURE    LIVER TRANSPLANT  2017    TUNNELED VENOUS PORT PLACEMENT      UPPER GASTROINTESTINAL ENDOSCOPY N/A 2022    EGD ESOPHAGOGASTRODUODENOSCOPY WITH INTERVENTION performed by Jennifer Mello MD at [facility]       Chart Reviewed: Yes  Family / Caregiver Present: No    Restrictions:  Restrictions/Precautions: Fall Risk    SUBJECTIVE:   Subjective: Pt agreeable to tx    Pain  Pain: denies pain pre and post tx    OBJECTIVE:   Orientation  Overall Orientation Status: Within Normal Limits    Bed mobility  Rolling to Left: Contact guard assistance;Minimal assistance  Supine to Sit: Minimal assistance  Sit to Supine: Contact guard assistance  Scooting: Stand by assistance  Bed Mobility Comments: HOB elevated due to SpO2 dropping; Pt requied max assist to scoot up in bed but able to scoot from side of bed over to middle; Able to maintain balance at EOB with perturbations in all directions. SpO2 would drop with pt looking and saying he was ok with immediate come back up to %. Not sure if getting good reading on finger. Transfers  Sit to Stand: Moderate Assistance  Comment: Attempted to stand at [facility], unable to get up. PT Exercises  Exercise Treatment: AP/ SAQs x 10 laying bed; LAQs/marches x 10 seated at EOB          Activity Tolerance  Activity Tolerance: Patient limited by endurance          ASSESSMENT   Assessment: Pt SpO2 would come down then jump right back up with different movements with no change in pt. O2 L9 mostly stayed %. Pt demo'd good sitting balance with perturbations and reaching. Pt unable to stand up with moderate assist with [facility]. Discharge Recommendations:  Patient would benefit from continued therapy after discharge, Continue to assess pending progress         Goals  Long Term Goals  Long term goal 1: Pt will be independent and safe with all bed mobility and transfers. Long term goal 2: Pt will be able to demonstrate good static and standing balance to reduce risk for falls with standing with support for >/= 2'. Long term goal 3: Pt will ambulate with LRD >/= 48' CGA to improve pt's overall mobility  Long term goal 4: Improve gianluca LE strength to achieve all goals and increase ease with mobility.   Long term goal 5: Pt will be supervision with HEP to improve LE strength, ROM, and activity tolerance  Patient Goals   Patient goals : to get my therapy and get stronger    PLAN    Plan: 1 time a day 3-6 times a week  Safety Devices  Type of Devices: All fall risk precautions in place, Bed alarm in place, Call light within reach, Left in bed     AMPAC (6 CLICK) BASIC MOBILITY  AM-PAC Inpatient Mobility Raw Score : 13     Therapy Time   Individual   Time In 1447   Time Out 1522   Minutes 35      Bm/trs   20  Therex  [address] [place] [address] INES Crystal JOVANI, 08/15/22 at 3:33 PM         Definitions for assistance levels  Independent = pt does not require any physical supervision or assistance from another person for activity completion. Device may be needed.   Stand by assistance = pt requires verbal cues or instructions from another person, close to but not touching, to perform the activity  Minimal assistance= pt performs 75% or more of the activity; assistance is required to complete the activity  Moderate assistance= pt performs 50% of the activity; assistance is required to complete the activity  Maximal assistance = pt performs 25% of the activity; assistance is required to complete the activity  Dependent = pt requires total physical assistance to accomplish the task

## 2022-08-15 NOTE — CARE COORDINATION
Met with patient again regarding [facility]. [facility] transfer was cancelled and patient requiring therapy before returning home alone. Patient is interested in staying at [facility] if possible, states he needs therapy because he cant even walk right now. Patient lives alone, elevator to apartment, sister local and friend lives floor below in apt building. Message left for PM&R Dr Romana Rao for potential ARU. Patient would need precert approval from [facility]. Electronically signed by Renate Foster RN on 8/15/22 at 11:48 AM EDT  1205-Case reviewed with PM&R Dr Romana Rao and recommendation for Acute Rehab. Precert was started with [facility]. Jai GAMBINO notified.  Electronically signed by Renate Foster RN on 8/15/22 at 2:08 PM EDT

## 2022-08-16 LAB
GLUCOSE BLD-MCNC: 107 MG/DL (ref 70–99)
GLUCOSE BLD-MCNC: 109 MG/DL (ref 70–99)
GLUCOSE BLD-MCNC: 121 MG/DL (ref 70–99)
GLUCOSE BLD-MCNC: 78 MG/DL (ref 70–99)
GLUCOSE BLD-MCNC: 98 MG/DL (ref 70–99)
PERFORMED ON: ABNORMAL
PERFORMED ON: NORMAL
PERFORMED ON: NORMAL

## 2022-08-16 PROCEDURE — 1210000000 HC MED SURG R&B

## 2022-08-16 PROCEDURE — 99232 SBSQ HOSP IP/OBS MODERATE 35: CPT | Performed by: INTERNAL MEDICINE

## 2022-08-16 PROCEDURE — A4216 STERILE WATER/SALINE, 10 ML: HCPCS | Performed by: INTERNAL MEDICINE

## 2022-08-16 PROCEDURE — 6370000000 HC RX 637 (ALT 250 FOR IP): Performed by: NURSE PRACTITIONER

## 2022-08-16 PROCEDURE — 97530 THERAPEUTIC ACTIVITIES: CPT

## 2022-08-16 PROCEDURE — 6360000002 HC RX W HCPCS: Performed by: INTERNAL MEDICINE

## 2022-08-16 PROCEDURE — 99232 SBSQ HOSP IP/OBS MODERATE 35: CPT | Performed by: PHYSICAL MEDICINE & REHABILITATION

## 2022-08-16 PROCEDURE — 2700000000 HC OXYGEN THERAPY PER DAY

## 2022-08-16 PROCEDURE — 2580000003 HC RX 258: Performed by: INTERNAL MEDICINE

## 2022-08-16 PROCEDURE — 6370000000 HC RX 637 (ALT 250 FOR IP): Performed by: INTERNAL MEDICINE

## 2022-08-16 PROCEDURE — 92526 ORAL FUNCTION THERAPY: CPT

## 2022-08-16 PROCEDURE — C9113 INJ PANTOPRAZOLE SODIUM, VIA: HCPCS | Performed by: INTERNAL MEDICINE

## 2022-08-16 PROCEDURE — 6360000002 HC RX W HCPCS: Performed by: NURSE PRACTITIONER

## 2022-08-16 PROCEDURE — 6370000000 HC RX 637 (ALT 250 FOR IP): Performed by: PHYSICAL MEDICINE & REHABILITATION

## 2022-08-16 RX ADMIN — Medication 10 ML: at 20:11

## 2022-08-16 RX ADMIN — SUCRALFATE 1 G: 1 TABLET ORAL at 11:11

## 2022-08-16 RX ADMIN — 0.12% CHLORHEXIDINE GLUCONATE 15 ML: 1.2 RINSE ORAL at 07:59

## 2022-08-16 RX ADMIN — DOCUSATE SODIUM 100 MG: 100 CAPSULE, LIQUID FILLED ORAL at 07:59

## 2022-08-16 RX ADMIN — Medication 10 ML: at 08:05

## 2022-08-16 RX ADMIN — 0.12% CHLORHEXIDINE GLUCONATE 15 ML: 1.2 RINSE ORAL at 20:09

## 2022-08-16 RX ADMIN — SUCRALFATE 1 G: 1 TABLET ORAL at 06:10

## 2022-08-16 RX ADMIN — SODIUM CHLORIDE, PRESERVATIVE FREE 40 MG: 5 INJECTION INTRAVENOUS at 20:09

## 2022-08-16 RX ADMIN — SUCRALFATE 1 G: 1 TABLET ORAL at 01:28

## 2022-08-16 RX ADMIN — POLYETHYLENE GLYCOL 3350 17 G: 17 POWDER, FOR SOLUTION ORAL at 07:59

## 2022-08-16 RX ADMIN — MIDODRINE HYDROCHLORIDE 5 MG: 5 TABLET ORAL at 11:11

## 2022-08-16 RX ADMIN — SODIUM CHLORIDE, PRESERVATIVE FREE 40 MG: 5 INJECTION INTRAVENOUS at 07:59

## 2022-08-16 RX ADMIN — MIDODRINE HYDROCHLORIDE 5 MG: 5 TABLET ORAL at 17:07

## 2022-08-16 RX ADMIN — DOCUSATE SODIUM 100 MG: 100 CAPSULE, LIQUID FILLED ORAL at 20:09

## 2022-08-16 RX ADMIN — SUCRALFATE 1 G: 1 TABLET ORAL at 17:07

## 2022-08-16 RX ADMIN — TACROLIMUS 1 MG: 1 CAPSULE ORAL at 20:09

## 2022-08-16 RX ADMIN — TACROLIMUS 1 MG: 1 CAPSULE ORAL at 07:59

## 2022-08-16 RX ADMIN — MIDODRINE HYDROCHLORIDE 5 MG: 5 TABLET ORAL at 07:59

## 2022-08-16 ASSESSMENT — PAIN SCALES - GENERAL: PAINLEVEL_OUTOF10: 0

## 2022-08-16 NOTE — CARE COORDINATION
This LSW met with patient at bedside this am. Patient will transfer to [facility] upon insurance authorization, medical clearance. LSW /  CM to follow.   Electronically signed by AL Munoz, JOYCE on 8/16/22 at 1:51 PM EDT

## 2022-08-16 NOTE — CARE COORDINATION
Marie Urban pending with [employer].  Electronically signed by Temitope Espinoza RN on 8/16/22 at 7:36 AM EDT

## 2022-08-16 NOTE — PROGRESS NOTES
Internal Medicine   Hospitalist   Progress Note  20 day documentation     2022   1:21 PM    Name:  Lucrecia Olson  MRN:    [id number]     IP Day: 25     Admit Date: 2022 11:15 AM  PCP: Claudio Whitfield MD    Code Status:  Full Code    Assessment and Plan: Active Problems/ diagnosis:     Acute hypoxic respiratory failure-on 6 L oxygen  Recent pneumonia-completed treatment  Epistaxis status post packing, removed, no further epistaxis  Acute upper GI bleeding due to duodenal ulcer-EGD this admission, hemoglobin stable, bleeding resolved  DVT in the lower extremity status post IVC filter this admission  Bilateral upper extremity DVTs in the setting of central line placement, central lines removed  Hypoglycemia-likely due to poor p.o. intake    Plan  Patient continues to improve clinically from respiratory standpoint. Wean down oxygen to achieve saturation above 92%. His hemoglobin is stable, continue to monitor  Contacted by [facility] on 2022, now they declined the patient as he is clinically improving and does not need tertiary center as per [facility] provider. Resume PT/OT  His glucose is more stable, last checked was above 200, discontinued D5 as per nephrologist recommendations. Monitor for fluid overload. Appreciate pulmonary medicine input  Nephrologist is following for end-stage renal disease/HD management  Resume PPIs  DVT PPx     7 pm- 7 am, please contact on call Hospitalist for any needs     Dispo- dc to rehab     Subjective:      no new events. Denies any new symptoms. Feels overall better.     Physical Examination:      Vitals:  /86   Pulse 88   Temp 98.8 °F (37.1 °C) (Oral)   Resp 16   Ht 5' 6\" (1.676 m)   Wt 139 lb 4.8 oz (63.2 kg)   SpO2 99%   BMI 22.48 kg/m²   Temp (24hrs), Av.5 °F (36.9 °C), Min:98.1 °F (36.7 °C), Max:98.8 °F (37.1 °C)      General appearance: alert, cooperative and no distress  Mental Status: oriented to person, place and time and normal affect  Lungs: clear to auscultation bilaterally, normal effort  Heart: regular rate   Abdomen: soft, nontender, nondistended, bowel sounds present, no masses  Extremities: no tenderness in the calves  Skin: Multiple bruises on upper and lower extremities. No gross lesions, rashes    Data:     Labs:  No results for input(s): WBC, HGB, PLT in the last 72 hours. Recent Labs     08/14/22  0519   *   K 3.9   CL 90*   CO2 26   BUN 20   CREATININE 4.50*   GLUCOSE 72     No results for input(s): AST, ALT, ALB, BILITOT, ALKPHOS in the last 72 hours.     Current Facility-Administered Medications   Medication Dose Route Frequency Provider Last Rate Last Admin    midodrine (PROAMATINE) tablet 5 mg  5 mg Oral TID  Waldemar Paz MD   5 mg at 08/16/22 1111    epoetin ryan-epbx (RETACRIT) injection 20,000 Units  20,000 Units SubCUTAneous Q7 Days Waldemar Paz MD   20,000 Units at 08/10/22 1802    sucralfate (CARAFATE) tablet 1 g  1 g Oral 4 times per day ISI Oliveira - CNP   1 g at 08/16/22 1111    pill splitter   Does not apply Once Jus Cruz MD        chlorhexidine (PERIDEX) 0.12 % solution 15 mL  15 mL Mouth/Throat BID Lilliam Perez DO   15 mL at 08/16/22 0759    0.9 % sodium chloride infusion   IntraVENous PRN Karl Hernandez DO        sodium chloride flush 0.9 % injection 5-40 mL  5-40 mL IntraVENous 2 times per day Usman Blankenship MD   10 mL at 08/15/22 2116    sodium chloride flush 0.9 % injection 5-40 mL  5-40 mL IntraVENous PRN Usman Blankenship MD        0.9 % sodium chloride infusion   IntraVENous PRN Usman Blankenship MD        acetaminophen (TYLENOL) tablet 650 mg  650 mg Oral Q4H PRN Usman Blankenship MD        ondansetron [facility]) injection 4 mg  4 mg IntraVENous Q6H PRN Usman Blankenship MD        hydrALAZINE (APRESOLINE) injection 10 mg  10 mg IntraVENous Q10 Min PRN Usman Blankenship MD        labetalol (NORMODYNE;TRANDATE) injection 10 mg  10 mg IntraVENous Q30 Min PRN Raford Dines Ko Zavala MD        pantoprazole (PROTONIX) 40 mg in sodium chloride (PF) 10 mL injection  40 mg IntraVENous Q12H Pitney David, DO   40 mg at 08/16/22 0759    [Held by provider] enoxaparin (LOVENOX) injection 60 mg  1 mg/kg SubCUTAneous Daily Carmella Terrell,         fluticasone (FLONASE) 50 MCG/ACT nasal spray 1 spray  1 spray Each Nostril Daily Odilia Ware DO   1 spray at 08/04/22 0902    sodium chloride flush 0.9 % injection 5-40 mL  5-40 mL IntraVENous 2 times per day Pitney David, DO   10 mL at 08/16/22 0805    sodium chloride flush 0.9 % injection 5-40 mL  5-40 mL IntraVENous PRN Pitney David, DO        0.9 % sodium chloride infusion   IntraVENous PRN Pitney David, DO        metoprolol tartrate (LOPRESSOR) tablet 12.5 mg  12.5 mg Oral BID Niranjan Roberts MD   12.5 mg at 08/13/22 0959    [Held by provider] aspirin EC tablet 81 mg  81 mg Oral Daily Niranjan Roberts MD   81 mg at 08/04/22 5585    docusate sodium (COLACE) capsule 100 mg  100 mg Oral BID ISI Garza - CNP   100 mg at 08/16/22 0759    insulin lispro (HUMALOG) injection vial 0-4 Units  0-4 Units SubCUTAneous 4x Daily AC & HS Jennifer Lozano MD   1 Units at 08/14/22 1123    polyethylene glycol (GLYCOLAX) packet 17 g  17 g Oral Daily ISI Garza - CNP   17 g at 08/16/22 0759    tacrolimus (PROGRAF) capsule 1 mg  1 mg Oral BID ISI Garza - CNP   1 mg at 08/16/22 0759    acetaminophen (TYLENOL) tablet 650 mg  650 mg Oral Q6H PRN Jennifer Lozano MD   650 mg at 08/03/22 2941    Or    acetaminophen (TYLENOL) suppository 650 mg  650 mg Rectal Q6H PRN Jennifer Lozano MD        glucose chewable tablet 16 g  4 tablet Oral PRN ISI Garza - CNP   16 g at 08/15/22 1136    dextrose bolus 10% 125 mL  125 mL IntraVENous PRN ISI Garza - CNP   Stopped at 08/08/22 1737    Or    dextrose bolus 10% 250 mL  250 mL IntraVENous ISI Moralez CNP        glucagon (rDNA) injection 1 mg  1 mg SubCUTAneous PRN Al Ceballos, APRN - CNP        dextrose 10 % infusion   IntraVENous Continuous PRN Alraul Ceballos, APRN - CNP        heparin (porcine) injection 1,000 Units  1,000 Units IntraVENous PRN Nadira Alexis MD           Additional work up or/and treatment plan may be added today or then after based on clinical progression. I am managing a portion of pt care. Some medical issues are handled by other specialists. Additional work up and treatment should be done in out pt setting by pt PCP and other out pt providers. In addition to examining and evaluating pt, I spent additional time explaining care, normaland abnormal findings, and treatment plan. All of pt questions were answered. Counseling, diet and education were provided. Case will be discussed with nursing staff when appropriate. Family will be updated if and when appropriate.        Electronically signed by Gagan Verdin DO on 8/16/2022 at 1:21 PM

## 2022-08-16 NOTE — PROGRESS NOTES
Subjective: The patient complains of severe acute on chronic progressive fatigue and generalized weakness partially relieved by rest, PT, OT and meds and hemodialysis and exacerbated by exertion and recent illness. He was initially admitted via the ER with SOB. He was diagnosed with hypoxia acute respiratory failure end-stage renal disease macrocytic anemia and hyperglycemia secondary to diabetes mellitus. He was admitted to the ICU. He is on IV Bactrim for pneumonia. GI was seeing him an duodenal ulcer GI bleed. Medically is complicated by history of a liver transplant for hepatitis. He is on immunosuppressants. He gets dialysis Monday Wednesday and Friday. I am concerned about patients medical complexities including:  Principal Problem:    Acute respiratory failure with hypoxia (HCC)  Active Problems:    Pneumonia due to infectious organism    Pleural effusion    Impaired mobility and activities of daily living    Dysphagia, oropharyngeal phase    Epistaxis    Duodenal ulcer    Severe malnutrition (HCC)    ESRD (end stage renal disease) on dialysis [facility])    Liver transplant recipient [facility])  Resolved Problems:    * No resolved hospital problems. *      .    Reviewed recent nursing note and discussed current status and planned care with acute care providers, \" Met with patient again regarding [facility]. [facility] transfer was cancelled and patient requiring therapy before returning home alone. Patient is interested in staying at [id number] [facility] if possible, states he needs therapy because he cant even walk right now. Patient lives alone, elevator to apartment, sister local and friend lives floor below in apt building. Message left for PM&R Dr Jair Villa for potential ARU. Patient would need precert approval from [facility].\"....\", vss, BP right lower extremity, Denies pain/discomfort, Small bm, cleaned/ zinc to coccyx, repositioned for comfort/breakfast, Am medications given per mar.  Denies further needs, call light within reach. \"     ROS x10: The patient also complains of severely impaired mobility and activities of daily living. Otherwise no new problems with vision, hearing, nose, mouth, throat, dermal, cardiovascular, GI, , pulmonary, musculoskeletal, psychiatric or neurological.        Vital signs:  /86   Pulse 88   Temp 98.8 °F (37.1 °C) (Oral)   Resp 16   Ht 5' 6\" (1.676 m)   Wt 139 lb 12.4 oz (63.4 kg)   SpO2 99%   BMI 22.56 kg/m²   I/O:   PO/Intake:    fair PO intake, monitoring for dysphagia    Bowel/Bladder:   continent,    General:  Patient is well developed, adequately nourished, and    well kempt. HEENT:    PERRLA, hearing intact to loud voice, external inspection of ear and nose -nasal packing. Inspection of lips, tongue -dark and coated-gums benign, nosebleed left nares has nasal packing in place. From nasal cannula O2 patient is also on some blood thinners. Musculoskeletal: No significant change in strength or tone. All joints stable. Inspection and palpation of digits and nails show no clubbing, cyanosis or inflammatory conditions. Neuro/Psychiatric: Affect: flat-  Alert and oriented to self and situation with  min-mod cues. No significant change in deep tendon reflexes or sensation  Lungs:  Diminished, CTA-B  . Respiration effort is normal at rest.   Heart:   S1 = S2,   RRR. Abdomen:  Soft, non-tender    Extremities:  Trace  lower extremity edema but no unusual tenderness. non Fx RUE fistula--functional LUE fistula. Skin:   BUE bruises dt blood draws-his bruises and skin tears.       Rehabilitation:  Physical Therapy:   Bed mobility:  Bed mobility  Rolling to Left: Contact guard assistance;Minimal assistance (08/15/22 1522)  Rolling to Right: Minimal assistance (08/03/22 0950)  Supine to Sit: Minimal assistance (08/15/22 1522)  Sit to Supine: Contact guard assistance (08/15/22 1522)  Scooting: Stand by assistance (08/15/22 1522)  Bed Mobility Comments: HOB elevated due to SpO2 dropping; Pt requied max assist to scoot up in bed but able to scoot from side of bed over to middle; Able to maintain balance at EOB with perturbations in all directions. SpO2 would drop with pt looking and saying he was ok with immediate come back up to %. Not sure if getting good reading on finger. (08/15/22 1522)  Transfers:  Transfers  Sit to Stand: Moderate Assistance (08/15/22 1525)  Stand to sit: Contact guard assistance (08/14/22 0911)  Comment: Attempted to stand at [employer], unable to get up. (08/15/22 1525)  Gait:   Ambulation  Surface: level tile (08/04/22 1306)  Device: Event [employer] (08/04/22 1306)  Other Apparatus: O2 (2L O2 NC, increased to 4L O2 NC RN notified and aware) (08/04/22 1306)  Assistance: Minimal assistance (08/04/22 1306)  Quality of Gait: flexed posture, decresaed bilateral step length and foot clearance, unsteady (08/04/22 1306)  Gait Deviations: Slow Whitney;Decreased step length;Decreased step height;Decreased arm swing (08/04/22 1306)  Distance: 5ft to chair (08/04/22 1306)  Comments: SOB upon exertion, HR elevated 110s with exertional dyspnea observed, O2 increased d/t low O2 sats (08/03/22 0950)  Overall Level of Assistance:  (unable to assess d/t endurance) (08/14/22 0918)  Stairs:     W/C mobility:       Occupational Therapy:   Hand Dominance: Left  ADL  Feeding: Independent (08/14/22 0918)  Grooming: Setup (08/14/22 0918)  UE Bathing: Minimal assistance (08/14/22 0918)  UE Bathing Skilled Clinical Factors: decreased ROM of B UE (08/14/22 0918)  LE Bathing: Maximum assistance (08/14/22 0918)  UE Dressing: Minimal assistance (08/14/22 0918)  LE Dressing: Maximum assistance (08/14/22 0918)  Toileting: Maximum assistance (08/14/22 0918)  Toileting Skilled Clinical Factors: Incontinent of dark loose stool; LPN notified (83/56/47 0992)  Additional Comments: Simulated ADLs as above. Pt. significantly limited d/t fatigue and SOB.  (08/03/22 8485)  Toilet Transfers  Equipment Used: Grab bars (08/14/22 6948)  Toilet Transfer: Minimal assistance (08/14/22 0917)  Toilet Transfers Comments: Anticipate min A (08/03/22 0946)     Shower Transfers  Shower Transfers: Not tested (08/14/22 0917)    Speech Therapy:            Diet/Swallow:        Dysphagia Outcome Severity Scale: Level 5: Mild dysphagia- Distant supervision. May need one diet consistency restricted  Compensatory Swallowing Strategies : Upright as possible for all oral intake, Small bites/sips, Assist feed, Eat/Feed slowly  Therapeutic Interventions: Patient/Family education, Diet tolerance monitoring    COGNITION  OT: Cognition Comment: Verbal cues for safety and sequencing  SP:           Lab/X-ray studies reviewed, analyzed and discussed with patient and staff:   Recent Results (from the past 24 hour(s))   POCT Glucose    Collection Time: 08/15/22 11:24 AM   Result Value Ref Range    POC Glucose 67 (L) 70 - 99 mg/dl    Performed on ACCU-CHEK    POCT Glucose    Collection Time: 08/15/22 11:49 AM   Result Value Ref Range    POC Glucose 94 70 - 99 mg/dl    Performed on ACCU-CHEK    POCT Glucose    Collection Time: 08/15/22  5:21 PM   Result Value Ref Range    POC Glucose 85 70 - 99 mg/dl    Performed on ACCU-CHEK    POCT Glucose    Collection Time: 08/15/22 11:55 PM   Result Value Ref Range    POC Glucose 107 (H) 70 - 99 mg/dl    Performed on ACCU-CHEK    POCT Glucose    Collection Time: 08/16/22  8:02 AM   Result Value Ref Range    POC Glucose 78 70 - 99 mg/dl    Performed on ACCU-CHEK      Previous extensive, complex labs, notes and diagnostics reviewed and analyzed. ALLERGIES:    Allergies as of 07/25/2022    (No Known Allergies)      (please also verify by checking [facility])     Complex Physical Medicine & Rehab Issues Assess & Plan:   Severe abnormality of gait and mobility and impaired self-care and ADL's secondary to hypoxic encephalopathy.   Updated functional and medical status reassessed regarding patients ability to participate in therapies and patient found to be able to participate in:      acute intensive comprehensive inpatient rehabilitation program including PT/OT to improve balance, ambulation, ADLs, and to improve the P/AROM. It is my opinion that they will be able to tolerate 3 hours of therapy a day and benefit from it at an acute level. I again discussed acute rehab with the patient and verify that the patient is able and willing to participate in 3 hours of therapy a day. Rehab and Acute Care Case Management has also reinforced this expectation. Will continue to follow to attempt to get patient to the most efficient but most effective level of care will be in their best interest.  Continue to focus on energy conservation heart rate and blood pressure monitoring before during and after therapy endurance and consistency of function. Bowel constipation   and Bladder dysfunction   overactive, neurogenic bladder:  frequent toileting, ambulate to bathroom with assistance, check post void residuals. Check for C.difficile x1 if >2 loose stools in 24 hours, continue bowel & bladder program.  Monitor for UTI symptoms including lethargy and confusion    Moderate back pain and generalized body aches and pain likely secondary to renal osteodystrophy and generalized OA pain: reassess pain every shift and prior to and after each therapy session, I advise giving prn OxyIR--avoid Tylenol DT liver risks, modalities prn in therapy, consider Lidoderm, K-pad prn. Skin healing   multiple upper extremity skin tears breakdown   risk:  continue pressure relief program.  Daily skin exams and reports from nursing. Severe fatigue due to immobility and nutritional deficits: continue to monitor closely for dehydration   Add vitamin B12 vitamin D and CoQ10 titrate dosing and add protein supplementation with low carb content.     6.  DVT with DVT prophylaxis and end-stage renal disease holding Lovenox patient is status post IVC filter 8/5/2022. Complex discharge planning:   Discussed with care team-last 24 hour events noted. I will continue to follow along and reassess functional and medical status as we strive to improve patient's functional and medical outcomes progressing to the most efficient and lowest level of care. Complex Active General Medical Issues that complicate care:     1. Principal Problem:    Acute respiratory failure with hypoxia (HCC)  Active Problems:    Pneumonia due to infectious organism    Pleural effusion    Impaired mobility and activities of daily living    Dysphagia, oropharyngeal phase    Epistaxis    Duodenal ulcer    Severe malnutrition (HCC)    ESRD (end stage renal disease) on dialysis [facility])    Liver transplant recipient [facility])  Resolved Problems:    * No resolved hospital problems. *          Events and functional changes in the past 24 hours reviewed improvements in functional status are encouraging       Focus of today's plan-   treat nosebleed Afrin and Ocean nasal spray and bacitracin. Is now medically more stable able to participate in PT and OT and we will attempt recertification with will require   [facility] who had certified him for his medical complications.       Jonatan Otero D.O., PM&R     Attending    [facility]ana Burger

## 2022-08-16 NOTE — PROGRESS NOTES
Shift assessment complete,alert/oriented, vss, BP right lower extremity, Denies pain/discomfort, Small bm, cleaned/ zinc to coccyx, repositioned for comfort/breakfast, Am medications given per mar. Denies further needs, call light within reach.  Electronically signed by Daniel Brewer RN on 8/16/2022 at 10:42 AM

## 2022-08-16 NOTE — PROGRESS NOTES
Comprehensive Nutrition Assessment    Type and Reason for Visit:  Reassess    Nutrition Recommendations/Plan:   D/c frozen supplement per pt request  Continue pudding supplement and current dysphagia diet  Add SOHAN restriction d/t edema      Malnutrition Assessment:  Malnutrition Status: Moderate malnutrition (08/16/22 1414)    Context:  Acute Illness     Findings of the 6 clinical characteristics of malnutrition:  Energy Intake:  75% or less of estimated energy requirements for 7 or more days  Weight Loss:  Greater than 5% over 1 month     Body Fat Loss:  Unable to assess     Muscle Mass Loss:  Unable to assess    Fluid Accumulation:  Unable to assess Generalized   Strength:  Not Performed    Nutrition Assessment:    Pt improving from a nutrition standpoint but still meeting criteria for moderate malnutrition with hx of poor intakes and progression wt loss. Current intakes ~75% per observation. Tolerating current dysphagia diet and compliant with fortified pudding supplement. Nutrition Related Findings:    PMH: HTN, hepatitis-s/p liver transplant, ESRD on hemodialysis M,W,F. , intubated 7/25-27 with trophic TF. S/p thoracentesis (7/26), labs noted, meds reviewed, anuric, last BM loose 8/14. +1-2 BUE/gen edema. MBS 8/11 = SBS with mildly thick liquids by cup. Hx of poor intakes with recommendations for TF placed. Intakes/appetite currently improved. Ate ~75% of lunch per observation. Labs noted, meds reviewed. Dislikes frozen supplement. Eating ~26-50% of pudding supplement. Wound Type: Skin Tears       Current Nutrition Intake & Therapies:    Average Meal Intake: 51-75%  Average Supplements Intake: 1-25%, 26-50%  ADULT DIET;  Dysphagia - Soft and Bite Sized; Mildly Thick ([place])  ADULT ORAL NUTRITION SUPPLEMENT; Breakfast, Dinner; Fortified Pudding Oral Supplement    Anthropometric Measures:  Height: 5' 6\" (167.6 cm)  Ideal Body Weight (IBW): 142 lbs (65 kg)    Admission Body Weight: 148 lb (67.1 kg)  Current Body Weight: 139 lb 12.4 oz (63.4 kg) (8/15 edema present),  Weight Source: Bed Scale  Current BMI (kg/m2): 22.6  Usual Body Weight: 152 lb 9 oz (69.2 kg) (bed 7/28/22)  % Weight Change (Calculated): -8.4  Weight Adjustment For: No Adjustment                 BMI Categories: Normal Weight (BMI 22.0 to 24.9) age over 72    Estimated Daily Nutrient Needs:  Energy Requirements Based On: Kcal/kg  Weight Used for Energy Requirements: Current  Energy (kcal/day): 8426-7430 kcals @ 28-30 kcal/kg  Weight Used for Protein Requirements: Current  Protein (g/day): 91.5 g protein @ 1.5 g/kg  Method Used for Fluid Requirements: Standard Renal  Fluid (ml/day): 500-800 + UOP (or as per MD)    Nutrition Diagnosis:   Severe malnutrition, In context of acute illness or injury related to inadequate protein-energy intake, increase demand for energy/nutrients as evidenced by Criteria as identified in malnutrition assessment  Inadequate protein-energy intake related to swallowing difficulty, altered GI function as evidenced by intake 0-25%, intake 26-50%    Nutrition Interventions:   Food and/or Nutrient Delivery: Continue Current Diet, Continue Oral Nutrition Supplement  Nutrition Education/Counseling: No recommendation at this time  Coordination of Nutrition Care: Continue to monitor while inpatient       Goals:  Previous Goal Met: No Progress toward Goal(s)  Goals: PO intake 75% or greater, other (specify)  Specify Other Goals: maintain wt    Nutrition Monitoring and Evaluation:      Food/Nutrient Intake Outcomes: Supplement Intake, Food and Nutrient Intake, Diet Advancement/Tolerance  Physical Signs/Symptoms Outcomes: Chewing or Swallowing, Biochemical Data, Weight, Meal Time Behavior    Discharge Planning:     Too soon to determine     Jesus Gutierrez, MS, RD, LD

## 2022-08-16 NOTE — PROGRESS NOTES
nephNephrology Progress Note    Assessment:  ESRDX  Liver transplanr  COPD  Sepsis  Anemia  epistaxis    HD mwf now   Increased midodrine to 5 tid for low bps  Retacrit for anemia  Crit line with hd shows that he is likely dry at this point and doesn't need extra txs  Agree with rehab      Patient Active Problem List:     Hypotension     ESRD (end stage renal disease) on dialysis ([facility] [address]     Liver transplanted [facility])     Liver transplant recipient ([facility] [address]     Sepsis ([facility] [address]     Gastroenteritis     C. difficile colitis     Severe sepsis ([facility] [address]     Vomiting and diarrhea     Generalized abdominal pain     Acute renal failure ([facility] [address]     Acute respiratory failure with hypoxia ([facility] [address]     Pneumonia due to infectious organism     Pleural effusion     Impaired mobility and activities of daily living     Dysphagia, oropharyngeal phase     Epistaxis     Duodenal ulcer      Subjective:  Admit Date: 7/25/2022    Interval History: had 5 hd treatments last week for fluid overload. Had hd yesterday. Doing ok.   Working on rehab    Medications:  Scheduled Meds:   midodrine  5 mg Oral TID WC    epoetin ryan-epbx  20,000 Units SubCUTAneous Q7 Days    sucralfate  1 g Oral 4 times per day    pill splitter   Does not apply Once    chlorhexidine  15 mL Mouth/Throat BID    sodium chloride flush  5-40 mL IntraVENous 2 times per day    pantoprazole (PROTONIX) 40 mg injection  40 mg IntraVENous Q12H    [Held by provider] enoxaparin  1 mg/kg SubCUTAneous Daily    fluticasone  1 spray Each Nostril Daily    sodium chloride flush  5-40 mL IntraVENous 2 times per day    metoprolol tartrate  12.5 mg Oral BID    [Held by provider] aspirin  81 mg Oral Daily    docusate sodium  100 mg Oral BID    insulin lispro  0-4 Units SubCUTAneous 4x Daily AC & HS    polyethylene glycol  17 g Oral Daily    tacrolimus  1 mg Oral BID     Continuous Infusions:   sodium chloride      sodium chloride      sodium chloride      dextrose         CBC:   No results for input(s): WBC, HGB, PLT in the last 72 hours. CMP:    Recent Labs     08/14/22  0519   *   K 3.9   CL 90*   CO2 26   BUN 20   CREATININE 4.50*   GLUCOSE 72   CALCIUM 8.5   LABGLOM 13.1*       Troponin: No results for input(s): TROPONINI in the last 72 hours. BNP: No results for input(s): BNP in the last 72 hours. INR:   No results for input(s): INR in the last 72 hours. Lipids: No results for input(s): CHOL, LDLDIRECT, TRIG, HDL, AMYLASE, LIPASE in the last 72 hours. Liver:   Recent Labs     08/14/22  1419   LABALBU 2.9*       Iron:    Recent Labs     08/14/22  1419   FERRITIN 1,241*       Urinalysis: No results for input(s): UA in the last 72 hours. Objective:  Vitals: /86   Pulse 88   Temp 98.8 °F (37.1 °C) (Oral)   Resp 16   Ht 5' 6\" (1.676 m)   Wt 139 lb 4.8 oz (63.2 kg)   SpO2 99%   BMI 22.48 kg/m²    Wt Readings from Last 3 Encounters:   08/16/22 139 lb 4.8 oz (63.2 kg)   06/28/22 145 lb (65.8 kg)   03/18/20 145 lb (65.8 kg)      24HR INTAKE/OUTPUT:  No intake or output data in the 24 hours ending 08/16/22 1457      General: alert, weak improved though  HEENT: normocephalic, atraumatic, anicteric  Neck: supple, no mass  Lungs: non-labored respirations, clear to auscultation bilaterally  Heart: regular rate and rhythm, no murmurs or rubs  Abdomen: soft, non-tender, non-distended  Ext: 2+ edema UE.   None in LE  Neuro: alert and oriented, no gross abnormalities  Psych: normal mood and affect  Skin: no rash      Electronically signed by Stephany Dockery MD, MD

## 2022-08-16 NOTE — PROGRESS NOTES
INPATIENT PROGRESS NOTES    PATIENT NAME: Lucrecia Olson  MRN: [id number]  SERVICE DATE:  August 16, 2022   SERVICE TIME:  6:46 PM      PRIMARY SERVICE: Pulmonary Disease    CHIEF COMPLAIN: Respiratory failure      INTERVAL HPI: Patient seen and examined at bedside, Interval Notes, orders reviewed. Nursing notes noted     He said he is feeling better, he is on 5 L via O2 via nasal cannula and O2 saturation is 96%     Denies having short of breath at rest.  No cough or sputum production. No chest pain. No fever or chills. Going for dialysis tomorrow    OBJECTIVE    Body mass index is 22.48 kg/m². PHYSICAL EXAM:  Vitals:  /86   Pulse 88   Temp 98.8 °F (37.1 °C) (Oral)   Resp 16   Ht 5' 6\" (1.676 m)   Wt 139 lb 4.8 oz (63.2 kg)   SpO2 96% Comment: Patient demonstrated s/s of SOB while sitting EOB, SPO2 checked at this time  BMI 22.48 kg/m²   General: Alert, awake . comfortable in bed, No distress. Head: Atraumatic , Normocephalic   Eyes: PERRL. No sclera icterus. No conjunctival injection. No discharge   ENT: Nasal packing on the left side. No active bleeding. Pharynx clear. Neck:  Trachea midline. No thyromegaly, no JVD, No cervical adenopathy. Chest : Bilaterally symmetrical ,Normal effort,  No accessory muscle use  Lung : . Fair BS bilateral, decreased BS at bases. Few bibasilar Rales. No wheezing. No rhonchi. Heart[de-identified] Normal  rate. Regular rhythm. No mumur ,  Rub or gallop  ABD: Non-tender. Non-distended. No masses. No organmegaly. Normal bowel sounds. No hernia. Ext : Edema in both upper extremity. No edema in lower extremity.   No Cyanosis No clubbing  Neuro: no focal weakness          DATA:   Recent Labs     08/14/22  1419   HCT 24.3*     Recent Labs     08/14/22  0519 08/14/22  1419   *  --    K 3.9  --    CL 90*  --    CO2 26  --    BUN 20  --    CREATININE 4.50*  --    GLUCOSE 72  --    CALCIUM 8.5  --    LABALBU  --  2.9*   LABGLOM 13.1*  --    GFRAA 15.8*  --        MV Settings:     Vent Mode: CPAP  Vt (Set, mL): 390 mL  Resp Rate (Set): 18 bmp  FiO2 : 50 %  PEEP/CPAP (cmH2O): 5  Pressure Support: 5 cmH20  Peak Inspiratory Pressure (cmH2O): 9.3 cmH2O  Mean Airway Pressure (cmH2O): 6 cmH20  I:E Ratio: 1:3    No results for input(s): PHART, ZKR7XRQ, PO2ART, UPF9EDV, BEART, X5VBPGHR in the last 72 hours. O2 Device: Nasal cannula  O2 Flow Rate (L/min): 5 L/min    ADULT ORAL NUTRITION SUPPLEMENT; Breakfast, Dinner; Fortified Pudding Oral Supplement  ADULT DIET; Dysphagia - Soft and Bite Sized;  No Added Salt (3-4 gm); Mildly Thick ([place])     MEDICATIONS during current hospitalization:    Continuous Infusions:   sodium chloride      sodium chloride      sodium chloride      dextrose         Scheduled Meds:   midodrine  5 mg Oral TID WC    epoetin ryan-epbx  20,000 Units SubCUTAneous Q7 Days    sucralfate  1 g Oral 4 times per day    pill splitter   Does not apply Once    chlorhexidine  15 mL Mouth/Throat BID    sodium chloride flush  5-40 mL IntraVENous 2 times per day    pantoprazole (PROTONIX) 40 mg injection  40 mg IntraVENous Q12H    [Held by provider] enoxaparin  1 mg/kg SubCUTAneous Daily    fluticasone  1 spray Each Nostril Daily    sodium chloride flush  5-40 mL IntraVENous 2 times per day    metoprolol tartrate  12.5 mg Oral BID    [Held by provider] aspirin  81 mg Oral Daily    docusate sodium  100 mg Oral BID    insulin lispro  0-4 Units SubCUTAneous 4x Daily AC & HS    polyethylene glycol  17 g Oral Daily    tacrolimus  1 mg Oral BID       PRN Meds:sodium chloride, sodium chloride flush, sodium chloride, acetaminophen, ondansetron, hydrALAZINE, labetalol, sodium chloride flush, sodium chloride, acetaminophen **OR** acetaminophen, glucose, dextrose bolus **OR** dextrose bolus, glucagon (rDNA), dextrose, heparin (porcine)    Radiology  Echocardiogram complete 2D with doppler with color    Result Date: 7/27/2022  Transthoracic Echocardiography Report (TTE)  Demographics Patient Name    VIA        Gender               Male                  [facility] [place]   Patient Number  [id number]       Race                                                   Ethnicity   Visit Number    [id number]      Room Number          IC12   Corporate ID                   Date of Study        07/27/2022   Accession       [id number]     Referring Physician  Number   Date of Birth   1955     Sonographer          Roge Francois   Age             75 year(s)     Interpreting         [facility])                                 Physician            Cardiology                                                      [facility]  Procedure Type of Study   TTE procedure:ECHO COMPLETE 2D W/DOP W/COLOR. Procedure Date Date: 07/27/2022 Start: 10:27 AM Study Location: Portable Technical Quality: Limited visualization due to lung interface. Indications:LVF. Patient Status: Routine Height: 66 inches Weight: 148 pounds BSA: 1.76 m^2 BMI: 23.89 kg/m^2 BP: 96/54 mmHg  Conclusions   Summary  Left ventricular ejection fraction is estimated at 45%. Diastolic Dysfunction is noted by mitral flow. Normal right ventricle systolic pressure. RVSP 28mmHg  Echogenic mass in the apex concerning for thrombus recommend limited echo  with definity  No hemodynamic evidence of significant valve disease   Signature   ----------------------------------------------------------------  Electronically signed by Gordo Ricks(Interpreting physician)  on 07/27/2022 03:48 PM  ----------------------------------------------------------------   Findings  Left Ventricle Left ventricular ejection fraction is estimated at 45%. Left ventricular size is normal . Normal left ventricular wall thickness. Diastolic Dysfunction is noted by mitral flow. Echogenic mass in the apex possible thrombus Right Ventricle Normal right ventricle structure and function. Normal right ventricle systolic pressure. RVSP 28mmHg Left Atrium Normal left atrium.  Right Atrium Normal right atrium. Mitral Valve Diffusely thickened and pliable mitral valve leaflets with normal excursion. Structurally normal mitral valve. No evidence of mitral valve stenosis. No evidence of mitral regurgitaton. Tricuspid Valve Tricuspid valve is structurally normal. No evidence of tricuspid stenosis. No evidence of tricuspid regurgitation. Aortic Valve Mildly thickened trileaflet aortic valve with normal excursion. Structurally normal aortic valve. No evidence of aortic valve stenosis . No evidence of aortic valve regurgitation . Pulmonic Valve The pulmonic valve was not well visualized . Pericardial Effusion No evidence of pericardial effusion. Aorta \ Miscellaneous The aorta is within normal limits. M-Mode Measurements (cm)   LVIDd: 2.97 cm                         LVIDs: 2.34 cm  IVSd: 0.73 cm                          IVSs: 1.01 cm  LVPWd: 0.9 cm                          LVPWs: 1.01 cm  Rt. Vent.  Dimension: 3.12 cm           AO Root Dimension: 2.1 cm                                         ACS: 1.37 cm                                         LA: 1.82 cm                                         LVOT: 2.03 cm  Doppler Measurements:   AV Velocity:0.02 m/s                    MV Peak E-Wave: 0.5 m/s  AV Peak Gradient: 5.77 mmHg             MV Peak A-Wave: 0.53 m/s  AV Mean Gradient: 3.05 mmHg  AV Area (Continuity):1.97 cm^2  TR Velocity:2.52 m/s                    Estimated RAP:3 mmHg  TR Gradient:25.31 mmHg                  RVSP:28.31 mmHg  Valves  Mitral Valve   Peak E-Wave: 0.5 m/s                  Peak A-Wave: 0.53 m/s  Mean Velocity: 0.87 m/s               E/A Ratio: 0.94  Mean Gradient: 4.66 mmHg              Peak Gradient: 0.99 mmHg                                        Deceleration Time: 353.3 msec                                        Area (continuity): 1.4 cm^2   Tissue Doppler   E' Septal Velocity: 0.07 m/s  E' Lateral Velocity: 0.07 m/s   Aortic Valve   Peak Velocity: 1.2 m/s                 Mean Velocity: 0.82 m/s  Peak Gradient: 5.77 mmHg               Mean Gradient: 3.05 mmHg  Area (continuity): 1.97 cm^2  AV VTI: 29.33 cm   Cusp Separation: 1.37 cm   Tricuspid Valve   Estimated RVSP: 28.31 mmHg              Estimated RAP: 3 mmHg  TR Velocity: 2.52 m/s                   TR Gradient: 25.31 mmHg   Pulmonic Valve   Peak Velocity: 0.99 m/s           Peak Gradient: 3.91 mmHg                                    Estimated PASP: 28.31 mmHg   LVOT   Peak Velocity: 0.97 m/s              Mean Velocity: 0.61 m/s  Peak Gradient: 3.77 mmHg             Mean Gradient: 1.79 mmHg  LVOT Diameter: 2.03 cm               LVOT VTI: 17.9 cm  Structures  Left Atrium   LA Dimension: 1.82 cm                        LA Area: 12.58 cm^2  LA/Aorta: 0.87  LA Volume/Index: 44.74 ml /25 m^2   Left Ventricle   Diastolic Dimension: 8.81 cm         Systolic Dimension: 5.50 cm  Septum Diastolic: 3.28 cm            Septum Systolic: 9.71 cm  PW Diastolic: 0.9 cm                 PW Systolic: 8.32 cm                                       FS: 21.2 %  LV EDV/LV EDV Index: 34.12 ml/19 m^2 LV ESV/LV ESV Index: 19.02 ml/11 m^2  EF Calculated: 44.3 %   LVOT Diameter: 2.03 cm   Right Ventricle   Diastolic Dimension: 6.64 cm                                    RV Systolic Pressure: 90.58 mmHg  Aorta/ Miscellaneous Aorta   Aortic Root: 2.1 cm  LVOT Diameter: 2.03 cm      ECHO Limited    Result Date: 7/28/2022  Transthoracic Echocardiography Report (TTE)  Demographics   Patient Name    VIA [facility]       Gender               Male                  [facility], [place]   Patient Number  [id number]       Race                                                   Ethnicity   Visit Number    [id number]      Room Number          IC12   Corporate ID                   Date of Study        07/28/2022   Accession       [id number]     Referring Physician  Number   Date of Birth   1955     Sonographer          Padmini Turner   Age             75 year(s)     Interpreting [facility]                                 Physician            Cardiology                                                      [facility]  Procedure Type of Study   TTE procedure:ECHOCARDIOGRAM LIMITED. Procedure Date Date: 07/28/2022 Start: 08:08 AM Study Location: Portable Technical Quality: Limited visualization Indications:LVF. Patient Status: Routine Contrast Medium: Definity. Amount - 2 ml Height: 66 inches Weight: 148 pounds BSA: 1.76 m^2 BMI: 23.89 kg/m^2  Conclusions   Summary  No evidence of thrombus with definity  Left ventricular ejection fraction is visually estimated at 65%. Signature   ----------------------------------------------------------------  Electronically signed by La Ricks(Interpreting physician)  on 07/28/2022 08:42 AM  ----------------------------------------------------------------   Findings  Left Ventricle Left ventricular ejection fraction is visually estimated at 65%. IR FLUORO GUIDED CVA DEVICE PLMT/REPLACE/REMOVAL    1. Venogram of the right internal jugular vein demonstrates a thrombus in the right internal jugular vein which completely occludes the vein. Passage of contrast into the chest is through small collateral veins. 2. 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 Passage of contrast into the chest is through a small collateral veins. Radiation dose to the patient was: 24.21 mGy Additional clinical data: Long-term IV access Procedure: 1. Ultrasound guidance for vascular access into the right internal jugular vein. The ultrasound image of the blood vessel was saved to PACS. 2. Venogram via contrast injection of the right internal jugular vein. 3.   Ultrasound guidance for vascular access into the left internal jugular vein. The ultrasound image of the blood vessel was saved to PACS 4. Venogram via contrast injection of the left internal jugular vein.  Body of Report: Informed and written consent was obtained from the patient following discussion of risks, benefits and alternatives to this procedure. The was patient placed supine on the angiographic table. The patient's neck and chest were then prepped and draped in  normal sterile fashion. A small amount of local lidocaine anesthesia was injected subcutaneously. Ultrasound was used to study the jugular vein we intended to use prior to accessing it. The vein appeared patent. The ultrasound image of the blood vessel was saved to PACS. Using ultrasound access, puncture was made of the right internal jugular vein using a 21 GA needle. A wire was advanced into the right internal jugular vein, though would not pass into the superior vena cava. A 4 Bangl[place]hi short thin sheath was placed, through the sheath digital subtraction venography was performed. Venography demonstrated a thrombus in the right internal jugular vein and complete occlusion of the vein central to the thrombus. This access was aborted. Ultrasound was used to study the left jugular vein we intended to use prior to accessing it. The vein appeared patent. The ultrasound image of the blood vessel was saved to PACS. Using ultrasound access, puncture was made of the left internal jugular vein using a 21 GA needle. A wire was attempted to be passed, though would not pass to the superior vena  cava. A 4 Bangladeshi thin sheath was placed and digital subtraction venography was performed. 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 The procedure was aborted. Findings discussed with ICU team who will place a temporary central line in the groin. XR CHEST PORTABLE    Result Date: 7/27/2022  XR CHEST PORTABLE COMPARISON: July 25, 2022. HISTORY: resp failure TECHNIQUE: AP view FINDINGS: Endotracheal tube again seen in place. . There is also an enteric tube seen in place and the tip is below the diaphragm.  They appear unchanged in position. A small pleural effusion seen on the right. The left costophrenic angle is not well seen. The lungs are hyperinflated and there is coarsening of the interstitium. Atelectasis and/or scarring is again seen bilaterally in the lower lung fields. The cardiac silhouette appears mildly enlarged but may be accentuated by the portable technique. Degenerative changes are seen in the visualized portion of the right shoulder. The airspace disease has diminished when compared to previous study. . A small pleural effusion is seen on the right and scarring or atelectasis is again seen bilaterally in the bases. US DUP UPPER EXTREMITY RIGHT VENOUS COMPARISON: HISTORY:  resp failure TECHNIQUE: , US DUP UPPER EXTREMITY RIGHT VENOUS AND LEFT VENOUS FINDINGS: Thrombus is seen in the right internal jugular and proximal subclavian, veins it is also seen in the cephalic vein. The right ulnar and basilic veins are not visualized. A right AV fistula seen. Thrombus is seen in the left internal jugular vein. The left basilic and axillary veins are not visualized. IMPRESSION: Deep venous thrombosis seen in the right and left upper extremities. .    XR CHEST PORTABLE    Result Date: 7/25/2022  XR CHEST PORTABLE : 7/25/2022 CLINICAL HISTORY:  post intubation . COMPARISON: Earlier 7/25/2022 TECHNIQUE: A portable upright AP radiograph of the chest was obtained at approximately 12:46 PM. FINDINGS: Both lung bases have been excluded from the radiograph. An endotracheal tube is present, with its tip approximately 4 to 5 cm above the lucita. An orogastric tube probably extends below the diaphragm out of field of view radiograph. Moderate pleural effusions and mild to moderate probable scarring and/or atelectasis of the mid to lower lung fields has not significantly changed. There is no cardiomegaly, pneumothorax, displaced fractures, or other significant changes identified. ENDOTRACHEAL AND OROGASTRIC TUBES IN EXPECTED POSITIONS. OTHERWISE, STABLE CHEST FROM EARLIER 7/25/2022. XR CHEST PORTABLE    Result Date: 7/25/2022  TECHNIQUE: Single portable view of the chest. CLINICAL INDICATION: Chest pain. COMPARISON: Chest x-ray obtained on June 28, 2022. PROCEDURE AND FINDINGS: Poor inspiratory effort is seen. The cardiomediastinal silhouette is slightly prominent in size. Mild prominence of the bronchovascular and interstitial lung markings is visualized bilaterally, linear streaky opacities visualized in bilateral lung fields, subsegmental atelectatic streaks, fluid visualized in the right minor fissure. Airspace opacification visualized in the lower lung fields bilaterally with blunting of the costophrenic angles and obliteration of the hemidiaphragms consistent with bilateral pleural effusions. . No evidence of pneumothorax or parenchymal lung mass. Degenerative bone changes. Congestive heart failure versus pneumonia and parapneumonic effusions would recommend clinical correlation. Increased opacification seen in comparison to the prior study. US DUP UPPER EXTREMITY RIGHT VENOUS    Result Date: 7/27/2022  XR CHEST PORTABLE COMPARISON: July 25, 2022. HISTORY: resp failure TECHNIQUE: AP view FINDINGS: Endotracheal tube again seen in place. . There is also an enteric tube seen in place and the tip is below the diaphragm. They appear unchanged in position. A small pleural effusion seen on the right. The left costophrenic angle is not well seen. The lungs are hyperinflated and there is coarsening of the interstitium. Atelectasis and/or scarring is again seen bilaterally in the lower lung fields. The cardiac silhouette appears mildly enlarged but may be accentuated by the portable technique. Degenerative changes are seen in the visualized portion of the right shoulder. The airspace disease has diminished when compared to previous study. . A small pleural effusion is seen on the right and scarring or atelectasis is again seen bilaterally in the bases. US DUP UPPER EXTREMITY RIGHT VENOUS COMPARISON: HISTORY:  resp failure TECHNIQUE: , US DUP UPPER EXTREMITY RIGHT VENOUS AND LEFT VENOUS FINDINGS: Thrombus is seen in the right internal jugular and proximal subclavian, veins it is also seen in the cephalic vein. The right ulnar and basilic veins are not visualized. A right AV fistula seen. Thrombus is seen in the left internal jugular vein. The left basilic and axillary veins are not visualized. IMPRESSION: Deep venous thrombosis seen in the right and left upper extremities. .    US DUP UPPER EXTREMITY LEFT VENOUS    Result Date: 7/27/2022  XR CHEST PORTABLE COMPARISON: July 25, 2022. HISTORY: resp failure TECHNIQUE: AP view FINDINGS: Endotracheal tube again seen in place. . There is also an enteric tube seen in place and the tip is below the diaphragm. They appear unchanged in position. A small pleural effusion seen on the right. The left costophrenic angle is not well seen. The lungs are hyperinflated and there is coarsening of the interstitium. Atelectasis and/or scarring is again seen bilaterally in the lower lung fields. The cardiac silhouette appears mildly enlarged but may be accentuated by the portable technique. Degenerative changes are seen in the visualized portion of the right shoulder. The airspace disease has diminished when compared to previous study. . A small pleural effusion is seen on the right and scarring or atelectasis is again seen bilaterally in the bases. US DUP UPPER EXTREMITY RIGHT VENOUS COMPARISON: HISTORY:  resp failure TECHNIQUE: , US DUP UPPER EXTREMITY RIGHT VENOUS AND LEFT VENOUS FINDINGS: Thrombus is seen in the right internal jugular and proximal subclavian, veins it is also seen in the cephalic vein. The right ulnar and basilic veins are not visualized. A right AV fistula seen. Thrombus is seen in the left internal jugular vein. The left basilic and axillary veins are not visualized.  IMPRESSION: Deep venous thrombosis seen in the right and left upper extremities. .    US DUP UPPER EXTREMITY RIGHT ARTERIES    Result Date: 8/2/2022  US DUP UPPER EXTREMITY RIGHT ARTERIES: 8/1/2022 11:42 AM CLINICAL HISTORY:  cold, poor pulses, extensive dvt . COMPARISON: None available. Grayscale, color and waveform Doppler analysis of the right upper arterial system was performed. FINDINGS: Mild to moderate atherosclerotic disease, with pulsatile mild to moderately diminished waveforms worsening distally. There are no significantly elevated velocities to suggest a flow-limiting stenosis, or arterial occlusion. The arterial system is normal in caliber, without evidence of aneurysm or dissection. Maximum systolic velocities are: RIGHT UPPER EXTREMITY: Right proximal common carotid artery 70 cm/s Right mid common carotid artery 53 cm/s Right proximal subclavian artery 68 cm/s Right mid subclavian artery 81 cm/s Right distal subclavian artery 71 cm/s Right axillary artery 41 cm/s Right brachial artery proximal 111 cm/s Right brachial artery mid 97 cm/s Right brachial artery distal 123 cm/s Right radial artery proximal 53 cm/s Right radial artery mid 43 cm/s Right radial artery distal 18 cm/s Right ulnar artery proximal 61 cm/s Right ulnar artery and mid 46 cm/s Right ulnar artery distal 33 cm/s     NO EVIDENCE OF A FLOW-LIMITING STENOSIS, ARTERIAL OCCLUSION, OR ANEURYSM. IR VENOGRAM VENOUS SINUS/JUGULAR    1. Venogram of the right internal jugular vein demonstrates a thrombus in the right internal jugular vein which completely occludes the vein. Passage of contrast into the chest is through small collateral veins. 2. 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 Passage of contrast into the chest is through a small collateral veins. Radiation dose to the patient was: 24.21 mGy Additional clinical data: Long-term IV access Procedure: 1. Ultrasound guidance for vascular access into the right internal jugular vein. The ultrasound image of the blood vessel was saved to PACS. 2. Venogram via contrast injection of the right internal jugular vein. 3.   Ultrasound guidance for vascular access into the left internal jugular vein. The ultrasound image of the blood vessel was saved to PACS 4. Venogram via contrast injection of the left internal jugular vein. Body of Report: Informed and written consent was obtained from the patient following discussion of risks, benefits and alternatives to this procedure. The was patient placed supine on the angiographic table. The patient's neck and chest were then prepped and draped in  normal sterile fashion. A small amount of local lidocaine anesthesia was injected subcutaneously. Ultrasound was used to study the jugular vein we intended to use prior to accessing it. The vein appeared patent. The ultrasound image of the blood vessel was saved to PACS. Using ultrasound access, puncture was made of the right internal jugular vein using a 21 GA needle. A wire was advanced into the right internal jugular vein, though would not pass into the superior vena cava. A 4 Georgian short thin sheath was placed, through the sheath digital subtraction venography was performed. Venography demonstrated a thrombus in the right internal jugular vein and complete occlusion of the vein central to the thrombus. This access was aborted. Ultrasound was used to study the left jugular vein we intended to use prior to accessing it. The vein appeared patent. The ultrasound image of the blood vessel was saved to PACS. Using ultrasound access, puncture was made of the left internal jugular vein using a 21 GA needle. A wire was attempted to be passed, though would not pass to the superior vena  cava. A 4 Georgian thin sheath was placed and digital subtraction venography was performed.  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 The procedure was aborted. Findings discussed with ICU team who will place a temporary central line in the groin. IR ULTRASOUND GUIDANCE VASCULAR ACCESS    1. Venogram of the right internal jugular vein demonstrates a thrombus in the right internal jugular vein which completely occludes the vein. Passage of contrast into the chest is through small collateral veins. 2. 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 Passage of contrast into the chest is through a small collateral veins. Radiation dose to the patient was: 24.21 mGy Additional clinical data: Long-term IV access Procedure: 1. Ultrasound guidance for vascular access into the right internal jugular vein. The ultrasound image of the blood vessel was saved to PACS. 2. Venogram via contrast injection of the right internal jugular vein. 3.   Ultrasound guidance for vascular access into the left internal jugular vein. The ultrasound image of the blood vessel was saved to PACS 4. Venogram via contrast injection of the left internal jugular vein. Body of Report: Informed and written consent was obtained from the patient following discussion of risks, benefits and alternatives to this procedure. The was patient placed supine on the angiographic table. The patient's neck and chest were then prepped and draped in  normal sterile fashion. A small amount of local lidocaine anesthesia was injected subcutaneously. Ultrasound was used to study the jugular vein we intended to use prior to accessing it. The vein appeared patent. The ultrasound image of the blood vessel was saved to PACS. Using ultrasound access, puncture was made of the right internal jugular vein using a 21 GA needle. A wire was advanced into the right internal jugular vein, though would not pass into the superior vena cava.  A 4 Portuguese short thin sheath was placed, through the sheath digital subtraction venography was performed. Venography demonstrated a thrombus in the right internal jugular vein and complete occlusion of the vein central to the thrombus. This access was aborted. Ultrasound was used to study the left jugular vein we intended to use prior to accessing it. The vein appeared patent. The ultrasound image of the blood vessel was saved to PACS. Using ultrasound access, puncture was made of the left internal jugular vein using a 21 GA needle. A wire was attempted to be passed, though would not pass to the superior vena  cava. A 4 Armenian thin sheath was placed and digital subtraction venography was performed. 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 The procedure was aborted. Findings discussed with ICU team who will place a temporary central line in the groin. US DUP LOWER EXTREMITIES BILATERAL VENOUS    Result Date: 7/27/2022  EXAMINATION: US DUP LOWER EXTREMITIES BILATERAL VENOUS CLINICAL HISTORY: 24-year-old with lower extremity swelling COMPARISONS: None available. FINDINGS: Duplex and color Doppler ultrasounds were performed of the bilateral lower extremity deep venous systems. Examination was performed portably and limited due to patient condition and access to the lower extremities. Right leg: Visualized portions of the right common femoral vein, femoral vein, popliteal vein demonstrate satisfactory compression, color flow, and augmentation. Deep calf veins are not evaluated. Left leg: The left common femoral vein is enlarged filled with intraluminal echoes with absent color flow and poor compression consistent with occluding thrombus. Occluding Thrombus extends into the left proximal femoral vein. Mid to distal femoral vein and popliteal vein demonstrate satisfactory compression and color flow.  Deep calf veins are not assessed on this portable study     ULTRASOUND FINDINGS ARE POSITIVE FOR OCCLUDING THROMBUS IN THE LEFT COMMON FEMORAL VEIN EXTENDING INTO THE PROXIMAL FEMORAL VEIN. NO ULTRASOUND SIGNS OF DVT IN THE RIGHT LEG FROM THE GROIN TO THE POPLITEAL FOSSA    IR MIDLINE CATH    Result Date: 8/3/2022  FOR BILLING PURPOSES ONLY. STUDY DICTATED IN [facility] BY PHYSICIAN. IMPRESSION AND SUGGESTION:  Acute respiratory failure with hypoxia on 5 L O2  Epistaxis s/p nasal packing  Pneumonia, treated  Duodenal ulcer  DVT in lower extremity     No complaint of shortness of breath. He is on 5 L O2 via nasal cannula. Continue O2 to keep saturation 90% or above. O2 saturation 96%. Titrate down FiO2 to 4 L. He is going to have dialysis tomorrow. No new problems overnight. Finished antibiotic. Continue present treatment plan. NOTE: This report was transcribed using voice recognition software. Every effort was made to ensure accuracy; however, inadvertent computerized transcription errors may be present.       Electronically signed by Wagner Contreras MD, FCCP on 8/16/2022 at 6:46 PM

## 2022-08-16 NOTE — PLAN OF CARE
Nutrition Problem #1: Severe malnutrition, In context of acute illness or injury  Intervention: Food and/or Nutrient Delivery: Continue Current Diet, Continue Oral Nutrition Supplement  Nutritional  Goals: PO intake >75%. Maintain wt.

## 2022-08-16 NOTE — PROGRESS NOTES
[facility]   Facility/Department: [facility] SURG UNIT  Speech Language Pathology  Treatment Note      Floyd Desai  1955  [id number]  [x]   confirmed      Date: 2022    Acute respiratory failure with hypoxia ([facility] [address] [J96.01]    Restrictions/Precautions: Fall Risk    Weight: 139 lb 4.8 oz (63.2 kg)     ADULT ORAL NUTRITION SUPPLEMENT; Breakfast, Dinner; Fortified Pudding Oral Supplement  ADULT DIET; Dysphagia - Soft and Bite Sized; No Added Salt (3-4 gm); Mildly Thick (Nectar)    SpO2: 99 % (22 0802)  O2 Flow Rate (L/min): 5 L/min (08/15/22 2111)  No active isolations    Speech Dx: Dysphagia    Subjective:  Cooperative and Pleasant      Patient resting in bed upon SLP arrival. Patient with normal vocal quality and clean, moist oral cavity. Interventions used this date:  Dysphagia Treatment      Objective/Assessment:  Patient progressing towards goals:  Short-term Goals  Timeframe for Short-term Goals: 2 weeks  Goal 1: Pt will complete oral motor ROM and strengthening exercises (labial/buccal) with 90% accuracy, in order to strengthen lingual/labial/buccal musculature to promote safety and efficiency of oral phase of swallow and decrease risk for pocketing. Not targeted    Goal 2: Pt will complete lingual ROM and strengthening exercises (lingual press, lingual pull backs) with 90% accuracy, in order to promote anterior to posterior propulsion of bolus and improve tongue base retraction. Patient recalled lingual press exercise with min-mod prompting. Patient reported he has forgotten to practice. Patient completed lingual press x10 with mod prompts. Goal 3: Pt will tolerate thermal stimulation of anterior faucial pillars followed by Nectar thick bolus in all opportunities to decrease swallow onset time.   Not targeted    Goal 4: Pt will tolerate therapeutic trials of Thin liquids via small single sips with adequate mastication and oral clearance of bolus with no overt s/s of aspiration on 100% trials. Patient tolerated 2 small sips of water via cup with no overt s/s of aspiration. Re-educated patient on results of MBS and pillars of aspiration. Due to continued O2 needs, recommend continuing NTL at this time. Goal 5: Pt will demonstrate Small-single sips strategies for safe and efficient swallow of recommended diet in all given opportunities. Patient demonstrated small sips x5 independently. Goal 6: Pt will tolerate the recommended diet level with no s/s of aspiration  Patient tolerated NTL sips x3 with no overt s/s of aspiration. Treatment/Activity Tolerance:  Patient tolerated treatment well    Plan:  Continue per POC    Pain Assessment:  Patient does not c/o pain. Pain Re-assessment:  Patient does not c/o pain. Patient/Caregiver Education:  Patient educated on session and progression towards goals. Education needs reinforcement. Safety Devices:   All fall risk precautions in place, Bed alarm in place, and Call light within reach    Therapy Time  SLP Individual Minutes  Time In: 1500  Time Out: [address]  Minutes: 10            Signature: Electronically signed by MARSHALL Durbin on 8/16/2022 at 3:19 PM

## 2022-08-16 NOTE — PROGRESS NOTES
[facility] OCCUPATIONAL THERAPY MED SURG TREATMENT NOTE     Date: 2022  Patient Name: Rolanda Diggs        MRN: [id number]  Account: [de-identified]   : 1955  (75 y.o.)  Room: I94/Y662-07    Chart Review:    Restrictions  Restrictions/Precautions  Restrictions/Precautions: Fall Risk     Safety:  Safety Devices  Type of Devices: All fall risk precautions in place;Call light within reach (PCA in patient's room upon therapist's departure.)    Patient's birthday verified: Yes    Subjective:    Subjective: \"I could do some exercises. \"       Pain at start of treatment: No    Pain at end of treatment: No        Objective:    Patient completed BUE strengthening exercises to improve BUE strength and endurance to promote independence with ADL and IADL related transfers. Patient used 2# dumbbell and required min visual cues for proper form of exercises.   1x15 elbow flexion/extension, scapular protraction/retraction        Therapy key for assistance levels -   Independent/Mod I = Pt. is able to perform task with no assistance but may require a device   Stand by assistance = Pt. does not perform task at an independent level but does not need physical assistance, requires verbal cues  Minimal, Moderate, Maximal Assistance = Pt. requires physical assistance (25%, 50%, 75% assist from helper) for task but is able to actively participate in task   Dependent = Pt. requires total assistance with task and is not able to actively participate with task completion            Transfers:       Bed Mobility  Bed mobility  Rolling to Left: Maximum assistance  Rolling to Right: Maximum assistance  Supine to Sit: Minimal assistance  Sit to Supine: Minimal assistance  Scooting: Minimal assistance    Seated and Standing Balance:  Balance  Sitting: Intact  Sitting - Static: Good (unsupported)    Functional Endurance:  Activity Tolerance  Activity Tolerance: Patient limited by fatigue  Activity Tolerance Comments: Patient requested to lay back in bed after 2 exercises due to fatigue. Treatment consisted of: Therapeutic activities    Assessment/Discharge Disposition:  Assessment  Discharge Recommendations: Subacute/Skilled Nursing Facility  Assessment: Patient with s/s of SOB while performing exercises EOB. Therapist instructed patient on deep breathing techniques and O2 was at 96%. [employer]  How much help for putting on and taking off regular lower body clothing?: A Lot  How much help for Bathing?: A Lot  How much help for Toileting?: A Lot  How much help for putting on and taking off regular upper body clothing?: A Little  How much help for taking care of personal grooming?: A Little  How much help for eating meals?: None  AM-PAC Inpatient Daily Activity Raw Score: 16  AM-PAC Inpatient ADL T-Scale Score : 35.96  ADL Inpatient CMS 0-100% Score: 53.32  ADL Inpatient CMS G-Code Modifier : CK    Plan:    Continue OT per POC    Patient Education:  Patient Education  Education Given To: Patient  Education Provided:  (Deep breathing techniques)  Education Method: Verbal;Demonstration  Barriers to Learning: None  Education Outcome: Demonstrated understanding;Verbalized understanding    Equipment recommendations:  OT Equipment Recommendations  Other: continue to assess- may need a shower chair and AD equipment for dressing    Goals/Plan of care addressed during this session:        Patient Goal: Patient goals : To get stronger    Improve Balance, Improve Strength, Improve [place] with ADLs, Improve [place] with Functional Transfers, Improve Cognition/Safety awareness, and Improve Endurance    Therapy Time:   Individual Group Co-Treat   Time In 1604       Time Out 1624         Minutes 20                Therapeutic activities: 20 minutes    Electronically signed by:     ABIDA Garcia    8/16/2022, 4:34 PM

## 2022-08-17 LAB
GLUCOSE BLD-MCNC: 114 MG/DL (ref 70–99)
GLUCOSE BLD-MCNC: 117 MG/DL (ref 70–99)
GLUCOSE BLD-MCNC: 78 MG/DL (ref 70–99)
GLUCOSE BLD-MCNC: 82 MG/DL (ref 70–99)
GLUCOSE BLD-MCNC: 96 MG/DL (ref 70–99)
PERFORMED ON: ABNORMAL
PERFORMED ON: ABNORMAL
PERFORMED ON: NORMAL

## 2022-08-17 PROCEDURE — 6370000000 HC RX 637 (ALT 250 FOR IP): Performed by: NURSE PRACTITIONER

## 2022-08-17 PROCEDURE — 1210000000 HC MED SURG R&B

## 2022-08-17 PROCEDURE — 6370000000 HC RX 637 (ALT 250 FOR IP): Performed by: PHYSICAL MEDICINE & REHABILITATION

## 2022-08-17 PROCEDURE — 2580000003 HC RX 258: Performed by: INTERNAL MEDICINE

## 2022-08-17 PROCEDURE — 90935 HEMODIALYSIS ONE EVALUATION: CPT

## 2022-08-17 PROCEDURE — C9113 INJ PANTOPRAZOLE SODIUM, VIA: HCPCS | Performed by: INTERNAL MEDICINE

## 2022-08-17 PROCEDURE — 2700000000 HC OXYGEN THERAPY PER DAY

## 2022-08-17 PROCEDURE — 6360000002 HC RX W HCPCS: Performed by: INTERNAL MEDICINE

## 2022-08-17 PROCEDURE — 6370000000 HC RX 637 (ALT 250 FOR IP): Performed by: INTERNAL MEDICINE

## 2022-08-17 PROCEDURE — 6360000002 HC RX W HCPCS: Performed by: NURSE PRACTITIONER

## 2022-08-17 PROCEDURE — 99232 SBSQ HOSP IP/OBS MODERATE 35: CPT | Performed by: INTERNAL MEDICINE

## 2022-08-17 PROCEDURE — A4216 STERILE WATER/SALINE, 10 ML: HCPCS | Performed by: INTERNAL MEDICINE

## 2022-08-17 PROCEDURE — 99232 SBSQ HOSP IP/OBS MODERATE 35: CPT | Performed by: PHYSICAL MEDICINE & REHABILITATION

## 2022-08-17 RX ADMIN — SUCRALFATE 1 G: 1 TABLET ORAL at 17:19

## 2022-08-17 RX ADMIN — SODIUM CHLORIDE, PRESERVATIVE FREE 40 MG: 5 INJECTION INTRAVENOUS at 08:13

## 2022-08-17 RX ADMIN — Medication 5 ML: at 21:12

## 2022-08-17 RX ADMIN — METOPROLOL TARTRATE 12.5 MG: 25 TABLET, FILM COATED ORAL at 20:22

## 2022-08-17 RX ADMIN — Medication 10 ML: at 08:18

## 2022-08-17 RX ADMIN — SUCRALFATE 1 G: 1 TABLET ORAL at 12:32

## 2022-08-17 RX ADMIN — MIDODRINE HYDROCHLORIDE 5 MG: 5 TABLET ORAL at 12:32

## 2022-08-17 RX ADMIN — DOCUSATE SODIUM 100 MG: 100 CAPSULE, LIQUID FILLED ORAL at 20:22

## 2022-08-17 RX ADMIN — TACROLIMUS 1 MG: 1 CAPSULE ORAL at 20:31

## 2022-08-17 RX ADMIN — SUCRALFATE 1 G: 1 TABLET ORAL at 05:53

## 2022-08-17 RX ADMIN — DOCUSATE SODIUM 100 MG: 100 CAPSULE, LIQUID FILLED ORAL at 08:12

## 2022-08-17 RX ADMIN — 0.12% CHLORHEXIDINE GLUCONATE 15 ML: 1.2 RINSE ORAL at 08:12

## 2022-08-17 RX ADMIN — MIDODRINE HYDROCHLORIDE 5 MG: 5 TABLET ORAL at 17:20

## 2022-08-17 RX ADMIN — MIDODRINE HYDROCHLORIDE 5 MG: 5 TABLET ORAL at 08:12

## 2022-08-17 RX ADMIN — Medication 10 ML: at 08:17

## 2022-08-17 RX ADMIN — SUCRALFATE 1 G: 1 TABLET ORAL at 00:08

## 2022-08-17 RX ADMIN — TACROLIMUS 1 MG: 1 CAPSULE ORAL at 08:13

## 2022-08-17 RX ADMIN — SODIUM CHLORIDE, PRESERVATIVE FREE 40 MG: 5 INJECTION INTRAVENOUS at 20:23

## 2022-08-17 RX ADMIN — EPOETIN ALFA-EPBX 20000 UNITS: 10000 INJECTION, SOLUTION INTRAVENOUS; SUBCUTANEOUS at 15:50

## 2022-08-17 RX ADMIN — 0.12% CHLORHEXIDINE GLUCONATE 15 ML: 1.2 RINSE ORAL at 20:24

## 2022-08-17 ASSESSMENT — PAIN SCALES - GENERAL: PAINLEVEL_OUTOF10: 0

## 2022-08-17 NOTE — PROGRESS NOTES
Internal Medicine   Hospitalist   Progress Note  20 day documentation     2022   1:46 PM    Name:  Leticia Carlin  MRN:    [id number]     IP Day: 21     Admit Date: 2022 11:15 AM  PCP: Mahsa Talavera MD    Code Status:  Full Code    Assessment and Plan: Active Problems/ diagnosis:     Acute hypoxic respiratory failure-on 6 L oxygen  Recent pneumonia-completed treatment  Epistaxis status post packing, removed, no further epistaxis  Acute upper GI bleeding due to duodenal ulcer-EGD this admission, hemoglobin stable, bleeding resolved  DVT in the lower extremity status post IVC filter this admission  Bilateral upper extremity DVTs in the setting of central line placement, central lines removed  Hypoglycemia-likely due to poor p.o. intake    Plan  Patient continues to improve clinically from respiratory standpoint. Wean down oxygen to achieve saturation above 92%. His hemoglobin is stable, continue to monitor  Contacted by [facility] on 2022, now they declined the patient as he is clinically improving and does not need tertiary center as per [facility] provider. Resume PT/OT  His glucose is more stable, last checked was above 200, discontinued D5 as per nephrologist recommendations. Monitor for fluid overload. Appreciate pulmonary medicine input  Nephrologist is following for end-stage renal disease/HD management  Resume PPIs  DVT PPx     7 pm- 7 am, please contact on call Hospitalist for any needs     Dispo- dc to SNF      Subjective:      no new events. Denies any new symptoms. Feels overall better.     Physical Examination:      Vitals:  BP (!) 124/42   Pulse (!) 107   Temp 97.6 °F (36.4 °C)   Resp 16   Ht 5' 6\" (1.676 m)   Wt 138 lb 3.7 oz (62.7 kg)   SpO2 99%   BMI 22.31 kg/m²   Temp (24hrs), Av.9 °F (36.6 °C), Min:97.6 °F (36.4 °C), Max:98.2 °F (36.8 °C)      General appearance: alert, cooperative and no distress  Mental Status: oriented to person, place and time and normal affect  Lungs: clear to auscultation bilaterally, normal effort  Heart: regular rate   Abdomen: soft, nontender, nondistended, bowel sounds present, no masses  Extremities: no tenderness in the calves  Skin: Multiple bruises on upper and lower extremities. No gross lesions, rashes    Data:     Labs:  No results for input(s): WBC, HGB, PLT in the last 72 hours. No results for input(s): NA, K, CL, CO2, BUN, CREATININE, GLUCOSE in the last 72 hours. No results for input(s): AST, ALT, ALB, BILITOT, ALKPHOS in the last 72 hours.     Current Facility-Administered Medications   Medication Dose Route Frequency Provider Last Rate Last Admin    midodrine (PROAMATINE) tablet 5 mg  5 mg Oral TID WC Nayla Walters MD   5 mg at 08/17/22 1232    epoetin ryan-epbx (RETACRIT) injection 20,000 Units  20,000 Units SubCUTAneous Q7 Days Nayla Walters MD   20,000 Units at 08/10/22 1802    sucralfate (CARAFATE) tablet 1 g  1 g Oral 4 times per day ISI Pink CNP   1 g at 08/17/22 1232    pill splitter   Does not apply Once Kamryn Pemberton MD        chlorhexidine (PERIDEX) 0.12 % solution 15 mL  15 mL Mouth/Throat BID Lilliam Sccandicein, DO   15 mL at 08/17/22 0812    0.9 % sodium chloride infusion   IntraVENous PRN anya Baldomero, DO        sodium chloride flush 0.9 % injection 5-40 mL  5-40 mL IntraVENous 2 times per day Sonya Guthrie MD   10 mL at 08/17/22 0817    sodium chloride flush 0.9 % injection 5-40 mL  5-40 mL IntraVENous PRN Sonya Guthrie MD        0.9 % sodium chloride infusion   IntraVENous PRN Sonya Guthrie MD        acetaminophen (TYLENOL) tablet 650 mg  650 mg Oral Q4H PRN Sonya Guthrie MD        ondansetron [facility] injection 4 mg  4 mg IntraVENous Q6H PRN Sonya Guthrie MD        hydrALAZINE (APRESOLINE) injection 10 mg  10 mg IntraVENous Q10 Min PRN Sonya Guthrie MD        labetalol (NORMODYNE;TRANDATE) injection 10 mg  10 mg IntraVENous Nikhil Members PRNAE Guthrie MD pantoprazole (PROTONIX) 40 mg in sodium chloride (PF) 10 mL injection  40 mg IntraVENous Q12H Pitney David, DO   40 mg at 08/17/22 0813    [Held by provider] enoxaparin (LOVENOX) injection 60 mg  1 mg/kg SubCUTAneous Daily Carmella Deckerin, DO        fluticasone (FLONASE) 50 MCG/ACT nasal spray 1 spray  1 spray Each Nostril Daily Sherren Mayans, DO   1 spray at 08/04/22 0902    sodium chloride flush 0.9 % injection 5-40 mL  5-40 mL IntraVENous 2 times per day Pitney David, DO   10 mL at 08/17/22 0818    sodium chloride flush 0.9 % injection 5-40 mL  5-40 mL IntraVENous PRN Pitney Dvaid, DO        0.9 % sodium chloride infusion   IntraVENous PRN Pitney David, DO        metoprolol tartrate (LOPRESSOR) tablet 12.5 mg  12.5 mg Oral BID Kiara Ruff MD   12.5 mg at 08/13/22 0959    [Held by provider] aspirin EC tablet 81 mg  81 mg Oral Daily Kiara Ruff MD   81 mg at 08/04/22 0876    docusate sodium (COLACE) capsule 100 mg  100 mg Oral BID ISI Marin - CNP   100 mg at 08/17/22 4290    insulin lispro (HUMALOG) injection vial 0-4 Units  0-4 Units SubCUTAneous 4x Daily AC & HS Katrik Schmidt MD   1 Units at 08/14/22 1123    polyethylene glycol (GLYCOLAX) packet 17 g  17 g Oral Daily ISI Marin CNP   17 g at 08/16/22 0759    tacrolimus (PROGRAF) capsule 1 mg  1 mg Oral BID ISI Marin - CNP   1 mg at 08/17/22 0813    acetaminophen (TYLENOL) tablet 650 mg  650 mg Oral Q6H PRN Kartik Schmidt MD   650 mg at 08/03/22 1661    Or    acetaminophen (TYLENOL) suppository 650 mg  650 mg Rectal Q6H PRN Kartik Schmidt MD        glucose chewable tablet 16 g  4 tablet Oral PRN ISI Marin CNP   16 g at 08/15/22 1136    dextrose bolus 10% 125 mL  125 mL IntraVENous PRN ISI Marin CNP   Stopped at 08/08/22 1737    Or    dextrose bolus 10% 250 mL  250 mL IntraVENous PRN ISI Marin CNP        glucagon (rDNA) injection 1 mg  1 mg SubCUTAneous PRN Cely Herrera Mauricio Ren, APRN - CNP        dextrose 10 % infusion   IntraVENous Continuous PRN Shailesh Almeida APRN - CNP        heparin (porcine) injection 1,000 Units  1,000 Units IntraVENous PRN Mic Epps MD           Additional work up or/and treatment plan may be added today or then after based on clinical progression. I am managing a portion of pt care. Some medical issues are handled by other specialists. Additional work up and treatment should be done in out pt setting by pt PCP and other out pt providers. In addition to examining and evaluating pt, I spent additional time explaining care, normaland abnormal findings, and treatment plan. All of pt questions were answered. Counseling, diet and education were provided. Case will be discussed with nursing staff when appropriate. Family will be updated if and when appropriate.        Electronically signed by Geovanny Chisholm DO on 8/17/2022 at 1:46 PM

## 2022-08-17 NOTE — PROGRESS NOTES
00:08- Pt resting on cart, meds provided per MAR, reagan care performed at this time, fluids provided, call light with in reach, pt denied any further needs at this time.

## 2022-08-17 NOTE — PROGRESS NOTES
nephNephrology Progress Note    Assessment:  ESRDX  Liver transplanr  COPD  Sepsis  Anemia  epistaxis    HD mwf now   Increased midodrine to 5 tid for low bps  Retacrit for anemia  Crit line with hd shows that he is likely dry at this point and doesn't need extra txs  Agree with rehab or SNF      Patient Active Problem List:     Hypotension     ESRD (end stage renal disease) on dialysis ([address]     Liver transplanted ([facility] [address]     Liver transplant recipient ([facility] [address]     Sepsis ([facility] [address]     Gastroenteritis     C. difficile colitis     Severe sepsis ([address]     Vomiting and diarrhea     Generalized abdominal pain     Acute renal failure ([address]     Acute respiratory failure with hypoxia ([facility] [address]     Pneumonia due to infectious organism     Pleural effusion     Impaired mobility and activities of daily living     Dysphagia, oropharyngeal phase     Epistaxis     Duodenal ulcer      Subjective:  Admit Date: 7/25/2022    Interval History: had 5 hd treatments last week for fluid overload. Had hd today. Awaiting possible transfer to rehab. Apparently denied so going to SNF.       Medications:  Scheduled Meds:   midodrine  5 mg Oral TID WC    epoetin ryan-epbx  20,000 Units SubCUTAneous Q7 Days    sucralfate  1 g Oral 4 times per day    pill splitter   Does not apply Once    chlorhexidine  15 mL Mouth/Throat BID    sodium chloride flush  5-40 mL IntraVENous 2 times per day    pantoprazole (PROTONIX) 40 mg injection  40 mg IntraVENous Q12H    [Held by provider] enoxaparin  1 mg/kg SubCUTAneous Daily    fluticasone  1 spray Each Nostril Daily    sodium chloride flush  5-40 mL IntraVENous 2 times per day    metoprolol tartrate  12.5 mg Oral BID    [Held by provider] aspirin  81 mg Oral Daily    docusate sodium  100 mg Oral BID    insulin lispro  0-4 Units SubCUTAneous 4x Daily AC & HS    polyethylene glycol  17 g Oral Daily    tacrolimus  1 mg Oral BID     Continuous Infusions:   sodium chloride      sodium chloride      sodium chloride dextrose         CBC:   No results for input(s): WBC, HGB, PLT in the last 72 hours. CMP:    No results for input(s): NA, K, CL, CO2, BUN, CREATININE, GLUCOSE, CALCIUM, LABGLOM in the last 72 hours. Troponin: No results for input(s): TROPONINI in the last 72 hours. BNP: No results for input(s): BNP in the last 72 hours. INR:   No results for input(s): INR in the last 72 hours. Lipids: No results for input(s): CHOL, LDLDIRECT, TRIG, HDL, AMYLASE, LIPASE in the last 72 hours. Liver:   Recent Labs     08/14/22  1419   LABALBU 2.9*       Iron:    Recent Labs     08/14/22  1419   FERRITIN 1,241*       Urinalysis: No results for input(s): UA in the last 72 hours. Objective:  Vitals: BP (!) 124/42   Pulse (!) 107   Temp 97.6 °F (36.4 °C)   Resp 16   Ht 5' 6\" (1.676 m)   Wt 138 lb 3.7 oz (62.7 kg)   SpO2 99%   BMI 22.31 kg/m²    Wt Readings from Last 3 Encounters:   08/17/22 138 lb 3.7 oz (62.7 kg)   06/28/22 145 lb (65.8 kg)   03/18/20 145 lb (65.8 kg)      24HR INTAKE/OUTPUT:    Intake/Output Summary (Last 24 hours) at 8/17/2022 1331  Last data filed at 8/17/2022 1209  Gross per 24 hour   Intake 840 ml   Output 2400 ml   Net -1560 ml         General: alert, weak improved though  HEENT: normocephalic, atraumatic, anicteric  Neck: supple, no mass  Lungs: non-labored respirations, clear to auscultation bilaterally  Heart: regular rate and rhythm, no murmurs or rubs  Abdomen: soft, non-tender, non-distended  Ext: 2+ edema UE.   None in LE  Neuro: alert and oriented, no gross abnormalities  Psych: normal mood and affect  Skin: no rash      Electronically signed by Luis Bruce MD, MD

## 2022-08-17 NOTE — PROGRESS NOTES
Pulmonary Progress Note    2022 6:22 AM    Subjective:   Admit Date: 2022  PCP: Astrid Randall MD    Chief Complaint   Patient presents with    Shortness of Breath     C/O SOB  AFTER DIALYSIS  88 % ON RA  GIVEN 2 DUONEBS  SOLU MEDROL      Interval History: currently on 3 L NC. Doing better. Denies fever or chills. SOB has improved. 14 points review of systems has been obtained and negative except to was mentioned in HPI.      Medications:   Scheduled Meds:   midodrine  5 mg Oral TID WC    epoetin ryan-epbx  20,000 Units SubCUTAneous Q7 Days    sucralfate  1 g Oral 4 times per day    pill splitter   Does not apply Once    chlorhexidine  15 mL Mouth/Throat BID    sodium chloride flush  5-40 mL IntraVENous 2 times per day    pantoprazole (PROTONIX) 40 mg injection  40 mg IntraVENous Q12H    [Held by provider] enoxaparin  1 mg/kg SubCUTAneous Daily    fluticasone  1 spray Each Nostril Daily    sodium chloride flush  5-40 mL IntraVENous 2 times per day    metoprolol tartrate  12.5 mg Oral BID    [Held by provider] aspirin  81 mg Oral Daily    docusate sodium  100 mg Oral BID    insulin lispro  0-4 Units SubCUTAneous 4x Daily AC & HS    polyethylene glycol  17 g Oral Daily    tacrolimus  1 mg Oral BID     Continuous Infusions:   sodium chloride      sodium chloride      sodium chloride      dextrose           Objective:   Vitals:   Temp (24hrs), Av.5 °F (36.9 °C), Min:98.2 °F (36.8 °C), Max:98.8 °F (37.1 °C)    BP (!) 121/97   Pulse (!) 102   Temp 98.2 °F (36.8 °C) (Oral)   Resp 18   Ht 5' 6\" (1.676 m)   Wt 139 lb 4.8 oz (63.2 kg)   SpO2 100%   BMI 22.48 kg/m²   I/O:24HR INTAKE/OUTPUT:    Intake/Output Summary (Last 24 hours) at 2022 0623  Last data filed at 2022 0554  Gross per 24 hour   Intake 240 ml   Output --   Net 240 ml      0701 -  0700  In: 240 [P.O.:240]  Out: -   CVP:             Ventilator Settings:  Vent Mode: CPAP  Resp Rate (Set): 18 bmp   Vt (Set, mL): 390 mL   Pressure Support: 5 cmH20   PEEP/CPAP (cmH2O): 5   FiO2 : 50 %     Physical Exam:  General appearance - alert, ill appearing, and in mild distress  Mental status - alert, oriented to person, place, and time  Eyes - pupils equal and reactive, extraocular eye movements intact  Nose - normal and patent, no erythema, discharge or polyps  Neck - supple, no significant adenopathy  Chest - clear to auscultation, no wheezes, rales or rhonchi, symmetric air entry  Heart - normal rate, regular rhythm, normal S1, S2, no murmurs, rubs, clicks or gallops  Abdomen - soft, nontender, nondistended, no masses or organomegaly  Rectal - deferred, not clinically indicated  Neurological - alert, oriented, normal speech, no focal findings or movement disorder noted, motor and sensory grossly normal bilaterally  Musculoskeletal - no joint tenderness, deformity or swelling  Extremities - peripheral pulses normal, no pedal edema, no clubbing or cyanosis  Skin - normal coloration and turgor, no rashes, no suspicious skin lesions noted                     Assessment and Plan:       Impression:    - acute hypoxic RF. This has improved, currently on 3 L  - CAP. Has finished treatment  - epistaxis s/p packing  - LE DVT  -ESRD on HD    Recommendations:    - wean off O2. Goal to have sat >90%. Dropped O2 to 2 L.   - continue bronchodilators  - incentive spirometry  - HD per nephrology  - DC planning        Electronically signed by Lorrie Matt MD on 8/17/2022 at 6:23 AM

## 2022-08-17 NOTE — FLOWSHEET NOTE
08/17/22 1209   Vital Signs   /60   Temp 97.6 °F (36.4 °C)   Heart Rate 69   Resp 18   SpO2 99 %   Weight 138 lb 3.7 oz (62.7 kg)   Weight Method Bed scale   Percent Weight Change -2.79   Post-Hemodialysis Assessment   Post-Treatment Procedures Blood returned; Access bleeding time < 10 minutes   Machine Disinfection Process Acid/Vinegar Clean;Heat Disinfect; Exterior Machine Disinfection   Dialyzer Clearance Moderately streaked   Duration of Treatment (minutes) 180 minutes   Heparin Amount Administered During Treatment (mL) 0 mL   Hemodialysis Intake (ml) 400 ml   Hemodialysis Output (ml) 2400 ml   NET Removed (ml) 2000   Tolerated Treatment Good   Patient Response to Treatment Pt tolerated tx well. Profile B maintained throughout tx. [place] pulled. Hemostasis achieved. Dressing applied. Report called to floor RN.  Pt stable   Bilateral Breath Sounds Diminished   Edema Right upper extremity   Edema Generalized +3;Pitting   Time Off 1145   Patient Disposition Return to room

## 2022-08-17 NOTE — CARE COORDINATION
Message received from Rowan Mancilla with [facility] precert denied: PER MD Yanelis Mccoy IRF Nora Lopez  PM&R Dr Sanjay Dahl notified and case reviewed. Patient ok for SNF LOC d/t prolonged therapy needs. Jai GAMBINO notified.  Electronically signed by Dorian Lerner RN on 8/17/22 at 7:35 AM EDT

## 2022-08-17 NOTE — PROGRESS NOTES
Physical Therapy Missed Treatment   Facility/Department: [facility] MED SURG [id number]    NAME: Suzi Lowe    : 1955 (75 y.o.)  MRN: [id number]    Account: [de-identified]  Gender: male    Chart reviewed, attempted PT at 200. Patient unavailable 2° to:    [] Hold per nsg request    [] Pt declined   [] Nsg notified   [] Other notified    [] Pt. . off floor for test/procedure. [x] Pt. Unavailable due to being in dialysis      Will attempt PT treatment again at earliest convenience.       Electronically signed by Guerline Owens PTA on 22 at 8:47 AM EDT

## 2022-08-17 NOTE — CARE COORDINATION
PER CALL FROM SHANIQUA WITH REHAB, PATIENT NOT APPROPRIATE FOR IRF, WILL NEED SNF AT [place]. REFERRAL AGAIN CALLED TO [facility] AS PREVIOUSLY REQUESTED.   PATIENT ACCEPTED TO [facility], WILL NEED FAMILY TO BRING PROGRAF FROM HOME, SPOKE WITH PATIENT AND HE STATES SOMEONE WILL BE ABLE TO BRING IN, WILL NEED AUTH FOR [facility]-WILL NEED UPDATED PT/OT ON 8/18 AND POSSIBLE FLOOR TO SNF PER [facility] IF CLICK SCORE 6-75, WILL NEED TO SEND CHAIR TIME AND DIALYSIS LOCATION TO [facility] AS WELL

## 2022-08-17 NOTE — PROGRESS NOTES
[facility]   Facility/Department: [address] [place] [address]  Speech Language Pathology    Kamar Watkins  1955  [id number]    Date: 8/17/2022      Speech Therapy attempted to see Kamar Piercetere on this date for a/an:    Treatment    Pt was unable to be seen due to:   Patient is currently off unit. Pt in dialysis. SLP to re-attempt as able.         Electronically signed by MARSHALL Calvert on 8/17/22 at 10:46 AM EDT

## 2022-08-17 NOTE — PROGRESS NOTES
Shift assessment complete. VSS. BP running low: midodrine given per order. Metoprolol held per Dr. Pamela Tierney. A&Ox4. Lung sounds are diminished. SOB on exertion. Pt denies having any pain. No nausea present. Weakness noted BLE. Sinus TACHY at times. Meds given per MAR. Abdomen is soft and rounded. Bowel sounds are active. Ecchymosis present chest and upper arms. C/O diarrhea. Glycolax held. BUE 2+ edema present. Pt resting in bed with call light in reach. No further needs at this time. 0800 - Pt down to dialysis, report given   1215 - Pt returned to [facility]. 2 liters of fluid removed. VSS.      Electronically signed by Delaney Regan RN on 8/17/2022 at 1:17 PM

## 2022-08-17 NOTE — PROGRESS NOTES
Subjective: The patient complains of severe acute on chronic progressive fatigue and generalized weakness partially relieved by rest, PT, OT and meds and hemodialysis and exacerbated by exertion and recent illness. He was initially admitted via the ER with SOB. He was diagnosed with hypoxia acute respiratory failure end-stage renal disease macrocytic anemia and hyperglycemia secondary to diabetes mellitus. He was admitted to the ICU. He is on IV Bactrim for pneumonia. GI was seeing him an duodenal ulcer GI bleed. Medically is complicated by history of a liver transplant for hepatitis. He is on immunosuppressants. He gets dialysis Monday Wednesday and Friday. I am concerned about patients medical complexities including:  Principal Problem:    Acute respiratory failure with hypoxia (HCC)  Active Problems:    Pneumonia due to infectious organism    Pleural effusion    Impaired mobility and activities of daily living    Dysphagia, oropharyngeal phase    Epistaxis    Duodenal ulcer    Severe malnutrition (HCC)    ESRD (end stage renal disease) on dialysis [facility])    Liver transplant recipient [facility])  Resolved Problems:    * No resolved hospital problems. *      .    Reviewed recent nursing note and discussed current status and planned care with acute care providers, \" Met with patient again regarding [facility]. [facility] transfer was cancelled and patient requiring therapy before returning home alone. Patient is interested in staying at [facility] if possible, states he needs therapy because he cant even walk right now. Patient lives alone, elevator to apartment, sister local and friend lives floor below in apt building. Message left for PM&R Dr Hina Snell for potential ARU. Patient would need precert approval from [facility].\"....\", vss, BP right lower extremity, Denies pain/discomfort, Small bm, cleaned/ zinc to coccyx, repositioned for comfort/breakfast, Am medications given per mar.  Denies further needs, call light within reach. \"     Patient is for hemodialysis today. The patient has been approved for acute rehab in the past remains medically complicated his insurance company is now stating that he is not medically complicated enough to do acute rehab. I disagree with him though in the past I felt a very very difficult to reason with. ROS x10: The patient also complains of severely impaired mobility and activities of daily living. Otherwise no new problems with vision, hearing, nose, mouth, throat, dermal, cardiovascular, GI, , pulmonary, musculoskeletal, psychiatric or neurological.        Vital signs:  BP (!) 121/59   Pulse 76   Temp 98.1 °F (36.7 °C) (Oral)   Resp 16   Ht 5' 6\" (1.676 m)   Wt 139 lb 4.8 oz (63.2 kg)   SpO2 97%   BMI 22.48 kg/m²   I/O:   PO/Intake:    fair PO intake, monitoring for dysphagia    Bowel/Bladder:   continent,    General:  Patient is well developed, adequately nourished, and    well kempt. HEENT:    PERRLA, hearing intact to loud voice, external inspection of ear and nose -nasal packing. Inspection of lips, tongue -dark and coated-gums benign, nosebleed left nares has nasal packing in place. From nasal cannula O2 patient is also on some blood thinners. Musculoskeletal: No significant change in strength or tone. All joints stable. Inspection and palpation of digits and nails show no clubbing, cyanosis or inflammatory conditions. Neuro/Psychiatric: Affect: flat-  Alert and oriented to self and situation with  min-mod cues. No significant change in deep tendon reflexes or sensation  Lungs:  Diminished, CTA-B  . Respiration effort is normal at rest.   Heart:   S1 = S2,   RRR. Abdomen:  Soft, non-tender    Extremities:  Trace  lower extremity edema but no unusual tenderness. non Fx RUE fistula--functional LUE fistula. Skin:   BUE bruises dt blood draws-his bruises and skin tears.       Rehabilitation:  Physical Therapy:   Bed mobility:  Bed mobility  Rolling to Left: Contact guard assistance;Minimal assistance (08/15/22 1522)  Rolling to Right: Minimal assistance (08/03/22 0950)  Supine to Sit: Minimal assistance (08/15/22 1522)  Sit to Supine: Contact guard assistance (08/15/22 1522)  Scooting: Stand by assistance (08/15/22 1522)  Bed Mobility Comments: HOB elevated due to SpO2 dropping; Pt requied max assist to scoot up in bed but able to scoot from side of bed over to middle; Able to maintain balance at EOB with perturbations in all directions. SpO2 would drop with pt looking and saying he was ok with immediate come back up to %. Not sure if getting good reading on finger. (08/15/22 1522)  Transfers:  Transfers  Sit to Stand:  Moderate Assistance (08/15/22 1525)  Stand to sit: Contact guard assistance (08/14/22 0911)  Comment: Attempted to stand at [employer], unable to get up. (08/15/22 1525)  Gait:   Ambulation  Surface: level tile (08/04/22 1306)  Device: Rolling Walker (08/04/22 1306)  Other Apparatus: O2 (2L O2 NC, increased to 4L O2 NC RN notified and aware) (08/04/22 1306)  Assistance: Minimal assistance (08/04/22 1306)  Quality of Gait: flexed posture, decresaed bilateral step length and foot clearance, unsteady (08/04/22 1306)  Gait Deviations: Slow Whitney;Decreased step length;Decreased step height;Decreased arm swing (08/04/22 1306)  Distance: 5ft to chair (08/04/22 1306)  Comments: SOB upon exertion, HR elevated 110s with exertional dyspnea observed, O2 increased d/t low O2 sats (08/03/22 0950)  Overall Level of Assistance:  (unable to assess d/t endurance) (08/14/22 0918)  Stairs:     W/C mobility:       Occupational Therapy:   Hand Dominance: Left  ADL  Feeding: Independent (08/14/22 0918)  Grooming: Setup (08/14/22 0918)  UE Bathing: Minimal assistance (08/14/22 0918)  UE Bathing Skilled Clinical Factors: decreased ROM of B UE (08/14/22 0918)  LE Bathing: Maximum assistance (08/14/22 0918)  UE Dressing: Minimal assistance (08/14/22 7625)  LE Dressing: Maximum assistance (08/14/22 0918)  Toileting: Maximum assistance (08/14/22 0918)  Toileting Skilled Clinical Factors: Incontinent of dark loose stool; LPN notified (24/13/24 9646)  Additional Comments: Simulated ADLs as above. Pt. significantly limited d/t fatigue and SOB. (08/03/22 1305)  Toilet Transfers  Equipment Used: Grab bars (08/14/22 0285)  Toilet Transfer: Minimal assistance (08/14/22 0917)  Toilet Transfers Comments: Anticipate min A (08/03/22 0946)     Shower Transfers  Shower Transfers: Not tested (08/14/22 0917)    Speech Therapy:            Diet/Swallow:        Dysphagia Outcome Severity Scale: Level 5: Mild dysphagia- Distant supervision. May need one diet consistency restricted  Compensatory Swallowing Strategies : Upright as possible for all oral intake, Small bites/sips, Assist feed, Eat/Feed slowly  Therapeutic Interventions: Patient/Family education, Diet tolerance monitoring    COGNITION  OT: Cognition Comment: Verbal cues for safety and sequencing  SP:           Lab/X-ray studies reviewed, analyzed and discussed with patient and staff:   Recent Results (from the past 24 hour(s))   POCT Glucose    Collection Time: 08/16/22 11:02 AM   Result Value Ref Range    POC Glucose 121 (H) 70 - 99 mg/dl    Performed on ACCU-CHEK    POCT Glucose    Collection Time: 08/16/22  4:08 PM   Result Value Ref Range    POC Glucose 98 70 - 99 mg/dl    Performed on ACCU-CHEK    POCT Glucose    Collection Time: 08/16/22  8:43 PM   Result Value Ref Range    POC Glucose 109 (H) 70 - 99 mg/dl    Performed on ACCU-CHEK    POCT Glucose    Collection Time: 08/17/22  7:19 AM   Result Value Ref Range    POC Glucose 78 70 - 99 mg/dl    Performed on ACCU-CHEK      Previous extensive, complex labs, notes and diagnostics reviewed and analyzed.      ALLERGIES:    Allergies as of 07/25/2022    (No Known Allergies)      (please also verify by checking [facility])     Complex Physical Medicine & Rehab Issues Assess & Plan:   Severe abnormality of gait and mobility and impaired self-care and ADL's secondary to hypoxic encephalopathy. Updated functional and medical status reassessed regarding patients ability to participate in therapies and patient found to be able to participate in:      acute intensive comprehensive inpatient rehabilitation program including PT/OT to improve balance, ambulation, ADLs, and to improve the P/AROM. It is my opinion that they will be able to tolerate 3 hours of therapy a day and benefit from it at an acute level. I again discussed acute rehab with the patient and verify that the patient is able and willing to participate in 3 hours of therapy a day. Rehab and Acute Care Case Management has also reinforced this expectation. Will continue to follow to attempt to get patient to the most efficient but most effective level of care will be in their best interest.  Continue to focus on energy conservation heart rate and blood pressure monitoring before during and after therapy endurance and consistency of function. Bowel constipation   and Bladder dysfunction   overactive, neurogenic bladder:  frequent toileting, ambulate to bathroom with assistance, check post void residuals. Check for C.difficile x1 if >2 loose stools in 24 hours, continue bowel & bladder program.  Monitor for UTI symptoms including lethargy and confusion    Moderate back pain and generalized body aches and pain likely secondary to renal osteodystrophy and generalized OA pain: reassess pain every shift and prior to and after each therapy session, I advise giving prn OxyIR--avoid Tylenol DT liver risks, modalities prn in therapy, consider Lidoderm, K-pad prn. Skin healing   multiple upper extremity skin tears breakdown   risk:  continue pressure relief program.  Daily skin exams and reports from nursing.     Severe fatigue due to immobility and nutritional deficits: continue to monitor closely for dehydration   Add vitamin B12 vitamin D and CoQ10 titrate dosing and add protein supplementation with low carb content. 6.  DVT with DVT prophylaxis and end-stage renal disease holding Lovenox patient is status post IVC filter 8/5/2022. Complex discharge planning:   Discussed with care team-last 24 hour events noted. I will continue to follow along and reassess functional and medical status as we strive to improve patient's functional and medical outcomes progressing to the most efficient and lowest level of care. Complex Active General Medical Issues that complicate care:     1. Principal Problem:    Acute respiratory failure with hypoxia (HCC)  Active Problems:    Pneumonia due to infectious organism    Pleural effusion    Impaired mobility and activities of daily living    Dysphagia, oropharyngeal phase    Epistaxis    Duodenal ulcer    Severe malnutrition (HCC)    ESRD (end stage renal disease) on dialysis [facility])    Liver transplant recipient [facility])  Resolved Problems:    * No resolved hospital problems. *          Events and functional changes in the past 24 hours reviewed improvements in functional status are encouraging       Focus of today's plan-     continue to prophylax against nosebleed Afrin and Ocean nasal spray and bacitracin. Is now medically more stable able to participate in PT and OT and we will attempt recertification with will require   [facility] who had certified him for his medical complications.       Angie Jenkins D.O., PM&R     Attending    [address]

## 2022-08-18 VITALS
RESPIRATION RATE: 16 BRPM | SYSTOLIC BLOOD PRESSURE: 127 MMHG | DIASTOLIC BLOOD PRESSURE: 81 MMHG | BODY MASS INDEX: 22.22 KG/M2 | HEART RATE: 99 BPM | OXYGEN SATURATION: 99 % | HEIGHT: 66 IN | WEIGHT: 138.23 LBS | TEMPERATURE: 98.1 F

## 2022-08-18 LAB
ANION GAP SERPL CALCULATED.3IONS-SCNC: 14 MEQ/L (ref 9–15)
BASOPHILS ABSOLUTE: 0.1 K/UL (ref 0–0.2)
BASOPHILS RELATIVE PERCENT: 0.9 %
BUN BLDV-MCNC: 21 MG/DL (ref 8–23)
CALCIUM SERPL-MCNC: 8.7 MG/DL (ref 8.5–9.9)
CHLORIDE BLD-SCNC: 93 MEQ/L (ref 95–107)
CO2: 27 MEQ/L (ref 20–31)
CREAT SERPL-MCNC: 3.88 MG/DL (ref 0.7–1.2)
EOSINOPHILS ABSOLUTE: 0.1 K/UL (ref 0–0.7)
EOSINOPHILS RELATIVE PERCENT: 1.8 %
GFR AFRICAN AMERICAN: 18.8
GFR NON-AFRICAN AMERICAN: 15.5
GLUCOSE BLD-MCNC: 126 MG/DL (ref 70–99)
GLUCOSE BLD-MCNC: 131 MG/DL (ref 70–99)
GLUCOSE BLD-MCNC: 143 MG/DL (ref 70–99)
GLUCOSE BLD-MCNC: 75 MG/DL (ref 70–99)
GLUCOSE BLD-MCNC: 84 MG/DL (ref 70–99)
HCT VFR BLD CALC: 25 % (ref 42–52)
HEMOGLOBIN: 8 G/DL (ref 14–18)
LYMPHOCYTES ABSOLUTE: 0.4 K/UL (ref 1–4.8)
LYMPHOCYTES RELATIVE PERCENT: 6.2 %
MCH RBC QN AUTO: 32.5 PG (ref 27–31.3)
MCHC RBC AUTO-ENTMCNC: 31.8 % (ref 33–37)
MCV RBC AUTO: 102.2 FL (ref 80–100)
MONOCYTES ABSOLUTE: 1 K/UL (ref 0.2–0.8)
MONOCYTES RELATIVE PERCENT: 14.7 %
NEUTROPHILS ABSOLUTE: 5.4 K/UL (ref 1.4–6.5)
NEUTROPHILS RELATIVE PERCENT: 76.4 %
PDW BLD-RTO: 15.8 % (ref 11.5–14.5)
PERFORMED ON: ABNORMAL
PERFORMED ON: NORMAL
PLATELET # BLD: 342 K/UL (ref 130–400)
POTASSIUM SERPL-SCNC: 4.8 MEQ/L (ref 3.4–4.9)
RBC # BLD: 2.45 M/UL (ref 4.7–6.1)
SARS-COV-2, NAAT: NOT DETECTED
SODIUM BLD-SCNC: 134 MEQ/L (ref 135–144)
WBC # BLD: 7 K/UL (ref 4.8–10.8)

## 2022-08-18 PROCEDURE — 80048 BASIC METABOLIC PNL TOTAL CA: CPT

## 2022-08-18 PROCEDURE — 36415 COLL VENOUS BLD VENIPUNCTURE: CPT

## 2022-08-18 PROCEDURE — 6370000000 HC RX 637 (ALT 250 FOR IP): Performed by: INTERNAL MEDICINE

## 2022-08-18 PROCEDURE — C9113 INJ PANTOPRAZOLE SODIUM, VIA: HCPCS | Performed by: INTERNAL MEDICINE

## 2022-08-18 PROCEDURE — 99232 SBSQ HOSP IP/OBS MODERATE 35: CPT | Performed by: INTERNAL MEDICINE

## 2022-08-18 PROCEDURE — 97116 GAIT TRAINING THERAPY: CPT

## 2022-08-18 PROCEDURE — 99231 SBSQ HOSP IP/OBS SF/LOW 25: CPT | Performed by: PHYSICAL MEDICINE & REHABILITATION

## 2022-08-18 PROCEDURE — 2580000003 HC RX 258: Performed by: INTERNAL MEDICINE

## 2022-08-18 PROCEDURE — 87635 SARS-COV-2 COVID-19 AMP PRB: CPT

## 2022-08-18 PROCEDURE — 6360000002 HC RX W HCPCS: Performed by: NURSE PRACTITIONER

## 2022-08-18 PROCEDURE — 85025 COMPLETE CBC W/AUTO DIFF WBC: CPT

## 2022-08-18 PROCEDURE — 6370000000 HC RX 637 (ALT 250 FOR IP): Performed by: NURSE PRACTITIONER

## 2022-08-18 PROCEDURE — 6360000002 HC RX W HCPCS: Performed by: INTERNAL MEDICINE

## 2022-08-18 PROCEDURE — 92526 ORAL FUNCTION THERAPY: CPT

## 2022-08-18 RX ORDER — ASPIRIN 81 MG/1
81 TABLET ORAL DAILY
Qty: 30 TABLET | Refills: 3 | DISCHARGE
Start: 2022-08-18

## 2022-08-18 RX ORDER — SUCRALFATE 1 G/1
1 TABLET ORAL 4 TIMES DAILY
Qty: 120 TABLET | Refills: 3 | DISCHARGE
Start: 2022-08-18

## 2022-08-18 RX ORDER — FLUTICASONE PROPIONATE 50 MCG
1 SPRAY, SUSPENSION (ML) NASAL DAILY
Qty: 16 G | Refills: 3 | DISCHARGE
Start: 2022-08-19

## 2022-08-18 RX ORDER — CHLORHEXIDINE GLUCONATE 0.12 MG/ML
15 RINSE ORAL 2 TIMES DAILY
Qty: 420 ML | Refills: 0 | DISCHARGE
Start: 2022-08-18 | End: 2022-09-01

## 2022-08-18 RX ORDER — PANTOPRAZOLE SODIUM 40 MG/1
40 TABLET, DELAYED RELEASE ORAL
Qty: 180 TABLET | Refills: 1 | DISCHARGE
Start: 2022-08-18

## 2022-08-18 RX ADMIN — Medication 10 ML: at 09:41

## 2022-08-18 RX ADMIN — METOPROLOL TARTRATE 12.5 MG: 25 TABLET, FILM COATED ORAL at 09:38

## 2022-08-18 RX ADMIN — TACROLIMUS 1 MG: 1 CAPSULE ORAL at 18:52

## 2022-08-18 RX ADMIN — SUCRALFATE 1 G: 1 TABLET ORAL at 00:52

## 2022-08-18 RX ADMIN — SUCRALFATE 1 G: 1 TABLET ORAL at 11:40

## 2022-08-18 RX ADMIN — METOPROLOL TARTRATE 12.5 MG: 25 TABLET, FILM COATED ORAL at 18:45

## 2022-08-18 RX ADMIN — MIDODRINE HYDROCHLORIDE 5 MG: 5 TABLET ORAL at 09:38

## 2022-08-18 RX ADMIN — SODIUM CHLORIDE, PRESERVATIVE FREE 40 MG: 5 INJECTION INTRAVENOUS at 09:38

## 2022-08-18 RX ADMIN — SUCRALFATE 1 G: 1 TABLET ORAL at 06:24

## 2022-08-18 RX ADMIN — SUCRALFATE 1 G: 1 TABLET ORAL at 18:44

## 2022-08-18 RX ADMIN — TACROLIMUS 1 MG: 1 CAPSULE ORAL at 09:38

## 2022-08-18 NOTE — PROGRESS NOTES
[facility]  Facility/Department: [facility]  Speech Language Pathology   Treatment Note      Mildred Sas  1955  [id number]  [x]   confirmed      Date: 2022    Acute respiratory failure with hypoxia ([address] [J96.01]    Restrictions/Precautions: Fall Risk    Weight: 138 lb 3.7 oz (62.7 kg)     ADULT ORAL NUTRITION SUPPLEMENT; Breakfast, Dinner; Fortified Pudding Oral Supplement  ADULT DIET; Dysphagia - Soft and Bite Sized; No Added Salt (3-4 gm); Mildly Thick (Nectar)    SpO2: 99 % (22 0807)  O2 Flow Rate (L/min): 3 L/min   No active isolations    Speech Dx: Dysphagia    Subjective:  Alert, Cooperative, and Pleasant      Decreased O2 needs compared to yesterday. Patient reports dislike of thickened liquids. Interventions used this date:  Dysphagia Treatment and Instruction in Compensatory Strategies      Objective/Assessment:  Patient progressing towards goals:  Short-term Goals  Timeframe for Short-term Goals: 2 weeks  Goal 1: Pt will complete oral motor ROM and strengthening exercises (labial/buccal) with 90% accuracy, in order to strengthen lingual/labial/buccal musculature to promote safety and efficiency of oral phase of swallow and decrease risk for pocketing. Patient completed tongue lateralization exercises, 4 sets of 5, with fair effort with occasional verbal cues. Goal 2: Pt will complete lingual ROM and strengthening exercises (lingual press, lingual pull backs) with 90% accuracy, in order to promote anterior to posterior propulsion of bolus and improve tongue base retraction. Patient completed tongue press exercise x15 with fair effort with occasional verbal cues. Goal 3: Pt will tolerate thermal stimulation of anterior faucial pillars followed by Nectar thick bolus in all opportunities to decrease swallow onset time.   Not addressed  Goal 4: Pt will tolerate therapeutic trials of Thin liquids via small single sips with adequate mastication and oral clearance of bolus with no overt s/s of aspiration on 100% trials. Patient tolerated single sips of thin liquid via cup without overt s/s of aspiration in all opportunities, approximately 5oz total.   Goal 5: Pt will demonstrate Small-single sips strategies for safe and efficient swallow of recommended diet in all given opportunities. Following initial education, patient able to implement small, single sip strategy via cup independently. Goal 6: Pt will tolerate the recommended diet level with no s/s of aspiration  Patient declined food at this time due to having already eaten breakfast but denied difficulty. Recommend diet change to thin liquids, no straws, single sips and continue with soft & bite sized solids. Discussed with RN Robb Schaumann. Treatment/Activity Tolerance:  Patient tolerated treatment well    Plan: Alter current POC (see above)    Pain Assessment:  Patient does not c/o pain. Pain Re-assessment:  Patient does not c/o pain. Patient/Caregiver Education:  Patient educated on session and progression towards goals. Patient stated verbal understanding of directions.     Safety Devices:  Bed alarm in place and Call light within reach      Therapy Time  Time in: [id number]  Time out: 0850  Total minutes: 11      Signature: Electronically signed by Alex Love SLP on 8/18/2022 at 9:37 AM

## 2022-08-18 NOTE — PROGRESS NOTES
Physical Therapy Med Surg Daily Treatment Note  Facility/Department: [facility]  Room: [place] [address]       NAME: Barrie High  : 1955 (75 y.o.)  MRN: [id number]  CODE STATUS: Full Code    Date of Service: 2022    Patient Diagnosis(es): Acute respiratory failure with hypoxia [facility]) [J96.01]   Chief Complaint   Patient presents with    Shortness of Breath     C/O SOB  AFTER DIALYSIS  88 % ON RA  GIVEN 2 DUONEBS  SOLU MEDROL      Patient Active Problem List    Diagnosis Date Noted    Severe malnutrition ([facility] [address] 2022    Duodenal ulcer 2022    Impaired mobility and activities of daily living 2022    Dysphagia, oropharyngeal phase 2022    Epistaxis 2022    Pneumonia due to infectious organism 2022    Pleural effusion 2022    Acute respiratory failure with hypoxia (HCC) 2022    Vomiting and diarrhea     Generalized abdominal pain     Acute renal failure (HCC)     Gastroenteritis     C. difficile colitis     Severe sepsis ([address]     Hypotension 2017    ESRD (end stage renal disease) on dialysis ([facility] [address] 2017    Liver transplanted ([facility] [address] 2017    Liver transplant recipient [facility])     Sepsis [facility])         Past Medical History:   Diagnosis Date    ESRD (end stage renal disease) ([address]     Hemodialysis patient ([facility] [address]     Hepatitis     Hypertension     Liver transplanted ([facility] [address] 2016     Past Surgical History:   Procedure Laterality Date    DIALYSIS FISTULA CREATION Right     IR MIDLINE CATH  2022    IR MIDLINE CATH 2022 MLOZ SPECIAL PROCEDURE    LIVER TRANSPLANT  2017    TUNNELED VENOUS PORT PLACEMENT      UPPER GASTROINTESTINAL ENDOSCOPY N/A 2022    EGD ESOPHAGOGASTRODUODENOSCOPY WITH INTERVENTION performed by Sarwat Larkin MD at [facility]            Restrictions:  Restrictions/Precautions: Fall Risk    SUBJECTIVE:   Subjective: Pt agreeable to tx    Pain  Pain: denies pain pre and post tx    OBJECTIVE: Bed mobility  Supine to Sit: Minimal assistance  Sit to Supine: Minimal assistance    Transfers  Sit to Stand: Minimal Assistance  Stand to sit: Minimal Assistance    Ambulation  Surface: level tile  Device: Rolling Walker  Other Apparatus: O2  Assistance: Moderate assistance  Quality of Gait: flexed posture, decresaed bilateral step length and foot clearance, unsteady  Gait Deviations: Slow Whitney;Decreased step length;Decreased step height;Decreased arm swing  Distance: 8ft       ASSESSMENT   Assessment: Pt with fair abilities with AD. Verbal cues to breath through nose for 02. Discharge Recommendations:  Patient would benefit from continued therapy after discharge, Continue to assess pending progress         Goals  Long Term Goals  Long term goal 1: Pt will be independent and safe with all bed mobility and transfers. Long term goal 2: Pt will be able to demonstrate good static and standing balance to reduce risk for falls with standing with support for >/= 2'. Long term goal 3: Pt will ambulate with LRD >/= 48' CGA to improve pt's overall mobility  Long term goal 4: Improve gianluca LE strength to achieve all goals and increase ease with mobility. Long term goal 5: Pt will be supervision with HEP to improve LE strength, ROM, and activity tolerance  Patient Goals   Patient goals : to get my therapy and get stronger    PLAN    Plan: 1 time a day 3-6 times a week  Safety Devices  Type of Devices: All fall risk precautions in place, Call light within reach, Bed alarm in place, Left in bed     AMPA (6 CLICK) BASIC MOBILITY  AM-PAC Inpatient Mobility Raw Score : 14     Therapy Time   Individual   Time In 1135   Time Out 1150   Minutes [address] , [place], 08/18/22 at 12:02 PM         Definitions for assistance levels  Independent = pt does not require any physical supervision or assistance from another person for activity completion. Device may be needed.   Stand by assistance = pt requires verbal cues or instructions from another person, close to but not touching, to perform the activity  Minimal assistance= pt performs 75% or more of the activity; assistance is required to complete the activity  Moderate assistance= pt performs 50% of the activity; assistance is required to complete the activity  Maximal assistance = pt performs 25% of the activity; assistance is required to complete the activity  Dependent = pt requires total physical assistance to accomplish the task

## 2022-08-18 NOTE — DISCHARGE INSTRUCTIONS
Follow up with primary care physician in the next 7 days or sooner if needed. If you do not have a Primary care physician, please schedule an appointment with one. Please ask prior to discharge about a list of local providers. Please follow-up with GI in 1 week as outpatient to determine if it safe to restart your blood thinners. Please restart the aspirin     Please return to ER or call 911 if you develop any significant signs or symptoms. I may not have addressed all of your medical illnesses or the abnormal blood work or imaging therefore please ask your PCP to obtain [facility] record to follow up on all of the abnormal labs, imaging and findings that I have and have not addressed during your hospitalization. Discharging you from the hospital does not mean that your medical care ends here and now. You may still need additional work up, investigation, monitoring, and treatment to be handled from this point on by outside providers including your PCP, Specialists and other healthcare providers. For medication questions, contact your retail pharmacy and your PCP. Your medical team at [facility] ([facility]) appreciates the opportunity to work with you to get well!     Alessia Steen, DO  1:36 PM

## 2022-08-18 NOTE — PROGRESS NOTES
Pulmonary Progress Note    2022 8:33 AM    Subjective:   Admit Date: 2022  PCP: Benedict Pettit MD    Chief Complaint   Patient presents with    Shortness of Breath     C/O SOB  AFTER DIALYSIS  88 % ON RA  GIVEN 2 DUONEBS  SOLU MEDROL      Interval History: Feels well. No major issues overnight. Has been on 3 L of oxygen. Had dialysis yesterday. 14 points review of systems has been obtained and negative except to was mentioned in HPI.      Medications:   Scheduled Meds:   midodrine  5 mg Oral TID WC    epoetin ryan-epbx  20,000 Units SubCUTAneous Q7 Days    sucralfate  1 g Oral 4 times per day    pill splitter   Does not apply Once    chlorhexidine  15 mL Mouth/Throat BID    sodium chloride flush  5-40 mL IntraVENous 2 times per day    pantoprazole (PROTONIX) 40 mg injection  40 mg IntraVENous Q12H    [Held by provider] enoxaparin  1 mg/kg SubCUTAneous Daily    fluticasone  1 spray Each Nostril Daily    sodium chloride flush  5-40 mL IntraVENous 2 times per day    metoprolol tartrate  12.5 mg Oral BID    [Held by provider] aspirin  81 mg Oral Daily    docusate sodium  100 mg Oral BID    insulin lispro  0-4 Units SubCUTAneous 4x Daily AC & HS    polyethylene glycol  17 g Oral Daily    tacrolimus  1 mg Oral BID     Continuous Infusions:   sodium chloride      sodium chloride      sodium chloride      dextrose           Objective:   Vitals:   Temp (24hrs), Av.5 °F (36.9 °C), Min:97.6 °F (36.4 °C), Max:99.3 °F (37.4 °C)    BP (!) 142/99   Pulse 85   Temp 99.3 °F (37.4 °C) (Oral)   Resp 16   Ht 5' 6\" (1.676 m)   Wt 138 lb 3.7 oz (62.7 kg)   SpO2 99%   BMI 22.31 kg/m²   I/O:24HR INTAKE/OUTPUT:    Intake/Output Summary (Last 24 hours) at 2022 0833  Last data filed at 2022 1624  Gross per 24 hour   Intake 630 ml   Output 2400 ml   Net -1770 ml        0701 -  0700  In: 830 [P.O.:420]  Out: 2400   CVP:             Ventilator Settings:  Vent Mode: CPAP  Resp Rate (Set): 18 bmp   Vt (Set, mL): 390 mL   Pressure Support: 5 cmH20   PEEP/CPAP (cmH2O): 5   FiO2 : 50 %     Physical Exam:  General appearance - alert, ill appearing, and in mild distress  Mental status - alert, oriented to person, place, and time  Eyes - pupils equal and reactive, extraocular eye movements intact  Nose - normal and patent, no erythema, discharge or polyps  Neck - supple, no significant adenopathy  Chest - clear to auscultation, no wheezes, rales or rhonchi, symmetric air entry  Heart - normal rate, regular rhythm, normal S1, S2, no murmurs, rubs, clicks or gallops  Abdomen - soft, nontender, nondistended, no masses or organomegaly  Rectal - deferred, not clinically indicated  Neurological - alert, oriented, normal speech, no focal findings or movement disorder noted, motor and sensory grossly normal bilaterally  Musculoskeletal - no joint tenderness, deformity or swelling  Extremities - peripheral pulses normal, no pedal edema, no clubbing or cyanosis  Skin - normal coloration and turgor, no rashes, no suspicious skin lesions noted                     Assessment and Plan:       Impression:    - acute hypoxic RF. This has improved, currently on 3 L  - CAP. Has finished treatment  - epistaxis s/p packing  - LE DVT  -ESRD on HD    Recommendations:    - wean off O2. Goal to have sat >90%. - continue bronchodilators  - incentive spirometry  - HD per nephrology  - DC planning. Stable to discharge to rehab from pulmonary standpoint.        Electronically signed by Jessy Eisenberg MD on 8/18/2022 at 8:33 AM

## 2022-08-18 NOTE — DISCHARGE SUMMARY
[facility] Medicine Discharge Summary    Pelon Kee  :  1955  MRN:  [id number]    Admit date:  2022  Discharge date:  2022    Admitting Physician:  Kamryn Pemberton MD  Primary Care Physician:  Abdoul Rehman MD      Discharge Diagnoses:      Acute hypoxic respiratory failure-on 6 L oxygen  Recent pneumonia-completed treatment  Epistaxis status post packing, removed, no further epistaxis  Acute upper GI bleeding due to duodenal ulcer-EGD this admission, hemoglobin stable, bleeding resolved  DVT in the lower extremity status post IVC filter this admission  Bilateral upper extremity DVTs in the setting of central line placement, central lines removed  Hypoglycemia-likely due to poor p.o. intake       Chief Complaint   Patient presents with    Shortness of Breath     C/O SOB  AFTER DIALYSIS  88 % ON RA  GIVEN 2 DUONEBS AND [address] Course:     Patient is a 70-year-old male who was admitted with dyspnea. Patient has prolonged complicated stay unfortunately. Initially he was admitted to the ICU for acute hypoxic respiratory failure in the setting of pneumonia and pulmonary edema. He has a history of end-stage renal disease on dialysis. Critical care and nephrology were consulted. His stay was complicated by bilateral upper extremities and lower extremity DVT on the left in the setting of central line placement. Central line were removed. Patient was started on anticoagulation but unfortunately he had acute upper GI bleed and epistaxis. He had nasal packing by ENT. He had an EGD showing duodenal ulcer that was clipped and continued to ooze after clipping. Anticoagulation was stopped. An IVC filter was placed by interventional cardiology. He remained hemodynamically stable so he was transfer out of the ICU to medical floor. His H&H was monitored. He was treated with PPIs.   Due to significant risk of bleeding anticoagulation continued to be held on discharge but his aspirin was resumed. He needs to follow-up as outpatient with GI to determine if it safe to restart anticoagulation for bilateral upper extremity DVT although these DVTs have been in the setting of central line placements and the central line since have been removed. Patient respiratory status improved significantly and he was discharged to skilled nursing facility in a stable condition. While at facility, patient is recommended to use humidified oxygen and short nasal cannula to decrease the chance of epistaxis as per ENT. Patient was educated on the importance of following up with GI. He verbalized understanding. Exam on discharge:   BP (!) 163/102   Pulse (!) 136   Temp 98.1 °F (36.7 °C) (Oral)   Resp 16   Ht 5' 6\" (1.676 m)   Wt 138 lb 3.7 oz (62.7 kg)   SpO2 99%   BMI 22.31 kg/m²   General appearance: No apparent distress, appears stated age and cooperative. HEENT: Pupils equal, round, and reactive to light. Conjunctivae/corneas clear. Neck: Supple, with full range of motion. No jugular venous distention. Trachea midline. Respiratory:  Normal respiratory effort. Clear to auscultation, bilaterally without Rales/Wheezes/Rhonchi. Cardiovascular: Regular rate and rhythm with normal S1/S2 without murmurs, rubs or gallops. Abdomen: Soft, non-tender, non-distended with normal bowel sounds. Musculoskeletal: No clubbing, cyanosis or edema bilaterally. Full range of motion without deformity. Skin: Skin color, texture, turgor normal.  No rashes or lesions. Neuro: Non Focal. Symetrical motor and tone. Nl Comprehension, Alert,awake and oriented. NL CN. Symetrical tone and reflexes.   Psychiatric: Alert and oriented, thought content appropriate, normal insight  Capillary Refill: Brisk,< 3 seconds   Peripheral Pulses: +2 palpable, equal bilaterally     Patient was seen by the following consultants   Consults:  IP CONSULT TO NEPHROLOGY  IP CONSULT TO CRITICAL CARE  PHARMACY TO DOSE VANCOMYCIN  IP CONSULT TO DIETITIAN  IP CONSULT TO INTERVENTIONAL RADIOLOGY  IP CONSULT TO CARDIOLOGY  IP CONSULT TO PODIATRY  IP CONSULT TO PALLIATIVE CARE  IP CONSULT TO INFECTIOUS DISEASES  IP CONSULT TO PHARMACY  IP CONSULT TO PHARMACY  IP CONSULT TO REHAB/TCU ADMISSION COORDINATOR  IP CONSULT TO GI  IP CONSULT TO OTOLARYNGOLOGY  IP CONSULT TO CARDIOLOGY    Significant Diagnostic Studies:    Refer to chart     Please refer to chart if no studies are shown here    Echocardiogram complete 2D with doppler with color    Result Date: 7/27/2022  Transthoracic Echocardiography Report (TTE)  Demographics   Patient Name    [facility]       Gender               Male                  [facility]   Patient Number  [id number]       Race                                                   Ethnicity   Visit Number    [id number]      Room Number          IC12   Corporate ID                   Date of Study        07/27/2022   Accession       [id number]     Referring Physician  Number   Date of Birth   1955     Sonographer          Laurent Correa   Age             75 year(s)     Interpreting         [facility])                                 Physician            Cardiology                                                      [facility]  Procedure Type of Study   TTE procedure:ECHO COMPLETE 2D W/DOP W/COLOR. Procedure Date Date: 07/27/2022 Start: 10:27 AM Study Location: Portable Technical Quality: Limited visualization due to lung interface. Indications:LVF. Patient Status: Routine Height: 66 inches Weight: 148 pounds BSA: 1.76 m^2 BMI: 23.89 kg/m^2 BP: 96/54 mmHg  Conclusions   Summary  Left ventricular ejection fraction is estimated at 45%. Diastolic Dysfunction is noted by mitral flow. Normal right ventricle systolic pressure.   RVSP 28mmHg  Echogenic mass in the apex concerning for thrombus recommend limited echo  with definity  No hemodynamic evidence of significant valve disease   Signature ----------------------------------------------------------------  Electronically signed by Norman JoshuaInterpreting physician)  on 07/27/2022 03:48 PM  ----------------------------------------------------------------   Findings  Left Ventricle Left ventricular ejection fraction is estimated at 45%. Left ventricular size is normal . Normal left ventricular wall thickness. Diastolic Dysfunction is noted by mitral flow. Echogenic mass in the apex possible thrombus Right Ventricle Normal right ventricle structure and function. Normal right ventricle systolic pressure. RVSP 28mmHg Left Atrium Normal left atrium. Right Atrium Normal right atrium. Mitral Valve Diffusely thickened and pliable mitral valve leaflets with normal excursion. Structurally normal mitral valve. No evidence of mitral valve stenosis. No evidence of mitral regurgitaton. Tricuspid Valve Tricuspid valve is structurally normal. No evidence of tricuspid stenosis. No evidence of tricuspid regurgitation. Aortic Valve Mildly thickened trileaflet aortic valve with normal excursion. Structurally normal aortic valve. No evidence of aortic valve stenosis . No evidence of aortic valve regurgitation . Pulmonic Valve The pulmonic valve was not well visualized . Pericardial Effusion No evidence of pericardial effusion. Aorta \ Miscellaneous The aorta is within normal limits. M-Mode Measurements (cm)   LVIDd: 2.97 cm                         LVIDs: 2.34 cm  IVSd: 0.73 cm                          IVSs: 1.01 cm  LVPWd: 0.9 cm                          LVPWs: 1.01 cm  Rt. Vent.  Dimension: 3.12 cm           AO Root Dimension: 2.1 cm                                         ACS: 1.37 cm                                         LA: 1.82 cm                                         LVOT: 2.03 cm  Doppler Measurements:   AV Velocity:0.02 m/s                    MV Peak E-Wave: 0.5 m/s  AV Peak Gradient: 5.77 mmHg             MV Peak A-Wave: 0.53 m/s  AV Mean Gradient: 3.05 mmHg AV Area (Continuity):1.97 cm^2  TR Velocity:2.52 m/s                    Estimated RAP:3 mmHg  TR Gradient:25.31 mmHg                  RVSP:28.31 mmHg  Valves  Mitral Valve   Peak E-Wave: 0.5 m/s                  Peak A-Wave: 0.53 m/s  Mean Velocity: 0.87 m/s               E/A Ratio: 0.94  Mean Gradient: 4.66 mmHg              Peak Gradient: 0.99 mmHg                                        Deceleration Time: 353.3 msec                                        Area (continuity): 1.4 cm^2   Tissue Doppler   E' Septal Velocity: 0.07 m/s  E' Lateral Velocity: 0.07 m/s   Aortic Valve   Peak Velocity: 1.2 m/s                 Mean Velocity: 0.82 m/s  Peak Gradient: 5.77 mmHg               Mean Gradient: 3.05 mmHg  Area (continuity): 1.97 cm^2  AV VTI: 29.33 cm   Cusp Separation: 1.37 cm   Tricuspid Valve   Estimated RVSP: 28.31 mmHg              Estimated RAP: 3 mmHg  TR Velocity: 2.52 m/s                   TR Gradient: 25.31 mmHg   Pulmonic Valve   Peak Velocity: 0.99 m/s           Peak Gradient: 3.91 mmHg                                    Estimated PASP: 28.31 mmHg   LVOT   Peak Velocity: 0.97 m/s              Mean Velocity: 0.61 m/s  Peak Gradient: 3.77 mmHg             Mean Gradient: 1.79 mmHg  LVOT Diameter: 2.03 cm               LVOT VTI: 17.9 cm  Structures  Left Atrium   LA Dimension: 1.82 cm                        LA Area: 12.58 cm^2  LA/Aorta: 0.87  LA Volume/Index: 44.74 ml /25 m^2   Left Ventricle   Diastolic Dimension: 1.37 cm         Systolic Dimension: 7.39 cm  Septum Diastolic: 5.27 cm            Septum Systolic: 3.92 cm  PW Diastolic: 0.9 cm                 PW Systolic: 6.81 cm                                       FS: 21.2 %  LV EDV/LV EDV Index: 34.12 ml/19 m^2 LV ESV/LV ESV Index: 19.02 ml/11 m^2  EF Calculated: 44.3 %   LVOT Diameter: 2.03 cm   Right Ventricle   Diastolic Dimension: 4.66 cm                                    RV Systolic Pressure: 66.18 mmHg  Aorta/ Miscellaneous Aorta   Aortic Root: 2.1 cm  LVOT Diameter: 2.03 cm      IR FLUORO GUIDED CVA DEVICE PLMT/REPLACE/REMOVAL    1. Venogram of the right internal jugular vein demonstrates a thrombus in the right internal jugular vein which completely occludes the vein. Passage of contrast into the chest is through small collateral veins. 2. 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 Passage of contrast into the chest is through a small collateral veins. Radiation dose to the patient was: 24.21 mGy Additional clinical data: Long-term IV access Procedure: 1. Ultrasound guidance for vascular access into the right internal jugular vein. The ultrasound image of the blood vessel was saved to PACS. 2. Venogram via contrast injection of the right internal jugular vein. 3.   Ultrasound guidance for vascular access into the left internal jugular vein. The ultrasound image of the blood vessel was saved to PACS 4. Venogram via contrast injection of the left internal jugular vein. Body of Report: Informed and written consent was obtained from the patient following discussion of risks, benefits and alternatives to this procedure. The was patient placed supine on the angiographic table. The patient's neck and chest were then prepped and draped in  normal sterile fashion. A small amount of local lidocaine anesthesia was injected subcutaneously. Ultrasound was used to study the jugular vein we intended to use prior to accessing it. The vein appeared patent. The ultrasound image of the blood vessel was saved to PACS. Using ultrasound access, puncture was made of the right internal jugular vein using a 21 GA needle. A wire was advanced into the right internal jugular vein, though would not pass into the superior vena cava. A 4 Pashto short thin sheath was placed, through the sheath digital subtraction venography was performed.  Venography demonstrated a thrombus in the right internal jugular vein and complete occlusion of the vein central to the thrombus. This access was aborted. Ultrasound was used to study the left jugular vein we intended to use prior to accessing it. The vein appeared patent. The ultrasound image of the blood vessel was saved to PACS. Using ultrasound access, puncture was made of the left internal jugular vein using a 21 GA needle. A wire was attempted to be passed, though would not pass to the superior vena  cava. A 4 Yi thin sheath was placed and digital subtraction venography was performed. 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 The procedure was aborted. Findings discussed with ICU team who will place a temporary central line in the groin. XR CHEST PORTABLE    Result Date: 7/27/2022  XR CHEST PORTABLE COMPARISON: July 25, 2022. HISTORY: resp failure TECHNIQUE: AP view FINDINGS: Endotracheal tube again seen in place. . There is also an enteric tube seen in place and the tip is below the diaphragm. They appear unchanged in position. A small pleural effusion seen on the right. The left costophrenic angle is not well seen. The lungs are hyperinflated and there is coarsening of the interstitium. Atelectasis and/or scarring is again seen bilaterally in the lower lung fields. The cardiac silhouette appears mildly enlarged but may be accentuated by the portable technique. Degenerative changes are seen in the visualized portion of the right shoulder. The airspace disease has diminished when compared to previous study. . A small pleural effusion is seen on the right and scarring or atelectasis is again seen bilaterally in the bases. US DUP UPPER EXTREMITY RIGHT VENOUS COMPARISON: HISTORY:  resp failure TECHNIQUE: , US DUP UPPER EXTREMITY RIGHT VENOUS AND LEFT VENOUS FINDINGS: Thrombus is seen in the right internal jugular and proximal subclavian, veins it is also seen in the cephalic vein.  The right ulnar and basilic the tip is below the diaphragm. They appear unchanged in position. A small pleural effusion seen on the right. The left costophrenic angle is not well seen. The lungs are hyperinflated and there is coarsening of the interstitium. Atelectasis and/or scarring is again seen bilaterally in the lower lung fields. The cardiac silhouette appears mildly enlarged but may be accentuated by the portable technique. Degenerative changes are seen in the visualized portion of the right shoulder. The airspace disease has diminished when compared to previous study. . A small pleural effusion is seen on the right and scarring or atelectasis is again seen bilaterally in the bases. US DUP UPPER EXTREMITY RIGHT VENOUS COMPARISON: HISTORY:  resp failure TECHNIQUE: , US DUP UPPER EXTREMITY RIGHT VENOUS AND LEFT VENOUS FINDINGS: Thrombus is seen in the right internal jugular and proximal subclavian, veins it is also seen in the cephalic vein. The right ulnar and basilic veins are not visualized. A right AV fistula seen. Thrombus is seen in the left internal jugular vein. The left basilic and axillary veins are not visualized. IMPRESSION: Deep venous thrombosis seen in the right and left upper extremities. .    IR VENOGRAM VENOUS SINUS/JUGULAR    1. Venogram of the right internal jugular vein demonstrates a thrombus in the right internal jugular vein which completely occludes the vein. Passage of contrast into the chest is through small collateral veins. 2. 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 Passage of contrast into the chest is through a small collateral veins. Radiation dose to the patient was: 24.21 mGy Additional clinical data: Long-term IV access Procedure: 1. Ultrasound guidance for vascular access into the right internal jugular vein. The ultrasound image of the blood vessel was saved to PACS.  2. Venogram via contrast injection of the right internal jugular vein. 3.   Ultrasound guidance for vascular access into the left internal jugular vein. The ultrasound image of the blood vessel was saved to PACS 4. Venogram via contrast injection of the left internal jugular vein. Body of Report: Informed and written consent was obtained from the patient following discussion of risks, benefits and alternatives to this procedure. The was patient placed supine on the angiographic table. The patient's neck and chest were then prepped and draped in  normal sterile fashion. A small amount of local lidocaine anesthesia was injected subcutaneously. Ultrasound was used to study the jugular vein we intended to use prior to accessing it. The vein appeared patent. The ultrasound image of the blood vessel was saved to PACS. Using ultrasound access, puncture was made of the right internal jugular vein using a 21 GA needle. A wire was advanced into the right internal jugular vein, though would not pass into the superior vena cava. A 4 Tunisian short thin sheath was placed, through the sheath digital subtraction venography was performed. Venography demonstrated a thrombus in the right internal jugular vein and complete occlusion of the vein central to the thrombus. This access was aborted. Ultrasound was used to study the left jugular vein we intended to use prior to accessing it. The vein appeared patent. The ultrasound image of the blood vessel was saved to PACS. Using ultrasound access, puncture was made of the left internal jugular vein using a 21 GA needle. A wire was attempted to be passed, though would not pass to the superior vena  cava. A 4 Tunisian thin sheath was placed and digital subtraction venography was performed. 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 The procedure was aborted. Findings discussed with ICU team who will place a temporary central line in the groin.      IR ULTRASOUND GUIDANCE VASCULAR ACCESS    1. Venogram of the right internal jugular vein demonstrates a thrombus in the right internal jugular vein which completely occludes the vein. Passage of contrast into the chest is through small collateral veins. 2. 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 Passage of contrast into the chest is through a small collateral veins. Radiation dose to the patient was: 24.21 mGy Additional clinical data: Long-term IV access Procedure: 1. Ultrasound guidance for vascular access into the right internal jugular vein. The ultrasound image of the blood vessel was saved to PACS. 2. Venogram via contrast injection of the right internal jugular vein. 3.   Ultrasound guidance for vascular access into the left internal jugular vein. The ultrasound image of the blood vessel was saved to PACS 4. Venogram via contrast injection of the left internal jugular vein. Body of Report: Informed and written consent was obtained from the patient following discussion of risks, benefits and alternatives to this procedure. The was patient placed supine on the angiographic table. The patient's neck and chest were then prepped and draped in  normal sterile fashion. A small amount of local lidocaine anesthesia was injected subcutaneously. Ultrasound was used to study the jugular vein we intended to use prior to accessing it. The vein appeared patent. The ultrasound image of the blood vessel was saved to PACS. Using ultrasound access, puncture was made of the right internal jugular vein using a 21 GA needle. A wire was advanced into the right internal jugular vein, though would not pass into the superior vena cava. A 4 Serbian short thin sheath was placed, through the sheath digital subtraction venography was performed.  Venography demonstrated a thrombus in the right internal jugular vein and complete occlusion of the vein central to the thrombus. This access was aborted. Ultrasound was used to study the left jugular vein we intended to use prior to accessing it. The vein appeared patent. The ultrasound image of the blood vessel was saved to PACS. Using ultrasound access, puncture was made of the left internal jugular vein using a 21 GA needle. A wire was attempted to be passed, though would not pass to the superior vena  cava. A 4 Uzbek thin sheath was placed and digital subtraction venography was performed. 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 The procedure was aborted. Findings discussed with ICU team who will place a temporary central line in the groin. US DUP LOWER EXTREMITIES BILATERAL VENOUS    Result Date: 7/27/2022  EXAMINATION: US DUP LOWER EXTREMITIES BILATERAL VENOUS CLINICAL HISTORY: 68-year-old with lower extremity swelling COMPARISONS: None available. FINDINGS: Duplex and color Doppler ultrasounds were performed of the bilateral lower extremity deep venous systems. Examination was performed portably and limited due to patient condition and access to the lower extremities. Right leg: Visualized portions of the right common femoral vein, femoral vein, popliteal vein demonstrate satisfactory compression, color flow, and augmentation. Deep calf veins are not evaluated. Left leg: The left common femoral vein is enlarged filled with intraluminal echoes with absent color flow and poor compression consistent with occluding thrombus. Occluding Thrombus extends into the left proximal femoral vein. Mid to distal femoral vein and popliteal vein demonstrate satisfactory compression and color flow. Deep calf veins are not assessed on this portable study     ULTRASOUND FINDINGS ARE POSITIVE FOR OCCLUDING THROMBUS IN THE LEFT COMMON FEMORAL VEIN EXTENDING INTO THE PROXIMAL FEMORAL VEIN.  NO ULTRASOUND SIGNS OF DVT IN THE RIGHT LEG FROM THE GROIN TO THE POPLITEAL FOSSA      Discharge Medications: Medication List        START taking these medications      aspirin 81 MG EC tablet  Take 1 tablet by mouth daily     chlorhexidine 0.12 % solution  Commonly known as: PERIDEX  Take 15 mLs by mouth 2 times daily for 14 days     fluticasone 50 MCG/ACT nasal spray  Commonly known as: FLONASE  1 spray by Each Nostril route daily  Start taking on: August 19, 2022     metoprolol tartrate 25 MG tablet  Commonly known as: LOPRESSOR  Take 0.5 tablets by mouth 2 times daily     pantoprazole 40 MG tablet  Commonly known as: PROTONIX  Take 1 tablet by mouth 2 times daily (before meals)     sucralfate 1 GM tablet  Commonly known as: CARAFATE  Take 1 tablet by mouth 4 times daily            CONTINUE taking these medications      albuterol sulfate  (90 Base) MCG/ACT inhaler  Commonly known as: ProAir HFA  2 puffs; Use every 4 hours while awake for 7-10 days then PRN wheezing  Dispense with SPACER and Instruct on use. May sub Ventolin or Proventil as needed per [employer]. calcium acetate 667 MG capsule  Commonly known as: PHOSLO     PROGRAF PO            STOP taking these medications      carvedilol 12.5 MG tablet  Commonly known as: COREG               Where to Get Your Medications        Information about where to get these medications is not yet available    Ask your nurse or doctor about these medications  aspirin 81 MG EC tablet  chlorhexidine 0.12 % solution  fluticasone 50 MCG/ACT nasal spray  metoprolol tartrate 25 MG tablet  pantoprazole 40 MG tablet  sucralfate 1 GM tablet         Disposition:   If discharged to Home, Any [place] [id number] needs that were indicated and/or required as been addressed and set up by Social Work. Condition at discharge: good     Activity: activity as tolerated    Total time taken for discharging this patient: 40 minutes. Greater than 70% of time was spent focused exclusively on this patient.  Time was taken to review chart, discuss plans with consultants, reconciling medications, discussing plan answering questions with patient.      Palmira Keller DO  8/18/2022, 1:37 PM  ----------------------------------------------------------------------------------------------------------------------    Perri Cardoza

## 2022-08-18 NOTE — PROGRESS NOTES
Subjective: The patient complains of severe acute on chronic progressive fatigue and generalized weakness partially relieved by rest, PT, OT and meds and hemodialysis and exacerbated by exertion and recent illness. He was initially admitted via the ER with SOB. He was diagnosed with hypoxia acute respiratory failure end-stage renal disease macrocytic anemia and hyperglycemia secondary to diabetes mellitus. He was admitted to the ICU. He is on IV Bactrim for pneumonia. GI was seeing him an duodenal ulcer GI bleed. Medically is complicated by history of a liver transplant for hepatitis. He is on immunosuppressants. He gets dialysis Monday Wednesday and Friday. I am concerned about patients medical complexities including:  Principal Problem:    Acute respiratory failure with hypoxia (HCC)  Active Problems:    Pneumonia due to infectious organism    Pleural effusion    Impaired mobility and activities of daily living    Dysphagia, oropharyngeal phase    Epistaxis    Duodenal ulcer    Severe malnutrition (HCC)    ESRD (end stage renal disease) on dialysis [facility])    Liver transplant recipient [facility])  Resolved Problems:    * No resolved hospital problems. *      .    Reviewed recent nursing note and discussed current status and planned care with acute care providers, \"VSS 3L NC with O2 sat at 99%. Per speech therapy, pt may be on thin liquids with no straws. Reports decent appetite per pt. Repositioned. Bathed at this time. No complaints. Safety maintained. Bed alarm on. Call light within reach. 1400: 6:30pm pick-up time for this pt  go to [facility]. Dr. Noemí Brown aware via [facility]. OK per Dr. Pilar Cruz and Dr. Rl Gerard per [facility] and OK per Dr. Elvira Narayanan latest note. Covid swab sent at this time. \"     I will follow along I reiterated with patient the importance of advocating for himself for himself when he goes to the skilled facility I disagree with his insurance and assessment that he is not complicated enough to come to acute rehab. ROS x10: The patient also complains of severely impaired mobility and activities of daily living. Otherwise no new problems with vision, hearing, nose, mouth, throat, dermal, cardiovascular, GI, , pulmonary, musculoskeletal, psychiatric or neurological.        Vital signs:  BP (!) 163/102   Pulse (!) 136   Temp 98.1 °F (36.7 °C) (Oral)   Resp 16   Ht 5' 6\" (1.676 m)   Wt 138 lb 3.7 oz (62.7 kg)   SpO2 99%   BMI 22.31 kg/m²   I/O:   PO/Intake:    fair PO intake, monitoring for dysphagia    Bowel/Bladder:   continent,    General:  Patient is well developed, adequately nourished, and    well kempt. HEENT:    PERRLA, hearing intact to loud voice, external inspection of ear and nose -nasal packing. Inspection of lips, tongue -dark and coated-gums benign, nosebleed left nares has nasal packing in place. From nasal cannula O2 patient is also on some blood thinners. Musculoskeletal: No significant change in strength or tone. All joints stable. Inspection and palpation of digits and nails show no clubbing, cyanosis or inflammatory conditions. Neuro/Psychiatric: Affect: flat-  Alert and oriented to self and situation with  min-mod cues. No significant change in deep tendon reflexes or sensation  Lungs:  Diminished, CTA-B  . Respiration effort is normal at rest.   Heart:   S1 = S2,   RRR. Abdomen:  Soft, non-tender    Extremities:  Trace  lower extremity edema but no unusual tenderness. non Fx RUE fistula--functional LUE fistula. Skin:   BUE bruises dt blood draws-his bruises and skin tears.       Rehabilitation:  Physical Therapy:   Bed mobility:  Bed mobility  Rolling to Left: Contact guard assistance;Minimal assistance (08/15/22 1522)  Rolling to Right: Minimal assistance (08/03/22 0950)  Supine to Sit: Minimal assistance (08/18/22 1134)  Sit to Supine: Minimal assistance (08/18/22 1134)  Scooting: Stand by assistance (08/15/22 1522)  Bed Mobility Comments: HOB elevated due to SpO2 dropping; Pt requied max assist to scoot up in bed but able to scoot from side of bed over to middle; Able to maintain balance at EOB with perturbations in all directions. SpO2 would drop with pt looking and saying he was ok with immediate come back up to %. Not sure if getting good reading on finger. (08/15/22 1522)  Transfers:  Transfers  Sit to Stand: Minimal Assistance (08/18/22 1134)  Stand to sit: Minimal Assistance (08/18/22 1134)  Comment: Attempted to stand at [address], unable to get up. (08/15/22 1525)  Gait:   Ambulation  Surface: level tile (08/18/22 1134)  Device: Rolling Walker (08/18/22 1134)  Other Apparatus: O2 (08/18/22 1134)  Assistance: Moderate assistance (08/18/22 1134)  Quality of Gait: flexed posture, decresaed bilateral step length and foot clearance, unsteady (08/18/22 1134)  Gait Deviations: Slow Whitney;Decreased step length;Decreased step height;Decreased arm swing (08/18/22 1134)  Distance: 8ft (08/18/22 1134)  Comments: SOB upon exertion, HR elevated 110s with exertional dyspnea observed, O2 increased d/t low O2 sats (08/03/22 0950)  Overall Level of Assistance:  (unable to assess d/t endurance) (08/14/22 0918)  Stairs:     W/C mobility:       Occupational Therapy:   Hand Dominance: Left  ADL  Feeding: Independent (08/14/22 0918)  Grooming: Setup (08/14/22 0918)  UE Bathing: Minimal assistance (08/14/22 0918)  UE Bathing Skilled Clinical Factors: decreased ROM of B UE (08/14/22 0918)  LE Bathing: Maximum assistance (08/14/22 0918)  UE Dressing: Minimal assistance (08/14/22 0918)  LE Dressing: Maximum assistance (08/14/22 0918)  Toileting: Maximum assistance (08/14/22 0918)  Toileting Skilled Clinical Factors: Incontinent of dark loose stool; LPN notified (99/25/99 9850)  Additional Comments: Simulated ADLs as above. Pt. significantly limited d/t fatigue and SOB.  (08/03/22 6215)  Toilet Transfers  Equipment Used: Grab bars (08/14/22 9640)  Toilet Transfer: Minimal assistance (08/14/22 0917)  Toilet Transfers Comments: Anticipate min A (08/03/22 0946)     Shower Transfers  Shower Transfers: Not tested (08/14/22 0917)    Speech Therapy:            Diet/Swallow:        Dysphagia Outcome Severity Scale: Level 5: Mild dysphagia- Distant supervision.  May need one diet consistency restricted  Compensatory Swallowing Strategies : Upright as possible for all oral intake, Small bites/sips, Assist feed, Eat/Feed slowly  Therapeutic Interventions: Patient/Family education, Diet tolerance monitoring    COGNITION  OT: Cognition Comment: Verbal cues for safety and sequencing  SP:           Lab/X-ray studies reviewed, analyzed and discussed with patient and staff:   Recent Results (from the past 24 hour(s))   POCT Glucose    Collection Time: 08/17/22  4:10 PM   Result Value Ref Range    POC Glucose 96 70 - 99 mg/dl    Performed on ACCU-CHEK    POCT Glucose    Collection Time: 08/17/22  5:15 PM   Result Value Ref Range    POC Glucose 114 (H) 70 - 99 mg/dl    Performed on ACCU-CHEK    POCT Glucose    Collection Time: 08/17/22  8:29 PM   Result Value Ref Range    POC Glucose 117 (H) 70 - 99 mg/dl    Performed on ACCU-CHEK    Basic Metabolic Panel    Collection Time: 08/18/22  6:39 AM   Result Value Ref Range    Sodium 134 (L) 135 - 144 mEq/L    Potassium 4.8 3.4 - 4.9 mEq/L    Chloride 93 (L) 95 - 107 mEq/L    CO2 27 20 - 31 mEq/L    Anion Gap 14 9 - 15 mEq/L    Glucose 75 70 - 99 mg/dL    BUN 21 8 - 23 mg/dL    Creatinine 3.88 (H) 0.70 - 1.20 mg/dL    GFR Non-African American 15.5 (L) >60    GFR  18.8 (L) >60    Calcium 8.7 8.5 - 9.9 mg/dL   CBC with Auto Differential    Collection Time: 08/18/22  6:39 AM   Result Value Ref Range    WBC 7.0 4.8 - 10.8 K/uL    RBC 2.45 (L) 4.70 - 6.10 M/uL    Hemoglobin 8.0 (L) 14.0 - 18.0 g/dL    Hematocrit 25.0 (L) 42.0 - 52.0 %    .2 (H) 80.0 - 100.0 fL    MCH 32.5 (H) 27.0 - 31.3 pg    MCHC 31.8 (L) 33.0 - 37.0 % to focus on energy conservation heart rate and blood pressure monitoring before during and after therapy endurance and consistency of function. Bowel constipation   and Bladder dysfunction   overactive, neurogenic bladder:  frequent toileting, ambulate to bathroom with assistance, check post void residuals. Check for C.difficile x1 if >2 loose stools in 24 hours, continue bowel & bladder program.  Monitor for UTI symptoms including lethargy and confusion    Moderate back pain and generalized body aches and pain likely secondary to renal osteodystrophy and generalized OA pain: reassess pain every shift and prior to and after each therapy session, I advise giving prn OxyIR--avoid Tylenol DT liver risks, modalities prn in therapy, consider Lidoderm, K-pad prn. Skin healing   multiple upper extremity skin tears breakdown   risk:  continue pressure relief program.  Daily skin exams and reports from nursing. Severe fatigue due to immobility and nutritional deficits: continue to monitor closely for dehydration   Add vitamin B12 vitamin D and CoQ10 titrate dosing and add protein supplementation with low carb content. 6.  DVT with DVT prophylaxis and end-stage renal disease holding Lovenox patient is status post IVC filter 8/5/2022. Complex discharge planning:   Discussed with care team-last 24 hour events noted. I will continue to follow along and reassess functional and medical status as we strive to improve patient's functional and medical outcomes progressing to the most efficient and lowest level of care. Complex Active General Medical Issues that complicate care:     1.  Principal Problem:    Acute respiratory failure with hypoxia (HCC)  Active Problems:    Pneumonia due to infectious organism    Pleural effusion    Impaired mobility and activities of daily living    Dysphagia, oropharyngeal phase    Epistaxis    Duodenal ulcer    Severe malnutrition (HCC)    ESRD (end stage renal disease) on dialysis [facility])    Liver transplant recipient [facility])  Resolved Problems:    * No resolved hospital problems. *          Events and functional changes in the past 24 hours reviewed improvements in functional status are encouraging       Focus of today's plan-     continue to prophylax against nosebleed Afrin and Ocean nasal spray and bacitracin. Is now medically more stable able to participate in PT and OT and we will attempt recertification  denied by [facility] who had certified him for his medical complications.       Renny Garcia D.O., PM&R     Attending    [address]

## 2022-08-18 NOTE — PROGRESS NOTES
1030: Assessment complete. VSS 3L NC with O2 sat at 99%. Per speech therapy, pt may be on thin liquids with no straws. Reports decent appetite per pt. Repositioned. Bathed at this time. No complaints. Safety maintained. Bed alarm on. Call light within reach. 1300: /102; prior was 142/99-- Dr. Timur Rosenberg notified on floor as pt is to have proamatine. Order d/c.    1400: 6:30pm pick-up time for this pt to go to [facility]. Dr. Timur Rosenberg aware via [employer]. OK per Dr. Kriss Melgoza and Dr. Ankush Chowdhury per [employer] and OK per Dr. Dodd Sensing latest note. Covid swab sent at this time. 1745: Report called to Enrique at [facility].      Electronically signed by Abagail Mortimer, RN on 8/18/2022 at 11:01 AM

## 2022-08-18 NOTE — PROGRESS NOTES
nephNephrology Progress Note    Assessment:  ESRDX  Liver transplanr  COPD  Sepsis  Anemia  epistaxis    HD mwf now   Bp actually high now. Stop midodrine and restart as needed  Retacrit for anemia  Crit line with hd shows that he is likely dry at this point and doesn't need extra txs  Agree with rehab or SNF      Patient Active Problem List:     Hypotension     ESRD (end stage renal disease) on dialysis ([facility] [address]     Liver transplanted [facility])     Liver transplant recipient ([facility] [address]     Sepsis ([facility] [address]     Gastroenteritis     C. difficile colitis     Severe sepsis ([facility] [address]     Vomiting and diarrhea     Generalized abdominal pain     Acute renal failure ([facility] [address]     Acute respiratory failure with hypoxia ([facility] [address]     Pneumonia due to infectious organism     Pleural effusion     Impaired mobility and activities of daily living     Dysphagia, oropharyngeal phase     Epistaxis     Duodenal ulcer      Subjective:  Admit Date: 7/25/2022    Interval History: pt feels ok. Bp ok on midodrine. Working on placement.   Therapy notes noted     Medications:  Scheduled Meds:   midodrine  5 mg Oral TID WC    epoetin ryan-epbx  20,000 Units SubCUTAneous Q7 Days    sucralfate  1 g Oral 4 times per day    pill splitter   Does not apply Once    chlorhexidine  15 mL Mouth/Throat BID    sodium chloride flush  5-40 mL IntraVENous 2 times per day    pantoprazole (PROTONIX) 40 mg injection  40 mg IntraVENous Q12H    [Held by provider] enoxaparin  1 mg/kg SubCUTAneous Daily    fluticasone  1 spray Each Nostril Daily    sodium chloride flush  5-40 mL IntraVENous 2 times per day    metoprolol tartrate  12.5 mg Oral BID    [Held by provider] aspirin  81 mg Oral Daily    docusate sodium  100 mg Oral BID    insulin lispro  0-4 Units SubCUTAneous 4x Daily AC & HS    polyethylene glycol  17 g Oral Daily    tacrolimus  1 mg Oral BID     Continuous Infusions:   sodium chloride      sodium chloride      sodium chloride      dextrose         CBC:   Recent Labs

## 2022-08-18 NOTE — PROGRESS NOTES
Internal Medicine   Hospitalist   Progress Note    8/18/2022   1:15 PM    Name:  Pelon Kee  MRN:    [id number]     IP Day: 24     Admit Date: 7/25/2022 11:15 AM  PCP: Abdoul Rehman MD    Code Status:  Full Code    Assessment and Plan: Active Problems/ diagnosis:     Acute hypoxic respiratory failure-on 6 L oxygen  Recent pneumonia-completed treatment  Epistaxis status post packing, removed, no further epistaxis  Acute upper GI bleeding due to duodenal ulcer-EGD this admission, hemoglobin stable, bleeding resolved  DVT in the lower extremity status post IVC filter this admission  Bilateral upper extremity DVTs in the setting of central line placement, central lines removed  Hypoglycemia-likely due to poor p.o. intake    Plan  Patient remained stable from respiratory standpoint. Wean down oxygen to achieve saturation above 92%. His hemoglobin is stable, continue to monitor. Due to his epistaxis, recent bleeding duodenal ulcer with oozing even after clipping and the placement of IVC filter, will hold off on anticoagulation until follow-up with GI and the clearance for resuming his anticoagulation. Also, central lines were removed that likely triggered his bilateral upper extremity DVTs. Contacted by [facility] on 8/13/2022, now they declined the patient as he is clinically improving and does not need tertiary center as per [facility] provider. Resume PT/OT  Encourage p.o. intake. Monitor glucose. Monitor BP. Blood pressure remained stable off IV hydration. Appreciate pulmonary medicine input  Nephrologist is following for end-stage renal disease/HD management  Resume PPIs  DVT PPx     7 pm- 7 am, please contact on call Hospitalist for any needs     Dispo- dc to SNF      Subjective:      no new events. Denies any new symptoms. Feels overall better.     Physical Examination:      Vitals:  BP (!) 163/102   Pulse (!) 136   Temp 98.1 °F (36.7 °C) (Oral)   Resp 16   Ht 5' 6\" (1.676 m)   Wt 138 lb 3.7 oz (62.7 kg)   SpO2 99%   BMI 22.31 kg/m²   Temp (24hrs), Av.9 °F (37.2 °C), Min:98.1 °F (36.7 °C), Max:99.3 °F (37.4 °C)      General appearance: alert, cooperative and no distress  Mental Status: oriented to person, place and time and normal affect  Lungs: clear to auscultation bilaterally, normal effort  Heart: regular rate   Abdomen: soft, nontender, nondistended, bowel sounds present, no masses  Extremities: no tenderness in the calves  Skin: Multiple bruises on upper and lower extremities. No gross lesions, rashes    Data:     Labs:  Recent Labs     22  0639   WBC 7.0   HGB 8.0*          Recent Labs     22  0639   *   K 4.8   CL 93*   CO2 27   BUN 21   CREATININE 3.88*   GLUCOSE 75       No results for input(s): AST, ALT, ALB, BILITOT, ALKPHOS in the last 72 hours.     Current Facility-Administered Medications   Medication Dose Route Frequency Provider Last Rate Last Admin    epoetin ryan-epbx (RETACRIT) injection 20,000 Units  20,000 Units SubCUTAneous Q7 Days Temo Chase MD   20,000 Units at 22 1550    sucralfate (CARAFATE) tablet 1 g  1 g Oral 4 times per day Dafne Prom, APRN - CNP   1 g at 22 1140    pill splitter   Does not apply Once Danielle Estrada MD        chlorhexidine (PERIDEX) 0.12 % solution 15 mL  15 mL Mouth/Throat BID Lilliam Perez DO   15 mL at 22 202    0.9 % sodium chloride infusion   IntraVENous PRN Isabel Grigsby,         sodium chloride flush 0.9 % injection 5-40 mL  5-40 mL IntraVENous 2 times per day Kalyan Billingsley MD   10 mL at 22 0941    sodium chloride flush 0.9 % injection 5-40 mL  5-40 mL IntraVENous PRN Kalyan Billingsley MD        0.9 % sodium chloride infusion   IntraVENous PRN Kalyan Billingsley MD        acetaminophen (TYLENOL) tablet 650 mg  650 mg Oral Q4H PRN Kalyan Billingsley MD        ondansetron [facility]) injection 4 mg  4 mg IntraVENous Q6H PRN Kalyan Billingsley MD        hydrALAZINE CNP   Stopped at 08/08/22 1737    Or    dextrose bolus 10% 250 mL  250 mL IntraVENous PRN Ara Been, APRN - CNP        glucagon (rDNA) injection 1 mg  1 mg SubCUTAneous PRN Ara Been, APRN - CNP        dextrose 10 % infusion   IntraVENous Continuous PRN Ara Been, APRN - CNP        heparin (porcine) injection 1,000 Units  1,000 Units IntraVENous PRN Lyndon Larios MD           Additional work up or/and treatment plan may be added today or then after based on clinical progression. I am managing a portion of pt care. Some medical issues are handled by other specialists. Additional work up and treatment should be done in out pt setting by pt PCP and other out pt providers. In addition to examining and evaluating pt, I spent additional time explaining care, normaland abnormal findings, and treatment plan. All of pt questions were answered. Counseling, diet and education were provided. Case will be discussed with nursing staff when appropriate. Family will be updated if and when appropriate.        Electronically signed by Miguel Angel Gonsales DO on 8/18/2022 at 1:15 PM

## 2022-08-19 ENCOUNTER — OFFICE VISIT (OUTPATIENT)
Dept: GERIATRIC MEDICINE | Age: 67
End: 2022-08-19
Payer: MEDICARE

## 2022-08-19 DIAGNOSIS — N18.6 ESRD (END STAGE RENAL DISEASE) ON DIALYSIS (HCC): Primary | ICD-10-CM

## 2022-08-19 DIAGNOSIS — R53.1 WEAKNESS: ICD-10-CM

## 2022-08-19 DIAGNOSIS — R04.0 EPISTAXIS: ICD-10-CM

## 2022-08-19 DIAGNOSIS — Z99.2 ESRD (END STAGE RENAL DISEASE) ON DIALYSIS (HCC): Primary | ICD-10-CM

## 2022-08-19 DIAGNOSIS — R26.81 UNSTEADINESS: ICD-10-CM

## 2022-08-19 PROCEDURE — 1123F ACP DISCUSS/DSCN MKR DOCD: CPT | Performed by: INTERNAL MEDICINE

## 2022-08-19 PROCEDURE — 99305 1ST NF CARE MODERATE MDM 35: CPT | Performed by: INTERNAL MEDICINE

## 2022-08-26 ENCOUNTER — OFFICE VISIT (OUTPATIENT)
Dept: GERIATRIC MEDICINE | Age: 67
End: 2022-08-26
Payer: MEDICARE

## 2022-08-26 DIAGNOSIS — J96.01 ACUTE RESPIRATORY FAILURE WITH HYPOXIA AND HYPERCAPNIA (HCC): Primary | ICD-10-CM

## 2022-08-26 DIAGNOSIS — E44.0 MODERATE PROTEIN MALNUTRITION (HCC): ICD-10-CM

## 2022-08-26 DIAGNOSIS — J96.02 ACUTE RESPIRATORY FAILURE WITH HYPOXIA AND HYPERCAPNIA (HCC): Primary | ICD-10-CM

## 2022-08-26 DIAGNOSIS — Z87.01 HISTORY OF RECENT PNEUMONIA: ICD-10-CM

## 2022-08-26 DIAGNOSIS — N18.6 ESRD (END STAGE RENAL DISEASE) (HCC): ICD-10-CM

## 2022-08-26 PROCEDURE — 1123F ACP DISCUSS/DSCN MKR DOCD: CPT | Performed by: PHYSICIAN ASSISTANT

## 2022-08-26 PROCEDURE — 99309 SBSQ NF CARE MODERATE MDM 30: CPT | Performed by: PHYSICIAN ASSISTANT

## 2022-08-27 ENCOUNTER — APPOINTMENT (OUTPATIENT)
Dept: CT IMAGING | Age: 67
DRG: 208 | End: 2022-08-27
Payer: MEDICARE

## 2022-08-27 ENCOUNTER — APPOINTMENT (OUTPATIENT)
Dept: GENERAL RADIOLOGY | Age: 67
DRG: 208 | End: 2022-08-27
Payer: MEDICARE

## 2022-08-27 ENCOUNTER — HOSPITAL ENCOUNTER (INPATIENT)
Age: 67
LOS: 2 days | DRG: 208 | End: 2022-08-29
Attending: EMERGENCY MEDICINE | Admitting: INTERNAL MEDICINE
Payer: MEDICARE

## 2022-08-27 DIAGNOSIS — R79.89 ELEVATED LACTIC ACID LEVEL: ICD-10-CM

## 2022-08-27 DIAGNOSIS — R19.00 ABDOMINAL MASS, UNSPECIFIED ABDOMINAL LOCATION: ICD-10-CM

## 2022-08-27 DIAGNOSIS — S22.49XA CLOSED FRACTURE OF MULTIPLE RIBS, UNSPECIFIED LATERALITY, INITIAL ENCOUNTER: ICD-10-CM

## 2022-08-27 DIAGNOSIS — R77.8 ELEVATED TROPONIN: ICD-10-CM

## 2022-08-27 DIAGNOSIS — I46.9 CARDIOPULMONARY ARREST (HCC): ICD-10-CM

## 2022-08-27 DIAGNOSIS — N18.9 CHRONIC KIDNEY DISEASE, UNSPECIFIED CKD STAGE: Primary | ICD-10-CM

## 2022-08-27 LAB
ALBUMIN SERPL-MCNC: 2.6 G/DL (ref 3.5–4.6)
ALP BLD-CCNC: 101 U/L (ref 35–104)
ALT SERPL-CCNC: 24 U/L (ref 0–41)
ANION GAP SERPL CALCULATED.3IONS-SCNC: 21 MEQ/L (ref 9–15)
ANISOCYTOSIS: ABNORMAL
APTT: 75 SEC (ref 24.4–36.8)
AST SERPL-CCNC: 36 U/L (ref 0–40)
ATYPICAL LYMPHOCYTE RELATIVE PERCENT: 1 %
BANDED NEUTROPHILS RELATIVE PERCENT: 9 %
BASE EXCESS ARTERIAL: -1 (ref -3–3)
BASOPHILS ABSOLUTE: 0 K/UL (ref 0–0.2)
BASOPHILS RELATIVE PERCENT: 0.7 %
BILIRUB SERPL-MCNC: <0.2 MG/DL (ref 0.2–0.7)
BUN BLDV-MCNC: 34 MG/DL (ref 8–23)
BURR CELLS: ABNORMAL
CALCIUM IONIZED: 1.81 MMOL/L (ref 1.12–1.32)
CALCIUM SERPL-MCNC: 8.4 MG/DL (ref 8.5–9.9)
CHLORIDE BLD-SCNC: 91 MEQ/L (ref 95–107)
CO2: 20 MEQ/L (ref 20–31)
CREAT SERPL-MCNC: 5.36 MG/DL (ref 0.7–1.2)
EOSINOPHILS ABSOLUTE: 0 K/UL (ref 0–0.7)
EOSINOPHILS RELATIVE PERCENT: 0.6 %
ETHANOL PERCENT: NORMAL G/DL
ETHANOL: <10 MG/DL (ref 0–0.08)
FINAL REPORT: NORMAL
GFR AFRICAN AMERICAN: 12.9
GFR AFRICAN AMERICAN: 15
GFR NON-AFRICAN AMERICAN: 10.7
GFR NON-AFRICAN AMERICAN: 12
GLOBULIN: 3.5 G/DL (ref 2.3–3.5)
GLUCOSE BLD-MCNC: 172 MG/DL (ref 70–99)
GLUCOSE BLD-MCNC: 178 MG/DL (ref 70–99)
HCO3 ARTERIAL: 28 MMOL/L (ref 21–29)
HCT VFR BLD CALC: 25.7 % (ref 42–52)
HEMOGLOBIN: 8.1 GM/DL (ref 13.5–17.5)
HEMOGLOBIN: 8.2 G/DL (ref 14–18)
HYPOCHROMIA: ABNORMAL
INR BLD: 1.3
LACTATE: 9.42 MMOL/L (ref 0.4–2)
LACTIC ACID: 8.6 MMOL/L (ref 0.5–2.2)
LYMPHOCYTES ABSOLUTE: 1.7 K/UL (ref 1–4.8)
LYMPHOCYTES RELATIVE PERCENT: 11 %
MACROCYTES: ABNORMAL
MAGNESIUM: 2.2 MG/DL (ref 1.7–2.4)
MCH RBC QN AUTO: 32.7 PG (ref 27–31.3)
MCHC RBC AUTO-ENTMCNC: 31.7 % (ref 33–37)
MCV RBC AUTO: 103.1 FL (ref 80–100)
METAMYELOCYTES RELATIVE PERCENT: 1 %
MONOCYTES ABSOLUTE: 0.7 K/UL (ref 0.2–0.8)
MONOCYTES RELATIVE PERCENT: 4.8 %
MYELOCYTE PERCENT: 10 %
NEUTROPHILS ABSOLUTE: 11.9 K/UL (ref 1.4–6.5)
NEUTROPHILS RELATIVE PERCENT: 64 %
NUCLEATED RED BLOOD CELLS: 4 /100 WBC
O2 SAT, ARTERIAL: 89 % (ref 93–100)
PCO2 ARTERIAL: 87 MM HG (ref 35–45)
PDW BLD-RTO: 16.7 % (ref 11.5–14.5)
PERFORMED ON: ABNORMAL
PH ARTERIAL: 7.12 (ref 7.35–7.45)
PLATELET # BLD: 263 K/UL (ref 130–400)
PLATELET SLIDE REVIEW: ADEQUATE
PO2 ARTERIAL: 78 MM HG (ref 75–108)
POC CHLORIDE: 94 MEQ/L (ref 99–110)
POC CREATININE: 4.7 MG/DL (ref 0.8–1.3)
POC FIO2: 100
POC HEMATOCRIT: 24 % (ref 41–53)
POC POTASSIUM: 2.4 MEQ/L (ref 3.5–5.1)
POC SAMPLE TYPE: ABNORMAL
POC SODIUM: 136 MEQ/L (ref 136–145)
POIKILOCYTES: ABNORMAL
POLYCHROMASIA: ABNORMAL
POTASSIUM SERPL-SCNC: 4 MEQ/L (ref 3.4–4.9)
PRELIMINARY: NORMAL
PROCALCITONIN: 13.36 NG/ML (ref 0–0.15)
PROMYELOCYTES PERCENT: 1 %
PROTHROMBIN TIME: 16.7 SEC (ref 12.3–14.9)
RBC # BLD: 2.5 M/UL (ref 4.7–6.1)
SODIUM BLD-SCNC: 132 MEQ/L (ref 135–144)
T4 FREE: 0.4 NG/DL (ref 0.84–1.68)
TCO2 ARTERIAL: 31 MMOL/L (ref 21–32)
TOTAL CK: 65 U/L (ref 0–190)
TOTAL PROTEIN: 6.1 G/DL (ref 6.3–8)
TROPONIN: 0.26 NG/ML (ref 0–0.01)
TSH REFLEX: 57.04 UIU/ML (ref 0.44–3.86)
WBC # BLD: 14 K/UL (ref 4.8–10.8)

## 2022-08-27 PROCEDURE — 83605 ASSAY OF LACTIC ACID: CPT

## 2022-08-27 PROCEDURE — 84145 PROCALCITONIN (PCT): CPT

## 2022-08-27 PROCEDURE — 82550 ASSAY OF CK (CPK): CPT

## 2022-08-27 PROCEDURE — 82565 ASSAY OF CREATININE: CPT

## 2022-08-27 PROCEDURE — 71250 CT THORAX DX C-: CPT

## 2022-08-27 PROCEDURE — 83735 ASSAY OF MAGNESIUM: CPT

## 2022-08-27 PROCEDURE — 80053 COMPREHEN METABOLIC PANEL: CPT

## 2022-08-27 PROCEDURE — 82435 ASSAY OF BLOOD CHLORIDE: CPT

## 2022-08-27 PROCEDURE — 84295 ASSAY OF SERUM SODIUM: CPT

## 2022-08-27 PROCEDURE — 84443 ASSAY THYROID STIM HORMONE: CPT

## 2022-08-27 PROCEDURE — 2580000003 HC RX 258: Performed by: EMERGENCY MEDICINE

## 2022-08-27 PROCEDURE — 6370000000 HC RX 637 (ALT 250 FOR IP): Performed by: EMERGENCY MEDICINE

## 2022-08-27 PROCEDURE — 87040 BLOOD CULTURE FOR BACTERIA: CPT

## 2022-08-27 PROCEDURE — 85610 PROTHROMBIN TIME: CPT

## 2022-08-27 PROCEDURE — 5A1945Z RESPIRATORY VENTILATION, 24-96 CONSECUTIVE HOURS: ICD-10-PCS | Performed by: INTERNAL MEDICINE

## 2022-08-27 PROCEDURE — 84132 ASSAY OF SERUM POTASSIUM: CPT

## 2022-08-27 PROCEDURE — 6360000002 HC RX W HCPCS: Performed by: EMERGENCY MEDICINE

## 2022-08-27 PROCEDURE — 90715 TDAP VACCINE 7 YRS/> IM: CPT | Performed by: EMERGENCY MEDICINE

## 2022-08-27 PROCEDURE — 84484 ASSAY OF TROPONIN QUANT: CPT

## 2022-08-27 PROCEDURE — 82330 ASSAY OF CALCIUM: CPT

## 2022-08-27 PROCEDURE — 2000000000 HC ICU R&B

## 2022-08-27 PROCEDURE — 90471 IMMUNIZATION ADMIN: CPT | Performed by: EMERGENCY MEDICINE

## 2022-08-27 PROCEDURE — 94002 VENT MGMT INPAT INIT DAY: CPT

## 2022-08-27 PROCEDURE — 85025 COMPLETE CBC W/AUTO DIFF WBC: CPT

## 2022-08-27 PROCEDURE — 36600 WITHDRAWAL OF ARTERIAL BLOOD: CPT

## 2022-08-27 PROCEDURE — 85730 THROMBOPLASTIN TIME PARTIAL: CPT

## 2022-08-27 PROCEDURE — 85014 HEMATOCRIT: CPT

## 2022-08-27 PROCEDURE — 0BH17EZ INSERTION OF ENDOTRACHEAL AIRWAY INTO TRACHEA, VIA NATURAL OR ARTIFICIAL OPENING: ICD-10-PCS | Performed by: INTERNAL MEDICINE

## 2022-08-27 PROCEDURE — 99285 EMERGENCY DEPT VISIT HI MDM: CPT

## 2022-08-27 PROCEDURE — 93005 ELECTROCARDIOGRAM TRACING: CPT | Performed by: EMERGENCY MEDICINE

## 2022-08-27 PROCEDURE — 70450 CT HEAD/BRAIN W/O DYE: CPT

## 2022-08-27 PROCEDURE — 73110 X-RAY EXAM OF WRIST: CPT

## 2022-08-27 PROCEDURE — 74176 CT ABD & PELVIS W/O CONTRAST: CPT

## 2022-08-27 PROCEDURE — 71045 X-RAY EXAM CHEST 1 VIEW: CPT

## 2022-08-27 PROCEDURE — 6360000002 HC RX W HCPCS

## 2022-08-27 PROCEDURE — 36415 COLL VENOUS BLD VENIPUNCTURE: CPT

## 2022-08-27 PROCEDURE — 84439 ASSAY OF FREE THYROXINE: CPT

## 2022-08-27 PROCEDURE — 2500000003 HC RX 250 WO HCPCS: Performed by: EMERGENCY MEDICINE

## 2022-08-27 PROCEDURE — 82803 BLOOD GASES ANY COMBINATION: CPT

## 2022-08-27 PROCEDURE — 72125 CT NECK SPINE W/O DYE: CPT

## 2022-08-27 PROCEDURE — 82077 ASSAY SPEC XCP UR&BREATH IA: CPT

## 2022-08-27 RX ORDER — SODIUM CHLORIDE 0.9 % (FLUSH) 0.9 %
5-40 SYRINGE (ML) INJECTION PRN
Status: DISCONTINUED | OUTPATIENT
Start: 2022-08-27 | End: 2022-08-29 | Stop reason: HOSPADM

## 2022-08-27 RX ORDER — ASPIRIN 300 MG/1
300 SUPPOSITORY RECTAL ONCE
Status: COMPLETED | OUTPATIENT
Start: 2022-08-27 | End: 2022-08-27

## 2022-08-27 RX ORDER — SODIUM CHLORIDE 9 MG/ML
INJECTION, SOLUTION INTRAVENOUS PRN
Status: DISCONTINUED | OUTPATIENT
Start: 2022-08-27 | End: 2022-08-29 | Stop reason: HOSPADM

## 2022-08-27 RX ORDER — HEPARIN SODIUM 5000 [USP'U]/ML
5000 INJECTION, SOLUTION INTRAVENOUS; SUBCUTANEOUS EVERY 8 HOURS SCHEDULED
Status: DISCONTINUED | OUTPATIENT
Start: 2022-08-28 | End: 2022-08-29 | Stop reason: HOSPADM

## 2022-08-27 RX ORDER — ONDANSETRON 4 MG/1
4 TABLET, ORALLY DISINTEGRATING ORAL EVERY 8 HOURS PRN
Status: DISCONTINUED | OUTPATIENT
Start: 2022-08-27 | End: 2022-08-29 | Stop reason: HOSPADM

## 2022-08-27 RX ORDER — POLYETHYLENE GLYCOL 3350 17 G/17G
17 POWDER, FOR SOLUTION ORAL DAILY PRN
Status: DISCONTINUED | OUTPATIENT
Start: 2022-08-27 | End: 2022-08-29 | Stop reason: HOSPADM

## 2022-08-27 RX ORDER — ONDANSETRON 2 MG/ML
4 INJECTION INTRAMUSCULAR; INTRAVENOUS EVERY 6 HOURS PRN
Status: DISCONTINUED | OUTPATIENT
Start: 2022-08-27 | End: 2022-08-29 | Stop reason: HOSPADM

## 2022-08-27 RX ORDER — SODIUM CHLORIDE 0.9 % (FLUSH) 0.9 %
5-40 SYRINGE (ML) INJECTION EVERY 12 HOURS SCHEDULED
Status: DISCONTINUED | OUTPATIENT
Start: 2022-08-28 | End: 2022-08-29 | Stop reason: HOSPADM

## 2022-08-27 RX ORDER — ACETAMINOPHEN 650 MG/1
650 SUPPOSITORY RECTAL EVERY 6 HOURS PRN
Status: DISCONTINUED | OUTPATIENT
Start: 2022-08-27 | End: 2022-08-29 | Stop reason: HOSPADM

## 2022-08-27 RX ORDER — MAGNESIUM SULFATE IN WATER 40 MG/ML
INJECTION, SOLUTION INTRAVENOUS
Status: COMPLETED
Start: 2022-08-27 | End: 2022-08-27

## 2022-08-27 RX ORDER — ACETAMINOPHEN 325 MG/1
650 TABLET ORAL EVERY 6 HOURS PRN
Status: DISCONTINUED | OUTPATIENT
Start: 2022-08-27 | End: 2022-08-29 | Stop reason: HOSPADM

## 2022-08-27 RX ADMIN — MIDAZOLAM 2 MG/HR: 5 INJECTION INTRAMUSCULAR; INTRAVENOUS at 21:31

## 2022-08-27 RX ADMIN — ASPIRIN 300 MG: 300 SUPPOSITORY RECTAL at 22:04

## 2022-08-27 RX ADMIN — NOREPINEPHRINE BITARTRATE 5 MCG/MIN: 1 INJECTION INTRAVENOUS at 21:15

## 2022-08-27 RX ADMIN — TETANUS TOXOID, REDUCED DIPHTHERIA TOXOID AND ACELLULAR PERTUSSIS VACCINE, ADSORBED 0.5 ML: 5; 2.5; 8; 8; 2.5 SUSPENSION INTRAMUSCULAR at 21:44

## 2022-08-27 RX ADMIN — MAGNESIUM SULFATE HEPTAHYDRATE 2 MG: 40 INJECTION, SOLUTION INTRAVENOUS at 20:07

## 2022-08-27 ASSESSMENT — PULMONARY FUNCTION TESTS: PIF_VALUE: 32

## 2022-08-28 ENCOUNTER — APPOINTMENT (OUTPATIENT)
Dept: CT IMAGING | Age: 67
DRG: 208 | End: 2022-08-28
Payer: MEDICARE

## 2022-08-28 ENCOUNTER — APPOINTMENT (OUTPATIENT)
Dept: GENERAL RADIOLOGY | Age: 67
DRG: 208 | End: 2022-08-28
Payer: MEDICARE

## 2022-08-28 PROBLEM — R57.9 SHOCK (HCC): Status: ACTIVE | Noted: 2022-08-28

## 2022-08-28 PROBLEM — J96.02 ACUTE RESPIRATORY FAILURE WITH HYPOXIA AND HYPERCAPNIA (HCC): Status: ACTIVE | Noted: 2022-07-25

## 2022-08-28 PROBLEM — N18.9 CHRONIC KIDNEY DISEASE: Status: ACTIVE | Noted: 2022-08-28

## 2022-08-28 LAB
ALBUMIN SERPL-MCNC: 2.6 G/DL (ref 3.5–4.6)
ALP BLD-CCNC: 103 U/L (ref 35–104)
ALT SERPL-CCNC: 37 U/L (ref 0–41)
ANION GAP SERPL CALCULATED.3IONS-SCNC: 17 MEQ/L (ref 9–15)
ANISOCYTOSIS: ABNORMAL
APPEARANCE FLUID: NORMAL
APPEARANCE FLUID: NORMAL
APTT: 59.2 SEC (ref 24.4–36.8)
AST SERPL-CCNC: 67 U/L (ref 0–40)
BASE EXCESS ARTERIAL: -1 (ref -3–3)
BASE EXCESS ARTERIAL: 0 (ref -3–3)
BASE EXCESS ARTERIAL: 0 (ref -3–3)
BASOPHILS ABSOLUTE: 0 K/UL (ref 0–0.2)
BASOPHILS RELATIVE PERCENT: 0.4 %
BILIRUB SERPL-MCNC: 0.3 MG/DL (ref 0.2–0.7)
BUN BLDV-MCNC: 39 MG/DL (ref 8–23)
CALCIUM IONIZED: 1.21 MMOL/L (ref 1.12–1.32)
CALCIUM IONIZED: 1.22 MMOL/L (ref 1.12–1.32)
CALCIUM IONIZED: 1.33 MMOL/L (ref 1.12–1.32)
CALCIUM SERPL-MCNC: 9.8 MG/DL (ref 8.5–9.9)
CELL COUNT FLUID TYPE: NORMAL
CELL COUNT FLUID TYPE: NORMAL
CHLORIDE BLD-SCNC: 89 MEQ/L (ref 95–107)
CLOT EVALUATION: NORMAL
CLOT EVALUATION: NORMAL
CO2: 23 MEQ/L (ref 20–31)
COLOR FLUID: NORMAL
COLOR FLUID: YELLOW
CREAT SERPL-MCNC: 5.39 MG/DL (ref 0.7–1.2)
EOSINOPHILS ABSOLUTE: 0 K/UL (ref 0–0.7)
EOSINOPHILS RELATIVE PERCENT: 0.1 %
FLUID PATH CONSULT: YES
FLUID PATH CONSULT: YES
FLUID TYPE: NORMAL
FLUID TYPE: NORMAL
GFR AFRICAN AMERICAN: 12.9
GFR AFRICAN AMERICAN: 13
GFR AFRICAN AMERICAN: 14
GFR AFRICAN AMERICAN: 15
GFR NON-AFRICAN AMERICAN: 10.6
GFR NON-AFRICAN AMERICAN: 11
GFR NON-AFRICAN AMERICAN: 12
GFR NON-AFRICAN AMERICAN: 13
GLOBULIN: 3.8 G/DL (ref 2.3–3.5)
GLUCOSE BLD-MCNC: 137 MG/DL (ref 70–99)
GLUCOSE BLD-MCNC: 139 MG/DL (ref 70–99)
GLUCOSE BLD-MCNC: 149 MG/DL (ref 70–99)
GLUCOSE BLD-MCNC: 167 MG/DL (ref 70–99)
GLUCOSE BLD-MCNC: 168 MG/DL (ref 70–99)
GLUCOSE BLD-MCNC: 187 MG/DL (ref 70–99)
GLUCOSE, FLUID: 132 MG/DL
GLUCOSE, FLUID: 139 MG/DL
HCO3 ARTERIAL: 23.4 MMOL/L (ref 21–29)
HCO3 ARTERIAL: 24 MMOL/L (ref 21–29)
HCO3 ARTERIAL: 25.1 MMOL/L (ref 21–29)
HCT VFR BLD CALC: 29.6 % (ref 42–52)
HEMOGLOBIN: 10.3 GM/DL (ref 13.5–17.5)
HEMOGLOBIN: 10.8 GM/DL (ref 13.5–17.5)
HEMOGLOBIN: 9.2 G/DL (ref 14–18)
HEMOGLOBIN: 9.8 GM/DL (ref 13.5–17.5)
HYPOCHROMIA: ABNORMAL
LACTATE: 4.21 MMOL/L (ref 0.4–2)
LACTATE: 4.42 MMOL/L (ref 0.4–2)
LACTATE: 5.27 MMOL/L (ref 0.4–2)
LACTIC ACID: 4.2 MMOL/L (ref 0.5–2.2)
LACTIC ACID: 4.6 MMOL/L (ref 0.5–2.2)
LYMPHOCYTES ABSOLUTE: 0.5 K/UL (ref 1–4.8)
LYMPHOCYTES RELATIVE PERCENT: 2 %
LYMPHOCYTES, BODY FLUID: 10 %
LYMPHOCYTES, BODY FLUID: 29 %
Lab: 3.6 MEQ/L
Lab: 3.6 MEQ/L
MACROCYTES: ABNORMAL
MAGNESIUM: 2.3 MG/DL (ref 1.7–2.4)
MCH RBC QN AUTO: 31.2 PG (ref 27–31.3)
MCHC RBC AUTO-ENTMCNC: 31 % (ref 33–37)
MCV RBC AUTO: 100.8 FL (ref 80–100)
METAMYELOCYTES RELATIVE PERCENT: 1 %
MONOCYTE, FLUID: 49 %
MONOCYTE, FLUID: 82 %
MONOCYTES ABSOLUTE: 0.3 K/UL (ref 0.2–0.8)
MONOCYTES RELATIVE PERCENT: 0.9 %
MYELOCYTE PERCENT: 2 %
NEUTROPHIL, FLUID: 22 %
NEUTROPHIL, FLUID: 8 %
NEUTROPHILS ABSOLUTE: 25.6 K/UL (ref 1.4–6.5)
NEUTROPHILS RELATIVE PERCENT: 94 %
NUCLEATED CELLS FLUID: 143 /CUMM
NUCLEATED CELLS FLUID: 312 /CUMM
NUMBER OF CELLS COUNTED FLUID: 100
NUMBER OF CELLS COUNTED FLUID: 100
O2 SAT, ARTERIAL: 100 % (ref 93–100)
O2 SAT, ARTERIAL: 86 % (ref 93–100)
O2 SAT, ARTERIAL: 97 % (ref 93–100)
OVALOCYTES: ABNORMAL
PCO2 ARTERIAL: 35 MM HG (ref 35–45)
PCO2 ARTERIAL: 36 MM HG (ref 35–45)
PCO2 ARTERIAL: 40 MM HG (ref 35–45)
PDW BLD-RTO: 16.4 % (ref 11.5–14.5)
PERFORMED ON: ABNORMAL
PH ARTERIAL: 7.4 (ref 7.35–7.45)
PH ARTERIAL: 7.42 (ref 7.35–7.45)
PH ARTERIAL: 7.45 (ref 7.35–7.45)
PLATELET # BLD: 347 K/UL (ref 130–400)
PLATELET SLIDE REVIEW: NORMAL
PO2 ARTERIAL: 457 MM HG (ref 75–108)
PO2 ARTERIAL: 51 MM HG (ref 75–108)
PO2 ARTERIAL: 86 MM HG (ref 75–108)
POC CHLORIDE: 94 MEQ/L (ref 99–110)
POC CHLORIDE: 94 MEQ/L (ref 99–110)
POC CHLORIDE: 95 MEQ/L (ref 99–110)
POC CREATININE: 4.6 MG/DL (ref 0.8–1.3)
POC CREATININE: 5 MG/DL (ref 0.8–1.3)
POC CREATININE: 5.3 MG/DL (ref 0.8–1.3)
POC FIO2: 100
POC FIO2: 100
POC FIO2: 90
POC HEMATOCRIT: 29 % (ref 41–53)
POC HEMATOCRIT: 30 % (ref 41–53)
POC HEMATOCRIT: 32 % (ref 41–53)
POC POTASSIUM: 3 MEQ/L (ref 3.5–5.1)
POC POTASSIUM: 3.3 MEQ/L (ref 3.5–5.1)
POC POTASSIUM: 4.4 MEQ/L (ref 3.5–5.1)
POC SAMPLE TYPE: ABNORMAL
POC SODIUM: 130 MEQ/L (ref 136–145)
POC SODIUM: 131 MEQ/L (ref 136–145)
POC SODIUM: 132 MEQ/L (ref 136–145)
POTASSIUM SERPL-SCNC: 3.6 MEQ/L (ref 3.4–4.9)
POTASSIUM SERPL-SCNC: 3.6 MEQ/L (ref 3.4–4.9)
POTASSIUM SERPL-SCNC: 3.8 MEQ/L (ref 3.4–4.9)
POTASSIUM SERPL-SCNC: 4 MEQ/L (ref 3.4–4.9)
POTASSIUM SERPL-SCNC: 4.1 MEQ/L (ref 3.4–4.9)
PROTEIN FLUID: 2.9 G/DL
PROTEIN FLUID: 3.6 G/DL
RBC # BLD: 2.94 M/UL (ref 4.7–6.1)
RBC FLUID: 2090 /CUMM
RBC FLUID: 2810 /CUMM
SODIUM BLD-SCNC: 129 MEQ/L (ref 135–144)
TCO2 ARTERIAL: 25 MMOL/L (ref 21–32)
TCO2 ARTERIAL: 25 MMOL/L (ref 21–32)
TCO2 ARTERIAL: 26 MMOL/L (ref 21–32)
TOTAL PROTEIN: 6.4 G/DL (ref 6.3–8)
TROPONIN: 0.3 NG/ML (ref 0–0.01)
WBC # BLD: 26.4 K/UL (ref 4.8–10.8)

## 2022-08-28 PROCEDURE — 84484 ASSAY OF TROPONIN QUANT: CPT

## 2022-08-28 PROCEDURE — 2580000003 HC RX 258: Performed by: PSYCHIATRY & NEUROLOGY

## 2022-08-28 PROCEDURE — 94003 VENT MGMT INPAT SUBQ DAY: CPT

## 2022-08-28 PROCEDURE — 99291 CRITICAL CARE FIRST HOUR: CPT | Performed by: PSYCHIATRY & NEUROLOGY

## 2022-08-28 PROCEDURE — 82803 BLOOD GASES ANY COMBINATION: CPT

## 2022-08-28 PROCEDURE — 2580000003 HC RX 258: Performed by: INTERNAL MEDICINE

## 2022-08-28 PROCEDURE — 2500000003 HC RX 250 WO HCPCS

## 2022-08-28 PROCEDURE — 2580000003 HC RX 258: Performed by: NURSE PRACTITIONER

## 2022-08-28 PROCEDURE — 37799 UNLISTED PX VASCULAR SURGERY: CPT

## 2022-08-28 PROCEDURE — 93005 ELECTROCARDIOGRAM TRACING: CPT | Performed by: NURSE PRACTITIONER

## 2022-08-28 PROCEDURE — 87116 MYCOBACTERIA CULTURE: CPT

## 2022-08-28 PROCEDURE — 71275 CT ANGIOGRAPHY CHEST: CPT

## 2022-08-28 PROCEDURE — 84157 ASSAY OF PROTEIN OTHER: CPT

## 2022-08-28 PROCEDURE — 87102 FUNGUS ISOLATION CULTURE: CPT

## 2022-08-28 PROCEDURE — 89051 BODY FLUID CELL COUNT: CPT

## 2022-08-28 PROCEDURE — 83735 ASSAY OF MAGNESIUM: CPT

## 2022-08-28 PROCEDURE — 83615 LACTATE (LD) (LDH) ENZYME: CPT

## 2022-08-28 PROCEDURE — 82330 ASSAY OF CALCIUM: CPT

## 2022-08-28 PROCEDURE — 6370000000 HC RX 637 (ALT 250 FOR IP): Performed by: INTERNAL MEDICINE

## 2022-08-28 PROCEDURE — 94761 N-INVAS EAR/PLS OXIMETRY MLT: CPT

## 2022-08-28 PROCEDURE — 84132 ASSAY OF SERUM POTASSIUM: CPT

## 2022-08-28 PROCEDURE — 02HV33Z INSERTION OF INFUSION DEVICE INTO SUPERIOR VENA CAVA, PERCUTANEOUS APPROACH: ICD-10-PCS | Performed by: INTERNAL MEDICINE

## 2022-08-28 PROCEDURE — 6360000002 HC RX W HCPCS: Performed by: INTERNAL MEDICINE

## 2022-08-28 PROCEDURE — 2700000000 HC OXYGEN THERAPY PER DAY

## 2022-08-28 PROCEDURE — 82565 ASSAY OF CREATININE: CPT

## 2022-08-28 PROCEDURE — 88112 CYTOPATH CELL ENHANCE TECH: CPT

## 2022-08-28 PROCEDURE — 88305 TISSUE EXAM BY PATHOLOGIST: CPT

## 2022-08-28 PROCEDURE — 87205 SMEAR GRAM STAIN: CPT

## 2022-08-28 PROCEDURE — 99291 CRITICAL CARE FIRST HOUR: CPT | Performed by: INTERNAL MEDICINE

## 2022-08-28 PROCEDURE — 83605 ASSAY OF LACTIC ACID: CPT

## 2022-08-28 PROCEDURE — 71045 X-RAY EXAM CHEST 1 VIEW: CPT

## 2022-08-28 PROCEDURE — 32551 INSERTION OF CHEST TUBE: CPT | Performed by: INTERNAL MEDICINE

## 2022-08-28 PROCEDURE — 32551 INSERTION OF CHEST TUBE: CPT

## 2022-08-28 PROCEDURE — 85730 THROMBOPLASTIN TIME PARTIAL: CPT

## 2022-08-28 PROCEDURE — 84295 ASSAY OF SERUM SODIUM: CPT

## 2022-08-28 PROCEDURE — 6360000002 HC RX W HCPCS: Performed by: EMERGENCY MEDICINE

## 2022-08-28 PROCEDURE — 36600 WITHDRAWAL OF ARTERIAL BLOOD: CPT

## 2022-08-28 PROCEDURE — 82435 ASSAY OF BLOOD CHLORIDE: CPT

## 2022-08-28 PROCEDURE — C9113 INJ PANTOPRAZOLE SODIUM, VIA: HCPCS | Performed by: NURSE PRACTITIONER

## 2022-08-28 PROCEDURE — 0W9B30Z DRAINAGE OF LEFT PLEURAL CAVITY WITH DRAINAGE DEVICE, PERCUTANEOUS APPROACH: ICD-10-PCS | Performed by: INTERNAL MEDICINE

## 2022-08-28 PROCEDURE — 82945 GLUCOSE OTHER FLUID: CPT

## 2022-08-28 PROCEDURE — 84133 ASSAY OF URINE POTASSIUM: CPT

## 2022-08-28 PROCEDURE — 82948 REAGENT STRIP/BLOOD GLUCOSE: CPT

## 2022-08-28 PROCEDURE — 85025 COMPLETE CBC W/AUTO DIFF WBC: CPT

## 2022-08-28 PROCEDURE — 2500000003 HC RX 250 WO HCPCS: Performed by: INTERNAL MEDICINE

## 2022-08-28 PROCEDURE — 6360000002 HC RX W HCPCS: Performed by: PSYCHIATRY & NEUROLOGY

## 2022-08-28 PROCEDURE — 2000000000 HC ICU R&B

## 2022-08-28 PROCEDURE — 6360000002 HC RX W HCPCS: Performed by: NURSE PRACTITIONER

## 2022-08-28 PROCEDURE — 80053 COMPREHEN METABOLIC PANEL: CPT

## 2022-08-28 PROCEDURE — 85014 HEMATOCRIT: CPT

## 2022-08-28 PROCEDURE — 2580000003 HC RX 258: Performed by: EMERGENCY MEDICINE

## 2022-08-28 PROCEDURE — 87070 CULTURE OTHR SPECIMN AEROBIC: CPT

## 2022-08-28 PROCEDURE — 6360000004 HC RX CONTRAST MEDICATION: Performed by: INTERNAL MEDICINE

## 2022-08-28 PROCEDURE — 2500000003 HC RX 250 WO HCPCS: Performed by: NURSE PRACTITIONER

## 2022-08-28 PROCEDURE — A4216 STERILE WATER/SALINE, 10 ML: HCPCS | Performed by: NURSE PRACTITIONER

## 2022-08-28 RX ORDER — INSULIN LISPRO 100 [IU]/ML
0-4 INJECTION, SOLUTION INTRAVENOUS; SUBCUTANEOUS EVERY 4 HOURS
Status: DISCONTINUED | OUTPATIENT
Start: 2022-08-28 | End: 2022-08-29 | Stop reason: HOSPADM

## 2022-08-28 RX ORDER — SUCRALFATE 1 G/1
1 TABLET ORAL EVERY 6 HOURS SCHEDULED
Status: DISCONTINUED | OUTPATIENT
Start: 2022-08-28 | End: 2022-08-29 | Stop reason: HOSPADM

## 2022-08-28 RX ORDER — DEXTROSE MONOHYDRATE 100 MG/ML
INJECTION, SOLUTION INTRAVENOUS CONTINUOUS PRN
Status: DISCONTINUED | OUTPATIENT
Start: 2022-08-28 | End: 2022-08-29

## 2022-08-28 RX ORDER — SODIUM CHLORIDE 0.9 % (FLUSH) 0.9 %
5-40 SYRINGE (ML) INJECTION EVERY 12 HOURS SCHEDULED
Status: DISCONTINUED | OUTPATIENT
Start: 2022-08-28 | End: 2022-08-29 | Stop reason: HOSPADM

## 2022-08-28 RX ORDER — LEVOTHYROXINE SODIUM 20 UG/ML
200 INJECTION, SOLUTION INTRAVENOUS ONCE
Status: COMPLETED | OUTPATIENT
Start: 2022-08-28 | End: 2022-08-28

## 2022-08-28 RX ORDER — ALBUMIN (HUMAN) 12.5 G/50ML
25 SOLUTION INTRAVENOUS DAILY PRN
Status: DISCONTINUED | OUTPATIENT
Start: 2022-08-28 | End: 2022-08-29 | Stop reason: HOSPADM

## 2022-08-28 RX ORDER — SODIUM CHLORIDE 9 MG/ML
INJECTION, SOLUTION INTRAVENOUS PRN
Status: DISCONTINUED | OUTPATIENT
Start: 2022-08-28 | End: 2022-08-29 | Stop reason: HOSPADM

## 2022-08-28 RX ORDER — TACROLIMUS 1 MG/1
1 CAPSULE ORAL 2 TIMES DAILY
Status: DISCONTINUED | OUTPATIENT
Start: 2022-08-28 | End: 2022-08-29 | Stop reason: HOSPADM

## 2022-08-28 RX ORDER — PROPOFOL 10 MG/ML
10 INJECTION, EMULSION INTRAVENOUS
Status: DISCONTINUED | OUTPATIENT
Start: 2022-08-28 | End: 2022-08-29

## 2022-08-28 RX ORDER — SODIUM CHLORIDE 0.9 % (FLUSH) 0.9 %
5-40 SYRINGE (ML) INJECTION PRN
Status: DISCONTINUED | OUTPATIENT
Start: 2022-08-28 | End: 2022-08-29 | Stop reason: HOSPADM

## 2022-08-28 RX ADMIN — HEPARIN SODIUM 5000 UNITS: 5000 INJECTION INTRAVENOUS; SUBCUTANEOUS at 12:37

## 2022-08-28 RX ADMIN — PIPERACILLIN AND TAZOBACTAM 4500 MG: 4; .5 INJECTION, POWDER, LYOPHILIZED, FOR SOLUTION INTRAVENOUS at 12:25

## 2022-08-28 RX ADMIN — SUCRALFATE 1 G: 1 TABLET ORAL at 22:01

## 2022-08-28 RX ADMIN — LEVETIRACETAM 1000 MG: 100 INJECTION, SOLUTION, CONCENTRATE INTRAVENOUS at 23:53

## 2022-08-28 RX ADMIN — PROPOFOL 10 MCG/KG/MIN: 10 INJECTION, EMULSION INTRAVENOUS at 12:19

## 2022-08-28 RX ADMIN — SODIUM CHLORIDE, PRESERVATIVE FREE 10 ML: 5 INJECTION INTRAVENOUS at 12:38

## 2022-08-28 RX ADMIN — SUCRALFATE 1 G: 1 TABLET ORAL at 14:38

## 2022-08-28 RX ADMIN — HEPARIN SODIUM 5000 UNITS: 5000 INJECTION INTRAVENOUS; SUBCUTANEOUS at 06:25

## 2022-08-28 RX ADMIN — SODIUM CHLORIDE, PRESERVATIVE FREE 10 ML: 5 INJECTION INTRAVENOUS at 22:12

## 2022-08-28 RX ADMIN — PROPOFOL 20 MCG/KG/MIN: 10 INJECTION, EMULSION INTRAVENOUS at 22:01

## 2022-08-28 RX ADMIN — Medication 16 MG: at 08:06

## 2022-08-28 RX ADMIN — VASOPRESSIN 0.03 UNITS/MIN: 20 INJECTION PARENTERAL at 08:18

## 2022-08-28 RX ADMIN — FENTANYL CITRATE 50 MCG/HR: 0.05 INJECTION, SOLUTION INTRAMUSCULAR; INTRAVENOUS at 02:43

## 2022-08-28 RX ADMIN — VASOPRESSIN 0.03 UNITS/MIN: 20 INJECTION PARENTERAL at 17:16

## 2022-08-28 RX ADMIN — Medication 28 MCG/MIN: at 18:06

## 2022-08-28 RX ADMIN — LEVETIRACETAM 1000 MG: 100 INJECTION, SOLUTION, CONCENTRATE INTRAVENOUS at 11:45

## 2022-08-28 RX ADMIN — SODIUM CHLORIDE 40 MG: 9 INJECTION, SOLUTION INTRAMUSCULAR; INTRAVENOUS; SUBCUTANEOUS at 08:08

## 2022-08-28 RX ADMIN — LEVOTHYROXINE SODIUM 200 MCG: 20 INJECTION, SOLUTION INTRAVENOUS at 04:21

## 2022-08-28 RX ADMIN — TACROLIMUS 1 MG: 1 CAPSULE ORAL at 14:38

## 2022-08-28 RX ADMIN — FENTANYL CITRATE 100 MCG/HR: 0.05 INJECTION, SOLUTION INTRAMUSCULAR; INTRAVENOUS at 14:37

## 2022-08-28 RX ADMIN — VANCOMYCIN HYDROCHLORIDE 1000 MG: 1 INJECTION, POWDER, LYOPHILIZED, FOR SOLUTION INTRAVENOUS at 12:28

## 2022-08-28 RX ADMIN — IOPAMIDOL 150 ML: 612 INJECTION, SOLUTION INTRAVENOUS at 10:46

## 2022-08-28 RX ADMIN — TACROLIMUS 1 MG: 1 CAPSULE ORAL at 22:02

## 2022-08-28 ASSESSMENT — PULMONARY FUNCTION TESTS
PIF_VALUE: 24
PIF_VALUE: 34
PIF_VALUE: 25
PIF_VALUE: 32
PIF_VALUE: 24
PIF_VALUE: 28
PIF_VALUE: 32
PIF_VALUE: 24
PIF_VALUE: 31
PIF_VALUE: 29
PIF_VALUE: 32
PIF_VALUE: 24
PIF_VALUE: 33
PIF_VALUE: 24
PIF_VALUE: 31
PIF_VALUE: 23
PIF_VALUE: 33
PIF_VALUE: 33
PIF_VALUE: 24
PIF_VALUE: 24
PIF_VALUE: 32
PIF_VALUE: 24
PIF_VALUE: 23
PIF_VALUE: 30
PIF_VALUE: 28
PIF_VALUE: 25
PIF_VALUE: 23
PIF_VALUE: 29
PIF_VALUE: 32
PIF_VALUE: 24
PIF_VALUE: 32
PIF_VALUE: 24
PIF_VALUE: 30
PIF_VALUE: 32
PIF_VALUE: 24
PIF_VALUE: 32
PIF_VALUE: 28
PIF_VALUE: 31
PIF_VALUE: 34
PIF_VALUE: 24
PIF_VALUE: 26
PIF_VALUE: 25
PIF_VALUE: 32
PIF_VALUE: 24
PIF_VALUE: 32
PIF_VALUE: 31
PIF_VALUE: 32
PIF_VALUE: 31
PIF_VALUE: 32
PIF_VALUE: 31
PIF_VALUE: 24
PIF_VALUE: 29
PIF_VALUE: 33
PIF_VALUE: 24
PIF_VALUE: 23
PIF_VALUE: 33
PIF_VALUE: 31
PIF_VALUE: 33
PIF_VALUE: 23
PIF_VALUE: 29
PIF_VALUE: 33
PIF_VALUE: 32
PIF_VALUE: 24
PIF_VALUE: 32
PIF_VALUE: 32
PIF_VALUE: 33
PIF_VALUE: 31
PIF_VALUE: 24
PIF_VALUE: 32
PIF_VALUE: 33
PIF_VALUE: 34
PIF_VALUE: 24
PIF_VALUE: 48
PIF_VALUE: 31
PIF_VALUE: 32
PIF_VALUE: 31
PIF_VALUE: 32
PIF_VALUE: 24

## 2022-08-28 ASSESSMENT — PAIN SCALES - GENERAL
PAINLEVEL_OUTOF10: 0

## 2022-08-28 NOTE — PROGRESS NOTES
Pt sister was present I the ER waiting room, Pt daughter currently lives in [place], Pt daughter is working on getting a flight to [place] tomorrow, Pt not alert at this time, prayer with the pt sister for the pt and family,

## 2022-08-28 NOTE — ED NOTES
2006 1 mg Epi given by Jaqueline Cueto RN   2006 50 mEq/50 mL Sodium Bicarb given by Joanna Angulo  2006 pulse check by Dr Deisi Isidro pt in [facility]  2007 1000 mg Calcium Chloride given by Jaqueline Cueto RN  2007 2 mg Magnesium started by Nichol Hernadez RN  2008 1 mg Epi given by Joanna Angulo  2009 pulse check, pt has heartbeat, ROSC per Dr Marlena Caballero 1000 mg Calcium Chloride given by Joanna Angulo  2010 50 mEq/50 mL Sodium Bicarb given by Inez Pickard RN  08/27/22 9051

## 2022-08-28 NOTE — PROCEDURES
CHEST TUBE   [id number]   PROCEDURE PERFORMED:   1. left sided 20-Malaysian  chest tube placement. 2. Ultrasound guidance yes -          CONSENT: The risks and benefits of this procedure were explained to the the patient's daughter. All questions were answered and alternative options explained. The patient has been assessed and  is stable to undergo conscious sedation as well as the procedure itself. MEDICATIONS USED:   Lidocaine 1% without epinephrine, total quantity 5 mL. PREOPERATIVE DIAGNOSIS(ES):   1.left pleural effusion    POSTPROCEDURE DIAGNOSIS(ES):   1. Same     DESCRIPTION OF PROCEDURE:   Consent was verified as signed and placed upon the chart. Patient was positively identified and procedure site was marked. Time-out was performed by operating team and patient. Vital sign monitoring was accomplished by noninvasive hemodynamic monitoring, pulse oximetry, and telemetry. While on the exam table, the left hemothorax was examined by ultrasound and the diaphragm and pleural fluid were localized. The patient was then prepped and draped in the usual sterile fashion and the skin was anesthetized with 1% lidocaine without epinephrine. A finder need was inserted into the pleural space with return of yellow cloudy pleural fluid. A 20-Malaysian   chest tube was then placed to 20 cm in depth using a modified Seldinger technique and serial dilations. The catheter was secured in place with 2-0 silk sutures x 1  and a sterile dressing was applied. there was a total of 2000 mL of cloudy yellow pleural fluid evacuated. The patient had stable vital signs throughout the entire procedur      ESTIMATED BLOOD LOSS:  less than 5 cc    COMPLICATIONS:  None  Patient has residual pneumothorax ex vacuo likely due to entrapped lung        CHEST TUBE   [id number]   PROCEDURE PERFORMED:   1. right sided 20-Malaysian  chest tube placement.    2. Ultrasound guidance yes -        CONSENT: The risks and benefits of this procedure were explained to the the patient's daughter. All questions were answered and alternative options explained. The patient has been assessed and  is stable to undergo conscious sedation as well as the procedure itself. MEDICATIONS USED:   Lidocaine 1% without epinephrine, total quantity 5 mL. PREOPERATIVE DIAGNOSIS(ES):   1.right pleural effusion    POSTPROCEDURE DIAGNOSIS(ES):   1. Same     DESCRIPTION OF PROCEDURE:   Consent was verified as signed and placed upon the chart. Patient was positively identified and procedure site was marked. Time-out was performed by operating team and patient. Vital sign monitoring was accomplished by noninvasive hemodynamic monitoring, pulse oximetry, and telemetry. While on the exam table, the right hemothorax was examined by ultrasound and the diaphragm and pleural fluid were localized. The patient was then prepped and draped in the usual sterile fashion and the skin was anesthetized with 1% lidocaine without epinephrine. A finder need was inserted into the pleural space with return of yellow thick purulent pleural fluid. A 20-Gibraltarian   chest tube was then placed to 20 cm in depth using a modified Seldinger technique and serial dilations. The catheter was secured in place with 2-0 silk sutures x 1  and a sterile dressing was applied. there was a total of 2200 mL of purulent yellow thick pleural fluid evacuated.  The patient had stable vital signs throughout the entire procedur      ESTIMATED BLOOD LOSS:  less than 5 cc    COMPLICATIONS:  None    Electronically signed by Yosef Garcia MD, [facility]   on 8/28/2022 at 10:12 AM

## 2022-08-28 NOTE — ED PROVIDER NOTES
[address] ED  EMERGENCY DEPARTMENT ENCOUNTER      Pt Name: Althea Cornejo  MRN: [id number]  [place] 1955  Date of evaluation: 8/27/2022  Provider: Thu Malin MD    CHIEF COMPLAINT       Chief Complaint   Patient presents with    Cardiac Arrest         HISTORY OF PRESENT ILLNESS   (Location/Symptom, Timing/Onset, Context/Setting, Quality, Duration, Modifying Factors, Severity)  Note limiting factors. Althea Cornejo is a 75 y.o. male who presents to the emergency department via EMS from [facility]. H/o resp failure home O2 dependent, HTN, ESRD on HD, liver transplant. EMS reports that he was reportedly at baseline earlier today, last known well 15 minutes prior to arrival, they walked into his room and saw that he was lying on the ground next to the bed. This was unwitnessed. He is reportedly breathing with by standards, EMS arrived and the patient was in PEA, compressions given, epinephrine x2. No shocks advised prior to arrival.  Patient presents intubated, unresponsive  No anticoagulation   Unable to get further hx at this time  HPI    Nursing Notes were reviewed. REVIEW OF SYSTEMS    (2-9 systems for level 4, 10 or more for level 5)     Review of Systems   Unable to perform ROS: Patient unresponsive     Except as noted above the remainder of the review of systems was reviewed and negative.        PAST MEDICAL HISTORY     Past Medical History:   Diagnosis Date    ESRD (end stage renal disease) ([facility]     Hemodialysis patient ([facility]     Hepatitis     Hypertension     Liver transplanted ([facility] 03/2016         SURGICAL HISTORY       Past Surgical History:   Procedure Laterality Date    DIALYSIS FISTULA CREATION Right     IR MIDLINE CATH  8/2/2022    IR MIDLINE CATH 8/2/2022 [facility] SPECIAL PROCEDURE    LIVER TRANSPLANT  03/08/2017    TUNNELED VENOUS PORT PLACEMENT      UPPER GASTROINTESTINAL ENDOSCOPY N/A 8/5/2022    EGD ESOPHAGOGASTRODUODENOSCOPY WITH INTERVENTION performed by Larry Serra MD at [address] [place] [address]       Previous Medications    ALBUTEROL SULFATE HFA (PROAIR HFA) 108 (90 BASE) MCG/ACT INHALER    2 puffs; Use every 4 hours while awake for 7-10 days then PRN wheezing  Dispense with SPACER and Instruct on use. May sub Ventolin or Proventil as needed per [employer]. ASPIRIN 81 MG EC TABLET    Take 1 tablet by mouth daily    CALCIUM ACETATE (PHOSLO) 667 MG CAPSULE    Take 667 mg by mouth 3 times daily (with meals)    CHLORHEXIDINE (PERIDEX) 0.12 % SOLUTION    Take 15 mLs by mouth 2 times daily for 14 days    FLUTICASONE (FLONASE) 50 MCG/ACT NASAL SPRAY    1 spray by Each Nostril route daily    METOPROLOL TARTRATE (LOPRESSOR) 25 MG TABLET    Take 0.5 tablets by mouth 2 times daily    PANTOPRAZOLE (PROTONIX) 40 MG TABLET    Take 1 tablet by mouth 2 times daily (before meals)    SUCRALFATE (CARAFATE) 1 GM TABLET    Take 1 tablet by mouth 4 times daily    TACROLIMUS (PROGRAF PO)    Take 1 tablet by mouth 2 times daily Indications: Immunosuppression       ALLERGIES     Patient has no known allergies. FAMILY HISTORY     No family history on file.        SOCIAL HISTORY       Social History     Socioeconomic History    Marital status: Single   Tobacco Use    Smoking status: Former     Packs/day: 0.50     Years: 15.00     Pack years: 7.50     Types: Cigarettes     Quit date: 2017     Years since quittin.8    Smokeless tobacco: Never   Substance and Sexual Activity    Alcohol use: No    Drug use: No   Social History Narrative    Lives With: Alone    Type of Home: Apartment in [address]: One level (4th floor)    Home Access: Elevator, Level entry    Bathroom Shower/Tub: Tub/Shower unit    Bathroom Equipment: Grab bars in [facility]: [address]    Has the patient had two or more falls in the past year or any fall with injury in the past year?:  (One fall about 2 months ago bending over to pick something up)    ADL Assistance: [address]: Independent    Ambulation Assistance: Independent    Transfer Assistance: Independent    Active : Yes    Occupation: Retired    Additional Comments: Sister lives nearby     on hemodialysis Monday Wednesday Friday           SCREENINGS                               CIWA Assessment  BP: 115/75  Heart Rate: 70                 PHYSICAL EXAM    (up to 7 for level 4, 8 or more for level 5)     ED Triage Vitals [08/27/22 2005]   BP Temp Temp src Pulse Resp SpO2 Height Weight   -- -- -- -- -- -- 5' 6\" (1.676 m) 138 lb (62.6 kg)       Physical Exam  Constitutional:       Appearance: He is toxic-appearing. Comments: No overt signs of head injury. No midline vertebral step-off. No reproducible chest wall or abdominal tenderness. Left elbow superficial laceration and left wrist skin tear. Pelvis stable. GCS 3. Does not withdraw from pain. Does not answer questions. Pulseless, upper extremity edema right greater than left. Varicose veins. Left upper extremity fistula  unrepsonsive   HENT:      Nose: Nose normal.      Mouth/Throat:      Mouth: Mucous membranes are moist.   Eyes:      Comments: Sluggish pupils   Cardiovascular:      Heart sounds: No friction rub. Pulmonary:      Comments: Coarse breath sounds  Abdominal:      Tenderness: There is no abdominal tenderness. Musculoskeletal:         General: No deformity. Skin:     Capillary Refill: Capillary refill takes 2 to 3 seconds. Findings: No rash.    Neurological:      Comments: unrepsonsive       DIAGNOSTIC RESULTS     EKG: All EKG's are interpreted by the Emergency Department Physician who either signs or Co-signs this chart in the absence of a cardiologist.    Narrow complex regular rhythm, rate 149, normal axis, , nonspecific ST and T wave change, no STEMI    RADIOLOGY:   Non-plain film images such as CT, Ultrasound and MRI are read by the radiologist. Plain radiographic images are visualized and preliminarily interpreted by the emergency physician with the below findings:    Presumed rib fractures secondary to CPR    Pulm vascular congestion    Interpretation per the Radiologist below, if available at the time of this note:    CT HEAD WO CONTRAST   Final Result   No intracranial hemorrhage. Moderate volume loss and microvascular    changes are present. Opacification identified in the mastoid air cells,    and this could be related to a mastoid effusion versus inflammation. CT CERVICAL SPINE WO CONTRAST   Final Result   1. At the C6-C7 level moderate to severe bilateral neural foraminal    stenosis at And moderate to severe canal stenosis due to a degenerative    disc bulge and bony hypertrophy. 2.  No acute findings. DJD. Bilateral mastoid effusions. Right otitis    media. CT CHEST WO CONTRAST   Final Result   1. Bilateral slightly complex appearing pleural effusions. This could    be secondary to underlying blood products or empyema in the appropriate    clinical setting. Bilateral acute rib fractures. There is a right    posterior rib fracture with pathologic features. Recommend clinical    correlation and follow-up. Air in the chest wall soft tissues which    could be secondary to a laceration. 2.  Endotracheal tube in the origin of the right mainstem bronchus. CT ABDOMEN PELVIS WO CONTRAST Additional Contrast? None   Final Result     Masses as described which may represent metastatic lesions. Recommend    follow-up.           XR CHEST PORTABLE    (Results Pending)   XR WRIST LEFT (MIN 3 VIEWS)    (Results Pending)         ED BEDSIDE ULTRASOUND:   Performed by ED Physician - none    LABS:  Labs Reviewed   COMPREHENSIVE METABOLIC PANEL - Abnormal; Notable for the following components:       Result Value    Sodium 132 (*)     Chloride 91 (*)     Anion Gap 21 (*)     Glucose 172 (*)     BUN 34 (*)     Creatinine 5.36 (*) GFR Non- 10.7 (*)     GFR  12.9 (*)     Calcium 8.4 (*)     Total Protein 6.1 (*)     Albumin 2.6 (*)     All other components within normal limits    Narrative:     Makenna Shin  [facility] tel. [phone],  TROP results called to and read back by Kiesha Santiago, 08/27/2022 21:11, by  INO   CBC WITH AUTO DIFFERENTIAL - Abnormal; Notable for the following components:    WBC 14.0 (*)     RBC 2.50 (*)     Hemoglobin 8.2 (*)     Hematocrit 25.7 (*)     .1 (*)     MCH 32.7 (*)     MCHC 31.7 (*)     RDW 16.7 (*)     Neutrophils Absolute 11.9 (*)     Promyelocytes Percent 1 (*)     All other components within normal limits   TROPONIN - Abnormal; Notable for the following components:    Troponin 0.261 (*)     All other components within normal limits    Narrative:     CALL  [facility] tel. [phone],  TROP results called to and read back by Kiesha Santiago, 08/27/2022 21:11, by  Charan Bianchi   PROTIME-INR - Abnormal; Notable for the following components:    Protime 16.7 (*)     All other components within normal limits   APTT - Abnormal; Notable for the following components:    aPTT 75.0 (*)     All other components within normal limits   TSH WITH REFLEX - Abnormal; Notable for the following components:    TSH 57.040 (*)     All other components within normal limits    Narrative:     Makenna Shin  [facility] tel. [phone],  TROP results called to and read back by Kiesha Santiago, 08/27/2022 21:11, by  Charan Bianchi   PROCALCITONIN - Abnormal; Notable for the following components:    Procalcitonin 13.36 (*)     All other components within normal limits    Narrative:     CALL  [facility] tel. [phone],  TROP results called to and read back by Kiesha Santiago, 08/27/2022 21:11, by  Charan Bianchi   LACTIC ACID - Abnormal; Notable for the following components:    Lactic Acid 8.6 (*)     All other components within normal limits    Narrative:     Leah Colon tel. [phone],  LACID results called to and read back by Latrell Aguilera 08/27/2022 20:48, by  Angela Fung   T4, FREE - Abnormal; Notable for the following components:    T4 Free 0.40 (*)     All other components within normal limits    Narrative:     CALL  [facility] tel. [phone],  TROP results called to and read back by Sarah Last, 08/27/2022 21:11, by  Angela Fung   POCT ARTERIAL - Abnormal; Notable for the following components:    POC Potassium 2.4 (*)     POC Chloride 94 (*)     POC Glucose 178 (*)     POC Creatinine 4.7 (*)     GFR Non- 12 (*)     GFR African American 15 (*)     Calcium, Ionized 1.81 (*)     pH, Arterial 7.115 (*)     pCO2, Arterial 87 (*)     pO2, Arterial 78 (*)     O2 Sat, Arterial 89 (*)     Lactate 9.42 (*)     POC Hematocrit 24 (*)     Hemoglobin 8.1 (*)     All other components within normal limits   CULTURE, BLOOD 1   CULTURE, BLOOD 2   MAGNESIUM    Narrative:     Teri Hugo tel. [phone],  TROP results called to and read back by Sarah Last, 08/27/2022 21:11, by  Angela Fung   ETHANOL    Narrative:     Teri Hugo tel. [phone],  Naoma Dubois results called to and read back by Quentin Venegas, 08/27/2022 20:48, by  Angela Fung   CK    Narrative:     Teri Hugo tel. [phone],  TROP results called to and read back by Sarah Last, 08/27/2022 21:11, by  [address]       All other labs were within normal range or not returned as of this dictation.     EMERGENCY DEPARTMENT COURSE and DIFFERENTIAL DIAGNOSIS/MDM:   Vitals:    Vitals:    08/27/22 2224 08/27/22 2231 08/27/22 2234 08/27/22 2246   BP: 103/70 99/64  115/75   Pulse: 72 73  70   Resp: 22 22 22   Temp:   (!) 91.4 °F (33 °C)    SpO2:       Weight:       Height:             Baseline anemia  MDM  Patient presents in cardiac arrest, compressions prior to arrival, patient presents in PEA - ACLS protocol initiated-see code chart per nursing  Patient shows signs of hypervolemia, bagged appropriately with ETT presumed to be in appropriate position, given intravenous medication for possible acidosis or hyperkalemia given patient's known renal dysfunction, cooling initiated status post ROSC   Imaging obtained to rule out traumatic injury  Spoke to daughter Vermillion over the phone who lives in [place]  Dr miller does not believe based on this ekg, his comboridities, unknown downtime that he is a candidate for cath lab emergenctly at this time  I believe that the patient had most likely respiratory failure arrest    REASSESSMENT      Patient sister updated on results. CT head and neck unremarkable for acute pathology. For metastatic disease in the abdomen and multiple acute rib fractures, pleural effusions  36d, 4-6 rib fxs  ETT puled back 2 cm    CRITICAL CARE TIME   Total Critical Care time was 35minutes, excluding separately reportable procedures. There was a high probability of clinically significant/life threatening deterioration in the patient's condition which required my urgent intervention. Cardiac arrest, high liklihood of clinical deterioration, frequent rechecks, subspecialty consultation    CONSULTS:  None    PROCEDURES:  Unless otherwise noted below, none     Procedures      FINAL IMPRESSION      1. Chronic kidney disease, unspecified CKD stage    2. Cardiopulmonary arrest (HCC)    3. Elevated troponin    4. Elevated lactic acid level    5. Abdominal mass, unspecified abdominal location    6. Closed fracture of multiple ribs, unspecified laterality, initial encounter          DISPOSITION/PLAN   DISPOSITION Admitted 08/27/2022 09:17:52 PM      PATIENT REFERRED TO:  No follow-up provider specified. DISCHARGE MEDICATIONS:  New Prescriptions    No medications on file     Controlled Substances Monitoring:     No flowsheet data found.     (Please note that portions of this note were completed with a voice recognition program.  Efforts were made to edit the dictations but occasionally words are mis-transcribed.)    Micha Dubose MD (electronically signed)  Attending Emergency Physician            Jonny Hyde MD  08/27/22 1473

## 2022-08-28 NOTE — PROGRESS NOTES
Accepted by [facility] to medical ICU but no estimate on the bed as per their transfer center and ICU attending. We will continue all measures and treatments at [facility] until transfer happens.

## 2022-08-28 NOTE — CONSULTS
Pulmonary and Critical Care Medicine  Consult Note  Encounter Date: 2022 7:59 AM    Mr. Antonette Arias is a 75 y.o. male  : 1955  Requesting Provider: Miguel Angel Gonsales DO    Reason for request: Cardiac arrest            HISTORY OF PRESENT ILLNESS:    Patient is 75 y.o. presents with cardiac arrest, he was found unresponsive at nursing home, down time is unknown, last seen normal was 15 minutes prior to event, he was breathing per bystanders, on EMS arrival he was in PEA, chest compressions initiated, he received epinephrine, no shocks needed, he was intubated on the scene, currently on vent, he opened his eyes to touch however did not follow commands, and does not open his eyes spontaneously, he is currently on targeted hypothermia protocol, targeted temperature 37 degrees however he is hypothermic at 34 degrees. He is on Levophed at 28 mg/min, he is on 90% FiO2, 8 of PEEP, saturation 97%. Blood sugar is controlled. Patient had recent prolonged hospitalization with acute hypoxic failure, upper and lower extremity DVT, status post IVC filter, epistaxis and upper GI bleed due to duodenal ulcer, which required holding oral anticoagulation on discharge. Past Medical History:        Diagnosis Date    ESRD (end stage renal disease) ([facility]     Hemodialysis patient ([facility] [address]     Hepatitis     Hypertension     Liver transplanted ([facility] [address] [facility] 2016       Past Surgical History:        Procedure Laterality Date    DIALYSIS FISTULA CREATION Right     IR MIDLINE CATH  2022    IR MIDLINE CATH 2022 [facility] SPECIAL PROCEDURE    LIVER TRANSPLANT  2017    TUNNELED VENOUS PORT PLACEMENT      UPPER GASTROINTESTINAL ENDOSCOPY N/A 2022    EGD ESOPHAGOGASTRODUODENOSCOPY WITH INTERVENTION performed by Delaney Shi MD at [address] History:     reports that he quit smoking about 4 years ago. His smoking use included cigarettes. He has a 7.50 pack-year smoking history.  He has never used smokeless tobacco. He reports that he does not drink alcohol and does not use drugs. Family History:   No family history on file. Not obtainable, unresponsive  Allergies:  Patient has no known allergies. MEDICATIONS during current hospitalization:    Continuous Infusions:   vasopressin (Septic Shock) infusion      norepinephrine 28 mcg/min (08/28/22 0630)    fentaNYL 75 mcg/hr (08/28/22 0630)    midazolam Stopped (08/28/22 0257)    sodium chloride         Scheduled Meds:   pantoprazole (PROTONIX) 40 mg injection  40 mg IntraVENous Daily    sodium chloride flush  5-40 mL IntraVENous 2 times per day    heparin (porcine)  5,000 Units SubCUTAneous 3 times per day       PRN Meds:sodium chloride flush, sodium chloride, ondansetron **OR** ondansetron, polyethylene glycol, acetaminophen **OR** acetaminophen        REVIEW OF SYSTEMS:   Not obtainable, unresponsive    PHYSICAL EXAM:    Vitals:  BP 99/62   Pulse (!) 107   Temp (!) 93.2 °F (34 °C) (Bladder)   Resp 12   Ht 5' 6\" (1.676 m)   Wt 155 lb 6.8 oz (70.5 kg)   SpO2 97%   BMI 25.09 kg/m²     General: Unresponsive, on vent  Head: normocephalic, atraumatic  Eyes:No gross abnormalities. ENT:  MMM no lesions  Neck:  supple and no masses  Chest : Vent sounds, bibasilar rales, no wheezing, nontender, tympanic, no subcutaneous air  Heart[de-identified] Heart sounds are normal.  Regular rate and rhythm without murmur, gallop or rub. ABD:  symmetric, soft, no apparent tenderness, no apparent guarding or rebound  Musculoskeletal : no cyanosis, no clubbing, and edema bilateral upper extremities, trace edema lower extremities. Cannot hear or feel fistula bruit in left upper extremity  Neuro:   Unresponsive  Skin: No rashes or nodules noted.   Lymph node:  no cervical nodes  Urology: Yes [place]   Psychiatric: unresponsive        Data Review  Recent Labs     08/27/22 2015 08/27/22 2028 08/28/22  0004 08/28/22  0435   WBC 14.0*  --   --   --    HGB 8.2* 8.1* 9.8* 10.3*   HCT 25.7*  --   --   --      --   --   --       Recent Labs     08/27/22 2015 08/27/22 2028 08/28/22  0004 08/28/22  0230 08/28/22 0412 08/28/22  0435 08/28/22  0615   *  --   --   --   --   --   --    K 4.0  --   --  3.6 3.6  --  3.8   CL 91*  --   --   --   --   --   --    CO2 20  --   --   --   --   --   --    BUN 34*  --   --   --   --   --   --    CREATININE 5.36* 4.7* 5.3*  --   --  5.0*  --    GLUCOSE 172*  --   --   --   --   --   --        MV Settings:     Vent Mode: AC/VC  Vt (Set, mL): 450 mL  Resp Rate (Set): 22 bmp  PEEP/CPAP (cmH2O): 8  Peak Inspiratory Pressure (cmH2O): 32 cmH2O  Mean Airway Pressure (cmH2O): 12 cmH20  I:E Ratio: 1:3.00    ABGs:   Recent Labs     08/27/22 2028 08/28/22 0004 08/28/22 0435   PHART 7.115* 7.401 7.423   [id number] 87* 40 36   PO2ART 78* 51* 86*   [id number] 28.0 25.1 23.4   BEART -1 0 -1   [id number] 89* 86* 97*   [id number] 31 26 25     O2 Device: Ventilator  Lab Results   Component Value Date/Time    LACTA 4.6 08/28/2022 06:00 AM    LACTA 4.2 08/28/2022 03:15 AM    LACTA 8.6 08/27/2022 08:15 PM       Radiology  I personally reviewed imaging studies and CT chest shows bilateral pleural effusion with bilateral acute rib fracture        Assessment, plan:   Patient is at risk due to    Acute hypoxic and hypercapnic respiratory failure  Shock most likely septic  Cardiac arrest, likely due to hypoxia  Comatose state/acute encephalopathy secondary to above  Bilateral pleural effusion, etiology is not clear could be bleed versus infection or volume overload  End-stage renal disease on dialysis  History of upper extremity DVT in the setting of line placement not on anticoagulation due to bleed  History of lower extremity DVT status post IVC  Intra-abdominal masslike lesions abutting the anterior pleural surface     Recommendation  Vent support lung protective strategy  head of the bed 30°  Daily sedation holidays and breathing trials  Sedation with combination propofol and

## 2022-08-28 NOTE — ED NOTES
Pt arrived at ER with rebecca providing compressions, pt had IO in right shin with NS running.      James Camarillo RN  08/27/22 2015

## 2022-08-28 NOTE — ED NOTES
BP dropping, Norepinephrine titrated to 19 mcg/min, BP 91/48 MAP 62     Koe Stauffer, RN  08/27/22 7403

## 2022-08-28 NOTE — CONSULTS
Subjective:      Patient ID: Pelon Kee is a 75 y.o. male who presents today for seizures and encephalopathy. Giana Real HPI 68-year-old right-handed gentleman found down at home at the nursing home. From the history is very difficult ascertain if patient was in PEA and not breathing or he was breathing though brought in unresponsive intubated. When seen urgently and then this care unit he was having action myoclonus. Very infrequent and brief. The details of his other clinical issues when found are not known. There is no reports of previous history of seizures had patient previously had liver transplant and is on to Tacrolimus  Patient was recently discharged from the hospital after being treated for respiratory failure and hypoxia. Patient at that time was found to have lower and upper extremity DVT and was started on anticoagulation and then had GI bleeding and epistaxis and IVC filter was placed and he is off anticoagulation. CT scan of the head was reviewed and patient has scalp tissue hematomas consistent with the possibility of a fall in the nursing home.   Review of Systems   Unable to perform ROS: Intubated     Past Medical History:   Diagnosis Date    ESRD (end stage renal disease) ([facility]     Hemodialysis patient ([facility]     Hepatitis     Hypertension     Liver transplanted ([facility] 03/2016     Past Surgical History:   Procedure Laterality Date    DIALYSIS FISTULA CREATION Right     IR MIDLINE CATH  8/2/2022    IR MIDLINE CATH 8/2/2022 [facility] SPECIAL PROCEDURE    LIVER TRANSPLANT  03/08/2017    TUNNELED VENOUS PORT PLACEMENT      UPPER GASTROINTESTINAL ENDOSCOPY N/A 8/5/2022    EGD ESOPHAGOGASTRODUODENOSCOPY WITH INTERVENTION performed by Amanda Franco MD at [address] [place] [address] History    Marital status: Single     Spouse name: Not on file    Number of children: Not on file    Years of education: Not on file    Highest education level: Not on file   Occupational History Not on file   Tobacco Use    Smoking status: Former     Packs/day: 0.50     Years: 15.00     Pack years: 7.50     Types: Cigarettes     Quit date: 2017     Years since quittin.8    Smokeless tobacco: Never   Substance and Sexual Activity    Alcohol use: No    Drug use: No    Sexual activity: Not on file   Other Topics Concern    Not on file   Social History Narrative    Lives With: Alone    Type of Home: Apartment in [place] [address], [facility] [address]: One level (4th floor)    Home Access: Elevator, Level entry    Bathroom Shower/Tub: Tub/Shower unit    Bathroom Equipment: Grab bars in [facility]: Rollator, Simone Ganja    Has the patient had two or more falls in the past year or any fall with injury in the past year?:  (One fall about 2 months ago bending over to pick something up)    ADL Assistance: Independent    Homemaking Assistance: Independent    Ambulation Assistance: Independent    Transfer Assistance: Independent    Active : Yes    Occupation: Retired    Additional Comments: Sister lives nearby     on hemodialysis          Social Determinants of Health     Financial Resource Strain: Not on file   Food Insecurity: Not on file   Transportation Needs: Not on file   Physical Activity: Not on file   Stress: Not on file   Social Connections: Not on file   Intimate Partner Violence: Not on file   Housing Stability: Not on file     No family history on file.   No Known Allergies  Current Facility-Administered Medications   Medication Dose Route Frequency Provider Last Rate Last Admin    pantoprazole (PROTONIX) 40 mg in sodium chloride (PF) 10 mL injection  40 mg IntraVENous Daily David Gomez APRN - CNP   40 mg at 22 [id number]    vasopressin 20 Units in dextrose 5 % 100 mL infusion  0.01-0.03 Units/min IntraVENous Continuous Stacey Pierre MD 9 mL/hr at 22 0818 0.03 Units/min at 22 0818    propofol injection  10 mcg/kg/min IntraVENous Titrated Jessica Rojo MD        vancomycin 1000 mg IVPB in 250 mL D5W addavial  1,000 mg IntraVENous Once Jessica Rojo MD        glucose chewable tablet 16 g  4 tablet Oral PRN Jessica Rojo MD        dextrose bolus 10% 125 mL  125 mL IntraVENous PRN Jessica Rojo MD        Or    dextrose bolus 10% 250 mL  250 mL IntraVENous PRN Jessica Rojo MD        glucagon (rDNA) injection 1 mg  1 mg SubCUTAneous PRN Jessica Rojo MD        dextrose 10 % infusion   IntraVENous Continuous PRN Jessica Rojo MD        insulin lispro (HUMALOG) injection vial 0-4 Units  0-4 Units SubCUTAneous Q4H Jessica Rojo MD        piperacillin-tazobactam (ZOSYN) 4,500 mg in dextrose 5 % 100 mL IVPB (mini-bag)  4,500 mg IntraVENous Once Jessica Rojo MD        piperacillin-tazobactam (ZOSYN) 3,375 mg in dextrose 5 % 50 mL IVPB (mini-bag)  3,375 mg IntraVENous Q12H Jessica Rojo MD        sodium chloride flush 0.9 % injection 5-40 mL  5-40 mL IntraVENous PRN Jessica Rojo MD        iopamidol (ISOVUE-300) 61 % injection 75 mL  75 mL IntraVENous ONCE PRN Cynthia Ace MD        sodium chloride flush 0.9 % injection 5-40 mL  5-40 mL IntraVENous 2 times per day Jessica Rojo MD        sodium chloride flush 0.9 % injection 5-40 mL  5-40 mL IntraVENous PRN Jessica Rojo MD        0.9 % sodium chloride infusion   IntraVENous PRN Jessica Rojo MD        levETIRAcetam (KEPPRA) 1,000 mg in sodium chloride 0.9 % 100 mL IVPB  1,000 mg IntraVENous Q12H Fareed Finley MD        norepinephrine (LEVOPHED) 16 mg in dextrose 5 % 250 mL infusion  1-100 mcg/min IntraVENous Continuous Monica Smoke' Blanche Delatorre MD 26.3 mL/hr at 08/28/22 0630 28 mcg/min at 08/28/22 0630    fentaNYL (SUBLIMAZE) 1,000 mcg in sodium chloride 0.9 % 100 mL infusion   mcg/hr IntraVENous Continuous Monica Smoke' Blanche Delatorre MD 7.5 mL/hr at 08/28/22 0630 75 mcg/hr at 08/28/22 0630    sodium chloride flush 0.9 % injection 5-40 mL  5-40 mL IntraVENous 2 times per day Nicoletto Bolzan-Roche, APRN - CNP        sodium chloride flush 0.9 % injection 5-40 mL  5-40 mL IntraVENous PRN Nicoletto Bolzan-Roche, APRN - CNP        0.9 % sodium chloride infusion   IntraVENous PRN Nicoletto Bolzan-Roche, APRN - CNP        heparin (porcine) injection 5,000 Units  5,000 Units SubCUTAneous 3 times per day Nicoletto Bolzan-Roche, APRN - CNP   5,000 Units at 08/28/22 0625    ondansetron (ZOFRAN-ODT) disintegrating tablet 4 mg  4 mg Oral Q8H PRN Nicoletto Bolzan-Roche, APRN - CNP        Or    ondansetron (ZOFRAN) injection 4 mg  4 mg IntraVENous Q6H PRN Nicoletto Bolzan-Roche, APRN - CNP        polyethylene glycol (GLYCOLAX) packet 17 g  17 g Oral Daily PRN Nicoletto Bolzan-Roche, APRN - CNP        acetaminophen (TYLENOL) tablet 650 mg  650 mg Oral Q6H PRN Nicoletto Bolzan-Roche, APRN - CNP        Or    acetaminophen (TYLENOL) suppository 650 mg  650 mg Rectal Q6H PRN Nicojamisono Desirezan-Roche, APRN - CNP            Objective:   BP 99/62   Pulse 98   Temp (!) 93.2 °F (34 °C) (Bladder)   Resp 22   Ht 5' 6\" (1.676 m)   Wt 155 lb 6.8 oz (70.5 kg)   SpO2 97%   BMI 25.09 kg/m²     Physical Exam patient is unresponsive and intermittently occasionally has an action myoclonus. No other seizures are noted there is no gaze preference. Pupils are still equal reactive. No pain response is notable there is no gag and there is no other responses. He has just been initiated on propofol. CT ABDOMEN PELVIS WO CONTRAST Additional Contrast? None    Result Date: 8/27/2022  Patient: Mae Marquez  Time Out: 22:35 Exam(s): CT ABDOMEN + PELVIS Without Contrast  EXAM:   CT Abdomen and Pelvis Without Intravenous Contrast  CLINICAL HISTORY:    Reason for exam: fall. Chief complaint altered mental status.  S/p cardiac arrest, fell  TECHNIQUE:   Axial computed tomography images of the abdomen and pelvis without intravenous contrast.  All CT scan at this facility use dose modulation, iterative reconstruction, and/or weight based dosing when appropriate to reduce radiation dose to as low as reasonably achievable. COMPARISON:   No relevant prior studies available. FINDINGS:   Lung bases: See the chest CT. Pleural space:  2 discrete abdominal and pelvic masses abutting the anterior pleural surface. The left lower mass measures up to 8 cm in size. The right mass measures 7 cm. ABDOMEN:   Liver:  Unremarkable. Gallbladder and bile ducts: The gallbladder has been removed. No ductal dilation. Pancreas:  Portions of the pancreas are obscured by vascular coil material.  No ductal dilation. Spleen:  Unremarkable. No splenomegaly. Adrenals:  Unremarkable. No mass. Kidneys and ureters:  Atrophic kidneys. No obstructing stones. No hydronephrosis. Stomach and bowel:  Unremarkable. No obstruction. No mucosal thickening. PELVIS:   Appendix:  No findings to suggest acute appendicitis. Bladder:  Bladder decompressed by [place]. No stones. Reproductive:  Unremarkable as visualized. ABDOMEN and PELVIS:   Intraperitoneal space:  Unremarkable. No free air. No significant fluid collection. Bones/joints:  Degenerative changes of the spine. Right femoral fixation pins. No acute fracture. No dislocation. Soft tissues:  Unremarkable. Vasculature: Atherosclerosis. Infrarenal IVC filter. Lymph nodes:  Unremarkable. No enlarged lymph nodes. Electronically signed by Adan Adams M.D. on 08-27-22 at [phone]      Masses as described which may represent metastatic lesions. Recommend follow-up. CT HEAD WO CONTRAST    Result Date: 8/28/2022  CT Brain. Contrast medium:  without contrast.. History:  Fall. Altered mental status. Cardiac arrest.. Technical factors: CT imaging of the brain was obtained and formatted as 5 mm contiguous axial images. 2.5 mm contiguous axial images were obtained through the osseous structures.  Sagittal and coronal reconstruction obtained during postprocessing. Comparison:  None. Findings: Extra-axial spaces:  Normal. Intracranial hemorrhage:  None. Ventricular system: Ventricles mildly enlarged. Sulci mildly prominent. Basal Cisterns:  Normal. Cerebral Parenchyma: Bilateral symmetric periventricular areas decreased attenuation. Midline Shift:  None. Cerebellum:  Normal. Paranasal sinuses, calvarium, and mastoid air cells: Opacification bilateral mastoid air cells. Mucosal thickening bilateral maxillary sinuses. Partial opacification ethmoid sinuses. Extracalvarial soft tissue thickening measuring approximately 7.6 x 1.7  cm right frontal, parietal, and temporal region. Visualized Orbits:  Normal.     Impression: Bilateral mastoiditis. Bilateral maxillary and ethmoid sinusitis. Right frontal, parietal, and temporal scalp soft tissue swelling. Mild cerebral atrophy. Chronic ischemic white matter disease. All CT scans at this facility use dose modulation, iterative reconstruction, and/or weight based dosing when appropriate to reduce radiation dose to as low as reasonably achievable. CT CHEST WO CONTRAST    Result Date: 8/27/2022  Patient: Nabila Rivas  Time Out: 22:35 Exam(s): CT CHEST Without Contrast  EXAM:   CT Chest Without Intravenous Contrast  CLINICAL HISTORY:    Reason for exam: fall. Chief complaint altered mental status. S/p cardiac arrest, fell  TECHNIQUE:   Axial computed tomography images of the chest without intravenous contrast.  All CT scan at this facility use dose modulation, iterative reconstruction, and/or weight based dosing when appropriate to reduce radiation dose to as low as reasonably achievable. COMPARISON:   No relevant prior studies available. FINDINGS:   Lungs:  Bilateral compressive atelectatic changes. Pleural space: There are large bilateral mildly complex pleural effusions. No pneumothorax. Heart:  Unremarkable. No cardiomegaly. No significant pericardial effusion.   No significant coronary artery calcifications. Bones/joints:  Right-sided third anterolateral acute rib fracture. Right-sided posterior ninth rib fracture with an adjacent rounded lucency and sclerotic appearance of the bone. Left-sided fourth, fifth, sixth anterolateral rib buckle fractures. Left-sided fifth lateral acute rib fracture. No dislocation. Soft tissues:  Diffuse soft tissue edema. Small bubbles of air in the left anterior chest wall. Vasculature:  Unremarkable. No thoracic aortic aneurysm. Lymph nodes:  Unremarkable. No enlarged lymph nodes. Tubes, lines and devices:  Endotracheal tube terminates at the origin of the right mainstem bronchus. Electronically signed by Sandra Camarena M.D. on 08-27-22 at [phone]    1. Bilateral slightly complex appearing pleural effusions. This could be secondary to underlying blood products or empyema in the appropriate clinical setting. Bilateral acute rib fractures. There is a right posterior rib fracture with pathologic features. Recommend clinical correlation and follow-up. Air in the chest wall soft tissues which could be secondary to a laceration. 2.  Endotracheal tube in the origin of the right mainstem bronchus. CT CERVICAL SPINE WO CONTRAST    Result Date: 8/27/2022  Patient: Jovita Sánchez  Time Out: 22:13 Exam(s): CT C SPINE  EXAM:   CT Cervical Spine Without Intravenous Contrast  CLINICAL HISTORY:    Reason for exam: AMS. Chief complaint altered mental status. S/p cardiac arrest, fell  TECHNIQUE:   Axial computed tomography images of the cervical spine without intravenous contrast.  All CT scan at this facility use dose modulation, iterative reconstruction, and/or weight based dosing when appropriate to reduce radiation dose to as low as reasonably achievable. COMPARISON:   No relevant prior studies available. FINDINGS:   Vertebrae: Moderate to severe degenerative changes of the spine. Endplate osteophytes and facet arthropathy. No acute fracture. Discs/spinal canal/neural foramina: At the C6-C7 level moderate to severe bilateral neural foraminal stenosis at And moderate to severe canal stenosis due to a degenerative disc bulge and bony hypertrophy. Multilevel disc space narrowing. At the C4-C5 level moderate bilateral foraminal stenosis and moderate canal stenosis. At the C5-C6 level severe bilateral foraminal stenosis and moderate to severe canal stenosis. Soft tissues:  Soft tissue edema. Mastoid air cells:  Bilateral mastoid effusions. Auditory system: There is fluid in the right middle ear cavity. Pleural space:  Bilateral partially seen large pleural effusions. Tubes, lines and devices:  Endotracheal tube is partially seen. Electronically signed by Deon Wagner M.D. on 08-27-22 at 2213    1. At the C6-C7 level moderate to severe bilateral neural foraminal stenosis at And moderate to severe canal stenosis due to a degenerative disc bulge and bony hypertrophy. 2.  No acute findings. DJD. Bilateral mastoid effusions. Right otitis media. XR CHEST PORTABLE    Result Date: 8/28/2022  EXAMINATION: XR CHEST PORTABLE CLINICAL HISTORY: ANTEGRADE COMPARISONS: AUGUST 7, 2022 FINDINGS: Endotracheal tube identified with tip at lucita oriented toward right mainstem bronchus. Defibrillator pads identified overlying right lateral chest and left lower chest. Costophrenic angles not imaged. Osseous structures intact. Cardiopericardial silhouette normal. A homogeneous area increased opacity right lung base tracking along right chest wall the right apex. Ill-defined area increased opacity right mid and right upper lung. Imaged portion left lung clear. LARGE RIGHT PLEURAL EFFUSION WITH RIGHT LUNG ATELECTASIS/PNEUMONIA. ENDOTRACHEAL TUBE NEAR LUCITA. RECOMMEND RETRACTING ENDOTRACHEAL TUBE 4 CM. LIMITATIONS DISCUSSED. ABOVE FINDINGS DISCUSSED VIA TELEPHONE WITH PATIENT'S NURSE ONDINA AT 8:15 AM, [facility], AUGUST 28, 2020.       Lab Results Component Value Date/Time    WBC 26.4 08/28/2022 09:03 AM    RBC 2.94 08/28/2022 09:03 AM    RBC 3.34 02/20/2012 05:15 AM    HGB 10.8 08/28/2022 09:43 AM    HCT 29.6 08/28/2022 09:03 AM    .8 08/28/2022 09:03 AM    MCH 31.2 08/28/2022 09:03 AM    MCHC 31.0 08/28/2022 09:03 AM    RDW 16.4 08/28/2022 09:03 AM     08/28/2022 09:03 AM    MPV 9.5 10/09/2015 02:00 PM     Lab Results   Component Value Date/Time     08/28/2022 09:03 AM    K 4.1 08/28/2022 09:03 AM    K 3.5 08/13/2022 05:23 AM    CL 89 08/28/2022 09:03 AM    CO2 23 08/28/2022 09:03 AM    BUN 39 08/28/2022 09:03 AM    CREATININE 4.6 08/28/2022 09:43 AM    CREATININE 5.39 08/28/2022 09:03 AM    GFRAA 15 08/28/2022 09:43 AM    LABGLOM 13 08/28/2022 09:43 AM    GLUCOSE 167 08/28/2022 09:03 AM    GLUCOSE 136 02/25/2012 05:10 PM    PROT 6.4 08/28/2022 09:03 AM    LABALBU 2.6 08/28/2022 09:03 AM    LABALBU 2.8 02/23/2012 06:40 AM    CALCIUM 9.8 08/28/2022 09:03 AM    BILITOT 0.3 08/28/2022 09:03 AM    ALKPHOS 103 08/28/2022 09:03 AM    AST 67 08/28/2022 09:03 AM    ALT 37 08/28/2022 09:03 AM     Lab Results   Component Value Date/Time    PROTIME 16.7 08/27/2022 08:15 PM    PROTIME 14.1 02/20/2012 05:15 AM    INR 1.3 08/27/2022 08:15 PM     Lab Results   Component Value Date/Time    TSH 25.110 07/25/2022 11:30 AM    FERRITIN 1,241 08/14/2022 02:19 PM    IRON 42 08/15/2022 06:45 AM    TIBC 167 08/15/2022 06:45 AM     Lab Results   Component Value Date/Time    TRIG 36 05/20/2015 05:45 PM    HDL 5 05/20/2015 05:45 PM    LDLCALC 24 05/20/2015 05:45 PM     Lab Results   Component Value Date/Time    ETOH <10 08/27/2022 08:15 PM     No results found for: LITHIUM, DILFRTOT, VALPROATE    Assessment:   Hypoxic ischemic encephalopathy secondary to cardiorespiratory arrest patient was in PEA when found though there is no mention of respiration. Patient's initial pH was 7.1 with a PCO2 of 87 consistent with underlying significant acidosis.   At this time

## 2022-08-28 NOTE — PROGRESS NOTES
[address] [facility] Dose Adjustment Per Protocol:  Zosyn Extended Interval Interchange      Deysi Vasquez is a 75 y.o. male. The following ordered dose of Zosyn has been changed to optimize its pharmacodynamic profile per [facility] pharmacy policy approved by [facility]. Recent Labs     08/27/22 2015 08/27/22 2028 08/28/22  0004 08/28/22  0435   CREATININE 5.36*   < > 5.3* 5.0*   WBC 14.0*  --   --   --     < > = values in this interval not displayed. Lesly Pearce Height: 5' 6\" (167.6 cm), Weight: 155 lb 6.8 oz (70.5 kg), Body mass index is 25.09 kg/m². Estimated Creatinine Clearance: 13 mL/min (A) (based on SCr of 5 mg/dL (H)). Medication Ordered:   Traditional Dosing   [x] Zosyn 2.25 gm q6-8 hr   [] Zosyn 3.375 gm q6-8 hr   [] Zosyn 4.5 gm q6-8 hr   [] Zosyn 2.25 gm q6-8 hr   [] Zosyn 3.375 gm q6-8 hr   [] Zosyn 4.5 gm q6-8 hr     New Dose      Piperacillin/Tazobactam - Extended Infusion Dosing (4-hour infusion) - Preferred Dosing Strategy       Renal Function (CrCl mL/min)  ? 20  < 20, HD, PD  CRRT    All Indications - Loading dose of 4500 milligrams x 1 over 30 minutes or via IV push. Maintenance dose  should begin 6 hours after load for CrCl > 20 and 8 hours after load for CrCl < 20. Maintenance dosing for all indications except as outlined below  [] 3375mg q8h  [x] 3375mg q12h  [] 3375mg q8h    Febrile neutropenia, Cystic fibrosis, BMI > 40*, local Pseudomonas susceptibility < 80% (refer to page 3 for indications)     [] 4500mg q8h  [] 4500 q12h  [] 4500mg q8h    *Consider 3375mg q8h for indication of Urinary tract infections in BMI > 40. Adjust for decreased renal function.            Thank Fran Newton [facility]  8/28/2022 8:34 AM

## 2022-08-28 NOTE — CONSULTS
[facility]  Consult Note           Reason for Consult: End-stage renal disease  Requesting Physician:  Dr. Alyse Munguia    Chief Complaint:  cardiac arrest  History Obtained From:  patient, electronic medical record    History of Present Ilness:    75y.o. year old male admitted with cardiac arrest from nursing home. Patient is status post prolonged hospitalization. He has end-stage renal disease with history of liver transplant. Recently admitted with respiratory failure at which time he had hospital course complicated by and required mechanical ventilation. Came off the ventilator. Developed lower extremity and upper extremity DVT partly due to central line placement. Initially was placed on anticoagulation but developed GI bleed. IVC filter placed. We gotten significant amounts of fluid off with dialysis. His dry weight at dialysis was lowered. Was staying at the nursing home when he developed cardiac arrest.  Patient on targeted hypothermia protocol. On pressors. On vent. Had chest tube placed. Infected looking fluid removed. To go for chest CT. Noted that his left av access with poor thrill /bruit. I can hear a bruit some when I listen    Past Medical History:        Diagnosis Date    ESRD (end stage renal disease) ([facility] [address]     Hemodialysis patient ([facility] [address]     Hepatitis     Hypertension     Liver transplanted ([facility] [address] 03/2016       Past Surgical History:        Procedure Laterality Date    DIALYSIS FISTULA CREATION Right     IR MIDLINE CATH  8/2/2022    IR MIDLINE CATH 8/2/2022 MLOZ SPECIAL PROCEDURE    LIVER TRANSPLANT  03/08/2017    TUNNELED VENOUS PORT PLACEMENT      UPPER GASTROINTESTINAL ENDOSCOPY N/A 8/5/2022    EGD ESOPHAGOGASTRODUODENOSCOPY WITH INTERVENTION performed by Seb Noe MD at [facility] Medications:    No current facility-administered medications on file prior to encounter.      Current Outpatient Medications on File Prior to Encounter   Medication Sig Dispense Refill    metoprolol tartrate (LOPRESSOR) 25 MG tablet Take 0.5 tablets by mouth 2 times daily (Patient taking differently: Take 12.5 mg by mouth 2 times daily Indications: High Blood Pressure Disorder) 60 tablet 3    sucralfate (CARAFATE) 1 GM tablet Take 1 tablet by mouth 4 times daily (Patient taking differently: Take 1 g by mouth 4 times daily Indications: Irritation of the Digestive Tract) 120 tablet 3    pantoprazole (PROTONIX) 40 MG tablet Take 1 tablet by mouth 2 times daily (before meals) (Patient taking differently: Take 40 mg by mouth 2 times daily (before meals) Indications: Gastroesophageal Reflux Disease) 180 tablet 1    aspirin 81 MG EC tablet Take 1 tablet by mouth daily (Patient taking differently: Take 81 mg by mouth daily Indications: Preventative Treatment) 30 tablet 3    fluticasone (FLONASE) 50 MCG/ACT nasal spray 1 spray by Each Nostril route daily (Patient taking differently: 1 spray by Each Nostril route daily Indications: Allergy) 16 g 3    chlorhexidine (PERIDEX) 0.12 % solution Take 15 mLs by mouth 2 times daily for 14 days 420 mL 0    albuterol sulfate HFA (PROAIR HFA) 108 (90 Base) MCG/ACT inhaler 2 puffs; Use every 4 hours while awake for 7-10 days then PRN wheezing  Dispense with SPACER and Instruct on use. May sub Ventolin or Proventil as needed per [employer]. (Patient taking differently: Inhale 2 puffs into the lungs every 4 hours as needed for Wheezing or Shortness of Breath 2 puffs; Use every 4 hours while awake for 7-10 days then PRN wheezing  Dispense with SPACER and Instruct on use. May sub Ventolin or Proventil as needed per Insurance.) 18 g 0    calcium acetate (PHOSLO) 667 MG capsule Take 667 mg by mouth 3 times daily (with meals)      Tacrolimus (PROGRAF PO) Take 1 tablet by mouth 2 times daily Indications: Immunosuppression         Allergies:  Patient has no known allergies.     Social History:    Social History     Socioeconomic History    Marital status: Single Spouse name: Not on file    Number of children: Not on file    Years of education: Not on file    Highest education level: Not on file   Occupational History    Not on file   Tobacco Use    Smoking status: Former     Packs/day: 0.50     Years: 15.00     Pack years: 7.50     Types: Cigarettes     Quit date: 2017     Years since quittin.8    Smokeless tobacco: Never   Substance and Sexual Activity    Alcohol use: No    Drug use: No    Sexual activity: Not on file   Other Topics Concern    Not on file   Social History Narrative    Lives With: Alone    Type of Home: Apartment in [place] [address], [facility] [address]: One level (4th floor)    Home Access: Elevator, Level entry    Bathroom Shower/Tub: Tub/Shower unit    Bathroom Equipment: Grab bars in [address]: Rollator, [employer]    Has the patient had two or more falls in the past year or any fall with injury in the past year?:  (One fall about 2 months ago bending over to pick something up)    ADL Assistance: Independent    Homemaking Assistance: Independent    Ambulation Assistance: Independent    Transfer Assistance: Independent    Active : Yes    Occupation: Retired    Additional Comments: Sister lives nearby     on hemodialysis          Social Determinants of Health     Financial Resource Strain: Not on file   Food Insecurity: Not on file   Transportation Needs: Not on file   Physical Activity: Not on file   Stress: Not on file   Social Connections: Not on file   Intimate Partner Violence: Not on file   Housing Stability: Not on file       Family History:   No family history on file. Review of Systems:   Review of Systems   Unable to perform ROS: Intubated       Physical exam:   Constitutional:  VITALS:  BP 99/62   Pulse (!) 107   Temp (!) 93.2 °F (34 °C) (Bladder)   Resp 12   Ht 5' 6\" (1.676 m)   Wt 155 lb 6.8 oz (70.5 kg)   SpO2 97%   BMI 25.09 kg/m²   Gen:  Intubated, sedated  Skin: no rash, turgor wnl  Heent:  eomi, mmm  Neck: no bruits or jvd noted, thyroid normal  Lungs:  Normal expansion. Clear to auscultation. No rales, rhonchi, or wheezing. Heart:  Heart sounds are normal.  Regular rate and rhythm without murmur, gallop or rub. Abdomen:  +bs, soft, nt, nd, no hepatosplenomegaly   Extremities: 2+ upper extremity edema  Psychiatric: Unable to assess  Musculoskeletal:   digits, nails normal    Data/  CBC:   Lab Results   Component Value Date/Time    WBC 14.0 08/27/2022 08:15 PM    RBC 2.50 08/27/2022 08:15 PM    RBC 3.34 02/20/2012 05:15 AM    HGB 10.3 08/28/2022 04:35 AM    HCT 25.7 08/27/2022 08:15 PM    .1 08/27/2022 08:15 PM    MCH 32.7 08/27/2022 08:15 PM    MCHC 31.7 08/27/2022 08:15 PM    RDW 16.7 08/27/2022 08:15 PM     08/27/2022 08:15 PM    MPV 9.5 10/09/2015 02:00 PM     BMP:    Lab Results   Component Value Date/Time     08/27/2022 08:15 PM    K 4.0 08/28/2022 08:14 AM    K 3.5 08/13/2022 05:23 AM    CL 91 08/27/2022 08:15 PM    CO2 20 08/27/2022 08:15 PM    BUN 34 08/27/2022 08:15 PM    LABALBU 2.6 08/27/2022 08:15 PM    LABALBU 2.8 02/23/2012 06:40 AM    CREATININE 5.0 08/28/2022 04:35 AM    CREATININE 5.36 08/27/2022 08:15 PM    CALCIUM 8.4 08/27/2022 08:15 PM    GFRAA 14 08/28/2022 04:35 AM    LABGLOM 12 08/28/2022 04:35 AM    GLUCOSE 172 08/27/2022 08:15 PM    GLUCOSE 136 02/25/2012 05:10 PM     CT ABDOMEN PELVIS WO CONTRAST Additional Contrast? None    Result Date: 8/27/2022  Patient: aMe Marquez  Time Out: 22:35 Exam(s): CT ABDOMEN + PELVIS Without Contrast  EXAM:   CT Abdomen and Pelvis Without Intravenous Contrast  CLINICAL HISTORY:    Reason for exam: fall. Chief complaint altered mental status.  S/p cardiac arrest, fell  TECHNIQUE:   Axial computed tomography images of the abdomen and pelvis without intravenous contrast.  All CT scan at this facility use dose modulation, iterative reconstruction, and/or weight based dosing when appropriate to reduce based dosing when appropriate to reduce radiation dose to as low as reasonably achievable. COMPARISON:   No relevant prior studies available. FINDINGS:   Brain: Moderate volume loss and periventricular microvascular changes are present. No intracranial hemorrhage. No edema. Ventricles:  Unremarkable. No ventriculomegaly. Bones/joints:  Unremarkable. No acute fracture. Soft tissues:  Unremarkable. Sinuses:  Unremarkable as visualized. No acute sinusitis. Mastoid air cells: Opacification identified within the mastoid air cells. This could be related to a mastoid effusion versus inflammation. Electronically signed by Jersey Vargas D.O. on 08-27-22 at 2117    No intracranial hemorrhage. Moderate volume loss and microvascular changes are present. Opacification identified in the mastoid air cells, and this could be related to a mastoid effusion versus inflammation. CT CHEST WO CONTRAST    Result Date: 8/27/2022  Patient: Renee Steele  Time Out: 22:35 Exam(s): CT CHEST Without Contrast  EXAM:   CT Chest Without Intravenous Contrast  CLINICAL HISTORY:    Reason for exam: fall. Chief complaint altered mental status. S/p cardiac arrest, fell  TECHNIQUE:   Axial computed tomography images of the chest without intravenous contrast.  All CT scan at this facility use dose modulation, iterative reconstruction, and/or weight based dosing when appropriate to reduce radiation dose to as low as reasonably achievable. COMPARISON:   No relevant prior studies available. FINDINGS:   Lungs:  Bilateral compressive atelectatic changes. Pleural space: There are large bilateral mildly complex pleural effusions. No pneumothorax. Heart:  Unremarkable. No cardiomegaly. No significant pericardial effusion. No significant coronary artery calcifications. Bones/joints:  Right-sided third anterolateral acute rib fracture.   Right-sided posterior ninth rib fracture with an adjacent rounded lucency and sclerotic appearance of the bone. Left-sided fourth, fifth, sixth anterolateral rib buckle fractures. Left-sided fifth lateral acute rib fracture. No dislocation. Soft tissues:  Diffuse soft tissue edema. Small bubbles of air in the left anterior chest wall. Vasculature:  Unremarkable. No thoracic aortic aneurysm. Lymph nodes:  Unremarkable. No enlarged lymph nodes. Tubes, lines and devices:  Endotracheal tube terminates at the origin of the right mainstem bronchus. Electronically signed by Nuvia Alcantara M.D. on 08-27-22 at [phone]    1. Bilateral slightly complex appearing pleural effusions. This could be secondary to underlying blood products or empyema in the appropriate clinical setting. Bilateral acute rib fractures. There is a right posterior rib fracture with pathologic features. Recommend clinical correlation and follow-up. Air in the chest wall soft tissues which could be secondary to a laceration. 2.  Endotracheal tube in the origin of the right mainstem bronchus. CT CERVICAL SPINE WO CONTRAST    Result Date: 8/27/2022  Patient: Mckay Gaitan  Time Out: 22:13 Exam(s): CT C SPINE  EXAM:   CT Cervical Spine Without Intravenous Contrast  CLINICAL HISTORY:    Reason for exam: AMS. Chief complaint altered mental status. S/p cardiac arrest, fell  TECHNIQUE:   Axial computed tomography images of the cervical spine without intravenous contrast.  All CT scan at this facility use dose modulation, iterative reconstruction, and/or weight based dosing when appropriate to reduce radiation dose to as low as reasonably achievable. COMPARISON:   No relevant prior studies available. FINDINGS:   Vertebrae: Moderate to severe degenerative changes of the spine. Endplate osteophytes and facet arthropathy. No acute fracture. Discs/spinal canal/neural foramina:   At the C6-C7 level moderate to severe bilateral neural foraminal stenosis at And moderate to severe canal stenosis due to a degenerative disc increased from prior. Opacity versus atelectasis adjacent to the effusions. No pneumothorax. Stable visualized cardiomediastinal silhouette, with portions obscured by the lung findings. Osseous structures grossly unchanged. Moderate to large bilateral pleural effusions, increased from prior. US DUP UPPER EXTREMITY RIGHT ARTERIES    Result Date: 8/2/2022  US DUP UPPER EXTREMITY RIGHT ARTERIES: 8/1/2022 11:42 AM CLINICAL HISTORY:  cold, poor pulses, extensive dvt . COMPARISON: None available. Grayscale, color and waveform Doppler analysis of the right upper arterial system was performed. FINDINGS: Mild to moderate atherosclerotic disease, with pulsatile mild to moderately diminished waveforms worsening distally. There are no significantly elevated velocities to suggest a flow-limiting stenosis, or arterial occlusion. The arterial system is normal in caliber, without evidence of aneurysm or dissection. Maximum systolic velocities are: RIGHT UPPER EXTREMITY: Right proximal common carotid artery 70 cm/s Right mid common carotid artery 53 cm/s Right proximal subclavian artery 68 cm/s Right mid subclavian artery 81 cm/s Right distal subclavian artery 71 cm/s Right axillary artery 41 cm/s Right brachial artery proximal 111 cm/s Right brachial artery mid 97 cm/s Right brachial artery distal 123 cm/s Right radial artery proximal 53 cm/s Right radial artery mid 43 cm/s Right radial artery distal 18 cm/s Right ulnar artery proximal 61 cm/s Right ulnar artery and mid 46 cm/s Right ulnar artery distal 33 cm/s     NO EVIDENCE OF A FLOW-LIMITING STENOSIS, ARTERIAL OCCLUSION, OR ANEURYSM. FL MODIFIED BARIUM SWALLOW W VIDEO    Result Date: 8/11/2022  EXAMINATION: FL MODIFIED BARIUM SWALLOW W VIDEO CLINICAL HISTORY: DYSPHAGIA. FINDINGS: Study performed with patient seated upright, and recorded in lateral projection.  Commercially prepared, nonstandardized barium viscosities consisting of thin liquid, puree, mildly thick liquid, and soft solid, were administered sequentially by a member of the Department of speech pathology in attendance. Mild to moderate oral dysphagia was identified. This was characterized by decreased mastication and piecemeal swallowing of soft, bite size solids. Premature bolus loss was identified with all consistencies tested, and extended with solids to the level of the vallecula and the piriform sinuses with liquids. Lingual pumping, decreased bolus cohesion, and trace oral residue along the tongue was identified with all consistencies tested. Bolus control improved when patient consumed a smaller bolus size. Mild pharyngeal dysphasia was identified. This was characterized by deep laryngeal penetration with a normal sized bolus thin liquids to the level of the true vocal cords. Shallow penetration with immediate ejection occurred with a normal sized bolus of nectar thick liquid. No aspiration was identified. Patient consumed small, single sips of nectar thick liquid and thin liquids without penetration and without aspiration. No pharyngeal residuals were identified. Decreased posterior epiglottic inversion was identified. Delayed swallowing initiation was visualized with all trials. Please review speech pathologist notes for additional information. 19 images. 1.9 minutes fluoroscopy time. MILD TO MODERATE ORAL DYSPHAGIA. MILD PHARYNGEAL DYSPHASIA. NO ASPIRATION. PENETRATION AS DISCUSSED. IR MIDLINE CATH    Result Date: 8/3/2022  FOR BILLING PURPOSES ONLY. STUDY DICTATED IN [facility] BY PHYSICIAN. Assessment/  76 yo man with ESRD on HD. Has liver transplant and subsequent kidney failure and has been on HD many years. Sig vascular disease. Was just admitted with resp failure and had multiple DVT's. S/p IVC filter. Complicated by GI bleed. Now readmitted with resp failure and hypotension. Chest tubes placed with purulent fluid. Possible trouble with av access.   I do hear bruit though    Plan/  1- ok for CT with contrast  2- HD tomorrow. Will attempt to use AVF  3- poor prognosis    Thank you for the consultation. Please do not hesitate to call with questions.     Roderick Diez MD

## 2022-08-28 NOTE — ED NOTES
Arctic sun started     Affiliated [employer] Services, [facility]  08/27/22 2117

## 2022-08-28 NOTE — PROGRESS NOTES
[address] [facility] Pharmacokinetic Monitoring Service - Vancomycin    Cici West is a 75 y.o. male starting on vancomycin therapy for HAP. Pharmacy consulted by [facility] for monitoring and adjustment. Target Concentration: Pre-Dialysis Concentration 21-24 mg/L  Additional Antimicrobials: Zosyn    Pertinent Laboratory Values: Wt Readings from Last 1 Encounters:   08/28/22 155 lb 6.8 oz (70.5 kg)     Temp Readings from Last 1 Encounters:   08/28/22 (!) 93.2 °F (34 °C) (Bladder)     Recent Labs     08/27/22 2015 08/27/22 2028 08/28/22  0903 08/28/22  0943   CREATININE 5.36*   < > 5.39* 4.6*   WBC 14.0*  --  26.4*  --     < > = values in this interval not displayed. MRSA Nasal Swab:  not ordered- HAP    Plan:  Concentration-guided dosing due to renal impairment and intermittent hemodialysis   Start vancomycin 1000mg x1 (previously ordered)  Vancomycin concentration to be ordered based on HD sessions . HD x1 ordered for 8-29-22 (patient tx usually MWF).   Pharmacy will continue to monitor patient and adjust therapy as indicated    Thank you for the consult,  Mary Willett [facility]  8/28/2022 11:55 AM

## 2022-08-28 NOTE — H&P
differently: Take 1 g by mouth 4 times daily Indications: Irritation of the Digestive Tract 8/18/22   Zayda Kennedy, DO   pantoprazole (PROTONIX) 40 MG tablet Take 1 tablet by mouth 2 times daily (before meals)  Patient taking differently: Take 40 mg by mouth 2 times daily (before meals) Indications: Gastroesophageal Reflux Disease 8/18/22   Zayda Kennedy, DO   aspirin 81 MG EC tablet Take 1 tablet by mouth daily  Patient taking differently: Take 81 mg by mouth daily Indications: Preventative Treatment 8/18/22   Titus Terrell, DO   fluticasone (FLONASE) 50 MCG/ACT nasal spray 1 spray by Each Nostril route daily  Patient taking differently: 1 spray by Each Nostril route daily Indications: Allergy 8/19/22   Zayda Kennedy, DO   chlorhexidine (PERIDEX) 0.12 % solution Take 15 mLs by mouth 2 times daily for 14 days 8/18/22 9/1/22  Titus Terrell, DO   albuterol sulfate HFA (PROAIR HFA) 108 (90 Base) MCG/ACT inhaler 2 puffs; Use every 4 hours while awake for 7-10 days then PRN wheezing  Dispense with SPACER and Instruct on use. May sub Ventolin or Proventil as needed per [employer]. Patient taking differently: Inhale 2 puffs into the lungs every 4 hours as needed for Wheezing or Shortness of Breath 2 puffs; Use every 4 hours while awake for 7-10 days then PRN wheezing  Dispense with SPACER and Instruct on use. May sub Ventolin or Proventil as needed per [employer]. 6/28/22   Kenisha Mejia, DO   calcium acetate (PHOSLO) 667 MG capsule Take 667 mg by mouth 3 times daily (with meals)    Historical Provider, MD   Tacrolimus (PROGRAF PO) Take 1 tablet by mouth 2 times daily Indications: Immunosuppression    Historical Provider, MD       ALLERGIES: Patient has no known allergies.     REVIEW OF SYSTEM:   Review of Systems   Unable to perform ROS: Intubated       OBJECTIVE  PHYSICAL EXAM: BP 97/65   Pulse 70   Temp (!) 91.4 °F (33 °C)   Resp 22   Ht 5' 6\" (1.676 m)   Wt 138 lb (62.6 kg)   SpO2 96%   BMI 22.27 kg/m²     Physical Exam  Vitals and nursing note reviewed. Constitutional:       Appearance: He is well-developed. He is toxic-appearing. Interventions: He is intubated. HENT:      Head: Normocephalic and atraumatic. Nose: Nose normal.      Mouth/Throat:      Mouth: Mucous membranes are moist.   Cardiovascular:      Rate and Rhythm: Normal rate and regular rhythm. Pulses: Normal pulses. Heart sounds: Normal heart sounds. Comments: 2-3+ pitting edema of upper extremities bilaterally. Pulmonary:      Effort: He is intubated. Breath sounds: Normal breath sounds. No wheezing or rhonchi. Abdominal:      General: Bowel sounds are normal.      Palpations: Abdomen is soft. There is no mass. Tenderness: There is no abdominal tenderness. Musculoskeletal:         General: Normal range of motion. Cervical back: Normal range of motion. Right lower leg: No edema. Left lower leg: No edema. Lymphadenopathy:      Cervical: No cervical adenopathy. Skin:     General: Skin is warm and dry. Capillary Refill: Capillary refill takes less than 2 seconds. Neurological:      Mental Status: He is unresponsive. Deep Tendon Reflexes: Reflexes normal.         DATA:     Diagnostic tests reviewed for today's visit:    Most recent labs and imaging results reviewed.      LABS:    Recent Results (from the past 24 hour(s))   Comprehensive Metabolic Panel    Collection Time: 08/27/22  8:15 PM   Result Value Ref Range    Sodium 132 (L) 135 - 144 mEq/L    Potassium 4.0 3.4 - 4.9 mEq/L    Chloride 91 (L) 95 - 107 mEq/L    CO2 20 20 - 31 mEq/L    Anion Gap 21 (H) 9 - 15 mEq/L    Glucose 172 (H) 70 - 99 mg/dL    BUN 34 (H) 8 - 23 mg/dL    Creatinine 5.36 (H) 0.70 - 1.20 mg/dL    GFR Non-African American 10.7 (L) >60    GFR  12.9 (L) >60    Calcium 8.4 (L) 8.5 - 9.9 mg/dL    Total Protein 6.1 (L) 6.3 - 8.0 g/dL    Albumin 2.6 (L) 3.5 - 4.6 g/dL    Total Bilirubin <0.2 0.2 - 0.7 mg/dL    Alkaline Phosphatase 101 35 - 104 U/L    ALT 24 0 - 41 U/L    AST 36 0 - 40 U/L    Globulin 3.5 2.3 - 3.5 g/dL   CBC with Auto Differential    Collection Time: 08/27/22  8:15 PM   Result Value Ref Range    WBC 14.0 (H) 4.8 - 10.8 K/uL    RBC 2.50 (L) 4.70 - 6.10 M/uL    Hemoglobin 8.2 (L) 14.0 - 18.0 g/dL    Hematocrit 25.7 (L) 42.0 - 52.0 %    .1 (H) 80.0 - 100.0 fL    MCH 32.7 (H) 27.0 - 31.3 pg    MCHC 31.7 (L) 33.0 - 37.0 %    RDW 16.7 (H) 11.5 - 14.5 %    Platelets 093 264 - 086 K/uL    PLATELET SLIDE REVIEW Adequate     Neutrophils % 64.0 %    Lymphocytes % 11.0 %    Monocytes % 4.8 %    Eosinophils % 0.6 %    Basophils % 0.7 %    Neutrophils Absolute 11.9 (H) 1.4 - 6.5 K/uL    Lymphocytes Absolute 1.7 1.0 - 4.8 K/uL    Monocytes Absolute 0.7 0.2 - 0.8 K/uL    Eosinophils Absolute 0.0 0.0 - 0.7 K/uL    Basophils Absolute 0.0 0.0 - 0.2 K/uL    Bands Relative 9 %    Atypical Lymphocytes Relative 1 %    Metamyelocytes Relative 1 %    Myelocyte Percent 10 %    Promyelocytes Percent 1 (A) %    nRBC 4 /100 WBC    Anisocytosis 1+     Macrocytes 1+     Polychromasia 1+     Hypochromia 1+     Poikilocytes 1+     Collins Cells 1+    Troponin    Collection Time: 08/27/22  8:15 PM   Result Value Ref Range    Troponin 0.261 (HH) 0.000 - 0.010 ng/mL   Magnesium    Collection Time: 08/27/22  8:15 PM   Result Value Ref Range    Magnesium 2.2 1.7 - 2.4 mg/dL   Ethanol    Collection Time: 08/27/22  8:15 PM   Result Value Ref Range    Ethanol Lvl <10 mg/dL    Ethanol percent Not indicated G/dL   Protime-INR    Collection Time: 08/27/22  8:15 PM   Result Value Ref Range    Protime 16.7 (H) 12.3 - 14.9 sec    INR 1.3    APTT    Collection Time: 08/27/22  8:15 PM   Result Value Ref Range    aPTT 75.0 (H) 24.4 - 36.8 sec   TSH with Reflex    Collection Time: 08/27/22  8:15 PM   Result Value Ref Range    TSH 57.040 (H) 0.440 - 3.860 uIU/mL   Procalcitonin    Collection Time: 08/27/22  8:15 PM   Result Value Ref Range    Procalcitonin 13.36 (H) 0.00 - 0.15 ng/mL   CK    Collection Time: 08/27/22  8:15 PM   Result Value Ref Range    Total CK 65 0 - 190 U/L   Lactic Acid    Collection Time: 08/27/22  8:15 PM   Result Value Ref Range    Lactic Acid 8.6 (HH) 0.5 - 2.2 mmol/L   T4, Free    Collection Time: 08/27/22  8:15 PM   Result Value Ref Range    T4 Free 0.40 (L) 0.84 - 1.68 ng/dL   POCT Arterial    Collection Time: 08/27/22  8:28 PM   Result Value Ref Range    POC Sodium 136 136 - 145 mEq/L    POC Potassium 2.4 (LL) 3.5 - 5.1 mEq/L    POC Chloride 94 (L) 99 - 110 mEq/L    POC Glucose 178 (H) 70 - 99 mg/dl    POC Creatinine 4.7 (H) 0.8 - 1.3 mg/dL    GFR Non- 12 (A) >60    GFR  15 (A) >60    Calcium, Ionized 1.81 (HH) 1.12 - 1.32 mmol/L    pH, Arterial 7.115 (LL) 7.350 - 7.450    pCO2, Arterial 87 (HH) 35 - 45 mm Hg    pO2, Arterial 78 (HH) 75 - 108 mm Hg    HCO3, Arterial 28.0 21.0 - 29.0 mmol/L    Base Excess, Arterial -1 -3 - 3    O2 Sat, Arterial 89 (HH) 93 - 100 %    TCO2, Arterial 31 21 - 32 mmol/L    Lactate 9.42 (HH) 0.40 - 2.00 mmol/L    POC Hematocrit 24 (L) 41 - 53 %    Hemoglobin 8.1 (L) 13.5 - 17.5 gm/dL    FIO2 100.000     Sample Type ART     Performed on SEE BELOW        IMAGING:   CT ABDOMEN PELVIS WO CONTRAST Additional Contrast? None    Result Date: 8/27/2022    Masses as described which may represent metastatic lesions. Recommend follow-up. CT HEAD WO CONTRAST    Result Date: 8/27/2022  No intracranial hemorrhage. Moderate volume loss and microvascular changes are present. Opacification identified in the mastoid air cells, and this could be related to a mastoid effusion versus inflammation. CT CHEST WO CONTRAST    Result Date: 8/27/2022  1. Bilateral slightly complex appearing pleural effusions. This could be secondary to underlying blood products or empyema in the appropriate clinical setting. Bilateral acute rib fractures.   There is a right posterior rib fracture with pathologic features. Recommend clinical correlation and follow-up. Air in the chest wall soft tissues which could be secondary to a laceration. 2.  Endotracheal tube in the origin of the right mainstem bronchus. CT CERVICAL SPINE WO CONTRAST    Result Date: 8/27/2022  1. At the C6-C7 level moderate to severe bilateral neural foraminal stenosis at And moderate to severe canal stenosis due to a degenerative disc bulge and bony hypertrophy. 2.  No acute findings. DJD. Bilateral mastoid effusions. Right otitis media. VTE Prophylaxis: low molecular weight heparin -  start     ASSESSMENT AND PLAN    Principal Problem:  Cardiac arrest: Patient in cardiogenic shock. Rest possibly due to suspected PE considering recent DVTs. Patient currently intubated. Troponin 0.261. We will admit to ICU. We will consult intensivist.  We will cycle troponin. We will repeat EKG. APTT 75 with repeat at 59.2. We will repeat a PTT at 6 AM, if lower we will consider chest CTA to rule out PE. Elevated lactic acid: Lactic acid 8.6. Likely secondary to shock. We will repeat lactic acid now and every 6 hours. Mass: 2 abdominal masses seen in chest CT. We will consult palliative care team.  We will evaluate abdominal masses pending patient stabilization    Hypothyroid: Thyroid 57.040. We will administer 200 mcg Synthroid IV x1 dose. We will consult endocrine. Active problems:  ESRD: Patient been on dialysis. We will consult nephrology for dialysis. HTN: Patient on home meds to control. We will resume home meds and monitor BP closely in the intensive care unit. We will utilize IV medication for SBP greater than 160. COPD: Patient on home meds to control. Patient currently intubated. We will resume home meds with extubation. We will monitor respiratory status closely  GERD: PPI. We will administer PPI IV.     Plan of care discussed with: other PT intubated    SIGNATURE: David ISI Gomez - CNP  DATE: August 27, 2022  TIME: 11:19 PM     Eliza Toney MD - supervising physician

## 2022-08-28 NOTE — ED NOTES
Pt started to move his hands and opened his eyes, soft restraints were applied to both wrists. Versed rate was increased.      Dinesh Rosado RN  08/27/22 8679

## 2022-08-28 NOTE — CARE COORDINATION
PT FROM [facility]. PREVIOUSLY HOME ALONE. PREVIOUS CMI COPIED/PASTED FROM LAST ADMISSION. Met with DTRMARIA LUISA to discuss discharge plan. PCP: Terence Schulte MD                                Date of Last Visit: UNSURE     VA Patient: No        VA Notified: no     If no PCP, list provided? N/A     Discharge Planning     Living Arrangements: independently at home     Who do you live with? ALONE     Who helps you with your care:  self     If lives at home:                Do you have any barriers navigating in your home? no     Patient can perform ADL? Yes     Current Services (outpatient and in home) :  None     Dialysis: Yes, Location [facility], Chair Time AM BUT UNSURE OF TIME     Is transportation available to get to your appointments? Yes     DME Equipment:  yes - [facility]     Respiratory equipment: None     Respiratory provider:  no     Pharmacy:  yes - [facility] with Medication Assistance Program?  No       Patient agreeable to [facility] ? SHE IS UNSURE OF HIS NEEDS, ,MAY NEED HHC AND PRIVATE HIRE LIST     Patient agreeable to SNF/Rehab? N/A     Other discharge needs identified? N/A     Initial Discharge Plan? (Note: please see concurrent daily documentation for any updates after initial note). DTR STATES SHE LIVES IN [place] AND HIS SISTER IS THE ONLY LOCAL  FAMILY BUT NOT RELIABLE TO ASSIST. SHE STATES HE COULD USE SOME HELP WITH CLEANING AND COOKING.  WILL LEAVE NOTE FOR PT/OT EVALS ONCE EXTUBATED AND MAY NEED PRIVATE HIRE LIST

## 2022-08-28 NOTE — PROGRESS NOTES
Internal Medicine   Hospitalist   Progress Note    2022   12:09 PM    Name:  Hamlet Soni  MRN:    [id number]     IP Day: 1     Admit Date: 2022  8:03 PM  PCP: Ruth Hu MD    Code Status:  Full Code    Assessment and Plan: Active Problems/ diagnosis:     Cardiac arrest   Acute hypoxic and hypercapenic respiratory failure   Bilateral pleural effusion   ESRD on HD   Shock- etiology to be determined   Acute encephalopathy due to acidosis, resp failure, ?rule out ischemia   Likely fall at SNF-CT head shows scalp soft tissue swelling/?hematoma  Bilateral upper ext and left lower DVT- s/p IVC, not on AC due to epistaxis and recent bleeding duodenal ulcer  Recent duodenal ulcer s/p clipping   H/o liver transplant     Plan  Continue close monitoring in intensive care unit  Ventilator management as per intensive care, pressors to keep map above 65, currently on Levophed and vasopressin  Status post chest tube placement   Resume broad-spectrum antibiotics, follow-up blood cultures and pleural fluid culture  Cardiology consult  Seen by neurology this morning, CT head noted, resume antiepileptics as per neurologist  Nephrologist consulted for managing HD  Resume his Prograf  DVT and PUD PPx     7 pm- 7 am, please contact on call Hospitalist for any needs     Dispo-family wants him to be transferred to [facility] clinic as his providers are there. Transfer is initiated to [facility]    Subjective:     Patient is ventilated and unresponsive. Multiple family members at bedside. Family [facility].     Physical Examination:      Vitals:  BP 99/62   Pulse 89   Temp (!) 93.2 °F (34 °C) (Bladder)   Resp 22   Ht 5' 6\" (1.676 m)   Wt 155 lb 6.8 oz (70.5 kg)   SpO2 97%   BMI 25.09 kg/m²   Temp (24hrs), Av.4 °F (33.6 °C), Min:87.4 °F (30.8 °C), Max:96.8 °F (36 °C)      General appearance: Intubated, unresponsive  HEENT: Right-sided scalp swelling noted  Mental Status: Deferred as patient is unresponsive  Lungs: On the ventilator, mechanical sound transmitted bilaterally  Heart: regular rate   Abdomen: soft, nondistended, bowel sounds present, no masses  Extremities: No edema in the lower extremity, trace bilateral upper extremity edema. Skin: Bruising noted on different areas of his body. Neurologic: Unresponsive  Shelton catheter in place  Has central line and A-line  Chest tube in place    Data:     Labs:  Recent Labs     08/27/22 2015 08/27/22 2028 08/28/22 0903 08/28/22 0943   WBC 14.0*  --  26.4*  --    HGB 8.2*   < > 9.2* 10.8*     --  347  --     < > = values in this interval not displayed. Recent Labs     08/27/22 2015 08/27/22 2028 08/28/22 0814 08/28/22 0903 08/28/22 0943   *  --   --  129*  --    K 4.0   < > 4.0 4.1  --    CL 91*  --   --  89*  --    CO2 20  --   --  23  --    BUN 34*  --   --  39*  --    CREATININE 5.36*   < >  --  5.39* 4.6*   GLUCOSE 172*  --   --  167*  --     < > = values in this interval not displayed.      Recent Labs     08/27/22 2015 08/28/22 0903   AST 36 67*   ALT 24 37   BILITOT <0.2 0.3   ALKPHOS 101 103       Current Facility-Administered Medications   Medication Dose Route Frequency Provider Last Rate Last Admin    pantoprazole (PROTONIX) 40 mg in sodium chloride (PF) 10 mL injection  40 mg IntraVENous Daily Nicoletto Bolzan-Roche, APRN - CNP   40 mg at 08/28/22 9915    vasopressin 20 Units in dextrose 5 % 100 mL infusion  0.01-0.03 Units/min IntraVENous Continuous Sarah Chambers MD 9 mL/hr at 08/28/22 0818 0.03 Units/min at 08/28/22 0818    propofol injection  10 mcg/kg/min IntraVENous Titrated Yosef Garcia MD        vancomycin 1000 mg IVPB in 250 mL D5W addavial  1,000 mg IntraVENous Once Yosef Garcia MD        glucose chewable tablet 16 g  4 tablet Oral PRN Yosef Garcia MD        dextrose bolus 10% 125 mL  125 mL IntraVENous PRN Yosef Garcia MD        Or    dextrose bolus 10% 250 mL  250 mL IntraVENous PRN Armen Nazario MD        glucagon (rDNA) injection 1 mg  1 mg SubCUTAneous PRN Armen Nazario MD        dextrose 10 % infusion   IntraVENous Continuous PRN Armen Nazario MD        insulin lispro (HUMALOG) injection vial 0-4 Units  0-4 Units SubCUTAneous Q4H Armen Nazario MD        piperacillin-tazobactam (ZOSYN) 4,500 mg in dextrose 5 % 100 mL IVPB (mini-bag)  4,500 mg IntraVENous Once Armen Nazario MD        piperacillin-tazobactam (ZOSYN) 3,375 mg in dextrose 5 % 50 mL IVPB (mini-bag)  3,375 mg IntraVENous Q12H Armen Nazario MD        sodium chloride flush 0.9 % injection 5-40 mL  5-40 mL IntraVENous PRN Armen Nazario MD        sodium chloride flush 0.9 % injection 5-40 mL  5-40 mL IntraVENous 2 times per day Armen Nazario MD        sodium chloride flush 0.9 % injection 5-40 mL  5-40 mL IntraVENous PRN Armen Nazario MD        0.9 % sodium chloride infusion   IntraVENous PRN Armen Nazario MD        levETIRAcetam (KEPPRA) 1,000 mg in sodium chloride 0.9 % 100 mL IVPB  1,000 mg IntraVENous Q12H Gina Bautista MD   Stopped at 08/28/22 1200    albumin human 25 % IV solution 25 g  25 g IntraVENous Daily PRN Yosef Baer MD        vancomycin (VANCOCIN) intermittent dosing (placeholder)   Other RX Placeholder Armen Nazario MD        norepinephrine (LEVOPHED) 16 mg in dextrose 5 % 250 mL infusion  1-100 mcg/min IntraVENous Continuous Royetta Jude' Annamae Felty, MD 26.3 mL/hr at 08/28/22 0630 28 mcg/min at 08/28/22 0630    fentaNYL (SUBLIMAZE) 1,000 mcg in sodium chloride 0.9 % 100 mL infusion   mcg/hr IntraVENous Continuous Royetta Jude' Annamae Felty, MD 7.5 mL/hr at 08/28/22 0630 75 mcg/hr at 08/28/22 0630    sodium chloride flush 0.9 % injection 5-40 mL  5-40 mL IntraVENous 2 times per day Nicoletto Bolzainn-Roche, APRN - CNP        sodium chloride flush 0.9 % injection 5-40 mL  5-40 mL IntraVENous PRN Nicoletto Bolzan-Roche, APRN - CNP        0.9 % sodium chloride infusion   IntraVENous PRN Nicoletto Bolzan-Roche, APRN - CNP        heparin (porcine) injection 5,000 Units  5,000 Units SubCUTAneous 3 times per day Nicoletto Bolzan-Roche, APRN - CNP   5,000 Units at 08/28/22 0625    ondansetron (ZOFRAN-ODT) disintegrating tablet 4 mg  4 mg Oral Q8H PRN Nicoletto Bolzan-Roche, APRN - CNP        Or    ondansetron (ZOFRAN) injection 4 mg  4 mg IntraVENous Q6H PRN Nicoletto Bolzan-Roche, APRN - CNP        polyethylene glycol (GLYCOLAX) packet 17 g  17 g Oral Daily PRN Nicoletto Bolzan-Roche, APRN - CNP        acetaminophen (TYLENOL) tablet 650 mg  650 mg Oral Q6H PRN Nicoletto Bolzan-Roche, APRN - CNP        Or    acetaminophen (TYLENOL) suppository 650 mg  650 mg Rectal Q6H PRN Nicojamisono Desirezan-Roche, APRN - CNP           Additional work up or/and treatment plan may be added today or then after based on clinical progression. I am managing a portion of pt care. Some medical issues are handled by other specialists. Additional work up and treatment should be done in out pt setting by pt PCP and other out pt providers. In addition to examining and evaluating pt, I spent additional time explaining care, normaland abnormal findings, and treatment plan. All of pt questions were answered. Counseling, diet and education were provided. Case will be discussed with nursing staff when appropriate. Family will be updated if and when appropriate.        Electronically signed by Neeru Lopez DO on 8/28/2022 at 12:09 PM

## 2022-08-28 NOTE — FLOWSHEET NOTE
Patient on ventilator. Patient appears dusky. On minimal sedation and not responding. Patients opening eyes but not keeping eyes open and not following commands. 45 ml of versed wasted with petra hardwick rn. Dr. Wilver Herrera placed 2 chest tubes in the patient. Ct on the right and left. Pleural fluid sent down to lab. Patient down to CT of the chest r/o PE    Family at bedside and very upset. Family crying. Daughter here from [place].     Dr. Wilver Herrera ordered propofol to be increased to 20mcg/kg/min and fentanyl 100mcg/hr

## 2022-08-28 NOTE — PROGRESS NOTES
2350: Pt arrived to ICU via cart with this RN, respiratory, and ER PCA. 0015: Started normothermia therapy on artic sun per Dr. Aishwarya Griffin. 0030Rozann Lou, NP at bedside with Dr. Dai Kirby to place central line and A-line. 0115: CVC placed in right femoral by Carla Espinoza, NP.   0124: Per Dr. Aishwarya Griffin versed gtt changed from 8 mg/hr to 2 mg/hr. 0150: A-line placed by Carla Espinoza NP. Lump felt in RUQ upon palpitation  EKG completed  0230: OG placed at 57cm to LIWS  0300: Spoke with sister Imani Steen - will be in later today to visit, if there are any changes for the worse she would like a call. 0500: CHG bath completed. 80: Perfectserved Dr. Aishwarya Griffin - pt is now at 29 mcgs of levophed with a BP of 88/54. 3401: CT called - need peripheral IV access,  informed us we may need to do a VQ scan if IV access is not attainable. 36: Spoke with Dr. Aishwarya Griffin via phone - informed him of CT call and new orders placed for vasopressin if needed - see new orders. [id number]: Attempted to get IV access x 2 with no luck - used vein finder - pt is very edematous on upper extremities. 0700: Dr. Richard Mention at bedside to see pt - updated on pt status as well as AV fistula having no bruit or thrill.  [id number]: Shift handoff report given to CHUYITA Argueta RN and Stefani Prado RN.          Dual skin assessment completed with Krissy Bobo RN:  Bruising on chest and right shoulder noted  Skin tears on mid chest/sternum  L bicep abrasion  L elbow skin tear  B/L bruising and weeping both arms  R forearm skin tear  R elbow skin tear  Dry/flaky b/l lower extremities  Toe nails very thick and discolored  B/L callused heels    Redness/excoriation of bottom

## 2022-08-29 ENCOUNTER — APPOINTMENT (OUTPATIENT)
Dept: GENERAL RADIOLOGY | Age: 67
DRG: 208 | End: 2022-08-29
Payer: MEDICARE

## 2022-08-29 ENCOUNTER — APPOINTMENT (OUTPATIENT)
Dept: ULTRASOUND IMAGING | Age: 67
DRG: 208 | End: 2022-08-29
Payer: MEDICARE

## 2022-08-29 VITALS
HEART RATE: 102 BPM | HEIGHT: 66 IN | DIASTOLIC BLOOD PRESSURE: 58 MMHG | WEIGHT: 155.42 LBS | TEMPERATURE: 98.2 F | SYSTOLIC BLOOD PRESSURE: 90 MMHG | BODY MASS INDEX: 24.98 KG/M2 | OXYGEN SATURATION: 66 % | RESPIRATION RATE: 16 BRPM

## 2022-08-29 PROBLEM — R77.8 ELEVATED TROPONIN: Status: ACTIVE | Noted: 2022-08-29

## 2022-08-29 PROBLEM — R19.00 ABDOMINAL MASS: Status: ACTIVE | Noted: 2022-08-29

## 2022-08-29 LAB
ALBUMIN SERPL-MCNC: 2.2 G/DL (ref 3.5–4.6)
ALP BLD-CCNC: 120 U/L (ref 35–104)
ALT SERPL-CCNC: 551 U/L (ref 0–41)
ANION GAP SERPL CALCULATED.3IONS-SCNC: 30 MEQ/L (ref 9–15)
ANISOCYTOSIS: ABNORMAL
APTT: 94.1 SEC (ref 24.4–36.8)
AST SERPL-CCNC: 1210 U/L (ref 0–40)
BANDED NEUTROPHILS RELATIVE PERCENT: 2 %
BASE EXCESS ARTERIAL: -12 (ref -3–3)
BASE EXCESS ARTERIAL: -16 (ref -3–3)
BASE EXCESS ARTERIAL: 18 (ref -3–3)
BASE EXCESS VENOUS: -16 (ref -3–3)
BASOPHILS ABSOLUTE: 0 K/UL (ref 0–0.2)
BASOPHILS RELATIVE PERCENT: 0.1 %
BILIRUB SERPL-MCNC: 0.6 MG/DL (ref 0.2–0.7)
BUN BLDV-MCNC: 46 MG/DL (ref 8–23)
BURR CELLS: ABNORMAL
CALCIUM IONIZED: 0.74 MMOL/L (ref 1.12–1.32)
CALCIUM IONIZED: 0.92 MMOL/L (ref 1.12–1.32)
CALCIUM IONIZED: 1 MMOL/L (ref 1.12–1.32)
CALCIUM IONIZED: 1.08 MMOL/L (ref 1.12–1.32)
CALCIUM SERPL-MCNC: 8.9 MG/DL (ref 8.5–9.9)
CHLORIDE BLD-SCNC: 86 MEQ/L (ref 95–107)
CO2: 13 MEQ/L (ref 20–31)
CREAT SERPL-MCNC: 5.95 MG/DL (ref 0.7–1.2)
EKG ATRIAL RATE: 72 BPM
EKG ATRIAL RATE: 94 BPM
EKG Q-T INTERVAL: 290 MS
EKG Q-T INTERVAL: 384 MS
EKG QRS DURATION: 78 MS
EKG QRS DURATION: 80 MS
EKG QTC CALCULATION (BAZETT): 456 MS
EKG QTC CALCULATION (BAZETT): 474 MS
EKG R AXIS: 42 DEGREES
EKG R AXIS: 48 DEGREES
EKG T AXIS: 199 DEGREES
EKG T AXIS: 63 DEGREES
EKG VENTRICULAR RATE: 149 BPM
EKG VENTRICULAR RATE: 92 BPM
EOSINOPHILS ABSOLUTE: 0 K/UL (ref 0–0.7)
EOSINOPHILS RELATIVE PERCENT: 0.1 %
GFR AFRICAN AMERICAN: 11.5
GFR AFRICAN AMERICAN: 12
GFR AFRICAN AMERICAN: 12
GFR AFRICAN AMERICAN: 13
GFR AFRICAN AMERICAN: 13
GFR NON-AFRICAN AMERICAN: 10
GFR NON-AFRICAN AMERICAN: 10
GFR NON-AFRICAN AMERICAN: 11
GFR NON-AFRICAN AMERICAN: 11
GFR NON-AFRICAN AMERICAN: 9.5
GLOBULIN: 3.7 G/DL (ref 2.3–3.5)
GLUCOSE BLD-MCNC: 122 MG/DL (ref 70–99)
GLUCOSE BLD-MCNC: 146 MG/DL (ref 70–99)
GLUCOSE BLD-MCNC: 52 MG/DL (ref 70–99)
GLUCOSE BLD-MCNC: 56 MG/DL (ref 70–99)
GLUCOSE BLD-MCNC: 74 MG/DL (ref 70–99)
GLUCOSE BLD-MCNC: 86 MG/DL (ref 70–99)
GLUCOSE BLD-MCNC: 89 MG/DL (ref 70–99)
GLUCOSE BLD-MCNC: 91 MG/DL (ref 70–99)
HCO3 ARTERIAL: 10.4 MMOL/L (ref 21–29)
HCO3 ARTERIAL: 14.4 MMOL/L (ref 21–29)
HCO3 ARTERIAL: 39 MMOL/L (ref 21–29)
HCO3 VENOUS: 13.3 MMOL/L (ref 23–29)
HCT VFR BLD CALC: 32.7 % (ref 42–52)
HEMOGLOBIN: 10 G/DL (ref 14–18)
HEMOGLOBIN: 11.5 GM/DL (ref 13.5–17.5)
HEMOGLOBIN: 12 GM/DL (ref 13.5–17.5)
HEMOGLOBIN: 7.3 GM/DL (ref 13.5–17.5)
HEMOGLOBIN: 7.9 GM/DL (ref 13.5–17.5)
INR BLD: 3.2
LACTATE DEHYDROGENASE, FLUID: 122 U/L
LACTATE DEHYDROGENASE, FLUID: 173 U/L
LACTATE: 13.63 MMOL/L (ref 0.4–2)
LACTATE: 14.35 MMOL/L (ref 0.4–2)
LACTATE: 15.24 MMOL/L (ref 0.4–2)
LACTATE: 18.39 MMOL/L (ref 0.4–2)
LACTIC ACID: 17.8 MMOL/L (ref 0.5–2.2)
LYMPHOCYTES ABSOLUTE: 1.1 K/UL (ref 1–4.8)
LYMPHOCYTES RELATIVE PERCENT: 4 %
MAGNESIUM: 2.7 MG/DL (ref 1.7–2.4)
MCH RBC QN AUTO: 30.9 PG (ref 27–31.3)
MCHC RBC AUTO-ENTMCNC: 30.6 % (ref 33–37)
MCV RBC AUTO: 101.1 FL (ref 80–100)
MONOCYTES ABSOLUTE: 0.3 K/UL (ref 0.2–0.8)
MONOCYTES RELATIVE PERCENT: 1 %
NEUTROPHILS ABSOLUTE: 26.8 K/UL (ref 1.4–6.5)
NEUTROPHILS RELATIVE PERCENT: 93 %
NUCLEATED RED BLOOD CELLS: 3 /100 WBC
O2 SAT, ARTERIAL: 100 % (ref 93–100)
O2 SAT, ARTERIAL: 91 % (ref 93–100)
O2 SAT, ARTERIAL: 98 % (ref 93–100)
O2 SAT, VEN: 35 %
PATH CONSULT FLUID: NORMAL
PATH CONSULT FLUID: NORMAL
PCO2 ARTERIAL: 22 MM HG (ref 35–45)
PCO2 ARTERIAL: 29 MM HG (ref 35–45)
PCO2 ARTERIAL: 37 MM HG (ref 35–45)
PCO2, VEN: 41.6 MM HG (ref 40–50)
PDW BLD-RTO: 16.5 % (ref 11.5–14.5)
PERFORMED ON: ABNORMAL
PERFORMED ON: NORMAL
PH ARTERIAL: 7.28 (ref 7.35–7.45)
PH ARTERIAL: 7.3 (ref 7.35–7.45)
PH ARTERIAL: 7.63 (ref 7.35–7.45)
PH VENOUS: 7.11 (ref 7.32–7.42)
PLATELET # BLD: 335 K/UL (ref 130–400)
PLATELET SLIDE REVIEW: NORMAL
PO2 ARTERIAL: 124 MM HG (ref 75–108)
PO2 ARTERIAL: 132 MM HG (ref 75–108)
PO2 ARTERIAL: 65 MM HG (ref 75–108)
PO2, VEN: 28 MM HG
POC CHLORIDE: 88 MEQ/L (ref 99–110)
POC CHLORIDE: 90 MEQ/L (ref 99–110)
POC CHLORIDE: 93 MEQ/L (ref 99–110)
POC CHLORIDE: 94 MEQ/L (ref 99–110)
POC CREATININE: 5.3 MG/DL (ref 0.8–1.3)
POC CREATININE: 5.3 MG/DL (ref 0.8–1.3)
POC CREATININE: 5.8 MG/DL (ref 0.8–1.3)
POC CREATININE: 5.9 MG/DL (ref 0.8–1.3)
POC FIO2: 40
POC HEMATOCRIT: 22 % (ref 41–53)
POC HEMATOCRIT: 23 % (ref 41–53)
POC HEMATOCRIT: 34 % (ref 41–53)
POC HEMATOCRIT: 35 % (ref 41–53)
POC POTASSIUM: 4.6 MEQ/L (ref 3.5–5.1)
POC POTASSIUM: 5.5 MEQ/L (ref 3.5–5.1)
POC POTASSIUM: 5.7 MEQ/L (ref 3.5–5.1)
POC POTASSIUM: 7.5 MEQ/L (ref 3.5–5.1)
POC SAMPLE TYPE: ABNORMAL
POC SODIUM: 121 MEQ/L (ref 136–145)
POC SODIUM: 123 MEQ/L (ref 136–145)
POC SODIUM: 132 MEQ/L (ref 136–145)
POC SODIUM: 149 MEQ/L (ref 136–145)
POLYCHROMASIA: ABNORMAL
POTASSIUM SERPL-SCNC: 5.7 MEQ/L (ref 3.4–4.9)
PROTHROMBIN TIME: 32.7 SEC (ref 12.3–14.9)
RBC # BLD: 3.23 M/UL (ref 4.7–6.1)
REASON FOR REJECTION: NORMAL
REJECTED TEST: NORMAL
SODIUM BLD-SCNC: 129 MEQ/L (ref 135–144)
SPECIMEN TYPE: NORMAL
SPECIMEN TYPE: NORMAL
TCO2 ARTERIAL: 11 MMOL/L (ref 21–32)
TCO2 ARTERIAL: 15 MMOL/L (ref 21–32)
TCO2 ARTERIAL: 40 MMOL/L (ref 21–32)
TCO2 CALC VENOUS: 15 MMOL/L
TOTAL PROTEIN: 5.9 G/DL (ref 6.3–8)
WBC # BLD: 28.2 K/UL (ref 4.8–10.8)

## 2022-08-29 PROCEDURE — P9047 ALBUMIN (HUMAN), 25%, 50ML: HCPCS | Performed by: INTERNAL MEDICINE

## 2022-08-29 PROCEDURE — 2580000003 HC RX 258: Performed by: EMERGENCY MEDICINE

## 2022-08-29 PROCEDURE — 6360000002 HC RX W HCPCS: Performed by: INTERNAL MEDICINE

## 2022-08-29 PROCEDURE — 2580000003 HC RX 258: Performed by: PSYCHIATRY & NEUROLOGY

## 2022-08-29 PROCEDURE — 94640 AIRWAY INHALATION TREATMENT: CPT

## 2022-08-29 PROCEDURE — 85014 HEMATOCRIT: CPT

## 2022-08-29 PROCEDURE — 6360000002 HC RX W HCPCS: Performed by: PSYCHIATRY & NEUROLOGY

## 2022-08-29 PROCEDURE — 99221 1ST HOSP IP/OBS SF/LOW 40: CPT | Performed by: NURSE PRACTITIONER

## 2022-08-29 PROCEDURE — 82803 BLOOD GASES ANY COMBINATION: CPT

## 2022-08-29 PROCEDURE — 93010 ELECTROCARDIOGRAM REPORT: CPT | Performed by: INTERNAL MEDICINE

## 2022-08-29 PROCEDURE — 82330 ASSAY OF CALCIUM: CPT

## 2022-08-29 PROCEDURE — 82565 ASSAY OF CREATININE: CPT

## 2022-08-29 PROCEDURE — 85730 THROMBOPLASTIN TIME PARTIAL: CPT

## 2022-08-29 PROCEDURE — 6370000000 HC RX 637 (ALT 250 FOR IP): Performed by: INTERNAL MEDICINE

## 2022-08-29 PROCEDURE — 82948 REAGENT STRIP/BLOOD GLUCOSE: CPT

## 2022-08-29 PROCEDURE — 80053 COMPREHEN METABOLIC PANEL: CPT

## 2022-08-29 PROCEDURE — 90945 DIALYSIS ONE EVALUATION: CPT

## 2022-08-29 PROCEDURE — 94761 N-INVAS EAR/PLS OXIMETRY MLT: CPT

## 2022-08-29 PROCEDURE — 2580000003 HC RX 258: Performed by: NURSE PRACTITIONER

## 2022-08-29 PROCEDURE — 6360000002 HC RX W HCPCS: Performed by: NURSE PRACTITIONER

## 2022-08-29 PROCEDURE — 84295 ASSAY OF SERUM SODIUM: CPT

## 2022-08-29 PROCEDURE — 82435 ASSAY OF BLOOD CHLORIDE: CPT

## 2022-08-29 PROCEDURE — 93925 LOWER EXTREMITY STUDY: CPT

## 2022-08-29 PROCEDURE — A4216 STERILE WATER/SALINE, 10 ML: HCPCS | Performed by: NURSE PRACTITIONER

## 2022-08-29 PROCEDURE — 2500000003 HC RX 250 WO HCPCS

## 2022-08-29 PROCEDURE — 6360000002 HC RX W HCPCS: Performed by: EMERGENCY MEDICINE

## 2022-08-29 PROCEDURE — 84132 ASSAY OF SERUM POTASSIUM: CPT

## 2022-08-29 PROCEDURE — 2500000003 HC RX 250 WO HCPCS: Performed by: INTERNAL MEDICINE

## 2022-08-29 PROCEDURE — 2580000003 HC RX 258: Performed by: INTERNAL MEDICINE

## 2022-08-29 PROCEDURE — 2500000003 HC RX 250 WO HCPCS: Performed by: EMERGENCY MEDICINE

## 2022-08-29 PROCEDURE — 36415 COLL VENOUS BLD VENIPUNCTURE: CPT

## 2022-08-29 PROCEDURE — 83735 ASSAY OF MAGNESIUM: CPT

## 2022-08-29 PROCEDURE — 2700000000 HC OXYGEN THERAPY PER DAY

## 2022-08-29 PROCEDURE — 37799 UNLISTED PX VASCULAR SURGERY: CPT

## 2022-08-29 PROCEDURE — 85610 PROTHROMBIN TIME: CPT

## 2022-08-29 PROCEDURE — 99291 CRITICAL CARE FIRST HOUR: CPT | Performed by: INTERNAL MEDICINE

## 2022-08-29 PROCEDURE — 94003 VENT MGMT INPAT SUBQ DAY: CPT

## 2022-08-29 PROCEDURE — 83605 ASSAY OF LACTIC ACID: CPT

## 2022-08-29 PROCEDURE — 36556 INSERT NON-TUNNEL CV CATH: CPT | Performed by: INTERNAL MEDICINE

## 2022-08-29 PROCEDURE — APPSS60 APP SPLIT SHARED TIME 46-60 MINUTES: Performed by: PHYSICIAN ASSISTANT

## 2022-08-29 PROCEDURE — 76937 US GUIDE VASCULAR ACCESS: CPT | Performed by: INTERNAL MEDICINE

## 2022-08-29 PROCEDURE — C9113 INJ PANTOPRAZOLE SODIUM, VIA: HCPCS | Performed by: NURSE PRACTITIONER

## 2022-08-29 PROCEDURE — 85025 COMPLETE CBC W/AUTO DIFF WBC: CPT

## 2022-08-29 PROCEDURE — 71045 X-RAY EXAM CHEST 1 VIEW: CPT

## 2022-08-29 RX ORDER — LEVOTHYROXINE SODIUM 20 UG/ML
50 INJECTION, SOLUTION INTRAVENOUS DAILY
Status: DISCONTINUED | OUTPATIENT
Start: 2022-08-29 | End: 2022-08-29 | Stop reason: HOSPADM

## 2022-08-29 RX ORDER — CALCIUM GLUCONATE 94 MG/ML
1000 INJECTION, SOLUTION INTRAVENOUS ONCE
Status: DISCONTINUED | OUTPATIENT
Start: 2022-08-29 | End: 2022-08-29 | Stop reason: HOSPADM

## 2022-08-29 RX ORDER — CALCIUM CHLORIDE, MAGNESIUM CHLORIDE, DEXTROSE MONOHYDRATE, LACTIC ACID, SODIUM CHLORIDE, SODIUM BICARBONATE AND POTASSIUM CHLORIDE 3.68; 3.05; 22; 5.4; 6.46; 3.09; .314 G/L; G/L; G/L; G/L; G/L; G/L; G/L
INJECTION INTRAVENOUS CONTINUOUS
Status: DISCONTINUED | OUTPATIENT
Start: 2022-08-29 | End: 2022-08-29 | Stop reason: HOSPADM

## 2022-08-29 RX ORDER — HALOPERIDOL 5 MG/ML
2 INJECTION INTRAMUSCULAR EVERY 6 HOURS PRN
Status: DISCONTINUED | OUTPATIENT
Start: 2022-08-29 | End: 2022-08-29 | Stop reason: HOSPADM

## 2022-08-29 RX ORDER — DEXTROSE MONOHYDRATE 100 MG/ML
INJECTION, SOLUTION INTRAVENOUS CONTINUOUS PRN
Status: DISCONTINUED | OUTPATIENT
Start: 2022-08-29 | End: 2022-08-29 | Stop reason: HOSPADM

## 2022-08-29 RX ORDER — MORPHINE SULFATE 2 MG/ML
2 INJECTION, SOLUTION INTRAMUSCULAR; INTRAVENOUS
Status: DISCONTINUED | OUTPATIENT
Start: 2022-08-29 | End: 2022-08-29 | Stop reason: HOSPADM

## 2022-08-29 RX ORDER — 0.9 % SODIUM CHLORIDE 0.9 %
2000 INTRAVENOUS SOLUTION INTRAVENOUS
Status: DISCONTINUED | OUTPATIENT
Start: 2022-08-29 | End: 2022-08-29 | Stop reason: HOSPADM

## 2022-08-29 RX ORDER — 0.9 % SODIUM CHLORIDE 0.9 %
1000 INTRAVENOUS SOLUTION INTRAVENOUS ONCE
Status: COMPLETED | OUTPATIENT
Start: 2022-08-29 | End: 2022-08-29

## 2022-08-29 RX ORDER — MORPHINE SULFATE 4 MG/ML
4 INJECTION, SOLUTION INTRAMUSCULAR; INTRAVENOUS
Status: DISCONTINUED | OUTPATIENT
Start: 2022-08-29 | End: 2022-08-29 | Stop reason: HOSPADM

## 2022-08-29 RX ORDER — GLYCOPYRROLATE 0.2 MG/ML
0.2 INJECTION INTRAMUSCULAR; INTRAVENOUS EVERY 4 HOURS PRN
Status: DISCONTINUED | OUTPATIENT
Start: 2022-08-29 | End: 2022-08-29 | Stop reason: HOSPADM

## 2022-08-29 RX ORDER — HEPARIN SODIUM 1000 [USP'U]/ML
1000 INJECTION, SOLUTION INTRAVENOUS; SUBCUTANEOUS PRN
Status: DISCONTINUED | OUTPATIENT
Start: 2022-08-29 | End: 2022-08-29 | Stop reason: HOSPADM

## 2022-08-29 RX ORDER — CHLORHEXIDINE GLUCONATE 0.12 MG/ML
15 RINSE ORAL 2 TIMES DAILY
Status: DISCONTINUED | OUTPATIENT
Start: 2022-08-29 | End: 2022-08-29 | Stop reason: HOSPADM

## 2022-08-29 RX ORDER — DEXTROSE MONOHYDRATE 100 MG/ML
INJECTION, SOLUTION INTRAVENOUS CONTINUOUS PRN
Status: DISCONTINUED | OUTPATIENT
Start: 2022-08-29 | End: 2022-08-29

## 2022-08-29 RX ORDER — POLYETHYLENE GLYCOL 3350 17 G/17G
17 POWDER, FOR SOLUTION ORAL DAILY
Status: DISCONTINUED | OUTPATIENT
Start: 2022-08-29 | End: 2022-08-29 | Stop reason: HOSPADM

## 2022-08-29 RX ORDER — ALBUMIN (HUMAN) 12.5 G/50ML
50 SOLUTION INTRAVENOUS ONCE
Status: COMPLETED | OUTPATIENT
Start: 2022-08-29 | End: 2022-08-29

## 2022-08-29 RX ADMIN — DESMOPRESSIN ACETATE 28.2 MCG: 4 SOLUTION INTRAVENOUS at 12:20

## 2022-08-29 RX ADMIN — Medication 37 MCG/MIN: at 02:30

## 2022-08-29 RX ADMIN — 0.12% CHLORHEXIDINE GLUCONATE 15 ML: 1.2 RINSE ORAL at 09:33

## 2022-08-29 RX ADMIN — Medication 91 MCG/MIN: at 12:18

## 2022-08-29 RX ADMIN — SODIUM BICARBONATE 50 MEQ: 84 INJECTION INTRAVENOUS at 11:08

## 2022-08-29 RX ADMIN — PIPERACILLIN AND TAZOBACTAM 3375 MG: 3; .375 INJECTION, POWDER, FOR SOLUTION INTRAVENOUS at 01:18

## 2022-08-29 RX ADMIN — ALBUTEROL SULFATE 10 MG: 2.5 SOLUTION RESPIRATORY (INHALATION) at 09:54

## 2022-08-29 RX ADMIN — FENTANYL CITRATE 100 MCG/HR: 0.05 INJECTION, SOLUTION INTRAMUSCULAR; INTRAVENOUS at 01:13

## 2022-08-29 RX ADMIN — SODIUM BICARBONATE 50 MEQ: 84 INJECTION INTRAVENOUS at 10:07

## 2022-08-29 RX ADMIN — SODIUM CHLORIDE 40 MG: 9 INJECTION, SOLUTION INTRAMUSCULAR; INTRAVENOUS; SUBCUTANEOUS at 10:10

## 2022-08-29 RX ADMIN — VASOPRESSIN 0.03 UNITS/MIN: 20 INJECTION PARENTERAL at 04:26

## 2022-08-29 RX ADMIN — ALBUMIN (HUMAN) 25 G: 0.25 INJECTION, SOLUTION INTRAVENOUS at 11:01

## 2022-08-29 RX ADMIN — ALBUMIN (HUMAN) 50 G: 5 SOLUTION INTRAVENOUS at 11:00

## 2022-08-29 RX ADMIN — MORPHINE SULFATE 4 MG: 4 INJECTION, SOLUTION INTRAMUSCULAR; INTRAVENOUS at 15:18

## 2022-08-29 RX ADMIN — SUCRALFATE 1 G: 1 TABLET ORAL at 02:20

## 2022-08-29 RX ADMIN — SODIUM ZIRCONIUM CYCLOSILICATE 10 G: 10 POWDER, FOR SUSPENSION ORAL at 09:33

## 2022-08-29 RX ADMIN — VASOPRESSIN 0.04 UNITS/MIN: 20 INJECTION PARENTERAL at 11:47

## 2022-08-29 RX ADMIN — SODIUM CHLORIDE 1000 ML: 9 INJECTION, SOLUTION INTRAVENOUS at 11:00

## 2022-08-29 RX ADMIN — LEVETIRACETAM 1000 MG: 100 INJECTION, SOLUTION, CONCENTRATE INTRAVENOUS at 12:25

## 2022-08-29 RX ADMIN — SODIUM BICARBONATE 50 MEQ: 84 INJECTION INTRAVENOUS at 11:07

## 2022-08-29 RX ADMIN — PIPERACILLIN AND TAZOBACTAM 3375 MG: 3; .375 INJECTION, POWDER, FOR SOLUTION INTRAVENOUS at 12:57

## 2022-08-29 RX ADMIN — Medication 50 MEQ: at 11:07

## 2022-08-29 ASSESSMENT — PULMONARY FUNCTION TESTS
PIF_VALUE: 24
PIF_VALUE: 20
PIF_VALUE: 24
PIF_VALUE: 24
PIF_VALUE: 28
PIF_VALUE: 29
PIF_VALUE: 20
PIF_VALUE: 20
PIF_VALUE: 24
PIF_VALUE: 24
PIF_VALUE: 18
PIF_VALUE: 25
PIF_VALUE: 24
PIF_VALUE: 28
PIF_VALUE: 21
PIF_VALUE: 24
PIF_VALUE: 23
PIF_VALUE: 20
PIF_VALUE: 22
PIF_VALUE: 24
PIF_VALUE: 29
PIF_VALUE: 24
PIF_VALUE: 21
PIF_VALUE: 24
PIF_VALUE: 24
PIF_VALUE: 30
PIF_VALUE: 24
PIF_VALUE: 23
PIF_VALUE: 24
PIF_VALUE: 24
PIF_VALUE: 20
PIF_VALUE: 24
PIF_VALUE: 20
PIF_VALUE: 20
PIF_VALUE: 29
PIF_VALUE: 24
PIF_VALUE: 23
PIF_VALUE: 30
PIF_VALUE: 23
PIF_VALUE: 24
PIF_VALUE: 20
PIF_VALUE: 19
PIF_VALUE: 23
PIF_VALUE: 20
PIF_VALUE: 24
PIF_VALUE: 20
PIF_VALUE: 24
PIF_VALUE: 23
PIF_VALUE: 24

## 2022-08-29 ASSESSMENT — PAIN SCALES - GENERAL
PAINLEVEL_OUTOF10: 0

## 2022-08-29 NOTE — PROGRESS NOTES
Shift Summary        Patient is on vent with minimal sedation. Does not respond to verbal stimuli and minimal response to painful stimuli. CT times 2 maintained. Right groin CVC line and R radial art line in place. Levophed gtt titrated up to 55 mcg by end of my shift and Vasopressin it at .04 units. Propofol gtt decreased to 5 mcg. Fentanyl at 50 mcg. Patient is weeping from arms complete bath and bed change done. Portable Chest xray done this am. Patient remains on artic sun. Rewarming. Am labs drawn per art line see flow sheets for assessments and MAR for meds. Dr Algernon Call updated about increase in levophed and Vasopressin . Ask about getting Am blood gas.  No new orders at this time

## 2022-08-29 NOTE — PROGRESS NOTES
Pulmonary & Critical Care Medicine ICU Progress Note  Chief complaint : Cardiac arrest, septic shock    Subjunctive/24 hour events :   Patient seen and examined during multidisciplinary rounds with RN, charge nurse, RT, pharmacy, dietitian, and social service. Patient on vent, he is unresponsive, currently on Levophed at 55 mcg/min, vasopressin 0.04 units/min, he is on 40% FiO2, 8 of PEEP, saturation in low 90s. No fever, does not make urine, no bowel movement, no vomiting,   Chest tube on the left was pulled out yesterday after the CT scan and air suctioned out significant amount then no more air leak. Social History     Tobacco Use    Smoking status: Former     Packs/day: 0.50     Years: 15.00     Pack years: 7.50     Types: Cigarettes     Quit date: 2017     Years since quittin.8    Smokeless tobacco: Never   Substance Use Topics    Alcohol use: No     No family history on file. Recent Labs     22  0435 22  0943   PHART 7.423 7.448   DBV6ISB 36 35   PO2ART 86* 457*       MV Settings:  Vent Mode: AC/VC Resp Rate (Set): 22 bmp/Vt (Set, mL): 450 mL/ /FiO2 : 40 %           IV:   norepinephrine 55 mcg/min (22)    vasopressin (Septic Shock) infusion 0.04 Units/min (22)    propofol 5 mcg/kg/min (22)    dextrose      sodium chloride      fentaNYL 100 mcg/hr (22)    sodium chloride         Vitals:  BP (!) 90/58   Pulse (!) 115   Temp 98.4 °F (36.9 °C) (Rectal)   Resp (!) 0   Ht 5' 6\" (1.676 m)   Wt 155 lb 6.8 oz (70.5 kg)   SpO2 (!) 76%   BMI 25.09 kg/m²    Tmax:        Intake/Output Summary (Last 24 hours) at 2022 0756  Last data filed at 2022 3672  Gross per 24 hour   Intake 2077.16 ml   Output 4320 ml   Net -2242.84 ml       EXAM:  General: Unresponsive, on vent  Head: normocephalic, atraumatic  Eyes:No gross abnormalities.   ENT:  MMM no lesions, ET and OG tube in  Neck:  supple and no masses  Chest : Bilateral chest tubes, no 34*  --   --   --   --  39*  --    CREATININE 5.36*   < > 5.0*  --   --  5.39* 4.6*    < > = values in this interval not displayed. LIVER PROFILE:   Recent Labs     08/27/22 2015 08/28/22  0903   AST 36 67*   ALT 24 37   BILITOT <0.2 0.3   ALKPHOS 101 103     PT/INR:   Recent Labs     08/27/22 2015   PROTIME 16.7*   INR 1.3     APTT:   Recent Labs     08/27/22 2015 08/28/22  0230   APTT 75.0* 59.2*     UA:No results for input(s): NITRITE, COLORU, PHUR, LABCAST, WBCUA, RBCUA, MUCUS, TRICHOMONAS, YEAST, BACTERIA, CLARITYU, SPECGRAV, LEUKOCYTESUR, UROBILINOGEN, BILIRUBINUR, BLOODU, GLUCOSEU, AMORPHOUS in the last 72 hours. Invalid input(s): Kevin Ro    Cultures:  Negative so far  Films:  CXR reviewed by me and it showed bilateral chest tubes, small left-sided pneumothorax    Assessment:   This is a critically ill patient at risk of deterioration / death , needing close ICU monitoring and intervention due to below noted problems   Acute hypoxic and hypercapnic respiratory failure  Septic shock  Status postcardiac arrest  Comatose state/acute encephalopathy  Bilateral pleural effusion, concerning for infection, mainly on the right  Bilateral pneumothorax post chest tube placement, likely pneumothorax ex vacuo  End-stage renal disease on dialysis, left arm fistula is not working  History of DVT both upper and lower extremities, he status post IVC filter not on anticoagulation due to GI bleed  Intra-abdominal masslike lesion, possibly postop issue  Chronic immunosuppression post liver transplant  Shock liver  Mouth and nasal bleed overnight    Recommendation  Vent support lung protective strategy  head of the bed 30°  Stop sedation   Watch for ICU delirium: TV on, natural light, avoid benzos, pain control, early mobility, and family engagement  PUD prophylaxis  DVT prophylaxis  Levophed/vasopressin target mean arterial pressure 65-70  Monitor blood sugar target 140-180  Continue current antibiotics  Follow-up

## 2022-08-29 NOTE — PROGRESS NOTES
Renal Progress Note    Assessment and Plan:    76 yo man with ESRD on HD. Has liver transplant and subsequent kidney failure and has been on HD many years. Sig vascular disease. Was just admitted with resp failure and had multiple DVT's. S/p IVC filter. Complicated by GI bleed. Now readmitted with resp failure and hypotension. Chest tubes placed with purulent fluid. Very weak bruit in left sided AVG. Unable to cannulate. Cultures are neg so far. Waiting transfer to [facility]     Plan/  1- I called his sister Neha Lal and d/w her. She is agreeable to HD catheter and CRRT  2. Given issues with graft and hypotension, will need CRRT instead. Will need femoral HD catheter. Will d/w critical care  3. Ok with transfer to [facility]  4. Poor prognosis  5. Will need fistulogram of AVG once a little more stable         Patient Active Problem List:     Hypotension     ESRD (end stage renal disease) on dialysis ([address]     Liver transplanted ([facility] [address]     Liver transplant recipient ([address]     Sepsis ([address]     Gastroenteritis     C. difficile colitis     Severe sepsis ([address]     Vomiting and diarrhea     Generalized abdominal pain     Acute renal failure (HCC)     Acute respiratory failure with hypoxia and hypercapnia (HCC)     Pneumonia due to infectious organism     Bilateral pleural effusion     Impaired mobility and activities of daily living     Dysphagia, oropharyngeal phase     Epistaxis     Duodenal ulcer     Severe malnutrition (HCC)     Cardiac arrest ([address]     Shock ([address]     Chronic kidney disease      Subjective:   Admit Date: 8/27/2022    Interval History: pt with WBC 28k. Bruit in graft is worse today. On vaso and levo. On vent. Awaiting transfer to [facility]. No fevers.   On vanco / zosyn and tacro for liver transplant      Medications:   Scheduled Meds:   pantoprazole (PROTONIX) 40 mg injection  40 mg IntraVENous Daily    insulin lispro  0-4 Units SubCUTAneous Q4H    piperacillin-tazobactam  3,375 mg IntraVENous Q12H sodium chloride flush  5-40 mL IntraVENous 2 times per day    levETIRAcetam  1,000 mg IntraVENous Q12H    vancomycin (VANCOCIN) intermittent dosing (placeholder)   Other RX Placeholder    sucralfate  1 g Oral 4 times per day    tacrolimus  1 mg Oral BID    sodium chloride flush  5-40 mL IntraVENous 2 times per day    [Held by provider] heparin (porcine)  5,000 Units SubCUTAneous 3 times per day     Continuous Infusions:   norepinephrine 55 mcg/min (08/29/22 0658)    vasopressin (Septic Shock) infusion 0.04 Units/min (08/29/22 0658)    propofol 5 mcg/kg/min (08/29/22 0658)    dextrose      sodium chloride      fentaNYL 100 mcg/hr (08/29/22 0658)    sodium chloride         CBC:   Recent Labs     08/28/22 0903 08/28/22  0943 08/29/22  0544   WBC 26.4*  --  28.2*   HGB 9.2* 10.8* 10.0*     --  335     CMP:    Recent Labs     08/27/22 2015 08/27/22 2028 08/28/22  0435 08/28/22  0615 08/28/22  0814 08/28/22  0903 08/28/22  0943   *  --   --   --   --  129*  --    K 4.0   < >  --  3.8 4.0 4.1  --    CL 91*  --   --   --   --  89*  --    CO2 20  --   --   --   --  23  --    BUN 34*  --   --   --   --  39*  --    CREATININE 5.36*   < > 5.0*  --   --  5.39* 4.6*   GLUCOSE 172*  --   --   --   --  167*  --    CALCIUM 8.4*  --   --   --   --  9.8  --    LABGLOM 10.7*   < > 12*  --   --  10.6* 13*    < > = values in this interval not displayed. Troponin:   Recent Labs     08/28/22 0903   TROPONINI 0.297*     BNP: No results for input(s): BNP in the last 72 hours. INR:   Recent Labs     08/27/22 2015   INR 1.3     Lipids: No results for input(s): CHOL, LDLDIRECT, TRIG, HDL, AMYLASE, LIPASE in the last 72 hours. Liver:   Recent Labs     08/28/22  0903   AST 67*   ALT 37   ALKPHOS 103   PROT 6.4   LABALBU 2.6*   BILITOT 0.3     Iron:  No results for input(s): IRONS, FERRITIN in the last 72 hours. Invalid input(s): LABIRONS  Urinalysis: No results for input(s): UA in the last 72 hours.     Objective: Vitals: BP (!) 90/58   Pulse (!) 115   Temp 98.4 °F (36.9 °C) (Rectal)   Resp (!) 0   Ht 5' 6\" (1.676 m)   Wt 155 lb 6.8 oz (70.5 kg)   SpO2 (!) 76%   BMI 25.09 kg/m²    Wt Readings from Last 3 Encounters:   08/28/22 155 lb 6.8 oz (70.5 kg)   08/17/22 138 lb 3.7 oz (62.7 kg)   06/28/22 145 lb (65.8 kg)      24HR INTAKE/OUTPUT:    Intake/Output Summary (Last 24 hours) at 8/29/2022 0807  Last data filed at 8/29/2022 6819  Gross per 24 hour   Intake 2077.16 ml   Output 4320 ml   Net -2242.84 ml       Constitutional:  Intubated, sedated on ventilator  Skin:normal, no rash  HEENT:sclera anicteric.   Head atraumatic normocephalic  Neck:supple with no thyromegally  Cardiovascular:  S1, S2 without m/r/g   Respiratory:  CTA B without w/r/r   Abdomen: +bs, soft, nt  Ext: 1-2+ LE edema  Musculoskeletal:Intact  Neuro: Sedated on vent      Electronically signed by John Hoffmann MD on 8/29/2022 at 8:07 AM

## 2022-08-29 NOTE — PROGRESS NOTES
1030 crrt started, pt bp dropping pt noterating, dr. Candice Guzman notified. New orders given. 1200 assessment completed as noted. 1225 notified Dr. Candice Guzman not able to get a good pulse ox. Order given for prn abgs, pt oxygen good on abg.   1415 Dr. Enrique Huerta at bedside to talk with family ablut code status being changed. [phone] 20 Pt code status changed to [address] Family remains at bedside. Family wanting to move forward with withdrawal of care. [address] called, told to call back with cardiac time of death. Electronically signed by Gbay Gloria RN on 8/29/2022 at 2:58 PM  1510 wasted 10cc f fentanyl with Meg Rodrigues. Electronically signed by Gaby Gloria RN on 8/29/2022 at 3:11 PM  [phone] All meds stopped. Morphine given. 1519 pt extubated. 1520 family at bedside with chaplain Diana Kimball. Electronically signed by Gaby Gloria RN on 8/29/2022 at 3:24 PM  [phone] Notified [facility] of cardiac time of death. Pt body can be released.  Electronically signed by Gaby Gloria RN on 8/29/2022 at 3:52 PM

## 2022-08-29 NOTE — PROGRESS NOTES
8/29/22    From:[facility]. PREVIOUSLY HOME ALONE    Admit:FOUND UNRESPONSIVE, PEA/ARREST    PMH:ESRD W/HD, LIVER TRANSPLANT, HTN, HEP    Anticipated Discharge Disposition:TBD     Patient Mobility or PT/OT ordered:  Consults: TRENTON PULADRIANNA, CARDIO    Clinical:      39/5.39  TROP 0.297   WBC 26.4--28.2  8/28 CT CH- R&L CHEST TUBES. MOD LT PL EFF   Barriers to Discharge:VENT. FENT/PROP//VASOPRESS/LEVO. IV KEPPRA. VANC/ZOSYN    Assessments: PREVIOUS CMI COPIED.  PT FROM [facility]

## 2022-08-29 NOTE — PROGRESS NOTES
Wound Ostomy Continence Nursing     This [id number] [facility] Nurse received referral for evaluation of UE skin tears with significant weeping. Goals of care currently being discussed with family, and decided for comfort measures only. Per Sondra AVALOS, no need for Wound Ostomy consultation at this time.     Electronically signed by HANG Olivas, RN, [facility] on 8/29/2022 at 2:18 PM

## 2022-08-29 NOTE — PROGRESS NOTES
[id number]: Shift handoff report taken from [facility], [facility]. Pt in bed, eyes closed, sedated on vent. [id number]: Shift assessment performed - see flowsheet. 65: Spoke with [facility] transfer center - updated on pt condition. 0815: Lab called - blood hemolyzed - new labs drawn. 0818: Mundo shared ABG results with this RN and Gris Zavala - gemini 7.5.   0830: Dr. Lorena Alva at bedside to place femoral dialysis cath. VBG drawn per Dr. Lorena Alva to recheck potassium - k: 5.7.  0878: ICU rounds completed with Dr. Lorena Alva and icu team along with Daughter. 0915Osito Demarco at bedside to set up CRRT. EEG tech at bedside - unable to start at this time due to CRRT being set up. Spoke with Dr. Lorena Alva about giving calcium gluconate, insulin IV, D10 for K since blood work came back K - 5.7 - informed to hold for now will administer if we cannot get CRRT started. Dr. Lorena Alva okay with [facility] 1000: Don Uribe spoke with Dr. Gris Zavala - since pt k - 5.7 will change k bath for CRRT - see new orders. Spoke with pharmacy about bringing up new solution for CRRT. [id number]: Handoff report given to BAILEY Amaro  1100: Dr. Lorena Alva at bedside - see new orders  1107: Fluid removal on CRRT changed to 0 - pt not tolerating CRRT. Bicarb given by Guerda Alva verbal order  9754: Bicarb given by Guerda Duque per Dr. Lorena Alva verbal order  2000: Bicarb given by Guerda Alva verbal orer  CRRT [place] return extremely positive pressure BAILEY Jenkins at bedside. Dr. Lorena Alva wants blood to be returned - blood cannot be returned due to clot in venous line. Diaylsis called to come to bedside. Family at bedside - daughter Reji Hollingsworth and her son at bedside - Father at bedside with family.

## 2022-08-29 NOTE — PROGRESS NOTES
Spiritual Care Services     Summary of Visit:  Compassionate wean for patient. Pt's dtr, grandson, sister, and the dtr's maternal grandmother all here at time of death. Grief support provided. Stories of pt's love of fishing, and his relaxed and easy going manner shared. Pt one of ten siblings, the fifth to die, his sister struggling . Spiritual Assessment/Intervention/Outcomes:    Encounter Summary  Encounter Overview/Reason : Grief, Loss, and Adjustments  Service Provided For[de-identified] Patient and family together  Referral/Consult From[de-identified] Other , Nurse  Support System: Children, Family members  Last Encounter : 08/29/22  Complexity of Encounter: High  Begin Time: 1430  End Time : 1545  Total Time Calculated: 75 min  Encounter   Type: Follow up  Crisis  Type:  (Full Arrest)  Spiritual/Emotional needs  Type: Spiritual Support  Rituals, Rites and Sacraments  Type: Anointing (FR Navarro)  Grief, Loss, and Adjustments  Type: Grief and loss              Values / Beliefs  Do You Have Any Ethnic, Cultural, Sacramental, or Spiritual Religion Needs You Would Like Us To Be Aware of While You Are in the Hospital : No    Care Plan:    Grief support. Spiritual Care Services   Electronically signed by Max Khan on 8/29/22 at 3:43 PM EDT     To reach a  for emotional and spiritual support, place an [facility] consult request.   If a  is needed immediately, dial 0 and ask to page the on-call .

## 2022-08-29 NOTE — CONSULTS
intubated and on mechanical ventilation. He is not on any sedation at this time and is unresponsive. He is on vasopressor support with Levophed at 55 mcg/min and vasopressin at 0.04 units/min. Currently on telemetry he sinus tach with heart rate low 100s.       No Known Allergies    Current Facility-Administered Medications   Medication Dose Route Frequency Provider Last Rate Last Admin    norepinephrine (LEVOPHED) 16 mg in dextrose 5% 250 mL infusion  1-100 mcg/min IntraVENous Continuous Patt Edwards MD 51.6 mL/hr at 08/29/22 0658 55 mcg/min at 08/29/22 0658    insulin regular (HUMULIN R;NOVOLIN R) injection 10 Units  10 Units IntraVENous Once Kailee Hurley MD        And    dextrose bolus 10% 250 mL  250 mL IntraVENous Once Kailee Hurley MD        0.9 % sodium chloride bolus  1,000 mL IntraVENous Once Kailee Hurley MD        0.9 % sodium chloride bolus  2,000 mL IntraVENous Once PRN Edel Palomares MD        calcium gluconate 10 % injection 1,000 mg  1,000 mg IntraVENous Once Edel Palomares MD        glucose chewable tablet 16 g  4 tablet Oral PRN Edel Palomares MD        dextrose bolus 10% 125 mL  125 mL IntraVENous PRN Edel Palomares MD        Or    dextrose bolus 10% 250 mL  250 mL IntraVENous PRN Edel Palomares MD        glucagon (rDNA) injection 1 mg  1 mg SubCUTAneous PRN Edel Palomares MD        dextrose 10 % infusion   IntraVENous Continuous PRN Edel Palomares MD        heparin (porcine) injection 1,000 Units  1,000 Units IntraCATHeter PRN Kailee Hurley MD        chlorhexidine (PERIDEX) 0.12 % solution 15 mL  15 mL Mouth/Throat BID Kailee Hurley MD   15 mL at 08/29/22 0933    docusate (COLACE) 50 MG/5ML liquid 100 mg  100 mg Oral BID Kailee Hurley MD        polyethylene glycol (GLYCOLAX) packet 17 g  17 g Oral Daily Kailee Hurley MD        desmopressin (DDAVP) 28.2 mcg in sodium chloride 0.9 % 50 mL IVPB  0.4 mcg/kg IntraVENous Once Kailee Hurley MD        [facility] 4/2.5 dialysis solution   Dialysis Continuous Ivana Maurer MD        prismaSol BGK 4/2.5 dialysis solution   Dialysis Continuous Ivana Maurer MD        prismaSol BGK 4/2.5 dialysis solution   Dialysis Continuous Ivana Maurer MD        piperacillin-tazobactam (ZOSYN) 3,375 mg in dextrose 5 % 50 mL IVPB (mini-bag)  3,375 mg IntraVENous Q8H Venecia MD Leora        pantoprazole (PROTONIX) 40 mg in sodium chloride (PF) 10 mL injection  40 mg IntraVENous Daily Nicoletto Bolzan-Roche, APRN - CNP   40 mg at 08/29/22 1010    vasopressin 20 Units in dextrose 5 % 100 mL infusion  0.01-0.03 Units/min IntraVENous Continuous Dixie Alfaro MD 12 mL/hr at 08/29/22 0658 0.04 Units/min at 08/29/22 0658    insulin lispro (HUMALOG) injection vial 0-4 Units  0-4 Units SubCUTAneous Q4H Venecia MD Leora        sodium chloride flush 0.9 % injection 5-40 mL  5-40 mL IntraVENous PRN Venecia MD Leora        sodium chloride flush 0.9 % injection 5-40 mL  5-40 mL IntraVENous 2 times per day Venceia MD Leora   10 mL at 08/28/22 2212    sodium chloride flush 0.9 % injection 5-40 mL  5-40 mL IntraVENous PRN Venecia MD Leora        0.9 % sodium chloride infusion   IntraVENous PRN Venecia MD Leora        levETIRAcetam (KEPPRA) 1,000 mg in sodium chloride 0.9 % 100 mL IVPB  1,000 mg IntraVENous Q12H Stella Walker MD   Stopped at 08/29/22 0008    albumin human 25 % IV solution 25 g  25 g IntraVENous Daily PRN Ivana Maurer MD        vancomycin (VANCOCIN) intermittent dosing (placeholder)   Other RX Placeholder Veneciasuzi Corey MD        sucralfate (CARAFATE) tablet 1 g  1 g Oral 4 times per day [address], DO   1 g at 08/29/22 0220    tacrolimus (PROGRAF) capsule 1 mg  1 mg Oral BID [address], DO   1 mg at 08/28/22 2202    sodium chloride flush 0.9 % injection 5-40 mL  5-40 mL IntraVENous 2 times per day Nicoletto Bolzan-Roche, APRN - CNP   10 mL at 08/28/22 2709    sodium chloride flush 0.9 % injection 5-40 mL  5-40 mL IntraVENous PRN Nicoletto Bolzan-Roche, APRN - CNP        0.9 % sodium chloride infusion   IntraVENous PRN Nicoletto Bolzan-Roche, APRN - CNP        [Held by provider] heparin (porcine) injection 5,000 Units  5,000 Units SubCUTAneous 3 times per day Nicoletto Bolzan-Roche, APRN - CNP   5,000 Units at 22 1237    ondansetron (ZOFRAN-ODT) disintegrating tablet 4 mg  4 mg Oral Q8H PRN Nicoletto Bolzan-Roche, APRN - CNP        Or    ondansetron (ZOFRAN) injection 4 mg  4 mg IntraVENous Q6H PRN Nicoletto Bolzan-Roche, APRN - CNP        polyethylene glycol (GLYCOLAX) packet 17 g  17 g Oral Daily PRN Nicoletto Bolzan-Roche, APRN - CNP        acetaminophen (TYLENOL) tablet 650 mg  650 mg Oral Q6H PRN Nicoletto Bolzan-Roche, APRN - CNP        Or    acetaminophen (TYLENOL) suppository 650 mg  650 mg Rectal Q6H PRN Nicoletto Bolzan-Roche, APRN - CNP           PMHx:  Past Medical History:   Diagnosis Date    ESRD (end stage renal disease) ([facility]     Hemodialysis patient ([facility] [address]     Hepatitis     Hypertension     Liver transplanted ([facility] [address] 2016       PSHx:  Past Surgical History:   Procedure Laterality Date    DIALYSIS FISTULA CREATION Right     IR MIDLINE CATH  2022    IR MIDLINE CATH 2022 MLOZ SPECIAL PROCEDURE    LIVER TRANSPLANT  2017    TUNNELED VENOUS PORT PLACEMENT      UPPER GASTROINTESTINAL ENDOSCOPY N/A 2022    EGD ESOPHAGOGASTRODUODENOSCOPY WITH INTERVENTION performed by Seb Noe MD at [address] [place] [address] Hx:  Social History     Socioeconomic History    Marital status: Single   Tobacco Use    Smoking status: Former     Packs/day: 0.50     Years: 15.00     Pack years: 7.50     Types: Cigarettes     Quit date: 2017     Years since quittin.8    Smokeless tobacco: Never   Substance and Sexual Activity    Alcohol use: No    Drug use: No   Social History Narrative    Lives With: Alone    Type of Home: Apart[address] in [address]: One level (4th floor)    Home Access: Elevator, Level entry    Bathroom Shower/Tub: Tub/Shower unit    Bathroom Equipment: Grab bars in shower    Home Equipment: [address]    Has the patient had two or more falls in the past year or any fall with injury in the past year?:  (One fall about 2 months ago bending over to pick something up)    ADL Assistance: Independent    Homemaking Assistance: Independent    Ambulation Assistance: Independent    Transfer Assistance: Independent    Active : Yes    Occupation: Retired    Additional Comments: Sister lives nearby     on hemodialysis Monday Wednesday Friday           Family Hx:  No family history on file. Review of Systems:   Review of Systems   Unable to perform ROS: Intubated       Physical Examination:    BP (!) 90/58   Pulse (!) 115   Temp 98.4 °F (36.9 °C) (Rectal)   Resp (!) 0   Ht 5' 6\" (1.676 m)   Wt 155 lb 6.8 oz (70.5 kg)   SpO2 (!) 76%   BMI 25.09 kg/m²    Physical Exam  Constitutional:       Appearance: He is ill-appearing. Comments: Unresponsive   HENT:      Head: Normocephalic and atraumatic. Cardiovascular:      Rate and Rhythm: Regular rhythm. Tachycardia present. Pulmonary:      Effort: No respiratory distress. Breath sounds: No wheezing, rhonchi or rales. Comments: Intubated on vent  Abdominal:      Palpations: Abdomen is soft. Musculoskeletal:      Right lower leg: No edema. Left lower leg: No edema. Comments: Bilateral UE edema R>L; SCDs on bilateral LE   Skin:     General: Skin is warm and dry. Coloration: Skin is pale.    Neurological:      Comments: Unresponsive       LABS:  CBC:  Lab Results   Component Value Date/Time    WBC 28.2 08/29/2022 05:44 AM    RBC 3.23 08/29/2022 05:44 AM    RBC 3.34 02/20/2012 05:15 AM    HGB 12.0 08/29/2022 08:42 AM    HCT 32.7 08/29/2022 05:44 AM    .1 08/29/2022 05:44 AM    MCH 30.9 08/29/2022 05:44 AM    MCHC 30.6 08/29/2022 05:44 AM    RDW 16.5 08/29/2022 05:44 AM     08/29/2022 05:44 AM    MPV 9.5 10/09/2015 02:00 PM     CBC with Differential:   Lab Results   Component Value Date/Time    WBC 28.2 08/29/2022 05:44 AM    RBC 3.23 08/29/2022 05:44 AM    RBC 3.34 02/20/2012 05:15 AM    HGB 12.0 08/29/2022 08:42 AM    HCT 32.7 08/29/2022 05:44 AM     08/29/2022 05:44 AM    .1 08/29/2022 05:44 AM    MCH 30.9 08/29/2022 05:44 AM    MCHC 30.6 08/29/2022 05:44 AM    RDW 16.5 08/29/2022 05:44 AM    NRBC 3 08/29/2022 05:44 AM    BANDSPCT 2 08/29/2022 05:44 AM    METASPCT 1 08/28/2022 09:03 AM    LYMPHOPCT 4.0 08/29/2022 05:44 AM    PROMYELOPCT 1 08/27/2022 08:15 PM    MONOPCT 1.0 08/29/2022 05:44 AM    MYELOPCT 2 08/28/2022 09:03 AM    EOSPCT 3.1 02/20/2012 05:15 AM    BASOPCT 0.1 08/29/2022 05:44 AM    MONOSABS 0.3 08/29/2022 05:44 AM    LYMPHSABS 1.1 08/29/2022 05:44 AM    EOSABS 0.0 08/29/2022 05:44 AM    BASOSABS 0.0 08/29/2022 05:44 AM     CMP:    Lab Results   Component Value Date/Time     08/29/2022 08:18 AM    K 5.7 08/29/2022 08:18 AM    K 3.5 08/13/2022 05:23 AM    CL 86 08/29/2022 08:18 AM    CO2 13 08/29/2022 08:18 AM    BUN 46 08/29/2022 08:18 AM    CREATININE 5.9 08/29/2022 08:42 AM    CREATININE 5.95 08/29/2022 08:18 AM    GFRAA 12 08/29/2022 08:42 AM    LABGLOM 10 08/29/2022 08:42 AM    GLUCOSE 91 08/29/2022 08:18 AM    GLUCOSE 136 02/25/2012 05:10 PM    PROT 5.9 08/29/2022 08:18 AM    LABALBU 2.2 08/29/2022 08:18 AM    LABALBU 2.8 02/23/2012 06:40 AM    CALCIUM 8.9 08/29/2022 08:18 AM    BILITOT 0.6 08/29/2022 08:18 AM    ALKPHOS 120 08/29/2022 08:18 AM    AST 1,210 08/29/2022 08:18 AM     08/29/2022 08:18 AM     BMP:    Lab Results   Component Value Date/Time     08/29/2022 08:18 AM    K 5.7 08/29/2022 08:18 AM    K 3.5 08/13/2022 05:23 AM    CL 86 08/29/2022 08:18 AM    CO2 13 08/29/2022 08:18 AM    BUN 46 08/29/2022 08:18 AM    LABALBU 2.2 08/29/2022 08:18 AM    LABALBU 2.8 02/23/2012 06:40 AM    CREATININE 5.9 08/29/2022 08:42 AM    CREATININE 5.95 08/29/2022 08:18 AM    CALCIUM 8.9 08/29/2022 08:18 AM    GFRAA 12 08/29/2022 08:42 AM    LABGLOM 10 08/29/2022 08:42 AM    GLUCOSE 91 08/29/2022 08:18 AM    GLUCOSE 136 02/25/2012 05:10 PM     Magnesium:    Lab Results   Component Value Date/Time    MG 2.7 08/29/2022 08:18 AM    MG 2.0 02/19/2012 12:42 PM     Troponin:    Lab Results   Component Value Date/Time    TROPONINI 0.297 08/28/2022 09:03 AM       Radiology:  CT ABDOMEN PELVIS WO CONTRAST Additional Contrast? None    Result Date: 8/27/2022  Patient: Belle Alicea  Time Out: 22:35 Exam(s): CT ABDOMEN + PELVIS Without Contrast  EXAM:   CT Abdomen and Pelvis Without Intravenous Contrast  CLINICAL HISTORY:    Reason for exam: fall. Chief complaint altered mental status. S/p cardiac arrest, fell  TECHNIQUE:   Axial computed tomography images of the abdomen and pelvis without intravenous contrast.  All CT scan at this facility use dose modulation, iterative reconstruction, and/or weight based dosing when appropriate to reduce radiation dose to as low as reasonably achievable. COMPARISON:   No relevant prior studies available. FINDINGS:   Lung bases: See the chest CT. Pleural space:  2 discrete abdominal and pelvic masses abutting the anterior pleural surface. The left lower mass measures up to 8 cm in size. The right mass measures 7 cm. ABDOMEN:   Liver:  Unremarkable. Gallbladder and bile ducts: The gallbladder has been removed. No ductal dilation. Pancreas:  Portions of the pancreas are obscured by vascular coil material.  No ductal dilation. Spleen:  Unremarkable. No splenomegaly. Adrenals:  Unremarkable. No mass. Kidneys and ureters:  Atrophic kidneys. No obstructing stones. No hydronephrosis. Stomach and bowel:  Unremarkable. No obstruction. No mucosal thickening. PELVIS:   Appendix:  No findings to suggest acute appendicitis. Bladder:  Bladder decompressed by [place]. No stones. Reproductive:  Unremarkable as visualized. ABDOMEN and PELVIS:   Intraperitoneal space:  Unremarkable. No free air. No significant fluid collection. Bones/joints:  Degenerative changes of the spine. Right femoral fixation pins. No acute fracture. No dislocation. Soft tissues:  Unremarkable. Vasculature: Atherosclerosis. Infrarenal IVC filter. Lymph nodes:  Unremarkable. No enlarged lymph nodes. Electronically signed by Deon Wagner M.D. on 08-27-22 at [phone]      Masses as described which may represent metastatic lesions. Recommend follow-up. XR WRIST LEFT (MIN 3 VIEWS)    Result Date: 8/29/2022  COMPARISON: August 28, 2022 HISTORY: lung evaluation TECHNIQUE: AP view FINDINGS: A left chest tube is again seen in place. A right chest tube is also seen in place. Both are unchanged in position. The right lung appears to be expanded. A small pneumothorax is seen on the left that measures 15 mm in greatest transverse dimension. No midline shift is seen. An endotracheal tube and enteric tube are again seen in place. There is no evidence for pleural effusion or consolidation. A small pneumothorax is again seen on the left. CT HEAD WO CONTRAST    Result Date: 8/28/2022  CT Brain. Contrast medium:  without contrast.. History:  Fall. Altered mental status. Cardiac arrest.. Technical factors: CT imaging of the brain was obtained and formatted as 5 mm contiguous axial images. 2.5 mm contiguous axial images were obtained through the osseous structures. Sagittal and coronal reconstruction obtained during postprocessing. Comparison:  None. Findings: Extra-axial spaces:  Normal. Intracranial hemorrhage:  None. Ventricular system: Ventricles mildly enlarged. Sulci mildly prominent. Basal Cisterns:  Normal. Cerebral Parenchyma: Bilateral symmetric periventricular areas decreased attenuation. Midline Shift:  None.  Cerebellum:  Normal. Paranasal sinuses, calvarium, and mastoid air cells: Opacification bilateral mastoid air cells. Mucosal thickening bilateral maxillary sinuses. Partial opacification ethmoid sinuses. Extracalvarial soft tissue thickening measuring approximately 7.6 x 1.7  cm right frontal, parietal, and temporal region. Visualized Orbits:  Normal.     Impression: Bilateral mastoiditis. Bilateral maxillary and ethmoid sinusitis. Right frontal, parietal, and temporal scalp soft tissue swelling. Mild cerebral atrophy. Chronic ischemic white matter disease. All CT scans at this facility use dose modulation, iterative reconstruction, and/or weight based dosing when appropriate to reduce radiation dose to as low as reasonably achievable. CT CHEST WO CONTRAST    Result Date: 8/27/2022  Patient: Wiliam Bamberger  Time Out: 22:35 Exam(s): CT CHEST Without Contrast  EXAM:   CT Chest Without Intravenous Contrast  CLINICAL HISTORY:    Reason for exam: fall. Chief complaint altered mental status. S/p cardiac arrest, fell  TECHNIQUE:   Axial computed tomography images of the chest without intravenous contrast.  All CT scan at this facility use dose modulation, iterative reconstruction, and/or weight based dosing when appropriate to reduce radiation dose to as low as reasonably achievable. COMPARISON:   No relevant prior studies available. FINDINGS:   Lungs:  Bilateral compressive atelectatic changes. Pleural space: There are large bilateral mildly complex pleural effusions. No pneumothorax. Heart:  Unremarkable. No cardiomegaly. No significant pericardial effusion. No significant coronary artery calcifications. Bones/joints:  Right-sided third anterolateral acute rib fracture. Right-sided posterior ninth rib fracture with an adjacent rounded lucency and sclerotic appearance of the bone. Left-sided fourth, fifth, sixth anterolateral rib buckle fractures. Left-sided fifth lateral acute rib fracture. No dislocation. Soft tissues:  Diffuse soft tissue edema.   Small bubbles of air in the left anterior chest wall. Vasculature:  Unremarkable. No thoracic aortic aneurysm. Lymph nodes:  Unremarkable. No enlarged lymph nodes. Tubes, lines and devices:  Endotracheal tube terminates at the origin of the right mainstem bronchus. Electronically signed by Saúl Miner M.D. on 08-27-22 at [phone]    1. Bilateral slightly complex appearing pleural effusions. This could be secondary to underlying blood products or empyema in the appropriate clinical setting. Bilateral acute rib fractures. There is a right posterior rib fracture with pathologic features. Recommend clinical correlation and follow-up. Air in the chest wall soft tissues which could be secondary to a laceration. 2.  Endotracheal tube in the origin of the right mainstem bronchus. CTA CHEST W WO CONTRAST    Result Date: 8/28/2022  CT PULMONARY ANGIOGRAM WITH INTRAVENOUS CONTRAST MEDIUM. REASON FOR EXAMINATION:  CARDIAC ARREST. PNEUMOTHORAX. TECHNIQUE: Helical CTA was performed through the chest utilizing Isovue-300, 150 mL. Patient experienced infiltrate, 75 mL intravenous contrast.  Images were obtained with bolus tracking in order to opacify the pulmonary arteries. Thick section coronal MIP 3D reconstructions were performed on a separate workstation. COMPARISON:none FINDINGS:  Pulmonary arteries: No intraluminal filling defects. Thoracic aorta: Normal in course and caliber. Cardiac Size: Normal. Pericardial effusion: None. Vascular: Limited imaging suggested lesion proximal right basilic/axillary vein with extensive collateral circulation, right chest wall, with contrast medium filling superior vena cava collateral circulation from right anterior chest wall (series 2, images 33 through 42). Right lung: Areas of pneumothoraces identified at right lung apex, and base right chest (series 2, images 24 and 94, series 602, image 42).  Right thoracostomy tube identified with tip posterior right upper chest (series 2, image 62, series 602, image 21). Diffuse emphysematous change. Small right effusion. Dependent subsegmental atelectatic change right upper and right lower lobe. Left lung: Large left pneumothorax with lung retracted approximately 4.5 cm from apex and 5.7 cm lung base. Left thoracostomy tube identified posterior chest with catheter apex left chest (series 2, image 21, series 602, image 48). Moderate left pleural effusion. Dependent subsegmental consolidation, left lower lobe. Lymph nodes: No hilar, mediastinal, or axillary lymph node enlargement. Upper abdomen:Limited imaging upper abdomen shows multiple surgical metallic artifacts at gastroesophageal junction, extending caudally in the left upper quadrant. Surgical clips also identified right upper quadrant. Bilateral renal hypoplasia. Musculoskeletal:No osteoblastic, and no osteolytic lesions. No CT evidence pulmonary embolism. Right thoracostomy tube as discussed with persistent right pneumothorax. Left thoracostomy tube as discussed with large persistent left pneumothorax. Small right, moderate left pleural effusions. Findings suspicious for occlusion right basilic/right axillary vein with contrast medium filling superior vena cava via extensive collateral circulation right anterior chest wall. Bilateral renal hypoplasia. Postsurgical abdominal changes discussed. All CT scans at this facility use dose modulation, iterative reconstruction, and/or weight based dosing when appropriate to reduce radiation dose to as low as reasonably achievable. CT CERVICAL SPINE WO CONTRAST    Result Date: 8/27/2022  Patient: Margy Larios  Time Out: 22:13 Exam(s): CT C SPINE  EXAM:   CT Cervical Spine Without Intravenous Contrast  CLINICAL HISTORY:    Reason for exam: AMS. Chief complaint altered mental status.  S/p cardiac arrest, fell  TECHNIQUE:   Axial computed tomography images of the cervical spine without intravenous contrast.  All CT scan at this facility use dose modulation, iterative reconstruction, and/or weight based dosing when appropriate to reduce radiation dose to as low as reasonably achievable. COMPARISON:   No relevant prior studies available. FINDINGS:   Vertebrae: Moderate to severe degenerative changes of the spine. Endplate osteophytes and facet arthropathy. No acute fracture. Discs/spinal canal/neural foramina: At the C6-C7 level moderate to severe bilateral neural foraminal stenosis at And moderate to severe canal stenosis due to a degenerative disc bulge and bony hypertrophy. Multilevel disc space narrowing. At the C4-C5 level moderate bilateral foraminal stenosis and moderate canal stenosis. At the C5-C6 level severe bilateral foraminal stenosis and moderate to severe canal stenosis. Soft tissues:  Soft tissue edema. Mastoid air cells:  Bilateral mastoid effusions. Auditory system: There is fluid in the right middle ear cavity. Pleural space:  Bilateral partially seen large pleural effusions. Tubes, lines and devices:  Endotracheal tube is partially seen. Electronically signed by Rodrigue Burnett M.D. on 08-27-22 at 2213    1. At the C6-C7 level moderate to severe bilateral neural foraminal stenosis at And moderate to severe canal stenosis due to a degenerative disc bulge and bony hypertrophy. 2.  No acute findings. DJD. Bilateral mastoid effusions. Right otitis media. XR CHEST PORTABLE    Result Date: 8/29/2022  COMPARISON: August 28, 2022 HISTORY: lung evaluation TECHNIQUE: AP view FINDINGS: A left chest tube is again seen in place. A right chest tube is also seen in place. Both are unchanged in position. The right lung appears to be expanded. A small pneumothorax is seen on the left that measures 15 mm in greatest transverse dimension. No midline shift is seen. An endotracheal tube and enteric tube are again seen in place. There is no evidence for pleural effusion or consolidation.      A small pneumothorax is again seen on the left.    XR CHEST PORTABLE    Result Date: 8/28/2022  EXAMINATION: XR CHEST PORTABLE CLINICAL HISTORY: CHEST TUBE PLACEMENT COMPARISONS: AUGUST 27, 2022, 2015 HOURS FINDINGS: Patient leaning to right. Tip of endotracheal tube 4.8 cm superior to lucita. Nasogastric tube courses beneath diaphragm. Interval placement right thoracostomy tube with tip found medially right mid chest. Previously visualized right pleural effusion, coursing along right chest wall to right apex no longer identified. No pneumothorax visualized. Ill-defined area of increased opacity now identified in left lower lung. Cardiopericardial silhouette normal.     INTERVAL PLACEMENT RIGHT CHEST TUBE, WITH EVACUATION RIGHT PLEURAL EFFUSION. LEFT LOWER LUNG ATELECTASIS/PNEUMONIA. INTERVAL RETRACTION ENDOTRACHEAL TUBE. XR CHEST PORTABLE    Result Date: 8/28/2022  EXAMINATION: XR CHEST PORTABLE CLINICAL HISTORY: LEFT CHEST 2 COMPARISONS: CHEST RADIOGRAPH, AUGUST 28, 2022 0851 HOURS FINDINGS: Costophrenic angles not imaged. Tip of endotracheal tube 3.7 cm superior to lucita. Nasogastric tube courses beneath diaphragm. Interval placement left thoracostomy tube with tip found medially left upper chest. Previous ill-defined area of increased opacity left lower lung no longer identified. Incomplete reexpansion left lung with lung margin retracted 3.6 cm. Left chest wall. Right thoracostomy tube identified with tip abutting the mediastinum in mid chest. Incomplete reexpansion right lung, with retraction approximately 2 cm from right lower chest wall. INTERVAL PLACEMENT LEFT THORACOSTOMY TUBE WITH RESOLUTION OF LEFT EFFUSION. INCOMPLETE REEXPANSION LEFT LOWER LUNG. PRE-EXISTING RIGHT THORACOSTOMY TUBE WITH INCOMPLETE REEXPANSION RIGHT LOWER LUNG. THE ABOVE FINDINGS WERE DISCUSSED WITH PATIENT'S NURSE ONDINA.  TELEPHONE IN THE ICU AT 11:12 AM, SUNDAY, AUGUST 28, 2022     XR CHEST PORTABLE    Result Date: 8/28/2022  EXAMINATION: XR CHEST PORTABLE CLINICAL HISTORY: ANTEGRADE COMPARISONS: AUGUST 7, 2022 FINDINGS: Endotracheal tube identified with tip at lucita oriented toward right mainstem bronchus. Defibrillator pads identified overlying right lateral chest and left lower chest. Costophrenic angles not imaged. Osseous structures intact. Cardiopericardial silhouette normal. A homogeneous area increased opacity right lung base tracking along right chest wall the right apex. Ill-defined area increased opacity right mid and right upper lung. Imaged portion left lung clear. LARGE RIGHT PLEURAL EFFUSION WITH RIGHT LUNG ATELECTASIS/PNEUMONIA. ENDOTRACHEAL TUBE NEAR LUCITA. RECOMMEND RETRACTING ENDOTRACHEAL TUBE 4 CM. LIMITATIONS DISCUSSED. ABOVE FINDINGS DISCUSSED VIA TELEPHONE WITH PATIENT'S NURSE ONDINA AT 8:15 AM, [facility], AUGUST 28, 2020. Echocardiogram 7/27/22:  Conclusions   Summary   Left ventricular ejection fraction is estimated at 45%. Diastolic Dysfunction is noted by mitral flow. Normal right ventricle systolic pressure. RVSP 28mmHg   Echogenic mass in the apex concerning for thrombus recommend limited echo   with definity   No hemodynamic evidence of significant valve disease      Signature   ----------------------------------------------------------------   Electronically signed by Isidro Ricks(Interpreting physician)   on 07/27/2022 03:48 PM    Limited echo 7/28/22:   Conclusions   Summary   No evidence of thrombus with definity   Left ventricular ejection fraction is visually estimated at 65%.       Signature   ----------------------------------------------------------------   Electronically signed by Isidro Ricks(Interpreting physician)   on 07/28/2022 08:42 AM      EKG 8/28/22: SR 92, low voltage QRS, nonspecific ST changes, QTc 474ms    Telemetry 8/29/22: ST 100s    Assessment:    Active Hospital Problems    Diagnosis Date Noted    Shock ([address] [R57.9] 08/28/2022     Priority: Medium    Chronic kidney disease [N18.9] 08/28/2022     Priority: Medium    Cardiac arrest [facility]) [I46.9] 08/27/2022     Priority: Medium    Bilateral pleural effusion [J90] 07/30/2022     Priority: Medium    Acute respiratory failure with hypoxia and hypercapnia (HCC) [J96.01, J96.02] 07/25/2022     Priority: Medium     PEA arrest s/p ROSC  Elevated troponin  Acute respiratory failure  Hypoxic encephalopathy  Bilateral pleural effusion s/p chest tube placement  Recent extensive bilateral UE DVT and left LE DVT s/p IVC filter on 8/5/22  Recent GIB secondary to duodenal ulcer  ESRD-HDD  Hx liver transplant   Anemia--stable  Abdominal masses per CT abdomen on 8/27/22  Shock--requiring high dose levo and vasopressin    Plan:  ICU management  Vent management per pulmonology  Continue current medications  Optimize cardiac meds when feasible/BP allows  Wean vasopressors as tolerated--currently on Levophed at 55 mcg/min and vasopressin at 0.04 units/min  Monitor on telemetry for any tachycardia or bradycardia arrhythmias  Maintain potassium greater than 4, magnesium greater than 2  GI/DVT prophylaxis  Hospitalist recommendations  Nephrology recommendations regarding hemodialysis management  Neurology recommendations  Pulmonary/critical care recommendations  Conservative medical therapy from a cardiac standpoint at this time secondary to multiple comorbidities.   Will consider coronary/ischemic evaluation in future if/when feasible if clinical/neurologic recovery  Palliative care consult/recommendations  Further recommendations to follow            Electronically signed by DARRELL Sherwood on 8/29/2022 at 10:43 AM

## 2022-08-29 NOTE — PROGRESS NOTES
Internal Medicine   Hospitalist   Progress Note    8/29/2022   12:50 PM    Name:  Lucrecia Olson  MRN:    [id number]     IP Day: 2     Admit Date: 8/27/2022  8:03 PM  PCP: Claudio Whitfield MD    Code Status:  Full Code    Assessment and Plan: Active Problems/ diagnosis:     Cardiac arrest   Acute hypoxic and hypercapenic respiratory failure   Bilateral pleural effusion   ESRD on CRRT   Shock- etiology to be determined  Lactic acidosis  Concern for pulselessness and bilateral lower extremity  Acute encephalopathy due to acidosis, resp failure, ?rule out ischemia   Likely fall at [facility]-CT head shows scalp soft tissue swelling/?hematoma  Bilateral upper ext and left lower DVT- s/p IVC, not on AC due to epistaxis and recent bleeding duodenal ulcer  Recent duodenal ulcer s/p clipping   H/o liver transplant     Plan  Continue close monitoring in intensive care unit  Pt unfortunately continues to worsen  Consult vascular surgeon obtain arterial ultrasound, needs heparin but having coffee-ground material in the OG, had epistaxis last night. Recent duodenal ulcer. on CRRT  Ventilator management as per intensive care, pressors to keep map above 65, currently on Levophed and vasopressin  Status post chest tube placement 8/28  Resume broad-spectrum antibiotics, follow-up blood cultures and pleural fluid culture  Cardiology consult-supportive care recommended  Seen by neurology this morning, CT head noted, resume antiepileptics as per neurologist  Nephrologist consulted for managing HD  Resume his Prograf  DVT and PUD PPx     7 pm- 7 am, please contact on call Hospitalist for any needs     Dispo-family is deciding on goals of care     Subjective:     Patient is ventilated and unresponsive. Multiple family members at bedside. Family is waiting for other family members to arrive to decide on goals of care.     Physical Examination:      Vitals:  BP (!) 90/58   Pulse (!) 115   Temp 98.4 °F (36.9 °C) (Rectal)   Resp (!) 0   Ht 5' 6\" (1.676 m)   Wt 155 lb 6.8 oz (70.5 kg)   SpO2 (!) 76%   BMI 25.09 kg/m²   Temp (24hrs), Av.1 °F (36.7 °C), Min:97.2 °F (36.2 °C), Max:98.4 °F (36.9 °C)      General appearance: Intubated, unresponsive  HEENT: Right-sided scalp swelling noted  Mental Status: Deferred as patient is unresponsive  Lungs: On the ventilator, mechanical sound transmitted bilaterally  Heart: regular rate   Abdomen: soft, nondistended, bowel sounds present, no masses  Extremities: No edema in the lower extremity, trace bilateral upper extremity edema. Skin: Bruising noted on different areas of his body. Neurologic: Unresponsive  S[place]ton catheter in place  Has central line and A-line  Chest tube in place    Data:     Labs:  Recent Labs     22  0922  0943 22  0544 22  0812 22  0842 22  1111   WBC 26.4*  --  28.2*  --   --   --    HGB 9.2*   < > 10.0*   < > 12.0* 7.3*     --  335  --   --   --     < > = values in this interval not displayed. Recent Labs     22  0922  0822  0842 22  1111   *  --  129*  --   --    K 4.1  --  5.7*  --   --    CL 89*  --  86*  --   --    CO2 23  --  13*  --   --    BUN 39*  --  46*  --   --    CREATININE 5.39*   < > 5.95* 5.9* 5.3*   GLUCOSE 167*  --  91  --   --     < > = values in this interval not displayed.      Recent Labs     22   AST 67* 1,210*   ALT 37 551*   BILITOT 0.3 0.6   ALKPHOS 103 120*       Current Facility-Administered Medications   Medication Dose Route Frequency Provider Last Rate Last Admin    norepinephrine (LEVOPHED) 16 mg in dextrose 5% 250 mL infusion  1-100 mcg/min IntraVENous Continuous Leatha Blanco MD 85.3 mL/hr at 22 1218 91 mcg/min at 22 1218    insulin regular (HUMULIN R;NOVOLIN R) injection 10 Units  10 Units IntraVENous Once Freddy Saldana MD        And    dextrose bolus 10% 250 mL  250 mL IntraVENous Once Fer Gilda Boogie MD        0.9 % sodium chloride bolus  1,000 mL IntraVENous Once Katie Aleman .9 mL/hr at 08/29/22 1100 1,000 mL at 08/29/22 1100    0.9 % sodium chloride bolus  2,000 mL IntraVENous Once AMELIA Dockery MD        calcium gluconate 10 % injection 1,000 mg  1,000 mg IntraVENous Once Stephany Dockery MD        glucose chewable tablet 16 g  4 tablet Oral PRN Stephany Dockery MD        dextrose bolus 10% 125 mL  125 mL IntraVENous PRN Stephany Dockery MD        Or    dextrose bolus 10% 250 mL  250 mL IntraVENous PRN Stephany Dockery MD        glucagon (rDNA) injection 1 mg  1 mg SubCUTAneous PRN Stephany Dockery MD        dextrose 10 % infusion   IntraVENous Continuous AMELIA Dockery MD        heparin (porcine) injection 1,000 Units  1,000 Units IntraCATHeter PRNAE Aleman MD        chlorhexidine (PERIDEX) 0.12 % solution 15 mL  15 mL Mouth/Throat BID Katie Aleman MD   15 mL at 08/29/22 0933    docusate (COLACE) 50 MG/5ML liquid 100 mg  100 mg Oral BID Katie Aleman MD        polyethylene glycol (GLYCOLAX) packet 17 g  17 g Oral Daily Katie Aleman MD        prismaSol BGK 4/2.5 dialysis solution   Dialysis Continuous Stephany Dockery MD        prismaSol BGK 4/2.5 dialysis solution   Dialysis Continuous Stephany Dockery MD        prismaSol BGK 4/2.5 dialysis solution   Dialysis Continuous Stephany Dockery MD        piperacillin-tazobactam (ZOSYN) 3,375 mg in dextrose 5 % 50 mL IVPB (mini-bag)  3,375 mg IntraVENous Jessie Smith MD        pantoprazole (PROTONIX) 40 mg in sodium chloride (PF) 10 mL injection  40 mg IntraVENous Daily Nicoletto Bolzan-Roche, APRN - CNP   40 mg at 08/29/22 1010    vasopressin 20 Units in dextrose 5 % 100 mL infusion  0.01-0.03 Units/min IntraVENous Continuous Gibran Rea MD 12 mL/hr at 08/29/22 1147 0.04 Units/min at 08/29/22 1147    insulin lispro (HUMALOG) injection vial 0-4 Units  0-4 Units SubCUTAneous Q4H Katie Aleman MD        sodium chloride flush 0.9 % injection 5-40 mL  5-40 mL IntraVENous PRN Yosef Garcia MD        sodium chloride flush 0.9 % injection 5-40 mL  5-40 mL IntraVENous 2 times per day Yosef Garcia MD   10 mL at 08/28/22 2212    sodium chloride flush 0.9 % injection 5-40 mL  5-40 mL IntraVENous PRN Yosef Garcia MD        0.9 % sodium chloride infusion   IntraVENous PRN Yosef Garcia MD        levETIRAcetam (KEPPRA) 1,000 mg in sodium chloride 0.9 % 100 mL IVPB  1,000 mg IntraVENous Q12H Josette Barfield  mL/hr at 08/29/22 1225 1,000 mg at 08/29/22 1225    albumin human 25 % IV solution 25 g  25 g IntraVENous Daily PRN Finn Leach  mL/hr at 08/29/22 1101 25 g at 08/29/22 1101    vancomycin (VANCOCIN) intermittent dosing (placeholder)   Other RX Devona Holter, MD        sucralfate (CARAFATE) tablet 1 g  1 g Oral 4 times per day Lovette Purl, DO   1 g at 08/29/22 0220    tacrolimus (PROGRAF) capsule 1 mg  1 mg Oral BID Lovette Purl, DO   1 mg at 08/28/22 2202    sodium chloride flush 0.9 % injection 5-40 mL  5-40 mL IntraVENous 2 times per day Nicoletto Bolzan-Roche, APRN - CNP   10 mL at 08/28/22 2212    sodium chloride flush 0.9 % injection 5-40 mL  5-40 mL IntraVENous PRN Nicoletto Bolzan-Roche, APRN - CNP        0.9 % sodium chloride infusion   IntraVENous PRN Nicoletto Bolzan-Roche, APRN - CNP        [Held by provider] heparin (porcine) injection 5,000 Units  5,000 Units SubCUTAneous 3 times per day Nicoletto Bolzan-Roche, APRN - CNP   5,000 Units at 08/28/22 1237    ondansetron (ZOFRAN-ODT) disintegrating tablet 4 mg  4 mg Oral Q8H PRN Nicoletto Bolzan-Roche, APRN - CNP        Or    ondansetron (ZOFRAN) injection 4 mg  4 mg IntraVENous Q6H PRN Nicoletto Bolzan-Roche, APRN - [place]        polyethylene glycol (GLYCOLAX) packet 17 g  17 g Oral Daily PRN David Gomez, APRN - [place]        acetaminophen (TYLENOL) tablet 650 mg  650 mg Oral Q6H PRN David Gomez, APRN -

## 2022-08-29 NOTE — CONSULTS
Palliative Care Consult Note  Patient: Perri Cardoza  Gender: male  YOB: 1955  Unit/Bed: IC03/IC03-01  CodeStatus: Full Code  Inpatient Treatment Team: Treatment Team: Attending Provider: Katelin Meléndez DO; Consulting Physician: Cory Smith MD; Consulting Physician: Lalo Cullen PA-C; Consulting Physician: Lucía Mercedes MD; Utilization Reviewer: Samantha So RN; Consulting Physician: lEieser Gautam MD; Consulting Physician: Elana Sutherland DO; : Zuri Burr; Registered Nurse: Jana Vásquez RN  Admit Date:  8/27/2022    Chief Complaint: Goals of care    History of Presenting Illness:      Perri Cardoza is a 75 y.o. male on hospital day 2 with a history of 75 y.o. male with a history of ESRD on dialysis, hypertension, hepatitis status post liver transplant presents with cardiac arrest.  He was found unresponsive at the nursing home. He was recently discharged from the hospital after he was treated for respiratory failure and hypoxia. He was found to have upper and lower extremity DVT during admission. He was started on anticoagulation but he was unable to tolerate and he started having GI bleed and epistaxis. IVC filter was placed and he was discharged off anticoagulation. In cardiogenic shock. Currently in ICU, vented sedated, on vaso and levo, awaiting transfer to [facility]. Does not respond to verbal stimuli minimal response to painful stimuli.               Review of Systems:       Review of Systems    Physical Examination:       BP (!) 90/58   Pulse (!) 115   Temp 98.4 °F (36.9 °C) (Rectal)   Resp (!) 0   Ht 5' 6\" (1.676 m)   Wt 155 lb 6.8 oz (70.5 kg)   SpO2 (!) 76%   BMI 25.09 kg/m²    Physical Exam    Allergies:      No Known Allergies    Medications:      Current Facility-Administered Medications   Medication Dose Route Frequency Provider Last Rate Last Admin    norepinephrine (LEVOPHED) 16 mg in dextrose 5% 250 mL infusion  1-100 mcg/min IntraVENous Continuous Wells Chip Dunham MD 51.6 mL/hr at 08/29/22 0658 55 mcg/min at 08/29/22 0658    insulin regular (HUMULIN R;NOVOLIN R) injection 10 Units  10 Units IntraVENous Once Bri Recio MD        And    dextrose bolus 10% 250 mL  250 mL IntraVENous Once Bri Recio MD        glucose chewable tablet 16 g  4 tablet Oral PRN Bri Recio MD        dextrose bolus 10% 125 mL  125 mL IntraVENous PRN Bri Recio MD        Or    dextrose bolus 10% 250 mL  250 mL IntraVENous PRN Bri Recio MD        glucagon (rDNA) injection 1 mg  1 mg SubCUTAneous PRN Bri Recio MD        dextrose 10 % infusion   IntraVENous Continuous PRN Bri Recio MD        sodium bicarbonate 8.4 % injection 50 mEq  50 mEq IntraVENous Once Bri Recio MD        sodium zirconium cyclosilicate (LOKELMA) oral suspension 10 g  10 g Oral Once Bri Recio MD        0.9 % sodium chloride bolus  1,000 mL IntraVENous Once Bri Recio MD        0.9 % sodium chloride bolus  2,000 mL IntraVENous Once PRN MD eb XiaoSOL BGK 0/2.5 dialysis solution   Dialysis Continuous Deonna Mcgarry MD        prismaSOL BGK 0/2.5 dialysis solution   Dialysis Continuous Deonna Mcgarry MD        prismaSOL BGK 0/2.5 dialysis solution   Dialysis Continuous Deonna Mcgarry MD        calcium gluconate 10 % injection 1,000 mg  1,000 mg IntraVENous Once Deonna Mcgarry MD        glucose chewable tablet 16 g  4 tablet Oral PRN Deonna Mcgarry MD        dextrose bolus 10% 125 mL  125 mL IntraVENous PRN Deonna Mcgarry MD        Or    dextrose bolus 10% 250 mL  250 mL IntraVENous PRN Deonna Mcgarry MD        glucagon (rDNA) injection 1 mg  1 mg SubCUTAneous PRN Deonna Mcgarry MD        dextrose 10 % infusion   IntraVENous Continuous PRNAE Mcgarry MD        albuterol (PROVENTIL) nebulizer solution 10 mg  10 mg Nebulization Once Deonna Mcgarry MD        pantoprazole (PROTONIX) 40 mg in sodium chloride (PF) 10 mL injection  40 mg IntraVENous Daily Nicoletto Bolzan-Roche, APRN - CNP   40 mg at 08/28/22 3629    vasopressin 20 Units in dextrose 5 % 100 mL infusion  0.01-0.03 Units/min IntraVENous Continuous Shahram Rivera MD 12 mL/hr at 08/29/22 0658 0.04 Units/min at 08/29/22 0658    propofol injection  10 mcg/kg/min IntraVENous Titrated Ayde Hernandez MD 2.1 mL/hr at 08/29/22 0658 5 mcg/kg/min at 08/29/22 0658    glucose chewable tablet 16 g  4 tablet Oral PRN Ayde Hernandez MD        dextrose bolus 10% 125 mL  125 mL IntraVENous PRN Ayde Hernandez MD        Or    dextrose bolus 10% 250 mL  250 mL IntraVENous PRN Ayde Hernandez MD        glucagon (rDNA) injection 1 mg  1 mg SubCUTAneous PRN Ayde Hernandez MD        dextrose 10 % infusion   IntraVENous Continuous PRN Ayde Hernandez MD        insulin lispro (HUMALOG) injection vial 0-4 Units  0-4 Units SubCUTAneous Q4H Ayde Hernandez MD        piperacillin-tazobactam (ZOSYN) 3,375 mg in dextrose 5 % 50 mL IVPB (mini-bag)  3,375 mg IntraVENous Q12H Ayde Hernandez MD   Stopped at 08/29/22 0422    sodium chloride flush 0.9 % injection 5-40 mL  5-40 mL IntraVENous PRN Ayde Hernandez MD        sodium chloride flush 0.9 % injection 5-40 mL  5-40 mL IntraVENous 2 times per day Ayde Hernandez MD   10 mL at 08/28/22 2212    sodium chloride flush 0.9 % injection 5-40 mL  5-40 mL IntraVENous PRN Ayde Hernandez MD        0.9 % sodium chloride infusion   IntraVENous PRN Ayde Hernandez MD        levETIRAcetam (KEPPRA) 1,000 mg in sodium chloride 0.9 % 100 mL IVPB  1,000 mg IntraVENous Q12H Neno Tinsley MD   Stopped at 08/29/22 0008    albumin human 25 % IV solution 25 g  25 g IntraVENous Daily PRN Adair Shaw MD        vancomycin (VANCOCIN) intermittent dosing (placeholder)   Other RX Placeholder Ayde Hernandez MD        sucralfate (CARAFATE) tablet 1 g  1 g Oral 4 times per day Karl Hernandez DO   1 g at 08/29/22 0220    tacrolimus (PROGRAF) capsule 1 mg  1 mg Oral BID Pattie Enciso DO   1 mg at 08/28/22 2202    fentaNYL (SUBLIMAZE) 1,000 mcg in sodium chloride 0.9 % 100 mL infusion   mcg/hr IntraVENous Continuous Steven Amin MD 10 mL/hr at 08/29/22 0658 100 mcg/hr at 08/29/22 0658    sodium chloride flush 0.9 % injection 5-40 mL  5-40 mL IntraVENous 2 times per day Nicoletto Bolzan-Roche, APRN - CNP   10 mL at 08/28/22 2212    sodium chloride flush 0.9 % injection 5-40 mL  5-40 mL IntraVENous PRN Nicoletto Bolzan-Roche, APRN - CNP        0.9 % sodium chloride infusion   IntraVENous PRN Nicoletto Bolzan-Roche, APRN - CNP        [Held by provider] heparin (porcine) injection 5,000 Units  5,000 Units SubCUTAneous 3 times per day Nicoletto Bolzan-Roche, APRN - CNP   5,000 Units at 08/28/22 1237    ondansetron (ZOFRAN-ODT) disintegrating tablet 4 mg  4 mg Oral Q8H PRN Nicoletto Bolzan-Roche, APRN - CNP        Or    ondansetron (ZOFRAN) injection 4 mg  4 mg IntraVENous Q6H PRN Nicoletto Bolzan-Roche, APRN - CNP        polyethylene glycol (GLYCOLAX) packet 17 g  17 g Oral Daily PRN Nicoletto Bolzan-Roche, APRN - CNP        acetaminophen (TYLENOL) tablet 650 mg  650 mg Oral Q6H PRN Nicoletto Bolzan-Roche, APRN - CNP        Or    acetaminophen (TYLENOL) suppository 650 mg  650 mg Rectal Q6H PRN Nicoletto Bolzan-Roche, APRN - CNP           History:       PMHx:  Past Medical History:   Diagnosis Date    ESRD (end stage renal disease) ([facility] [address]     Hemodialysis patient ([facility] [address]     Hepatitis     Hypertension     Liver transplanted ([facility] [address] 03/2016       PSHx:  Past Surgical History:   Procedure Laterality Date    DIALYSIS FISTULA CREATION Right     IR MIDLINE CATH  8/2/2022    IR MIDLINE CATH 8/2/2022 MLOZ SPECIAL PROCEDURE    LIVER TRANSPLANT  03/08/2017    TUNNELED VENOUS PORT PLACEMENT      UPPER GASTROINTESTINAL ENDOSCOPY N/A 8/5/2022    EGD ESOPHAGOGASTRODUODENOSCOPY WITH INTERVENTION performed by Brent Phalen, MD at [facility] NRBC 3 08/29/2022 05:44 AM    BANDSPCT 2 08/29/2022 05:44 AM    METASPCT 1 08/28/2022 09:03 AM    LYMPHOPCT 4.0 08/29/2022 05:44 AM    PROMYELOPCT 1 08/27/2022 08:15 PM    MONOPCT 1.0 08/29/2022 05:44 AM    MYELOPCT 2 08/28/2022 09:03 AM    EOSPCT 3.1 02/20/2012 05:15 AM    BASOPCT 0.1 08/29/2022 05:44 AM    MONOSABS 0.3 08/29/2022 05:44 AM    LYMPHSABS 1.1 08/29/2022 05:44 AM    EOSABS 0.0 08/29/2022 05:44 AM    BASOSABS 0.0 08/29/2022 05:44 AM     CMP:    Lab Results   Component Value Date/Time     08/28/2022 09:03 AM    K 4.1 08/28/2022 09:03 AM    K 3.5 08/13/2022 05:23 AM    CL 89 08/28/2022 09:03 AM    CO2 23 08/28/2022 09:03 AM    BUN 39 08/28/2022 09:03 AM    CREATININE 5.8 08/29/2022 08:12 AM    CREATININE 5.39 08/28/2022 09:03 AM    GFRAA 12 08/29/2022 08:12 AM    LABGLOM 10 08/29/2022 08:12 AM    GLUCOSE 167 08/28/2022 09:03 AM    GLUCOSE 136 02/25/2012 05:10 PM    PROT 6.4 08/28/2022 09:03 AM    LABALBU 2.6 08/28/2022 09:03 AM    LABALBU 2.8 02/23/2012 06:40 AM    CALCIUM 9.8 08/28/2022 09:03 AM    BILITOT 0.3 08/28/2022 09:03 AM    ALKPHOS 103 08/28/2022 09:03 AM    AST 67 08/28/2022 09:03 AM    ALT 37 08/28/2022 09:03 AM     BMP:    Lab Results   Component Value Date/Time     08/28/2022 09:03 AM    K 4.1 08/28/2022 09:03 AM    K 3.5 08/13/2022 05:23 AM    CL 89 08/28/2022 09:03 AM    CO2 23 08/28/2022 09:03 AM    BUN 39 08/28/2022 09:03 AM    LABALBU 2.6 08/28/2022 09:03 AM    LABALBU 2.8 02/23/2012 06:40 AM    CREATININE 5.8 08/29/2022 08:12 AM    CREATININE 5.39 08/28/2022 09:03 AM    CALCIUM 9.8 08/28/2022 09:03 AM    GFRAA 12 08/29/2022 08:12 AM    LABGLOM 10 08/29/2022 08:12 AM    GLUCOSE 167 08/28/2022 09:03 AM    GLUCOSE 136 02/25/2012 05:10 PM     TSH:   Lab Results   Component Value Date/Time    TSH 25.110 07/25/2022 11:30 AM     Vitamin B12 and Folate: No components found for: FOLIC,  Y98  Urinalysis:   Lab Results   Component Value Date/Time    NITRU Negative 05/21/2015 10:22 AM BLOODU Negative 05/21/2015 10:22 AM    SPECGRAV 1.018 05/21/2015 10:22 AM    GLUCOSEU Negative 05/21/2015 10:22 AM    GLUCOSEU NEG 02/22/2012 11:16 AM           FUNCTIONAL ADL´S:     Independent: [  ] Eating, [   ] Dressing, [   ] Transferring, [   ] Dot Leyden, [   ] Virginia Kast, [   ] Continence  Dependent   : [x  ] Eating, [  x ] Dressing, [  x ] Transferring, [   x] Toileting, [  x ] Bathing, [  x ] Continence  W. assistant : [  ] Eating, [   ] Dressing, [   ] Transferring, [   ] Dot Leyden, [   ] Virginia Kast, [   ] Continence    Radiology: Reviewed      CT ABDOMEN PELVIS WO CONTRAST Additional Contrast? None    Result Date: 8/27/2022  Patient: Katelyn Zheng  Time Out: 22:35 Exam(s): CT ABDOMEN + PELVIS Without Contrast  EXAM:   CT Abdomen and Pelvis Without Intravenous Contrast  CLINICAL HISTORY:    Reason for exam: fall. Chief complaint altered mental status. S/p cardiac arrest, fell  TECHNIQUE:   Axial computed tomography images of the abdomen and pelvis without intravenous contrast.  All CT scan at this facility use dose modulation, iterative reconstruction, and/or weight based dosing when appropriate to reduce radiation dose to as low as reasonably achievable. COMPARISON:   No relevant prior studies available. FINDINGS:   Lung bases: See the chest CT. Pleural space:  2 discrete abdominal and pelvic masses abutting the anterior pleural surface. The left lower mass measures up to 8 cm in size. The right mass measures 7 cm. ABDOMEN:   Liver:  Unremarkable. Gallbladder and bile ducts: The gallbladder has been removed. No ductal dilation. Pancreas:  Portions of the pancreas are obscured by vascular coil material.  No ductal dilation. Spleen:  Unremarkable. No splenomegaly. Adrenals:  Unremarkable. No mass. Kidneys and ureters:  Atrophic kidneys. No obstructing stones. No hydronephrosis. Stomach and bowel:  Unremarkable. No obstruction. No mucosal thickening.    PELVIS:   Appendix:  No findings to suggest acute appendicitis. Bladder:  Bladder decompressed by [place]. No stones. Reproductive:  Unremarkable as visualized. ABDOMEN and PELVIS:   Intraperitoneal space:  Unremarkable. No free air. No significant fluid collection. Bones/joints:  Degenerative changes of the spine. Right femoral fixation pins. No acute fracture. No dislocation. Soft tissues:  Unremarkable. Vasculature: Atherosclerosis. Infrarenal IVC filter. Lymph nodes:  Unremarkable. No enlarged lymph nodes. Electronically signed by Ester Farrar M.D. on 08-27-22 at [phone]      Masses as described which may represent metastatic lesions. Recommend follow-up. XR WRIST LEFT (MIN 3 VIEWS)    Result Date: 8/29/2022  COMPARISON: August 28, 2022 HISTORY: lung evaluation TECHNIQUE: AP view FINDINGS: A left chest tube is again seen in place. A right chest tube is also seen in place. Both are unchanged in position. The right lung appears to be expanded. A small pneumothorax is seen on the left that measures 15 mm in greatest transverse dimension. No midline shift is seen. An endotracheal tube and enteric tube are again seen in place. There is no evidence for pleural effusion or consolidation. A small pneumothorax is again seen on the left. CT HEAD WO CONTRAST    Result Date: 8/28/2022  CT Brain. Contrast medium:  without contrast.. History:  Fall. Altered mental status. Cardiac arrest.. Technical factors: CT imaging of the brain was obtained and formatted as 5 mm contiguous axial images. 2.5 mm contiguous axial images were obtained through the osseous structures. Sagittal and coronal reconstruction obtained during postprocessing. Comparison:  None. Findings: Extra-axial spaces:  Normal. Intracranial hemorrhage:  None. Ventricular system: Ventricles mildly enlarged. Sulci mildly prominent. Basal Cisterns:  Normal. Cerebral Parenchyma: Bilateral symmetric periventricular areas decreased attenuation.  Midline Shift: None. Cerebellum:  Normal. Paranasal sinuses, calvarium, and mastoid air cells: Opacification bilateral mastoid air cells. Mucosal thickening bilateral maxillary sinuses. Partial opacification ethmoid sinuses. Extracalvarial soft tissue thickening measuring approximately 7.6 x 1.7  cm right frontal, parietal, and temporal region. Visualized Orbits:  Normal.     Impression: Bilateral mastoiditis. Bilateral maxillary and ethmoid sinusitis. Right frontal, parietal, and temporal scalp soft tissue swelling. Mild cerebral atrophy. Chronic ischemic white matter disease. All CT scans at this facility use dose modulation, iterative reconstruction, and/or weight based dosing when appropriate to reduce radiation dose to as low as reasonably achievable. CT CHEST WO CONTRAST    Result Date: 8/27/2022  Patient: Alexa Freeman  Time Out: 22:35 Exam(s): CT CHEST Without Contrast  EXAM:   CT Chest Without Intravenous Contrast  CLINICAL HISTORY:    Reason for exam: fall. Chief complaint altered mental status. S/p cardiac arrest, fell  TECHNIQUE:   Axial computed tomography images of the chest without intravenous contrast.  All CT scan at this facility use dose modulation, iterative reconstruction, and/or weight based dosing when appropriate to reduce radiation dose to as low as reasonably achievable. COMPARISON:   No relevant prior studies available. FINDINGS:   Lungs:  Bilateral compressive atelectatic changes. Pleural space: There are large bilateral mildly complex pleural effusions. No pneumothorax. Heart:  Unremarkable. No cardiomegaly. No significant pericardial effusion. No significant coronary artery calcifications. Bones/joints:  Right-sided third anterolateral acute rib fracture. Right-sided posterior ninth rib fracture with an adjacent rounded lucency and sclerotic appearance of the bone. Left-sided fourth, fifth, sixth anterolateral rib buckle fractures. Left-sided fifth lateral acute rib fracture. No dislocation. Soft tissues:  Diffuse soft tissue edema. Small bubbles of air in the left anterior chest wall. Vasculature:  Unremarkable. No thoracic aortic aneurysm. Lymph nodes:  Unremarkable. No enlarged lymph nodes. Tubes, lines and devices:  Endotracheal tube terminates at the origin of the right mainstem bronchus. Electronically signed by Randi Benton M.D. on 08-27-22 at [phone]    1. Bilateral slightly complex appearing pleural effusions. This could be secondary to underlying blood products or empyema in the appropriate clinical setting. Bilateral acute rib fractures. There is a right posterior rib fracture with pathologic features. Recommend clinical correlation and follow-up. Air in the chest wall soft tissues which could be secondary to a laceration. 2.  Endotracheal tube in the origin of the right mainstem bronchus. CTA CHEST W WO CONTRAST    Result Date: 8/28/2022  CT PULMONARY ANGIOGRAM WITH INTRAVENOUS CONTRAST MEDIUM. REASON FOR EXAMINATION:  CARDIAC ARREST. PNEUMOTHORAX. TECHNIQUE: Helical CTA was performed through the chest utilizing Isovue-300, 150 mL. Patient experienced infiltrate, 75 mL intravenous contrast.  Images were obtained with bolus tracking in order to opacify the pulmonary arteries. Thick section coronal MIP 3D reconstructions were performed on a separate workstation. COMPARISON:none FINDINGS:  Pulmonary arteries: No intraluminal filling defects. Thoracic aorta: Normal in course and caliber. Cardiac Size: Normal. Pericardial effusion: None. Vascular: Limited imaging suggested lesion proximal right basilic/axillary vein with extensive collateral circulation, right chest wall, with contrast medium filling superior vena cava collateral circulation from right anterior chest wall (series 2, images 33 through 42). Right lung: Areas of pneumothoraces identified at right lung apex, and base right chest (series 2, images 24 and 94, series 602, image 42).  Right thoracostomy tube identified with tip posterior right upper chest (series 2, image 62, series 602, image 21). Diffuse emphysematous change. Small right effusion. Dependent subsegmental atelectatic change right upper and right lower lobe. Left lung: Large left pneumothorax with lung retracted approximately 4.5 cm from apex and 5.7 cm lung base. Left thoracostomy tube identified posterior chest with catheter apex left chest (series 2, image 21, series 602, image 48). Moderate left pleural effusion. Dependent subsegmental consolidation, left lower lobe. Lymph nodes: No hilar, mediastinal, or axillary lymph node enlargement. Upper abdomen:Limited imaging upper abdomen shows multiple surgical metallic artifacts at gastroesophageal junction, extending caudally in the left upper quadrant. Surgical clips also identified right upper quadrant. Bilateral renal hypoplasia. Musculoskeletal:No osteoblastic, and no osteolytic lesions. No CT evidence pulmonary embolism. Right thoracostomy tube as discussed with persistent right pneumothorax. Left thoracostomy tube as discussed with large persistent left pneumothorax. Small right, moderate left pleural effusions. Findings suspicious for occlusion right basilic/right axillary vein with contrast medium filling superior vena cava via extensive collateral circulation right anterior chest wall. Bilateral renal hypoplasia. Postsurgical abdominal changes discussed. All CT scans at this facility use dose modulation, iterative reconstruction, and/or weight based dosing when appropriate to reduce radiation dose to as low as reasonably achievable. CT CERVICAL SPINE WO CONTRAST    Result Date: 8/27/2022  Patient: Reba Corrales  Time Out: 22:13 Exam(s): CT C SPINE  EXAM:   CT Cervical Spine Without Intravenous Contrast  CLINICAL HISTORY:    Reason for exam: AMS. Chief complaint altered mental status.  S/p cardiac arrest, fell  TECHNIQUE:   Axial computed tomography images of the cervical spine without intravenous contrast.  All CT scan at this facility use dose modulation, iterative reconstruction, and/or weight based dosing when appropriate to reduce radiation dose to as low as reasonably achievable. COMPARISON:   No relevant prior studies available. FINDINGS:   Vertebrae: Moderate to severe degenerative changes of the spine. Endplate osteophytes and facet arthropathy. No acute fracture. Discs/spinal canal/neural foramina: At the C6-C7 level moderate to severe bilateral neural foraminal stenosis at And moderate to severe canal stenosis due to a degenerative disc bulge and bony hypertrophy. Multilevel disc space narrowing. At the C4-C5 level moderate bilateral foraminal stenosis and moderate canal stenosis. At the C5-C6 level severe bilateral foraminal stenosis and moderate to severe canal stenosis. Soft tissues:  Soft tissue edema. Mastoid air cells:  Bilateral mastoid effusions. Auditory system: There is fluid in the right middle ear cavity. Pleural space:  Bilateral partially seen large pleural effusions. Tubes, lines and devices:  Endotracheal tube is partially seen. Electronically signed by Rosalina Plaza M.D. on 08-27-22 at 2213    1. At the C6-C7 level moderate to severe bilateral neural foraminal stenosis at And moderate to severe canal stenosis due to a degenerative disc bulge and bony hypertrophy. 2.  No acute findings. DJD. Bilateral mastoid effusions. Right otitis media. XR CHEST PORTABLE    Result Date: 8/29/2022  COMPARISON: August 28, 2022 HISTORY: lung evaluation TECHNIQUE: AP view FINDINGS: A left chest tube is again seen in place. A right chest tube is also seen in place. Both are unchanged in position. The right lung appears to be expanded. A small pneumothorax is seen on the left that measures 15 mm in greatest transverse dimension. No midline shift is seen. An endotracheal tube and enteric tube are again seen in place.  There is no evidence for pleural effusion or consolidation. A small pneumothorax is again seen on the left. XR CHEST PORTABLE    Result Date: 8/28/2022  EXAMINATION: XR CHEST PORTABLE CLINICAL HISTORY: CHEST TUBE PLACEMENT COMPARISONS: AUGUST 27, 2022, 2015 HOURS FINDINGS: Patient leaning to right. Tip of endotracheal tube 4.8 cm superior to lucita. Nasogastric tube courses beneath diaphragm. Interval placement right thoracostomy tube with tip found medially right mid chest. Previously visualized right pleural effusion, coursing along right chest wall to right apex no longer identified. No pneumothorax visualized. Ill-defined area of increased opacity now identified in left lower lung. Cardiopericardial silhouette normal.     INTERVAL PLACEMENT RIGHT CHEST TUBE, WITH EVACUATION RIGHT PLEURAL EFFUSION. LEFT LOWER LUNG ATELECTASIS/PNEUMONIA. INTERVAL RETRACTION ENDOTRACHEAL TUBE. XR CHEST PORTABLE    Result Date: 8/28/2022  EXAMINATION: XR CHEST PORTABLE CLINICAL HISTORY: LEFT CHEST 2 COMPARISONS: CHEST RADIOGRAPH, AUGUST 28, 2022 0851 HOURS FINDINGS: Costophrenic angles not imaged. Tip of endotracheal tube 3.7 cm superior to lucita. Nasogastric tube courses beneath diaphragm. Interval placement left thoracostomy tube with tip found medially left upper chest. Previous ill-defined area of increased opacity left lower lung no longer identified. Incomplete reexpansion left lung with lung margin retracted 3.6 cm. Left chest wall. Right thoracostomy tube identified with tip abutting the mediastinum in mid chest. Incomplete reexpansion right lung, with retraction approximately 2 cm from right lower chest wall. INTERVAL PLACEMENT LEFT THORACOSTOMY TUBE WITH RESOLUTION OF LEFT EFFUSION. INCOMPLETE REEXPANSION LEFT LOWER LUNG. PRE-EXISTING RIGHT THORACOSTOMY TUBE WITH INCOMPLETE REEXPANSION RIGHT LOWER LUNG. THE ABOVE FINDINGS WERE DISCUSSED WITH PATIENT'S NURSE ONDINA.  TELEPHONE IN THE ICU AT 11:12 AM, SUNDAY, AUGUST 28, 2022     XR CHEST PORTABLE    Result Date: 8/28/2022  EXAMINATION: XR CHEST PORTABLE CLINICAL HISTORY: ANTEGRADE COMPARISONS: AUGUST 7, 2022 FINDINGS: Endotracheal tube identified with tip at lucita oriented toward right mainstem bronchus. Defibrillator pads identified overlying right lateral chest and left lower chest. Costophrenic angles not imaged. Osseous structures intact. Cardiopericardial silhouette normal. A homogeneous area increased opacity right lung base tracking along right chest wall the right apex. Ill-defined area increased opacity right mid and right upper lung. Imaged portion left lung clear. LARGE RIGHT PLEURAL EFFUSION WITH RIGHT LUNG ATELECTASIS/PNEUMONIA. ENDOTRACHEAL TUBE NEAR LUCITA. RECOMMEND RETRACTING ENDOTRACHEAL TUBE 4 CM. LIMITATIONS DISCUSSED. ABOVE FINDINGS DISCUSSED VIA TELEPHONE WITH PATIENT'S NURSE ONDINA AT 8:15 AM, [facility], AUGUST 27, 2021. Assessment and plan:      -Advance Care Planning  Discussed goals of care with patient. Explained in extensive detail nuances between full code, DNR CCA and DNR CC. Remains Full Code      -Goals of Care Discussion:  Disease process and goals of treatment were discussed in basic terms. Discussed poor prognosis due to multiple comorbidities and unknown down time creating hypoxic injury to body. His daughter feels much guilt as she lives out of state and could not convince him to live with her. She feels she could have ontrolled his health better. Much discussion that if he had cardiac arrest in front of her outcome may be the same. She is aware transfer to [facility] is \" a longshot\" she just feels she \" has to do everything\" prior to letting him go. Much active listening, presence, and emotional support were given. We discussed the palliative care philosophy in light of those goals. We discussed all care options contingent on treatment response and QOL.      In the setting of acute hypoxic and hypercapnic respiratory failure status postcardiac arrest, septic shock, comatose state/acute encephalopathy, bilateral pleural effusions,bilateral pneumothorax post chest tube placement, end-stage renal disease on dialysis,shock liver,  status post liver transplant with chronic immunosuppression:  Jacki Bynum is hospice eligible family declines    While hospitalized treated for multiple medical problems including   cardiac arrest   elevated lactic acid   shock  abdominal masses   hypothyroid   ESRD   hypertension   COPD   GERD  GI Bleed  Electronically signed by ISI Marshall CNP on 8/29/2022 at 8:45 AM

## 2022-08-29 NOTE — PROGRESS NOTES
[facility] [facility] Dose Adjustment Per Protocol:  Zosyn Extended Interval Interchange      Benja Jimenez is a 75 y.o. male. The following ordered dose of Zosyn has been changed to optimize its pharmacodynamic profile per [facility] pharmacy policy approved by [facility]. Recent Labs     08/28/22  0903 08/28/22  0943 08/29/22  0544 08/29/22  0812 08/29/22  0818 08/29/22  0842   CREATININE 5.39*   < >  --    < > 5.95* 5.9*   WBC 26.4*  --  28.2*  --   --   --     < > = values in this interval not displayed. Miladis Irving Height: 5' 6\" (167.6 cm), Weight: 155 lb 6.8 oz (70.5 kg), Body mass index is 25.09 kg/m². Estimated Creatinine Clearance: 11 mL/min (A) (based on SCr of 5.9 mg/dL [facility])). Medication Ordered:   Traditional Dosing   [] Zosyn 2.25 gm q6-8 hr   [] Zosyn 3.375 gm q6-8 hr   [] Zosyn 4.5 gm q6-8 hr   [] Zosyn 2.25 gm q6-8 hr   [] Zosyn 3.375 gm q6-8 hr   [] Zosyn 4.5 gm q6-8 hr     New Dose      Piperacillin/Tazobactam - Extended Infusion Dosing (4-hour infusion) - Preferred Dosing Strategy       Renal Function (CrCl mL/min)  ? 20  < 20, HD, PD  CRRT    All Indications - Loading dose of 4500 milligrams x 1 over 30 minutes or via IV push. Maintenance dose  should begin 6 hours after load for CrCl > 20 and 8 hours after load for CrCl < 20. Maintenance dosing for all indications except as outlined below  [] 3375mg q8h  [] 3375mg q12h  [x] 3375mg q8h    Febrile neutropenia, Cystic fibrosis, BMI > 40*, local Pseudomonas susceptibility < 80% (refer to page 3 for indications)     [] 4500mg q8h  [] 4500 q12h  [] 4500mg q8h    *Consider 3375mg q8h for indication of Urinary tract infections in BMI > 40. Adjust for decreased renal function. Patient started on CRRT 8/29/22.  As such will increase Zosyn to 3.375 mg IV q 8 hours per [facility] protocol for CRRT      Thank Stephie Whelan, [address] [facility]  8/29/2022 10:15 AM

## 2022-08-29 NOTE — PROGRESS NOTES
Comprehensive Nutrition Assessment    Type and Reason for Visit:  Initial, Consult (TF order and manage)    Nutrition Recommendations/Plan:   Begin trophic TF  Renal TF ( Nepro with Carb steady) @ 20 ml/hr x 24 hrs via OG  Water flush 50 ml, every 6 hrs  Monitor for improvement in K+ levels with CRRT, will likely need formula change in order to meet protein needs, if pt reamains intubated     Malnutrition Assessment:  Malnutrition Status: Moderate malnutrition (08/29/22 1014)    Context:  Chronic Illness     Findings of the 6 clinical characteristics of malnutrition:  Energy Intake:  75% or less estimated energy requirements for 1 month or longer  Weight Loss:  Unable to assess (due to edema)     Body Fat Loss:  Mild body fat loss (limited assessment with intubation) Buccal region, Orbital   Muscle Mass Loss:  Mild muscle mass loss (limited assessment with intubation) Clavicles (pectoralis & deltoids), Temples (temporalis)  Fluid Accumulation:  No significant fluid accumulation (not known to be nutritionally related)     Strength:  Not Performed    Nutrition Assessment:    Pt admitted with hx of moderate malnutrition, in the context of chronic disease.  At risk for furhter decline in nutritional status with intubation, will begin trophic TF to maintain gut integrity and monitor course of medical management for futher recommendations    Nutrition Related Findings:    history of ESRD on dialysis, hypertension, hepatitis status post liver transplant ( hx moderate/severe malnutrition) presents with cardiac arrest.  He was found unresponsive at the nursing home, s/p td hansen, currently normothermic, intubated 8/27, OG in place, anuric at baseline, to start CRRT today, no propofol, last BM PTA ( on colace and glycolax), Labs noted: Na = 129, K=3.8-5.7 (lokelma x 1),elevated BUN/creat, glucose , elevated liver enzymes noted, per nursing 4+ pitting BUE edema, current levophed @ 51 ml/hr + vasopressin @ 12 ml/hr,okay to start trophic TF per rounds, was admitted 7/25-8/18 for respiratory failure with ventilator and DVT's Wound Type: Moisture Associate Skin Damage       Current Nutrition Intake & Therapies:    Average Meal Intake: NPO  Average Supplements Intake: NPO  Diet NPO  ADULT TUBE FEEDING; Orogastric; Peptide Based High Protein; Continuous; 20; No; 50; Q 6 hours  Current Tube Feeding (TF) Orders:  Feeding Route: Orogastric  Formula: Renal Formula ( Nepro with Carb steady)  Schedule: Continuous  Feeding Regimen: 20 ml/hr  Water Flushes: 50 ml every 6hrs ( 200ml  Current TF & Flush Orders Provides: 864 kcals, 39 g protein, 550 ml free water, 455 mg K  Goal TF & Flush Orders Provides: 864 kcals, 39 g protein, 550 ml free water, 455 mg K    Anthropometric Measures:  Height: 5' 6\" (167.6 cm)  Ideal Body Weight (IBW): 142 lbs (65 kg)    Admission Body Weight: 138 lb (62.6 kg) (stated)  Current Body Weight: 155 lb (70.3 kg) (* edema present),   UTD due to edema IBW.  Weight Source: Bed Scale  Current BMI (kg/m2): 25  Usual Body Weight: 148 lb (67.1 kg) (( 7/25/22), 138# ( 8/17/22))  % Weight Change (Calculated): 4.7                    BMI Categories: Normal Weight (BMI 22.0 to 24.9) age over 72 (22.2 based on ' recent wts')    Estimated Daily Nutrient Needs:  Energy Requirements Based On: Kcal/kg  Weight Used for Energy Requirements: Usual  Energy (kcal/day): 1151-5524 kcals @ 22-25 kca/kg  Weight Used for Protein Requirements: Usual  Protein (g/day): ~ 95 g protein @ 1.5 g/kg  Method Used for Fluid Requirements: Standard Renal  Fluid (ml/day):  + daily urine output    Nutrition Diagnosis:   Inadequate oral intake related to impaired respiratory function as evidenced by intubation  Altered nutrition-related lab values related to renal dysfunction as evidenced by lab values    Nutrition Interventions:   Food and/or Nutrient Delivery: Start Tube Feeding  Nutrition Education/Counseling: Education not indicated  Coordination of Nutrition Care: Continue to monitor while inpatient       Goals:     Goals: Initiate nutrition support, by next RD assessment       Nutrition Monitoring and Evaluation:   Behavioral-Environmental Outcomes: None Identified  Food/Nutrient Intake Outcomes: Food and Nutrient Intake, Enteral Nutrition Intake/Tolerance  Physical Signs/Symptoms Outcomes: Biochemical Data, Fluid Status or Edema, Hemodynamic Status, Weight    Discharge Planning:     Too soon to determine     Ruth Mix, RD, LD

## 2022-08-29 NOTE — PROGRESS NOTES
Was present in the Er when the pt arrived Saturday night, pt sister was also present at that time, Daughter was present during rounding, spoke to the daughter who traveled from out of state to be with her father, pt is [employer] and at this time  a  is not needed, let the daughter know that spiritual care is here and we can have a  visit if requested, comfort care, pt is intubated at this time,     Tara Jose was called to anoint pt, Fr Ke Robles anointed pt and prayed with the pt daughter and grandson

## 2022-08-30 LAB — CULTURE, RESPIRATORY: NORMAL

## 2022-09-01 LAB
BLOOD CULTURE, ROUTINE: NORMAL
CULTURE, BLOOD 2: NORMAL

## 2022-09-03 LAB
BODY FLUID CULTURE, STERILE: NORMAL
BODY FLUID CULTURE, STERILE: NORMAL

## 2022-09-03 NOTE — PROGRESS NOTES
Physician Progress Note      PATIENT:               Concepcion Padilla  [facility] #:                  [id number]  :                       1955  ADMIT DATE:       2022 8:03 PM  [address] [place] DATE:        2022 5:41 PM  RESPONDING  PROVIDER #:        Slade Terrell DO          QUERY TEXT:    Pt admitted with cardiac arrest and acute respiratory failure on . \"Septic   shock\", \"bilateral pleural effusion, concern for infection\" noted by Dr. Yanci Osei . On admission: WBC 14, Procalcitonin 13.56, Lactate 9.47, HR   , BP in the 60's. Pt admitted to ICU on pressors and vent. Pt further   deteriorated and  2 days later. If possible, please document in the   progress notes and discharge summary if you are evaluating and /or treating   any of the following: The medical record reflects the following:  Risk Factors: ESRD, COPD, Chronic immunosuppression post liver transplant,   recent admission with acute respiratory failure  Clinical Indicators: WBC 14/26.4/28.2; Procalcitonin 13.36; Lactic acid   8.6/4.2; Lactate 9.42/4.21; SBP in the 60's, ABG pH 7.115 pCO2 87 pO2 78 on   100%. Acute encephalopathy/comatose state. Chest x-ray showed large right   pleural effusion with right lung atelectasis/pneumonia  Treatment: IV Zosyn and Vanco, Levophed gtt, Vaso gtt, NS 1L, Albumin, bicarb,   mechanical ventilation, ICU monitoring, cultures/labs/CXR    Thank you, Dafne Kerns BSN [facility]  [phone]    [facility] [address]  Options provided:  -- Probable sepsis, present on admission, due to, Please document source. -- Probable sepsis due to probable pneumonia, present on admission  -- Sepsis was ruled out after study  -- Other - I will add my own diagnosis  -- Disagree - Not applicable / Not valid  -- Disagree - Clinically unable to determine / Unknown  -- Refer to Clinical Documentation Reviewer    PROVIDER RESPONSE TEXT:    Sepsis was ruled out after study.     Query created by: Pako Gary on 2022 2:47 PM      Electronically signed by:  Geovanny Chisholm DO 9/3/2022 8:45 AM

## 2022-09-03 NOTE — DISCHARGE SUMMARY
Hospital Medicine final/ Discharge Summary    Anu Flores  :  1955  MRN:  [id number]    Admit date:  2022  Discharge date:   22    Admitting Physician:   Latanya Sierra MD  Primary Care Physician:  Alexander Omalley MD      Discharge Diagnoses:         Cardiac arrest   Acute hypoxic and hypercapenic respiratory failure   Bilateral pleural effusion   ESRD on HD   Shock- etiology to be determined   Acute encephalopathy due to acidosis, resp failure, ?rule out ischemia   Likely fall at -CT head shows scalp soft tissue swelling/?hematoma  Bilateral upper ext and left lower DVT- s/p IVC, not on AC due to epistaxis and recent bleeding duodenal ulcer  Recent duodenal ulcer s/p clipping   H/o liver transplant     Chief Complaint   Patient presents with    [address] [place] [address] [address] [place]:          Cardiac arrest   Acute hypoxic and hypercapenic respiratory failure   Bilateral pleural effusion   ESRD on HD   Shock- etiology to be determined   Acute encephalopathy due to acidosis, resp failure, ?rule out ischemia   Likely fall at -CT head shows scalp soft tissue swelling/?hematoma  Bilateral upper ext and left lower DVT- s/p IVC, not on AC due to epistaxis and recent bleeding duodenal ulcer  Recent duodenal ulcer s/p clipping   H/o liver transplant     Exam on discharge:          Patient was seen by the following consultants   Consults:  IP CONSULT TO NEPHROLOGY  IP CONSULT TO ENDOCRINOLOGY  IP CONSULT TO CRITICAL CARE  IP CONSULT TO PALLIATIVE CARE  PHARMACY CONSULT FOR RENAL DOSING  IP CONSULT TO NEUROLOGY  IP CONSULT TO CARDIOLOGY  IP CONSULT TO DIETITIAN  IP CONSULT TO VASCULAR SURGERY  IP CONSULT TO VASCULAR SURGERY    Significant Diagnostic Studies:    Refer to chart     Please refer to chart if no studies are shown here    CT ABDOMEN PELVIS WO CONTRAST Additional Contrast? None    Result Date: 2022  Patient: Zoila Solis  Time Out: 22:35 Exam(s): CT ABDOMEN + PELVIS Without 8/29/2022  COMPARISON: August 28, 2022 HISTORY: lung evaluation TECHNIQUE: AP view FINDINGS: A left chest tube is again seen in place. A right chest tube is also seen in place. Both are unchanged in position. The right lung appears to be expanded. A small pneumothorax is seen on the left that measures 15 mm in greatest transverse dimension. No midline shift is seen. An endotracheal tube and enteric tube are again seen in place. There is no evidence for pleural effusion or consolidation. A small pneumothorax is again seen on the left. CT HEAD WO CONTRAST    Result Date: 8/28/2022  CT Brain. Contrast medium:  without contrast.. History:  Fall. Altered mental status. Cardiac arrest.. Technical factors: CT imaging of the brain was obtained and formatted as 5 mm contiguous axial images. 2.5 mm contiguous axial images were obtained through the osseous structures. Sagittal and coronal reconstruction obtained during postprocessing. Comparison:  None. Findings: Extra-axial spaces:  Normal. Intracranial hemorrhage:  None. Ventricular system: Ventricles mildly enlarged. Sulci mildly prominent. Basal Cisterns:  Normal. Cerebral Parenchyma: Bilateral symmetric periventricular areas decreased attenuation. Midline Shift:  None. Cerebellum:  Normal. Paranasal sinuses, calvarium, and mastoid air cells: Opacification bilateral mastoid air cells. Mucosal thickening bilateral maxillary sinuses. Partial opacification ethmoid sinuses. Extracalvarial soft tissue thickening measuring approximately 7.6 x 1.7  cm right frontal, parietal, and temporal region. Visualized Orbits:  Normal.     Impression: Bilateral mastoiditis. Bilateral maxillary and ethmoid sinusitis. Right frontal, parietal, and temporal scalp soft tissue swelling. Mild cerebral atrophy. Chronic ischemic white matter disease.  All CT scans at this facility use dose modulation, iterative reconstruction, and/or weight based dosing when appropriate to reduce radiation dose to as low as reasonably achievable. CT CHEST WO CONTRAST    Result Date: 8/27/2022  Patient: Doc Anderson  Time Out: 22:35 Exam(s): CT CHEST Without Contrast  EXAM:   CT Chest Without Intravenous Contrast  CLINICAL HISTORY:    Reason for exam: fall. Chief complaint altered mental status. S/p cardiac arrest, fell  TECHNIQUE:   Axial computed tomography images of the chest without intravenous contrast.  All CT scan at this facility use dose modulation, iterative reconstruction, and/or weight based dosing when appropriate to reduce radiation dose to as low as reasonably achievable. COMPARISON:   No relevant prior studies available. FINDINGS:   Lungs:  Bilateral compressive atelectatic changes. Pleural space: There are large bilateral mildly complex pleural effusions. No pneumothorax. Heart:  Unremarkable. No cardiomegaly. No significant pericardial effusion. No significant coronary artery calcifications. Bones/joints:  Right-sided third anterolateral acute rib fracture. Right-sided posterior ninth rib fracture with an adjacent rounded lucency and sclerotic appearance of the bone. Left-sided fourth, fifth, sixth anterolateral rib buckle fractures. Left-sided fifth lateral acute rib fracture. No dislocation. Soft tissues:  Diffuse soft tissue edema. Small bubbles of air in the left anterior chest wall. Vasculature:  Unremarkable. No thoracic aortic aneurysm. Lymph nodes:  Unremarkable. No enlarged lymph nodes. Tubes, lines and devices:  Endotracheal tube terminates at the origin of the right mainstem bronchus. Electronically signed by Chanel Tucker M.D. on 08-27-22 at [phone]    1. Bilateral slightly complex appearing pleural effusions. This could be secondary to underlying blood products or empyema in the appropriate clinical setting. Bilateral acute rib fractures. There is a right posterior rib fracture with pathologic features. Recommend clinical correlation and follow-up.   Air in the chest wall soft tissues which could be secondary to a laceration. 2.  Endotracheal tube in the origin of the right mainstem bronchus. CTA CHEST W WO CONTRAST    Result Date: 8/28/2022  CT PULMONARY ANGIOGRAM WITH INTRAVENOUS CONTRAST MEDIUM. REASON FOR EXAMINATION:  CARDIAC ARREST. PNEUMOTHORAX. TECHNIQUE: Helical CTA was performed through the chest utilizing Isovue-300, 150 mL. Patient experienced infiltrate, 75 mL intravenous contrast.  Images were obtained with bolus tracking in order to opacify the pulmonary arteries. Thick section coronal MIP 3D reconstructions were performed on a separate workstation. COMPARISON:none FINDINGS:  Pulmonary arteries: No intraluminal filling defects. Thoracic aorta: Normal in course and caliber. Cardiac Size: Normal. Pericardial effusion: None. Vascular: Limited imaging suggested lesion proximal right basilic/axillary vein with extensive collateral circulation, right chest wall, with contrast medium filling superior vena cava collateral circulation from right anterior chest wall (series 2, images 33 through 42). Right lung: Areas of pneumothoraces identified at right lung apex, and base right chest (series 2, images 24 and 94, series 602, image 42). Right thoracostomy tube identified with tip posterior right upper chest (series 2, image 62, series 602, image 21). Diffuse emphysematous change. Small right effusion. Dependent subsegmental atelectatic change right upper and right lower lobe. Left lung: Large left pneumothorax with lung retracted approximately 4.5 cm from apex and 5.7 cm lung base. Left thoracostomy tube identified posterior chest with catheter apex left chest (series 2, image 21, series 602, image 48). Moderate left pleural effusion. Dependent subsegmental consolidation, left lower lobe. Lymph nodes: No hilar, mediastinal, or axillary lymph node enlargement.  Upper abdomen:Limited imaging upper abdomen shows multiple surgical metallic artifacts at gastroesophageal junction, extending caudally in the left upper quadrant. Surgical clips also identified right upper quadrant. Bilateral renal hypoplasia. Musculoskeletal:No osteoblastic, and no osteolytic lesions. No CT evidence pulmonary embolism. Right thoracostomy tube as discussed with persistent right pneumothorax. Left thoracostomy tube as discussed with large persistent left pneumothorax. Small right, moderate left pleural effusions. Findings suspicious for occlusion right basilic/right axillary vein with contrast medium filling superior vena cava via extensive collateral circulation right anterior chest wall. Bilateral renal hypoplasia. Postsurgical abdominal changes discussed. All CT scans at this facility use dose modulation, iterative reconstruction, and/or weight based dosing when appropriate to reduce radiation dose to as low as reasonably achievable. CT CERVICAL SPINE WO CONTRAST    Result Date: 8/27/2022  Patient: Mae Marquez  Time Out: 22:13 Exam(s): CT C SPINE  EXAM:   CT Cervical Spine Without Intravenous Contrast  CLINICAL HISTORY:    Reason for exam: AMS. Chief complaint altered mental status. S/p cardiac arrest, fell  TECHNIQUE:   Axial computed tomography images of the cervical spine without intravenous contrast.  All CT scan at this facility use dose modulation, iterative reconstruction, and/or weight based dosing when appropriate to reduce radiation dose to as low as reasonably achievable. COMPARISON:   No relevant prior studies available. FINDINGS:   Vertebrae: Moderate to severe degenerative changes of the spine. Endplate osteophytes and facet arthropathy. No acute fracture. Discs/spinal canal/neural foramina: At the C6-C7 level moderate to severe bilateral neural foraminal stenosis at And moderate to severe canal stenosis due to a degenerative disc bulge and bony hypertrophy. Multilevel disc space narrowing.   At the C4-C5 level moderate bilateral foraminal stenosis and moderate canal stenosis. At the C5-C6 level severe bilateral foraminal stenosis and moderate to severe canal stenosis. Soft tissues:  Soft tissue edema. Mastoid air cells:  Bilateral mastoid effusions. Auditory system: There is fluid in the right middle ear cavity. Pleural space:  Bilateral partially seen large pleural effusions. Tubes, lines and devices:  Endotracheal tube is partially seen. Electronically signed by Ira Villasenor M.D. on 08-27-22 at 2213    1. At the C6-C7 level moderate to severe bilateral neural foraminal stenosis at And moderate to severe canal stenosis due to a degenerative disc bulge and bony hypertrophy. 2.  No acute findings. DJD. Bilateral mastoid effusions. Right otitis media. XR CHEST PORTABLE    Result Date: 8/28/2022  EXAMINATION: XR CHEST PORTABLE CLINICAL HISTORY: CHEST TUBE PLACEMENT COMPARISONS: AUGUST 27, 2022, 2015 HOURS FINDINGS: Patient leaning to right. Tip of endotracheal tube 4.8 cm superior to lucita. Nasogastric tube courses beneath diaphragm. Interval placement right thoracostomy tube with tip found medially right mid chest. Previously visualized right pleural effusion, coursing along right chest wall to right apex no longer identified. No pneumothorax visualized. Ill-defined area of increased opacity now identified in left lower lung. Cardiopericardial silhouette normal.     INTERVAL PLACEMENT RIGHT CHEST TUBE, WITH EVACUATION RIGHT PLEURAL EFFUSION. LEFT LOWER LUNG ATELECTASIS/PNEUMONIA. INTERVAL RETRACTION ENDOTRACHEAL TUBE. XR CHEST PORTABLE    Result Date: 8/28/2022  EXAMINATION: XR CHEST PORTABLE CLINICAL HISTORY: LEFT CHEST 2 COMPARISONS: CHEST RADIOGRAPH, AUGUST 28, 2022 0851 HOURS FINDINGS: Costophrenic angles not imaged. Tip of endotracheal tube 3.7 cm superior to lucita. Nasogastric tube courses beneath diaphragm.  Interval placement left thoracostomy tube with tip found medially left upper chest. Previous ill-defined area of increased opacity left lower lung no longer identified. Incomplete reexpansion left lung with lung margin retracted 3.6 cm. Left chest wall. Right thoracostomy tube identified with tip abutting the mediastinum in mid chest. Incomplete reexpansion right lung, with retraction approximately 2 cm from right lower chest wall. INTERVAL PLACEMENT LEFT THORACOSTOMY TUBE WITH RESOLUTION OF LEFT EFFUSION. INCOMPLETE REEXPANSION LEFT LOWER LUNG. PRE-EXISTING RIGHT THORACOSTOMY TUBE WITH INCOMPLETE REEXPANSION RIGHT LOWER LUNG. THE ABOVE FINDINGS WERE DISCUSSED WITH PATIENT'S NURSE ONDINA. TELEPHONE IN THE ICU AT 11:12 AM, SUNDAY, AUGUST 28, 2022     XR CHEST PORTABLE    Result Date: 8/28/2022  EXAMINATION: XR CHEST PORTABLE CLINICAL HISTORY: ANTEGRADE COMPARISONS: AUGUST 7, 2022 FINDINGS: Endotracheal tube identified with tip at lucita oriented toward right mainstem bronchus. Defibrillator pads identified overlying right lateral chest and left lower chest. Costophrenic angles not imaged. Osseous structures intact. Cardiopericardial silhouette normal. A homogeneous area increased opacity right lung base tracking along right chest wall the right apex. Ill-defined area increased opacity right mid and right upper lung. Imaged portion left lung clear. LARGE RIGHT PLEURAL EFFUSION WITH RIGHT LUNG ATELECTASIS/PNEUMONIA. ENDOTRACHEAL TUBE NEAR LUCITA. RECOMMEND RETRACTING ENDOTRACHEAL TUBE 4 CM. LIMITATIONS DISCUSSED. ABOVE FINDINGS DISCUSSED VIA TELEPHONE WITH PATIENT'S NURSE ONDINA AT 8:15 AM, [facility], AUGUST 28, 2020.       Discharge Medications:    NA      Disposition:   NA    Condition at discharge:  NA    Activity:  NA     Total time taken for discharging this patient: <30     Signed:  Mirta Decker DO  9/3/2022, 5:47 PM  ----------------------------------------------------------------------------------------------------------------------    Duane Chacon

## 2022-09-17 ASSESSMENT — ENCOUNTER SYMPTOMS
SHORTNESS OF BREATH: 1
BACK PAIN: 1
COUGH: 1

## 2022-09-17 NOTE — PROGRESS NOTES
Patient Name: Justin Navarrete    YOB: 1955  Medical Record Number: [id number]        History of Present Illness:  71-year-old M admitted here with shortness of breath after recent hospitalization. Patient had functional decline. Patient had evidence of pneumonia pulmonary patient was placed on Rester treatments patient has a history of chronic kidney disease has been on hemodialysis patient was seen by nephrology patient was started course of anticoagulation had evidence of GI bleed palpable epistaxis patient did undergo EGD as well-by ENT patient was stabilized patient had her anticoagulation reversed and had IVC filter placed patient was possible complicated medical standpoint and he was stabilized and graduated from the ICU separately to medical floor and transferred here for ongoing course of care. Today in his room globally weak function below his baseline intermittent pain at this time. No recent nausea or vomiting. Review of Systems   Constitutional:  Positive for fatigue. HENT:  Negative for congestion. Respiratory:  Positive for cough and shortness of breath. Cardiovascular:  Negative for chest pain and leg swelling. Musculoskeletal:  Positive for back pain. Neurological:  Positive for weakness. Psychiatric/Behavioral:  Positive for confusion. All other systems reviewed and are negative.     Review of Systems: All 14 review of systems negative other than as stated above    Social History     Tobacco Use    Smoking status: Former     Packs/day: 0.50     Years: 15.00     Pack years: 7.50     Types: Cigarettes     Quit date: 2017     Years since quittin.8    Smokeless tobacco: Never   Substance Use Topics    Alcohol use: No    Drug use: No         Past Medical History:   Diagnosis Date    ESRD (end stage renal disease) ([facility]     Hemodialysis patient ([facility]     Hepatitis     Hypertension     Liver transplanted ([facility] 2016           Past Surgical History:   Procedure Laterality Date    DIALYSIS FISTULA CREATION Right     IR MIDLINE CATH  8/2/2022    IR MIDLINE CATH 8/2/2022 MLOZ SPECIAL PROCEDURE    LIVER TRANSPLANT  03/08/2017    TUNNELED VENOUS PORT PLACEMENT      UPPER GASTROINTESTINAL ENDOSCOPY N/A 8/5/2022    EGD ESOPHAGOGASTRODUODENOSCOPY WITH INTERVENTION performed by Duy Hoffmann MD at [facility]         Current Outpatient Medications on File Prior to Visit   Medication Sig Dispense Refill    metoprolol tartrate (LOPRESSOR) 25 MG tablet Take 0.5 tablets by mouth 2 times daily (Patient taking differently: Take 12.5 mg by mouth 2 times daily Indications: High Blood Pressure Disorder) 60 tablet 3    sucralfate (CARAFATE) 1 GM tablet Take 1 tablet by mouth 4 times daily (Patient taking differently: Take 1 g by mouth 4 times daily Indications: Irritation of the Digestive T[place]) 120 tablet 3    pantoprazole (PROTONIX) 40 MG tablet Take 1 tablet by mouth 2 times daily (before meals) (Patient taking differently: Take 40 mg by mouth 2 times daily (before meals) Indications: Gastroesophageal Reflux Disease) 180 tablet 1    aspirin 81 MG EC tablet Take 1 tablet by mouth daily (Patient taking differently: Take 81 mg by mouth daily Indications: Preventative Treatment) 30 tablet 3    fluticasone (FLONASE) 50 MCG/ACT nasal spray 1 spray by Each Nostril route daily (Patient taking differently: 1 spray by Each Nostril route daily Indications: Allergy) 16 g 3    albuterol sulfate HFA (PROAIR HFA) 108 (90 Base) MCG/ACT inhaler 2 puffs; Use every 4 hours while awake for 7-10 days then PRN wheezing  Dispense with SPACER and Instruct on use. May sub Ventolin or Proventil as needed per [employer]. (Patient taking differently: Inhale 2 puffs into the lungs every 4 hours as needed for Wheezing or Shortness of Breath 2 puffs; Use every 4 hours while awake for 7-10 days then PRN wheezing  Dispense with SPACER and Instruct on use.   May sub Ventolin or Proventil as needed per [employer].) 18 g 0    calcium acetate (PHOSLO) 667 MG capsule Take 667 mg by mouth 3 times daily (with meals)      Tacrolimus (PROGRAF PO) Take 1 tablet by mouth 2 times daily Indications: Immunosuppression       No current facility-administered medications on file prior to visit. No Known Allergies      No family history on file. Physical Exam:      Physical Exam  Vitals and nursing note reviewed. Constitutional:       Appearance: Normal appearance. He is normal weight. HENT:      Head: Normocephalic and atraumatic. Nose: Nose normal.      Mouth/Throat:      Mouth: Mucous membranes are dry. Eyes:      Extraocular Movements: Extraocular movements intact. Pupils: Pupils are equal, round, and reactive to light. Cardiovascular:      Rate and Rhythm: Regular rhythm. Pulses: Normal pulses. Heart sounds: Normal heart sounds. Pulmonary:      Effort: Pulmonary effort is normal.      Breath sounds: Rhonchi present. Abdominal:      General: Bowel sounds are normal. There is no distension. Palpations: Abdomen is soft. Musculoskeletal:         General: Swelling present. Cervical back: Neck supple. No tenderness. Skin:     General: Skin is warm. Findings: Bruising present. Neurological:      Motor: Weakness present. Psychiatric:         Mood and Affect: Mood normal.       There were no vitals taken for this visit.       .   Lab Results   Component Value Date    WBC 28.2 (H) 08/29/2022    HGB 7.9 (L) 08/29/2022    HCT 32.7 (L) 08/29/2022    .1 (H) 08/29/2022     08/29/2022     Lab Results   Component Value Date/Time     08/29/2022 08:18 AM    K 5.7 08/29/2022 08:18 AM    K 3.5 08/13/2022 05:23 AM    CL 86 08/29/2022 08:18 AM    CO2 13 08/29/2022 08:18 AM    BUN 46 08/29/2022 08:18 AM    CREATININE 5.3 08/29/2022 12:19 PM    CREATININE 5.95 08/29/2022 08:18 AM    GLUCOSE 91 08/29/2022 08:18 AM    GLUCOSE 136 02/25/2012 05:10 PM    CALCIUM 8.9 08/29/2022 08:18 AM                ASSESSMENT:  Patient Active Problem List   Diagnosis    Hypotension    ESRD (end stage renal disease) on dialysis ([facility]    Liver transplanted ([facility]    Liver transplant recipient ([facility]    Sepsis ([facility]    Gastroenteritis    C. difficile colitis    Severe sepsis ([facility]    Vomiting and diarrhea    Generalized abdominal pain    Acute renal failure (HCC)    Acute respiratory failure with hypoxia and hypercapnia (HCC)    Pneumonia due to infectious organism    Bilateral pleural effusion    Impaired mobility and activities of daily living    Dysphagia, oropharyngeal phase    Epistaxis    Duodenal ulcer    Severe malnutrition (HCC)    Cardiac arrest (HCC)    Shock ([facility]    Chronic kidney disease    Abdominal mass    Elevated troponin         PLAN:   Diagnosis Orders   1. ESRD (end stage renal disease) on dialysis ([facility]        2. Epistaxis        3. Unsteadiness        4. Weakness          Physical therapy outpatient return for support. Monitor for further GI bleed monitor further epistaxis monitor respiratory status at this time. Monitoring for signs of acute recurrent pneumonia completed course of antibiotic therapy has been on steroid wean follow functionally at this time repeat CBC BMP liver function test pending. Please note orders entered on site at facility after discussion with appropriate facility nursing/therapy/ / nutritional staff. Current longstanding medical problems and acute medical issues addressed with staff. Available data and data elements in on site paper chart reviewed and analyzed. Current external consultant notes reviewed in on site chart. Ordered laboratory testing and imaging will be reviewed when available. This patient's need for psychiatric medication has been reviewed. Will consider further adjustment and possible further evaluations by mental health services.

## 2022-09-19 ASSESSMENT — ENCOUNTER SYMPTOMS
WHEEZING: 1
BACK PAIN: 1
COUGH: 1
SHORTNESS OF BREATH: 1

## 2022-09-19 NOTE — PROGRESS NOTES
Subjective:      Patient ID: Ana Last is a pleasant 75 y.o. male who presents today for:  No chief complaint on file. [facility]    Patient seen today for acute respiratory failure with hypoxia, particular malnutrition, and ESRD. Did undergo course of antibiotics in hospital for pneumonia. Patient has been on hemodialysis routinely 3 days a week times past 6 years. Does have history of liver transplant. Continues to have ongoing shortness of breath. We will add a breathing treatments albuterol nebulizers. No current antibiotic. Has poor overall intake of food and fluids. Pulmonary edema likely culprit for SOB (?),  Will have nurse address with HD for possible increase fluid reduction. Current vital signs 136/82, 70 bpm, 96% SPO2 with NC, 98 °F, weight = 154 LBS.         Patient Active Problem List   Diagnosis    Hypotension    ESRD (end stage renal disease) on dialysis ([address]    Liver transplanted ([address]    Liver transplant recipient ([address]    Sepsis ([address]    Gastroenteritis    C. difficile colitis    Severe sepsis ([address]    Vomiting and diarrhea    Generalized abdominal pain    Acute renal failure (HCC)    Acute respiratory failure with hypoxia and hypercapnia (HCC)    Pneumonia due to infectious organism    Bilateral pleural effusion    Impaired mobility and activities of daily living    Dysphagia, oropharyngeal phase    Epistaxis    Duodenal ulcer    Severe malnutrition (HCC)    Cardiac arrest ([address]    Shock ([address]    Chronic kidney disease    Abdominal mass    Elevated troponin     Past Medical History:   Diagnosis Date    ESRD (end stage renal disease) ([address]     Hemodialysis patient ([address]     Hepatitis     Hypertension     Liver transplanted ([address] 03/2016     Past Surgical History:   Procedure Laterality Date    DIALYSIS FISTULA CREATION Right     IR MIDLINE CATH  8/2/2022    IR MIDLINE CATH 8/2/2022 MLOZ SPECIAL PROCEDURE    LIVER TRANSPLANT  03/08/2017    TUNNELED VENOUS PORT PLACEMENT UPPER GASTROINTESTINAL ENDOSCOPY N/A 2022    EGD ESOPHAGOGASTRODUODENOSCOPY WITH INTERVENTION performed by Maria L Lin MD at [address] History    Marital status: Single     Spouse name: Not on file    Number of children: Not on file    Years of education: Not on file    Highest education level: Not on file   Occupational History    Not on file   Tobacco Use    Smoking status: Former     Packs/day: 0.50     Years: 15.00     Pack years: 7.50     Types: Cigarettes     Quit date: 2017     Years since quittin.8    Smokeless tobacco: Never   Substance and Sexual Activity    Alcohol use: No    Drug use: No    Sexual activity: Not on file   Other Topics Concern    Not on file   Social History Narrative    Lives With: Alone    Type of Home: Apartment in [address]: One level (4th floor)    Home Access: Elevator, Level entry    Bathroom Shower/Tub: Tub/Shower unit    Bathroom Equipment: Grab bars in [facility]: [employer], [employer]    Has the patient had two or more falls in the past year or any fall with injury in the past year?:  (One fall about 2 months ago bending over to pick something up)    ADL Assistance: Independent    Homemaking Assistance: Independent    Ambulation Assistance: Independent    Transfer Assistance: Independent    Active : Yes    Occupation: Retired    Additional Comments: Sister lives nearby     on hemodialysis          Social Determinants of Health     Financial Resource Strain: Not on file   Food Insecurity: Not on file   Transportation Needs: Not on file   Physical Activity: Not on file   Stress: Not on file   Social Connections: Not on file   Intimate Partner Violence: Not on file   Housing Stability: Not on file     No family history on file. No Known Allergies      Review of Systems   Constitutional:  Positive for fatigue. HENT:  Negative for congestion. Respiratory:  Positive for cough, shortness of breath and wheezing. Cardiovascular:  Negative for chest pain and leg swelling. Musculoskeletal:  Positive for back pain. Skin:  Positive for pallor. Neurological:  Positive for weakness. Psychiatric/Behavioral:  Positive for confusion and decreased concentration. Negative for agitation. All other systems reviewed and are negative. Objective:   VS: See [facility]. Reviewed. Physical Exam  Constitutional:       General: He is not in acute distress. Appearance: Normal appearance. He is obese. He is ill-appearing. HENT:      Head: Normocephalic and atraumatic. Mouth/Throat:      Mouth: Mucous membranes are dry. Pharynx: Oropharynx is clear. No oropharyngeal exudate or posterior oropharyngeal erythema. Eyes:      Conjunctiva/sclera: Conjunctivae normal.      Pupils: Pupils are equal, round, and reactive to light. Cardiovascular:      Rate and Rhythm: Normal rate and regular rhythm. Pulmonary:      Effort: Pulmonary effort is normal. No respiratory distress. Breath sounds: Stridor present. Wheezing present. Chest:      Chest wall: No tenderness. Abdominal:      General: Bowel sounds are normal. There is distension. Skin:     General: Skin is warm and dry. Coloration: Skin is pale. Skin is not jaundiced. Neurological:      Mental Status: He is alert. He is disoriented. Motor: Weakness present. Psychiatric:         Mood and Affect: Mood normal.       Assessment:       Diagnosis Orders   1. Acute respiratory failure with hypoxia and hypercapnia (HCC)        2. Moderate protein malnutrition ([address]        3. ESRD (end stage renal disease) ([address]        4. History of recent pneumonia              Plan:      Continue monitoring patient vitals routinely. No current antibiotic. Add albuterol nebulizers every 8 hours as needed. CMP, CBC, BMP on Monday. Monitor for pulmonary edema and subsequent drop in SPO2.   We will request additional HD time if necessary. Labs to be reported to nephrology. No follow-ups on file. [place], [place]        Please note orders entered on site at facility after discussion with appropriate facility nursing/therapy/ / nutritional staff. Current longstanding medical problems and acute medical issues addressed with staff. Available data and data elements in on site paper chart reviewed and analyzed. Current external consultant notes reviewed in on site chart. Ordered laboratory testing and imaging will be reviewed when available. This patient's need for psychiatric medication has been reviewed. Will consider further adjustment and possible further evaluations by mental health services. Side effects, adverse effects of the medication prescribed today, as well as treatment plan and result expectations have been discussed withthe patient who expresses understanding and desires to proceed.

## 2022-09-21 LAB
AFB STAIN: NORMAL
FINAL REPORT: NORMAL
PRELIMINARY: NORMAL

## 2022-10-01 LAB
FINAL REPORT: NORMAL
FINAL REPORT: NORMAL
PRELIMINARY: NORMAL
PRELIMINARY: NORMAL

## 2022-10-25 LAB
AFB STAIN: NORMAL
AFB STAIN: NORMAL
FINAL REPORT: NORMAL
FINAL REPORT: NORMAL
PRELIMINARY: NORMAL
PRELIMINARY: NORMAL

## 2024-10-21 NOTE — PROGRESS NOTES
Falls Plan of Care: Balance, strength, and gait training instructions were provided.Ambulatory Visit  Name: Wade Le      : 1950      MRN: [id number]  Encounter Provider: Tulio Maki MD  Encounter Date: 10/21/2024   Encounter department: [facility]    Assessment & Plan  Medicare annual wellness visit, subsequent    During this visit, we have a goal to personalize prevention. I discussed with the patient about    Patient Active Problem List   Diagnosis    HTN (hypertension)    Prediabetes    Mixed hyperlipidemia    Status post total knee replacement, left    hsitroy of DVT (deep venous thrombosis) (HCC)    Abnormal CT scan of abdomen    Low back pain with sciatica    Visual impairment    Primary osteoarthritis involving multiple joints    Pulmonary nodules in low risk patient    Pancreatic cyst    Spinal stenosis of lumbar region with neurogenic claudication    Lumbar spondylosis    Lumbar radiculopathy    OAB (overactive bladder)    Nocturia    and decreased strength--a lifestyle plan to decrease the impact of current problems. I did a Health risk assessment and discussed ways to stay healthier with the patient. We also reviewed the current medications, the need to avoid polypharmacy in his current treatment, and how chronic conditions impact now and later. A recommended healthy diet and exercising as frequently as possible will help better control the patient's chronic conditions, Immunizations, and the need to comply with current CDC recommendations. The patient declined at this time advanced directives. I encouraged against using alcohol, tobacco, and recreational illegal prescribed and non-prescribed drugs. About smoking: recommended avoid exposure to second hand smoking.    Other issues:    Stop Eliquis. Anticoagulation period ended for 1 solo episode of DVT in 2023.  1. Chronic Pain:  - Patient reports persistent generalized body pain  -  Physical Therapy Med Surg Daily Treatment Note  [facility]  [facility]       NAME: Geraldine Palacios  : 1955 (75 y.o.)  MRN: [id number]  CODE STATUS: Full Code    Date of Service: 2022    Patient Diagnosis(es): Acute respiratory failure with hypoxia [facility]) [J96.01]   Chief Complaint   Patient presents with    Shortness of Breath     C/O SOB  AFTER DIALYSIS  88 % ON RA  GIVEN 2 DUONEBS  SOLU MEDROL      Patient Active Problem List    Diagnosis Date Noted    Impaired mobility and activities of daily living 2022    Dysphagia, oropharyngeal phase 2022    Epistaxis 2022    Pneumonia due to infectious organism 2022    Pleural effusion 2022    Acute respiratory failure with hypoxia (HCC) 2022    Vomiting and diarrhea     Generalized abdominal pain     Acute renal failure (HCC)     Gastroenteritis     C. difficile colitis     Severe sepsis ([address]     Hypotension 2017    ESRD (end stage renal disease) on dialysis ([address] 2017    Liver [facility] 2017    Liver transplant recipient [facility])     Sepsis [facility])         Past Medical History:   Diagnosis Date    ESRD (end stage renal disease) ([address]     Hepatitis     Hypertension     Liver transplanted ([facility] 2016     Past Surgical History:   Procedure Laterality Date    DIALYSIS FISTULA CREATION Right     IR MIDLINE CATH  2022    IR MIDLINE CATH 2022 MLOZ SPECIAL PROCEDURE    LIVER TRANSPLANT  2017    TUNNELED VENOUS PORT PLACEMENT         Chart Reviewed: Yes  Patient assessed for rehabilitation services?: Yes    Restrictions:  Restrictions/Precautions: Fall Risk    SUBJECTIVE:   Subjective: Patient resting in bed. RN present. Patient agreeable to tx. Pain  Denies pain     OBJECTIVE:     Bed mobility  Supine to Sit: Moderate assistance  Sit to Supine: Minimal assistance (Up to bedside chair)  Scooting: Minimal assistance; Moderate assistance  Bed Mobility Comments: Requires increased time, effort and use of bed rail. Difficulty maintaining seated balance at midline at EOB due to left lateral lean. Transfers  Sit to Stand: Minimal Assistance  Stand to sit: Minimal Assistance  Comment: Completes STS x1 with ww     ASSESSMENT   Body Structures, Functions, Activity Limitations Requiring Skilled Therapeutic Intervention: Decreased functional mobility ; Decreased safe awareness;Decreased balance; Increased pain;Decreased posture;Decreased endurance  Assessment: Patient demonstrates decline in activity tolerance/ functional mobility this date. completes sit to stand with ww. Tolerates approx 20 seconds of static standing. Unable to attempt ambulation due to weakness and SOB. SPO2 90% post activity. Patient request to return BTB. Barriers to Learning: none     Discharge Recommendations:  Patient would benefit from continued therapy after discharge    Goals  Long Term Goals  Long term goal 1: Bed mobility with indep  Long term goal 2: Functional transfers with indep  Long term goal 3: Amb 50ft with LRAD and indep  Long term goal 4: TUG <15 seconds to demonstrate decreased fall risk  Long term goal 5: Pt will be supervision with HEP to improve LE strength, ROM, and activity tolerance  Patient Goals   Patient goals : to get my therapy and get stronger    PLAN    Plan: 1 time a day 3-6 times a week  Safety Devices  Type of Devices: All fall risk precautions in place, Call light within reach, Chair alarm in place, Left in chair     [facility] (6 CLICK) Shira Sandra 28 Inpatient Mobility Raw Score : 12     Therapy Time   Individual   Time In 1050   Time Out 1106   Minutes 16     Timed Code Treatment Minutes: 16 Minutes   Tr/bed mobility [address] [place] [address], 08/05/22 at 11:36 AM         Definitions for assistance levels  Independent = pt does not require any physical supervision or assistance from another person for activity completion. Device may be needed.   Stand by Continue current pain management regimen  - Encourage physical therapy and gentle exercise as tolerated    2. Knee Issues:  - History of left knee replacement  - Visit to Ortho for Right knee pain scheduled for November 1st  - Monitor progress and assess need for further interventions    3. Pancreatic Cyst:  - 3mm cyst in pancreatic head, noted 2 years ago  - Plan: Schedule MRI of pancreas in August 2025 for follow-up    4. Preventive Care:  - Due for shingles and RSV vaccines - to be administered at pharmacy  - Order comprehensive blood panel: CBC, liver function, kidney function, glucose, and lipid profile    5. Mobility:  - Encourage continued use of cane for stability  - Recommend physical therapy to improve gait and overall mobility    6. Substance Use:  - Congratulate patient on 20+ years of abstinence from alcohol and smoking  - Continue to encourage abstinence    7. Follow-up:  - Schedule next annual Medicare wellness visit  - Ensure patient has transportation arranged for upcoming appointments     Orders:    CBC and differential; Future    Comprehensive metabolic panel; Future    Lipid Panel with Direct LDL reflex; Future    Need for shingles vaccine    Orders:    RSVPreF3 Vac Recomb Adjuvanted 120 MCG/0.5ML SUSR; Inject 0.5 mL into a muscle 1 (one) time for 1 dose    Need for RSV immunization    Orders:    Zoster Vac Recomb Adjuvanted (Shingrix) 50 MCG/0.5ML SUSR; Inject 0.5 mL into a muscle once for 1 dose Repeat dose in 2 to 6 months       Preventive health issues were discussed with patient, and age appropriate screening tests were ordered as noted in patient's After Visit Summary. Personalized health advice and appropriate referrals for health education or preventive services given if needed, as noted in patient's After Visit Summary.    History of Present Illness     Wade, a 73-year-old male, presents for his annual Medicare wellness visit. He reports chronic, generalized body pain but denies any  assistance = pt requires verbal cues or instructions from another person, close to but not touching, to perform the activity  Minimal assistance= pt performs 75% or more of the activity; assistance is required to complete the activity  Moderate assistance= pt performs 50% of the activity; assistance is required to complete the activity  Maximal assistance = pt performs 25% of the activity; assistance is required to complete the activity  Dependent = pt requires total physical assistance to accomplish the task new or worsening symptoms. Patient had a left knee replacement in the past and is scheduled for a right knee injection on November 1st. He mentions difficulty with mobility, using a cane for assistance. Wade proudly states he has been abstinent from alcohol and smoking for over 20 years. He recalls a previous pancreatic cyst diagnosis and expresses concern about it. The patient also is in Eliquis for 14 months now after 1 episode of DVT. Wade is approaching his 74th birthday next month and maintains an active social life, including [employer] attendance. He expresses some frustration with transportation to medical appointments, as his previous  is no longer available. He uses multiple yoni in his Phone and can acces [employer] when needed.       Patient Care Team:  Tulio Maki MD as PCP - General (Family Medicine)  Tulio Maki MD as PCP - PCP-[facility] (RT[facility])  Tulio Maki MD as PCP - PCP-[facility] ([facility])  Mariano Paz MD as Endoscopist    Review of Systems  Medical History Reviewed by provider this encounter:       Annual Wellness Visit Questionnaire   Wade is here for his Subsequent Wellness visit. Last Medicare Wellness visit information reviewed, patient interviewed and updates made to the record today.      Health Risk Assessment:   Patient rates overall health as good. Patient feels that their physical health rating is slightly worse. Patient is satisfied with their life. Eyesight was rated as same. Hearing was rated as same. Patient feels that their emotional and mental health rating is same. Patients states they are never, rarely angry. Patient states they are sometimes unusually tired/fatigued. Pain experienced in the last 7 days has been some. Patient's pain rating has been 3/10. Patient states that he has experienced no weight loss or gain in last 6 months.     Depression Screening:   PHQ-2 Score: 0      Fall Risk Screening:   In the past year, patient has experienced: no history of  falling in past year      Home Safety:  Patient has trouble with stairs inside or outside of their home. Patient has working smoke alarms and has working carbon monoxide detector. Home safety hazards include: none.     Nutrition:   Current diet is Regular.     Medications:   Patient is currently taking over-the-counter supplements. OTC medications include: see medication list. Patient is able to manage medications.     Activities of Daily Living (ADLs)/Instrumental Activities of Daily Living (IADLs):   Walk and transfer into and out of bed and chair?: Yes  Dress and groom yourself?: Yes    Bathe or shower yourself?: Yes    Feed yourself? Yes  Do your laundry/housekeeping?: Yes  Manage your money, pay your bills and track your expenses?: Yes  Make your own meals?: Yes    Do your own shopping?: Yes    Previous Hospitalizations:   Any hospitalizations or ED visits within the last 12 months?: Yes    How many hospitalizations have you had in the last year?: 1-2    Advance Care Planning:   Living will: No    Durable POA for healthcare: No    Advanced directive: No    Advanced directive counseling given: Yes    Five wishes given: No    Patient declined ACP directive: No    End of Life Decisions reviewed with patient: Yes    Provider agrees with end of life decisions: Yes      Cognitive Screening:   Provider or family/friend/caregiver concerned regarding cognition?: No    PREVENTIVE SCREENINGS      Cardiovascular Screening:    General: Screening Not Indicated and History Lipid Disorder    Due for: Lipid Panel      Diabetes Screening:     General: Screening Current    Due for: Blood Glucose      Colorectal Cancer Screening:     General: Screening Current    Due for: FOBT/FIT      Prostate Cancer Screening:    General: Screening Current      Osteoporosis Screening:    General: Risks and Benefits Discussed    Due for: DXA Axial      Abdominal Aortic Aneurysm (AAA) Screening:    Risk factors include: age between 65-74 yo and  "tobacco use        General: Screening Not Indicated      Lung Cancer Screening:     General: Screening Not Indicated      Hepatitis C Screening:    General: Screening Current    Hep C Screening Accepted: Yes      Screening, Brief Intervention, and Referral to Treatment (SBIRT)    Screening  Typical number of drinks in a day: 0  Typical number of drinks in a week: 0  Interpretation: Low risk drinking behavior.    Single Item Drug Screening:  How often have you used an illegal drug (including marijuana) or a prescription medication for non-medical reasons in the past year? never    Single Item Drug Screen Score: 0  Interpretation: Negative screen for possible drug use disorder    Other Counseling Topics:   Alcohol use counseling, car/seat belt/driving safety, skin self-exam, sunscreen and calcium and vitamin D intake and regular weightbearing exercise.     Social Determinants of Health     Financial Resource Strain: Low Risk  (2/9/2023)    Overall Financial Resource Strain (CARDIA)     Difficulty of Paying Living Expenses: Not hard at all   Food Insecurity: No Food Insecurity (10/21/2024)    Hunger Vital Sign     Worried About Running Out of Food in the Last Year: Never true     Ran Out of Food in the Last Year: Never true   Transportation Needs: No Transportation Needs (10/21/2024)    PRAPARE - Transportation     Lack of Transportation (Medical): No     Lack of Transportation (Non-Medical): No   Housing Stability: Low Risk  (10/21/2024)    Housing Stability Vital Sign     Unable to Pay for Housing in the Last Year: No     Number of Times Moved in the Last Year: 1     Homeless in the Last Year: No   Utilities: Not At Risk (10/21/2024)    [facility] Utilities     Threatened with loss of utilities: No     No results found.    Objective     /70 (BP Location: Left arm, Patient Position: Sitting, Cuff Size: Standard)   Pulse 64   Temp 97.8 °F (36.6 °C) (Tympanic)   Resp 16   Ht 5' 6\" (1.676 m)   Wt 90.7 kg (200 lb)   " SpO2 97%   BMI 32.28 kg/m²     Physical Exam  Administrative Statements   I have spent a total time of 35 minutes in caring for this patient on the day of the visit/encounter including Diagnostic results.

## (undated) DEVICE — SINGLE PORT MANIFOLD: Brand: NEPTUNE 2

## (undated) DEVICE — BRUSH ENDO CLN L90.5IN SHTH DIA1.7MM BRIST DIA5-7MM 2-6MM

## (undated) DEVICE — TUBING, SUCTION, 1/4" X 10', STRAIGHT: Brand: MEDLINE

## (undated) DEVICE — Device: Brand: ENDO SMARTCAP

## (undated) DEVICE — ENDO CARRY-ON PROCEDURE KIT: Brand: ENDO CARRY-ON PROCEDURE KIT

## (undated) DEVICE — INSTINCT PLUS ENDOSCOPIC CLIPPING DEVICE: Brand: INSTINCT

## (undated) DEVICE — CONMED SCOPE SAVER BITE BLOCK, 20X27 MM: Brand: SCOPE SAVER

## (undated) DEVICE — TUBE SET 96 MM 64 MM H2O PERISTALTIC STD AUX CHANNEL